# Patient Record
Sex: MALE | Race: BLACK OR AFRICAN AMERICAN | Employment: OTHER | URBAN - METROPOLITAN AREA
[De-identification: names, ages, dates, MRNs, and addresses within clinical notes are randomized per-mention and may not be internally consistent; named-entity substitution may affect disease eponyms.]

---

## 2017-07-13 ENCOUNTER — HOSPITAL ENCOUNTER (OUTPATIENT)
Age: 77
Setting detail: OBSERVATION
Discharge: NURSING, REHAB OR LONG-TERM CARE | End: 2017-07-20
Attending: EMERGENCY MEDICINE | Admitting: INTERNAL MEDICINE
Payer: MEDICARE

## 2017-07-13 DIAGNOSIS — N18.9 CHRONIC RENAL INSUFFICIENCY, UNSPECIFIED STAGE: ICD-10-CM

## 2017-07-13 DIAGNOSIS — D64.9 CHRONIC ANEMIA: ICD-10-CM

## 2017-07-13 DIAGNOSIS — M79.605 BILATERAL LEG PAIN: Primary | ICD-10-CM

## 2017-07-13 DIAGNOSIS — M79.604 BILATERAL LEG PAIN: Primary | ICD-10-CM

## 2017-07-13 PROBLEM — R53.1 WEAKNESS: Status: ACTIVE | Noted: 2017-07-13

## 2017-07-13 PROBLEM — Z87.19 HISTORY OF GI BLEED: Status: ACTIVE | Noted: 2017-07-13

## 2017-07-13 PROBLEM — Z86.718 HISTORY OF DVT (DEEP VEIN THROMBOSIS): Status: ACTIVE | Noted: 2017-07-13

## 2017-07-13 LAB
ALBUMIN SERPL BCP-MCNC: 2.7 G/DL (ref 3.5–5)
ALBUMIN/GLOB SERPL: 0.5 {RATIO} (ref 1.1–2.2)
ALP SERPL-CCNC: 94 U/L (ref 45–117)
ALT SERPL-CCNC: 21 U/L (ref 12–78)
ANION GAP BLD CALC-SCNC: 8 MMOL/L (ref 5–15)
APPEARANCE UR: CLEAR
APTT PPP: 27.8 SEC (ref 22.1–32.5)
AST SERPL W P-5'-P-CCNC: 21 U/L (ref 15–37)
BACTERIA URNS QL MICRO: NEGATIVE /HPF
BASOPHILS # BLD AUTO: 0 K/UL (ref 0–0.1)
BASOPHILS # BLD: 0 % (ref 0–1)
BILIRUB SERPL-MCNC: 0.4 MG/DL (ref 0.2–1)
BILIRUB UR QL: NEGATIVE
BUN SERPL-MCNC: 19 MG/DL (ref 6–20)
BUN/CREAT SERPL: 8 (ref 12–20)
CALCIUM SERPL-MCNC: 8.4 MG/DL (ref 8.5–10.1)
CHLORIDE SERPL-SCNC: 107 MMOL/L (ref 97–108)
CK MB CFR SERPL CALC: 3.7 % (ref 0–2.5)
CK MB SERPL-MCNC: 2.5 NG/ML (ref 5–25)
CK SERPL-CCNC: 68 U/L (ref 39–308)
CO2 SERPL-SCNC: 25 MMOL/L (ref 21–32)
COLOR UR: ABNORMAL
CREAT SERPL-MCNC: 2.3 MG/DL (ref 0.7–1.3)
EOSINOPHIL # BLD: 0.2 K/UL (ref 0–0.4)
EOSINOPHIL NFR BLD: 3 % (ref 0–7)
EPITH CASTS URNS QL MICRO: ABNORMAL /LPF
ERYTHROCYTE [DISTWIDTH] IN BLOOD BY AUTOMATED COUNT: 17.8 % (ref 11.5–14.5)
GLOBULIN SER CALC-MCNC: 5.2 G/DL (ref 2–4)
GLUCOSE SERPL-MCNC: 102 MG/DL (ref 65–100)
GLUCOSE UR STRIP.AUTO-MCNC: NEGATIVE MG/DL
HCT VFR BLD AUTO: 28.1 % (ref 36.6–50.3)
HGB BLD-MCNC: 8.8 G/DL (ref 12.1–17)
HGB UR QL STRIP: ABNORMAL
HYALINE CASTS URNS QL MICRO: ABNORMAL /LPF (ref 0–5)
INR PPP: 1.1 (ref 0.9–1.1)
KETONES UR QL STRIP.AUTO: NEGATIVE MG/DL
LEUKOCYTE ESTERASE UR QL STRIP.AUTO: NEGATIVE
LYMPHOCYTES # BLD AUTO: 32 % (ref 12–49)
LYMPHOCYTES # BLD: 1.6 K/UL (ref 0.8–3.5)
MCH RBC QN AUTO: 24.2 PG (ref 26–34)
MCHC RBC AUTO-ENTMCNC: 31.3 G/DL (ref 30–36.5)
MCV RBC AUTO: 77.2 FL (ref 80–99)
MONOCYTES # BLD: 0.4 K/UL (ref 0–1)
MONOCYTES NFR BLD AUTO: 8 % (ref 5–13)
NEUTS SEG # BLD: 2.8 K/UL (ref 1.8–8)
NEUTS SEG NFR BLD AUTO: 57 % (ref 32–75)
NITRITE UR QL STRIP.AUTO: NEGATIVE
PH UR STRIP: 6 [PH] (ref 5–8)
PLATELET # BLD AUTO: 239 K/UL (ref 150–400)
POTASSIUM SERPL-SCNC: 3.8 MMOL/L (ref 3.5–5.1)
PROT SERPL-MCNC: 7.9 G/DL (ref 6.4–8.2)
PROT UR STRIP-MCNC: 100 MG/DL
PROTHROMBIN TIME: 11.1 SEC (ref 9–11.1)
RBC # BLD AUTO: 3.64 M/UL (ref 4.1–5.7)
RBC #/AREA URNS HPF: ABNORMAL /HPF (ref 0–5)
SODIUM SERPL-SCNC: 140 MMOL/L (ref 136–145)
SP GR UR REFRACTOMETRY: 1.01 (ref 1–1.03)
THERAPEUTIC RANGE,PTTT: NORMAL SECS (ref 58–77)
TROPONIN I SERPL-MCNC: <0.04 NG/ML
UA: UC IF INDICATED,UAUC: ABNORMAL
UROBILINOGEN UR QL STRIP.AUTO: 0.2 EU/DL (ref 0.2–1)
WBC # BLD AUTO: 5 K/UL (ref 4.1–11.1)
WBC URNS QL MICRO: ABNORMAL /HPF (ref 0–4)

## 2017-07-13 PROCEDURE — 74011250637 HC RX REV CODE- 250/637: Performed by: EMERGENCY MEDICINE

## 2017-07-13 PROCEDURE — 74011250636 HC RX REV CODE- 250/636: Performed by: INTERNAL MEDICINE

## 2017-07-13 PROCEDURE — 81001 URINALYSIS AUTO W/SCOPE: CPT | Performed by: EMERGENCY MEDICINE

## 2017-07-13 PROCEDURE — 93005 ELECTROCARDIOGRAM TRACING: CPT

## 2017-07-13 PROCEDURE — 74011250637 HC RX REV CODE- 250/637: Performed by: INTERNAL MEDICINE

## 2017-07-13 PROCEDURE — 85025 COMPLETE CBC W/AUTO DIFF WBC: CPT | Performed by: EMERGENCY MEDICINE

## 2017-07-13 PROCEDURE — 99218 HC RM OBSERVATION: CPT

## 2017-07-13 PROCEDURE — 36415 COLL VENOUS BLD VENIPUNCTURE: CPT | Performed by: EMERGENCY MEDICINE

## 2017-07-13 PROCEDURE — 82550 ASSAY OF CK (CPK): CPT | Performed by: EMERGENCY MEDICINE

## 2017-07-13 PROCEDURE — 85610 PROTHROMBIN TIME: CPT | Performed by: EMERGENCY MEDICINE

## 2017-07-13 PROCEDURE — 85730 THROMBOPLASTIN TIME PARTIAL: CPT | Performed by: EMERGENCY MEDICINE

## 2017-07-13 PROCEDURE — 80053 COMPREHEN METABOLIC PANEL: CPT | Performed by: EMERGENCY MEDICINE

## 2017-07-13 PROCEDURE — 84484 ASSAY OF TROPONIN QUANT: CPT | Performed by: EMERGENCY MEDICINE

## 2017-07-13 PROCEDURE — 99285 EMERGENCY DEPT VISIT HI MDM: CPT

## 2017-07-13 RX ORDER — CARVEDILOL 6.25 MG/1
6.25 TABLET ORAL 2 TIMES DAILY WITH MEALS
Status: ON HOLD | COMMUNITY
End: 2017-07-20

## 2017-07-13 RX ORDER — SODIUM CHLORIDE 0.9 % (FLUSH) 0.9 %
5-10 SYRINGE (ML) INJECTION AS NEEDED
Status: DISCONTINUED | OUTPATIENT
Start: 2017-07-13 | End: 2017-07-21 | Stop reason: HOSPADM

## 2017-07-13 RX ORDER — ACETAMINOPHEN 325 MG/1
650 TABLET ORAL
Status: DISCONTINUED | OUTPATIENT
Start: 2017-07-13 | End: 2017-07-21 | Stop reason: HOSPADM

## 2017-07-13 RX ORDER — AMLODIPINE BESYLATE 5 MG/1
10 TABLET ORAL
Status: COMPLETED | OUTPATIENT
Start: 2017-07-13 | End: 2017-07-13

## 2017-07-13 RX ORDER — INSULIN LISPRO 100 [IU]/ML
INJECTION, SOLUTION INTRAVENOUS; SUBCUTANEOUS
Status: DISCONTINUED | OUTPATIENT
Start: 2017-07-14 | End: 2017-07-21 | Stop reason: HOSPADM

## 2017-07-13 RX ORDER — SODIUM CHLORIDE 9 MG/ML
50 INJECTION, SOLUTION INTRAVENOUS CONTINUOUS
Status: DISCONTINUED | OUTPATIENT
Start: 2017-07-13 | End: 2017-07-19

## 2017-07-13 RX ORDER — ASPIRIN 81 MG/1
81 TABLET ORAL DAILY
COMMUNITY
End: 2018-01-20

## 2017-07-13 RX ORDER — DEXTROSE 50 % IN WATER (D50W) INTRAVENOUS SYRINGE
12.5-25 AS NEEDED
Status: DISCONTINUED | OUTPATIENT
Start: 2017-07-13 | End: 2017-07-21 | Stop reason: HOSPADM

## 2017-07-13 RX ORDER — NALOXONE HYDROCHLORIDE 0.4 MG/ML
0.4 INJECTION, SOLUTION INTRAMUSCULAR; INTRAVENOUS; SUBCUTANEOUS AS NEEDED
Status: DISCONTINUED | OUTPATIENT
Start: 2017-07-13 | End: 2017-07-21 | Stop reason: HOSPADM

## 2017-07-13 RX ORDER — HYDRALAZINE HYDROCHLORIDE 20 MG/ML
20 INJECTION INTRAMUSCULAR; INTRAVENOUS
Status: DISCONTINUED | OUTPATIENT
Start: 2017-07-13 | End: 2017-07-21 | Stop reason: HOSPADM

## 2017-07-13 RX ORDER — AMLODIPINE BESYLATE 5 MG/1
10 TABLET ORAL DAILY
Status: DISCONTINUED | OUTPATIENT
Start: 2017-07-14 | End: 2017-07-21 | Stop reason: HOSPADM

## 2017-07-13 RX ORDER — DIPHENHYDRAMINE HYDROCHLORIDE 50 MG/ML
12.5 INJECTION, SOLUTION INTRAMUSCULAR; INTRAVENOUS
Status: DISCONTINUED | OUTPATIENT
Start: 2017-07-13 | End: 2017-07-21 | Stop reason: HOSPADM

## 2017-07-13 RX ORDER — SODIUM CHLORIDE 0.9 % (FLUSH) 0.9 %
5-10 SYRINGE (ML) INJECTION EVERY 8 HOURS
Status: DISCONTINUED | OUTPATIENT
Start: 2017-07-13 | End: 2017-07-21 | Stop reason: HOSPADM

## 2017-07-13 RX ORDER — HALOPERIDOL 1 MG/1
0.5 TABLET ORAL 3 TIMES DAILY
COMMUNITY
End: 2017-07-20

## 2017-07-13 RX ORDER — GABAPENTIN 300 MG/1
300 CAPSULE ORAL 2 TIMES DAILY
Status: DISCONTINUED | OUTPATIENT
Start: 2017-07-14 | End: 2017-07-14

## 2017-07-13 RX ORDER — ATORVASTATIN CALCIUM 40 MG/1
40 TABLET, FILM COATED ORAL DAILY
COMMUNITY
End: 2018-01-20

## 2017-07-13 RX ORDER — HALOPERIDOL 1 MG/1
0.5 TABLET ORAL 3 TIMES DAILY
Status: DISCONTINUED | OUTPATIENT
Start: 2017-07-13 | End: 2017-07-21 | Stop reason: HOSPADM

## 2017-07-13 RX ORDER — CARVEDILOL 6.25 MG/1
6.25 TABLET ORAL
Status: COMPLETED | OUTPATIENT
Start: 2017-07-13 | End: 2017-07-13

## 2017-07-13 RX ORDER — ATORVASTATIN CALCIUM 20 MG/1
40 TABLET, FILM COATED ORAL DAILY
Status: DISCONTINUED | OUTPATIENT
Start: 2017-07-14 | End: 2017-07-21 | Stop reason: HOSPADM

## 2017-07-13 RX ORDER — HYDROMORPHONE HYDROCHLORIDE 1 MG/ML
0.5 INJECTION, SOLUTION INTRAMUSCULAR; INTRAVENOUS; SUBCUTANEOUS
Status: DISCONTINUED | OUTPATIENT
Start: 2017-07-13 | End: 2017-07-16

## 2017-07-13 RX ORDER — ONDANSETRON 2 MG/ML
4 INJECTION INTRAMUSCULAR; INTRAVENOUS
Status: DISCONTINUED | OUTPATIENT
Start: 2017-07-13 | End: 2017-07-21 | Stop reason: HOSPADM

## 2017-07-13 RX ORDER — CARVEDILOL 6.25 MG/1
6.25 TABLET ORAL 2 TIMES DAILY WITH MEALS
Status: DISCONTINUED | OUTPATIENT
Start: 2017-07-14 | End: 2017-07-16

## 2017-07-13 RX ORDER — MAGNESIUM SULFATE 100 %
4 CRYSTALS MISCELLANEOUS AS NEEDED
Status: DISCONTINUED | OUTPATIENT
Start: 2017-07-13 | End: 2017-07-21 | Stop reason: HOSPADM

## 2017-07-13 RX ORDER — NORTRIPTYLINE HYDROCHLORIDE 25 MG/1
25 CAPSULE ORAL
Status: DISCONTINUED | OUTPATIENT
Start: 2017-07-13 | End: 2017-07-21 | Stop reason: HOSPADM

## 2017-07-13 RX ORDER — HYDROCODONE BITARTRATE AND ACETAMINOPHEN 5; 325 MG/1; MG/1
1 TABLET ORAL
Status: DISCONTINUED | OUTPATIENT
Start: 2017-07-13 | End: 2017-07-21 | Stop reason: HOSPADM

## 2017-07-13 RX ORDER — ASPIRIN 81 MG/1
81 TABLET ORAL DAILY
Status: DISCONTINUED | OUTPATIENT
Start: 2017-07-14 | End: 2017-07-21 | Stop reason: HOSPADM

## 2017-07-13 RX ORDER — DOCUSATE SODIUM 100 MG/1
100 CAPSULE, LIQUID FILLED ORAL 2 TIMES DAILY
Status: DISCONTINUED | OUTPATIENT
Start: 2017-07-14 | End: 2017-07-21 | Stop reason: HOSPADM

## 2017-07-13 RX ORDER — HYDRALAZINE HYDROCHLORIDE 25 MG/1
50 TABLET, FILM COATED ORAL 3 TIMES DAILY
Status: DISCONTINUED | OUTPATIENT
Start: 2017-07-13 | End: 2017-07-14

## 2017-07-13 RX ADMIN — NORTRIPTYLINE HYDROCHLORIDE 25 MG: 25 CAPSULE ORAL at 23:52

## 2017-07-13 RX ADMIN — HALOPERIDOL 0.5 MG: 1 TABLET ORAL at 23:52

## 2017-07-13 RX ADMIN — CARVEDILOL 6.25 MG: 6.25 TABLET, FILM COATED ORAL at 20:50

## 2017-07-13 RX ADMIN — AMLODIPINE BESYLATE 10 MG: 5 TABLET ORAL at 20:50

## 2017-07-13 RX ADMIN — HYDRALAZINE HYDROCHLORIDE 50 MG: 25 TABLET, FILM COATED ORAL at 23:52

## 2017-07-13 RX ADMIN — SODIUM CHLORIDE 50 ML/HR: 900 INJECTION, SOLUTION INTRAVENOUS at 23:52

## 2017-07-13 NOTE — ED NOTES
Pt states he was recently discharged from [facility] and received 5 units of blood. Was on Xarelto. Not currently taking it. States he feels like his blood is low again. Bilateral leg pain.

## 2017-07-13 NOTE — IP AVS SNAPSHOT
[facility] 
 
 
 [address] 
[phone] Patient: Jonathan Chavez MRN: [id number] CVT:0/6/0867 Current Discharge Medication List  
  
START taking these medications Dose & Instructions Dispensing Information Comments Morning Noon Evening Bedtime  
 docusate sodium 100 mg capsule Commonly known as:  Stephanie Lambert Your last dose was: Your next dose is:    
   
   
 Dose:  100 mg Take 1 Cap by mouth two (2) times a day for 90 days. Quantity:  60 Cap Refills:  0  
     
   
   
   
  
 ferrous sulfate 325 mg (65 mg iron) tablet Your last dose was: Your next dose is:    
   
   
 Dose:  325 mg Take 1 Tab by mouth two (2) times daily (with meals). Quantity:  60 Tab Refills:  0  
     
   
   
   
  
 hydrALAZINE 50 mg tablet Commonly known as:  APRESOLINE Your last dose was: Your next dose is:    
   
   
 Dose:  50 mg Take 1 Tab by mouth three (3) times daily. Quantity:  90 Tab Refills:  0  
     
   
   
   
  
 polyethylene glycol 17 gram packet Commonly known as:  Lona Husr Your last dose was: Your next dose is:    
   
   
 Dose:  17 g Take 1 Packet by mouth daily. Quantity:  30 Packet Refills:  0 CONTINUE these medications which have CHANGED Dose & Instructions Dispensing Information Comments Morning Noon Evening Bedtime  
 carvedilol 3.125 mg tablet Commonly known as:  Sadaf Lozano What changed:   
- medication strength 
- how much to take Your last dose was: Your next dose is:    
   
   
 Dose:  3.125 mg Take 1 Tab by mouth two (2) times daily (with meals). Quantity:  60 Tab Refills:  0 CONTINUE these medications which have NOT CHANGED Dose & Instructions Dispensing Information Comments Morning Noon Evening Bedtime  
 amLODIPine 10 mg tablet Commonly known as:  Priscila Swift Your last dose was: Your next dose is:    
   
   
 Dose:  10 mg Take 10 mg by mouth daily. Refills:  0  
     
   
   
   
  
 aspirin delayed-release 81 mg tablet Your last dose was: Your next dose is:    
   
   
 Dose:  81 mg Take 81 mg by mouth daily. Refills:  0  
     
   
   
   
  
 atorvastatin 40 mg tablet Commonly known as:  LIPITOR Your last dose was: Your next dose is:    
   
   
 Dose:  40 mg Take 40 mg by mouth daily. Refills:  0  
     
   
   
   
  
 nortriptyline 25 mg capsule Commonly known as:  PAMELOR Your last dose was: Your next dose is:    
   
   
 Dose:  25 mg Take 25 mg by mouth nightly. Indications: Pain Refills:  0 STOP taking these medications   
 cloNIDine HCl 0.2 mg tablet Commonly known as:  CATAPRES  
   
  
 haloperidol 1 mg tablet Commonly known as:  HALDOL  
   
  
 oxyCODONE-acetaminophen  mg per tablet Commonly known as:  PERCOCET 10 Where to Get Your Medications These medications were sent to [facility], [address] [place] [address] 99 [address] [place] [address], [place] [address] Phone:  [phone]  
  carvedilol 3.125 mg tablet  
 docusate sodium 100 mg capsule  
 ferrous sulfate 325 mg (65 mg iron) tablet  
 hydrALAZINE 50 mg tablet  
 polyethylene glycol 17 gram packet

## 2017-07-13 NOTE — ED NOTES
MD notified of BP elevation. Verbal order for home meds. Pt states he has not taken any of his medications.

## 2017-07-13 NOTE — ED NOTES
Pt has been consistently on the phone since arrival. C/O not eating since last night when he had a bowl of soup. Ordered pt meal tray per MD GARZA.

## 2017-07-13 NOTE — IP AVS SNAPSHOT
[facility] 
 
 
 [address] [facility] [address] [facility] 
[phone] Patient: Alec Cheng MRN: [id number] AZX:[birth year] You are allergic to the following Allergen Reactions Tetracycline Hives Recent Documentation Height Weight BMI Smoking Status 2.032 m 88.9 kg 21.53 kg/m2 Current Every Day Smoker Unresulted Labs Order Current Status SAMPLE TO BLOOD BANK In process Emergency Contacts Name Discharge Info Relation Home Work Mobile Marisabel Willingham  Spouse [3] [phone] About your hospitalization You were admitted on:  July 13, 2017 You last received care in the:  [facility] 5M1 MED SURG 1 You were discharged on:  July 20, 2017 Unit phone number:  [phone] Why you were hospitalized Your primary diagnosis was:  Not on File Your diagnoses also included:  Weakness, History Of Dvt (Deep Vein Thrombosis), History Of Gi Bleed, Peripheral Vascular Disease (Hcc), Hypertension, Neuropathic Pain Of Foot, Ckd (Chronic Kidney Disease), Stage Iii, Dm Type 2 Causing Renal Disease (Hcc), Cad (Coronary Artery Disease) Providers Seen During Your Hospitalizations Provider Role Specialty Primary office phone Daryl Jewell MD Attending Provider Emergency Medicine [phone] Shana Zurita MD Attending Provider Internal Medicine [phone] Monique Courtney MD Attending Provider Internal Medicine [phone] Mary Le MD Attending Provider Internal Medicine [phone] Your Primary Care Physician (PCP) Primary Care Physician Office Phone Office Fax OTHER, PHYS ** None ** ** None ** Follow-up Information Follow up With Details Comments Contact Info  
 your doctor Schedule an appointment as soon as possible for a visit in 1 day [facility] EMERGENCY DEPT  If symptoms worsen [address] [facility] [address] 
[phone] Johny Warner MD   Patient can only remember the practice name and not the physician [place] [address] [address] 
[phone] Current Discharge Medication List  
  
START taking these medications Dose & Instructions Dispensing Information Comments Morning Noon Evening Bedtime  
 docusate sodium 100 mg capsule Commonly known as:  Farshad Parody Your last dose was: Your next dose is:    
   
   
 Dose:  100 mg Take 1 Cap by mouth two (2) times a day for 90 days. Quantity:  60 Cap Refills:  0  
     
   
   
   
  
 ferrous sulfate 325 mg (65 mg iron) tablet Your last dose was: Your next dose is:    
   
   
 Dose:  325 mg Take 1 Tab by mouth two (2) times daily (with meals). Quantity:  60 Tab Refills:  0  
     
   
   
   
  
 hydrALAZINE 50 mg tablet Commonly known as:  APRESOLINE Your last dose was: Your next dose is:    
   
   
 Dose:  50 mg Take 1 Tab by mouth three (3) times daily. Quantity:  90 Tab Refills:  0  
     
   
   
   
  
 polyethylene glycol 17 gram packet Commonly known as:  Kathrin Hodgkin Your last dose was: Your next dose is:    
   
   
 Dose:  17 g Take 1 Packet by mouth daily. Quantity:  30 Packet Refills:  0 CONTINUE these medications which have CHANGED Dose & Instructions Dispensing Information Comments Morning Noon Evening Bedtime  
 carvedilol 3.125 mg tablet Commonly known as:  Denis Morrell What changed:   
- medication strength 
- how much to take Your last dose was: Your next dose is:    
   
   
 Dose:  3.125 mg Take 1 Tab by mouth two (2) times daily (with meals). Quantity:  60 Tab Refills:  0 CONTINUE these medications which have NOT CHANGED Dose & Instructions Dispensing Information Comments  Morning Noon Evening Bedtime  
 amLODIPine 10 mg tablet Commonly known as:  Ariel Rings Your last dose was: Your next dose is:    
   
   
 Dose:  10 mg Take 10 mg by mouth daily. Refills:  0  
     
   
   
   
  
 aspirin delayed-release 81 mg tablet Your last dose was: Your next dose is:    
   
   
 Dose:  81 mg Take 81 mg by mouth daily. Refills:  0  
     
   
   
   
  
 atorvastatin 40 mg tablet Commonly known as:  LIPITOR Your last dose was: Your next dose is:    
   
   
 Dose:  40 mg Take 40 mg by mouth daily. Refills:  0  
     
   
   
   
  
 nortriptyline 25 mg capsule Commonly known as:  PAMELOR Your last dose was: Your next dose is:    
   
   
 Dose:  25 mg Take 25 mg by mouth nightly. Indications: Pain Refills:  0 STOP taking these medications   
 cloNIDine HCl 0.2 mg tablet Commonly known as:  CATAPRES  
   
  
 haloperidol 1 mg tablet Commonly known as:  HALDOL  
   
  
 oxyCODONE-acetaminophen  mg per tablet Commonly known as:  PERCOCET 10 Where to Get Your Medications These medications were sent to [address] [place] [address], [address] [place] [address] 99 66 [place] [address] Phone:  [phone]  
  carvedilol 3.125 mg tablet  
 docusate sodium 100 mg capsule  
 ferrous sulfate 325 mg (65 mg iron) tablet  
 hydrALAZINE 50 mg tablet  
 polyethylene glycol 17 gram packet Discharge Instructions HOSPITALIST DISCHARGE INSTRUCTIONS 
NAME: Emy Clay :  1940 MRN:  [id number] Date/Time:  2017 4:16 PM 
 
ADMIT DATE: 2017 DISCHARGE DATE: 2017 DISCHARGE DIAGNOSIS: 
Weakness Anemia MEDICATIONS: 
 
· It is important that you take the medication exactly as they are prescribed.   
· Keep your medication in the bottles provided by the pharmacist and keep a list of the medication names, dosages, and times to be taken in your wallet. · Do not take other medications without consulting your doctor. What to do at [facility]: 
1. Check blood pressure at home twice a day. Call facility MD for blood pressure greater than 170/90 or lower than 110/55 or if you have dizziness or lightheadedness. 2.  Check blood sugar twice a day. Call facility MD for blood sugar persistently greater than 200 or lower than 80. 
3.  Contact facility MD for heart rate < 50. 
4.  Monitor CBC and BMP weekly with results to facility MD. Recommended diet:  Cardiac Diet and Diabetic Diet Recommended activity: PT/OT Eval and Treat If you experience any of the following symptoms then please call your primary care physician or return to the emergency room if you cannot get hold of your doctor: 
Fever, chills, nausea, vomiting, diarrhea, change in mentation, falling, bleeding, shortness of breath Follow Up: 
Facility MD upon arrival 
 
 
Information obtained by : 
I understand that if any problems occur once I am at home I am to contact my physician. I understand and acknowledge receipt of the instructions indicated above. Physician's or R.N.'s Signature                                                                  Date/Time Patient or Representative Signature                                                          Date/Time Anemia: Care Instructions Your Care Instructions Anemia is a low level of red blood cells, which carry oxygen throughout your body. Many things can cause anemia. Lack of iron is one of the most common causes.  Your body needs iron to make hemoglobin, a substance in red blood cells that carries oxygen from the lungs to your body's cells. Without enough iron, the body produces fewer and smaller red blood cells. As a result, your body's cells do not get enough oxygen, and you feel tired and weak. And you may have trouble concentrating. Bleeding is the most common cause of a lack of iron. You may have heavy menstrual bleeding or bleeding caused by conditions such as ulcers, hemorrhoids, or cancer. Regular use of aspirin or other anti-inflammatory medicines (such as ibuprofen) also can cause bleeding in some people. A lack of iron in your diet also can cause anemia, especially at times when the body needs more iron, such as during pregnancy, infancy, and the teen years. Your doctor may have prescribed iron pills. It may take several months of treatment for your iron levels to return to normal. Your doctor also may suggest that you eat foods that are rich in iron, such as meat and beans. There are many other causes of anemia. It is not always due to a lack of iron. Finding the specific cause of your anemia will help your doctor find the right treatment for you. Follow-up care is a key part of your treatment and safety. Be sure to make and go to all appointments, and call your doctor if you are having problems. It's also a good idea to know your test results and keep a list of the medicines you take. How can you care for yourself at home? · Take your medicines exactly as prescribed. Call your doctor if you think you are having a problem with your medicine. · If your doctor recommends iron pills, take them as directed: ¨ Try to take the pills on an empty stomach about 1 hour before or 2 hours after meals. But you may need to take iron with food to avoid an upset stomach. ¨ Do not take antacids or drink milk or caffeine drinks (such as coffee, tea, or cola) at the same time or within 2 hours of the time that you take your iron. They can make it hard for your body to absorb the iron.  
¨ Vitamin C (from food or supplements) helps your body absorb iron. Try taking iron pills with a glass of orange juice or some other food that is high in vitamin C, such as citrus fruits. ¨ Iron pills may cause stomach problems, such as heartburn, nausea, diarrhea, constipation, and cramps. Be sure to drink plenty of fluids, and include fruits, vegetables, and fiber in your diet each day. Iron pills often make your bowel movements dark or green. ¨ If you forget to take an iron pill, do not take a double dose of iron the next time you take a pill. ¨ Keep iron pills out of the reach of small children. An overdose of iron can be very dangerous. · Follow your doctor's advice about eating iron-rich foods. These include red meat, shellfish, poultry, eggs, beans, raisins, whole-grain bread, and leafy green vegetables. · Steam vegetables to help them keep their iron content. When should you call for help? Call 911 anytime you think you may need emergency care. For example, call if: 
· You have symptoms of a heart attack. These may include: ¨ Chest pain or pressure, or a strange feeling in the chest. 
¨ Sweating. ¨ Shortness of breath. ¨ Nausea or vomiting. ¨ Pain, pressure, or a strange feeling in the back, neck, jaw, or upper belly or in one or both shoulders or arms. ¨ Lightheadedness or sudden weakness. ¨ A fast or irregular heartbeat. After you call 911, the  may tell you to chew 1 adult-strength or 2 to 4 low-dose aspirin. Wait for an ambulance. Do not try to drive yourself. · You passed out (lost consciousness). Call your doctor now or seek immediate medical care if: 
· You have new or increased shortness of breath. · You are dizzy or lightheaded, or you feel like you may faint. · Your fatigue and weakness continue or get worse. · You have any abnormal bleeding, such as: 
¨ Nosebleeds. ¨ Vaginal bleeding that is different (heavier, more frequent, at a different time of the month) than what you are used to.  
¨ Bloody or black stools, or rectal bleeding. ¨ Bloody or pink urine. Watch closely for changes in your health, and be sure to contact your doctor if: 
· You do not get better as expected. Where can you learn more? Go to http://kalpesh-jen.info/. Enter R301 in the search box to learn more about \"Anemia: Care Instructions. \" Current as of: October 13, 2016 Content Version: 11.3 © 3542-3085 [employer]. Care instructions adapted under license by [employer] (which disclaims liability or warranty for this information). If you have questions about a medical condition or this instruction, always ask your healthcare professional. [address] any warranty or liability for your use of this information. Chronic Kidney Disease: Care Instructions Your Care Instructions Chronic kidney disease happens when your kidneys don't work as well as they should. Your kidneys have a few important jobs. They remove waste from your blood. This waste leaves your body in your urine. They also balance your body's fluids and chemicals. When your kidneys don't work well, extra waste and fluid can build up. This can poison the body and sometimes cause death. The most common causes of this disease are diabetes and high blood pressure. In some cases, the disease develops in 2 to 3 months. But it usually develops over many years. If you take medicine and make healthy changes to your lifestyle, you may be able to prevent the disease from getting worse. But if your kidney damage gets worse, you may need dialysis or a kidney transplant. Dialysis uses a machine to filter waste from the blood. A transplant is surgery to give you a healthy kidney from another person. Follow-up care is a key part of your treatment and safety. Be sure to make and go to all appointments, and call your doctor if you are having problems.  It's also a good idea to know your test results and keep a list of the medicines you take. How can you care for yourself at home? Treatments and appointments · Be safe with medicines. Take your medicines exactly as prescribed. Call your doctor if you have any problems with your medicine. You also may take medicine to control your blood pressure or to treat diabetes. Many people who have diabetes take blood pressure medicine. · If you have diabetes, do your best to keep your blood sugar in your target range. You may do this by eating healthy food and exercising. You may also take medicines. · Go to your dialysis appointments if you have this treatment. · Do not take ibuprofen (Advil, Motrin), naproxen (Aleve), or similar medicines, unless your doctor tells you to. These may make the disease worse. · Do not take any vitamins, over-the-counter medicines, or herbal products without talking to your doctor first. 
· Do not smoke or use other tobacco products. Smoking can reduce blood flow to the kidneys. If you need help quitting, talk to your doctor about stop-smoking programs and medicines. These can increase your chances of quitting for good. · Do not drink alcohol or use illegal drugs. · Talk to your doctor about an exercise plan. Exercise helps lower your blood pressure. It also makes you feel better. · If you have an advance directive, let your doctor know. It may include a living will and a durable power of  for health care. If you don't have one, you may want to prepare one. It lets your doctor and loved ones know your health care wishes if you become unable to speak for yourself. Diet · Talk to a registered dietitian. He or she can help you make a meal plan that is right for you. Most people with kidney disease need to limit salt (sodium), fluids, and protein. Some also have to limit potassium and phosphorus. · You may have to give up many foods you like. But try to focus on the fact that this will help you stay healthy for as long as possible.  
· If you have a hard time eating enough, talk to your doctor or dietitian about ways to add calories to your diet. · Your diet may change as your disease changes. See your doctor for regular testing. And work with a dietitian to change your diet as needed. When should you call for help? Call 911 anytime you think you may need emergency care. For example, call if: 
· You passed out (lost consciousness). Call your doctor now or seek immediate medical care if: 
· You have less urine than normal or no urine. · You have trouble urinating or can urinate only very small amounts. · You are confused or have trouble thinking clearly. · You feel weaker or more tired than usual. 
· You are very thirsty, lightheaded, or dizzy. · You have nausea and vomiting. · You have new swelling of your arms or feet, or your swelling is worse. · You have blood in your urine. · You have new or worse trouble breathing. Watch closely for changes in your health, and be sure to contact your doctor if: 
· You have any problems with your medicine or other treatment. Where can you learn more? Go to http://kalpesh-jen.info/. Enter N276 in the search box to learn more about \"Chronic Kidney Disease: Care Instructions. \" Current as of: April 3, 2017 Content Version: 11.3 © 8149-7953 [employer]. Care instructions adapted under license by [employer] (which disclaims liability or warranty for this information). If you have questions about a medical condition or this instruction, always ask your healthcare professional. [place] [address] any warranty or liability for your use of this information. Discharge Orders None [employer] Announcement We are excited to announce that we are making your provider's discharge notes available to you in [employer]. You will see these notes when they are completed and signed by the physician that discharged you from your recent hospital stay.   If you have any questions or concerns about any information you see in Semanticator, please call the Health Information Department where you were seen or reach out to your Primary Care Provider for more information about your plan of care. Introducing [facility]! Florinda Houser introduces Semanticator patient portal. Now you can access parts of your medical record, email your doctor's office, and request medication refills online. 1. In your internet browser, go to [email or web address] 2. Click on the First Time User? Click Here link in the Sign In box. You will see the New Member Sign Up page. 3. Enter your Semanticator Access Code exactly as it appears below. You will not need to use this code after youve completed the sign-up process. If you do not sign up before the expiration date, you must request a new code. · Semanticator Access Code: [email or web address] Expires: 10/11/2017  9:17 PM 
 
4. Enter the last four digits of your Social Security Number (xxxx) and Date of Birth (mm/dd/yyyy) as indicated and click Submit. You will be taken to the next sign-up page. 5. Create a Semanticator ID. This will be your Semanticator login ID and cannot be changed, so think of one that is secure and easy to remember. 6. Create a Semanticator password. You can change your password at any time. 7. Enter your Password Reset Question and Answer. This can be used at a later time if you forget your password. 8. Enter your e-mail address. You will receive e-mail notification when new information is available in [address] 9. Click Sign Up. You can now view and download portions of your medical record. 10. Click the Download Summary menu link to download a portable copy of your medical information. If you have questions, please visit the Frequently Asked Questions section of the Semanticator website. Remember, Semanticator is NOT to be used for urgent needs. For medical emergencies, dial 911. Now available from your iPhone and Android! General Information Please provide this summary of care documentation to your next provider. Patient Signature:  ____________________________________________________________ Date:  ____________________________________________________________  
  
Jayce Shackle Provider Signature:  ____________________________________________________________ Date:  ____________________________________________________________

## 2017-07-14 LAB
ALBUMIN SERPL BCP-MCNC: 2.7 G/DL (ref 3.5–5)
ALBUMIN/GLOB SERPL: 0.5 {RATIO} (ref 1.1–2.2)
ALP SERPL-CCNC: 98 U/L (ref 45–117)
ALT SERPL-CCNC: 23 U/L (ref 12–78)
ANION GAP BLD CALC-SCNC: 10 MMOL/L (ref 5–15)
AST SERPL W P-5'-P-CCNC: 22 U/L (ref 15–37)
ATRIAL RATE: 64 BPM
BILIRUB DIRECT SERPL-MCNC: 0.1 MG/DL (ref 0–0.2)
BILIRUB SERPL-MCNC: 0.4 MG/DL (ref 0.2–1)
BUN SERPL-MCNC: 20 MG/DL (ref 6–20)
BUN/CREAT SERPL: 9 (ref 12–20)
CALCIUM SERPL-MCNC: 8.5 MG/DL (ref 8.5–10.1)
CALCULATED P AXIS, ECG09: 69 DEGREES
CALCULATED R AXIS, ECG10: 16 DEGREES
CALCULATED T AXIS, ECG11: 162 DEGREES
CHLORIDE SERPL-SCNC: 106 MMOL/L (ref 97–108)
CO2 SERPL-SCNC: 24 MMOL/L (ref 21–32)
CREAT SERPL-MCNC: 2.16 MG/DL (ref 0.7–1.3)
DIAGNOSIS, 93000: NORMAL
ERYTHROCYTE [DISTWIDTH] IN BLOOD BY AUTOMATED COUNT: 17.9 % (ref 11.5–14.5)
EST. AVERAGE GLUCOSE BLD GHB EST-MCNC: NORMAL MG/DL
FERRITIN SERPL-MCNC: 21 NG/ML (ref 26–388)
FOLATE SERPL-MCNC: 8.2 NG/ML (ref 5–21)
GLOBULIN SER CALC-MCNC: 5.4 G/DL (ref 2–4)
GLUCOSE BLD STRIP.AUTO-MCNC: 113 MG/DL (ref 65–100)
GLUCOSE BLD STRIP.AUTO-MCNC: 92 MG/DL (ref 65–100)
GLUCOSE BLD STRIP.AUTO-MCNC: 92 MG/DL (ref 65–100)
GLUCOSE BLD STRIP.AUTO-MCNC: 97 MG/DL (ref 65–100)
GLUCOSE SERPL-MCNC: 113 MG/DL (ref 65–100)
HBA1C MFR BLD: 4.6 % (ref 4.2–6.3)
HCT VFR BLD AUTO: 28.9 % (ref 36.6–50.3)
HGB BLD-MCNC: 9.3 G/DL (ref 12.1–17)
IRON SATN MFR SERPL: 10 % (ref 20–50)
IRON SERPL-MCNC: 28 UG/DL (ref 35–150)
MAGNESIUM SERPL-MCNC: 1.8 MG/DL (ref 1.6–2.4)
MCH RBC QN AUTO: 24.3 PG (ref 26–34)
MCHC RBC AUTO-ENTMCNC: 32.2 G/DL (ref 30–36.5)
MCV RBC AUTO: 75.5 FL (ref 80–99)
P-R INTERVAL, ECG05: 236 MS
PHOSPHATE SERPL-MCNC: 3.3 MG/DL (ref 2.6–4.7)
PLATELET # BLD AUTO: 277 K/UL (ref 150–400)
POTASSIUM SERPL-SCNC: 3.8 MMOL/L (ref 3.5–5.1)
PROT SERPL-MCNC: 8.1 G/DL (ref 6.4–8.2)
Q-T INTERVAL, ECG07: 460 MS
QRS DURATION, ECG06: 92 MS
QTC CALCULATION (BEZET), ECG08: 474 MS
RBC # BLD AUTO: 3.83 M/UL (ref 4.1–5.7)
SERVICE CMNT-IMP: ABNORMAL
SERVICE CMNT-IMP: NORMAL
SODIUM SERPL-SCNC: 140 MMOL/L (ref 136–145)
TIBC SERPL-MCNC: 268 UG/DL (ref 250–450)
VENTRICULAR RATE, ECG03: 64 BPM
VIT B12 SERPL-MCNC: 385 PG/ML (ref 211–911)
WBC # BLD AUTO: 5.2 K/UL (ref 4.1–11.1)

## 2017-07-14 PROCEDURE — 82607 VITAMIN B-12: CPT | Performed by: INTERNAL MEDICINE

## 2017-07-14 PROCEDURE — 80076 HEPATIC FUNCTION PANEL: CPT | Performed by: INTERNAL MEDICINE

## 2017-07-14 PROCEDURE — 36415 COLL VENOUS BLD VENIPUNCTURE: CPT | Performed by: INTERNAL MEDICINE

## 2017-07-14 PROCEDURE — 82746 ASSAY OF FOLIC ACID SERUM: CPT | Performed by: INTERNAL MEDICINE

## 2017-07-14 PROCEDURE — 99218 HC RM OBSERVATION: CPT

## 2017-07-14 PROCEDURE — 80048 BASIC METABOLIC PNL TOTAL CA: CPT | Performed by: INTERNAL MEDICINE

## 2017-07-14 PROCEDURE — 82728 ASSAY OF FERRITIN: CPT | Performed by: INTERNAL MEDICINE

## 2017-07-14 PROCEDURE — 74011250637 HC RX REV CODE- 250/637: Performed by: INTERNAL MEDICINE

## 2017-07-14 PROCEDURE — 74011250636 HC RX REV CODE- 250/636: Performed by: INTERNAL MEDICINE

## 2017-07-14 PROCEDURE — 96361 HYDRATE IV INFUSION ADD-ON: CPT

## 2017-07-14 PROCEDURE — 85027 COMPLETE CBC AUTOMATED: CPT | Performed by: INTERNAL MEDICINE

## 2017-07-14 PROCEDURE — 82962 GLUCOSE BLOOD TEST: CPT

## 2017-07-14 PROCEDURE — 83735 ASSAY OF MAGNESIUM: CPT | Performed by: INTERNAL MEDICINE

## 2017-07-14 PROCEDURE — 83036 HEMOGLOBIN GLYCOSYLATED A1C: CPT | Performed by: INTERNAL MEDICINE

## 2017-07-14 PROCEDURE — 84100 ASSAY OF PHOSPHORUS: CPT | Performed by: INTERNAL MEDICINE

## 2017-07-14 PROCEDURE — 83540 ASSAY OF IRON: CPT | Performed by: INTERNAL MEDICINE

## 2017-07-14 PROCEDURE — 96374 THER/PROPH/DIAG INJ IV PUSH: CPT

## 2017-07-14 RX ORDER — CLONIDINE HYDROCHLORIDE 0.1 MG/1
0.2 TABLET ORAL 2 TIMES DAILY
Status: DISCONTINUED | OUTPATIENT
Start: 2017-07-14 | End: 2017-07-16

## 2017-07-14 RX ORDER — OXYCODONE AND ACETAMINOPHEN 10; 325 MG/1; MG/1
1 TABLET ORAL
COMMUNITY
End: 2017-07-20

## 2017-07-14 RX ORDER — CLONIDINE HYDROCHLORIDE 0.2 MG/1
0.2 TABLET ORAL 2 TIMES DAILY
COMMUNITY
End: 2017-07-20

## 2017-07-14 RX ORDER — GLIPIZIDE 5 MG/1
5 TABLET ORAL DAILY
Status: ON HOLD | COMMUNITY
End: 2017-07-14

## 2017-07-14 RX ADMIN — CLONIDINE HYDROCHLORIDE 0.2 MG: 0.1 TABLET ORAL at 17:14

## 2017-07-14 RX ADMIN — HYDRALAZINE HYDROCHLORIDE 50 MG: 25 TABLET, FILM COATED ORAL at 08:38

## 2017-07-14 RX ADMIN — CARVEDILOL 6.25 MG: 6.25 TABLET, FILM COATED ORAL at 17:14

## 2017-07-14 RX ADMIN — Medication 10 ML: at 04:03

## 2017-07-14 RX ADMIN — ATORVASTATIN CALCIUM 40 MG: 20 TABLET, FILM COATED ORAL at 08:38

## 2017-07-14 RX ADMIN — HYDROCODONE BITARTRATE AND ACETAMINOPHEN 1 TABLET: 5; 325 TABLET ORAL at 02:21

## 2017-07-14 RX ADMIN — AMLODIPINE BESYLATE 10 MG: 5 TABLET ORAL at 08:38

## 2017-07-14 RX ADMIN — NORTRIPTYLINE HYDROCHLORIDE 25 MG: 25 CAPSULE ORAL at 21:32

## 2017-07-14 RX ADMIN — HALOPERIDOL 0.5 MG: 1 TABLET ORAL at 08:39

## 2017-07-14 RX ADMIN — HALOPERIDOL 0.5 MG: 1 TABLET ORAL at 17:14

## 2017-07-14 RX ADMIN — CARVEDILOL 6.25 MG: 6.25 TABLET, FILM COATED ORAL at 08:38

## 2017-07-14 RX ADMIN — ASPIRIN 81 MG: 81 TABLET, COATED ORAL at 08:38

## 2017-07-14 RX ADMIN — GABAPENTIN 300 MG: 300 CAPSULE ORAL at 08:39

## 2017-07-14 RX ADMIN — Medication 10 ML: at 00:24

## 2017-07-14 RX ADMIN — HALOPERIDOL 0.5 MG: 1 TABLET ORAL at 21:33

## 2017-07-14 RX ADMIN — HYDROMORPHONE HYDROCHLORIDE 0.5 MG: 1 INJECTION, SOLUTION INTRAMUSCULAR; INTRAVENOUS; SUBCUTANEOUS at 12:52

## 2017-07-14 RX ADMIN — DOCUSATE SODIUM 100 MG: 100 CAPSULE ORAL at 08:38

## 2017-07-14 RX ADMIN — HYDROCODONE BITARTRATE AND ACETAMINOPHEN 1 TABLET: 5; 325 TABLET ORAL at 17:18

## 2017-07-14 NOTE — PROGRESS NOTES
Physical Therapy Note: 
 
Orders acknowledged, chart reviewed, discussed with RN. PT evaluation attempted and deferred. Pt declining participation with PT, closing eyes and appears to fall asleep quickly during conversation. Pt with minimal verbal responses to questions. Requires 3 repetitions regarding willingness to participate before any verbal response offered. Pt eventually states, \"I'm not going to tell you again. I ache all over,\" and declines participation. He immediately closes eyes again, then spontaneously re-opens eyes when room being set-up for PT exit. He agrees to participate at next PT attempt. Current discharge plan is to attempt SNF placement, possibly with skilled rehab if appropriate. Pt currently observation status. Will continue to follow and complete PT evaluation when pt agreeable and appropriate.

## 2017-07-14 NOTE — WOUND CARE
Wound care consult for foot wound. Patient is alert, answering few questions verbally mostly nodding head. Pt does not appear to be in pain or distress. All skin folds and bony prominences assessed. Wound nurses asked if we could turn to assess his backside and he stated \" the day nurse just did that, I have nothing back there. \" Right plantar foot with large resurfaced, calloused area of skin, dry and built up. Left heel dry and intact, with hypopigmented skin from previous wounds. Right lower leg slightly swollen ankle to knee, no redness or warmth noted. Treatment: 
Lotion applied to bilateral legs and feet Podiatry consult Nutrition consult Bariatric bed ordered for length patient is 6'8 
 
Please re consult as needed.  
 
Stephy Hopson RN

## 2017-07-14 NOTE — PROGRESS NOTES
BSI: MED RECONCILIATION Comments/Recommendations:  
 Patient is a poor historian. Unable to report what medications he takes at home. Unable to determine last doses of medication. Reports having a list at home and that his fiance, Ms. Brian Castro, helps him with his medications. Asked if she is a good contact to review his medication list and patient reports she is unable to read or write and would not be able to help. Patient insisted I call his PCP office, Dr. Shila Allison, for his current medications. [facility] med list has been updated to reflect the list faxed from PCP office.  Of note, some of his active medications have not been filled recently: 
o Amlodipine 10 mg, #90 on 2/27/17 
o Nortriptyline 25 mg, #90 on 2/27/17 
o Clonidine 0.2 mg, #180 on 3/27/17 · Per medication list, patient should be taking glipizide 5 mg. However, this medication has not been filled since 2016.  Noted drug interaction between nortriptyline and haloperidol, due to risk of prolonged Qtc interval. Please monitor Qtc interval.  
 
Medications added: · Clonidine 0.2 mg  
· Oxycodone-APAP 
· Glipizide Medications removed: · Gabapentin · Hydralazine · Hydrocodone-APAP Information obtained from: Patient's PCP office (Dr. Shila Allison) Allergies: Tetracycline Prior to Admission Medications:  
 
Prior to Admission Medications Prescriptions Last Dose Informant Patient Reported? Taking? amLODIPine (NORVASC) 10 mg tablet   Yes Yes Sig: Take 10 mg by mouth daily. aspirin delayed-release 81 mg tablet   Yes Yes Sig: Take 81 mg by mouth daily. atorvastatin (LIPITOR) 40 mg tablet   Yes Yes Sig: Take 40 mg by mouth daily. carvedilol (COREG) 6.25 mg tablet   Yes Yes Sig: Take 6.25 mg by mouth two (2) times daily (with meals). cloNIDine HCl (CATAPRES) 0.2 mg tablet   Yes Yes Sig: Take 0.2 mg by mouth two (2) times a day. glipiZIDE (GLUCOTROL) 5 mg tablet   Yes Yes Sig: Take 5 mg by mouth daily. haloperidol (HALDOL) 1 mg tablet   Yes Yes Sig: Take 0.5 mg by mouth three (3) times daily. nortriptyline (PAMELOR) 25 mg capsule   Yes Yes Sig: Take 25 mg by mouth nightly. Indications: Pain  
oxyCODONE-acetaminophen (PERCOCET 10)  mg per tablet   Yes Yes Sig: Take 1 Tab by mouth three (3) times daily as needed. Sue Hannon , PharmD Candidate [id number]

## 2017-07-14 NOTE — DIABETES MGMT
DTC Consult Note POC Glucose last 24hrs:  
 
Lab Results Component Value Date/Time  (H) 07/14/2017 04:42 AM  
  (H) 07/13/2017 06:10 PM  
 GLUCPOC 97 07/14/2017 07:17 AM  
  
 
Recommendations/ Comments: Pt's probably disposition is SNF and LTC. Attempted to see Mr. Sebastian Quinonez; opened eyes when he was addressed. Inappropriate for education. A1C is less than pre-diabetes range. Glucose well within target. If appropriate, please consider changing correction from normal sensitivity to high sensitivity scale. Consult received from Salinas Mendez MD for 
[x]    Assessment of home management 
[]    Meal planning 
[x]    Meter / Monitoring 
[]    New Diagnosis []    Insulin education 
[]    Insulin pump notification 
[x]    Medication recommendations []    Hospital glucose management 
[]    Mary Barrios [x]    Outpatient Education - Information forwarded to [employer] who will follow-up with pt 1 week after discharge Hospital (inpatient) medications: 1. Correction Scale: Lispro (Humalog) Normal Sensitivity scale to cover for glucose > 139 mg/dL before meals and for glucose >199 at bedtime Assessment / Plan:  
 
Chart reviewed and initial evaluation complete on Suman Lacy . Suman Lacy is a 77 yo AA male with a past medical history significant for  DM type 2, per Dr.Khoi CHEN Velasco MD's H&P dated 7/13/2017. Per past medical records home medications are 
-none for DM Lab Results Component Value Date/Time Hemoglobin A1c 4.6 07/14/2017 04:42 AM  
 
Estimated Creatinine Clearance: 37.7 mL/min (based on Cr of 2.16). Thank you. Gianna Peralta, Certified Diabetes Educator [phone]

## 2017-07-14 NOTE — PROGRESS NOTES
Occupational therapy note: 
 
Orders acknowledged, chart reviewed, discussed with RN. OT evaluation attempted and deferred. Pt declining participation, closing eyes and appears to fall asleep quickly during conversation. Pt with minimal verbal responses to questions. Requires 3 repetitions regarding willingness to participate before any verbal response offered. Pt eventually states, \"I'm not going to tell you again. I ache all over,\" and declines participation. He immediately closes eyes again, then spontaneously re-opens eyes when room being set-up for therapist exit. He agrees to participate at OT attempt. Current discharge plan is to attempt SNF placement, possibly with skilled rehab if appropriate. Pt currently observation status. Will continue to follow and complete evaluation when pt agreeable and appropriate. Leesa Dukes, MS OTR/L

## 2017-07-14 NOTE — PROGRESS NOTES
CM Note: 
Met with pt for d/c planning. Pt did not readily answer questions and had to be coaxed to speak. I called his wife/fiancee, Hemant Lieberman. She stated he hasn't walked in several years and is w/c-bound. He had home health in the past but unable to recall the agency name. His emergency contact is his wife, Robina Knight ([phone]). He will need transport at d/c. Robina Knight stated she is unable to care for him at home and wants him to be placed in a snf. She stated she does not read or write and will have her daughter (who drives) help her with choices. Pt has Rx coverage and gets his medications from [facility] on [address]. 
Will continue to follow and f/u on PT/OT evals and recommendations. MIKHAIL Rollins Care Management Interventions PCP Verified by CM: Yes (PCP is Dr. Malik Toussaint.) Transition of Care Consult (CM Consult): Discharge Planning MyChart Signup: No 
Physical Therapy Consult: Yes Occupational Therapy Consult: Yes Speech Therapy Consult: No 
Current Support Network: Own Home (Pt lives in a 1 story house with his wife and adult son. There are 5 entry steps.) Confirm Follow Up Transport: Wheelchair [facility] (Will need transport at discharge.) Plan discussed with Pt/Family/Caregiver: Yes Discharge Location Discharge Placement: Skilled nursing facility

## 2017-07-14 NOTE — PROGRESS NOTES
Nutrition Assessment: 
 
RECOMMENDATIONS/INTERVENTION(S):  
Add Glucerna TID until appetite improves Monitor BG, wt, PO intakes. Monitor POC and abdominal pain. ASSESSMENT:  
7/14: 78 yr old male admitted for general weakness, leg pain, poor living conditions. PMHx: PVD, HTN, CKD3, CAD, DVT, Hep C. Pt poor historian, unable to gather diet history, no family in the room. Pt appears thin. Per chart, over last 2 years pt has lost around 50 lbs. Unsure if wt loss has been gradual or recent. Pt has 2+ pitting lower extremity. LBM 7/14. Pt complaining of abdominal pain. Lunch at bedside, untouched. Pt on carb consistent diet, , 102. A1C 4.6. Labs/meds reviewed. Diet Order: Consistent carb 
% Eaten:  No data found. Pertinent Medications: [x] Reviewed Chemistries: 
Lab Results Component Value Date/Time Sodium 140 07/14/2017 04:42 AM  
 Potassium 3.8 07/14/2017 04:42 AM  
 Chloride 106 07/14/2017 04:42 AM  
 CO2 24 07/14/2017 04:42 AM  
 Anion gap 10 07/14/2017 04:42 AM  
 Glucose 113 07/14/2017 04:42 AM  
 BUN 20 07/14/2017 04:42 AM  
 Creatinine 2.16 07/14/2017 04:42 AM  
 BUN/Creatinine ratio 9 07/14/2017 04:42 AM  
 GFR est AA 36 07/14/2017 04:42 AM  
 GFR est non-AA 30 07/14/2017 04:42 AM  
 Calcium 8.5 07/14/2017 04:42 AM  
 AST (SGOT) 22 07/14/2017 04:42 AM  
 Alk. phosphatase 98 07/14/2017 04:42 AM  
 Protein, total 8.1 07/14/2017 04:42 AM  
 Albumin 2.7 07/14/2017 04:42 AM  
 Globulin 5.4 07/14/2017 04:42 AM  
 A-G Ratio 0.5 07/14/2017 04:42 AM  
 ALT (SGPT) 23 07/14/2017 04:42 AM  
  
Anthropometrics: Height: 6' 8\" (203.2 cm) Weight: 88.9 kg (196 lb) IBW (%IBW): 102.5 kg (226 lb) ( ) UBW (%UBW):   (  %) BMI: Body mass index is 21.53 kg/(m^2). This BMI is indicative of: 
 [x] Underweight for age   [] Normal    [] Overweight    []  Obesity    []  Extreme Obesity (BMI>40) Estimated Nutrition Needs (Based on): 5449 Kcals/day (BMR(1794x1.3) + 250) , 89 g (-107g/day (1.0-1.2g/kg) ActBW)) Protein Carbohydrate: At Least 130 g/day  Fluids: 2600 mL/day Last BM: 7/13   [x]Active     []Hyperactive  []Hypoactive       [] Absent   BS Skin:    [] Intact   [] Incision  [] Breakdown   [] DTI   [] Tears/Excoriation/Abrasion  []Edema [] Other: Wt Readings from Last 30 Encounters:  
07/13/17 88.9 kg (196 lb) 09/04/15 112.2 kg (247 lb 4 oz) 08/28/15 112.2 kg (247 lb 4 oz) 01/23/15 105.2 kg (231 lb 14.8 oz) 01/19/15 106.6 kg (235 lb) 11/07/14 106.1 kg (234 lb) NUTRITION DIAGNOSES:  
Problem:  Inadequate oral food/beverage intake Etiology: related to decreased ability to consume sufficient energy Signs/Symptoms: as evidenced by BMI < 25, age over 72. Poor PO intakes, abd pain NUTRITION INTERVENTIONS: 
Meals/Snacks: General/healthful diet   Supplements: Commercial supplement GOAL:  
Pt will consume > 50% of meals with no abd pain within 3-5 days Cultural, Catholic, or Ethnic Dietary Needs: None LEARNING NEEDS (Diet, Food/Nutrient-Drug Interaction):  
 [x] None Identified 
 [] Identified and Education Provided/Documented 
 [] Identified and Pt declined/was not appropriate [x] Interdisciplinary Care Plan Reviewed/Documented  
 [x] Discharge Needs:   TBD [] No Nutrition Related Discharge Needs NUTRITION RISK:  
Pt Is At Nutrition Risk  [x] No Nutrition Risk Identified  [] PT SEEN FOR:  
 []  MD Consult: []Calorie Count []Diabetic Diet Education []Diet Education []Electrolyte Management 
   [x]General Nutrition Management and Supplements []Management of Tube Feeding []TPN Recommendations []  RN Referral:  []MST score >=2 
   []Enteral/Parenteral Nutrition PTA []Pregnant: Gestational DM or Multigestation  
              [] Pressure Ulcer 
 
[]  Low BMI      []  Length of Stay       [] Dysphagia Diet         [] Ventilator Diane Sharpe RD Pager [phone]

## 2017-07-14 NOTE — PROGRESS NOTES
9127 
Bedside and Verbal shift change report given to MIKHAIL Ashton (oncoming nurse) by Taryn Mora RN (offgoing nurse). Report included the following information SBAR, Kardex, Intake/Output, MAR, Recent Results, Med Rec Status and Cardiac Rhythm 1 AV block. 0000 Patient with 6 home medications. Medications sent to pharmacy via security.

## 2017-07-14 NOTE — ED NOTES
TRANSFER - OUT REPORT: 
 
Verbal report given to Zachery (name) on Kristen Lund  being transferred to [facility]  (unit) for routine progression of care Report consisted of patients Situation, Background, Assessment and  
Recommendations(SBAR). Information from the following report(s) SBAR, MAR and Recent Results was reviewed with the receiving nurse. Lines:  
Peripheral IV 07/13/17 Right; Inner; Upper Arm (Active) Site Assessment Clean, dry, & intact 7/13/2017  6:19 PM  
Phlebitis Assessment 0 7/13/2017  6:19 PM  
Infiltration Assessment 0 7/13/2017  6:19 PM  
Dressing Status Clean, dry, & intact 7/13/2017  6:19 PM  
Dressing Type Tape;Transparent 7/13/2017  6:19 PM  
Hub Color/Line Status Pink;Patent; Flushed 7/13/2017  6:19 PM  
  
 
Opportunity for questions and clarification was provided. Patient transported with: 
 Beacon Reader

## 2017-07-14 NOTE — CONSULTS
Podiatric Surgery Consultation  Assessment/Plan: DM with other skin cx, onychomycosis, callus, class findings  - Pt evaluated and tx.  - Callus x 1 sharply debrided. - Nails x 10 sharply debrided. - RN to moisturize skin. - DM per hospitalist.  - Thank you for this consultation, will f/u prn. Subjective:  Pt seen for at risk foot care and to evaluate for ulceration vs callus formation. Unable to provide much hx or respond to questions but opens eyes when greeted. HPI: Pt admitted to [facility] for weakness, and likely requires placement in a facility. Per records his daughter stated that he was admitted to [facility] several months ago for a severe GI bleed due to Xarelto. During that hospitalization, the patient received an IVC filter and 5 units of blood. His Xarelto was stopped. After discharge, the patient has been living in a house without air conditioning, electricity, or running water. His daughter was concerned tonight because he did not have any food and called EMS. Family is interested in placement. History:  Weakness  Allergies   Allergen Reactions    Tetracycline Hives     Family History   Problem Relation Age of Onset    Hypertension Father       Past Medical History:   Diagnosis Date    Arthritis     CAD (coronary artery disease) 2007    CKD (chronic kidney disease), stage III     DM type 2 causing renal disease (HCC)     Gait abnormality     Heart murmur     Hepatitis C     History of blood transfusion     Hypercholesterolemia     Hypertension     Hypertension 11/7/2014    Neuropathy ([address]     Peripheral vascular disease ([facility] 11/7/2014    A. S/P Right SFA POBA and tibial atherectomy (2/4/13).     PUD (peptic ulcer disease) 2007    Unspecified sleep apnea     never used cpap     Past Surgical History:   Procedure Laterality Date    ABDOMEN SURGERY PROC UNLISTED  2007    has approx 7-8\" midline incision on abdomen--\"had to open him up and clean him out after feeding tube clogged\"    HX GI  2007    feeding tube for approx 2 mo    VASCULAR SURGERY PROCEDURE UNLIST Right 1/22/2015    popliteal-tibial bypass     Social History   Substance Use Topics    Smoking status: Current Every Day Smoker     Packs/day: 0.50    Smokeless tobacco: Not on file    Alcohol use No       History   Alcohol Use No     History   Drug Use No     Comment: >20 years ago      History   Smoking Status    Current Every Day Smoker    Packs/day: 0.50   Smokeless Tobacco    Not on file     Current Facility-Administered Medications   Medication Dose Route Frequency    hydrALAZINE (APRESOLINE) 20 mg/mL injection 20 mg  20 mg IntraVENous Q6H PRN    amLODIPine (NORVASC) tablet 10 mg  10 mg Oral DAILY    aspirin delayed-release tablet 81 mg  81 mg Oral DAILY    atorvastatin (LIPITOR) tablet 40 mg  40 mg Oral DAILY    carvedilol (COREG) tablet 6.25 mg  6.25 mg Oral BID WITH MEALS    gabapentin (NEURONTIN) capsule 300 mg  300 mg Oral BID    haloperidol (HALDOL) tablet 0.5 mg  0.5 mg Oral TID    hydrALAZINE (APRESOLINE) tablet 50 mg  50 mg Oral TID    nortriptyline (PAMELOR) capsule 25 mg  25 mg Oral QHS    0.9% sodium chloride infusion  50 mL/hr IntraVENous CONTINUOUS    sodium chloride (NS) flush 5-10 mL  5-10 mL IntraVENous Q8H    sodium chloride (NS) flush 5-10 mL  5-10 mL IntraVENous PRN    acetaminophen (TYLENOL) tablet 650 mg  650 mg Oral Q4H PRN    HYDROcodone-acetaminophen (NORCO) 5-325 mg per tablet 1 Tab  1 Tab Oral Q4H PRN    HYDROmorphone (PF) (DILAUDID) injection 0.5 mg  0.5 mg IntraVENous Q4H PRN    naloxone (NARCAN) injection 0.4 mg  0.4 mg IntraVENous PRN    diphenhydrAMINE (BENADRYL) injection 12.5 mg  12.5 mg IntraVENous Q4H PRN    ondansetron (ZOFRAN) injection 4 mg  4 mg IntraVENous Q6H PRN    docusate sodium (COLACE) capsule 100 mg  100 mg Oral BID    insulin lispro (HUMALOG) injection   SubCUTAneous AC&HS    glucose chewable tablet 16 g  4 Tab Oral PRN    dextrose (D50W) injection syrg 12.5-25 g  12.5-25 g IntraVENous PRN    glucagon (GLUCAGEN) injection 1 mg  1 mg IntraMUSCular PRN        Objective:  Vitals: Patient Vitals for the past 12 hrs:   BP Temp Pulse Resp SpO2   07/14/17 0742 174/85 98.5 °F (36.9 °C) 66 16 99 %   07/14/17 0401 149/76 98.4 °F (36.9 °C) 66 16 100 %   07/14/17 0001 169/85 - 72 - -       Vascular:  B/L LE  DP 0/4; PT 0/4  capillary fill time brisk, pitting edema is present, skin temperature is cool, varicosities are present. Dermatological:  Nails are thickened, elongated, discolored, painful to palpation, 3 mm thick, with subungual debris. Skin is dry and scaly, exhibits hemosiderin deposition. Skin cracks present at heels b/l. There is no maceration of the interspaces of the feet b/l. Lesions are present consisting of hyperkeratosis at LT heel. Neurological:  DTR are present, protective sensation per 5.07 [place] Samir monofilament is diminished, patient is alert and awake, unable to asses mood. Epicritic sensation is intact. Orthopedic:  B/L LE are symmetric, ROM of ankle, STJ, 1st MTPJ is limited, MMT 5 out of 5 for B/L LE. Constitutional: Pt is a well developed thin elderly AAM.    Lymphatics: negative tenderness to palpation of neck/axillary/inguinal nodes.     Imaging / Cx / Px:  n/a    Labs:  Recent Labs      07/14/17   0442  07/13/17   1811   WBC  5.2   --    CREA  2.16*   --    BUN  20   --    GLU  113*   --    HGB  9.3*   --    HCT  28.9*   --    INR   --   1.1   NA  140   --    K  3.8   --    CL  106   --    CO2  24   --

## 2017-07-14 NOTE — PROGRESS NOTES
[address] 
Primary Nurse Randy Brennan RN and Milagro Grimes RN performed a dual skin assessment on this patient Impairment noted- noted to L heel and scars Rambo score is 18 
 
 
 
2248 TRANSFER - IN REPORT: 
 
Verbal report received from [facility] (name) on Lolis Carbajal  being received from ED (unit) for routine progression of care Report consisted of patients Situation, Background, Assessment and  
Recommendations(SBAR). Information from the following report(s) SBAR, Kardex, ED Summary, Intake/Output, MAR, Recent Results, Med Rec Status and Cardiac Rhythm 1 Av block was reviewed with the receiving nurse. Opportunity for questions and clarification was provided. Assessment completed upon patients arrival to unit and care assumed.

## 2017-07-14 NOTE — PROGRESS NOTES
[facility] [address] 
[address], [place], [address] [facility] 
[phone] Medical Progress Note NAME:         Abdon Ceron :        1940 MRM:        [id number] Date:          2017 Subjective: Patient has been seen and examined as a follow up. Chart, labs, diagnostics reviewed. He is a poor historian and I have discussed with staff for collaborative hx. No new symptoms. Objective: 
 
Vital Signs: 
 
Visit Vitals  /84 (BP 1 Location: Left arm, BP Patient Position: At rest)  Pulse 66  Temp 98.6 °F (37 °C)  Resp 18  Ht 6' 8\" (2.032 m)  Wt 88.9 kg (196 lb)  SpO2 98%  BMI 21.53 kg/m2 No intake or output data in the 24 hours ending 17 1611 Physical Examination: 
 
General:   Weak looking male, not in much acute distress Eyes:   pink conjunctivae, PERRLA with no discharge. ENT:   no ottorrhea or rhinorrhea with moist mucous membranes Neck: no masses, thyroid non-tender and trachea central. 
Pulm:  no accessory muscle use, clear breath sounds without crackles or wheezes Card:  no JVD or murmurs, has regular and normal S1, S2 without thrills, bruits or peripheral edema Abd:  Soft, non-tender, non-distended, normoactive bowel sounds with no palpable organomegaly Musc:  No cyanosis, clubbing, atrophy or deformities. Skin:  No rashes, bruising or ulcers. Neuro: Awake and alert but non verbal. Generally a non focal exam.  
Psych:  Has little insight into illness Current Facility-Administered Medications Medication Dose Route Frequency  cloNIDine HCl (CATAPRES) tablet 0.2 mg  0.2 mg Oral BID  hydrALAZINE (APRESOLINE) 20 mg/mL injection 20 mg  20 mg IntraVENous Q6H PRN  
 amLODIPine (NORVASC) tablet 10 mg  10 mg Oral DAILY  aspirin delayed-release tablet 81 mg  81 mg Oral DAILY  atorvastatin (LIPITOR) tablet 40 mg  40 mg Oral DAILY  carvedilol (COREG) tablet 6.25 mg  6.25 mg Oral BID WITH MEALS  
 haloperidol (HALDOL) tablet 0.5 mg  0.5 mg Oral TID  nortriptyline (PAMELOR) capsule 25 mg  25 mg Oral QHS  
 0.9% sodium chloride infusion  50 mL/hr IntraVENous CONTINUOUS  
 sodium chloride (NS) flush 5-10 mL  5-10 mL IntraVENous Q8H  
 sodium chloride (NS) flush 5-10 mL  5-10 mL IntraVENous PRN  
 acetaminophen (TYLENOL) tablet 650 mg  650 mg Oral Q4H PRN  
 HYDROcodone-acetaminophen (NORCO) 5-325 mg per tablet 1 Tab  1 Tab Oral Q4H PRN  
 HYDROmorphone (PF) (DILAUDID) injection 0.5 mg  0.5 mg IntraVENous Q4H PRN  
 naloxone (NARCAN) injection 0.4 mg  0.4 mg IntraVENous PRN  
 diphenhydrAMINE (BENADRYL) injection 12.5 mg  12.5 mg IntraVENous Q4H PRN  
 ondansetron (ZOFRAN) injection 4 mg  4 mg IntraVENous Q6H PRN  
 docusate sodium (COLACE) capsule 100 mg  100 mg Oral BID  insulin lispro (HUMALOG) injection   SubCUTAneous AC&HS  
 glucose chewable tablet 16 g  4 Tab Oral PRN  
 dextrose (D50W) injection syrg 12.5-25 g  12.5-25 g IntraVENous PRN  
 glucagon (GLUCAGEN) injection 1 mg  1 mg IntraMUSCular PRN Laboratory data and review: 
 
Recent Labs  
   07/14/17 0442 07/13/17 [facility] WBC  5.2  5.0 HGB  9.3*  8.8* HCT  28.9*  28.1*  
PLT  277  239 Recent Labs  
   07/14/17 
 0442  07/13/17 
 1811  07/13/17 [facility] NA  140   --   140  
K  3.8   --   3.8 CL  106   --   107 CO2  24   --   25 GLU  113*   --   102* BUN  20   --   19  
CREA  2.16*   --   2.30* CA  8.5   --   8.4* MG  1.8   --    --   
PHOS  3.3   --    --   
ALB  2.7*   --   2.7* SGOT  22   --   21 ALT  23   --   21 INR   --   1.1   -- No components found for: [place] Assessment and Plan: 
 
  
Generalized weakness/debility: chronic. Declines to have PT, OT. CM working on placement.  
  
Anemia: seems chronic. Hx of recent GI bleed at [facility] s/p 5u RBCs. Hgb stable. Serologies suggest chronic illness.  Monitor Hgb 
  Hx of DVT: s/p IVC filter. Off Xarelto due to GI bleed. Monitor 
  
PVD/CAD: cont low dose ASA, Lipitor  
  
HTN: uncontrolled. Continue coreg, norvasc, hydralazine. Use IV hydralazine prn 
  
DM type 2 w/ renal and vascular complications: K8K 4.6. No need for monitoring.  
  
CKD 3: seems stable. Follow 
  
Neuropathy: decrease neurontin. Cont nortryptyline Total time spent for the patient's care: 25 Minutes Care Plan discussed with: Patient, Care Manager and Nursing Staff Discussed:  Care Plan and D/C Planning Prophylaxis:  SCD's Anticipated Disposition:  SNF/LTC 
        
___________________________________________________ Attending Physician:   Meredith Her MD

## 2017-07-14 NOTE — ED NOTES
One episode of loose stool however patient reports constipation. Patient changed and cleaned, new gown.

## 2017-07-14 NOTE — H&P
Westley Avila [facility] 
566 [facility], [address] [place] [address] 
[phone] Admission History and Physical 
 
 
NAME:  Samia Ellis :   1940 MRN:  [id number] PCP:  Alyssa Mojica MD  
 
Date/Time:  2017 Subjective: CHIEF COMPLAINT: weakness HISTORY OF PRESENT ILLNESS:    
The patient is a 79 yo hx of HTN, DM, PVD, CAD, CKD 3, GI bleed, DVT s/p IVC filter, presented w/ weakness, poor living condition. The patient is a poor historian. His daughter stated that he was admitted to [facility] several months ago for a severe GI bleed due to Xarelto. During that hospitalization, the patient received an IVC filter and 5 units of blood. His Xarelto was stopped. After discharge, the patient has been living in a house without air conditioning, electricity, or running water. His daughter was concerned tonight because he did not have any food and called EMS. Family is interested in placement. Allergies Allergen Reactions  Tetracycline Hives Prior to Admission medications Medication Sig Start Date End Date Taking? Authorizing Provider  
atorvastatin (LIPITOR) 40 mg tablet Take 40 mg by mouth daily. Yes Johny Warner MD  
carvedilol (COREG) 6.25 mg tablet Take 6.25 mg by mouth two (2) times daily (with meals). Yes Johny Warner MD  
haloperidol (HALDOL) 1 mg tablet Take 0.5 mg by mouth three (3) times daily. Yes Johny Warner MD  
aspirin delayed-release 81 mg tablet Take 81 mg by mouth daily. Yes Johny Warner MD  
nortriptyline (PAMELOR) 25 mg capsule Take 25 mg by mouth nightly. Indications: Pain   Yes Historical Provider  
amLODIPine (NORVASC) 10 mg tablet Take 10 mg by mouth daily. Yes Historical Provider HYDROcodone-acetaminophen (NORCO) 5-325 mg per tablet Take 2 Tabs by mouth every four (4) hours as needed for Pain. Max Daily Amount: 12 Tabs.  9/8/15   Renaldo Funk MD  
gabapentin (NEURONTIN) 600 mg tablet Take 600 mg by mouth two (2) times a day. Historical Provider  
hydrALAZINE (APRESOLINE) 50 mg tablet Take 50 mg by mouth daily. Historical Provider Past Medical History:  
Diagnosis Date  Arthritis  CAD (coronary artery disease) 2007  CKD (chronic kidney disease), stage III   
 DM type 2 causing renal disease ([facility] [address]  Gait abnormality  Heart murmur  Hepatitis C   
 History of blood transfusion  Hypercholesterolemia  Hypertension  Hypertension 11/7/2014  Neuropathy ([facility] [address]  Peripheral vascular disease ([facility] [address] 11/7/2014  
 A. S/P Right SFA POBA and tibial atherectomy (2/4/13).  PUD (peptic ulcer disease) 2007  Unspecified sleep apnea   
 never used cpap Past Surgical History:  
Procedure Laterality Date [address] UNLISTED  2007  
 has approx 7-8\" midline incision on abdomen--\"had to open him up and clean him out after feeding tube clogged\"  HX GI  2007  
 feeding tube for approx 2 mo  VASCULAR SURGERY PROCEDURE UNLIST Right 1/22/2015  
 popliteal-tibial bypass Social History Substance Use Topics  Smoking status: Current Every Day Smoker Packs/day: 0.50  Smokeless tobacco: Not on file  Alcohol use No  
  
 
Family History Problem Relation Age of Onset  Hypertension Father Review of Systems: 
(bold if positive, if negative) Gen:  Eyes:  ENT:  CVS:  Pulm:  GI:   
:   
MS:   weakness,Skin:  Psych:  Endo:   
Hem:  Renal:   
Neuro:    
 
  
Objective: VITALS:   
Vital signs reviewed; most recent are: 
 
Visit Vitals  BP (!) 185/92  Pulse 66  Temp 98.2 °F (36.8 °C)  Resp 13  Ht 6' 8\" (2.032 m)  Wt 88.9 kg (196 lb)  SpO2 100%  BMI 21.53 kg/m2 SpO2 Readings from Last 6 Encounters:  
07/13/17 100% 09/08/15 98% 08/28/15 100% 01/26/15 99% 01/19/15 98% No intake or output data in the 24 hours ending 07/13/17 5933 Exam:  
 
Physical Exam: 
 
Gen:  Chronically ill appearing, disheveled, NAD HEENT:  Pink conjunctivae, PERRL, hearing intact to voice, dry mucous membranes Neck:  Supple, without masses, thyroid non-tender Resp:  No accessory muscle use, clear breath sounds without wheezes rales or rhonchi 
Card:  No murmurs, normal S1, S2 without thrills, bruits or peripheral edema Abd:  Soft, non-tender, non-distended, normoactive bowel sounds are present Lymph:  No cervical adenopathy Musc:  No cyanosis or clubbing Skin:  Dry skin Neuro:  Cranial nerves 3-12 are grossly intact, follows commands appropriately Psych:  Alert with poor insight. Oriented to person, place Labs: 
 
Recent Labs  
   07/13/17 [facility] WBC  5.0 HGB  8.8* HCT  28.1*  
PLT  239 Recent Labs  
   07/13/17 [facility] NA  140  
K  3.8 CL  107 CO2  25 GLU  102* BUN  19  
CREA  2.30* CA  8.4* ALB  2.7* TBILI  0.4 SGOT  21 ALT  21 Lab Results Component Value Date/Time Glucose (POC) 115 09/08/2015 04:26 PM  
 Glucose (POC) 172 09/08/2015 12:11 PM  
 
No results for input(s): PH, PCO2, PO2, HCO3, FIO2 in the last 72 hours. Recent Labs  
   07/13/17 
 1811 INR  1.1 Radiology and EKG reviewed:   pending **Old Records reviewed in [facility]** Assessment/Plan: Active Problems: 
 
79 yo hx of HTN, DM, PVD, CAD, CKD 3, GI bleed, DVT s/p IVC filter, presented w/ weakness, poor living condition 1) Weakness/debility: seems chronic. Lives in poor condition. Family is interested in placement. Will consult PT/OT/CM 2) Anemia: seems chronic. Hx of recent GI bleed at [facility] s/p 5u RBCs. Will check iron, ferritin, B12, folate. Monitor Hgb 3) Hx of DVT: s/p IVC filter. Off Xarelto due to GI bleed 4) PVD/CAD: cont low dose ASA, statin 5) HTN: uncontrolled. Likely non-compliance on meds. Will restart coreg, norvasc, hydralazine. Use IV hydralazine prn 
 
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 Not on meds.   Start SSI, consult DM educator 7) CKD 3: seems stable. Will monitor 8) Neuropathy: decrease neurontin. Cont nortryptyline Code: Full Total time spent with patient: 70 Minutes (spent >50% of time on care coordination) Care Plan discussed with: Patient, family, nursing Discussed:  Care Plan Prophylaxis:  SCD's 
 
Probable Disposition:  SNF/LTC 
        
___________________________________________________ Attending Physician: Cody Moon MD

## 2017-07-15 LAB
GLUCOSE BLD STRIP.AUTO-MCNC: 113 MG/DL (ref 65–100)
GLUCOSE BLD STRIP.AUTO-MCNC: 120 MG/DL (ref 65–100)
GLUCOSE BLD STRIP.AUTO-MCNC: 132 MG/DL (ref 65–100)
GLUCOSE BLD STRIP.AUTO-MCNC: 76 MG/DL (ref 65–100)
GLUCOSE BLD STRIP.AUTO-MCNC: 97 MG/DL (ref 65–100)
SERVICE CMNT-IMP: ABNORMAL
SERVICE CMNT-IMP: NORMAL
SERVICE CMNT-IMP: NORMAL

## 2017-07-15 PROCEDURE — 74011250637 HC RX REV CODE- 250/637: Performed by: INTERNAL MEDICINE

## 2017-07-15 PROCEDURE — 99218 HC RM OBSERVATION: CPT

## 2017-07-15 PROCEDURE — 96361 HYDRATE IV INFUSION ADD-ON: CPT

## 2017-07-15 PROCEDURE — 82962 GLUCOSE BLOOD TEST: CPT

## 2017-07-15 RX ADMIN — DOCUSATE SODIUM 100 MG: 100 CAPSULE ORAL at 17:36

## 2017-07-15 RX ADMIN — CLONIDINE HYDROCHLORIDE 0.2 MG: 0.1 TABLET ORAL at 09:40

## 2017-07-15 RX ADMIN — NORTRIPTYLINE HYDROCHLORIDE 25 MG: 25 CAPSULE ORAL at 22:44

## 2017-07-15 RX ADMIN — HALOPERIDOL 0.5 MG: 1 TABLET ORAL at 17:37

## 2017-07-15 RX ADMIN — CARVEDILOL 6.25 MG: 6.25 TABLET, FILM COATED ORAL at 09:40

## 2017-07-15 RX ADMIN — HALOPERIDOL 0.5 MG: 1 TABLET ORAL at 09:40

## 2017-07-15 RX ADMIN — HYDROCODONE BITARTRATE AND ACETAMINOPHEN 1 TABLET: 5; 325 TABLET ORAL at 13:37

## 2017-07-15 RX ADMIN — CARVEDILOL 6.25 MG: 6.25 TABLET, FILM COATED ORAL at 17:36

## 2017-07-15 RX ADMIN — DOCUSATE SODIUM 100 MG: 100 CAPSULE ORAL at 09:40

## 2017-07-15 RX ADMIN — ASPIRIN 81 MG: 81 TABLET, COATED ORAL at 09:40

## 2017-07-15 RX ADMIN — CLONIDINE HYDROCHLORIDE 0.2 MG: 0.1 TABLET ORAL at 17:36

## 2017-07-15 RX ADMIN — AMLODIPINE BESYLATE 10 MG: 5 TABLET ORAL at 09:40

## 2017-07-15 RX ADMIN — Medication 10 ML: at 13:37

## 2017-07-15 RX ADMIN — ATORVASTATIN CALCIUM 40 MG: 20 TABLET, FILM COATED ORAL at 09:40

## 2017-07-15 RX ADMIN — HALOPERIDOL 0.5 MG: 1 TABLET ORAL at 22:44

## 2017-07-15 RX ADMIN — Medication 10 ML: at 22:45

## 2017-07-15 NOTE — PROGRESS NOTES
Occupational Therapy Note: Attempted to see patient. Spoke with nursing, patient is presenting like he did with previous days attempted OT evaluation. He opens eyes to introduction of therapist.  He is reporting abdominal discomfort, his abdomen is distended and tender to touch. Nursing aware. He was educated on the importance of activity and how upright activity improves his abdominal discomfort due to constipation. Patient then closes his eyes and does not respond. When asked follow-up questions the patient states \"i didn't ask you to come in here\". Patient's bed bianca was soiled with urine, offered patient changing of linens and gown while in supine to prevent skin breakdown. He again refuses any activity. Will continue to follow for OT evaluation as patient is agreeable.   
William Atwood, OTR/L

## 2017-07-15 NOTE — ROUTINE PROCESS
Bedside and Verbal shift change report given to Anni Hutton RN (oncoming nurse) by Brian Alfredo RN (offgoing nurse). Report included the following information SBAR, Kardex, ED Summary, Procedure Summary, Intake/Output, MAR, Accordion and Recent Results.

## 2017-07-15 NOTE — PROGRESS NOTES
Westley Avila karissa Streeter [address] 
5[address] [facility], [address] [place] [address] 
[phone] Medical Progress Note NAME:         Andrei Ruffin :        1940 MRM:        [id number] Date:          7/15/2017 Subjective: Patient has been seen and examined as a follow up. Chart, labs, diagnostics reviewed. He is a poor historian and I have discussed with his nurse for collaborative hx. He remains stable with no new issues. Objective: 
 
Vital Signs: 
 
Visit Vitals  /73 (BP 1 Location: Left arm, BP Patient Position: At rest)  Pulse 60  Temp 98.3 °F (36.8 °C)  Resp 18  Ht 6' 8\" (2.032 m)  Wt 88.9 kg (196 lb)  SpO2 100%  BMI 21.53 kg/m2 Intake/Output Summary (Last 24 hours) at 07/15/17 0759 Last data filed at 07/15/17 5704 Gross per 24 hour Intake                0 ml Output              150 ml Net             -150 ml Physical Examination: 
 
General:   Weak looking male, not in much acute distress Eyes:   pink conjunctivae, PERRLA with no discharge. ENT:   no ottorrhea or rhinorrhea with moist mucous membranes Pulm:  clear breath sounds without crackles or wheezes Card:  no JVD or murmurs, has regular and normal S1, S2 without thrills, bruits or peripheral edema Abd:  Soft, non-tender, non-distended, normoactive bowel sounds Musc:  No cyanosis, clubbing, atrophy or deformities. Skin:  No rashes, bruising or ulcers. Neuro: Awake and alert but non verbal. Generally a non focal exam.  
Psych:  Has little insight into illness Current Facility-Administered Medications Medication Dose Route Frequency  cloNIDine HCl (CATAPRES) tablet 0.2 mg  0.2 mg Oral BID  hydrALAZINE (APRESOLINE) 20 mg/mL injection 20 mg  20 mg IntraVENous Q6H PRN  
 amLODIPine (NORVASC) tablet 10 mg  10 mg Oral DAILY  aspirin delayed-release tablet 81 mg  81 mg Oral DAILY  atorvastatin (LIPITOR) tablet 40 mg  40 mg Oral DAILY  carvedilol (COREG) tablet 6.25 mg  6.25 mg Oral BID WITH MEALS  
 haloperidol (HALDOL) tablet 0.5 mg  0.5 mg Oral TID  nortriptyline (PAMELOR) capsule 25 mg  25 mg Oral QHS  
 0.9% sodium chloride infusion  50 mL/hr IntraVENous CONTINUOUS  
 sodium chloride (NS) flush 5-10 mL  5-10 mL IntraVENous Q8H  
 sodium chloride (NS) flush 5-10 mL  5-10 mL IntraVENous PRN  
 acetaminophen (TYLENOL) tablet 650 mg  650 mg Oral Q4H PRN  
 HYDROcodone-acetaminophen (NORCO) 5-325 mg per tablet 1 Tab  1 Tab Oral Q4H PRN  
 HYDROmorphone (PF) (DILAUDID) injection 0.5 mg  0.5 mg IntraVENous Q4H PRN  
 naloxone (NARCAN) injection 0.4 mg  0.4 mg IntraVENous PRN  
 diphenhydrAMINE (BENADRYL) injection 12.5 mg  12.5 mg IntraVENous Q4H PRN  
 ondansetron (ZOFRAN) injection 4 mg  4 mg IntraVENous Q6H PRN  
 docusate sodium (COLACE) capsule 100 mg  100 mg Oral BID  insulin lispro (HUMALOG) injection   SubCUTAneous AC&HS  
 glucose chewable tablet 16 g  4 Tab Oral PRN  
 dextrose (D50W) injection syrg 12.5-25 g  12.5-25 g IntraVENous PRN  
 glucagon (GLUCAGEN) injection 1 mg  1 mg IntraMUSCular PRN Laboratory data and review: 
 
Recent Labs  
   07/14/17 0442 07/13/17 [facility] WBC  5.2  5.0 HGB  9.3*  8.8* HCT  28.9*  28.1*  
PLT  277  239 Recent Labs  
   07/14/17 
 0442  07/13/17 
 1811  07/13/17 [facility] NA  140   --   140  
K  3.8   --   3.8 CL  106   --   107 CO2  24   --   25 GLU  113*   --   102* BUN  20   --   19  
CREA  2.16*   --   2.30* CA  8.5   --   8.4* MG  1.8   --    --   
PHOS  3.3   --    --   
ALB  2.7*   --   2.7* SGOT  22   --   21 ALT  23   --   21 INR   --   1.1   -- No components found for: [place] Assessment and Plan: 
  
Generalized weakness/debility: chronic. Declines to have PT, OT. CM working on placement. Needs LTC 
  
Anemia: seems chronic. Hx of recent GI bleed at [facility] s/p 5u RBCs.  Hgb stable. Serologies suggest chronic illness. No further testing.  
  
Hx of DVT: s/p IVC filter. Off Xarelto due to GI bleed. Monitor 
  
PVD/CAD: cont low dose ASA, Lipitor  
  
HTN: now controlled. Continue coreg, norvasc, hydralazine. Use IV hydralazine prn 
  
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 No need for monitoring.  
  
CKD 3: seems stable. Follow 
  
Neuropathy: decrease neurontin. Cont nortryptyline Total time spent for the patient's care: 25 Minutes Care Plan discussed with: Patient, Care Manager and Nursing Staff Discussed:  Care Plan and D/C Planning Prophylaxis:  SCD's Anticipated Disposition:  SNF/LTC 
        
___________________________________________________ Attending Physician:   Isra Vital MD

## 2017-07-15 NOTE — PROGRESS NOTES
Bedside and Verbal shift change report given to Brion Hashimoto, RN (oncoming nurse) by Teri Severe, RN (offgoing nurse). Report included the following information SBAR, Kardex, Intake/Output, MAR, Accordion and Recent Results

## 2017-07-15 NOTE — PROGRESS NOTES
Physical Therapy Attempted to see patient. Spoke with nursing, patient is presenting like he did with previous days attempt at mobility. He opens eyes to introduction of therapist.  He is reporting abdominal discomfort, his abdomen is distended and tender to touch. Nursing aware. He was educated on the importance of mobility and how upright activity improves his abdominal discomfort due to constipation. Patient then closes his eyes and does not respond. When asked follow-up questions the patient states \"i didn't ask you to come in here\". Patient's bed bianca was soiled with urine, offered patient changing of lines and gown while in supine to prevent skin breakdown. He again refuses mobility. He is currently unrealistic to attempts at mobility. Patient appears to only perform mobility when he is willing to do so.   For assessment of mobility, communications between nursing staff and therapy when patient is willing to participate would be beneficial.   
Ariela Long PT,DPT,NCS

## 2017-07-15 NOTE — ROUTINE PROCESS
Bedside and Verbal shift change report given to [address] [place] [address] (oncoming nurse) by Will RN (offgoing nurse). Report included the following information SBAR, Kardex, Intake/Output and MAR.

## 2017-07-16 LAB
GLUCOSE BLD STRIP.AUTO-MCNC: 100 MG/DL (ref 65–100)
GLUCOSE BLD STRIP.AUTO-MCNC: 113 MG/DL (ref 65–100)
GLUCOSE BLD STRIP.AUTO-MCNC: 120 MG/DL (ref 65–100)
GLUCOSE BLD STRIP.AUTO-MCNC: 185 MG/DL (ref 65–100)
SERVICE CMNT-IMP: ABNORMAL
SERVICE CMNT-IMP: NORMAL

## 2017-07-16 PROCEDURE — G8987 SELF CARE CURRENT STATUS: HCPCS | Performed by: PHYSICAL THERAPIST

## 2017-07-16 PROCEDURE — 97166 OT EVAL MOD COMPLEX 45 MIN: CPT

## 2017-07-16 PROCEDURE — 74011250637 HC RX REV CODE- 250/637: Performed by: INTERNAL MEDICINE

## 2017-07-16 PROCEDURE — 99218 HC RM OBSERVATION: CPT

## 2017-07-16 PROCEDURE — G8988 SELF CARE GOAL STATUS: HCPCS | Performed by: PHYSICAL THERAPIST

## 2017-07-16 PROCEDURE — 74011636637 HC RX REV CODE- 636/637: Performed by: INTERNAL MEDICINE

## 2017-07-16 PROCEDURE — 82962 GLUCOSE BLOOD TEST: CPT

## 2017-07-16 PROCEDURE — 51798 US URINE CAPACITY MEASURE: CPT

## 2017-07-16 PROCEDURE — 97535 SELF CARE MNGMENT TRAINING: CPT

## 2017-07-16 PROCEDURE — 96361 HYDRATE IV INFUSION ADD-ON: CPT

## 2017-07-16 RX ORDER — LANOLIN ALCOHOL/MO/W.PET/CERES
1 CREAM (GRAM) TOPICAL 2 TIMES DAILY WITH MEALS
Status: DISCONTINUED | OUTPATIENT
Start: 2017-07-16 | End: 2017-07-21 | Stop reason: HOSPADM

## 2017-07-16 RX ORDER — CLONIDINE HYDROCHLORIDE 0.1 MG/1
0.1 TABLET ORAL 2 TIMES DAILY
Status: DISCONTINUED | OUTPATIENT
Start: 2017-07-16 | End: 2017-07-19

## 2017-07-16 RX ORDER — POLYETHYLENE GLYCOL 3350 17 G/17G
17 POWDER, FOR SOLUTION ORAL DAILY
Status: DISCONTINUED | OUTPATIENT
Start: 2017-07-17 | End: 2017-07-21 | Stop reason: HOSPADM

## 2017-07-16 RX ORDER — CARVEDILOL 3.12 MG/1
3.12 TABLET ORAL 2 TIMES DAILY WITH MEALS
Status: DISCONTINUED | OUTPATIENT
Start: 2017-07-16 | End: 2017-07-21 | Stop reason: HOSPADM

## 2017-07-16 RX ADMIN — AMLODIPINE BESYLATE 10 MG: 5 TABLET ORAL at 09:00

## 2017-07-16 RX ADMIN — FERROUS SULFATE TAB 325 MG (65 MG ELEMENTAL FE) 325 MG: 325 (65 FE) TAB at 16:34

## 2017-07-16 RX ADMIN — NORTRIPTYLINE HYDROCHLORIDE 25 MG: 25 CAPSULE ORAL at 21:10

## 2017-07-16 RX ADMIN — CLONIDINE HYDROCHLORIDE 0.2 MG: 0.1 TABLET ORAL at 08:58

## 2017-07-16 RX ADMIN — CLONIDINE HYDROCHLORIDE 0.1 MG: 0.1 TABLET ORAL at 17:48

## 2017-07-16 RX ADMIN — Medication 10 ML: at 21:11

## 2017-07-16 RX ADMIN — ASPIRIN 81 MG: 81 TABLET, COATED ORAL at 09:00

## 2017-07-16 RX ADMIN — CARVEDILOL 3.12 MG: 3.12 TABLET, FILM COATED ORAL at 16:34

## 2017-07-16 RX ADMIN — HALOPERIDOL 0.5 MG: 1 TABLET ORAL at 09:00

## 2017-07-16 RX ADMIN — CARVEDILOL 6.25 MG: 6.25 TABLET, FILM COATED ORAL at 09:00

## 2017-07-16 RX ADMIN — DOCUSATE SODIUM 100 MG: 100 CAPSULE ORAL at 17:48

## 2017-07-16 RX ADMIN — HALOPERIDOL 0.5 MG: 1 TABLET ORAL at 16:34

## 2017-07-16 RX ADMIN — ATORVASTATIN CALCIUM 40 MG: 20 TABLET, FILM COATED ORAL at 08:58

## 2017-07-16 RX ADMIN — Medication 10 ML: at 05:16

## 2017-07-16 RX ADMIN — HALOPERIDOL 0.5 MG: 1 TABLET ORAL at 21:10

## 2017-07-16 RX ADMIN — DOCUSATE SODIUM 100 MG: 100 CAPSULE ORAL at 09:00

## 2017-07-16 NOTE — PROGRESS NOTES
PHYSICAL THERAPY: 1530 Chart reviewed, RN notified. Patient received in bed side chair following assist to chair from OT and lift team. Patient was initially agreeable to PT, when PT left to retrieve extra assist and returned, pt declined to participate. PT asked patient repeatedly if he wished to participate and sit<>stand or take a few steps with RW and extra assist. He continued to decline. Patient then stopped responding to PT's questions altogether and refused to acknowledge or make eye contact with PT. PT has not completed IE that was ordered on 07/13/2017. Will attempt one more time and if patient declines participation, PT should complete orders and notify MD of non-compliance.   
 
Thank you for the referral,  
 
Jose Marquez, PT, DPT

## 2017-07-16 NOTE — PROGRESS NOTES
Occupational Therapy notation: Chart reviewed, nursing consulted. Patient continues to decline participation with OT. Recommend one more attempt before completing orders. Will continue to follow.

## 2017-07-16 NOTE — PROGRESS NOTES
Problem: Self Care Deficits Care Plan (Adult) Goal: *Acute Goals and Plan of Care (Insert Text) Occupational Therapy Goals Initiated 7/16/2017 1. Patient will perform lower body dressing at supervision/set-up within 7 days. 2. Patient will perform toilet transfers at supervision/set-up using Walkers, Type: Rolling Walker within 7 days. 3. Patient will perform all aspects of toileting at supervision/set-up within 7 days. 4. Patient will tolerate greater than 5 minutes of standing without a rest break at supervision/set-up using Walkers, Type: Rolling Walker during ADLs within 7 days. OCCUPATIONAL THERAPY EVALUATION Patient: Jann Jacob (43 y.o. male) Date: 7/16/2017 Primary Diagnosis: Weakness Precautions:   Fall ASSESSMENT : 
Based on the objective data described below, the patient presents with admission for weakness, general deconditioning and episodes of bowel and bladder incontinence. Patient has been agitated and declining any advances from therapy, today was amenable to work with therapy to get OOB. Patient follows commands, demonstrates BUE ROM and strength WDL. Patient is cautious and wary of all requests asking why he needs to do something, calms down with explanation and additional time. Patient completes supine to sit EOB with min assist. Patient able to tolerate sitting EOB unsupported through MMT for UE and completion of ADL assessment. Patient requires mod assist x 2 for sit to stand and to advance 6 steps to sit in hip chair in place to accommodate 6'8\" stature. Patient states being independent PTA and working as a contract . Patient would benefit from additional rehab before safe discharge home. Patient will benefit from skilled intervention to address the above impairments. Patients rehabilitation potential is considered to be Fair Factors which may influence rehabilitation potential include:  
[ ]             None noted [ ]             Mental ability/status [ ]             Medical condition [ ]             Home/family situation and support systems [ ]             Safety awareness [ ]             Pain tolerance/management [ ]             Other: PLAN : 
Recommendations and Planned Interventions: 
[x ]               Self Care Training                  [x ]        Therapeutic Activities [x ]               Functional Mobility Training    [ ]        Cognitive Retraining [x ]               Therapeutic Exercises           [x ]        Endurance Activities [ x]               Balance Training                   [ ]        Neuromuscular Re-Education [ ]               Visual/Perceptual Training     [x ]   Home Safety Training [x ]               Patient Education                 [x ]        Family Training/Education [ ]               Other (comment): Frequency/Duration: Patient will be followed by occupational therapy 3 times a week to address goals. Discharge Recommendations: Rehab Further Equipment Recommendations for Discharge: TBD at rehab SUBJECTIVE:  
Patient stated You aren't going to put cuffs on me are you? (During MMT).  
   
OBJECTIVE DATA SUMMARY:  
HISTORY:  
Past Medical History:  
Diagnosis Date  Arthritis  CAD (coronary artery disease) 2007  CKD (chronic kidney disease), stage III    
 DM type 2 causing renal disease ([address]  Gait abnormality  Heart murmur  Hepatitis C    
 History of blood transfusion  Hypercholesterolemia  Hypertension  Hypertension 11/7/2014  Neuropathy [facility]  Peripheral vascular disease ([facility] 11/7/2014  
  A. S/P Right SFA POBA and tibial atherectomy (2/4/13).  PUD (peptic ulcer disease) 2007  Unspecified sleep apnea    
  never used cpap Past Surgical History:  
Procedure Laterality Date [address] UNLISTED   2007  
  has approx 7-8\" midline incision on abdomen--\"had to open him up and clean him out after feeding tube clogged\"  HX GI   2007  
  feeding tube for approx 2 mo  VASCULAR SURGERY PROCEDURE UNLIST Right 1/22/2015  
  popliteal-tibial bypass Prior Level of Function/Home Situation: States I with ADLS, doubtfu Expanded or extensive additional review of patient history:  
  
Home Situation Home Environment: Private residence # Steps to Enter: 5 Rails to Enter: No 
One/Two Story Residence: One story Living Alone: No 
Support Systems: Family member(s) Patient Expects to be Discharged to[de-identified] Private residence Current DME Used/Available at Home: Walker, rolling Tub or Shower Type: Shower [X]  Right hand dominant             [ ]  Left hand dominant EXAMINATION OF PERFORMANCE DEFICITS: 
Cognitive/Behavioral Status: 
Neurologic State: Alert Orientation Level: Oriented to person Cognition: Follows commands Perception: Appears intact Perseveration: No perseveration noted Safety/Judgement: Decreased awareness of need for assistance;Decreased insight into deficits Skin: appears intact UE Edema: none noted UE Hearing: Auditory Auditory Impairment: None Vision/Perceptual:   
    
    
    
  
Diplopia: No   
Acuity: Within Defined Limits;Able to read clock/calendar on wall without difficulty Range of Motion: 
  
AROM: Generally decreased, functional (BUE) Strength: 
  
Strength: Generally decreased, functional (BUE) Coordination: 
Coordination: Generally decreased, functional (BUE) Fine Motor Skills-Upper: Left Intact; Right Intact Gross Motor Skills-Upper: Left Intact; Right Intact Tone & Sensation: 
  
Tone: Normal (BUE) Sensation: Intact (BUE) Balance: 
Sitting: Intact Standing: Impaired;Pull to stand; With support Standing - Static: Constant support; Fair 
Standing - Dynamic : Fair Functional Mobility and Transfers for ADLs: 
Bed Mobility: 
Rolling: Minimum assistance;Assist x1;Additional time Supine to Sit: Minimum assistance;Assist x1;Additional time Scooting: Supervision Transfers: 
Sit to Stand: Moderate assistance;Assist x2 Stand to Sit: Moderate assistance;Assist x2 Bed to Chair: Moderate assistance;Assist x2 Toilet Transfer :  (NT, mod assist x 2 for transfer to chair) ADL Assessment: 
Feeding: Setup Oral Facial Hygiene/Grooming: Setup Bathing: Maximum assistance (for LE) Upper Body Dressing: Minimum assistance;Assist x1 Lower Body Dressing: Total assistance Toileting: Total assistance (incontinent of bowel and bladder) ADL Intervention and task modifications: 
  Patient instructed and indicated understanding the benefits of maintaining activity tolerance, functional mobility, and independence with self care tasks during acute stay to ensure safe return home and to baseline. Encouraged patient to increase frequency and duration OOB, be out of bed for all meals, perform daily ADLs (as approved by RN/MD regarding bathing etc), and performing functional mobility to/from bathroom with assistance. Cognitive Retraining Safety/Judgement: Decreased awareness of need for assistance;Decreased insight into deficits Functional Measure: 
Barthel Index: 
   
Bathin Bladder: 0 Bowels: 0 Groomin Dressin Feeding: 10 Mobility: 5 Stairs: 0 Toilet Use: 0 Transfer (Bed to Chair and Back): 5 Total: 30 Barthel and G-code impairment scale: 
Percentage of impairment CH 
0% CI 
1-19% CJ 
20-39% CK 
40-59% CL 
60-79% CM 
80-99% CN 
100% Barthel Score 0-100 100 99-80 79-60 59-40 20-39 1-19 
  0 Barthel Score 0-20 20 17-19 13-16 9-12 5-8 1-4 0 The Barthel ADL Index: Guidelines 1. The index should be used as a record of what a patient does, not as a record of what a patient could do.  
2. The main aim is to establish degree of independence from any help, physical or verbal, however minor and for whatever reason. 3. The need for supervision renders the patient not independent. 4. A patient's performance should be established using the best available evidence. Asking the patient, friends/relatives and nurses are the usual sources, but direct observation and common sense are also important. However direct testing is not needed. 5. Usually the patient's performance over the preceding 24-48 hours is important, but occasionally longer periods will be relevant. 6. Middle categories imply that the patient supplies over 50 per cent of the effort. 7. Use of aids to be independent is allowed. Bunny Sweet., Barthel, D.W. (6087). Functional evaluation: the Barthel Index. [facility] (14)2. DAREK Burks, Supriya Miller., Jackson Henning., Pamela, [address] (1999). Measuring the change indisability after inpatient rehabilitation; comparison of the responsiveness of the Barthel Index and Functional [place] Measure. Journal of Neurology, Neurosurgery, and Psychiatry, 66(4), 577-496. BALBIR Cutler.MARY, RYAN Wilkerson, & Rosario Ladd MPavanA. (2004.) Assessment of post-stroke quality of life in cost-effectiveness studies: The usefulness of the Barthel Index and the EuroQoL-5D. [facility], 45, 739-28 G codes: In compliance with CMSs Claims Based Outcome Reporting, the following G-code set was chosen for this patient based on their primary functional limitation being treated: The outcome measure chosen to determine the severity of the functional limitation was the Barthel with a score of 30/100 which was correlated with the impairment scale. · Other PT/OT Primary Functional Limitations: 
             - CURRENT STATUS:    CL - 60%-79% impaired, limited or restricted  - GOAL STATUS:           CK - 40%-59% impaired, limited or restricted              - D/C STATUS:                       ---------------To be determined---------------  
 Occupational Therapy Evaluation Charge Determination History Examination Decision-Making MEDIUM Complexity : Expanded review of history including physical, cognitive and psychosocial  history  MEDIUM Complexity : 3-5 performance deficits relating to physical, cognitive , or psychosocial skils that result in activity limitations and / or participation restrictions MEDIUM Complexity : Patient may present with comorbidities that affect occupational performnce. Miniml to moderate modification of tasks or assistance (eg, physical or verbal ) with assesment(s) is necessary to enable patient to complete evaluation Based on the above components, the patient evaluation is determined to be of the following complexity level: MEDIUM Pain: 
Pain Scale 1: Numeric (0 - 10) Pain Intensity 1: 0 Activity Tolerance:  
Patient completes sit to stand and transfer to chair Please refer to the flowsheet for vital signs taken during this treatment. After treatment:  
[X] Patient left in no apparent distress sitting up in chair [ ] Patient left in no apparent distress in bed [X] Call bell left within reach 
[X] Nursing notified [ ] Caregiver present [X] Chair alarm activated COMMUNICATION/EDUCATION:  
The patients plan of care was discussed with: Physical Therapist. 
[X] Home safety education was provided and the patient/caregiver indicated understanding. [X] Patient/family have participated as able in goal setting and plan of care. [X] Patient/family agree to work toward stated goals and plan of care. [ ] Patient understands intent and goals of therapy, but is neutral about his/her participation. [ ] Patient is unable to participate in goal setting and plan of care. This patients plan of care is appropriate for delegation to [facility]. Thank you for this referral. 
Angelo Kwong OT Time Calculation: 30 mins  Addended by DASIA Gunderson/L, CBARVIN on 7/19/2017 to update POC

## 2017-07-16 NOTE — PROGRESS NOTES
Medical Progress Note NAME: Dinah Valadez :  1940 MRM:  [id number] Date/Time: 2017  12:05 PM 
 
  
Assessment / Plan:  
 
Generalized weakness/debility:  Continues to decline to work with PT/OT. -- encourage OOB and PT/OT as able 
   
Iron deficiency Anemia:  HGB stable this admission. Hx of recent GI bleed while on Xarelto at [facility] s/p 5u RBCs. Serologies suggest iron deficiency anemia. -- add iron supplementation -- repeat labs in AM 
 -- records from [facility] 
  
PVD / CAD:  Denies chest pain or sob. Has hx of vascular surgery on right. -- continue ASA, Lipitor -- check MARY 
   
HTN:  Controlled. However, noted to have bradycardia at times. -- Continue amlodipine 
 -- decrease coreg and clonidine -- check overnight ox Abdominal pain:  Patient points to suprapubic region. Abdomen nontender and nondistended. -- check PVR   
 
Hx of DVT:  Xarelto stopped due to GI bleed. S/P IVC filter. 
   
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 -- SSI alone 
   
CKD 3:  Stable from . 
 -- monitor 
   
Neuropathy:  
 -- Cont nortryptyline  
  
 
 
  
Subjective: Chief Complaint:  abd pain. Points to suprapubic region in regards to pain. No n/v. No chest pain or sob. ROS: 
(bold if positive, if negative) Abd Pain Objective:  
 
 
Vitals:  
 
 
  
Last 24hrs VS reviewed since prior progress note. Most recent are: 
 
Visit Vitals  /74 (BP 1 Location: Left arm, BP Patient Position: At rest)  Pulse (!) 56  Temp 98.2 °F (36.8 °C)  Resp 16  
 Ht 6' 8\" (2.032 m)  Wt 88.9 kg (196 lb)  SpO2 100%  BMI 21.53 kg/m2 SpO2 Readings from Last 6 Encounters:  
17 100% 09/08/15 98% 08/28/15 100% 01/26/15 99% 01/19/15 98% Intake/Output Summary (Last 24 hours) at 17 1205 Last data filed at 17 2660 Gross per 24 hour Intake                0 ml Output              300 ml Net             -300 ml Exam:  
 
Physical Exam: 
 
Gen:  Well-developed, well-nourished, in no acute distress HEENT:  Pink conjunctivae, hearing intact to voice, moist mucous membranes Resp:  No accessory muscle use, clear breath sounds without wheezes rales or rhonchi 
Card:  No murmurs, normal S1, S2 without thrills or peripheral edema Abd:  Soft, non-tender, non-distended, normoactive bowel sounds are present Musc:  No cyanosis Skin:  Callus of left heel Neuro: Face symmetric, tongue midline, speech fluent,  strength is 4/5 bilaterally and dorsi / plantarflexion is 4/5 bilaterally, follows commands appropriately Psych:  alert Medications Reviewed: (see below) Lab Data Reviewed: (see below) 
 
______________________________________________________________________ Medications:  
 
Current Facility-Administered Medications Medication Dose Route Frequency  cloNIDine HCl (CATAPRES) tablet 0.2 mg  0.2 mg Oral BID  hydrALAZINE (APRESOLINE) 20 mg/mL injection 20 mg  20 mg IntraVENous Q6H PRN  
 amLODIPine (NORVASC) tablet 10 mg  10 mg Oral DAILY  aspirin delayed-release tablet 81 mg  81 mg Oral DAILY  atorvastatin (LIPITOR) tablet 40 mg  40 mg Oral DAILY  carvedilol (COREG) tablet 6.25 mg  6.25 mg Oral BID WITH MEALS  
 haloperidol (HALDOL) tablet 0.5 mg  0.5 mg Oral TID  nortriptyline (PAMELOR) capsule 25 mg  25 mg Oral QHS  
 0.9% sodium chloride infusion  50 mL/hr IntraVENous CONTINUOUS  
 sodium chloride (NS) flush 5-10 mL  5-10 mL IntraVENous Q8H  
 sodium chloride (NS) flush 5-10 mL  5-10 mL IntraVENous PRN  
 acetaminophen (TYLENOL) tablet 650 mg  650 mg Oral Q4H PRN  
 HYDROcodone-acetaminophen (NORCO) 5-325 mg per tablet 1 Tab  1 Tab Oral Q4H PRN  
 HYDROmorphone (PF) (DILAUDID) injection 0.5 mg  0.5 mg IntraVENous Q4H PRN  
 naloxone (NARCAN) injection 0.4 mg  0.4 mg IntraVENous PRN  
 diphenhydrAMINE (BENADRYL) injection 12.5 mg  12.5 mg IntraVENous Q4H PRN  
 ondansetron (ZOFRAN) injection 4 mg  4 mg IntraVENous Q6H PRN  
 docusate sodium (COLACE) capsule 100 mg  100 mg Oral BID  insulin lispro (HUMALOG) injection   SubCUTAneous AC&HS  
 glucose chewable tablet 16 g  4 Tab Oral PRN  
 dextrose (D50W) injection syrg 12.5-25 g  12.5-25 g IntraVENous PRN  
 glucagon (GLUCAGEN) injection 1 mg  1 mg IntraMUSCular PRN Lab Review:  
 
Recent Labs  
   07/14/17 0442 07/13/17 [facility] WBC  5.2  5.0 HGB  9.3*  8.8* HCT  28.9*  28.1*  
PLT  277  239 Recent Labs  
   07/14/17 0442 07/13/17 
 1811  07/13/17 [facility] NA  140   --   140  
K  3.8   --   3.8 CL  106   --   107 CO2  24   --   25 GLU  113*   --   102* BUN  20   --   19  
CREA  2.16*   --   2.30* CA  8.5   --   8.4* MG  1.8   --    --   
PHOS  3.3   --    --   
ALB  2.7*   --   2.7* TBILI  0.4   --   0.4 SGOT  22   --   21 ALT  23   --   21 INR   --   1.1   --   
 
Lab Results Component Value Date/Time Glucose (POC) 120 07/16/2017 10:44 AM  
 Glucose (POC) 100 07/16/2017 07:09 AM  
 Glucose (POC) 132 07/15/2017 09:14 PM  
 Glucose (POC) 97 07/15/2017 04:40 PM  
 Glucose (POC) 120 07/15/2017 11:34 AM  
 
 
 
Total time spent with patient: 30 Minutes Care Plan discussed with: Patient and Nursing Staff Discussed:  Care Plan Prophylaxis:  SCD's Disposition:  SNF/LTC 
        
___________________________________________________ Attending Physician: Rancho Valle MD

## 2017-07-16 NOTE — ROUTINE PROCESS
Bedside and Verbal shift change report given to Shannon Leal RN (oncoming nurse) by Randall Silva RN (offgoing nurse). Report included the following information SBAR, Kardex, ED Summary, Intake/Output, MAR and Recent Results.

## 2017-07-17 LAB
GLUCOSE BLD STRIP.AUTO-MCNC: 140 MG/DL (ref 65–100)
GLUCOSE BLD STRIP.AUTO-MCNC: 141 MG/DL (ref 65–100)
GLUCOSE BLD STRIP.AUTO-MCNC: 91 MG/DL (ref 65–100)
SERVICE CMNT-IMP: ABNORMAL
SERVICE CMNT-IMP: ABNORMAL
SERVICE CMNT-IMP: NORMAL

## 2017-07-17 PROCEDURE — 99218 HC RM OBSERVATION: CPT

## 2017-07-17 PROCEDURE — 74011250637 HC RX REV CODE- 250/637: Performed by: INTERNAL MEDICINE

## 2017-07-17 PROCEDURE — 96374 THER/PROPH/DIAG INJ IV PUSH: CPT

## 2017-07-17 PROCEDURE — 82962 GLUCOSE BLOOD TEST: CPT

## 2017-07-17 RX ADMIN — ASPIRIN 81 MG: 81 TABLET, COATED ORAL at 09:20

## 2017-07-17 RX ADMIN — CLONIDINE HYDROCHLORIDE 0.1 MG: 0.1 TABLET ORAL at 17:32

## 2017-07-17 RX ADMIN — CARVEDILOL 3.12 MG: 3.12 TABLET, FILM COATED ORAL at 16:11

## 2017-07-17 RX ADMIN — CARVEDILOL 3.12 MG: 3.12 TABLET, FILM COATED ORAL at 10:58

## 2017-07-17 RX ADMIN — FERROUS SULFATE TAB 325 MG (65 MG ELEMENTAL FE) 325 MG: 325 (65 FE) TAB at 09:20

## 2017-07-17 RX ADMIN — NORTRIPTYLINE HYDROCHLORIDE 25 MG: 25 CAPSULE ORAL at 23:52

## 2017-07-17 RX ADMIN — POLYETHYLENE GLYCOL 3350 17 G: 17 POWDER, FOR SOLUTION ORAL at 09:19

## 2017-07-17 RX ADMIN — FERROUS SULFATE TAB 325 MG (65 MG ELEMENTAL FE) 325 MG: 325 (65 FE) TAB at 16:12

## 2017-07-17 RX ADMIN — AMLODIPINE BESYLATE 10 MG: 5 TABLET ORAL at 09:20

## 2017-07-17 RX ADMIN — DOCUSATE SODIUM 100 MG: 100 CAPSULE ORAL at 09:20

## 2017-07-17 RX ADMIN — ATORVASTATIN CALCIUM 40 MG: 20 TABLET, FILM COATED ORAL at 09:20

## 2017-07-17 RX ADMIN — HALOPERIDOL 0.5 MG: 1 TABLET ORAL at 09:19

## 2017-07-17 RX ADMIN — HALOPERIDOL 0.5 MG: 1 TABLET ORAL at 23:51

## 2017-07-17 RX ADMIN — CLONIDINE HYDROCHLORIDE 0.1 MG: 0.1 TABLET ORAL at 09:20

## 2017-07-17 RX ADMIN — DOCUSATE SODIUM 100 MG: 100 CAPSULE ORAL at 17:32

## 2017-07-17 RX ADMIN — HALOPERIDOL 0.5 MG: 1 TABLET ORAL at 16:11

## 2017-07-17 NOTE — PROGRESS NOTES
Patient told MD he would agree to IV placement and lab draws, but when this RN entered the room the patient stated: \"I'm not letting you put anything in my arm. I have bad veins. 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 \"  Will make another attempt after the patient rests a while.

## 2017-07-17 NOTE — ROUTINE PROCESS
Bedside and Verbal shift change report given to Josiah Weathers RN (oncoming nurse) by America King RN (offgoing nurse). Report included the following information SBAR, Kardex, ED Summary, Intake/Output, MAR and Recent Results.

## 2017-07-17 NOTE — PROGRESS NOTES
1636: 
[facility] willing to accept pt and will start Quynh Daherman. MIKHAIL Rollins 
 
CM Note: 
Pt's fiancee, Melvern Lombard was called ([phone]) and asked when pt was at [facility]. She wasn't sure if it was April or June. She called back and stated he was at [facility] in June but didn't have any dates. I called her back and left a message that I needed the exact dates he was there. She chose the following snf's: [facility], [facility] and [facility]. Referrals sent.   MIKHAIL Rollins

## 2017-07-17 NOTE — INTERDISCIPLINARY ROUNDS
Interdisciplinary team rounds were held 7/17/2017 with the following team members:Care Management, Nursing and Physical Therapy Plan of care discussed. See clinical pathway and/or care plan for interventions and desired outcomes. Discharge TBD

## 2017-07-17 NOTE — PROGRESS NOTES
Attempted to work with the patient for Physical Therapy, chart reviewed and discussed with nurse patient still not cooperating and refuse to participate with therapy. We will continue to follow up with the patient for therapy. Thank you.

## 2017-07-17 NOTE — PROGRESS NOTES
Medical Progress Note NAME: Bhumi Elam :  1940 MRM:  [id number] Date/Time: 2017  10:13 AM 
 
  
Assessment / Plan:  
 
Generalized weakness/debility:  Continues to decline to work with PT/OT. -- encourage OOB and PT/OT as able 
   
Iron deficiency Anemia:  HGB stable this admission. Hx of recent GI bleed while on Xarelto at [facility] s/p 5u RBCs. Serologies suggest iron deficiency anemia. Patient refused to have labs done. -- continue iron supplementation -- repeat labs when willing 
  
PVD / CAD:  Denies chest pain or sob. Has hx of vascular surgery on right. -- continue ASA, Lipitor -- check MARY 
   
HTN:  Controlled. However, noted to have bradycardia at times. Refused overnight oximetry. -- Continue amlodipine 
 -- continue lower dose of coreg and clonidine Abdominal pain:  Denies abd pain. No sig post void residual noted by bladder scans. -- continue bowel regimen Hx of DVT:  Xarelto stopped due to GI bleed. S/P IVC filter. 
   
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 -- SSI alone 
   
CKD 3:  Stable from . -- repeat labs when patient willing 
   
Neuropathy:  
 -- Cont nortryptyline  
  
 
 
  
Subjective: Chief Complaint:  \"fine\" Denies abd pain. Denies cp or sob. ROS: 
(bold if positive, if negative) Objective:  
 
 
Vitals:  
 
 
  
Last 24hrs VS reviewed since prior progress note. Most recent are: 
 
Visit Vitals  /73  Pulse (!) 56  Temp 97.8 °F (36.6 °C)  Resp 16  
 Ht 6' 8\" (2.032 m)  Wt 88.9 kg (196 lb)  SpO2 100%  BMI 21.53 kg/m2 SpO2 Readings from Last 6 Encounters:  
17 100% 09/08/15 98% 08/28/15 100% 01/26/15 99% 01/19/15 98% Intake/Output Summary (Last 24 hours) at 17 1013 Last data filed at 17 1001 Gross per 24 hour Intake                0 ml Output              775 ml Net -775 ml Exam:  
 
Physical Exam: 
 
Gen:  Well-developed, well-nourished, in no acute distress HEENT:  Pink conjunctivae, hearing intact to voice, moist mucous membranes Resp:  No accessory muscle use, clear breath sounds without wheezes rales or rhonchi 
Card:  No murmurs, normal S1, S2 without thrills or peripheral edema Abd:  Soft, non-tender, non-distended, normoactive bowel sounds are present Skin:  Callus of left heel Neuro: Face symmetric, tongue midline, speech fluent,  strength is 4/5 bilaterally and dorsi / plantarflexion is 4/5 bilaterally, follows commands appropriately Psych:  alert Medications Reviewed: (see below) Lab Data Reviewed: (see below) 
 
______________________________________________________________________ Medications:  
 
Current Facility-Administered Medications Medication Dose Route Frequency  polyethylene glycol (MIRALAX) packet 17 g  17 g Oral DAILY  cloNIDine HCl (CATAPRES) tablet 0.1 mg  0.1 mg Oral BID  carvedilol (COREG) tablet 3.125 mg  3.125 mg Oral BID WITH MEALS  ferrous sulfate tablet 325 mg  1 Tab Oral BID WITH MEALS  
 hydrALAZINE (APRESOLINE) 20 mg/mL injection 20 mg  20 mg IntraVENous Q6H PRN  
 amLODIPine (NORVASC) tablet 10 mg  10 mg Oral DAILY  aspirin delayed-release tablet 81 mg  81 mg Oral DAILY  atorvastatin (LIPITOR) tablet 40 mg  40 mg Oral DAILY  haloperidol (HALDOL) tablet 0.5 mg  0.5 mg Oral TID  nortriptyline (PAMELOR) capsule 25 mg  25 mg Oral QHS  
 0.9% sodium chloride infusion  50 mL/hr IntraVENous CONTINUOUS  
 sodium chloride (NS) flush 5-10 mL  5-10 mL IntraVENous Q8H  
 sodium chloride (NS) flush 5-10 mL  5-10 mL IntraVENous PRN  
 acetaminophen (TYLENOL) tablet 650 mg  650 mg Oral Q4H PRN  
 HYDROcodone-acetaminophen (NORCO) 5-325 mg per tablet 1 Tab  1 Tab Oral Q4H PRN  
 naloxone (NARCAN) injection 0.4 mg  0.4 mg IntraVENous PRN  
 diphenhydrAMINE (BENADRYL) injection 12.5 mg  12.5 mg IntraVENous Q4H PRN  
 ondansetron (ZOFRAN) injection 4 mg  4 mg IntraVENous Q6H PRN  
 docusate sodium (COLACE) capsule 100 mg  100 mg Oral BID  insulin lispro (HUMALOG) injection   SubCUTAneous AC&HS  
 glucose chewable tablet 16 g  4 Tab Oral PRN  
 dextrose (D50W) injection syrg 12.5-25 g  12.5-25 g IntraVENous PRN  
 glucagon (GLUCAGEN) injection 1 mg  1 mg IntraMUSCular PRN Lab Review: No results for input(s): WBC, HGB, HCT, PLT, HGBEXT, HCTEXT, PLTEXT, HGBEXT, HCTEXT, PLTEXT in the last 72 hours. No results for input(s): NA, K, CL, CO2, GLU, BUN, CREA, CA, MG, PHOS, ALB, TBIL, TBILI, SGOT, ALT, INR in the last 72 hours. No lab exists for component: INREXT, INREXT Lab Results Component Value Date/Time Glucose (POC) 113 07/16/2017 09:42 PM  
 Glucose (POC) 185 07/16/2017 05:27 PM  
 Glucose (POC) 120 07/16/2017 10:44 AM  
 Glucose (POC) 100 07/16/2017 07:09 AM  
 Glucose (POC) 132 07/15/2017 09:14 PM  
 
 
 
Total time spent with patient: 20 Minutes Care Plan discussed with: Patient and Nursing Staff Discussed:  Care Plan Prophylaxis:  SCD's Disposition:  SNF/LTC 
        
___________________________________________________ Attending Physician: Rodrigo Frederick MD

## 2017-07-17 NOTE — PROGRESS NOTES
0130: Patient's IV infiltrated; removed IV and stopped fluids (NS @ 50 ml/hr). Patient refuses new IV. He also refused overnight oximetry and AM lab draw. Informed Dr. Alejandro Ellis; she stated to apply warm compresses to the IV site and hospitalist can assess R arm in the morning to determine if there is a need for a doppler.

## 2017-07-18 LAB
GLUCOSE BLD STRIP.AUTO-MCNC: 103 MG/DL (ref 65–100)
GLUCOSE BLD STRIP.AUTO-MCNC: 119 MG/DL (ref 65–100)
GLUCOSE BLD STRIP.AUTO-MCNC: 83 MG/DL (ref 65–100)
GLUCOSE BLD STRIP.AUTO-MCNC: 97 MG/DL (ref 65–100)
SERVICE CMNT-IMP: ABNORMAL
SERVICE CMNT-IMP: ABNORMAL
SERVICE CMNT-IMP: NORMAL
SERVICE CMNT-IMP: NORMAL

## 2017-07-18 PROCEDURE — G8978 MOBILITY CURRENT STATUS: HCPCS | Performed by: PHYSICAL THERAPIST

## 2017-07-18 PROCEDURE — G8979 MOBILITY GOAL STATUS: HCPCS | Performed by: PHYSICAL THERAPIST

## 2017-07-18 PROCEDURE — 74011250637 HC RX REV CODE- 250/637: Performed by: INTERNAL MEDICINE

## 2017-07-18 PROCEDURE — 93922 UPR/L XTREMITY ART 2 LEVELS: CPT

## 2017-07-18 PROCEDURE — 99218 HC RM OBSERVATION: CPT

## 2017-07-18 PROCEDURE — 97530 THERAPEUTIC ACTIVITIES: CPT

## 2017-07-18 PROCEDURE — 97161 PT EVAL LOW COMPLEX 20 MIN: CPT

## 2017-07-18 PROCEDURE — 82962 GLUCOSE BLOOD TEST: CPT

## 2017-07-18 RX ORDER — HYDRALAZINE HYDROCHLORIDE 25 MG/1
25 TABLET, FILM COATED ORAL 3 TIMES DAILY
Status: DISCONTINUED | OUTPATIENT
Start: 2017-07-18 | End: 2017-07-19

## 2017-07-18 RX ADMIN — NORTRIPTYLINE HYDROCHLORIDE 25 MG: 25 CAPSULE ORAL at 22:46

## 2017-07-18 RX ADMIN — CLONIDINE HYDROCHLORIDE 0.1 MG: 0.1 TABLET ORAL at 17:49

## 2017-07-18 RX ADMIN — ATORVASTATIN CALCIUM 40 MG: 20 TABLET, FILM COATED ORAL at 09:49

## 2017-07-18 RX ADMIN — DOCUSATE SODIUM 100 MG: 100 CAPSULE ORAL at 17:49

## 2017-07-18 RX ADMIN — HYDRALAZINE HYDROCHLORIDE 25 MG: 25 TABLET, FILM COATED ORAL at 17:49

## 2017-07-18 RX ADMIN — DOCUSATE SODIUM 100 MG: 100 CAPSULE ORAL at 09:49

## 2017-07-18 RX ADMIN — POLYETHYLENE GLYCOL 3350 17 G: 17 POWDER, FOR SOLUTION ORAL at 09:55

## 2017-07-18 RX ADMIN — HALOPERIDOL 0.5 MG: 1 TABLET ORAL at 17:49

## 2017-07-18 RX ADMIN — CLONIDINE HYDROCHLORIDE 0.1 MG: 0.1 TABLET ORAL at 09:49

## 2017-07-18 RX ADMIN — FERROUS SULFATE TAB 325 MG (65 MG ELEMENTAL FE) 325 MG: 325 (65 FE) TAB at 09:49

## 2017-07-18 RX ADMIN — CARVEDILOL 3.12 MG: 3.12 TABLET, FILM COATED ORAL at 09:49

## 2017-07-18 RX ADMIN — ASPIRIN 81 MG: 81 TABLET, COATED ORAL at 09:49

## 2017-07-18 RX ADMIN — CARVEDILOL 3.12 MG: 3.12 TABLET, FILM COATED ORAL at 17:49

## 2017-07-18 RX ADMIN — HYDRALAZINE HYDROCHLORIDE 25 MG: 25 TABLET, FILM COATED ORAL at 12:00

## 2017-07-18 RX ADMIN — FERROUS SULFATE TAB 325 MG (65 MG ELEMENTAL FE) 325 MG: 325 (65 FE) TAB at 17:49

## 2017-07-18 RX ADMIN — HALOPERIDOL 0.5 MG: 1 TABLET ORAL at 22:46

## 2017-07-18 RX ADMIN — AMLODIPINE BESYLATE 10 MG: 5 TABLET ORAL at 09:49

## 2017-07-18 RX ADMIN — HALOPERIDOL 0.5 MG: 1 TABLET ORAL at 09:51

## 2017-07-18 RX ADMIN — HYDRALAZINE HYDROCHLORIDE 25 MG: 25 TABLET, FILM COATED ORAL at 22:46

## 2017-07-18 NOTE — PROGRESS NOTES
Attempted to get IV access was unable to, Daily Gómez tried patient stated it was hurting. Radiology called to get access, patient refused. MD informed.

## 2017-07-18 NOTE — PROCEDURES
[facility]  *** FINAL REPORT ***    Name: Sumit Huff  MRN: [id number]    Inpatient  : 09 Sep 1940  HIS Order #: [id number]  [address] [facility] Visit #: [id number]  Date: 2017    TYPE OF TEST: Peripheral Arterial Testing    REASON FOR TEST  PVD with rest pain    Right Leg  Segmentals: Abnormal                     mmHg  Brachial         170  High thigh  Low thigh  Calf  Posterior tibial  89  Dorsalis pedis    90  Peroneal  Metatarsal  Toe pressure      66  Doppler:    Abnormal  PVR:  Ankle/Brachial: 0.51    Left Leg  Segmentals: Abnormal                     mmHg  Brachial         175  High thigh  Low thigh  Calf  Posterior tibial  92  Dorsalis pedis    64  Peroneal  Metatarsal  Toe pressure      60  Doppler:    Abnormal  PVR:  Ankle/Brachial: 0.53    INTERPRETATION/FINDINGS  PROCEDURE:  Evaluation of lower extremity arteries with systolic blood   pressure measurement at the ankle and brachial level and calculation  of the ankle/brachial pressure index (MARY). The exam may also include   pulse volume recording (PVR) plethysmography at the ankle level. IMPRESSION:  1. Moderate peripheral arterial disease indicated at rest in the right   leg. 2. Moderate peripheral arterial disease indicated at rest in the left  leg. 3. The right ankle/brachial index is 0.51 and the left ankle/brachial  index is 0.53.  4. The right toe/brachial index is 0.38 and the left toe/brachial  index is 0.34. ADDITIONAL COMMENTS    NOTE: A full PVR is not obtained due to a hx bypass in both legs. I have personally reviewed the data relevant to the interpretation of  this  study.     TECHNOLOGIST: Corin Barksdale RDCS  Signed: 2017 10:56 AM    PHYSICIAN: Smita Galvan MD  Signed: 2017 05:54 AM

## 2017-07-18 NOTE — PROGRESS NOTES
Bedside and Verbal shift change report given to Yvonne Peralta (oncoming nurse) by Kathy Walker RN (offgoing nurse). Report included the following information SBAR, Kardex, Intake/Output, MAR, Accordion and Recent Results.

## 2017-07-18 NOTE — PROGRESS NOTES
1553:  [facility] reported pt is full Medicaid. MIKHAIL Rollins    4573:  Didier Leija called from [facility]. She stated pt needs PT participation notes to get auth for snf. So far he has refused. MIKHAIL Rollins    7695:  Pt's daughter, Carrol Hall called ([phone]). She stated she has POA. She has tried for several months to get him into a nursing home but he refuses to go. He would rather be in his home without power. [facility] was asked to check if he does have Medicaid. MIKHAIL Rollins     Note:  Zoya Phelan declined pt as he still shows as obs and does not have Medicaid. I called his fiancee, Isai Collins. She stated there were no d/c papers from [facility]. She can't remember what unit he was on there. She now says that he was at [facility] for 3-4 weeks and was there in early May. Marisabeldiane Hardy stated pt has 12 children and none of them will help. She is going to call his daughter Hal Angelo and have me explain the situation to her. Jessica Paz consulted.   MIKHAIL Rollins

## 2017-07-18 NOTE — PROGRESS NOTES
Patient is a [employer] Medicare client and Medicaid client. I have verified his Medicaid on the Medicaid web portal. Since he is [facility] in order to go to SNF he will need an authorization for the initial stay. [facility] has accepted the patient for SNF care if Amina Tran can be obtained from [facility] They have requested updated therapy notes on the patient in order to accomplish this which updated progress notes were sent to them for today for today including therapy notes. Patient will need to have UAI completed for possible LTC due to patient possibility of needing LTC also.  I will also have the  look into additional facility partners for this partners that may accept this patient with transition to LTC . / Ethan Sandoval of Case Management

## 2017-07-18 NOTE — PROGRESS NOTES
Problem: Mobility Impaired (Adult and Pediatric)  Goal: *Acute Goals and Plan of Care (Insert Text)  Physical Therapy Goals  Initiated 7/18/2017  1. Patient will move from supine to sit and sit to supine , scoot up and down and roll side to side in bed with independence within 7 day(s). 2. Patient will transfer from bed to chair and chair to bed with minimal assistance/contact guard assist using the least restrictive device within 7 day(s). 3. Patient will perform sit to stand with minimal assistance/contact guard assist within 7 day(s). 4. Patient will ambulate with moderate assistance for 50 feet with the least restrictive device within 7 day(s). PHYSICAL THERAPY EVALUATION  Patient: Jj Bennett (30 y.o. male)  Date: 7/18/2017  Primary Diagnosis: Weakness        Precautions:   Fall      ASSESSMENT :  Based on the objective data described below, the patient presents with generalized weakness and debility. Sit on the edge of bed with min assist, dangled on the edge of bed high guard steady sitting without support, sit to stand 4 times max assist. Patient unable to stand up straight. After the 4th attempt patient sated no more and just want to go back to bed. Assisted back to bed supine. educate and  Instructed patient to continue active range of motion exercise on both legs while up in bed. Notified nurse who agreed to monitor patient. Patient will benefit from skilled intervention to address the above impairments.   Patients rehabilitation potential is considered to be Excellent  Factors which may influence rehabilitation potential include:   [ ]         None noted  [X]         Mental ability/status  [X]         Medical condition  [ ]         Home/family situation and support systems  [X]         Safety awareness  [ ]         Pain tolerance/management  [ ]         Other:        PLAN :  Recommendations and Planned Interventions:  [X]           Bed Mobility Training             [ ] Neuromuscular Re-Education  [X]           Transfer Training                   [ ]    Orthotic/Prosthetic Training  [X]           Gait Training                         [ ]    Modalities  [X]           Therapeutic Exercises           [ ]    Edema Management/Control  [X]           Therapeutic Activities            [ ]    Patient and Family Training/Education  [ ]           Other (comment):     Frequency/Duration: Patient will be followed by physical therapy  5 times a week to address goals. Discharge Recommendations: Diallo Young  Further Equipment Recommendations for Discharge: TBD       SUBJECTIVE:   Patient stated Ok we can try.       OBJECTIVE DATA SUMMARY:   HISTORY:    Past Medical History:   Diagnosis Date    Arthritis      CAD (coronary artery disease) 2007    CKD (chronic kidney disease), stage III      DM type 2 causing renal disease (HCC)      Gait abnormality      Heart murmur      Hepatitis C      History of blood transfusion      Hypercholesterolemia      Hypertension      Hypertension 11/7/2014    [facility] [address]      Peripheral vascular disease ([facility] [address] 11/7/2014     A. S/P Right SFA POBA and tibial atherectomy (2/4/13).  PUD (peptic ulcer disease) 2007    Unspecified sleep apnea       never used cpap     Past Surgical History:   Procedure Laterality Date    ABDOMEN SURGERY PROC UNLISTED   2007     has approx 7-8\" midline incision on abdomen--\"had to open him up and clean him out after feeding tube clogged\"    HX GI   2007     feeding tube for approx 2 mo    VASCULAR SURGERY PROCEDURE UNLIST Right 1/22/2015     popliteal-tibial bypass     Prior Level of Function/Home Situation: Independent community ambulator without assistive device.   Personal factors and/or comorbidities impacting plan of care:      Home Situation  Home Environment: Private residence  # Steps to Enter: 5  Rails to Enter: No  One/Two Story Residence: One story  Living Alone: No  Support Systems: Family member(s)  Patient Expects to be Discharged to[de-identified] Private residence  Current DME Used/Available at Home: Koleen Karlene, rolling  Tub or Shower Type: Shower     EXAMINATION/PRESENTATION/DECISION MAKING:   Critical Behavior:  Neurologic State: Alert  Orientation Level: Oriented to person, Oriented to place  Cognition: Follows commands  Safety/Judgement: Decreased awareness of need for assistance, Decreased insight into deficits  Hearing: Auditory  Auditory Impairment: None     Range Of Motion:  AROM: Within functional limits           PROM: Within functional limits           Strength:    Strength: Generally decreased, functional                    Tone & Sensation:                                  Coordination:  Coordination: Within functional limits  Vision:      Functional Mobility:  Bed Mobility:  Rolling: Minimum assistance  Supine to Sit: Minimum assistance  Sit to Supine: Minimum assistance  Scooting: Minimum assistance  Transfers:  Sit to Stand: Maximum assistance  Stand to Sit: Maximum assistance                       Balance:   Sitting: Intact; High guard  Standing: Impaired;Pull to stand; With support  Standing - Static: Poor  Standing - Dynamic : Poor  Ambulation/Gait Training:  Distance (ft): 0 Feet (ft)  Assistive Device: Walker, rolling;Gait belt  Ambulation - Level of Assistance: Maximum assistance     Gait Description (WDL): Exceptions to WDL  Gait Abnormalities: Path deviations        Base of Support: Widened     Speed/[place]: Fluctuations                             Therapeutic Exercises:    Instructed patient to continue active range of motion exercise on both legs while up in bed.          Functional Measure:  Barthel Index:      Bathin  Bladder: 0  Bowels: 0  Groomin  Dressin  Feeding: 10  Mobility: 0  Stairs: 0  Toilet Use: 5  Transfer (Bed to Chair and Back): 5  Total: 30         Barthel and G-code impairment scale:  Percentage of impairment CH  0% CI  1-19% CJ  20-39% CK  40-59% CL  60-79% CM  80-99% CN  100%   Barthel Score 0-100 100 99-80 79-60 59-40 20-39 1-19    0   Barthel Score 0-20 20 17-19 13-16 9-12 5-8 1-4 0      The Barthel ADL Index: Guidelines  1. The index should be used as a record of what a patient does, not as a record of what a patient could do. 2. The main aim is to establish degree of independence from any help, physical or verbal, however minor and for whatever reason. 3. The need for supervision renders the patient not independent. 4. A patient's performance should be established using the best available evidence. Asking the patient, friends/relatives and nurses are the usual sources, but direct observation and common sense are also important. However direct testing is not needed. 5. Usually the patient's performance over the preceding 24-48 hours is important, but occasionally longer periods will be relevant. 6. Middle categories imply that the patient supplies over 50 per cent of the effort. 7. Use of aids to be independent is allowed. Safia Samuels., Barthel, D.W. (1091). Functional evaluation: the Barthel Index. [address] [facility] (14)2. Rik Bryant avril DAREK Webster, Jeet Mcelroy., Valerie Woods, Ronal Hutchins, [facility] (1999). Measuring the change indisability after inpatient rehabilitation; comparison of the responsiveness of the Barthel Index and Functional [place] Measure. Journal of Neurology, Neurosurgery, and Psychiatry, 66(4), 260-029. Jacqueline Elizabeth, N.J.A, Jett Taylor  W.J.ANGELA, & Aj Gallardo MPavanA. (2004.) Assessment of post-stroke quality of life in cost-effectiveness studies: The usefulness of the Barthel Index and the EuroQoL-5D. Quality of Life Research, 13, 373-61         G codes: In compliance with CMSs Claims Based Outcome Reporting, the following G-code set was chosen for this patient based on their primary functional limitation being treated:      The outcome measure chosen to determine the severity of the functional limitation was the Barthel index with a score of 30/100 which was correlated with the impairment scale. · Mobility - Walking and Moving Around:               - CURRENT STATUS:    CL - 60%-79% impaired, limited or restricted               - GOAL STATUS:           CK - 40%-59% impaired, limited or restricted               - D/C STATUS:                       ---------------To be determined---------------      Physical Therapy Evaluation Charge Determination   History Examination Presentation Decision-Making   LOW Complexity : Zero comorbidities / personal factors that will impact the outcome / POC LOW Complexity : 1-2 Standardized tests and measures addressing body structure, function, activity limitation and / or participation in recreation  MEDIUM Complexity : Evolving with changing characteristics  Other outcome measures barthel index  HIGH       Based on the above components, the patient evaluation is determined to be of the following complexity level: MEDIUM     Pain:  Pain Scale 1: Numeric (0 - 10)  Pain Intensity 1: 0              Activity Tolerance:   Good. Please refer to the flowsheet for vital signs taken during this treatment. After treatment:   [ ]         Patient left in no apparent distress sitting up in chair  [X]         Patient left in no apparent distress in bed  [X]         Call bell left within reach  [X]         Nursing notified  [ ]         Caregiver present  [X]         Bed alarm activated      COMMUNICATION/EDUCATION:   The patients plan of care was discussed with: Registered Nurse and patient. [X]         Fall prevention education was provided and the patient/caregiver indicated understanding. [X]         Patient/family have participated as able in goal setting and plan of care. [X]         Patient/family agree to work toward stated goals and plan of care. [ ]         Patient understands intent and goals of therapy, but is neutral about his/her participation.   [ ]         Patient is unable to participate in goal setting and plan of care. Thank you for this referral.  Samuel Aguilar, PT,WCC.    Time Calculation: 25 mins

## 2017-07-18 NOTE — PROGRESS NOTES
Medical Progress Note NAME: Elver Valente :  1940 MRM:  [id number] Date/Time: 2017  10:13 AM 
 
  
Assessment / Plan:  
 
Generalized weakness/debility:  Continues to decline to work with PT/OT. -- encourage OOB and PT/OT as able 
   
Iron deficiency Anemia:  HGB stable this admission. Hx of recent GI bleed while on Xarelto at [facility] s/p 5u RBCs. Serologies suggest iron deficiency anemia. -- continue iron supplementation 
 -- discussed importance of checking labs again this AM and patient seems willing 
  
PVD / CAD:  Denies chest pain or sob. Has hx of vascular surgery on right. -- continue ASA, Lipitor -- check MARY 
   
HTN:  Trending upwards. However, noted to have bradycardia at times. Refused overnight oximetry. -- Continue amlodipine, coreg, and clonidine 
 -- add hydralazine po Abdominal pain:  Resolved. No sig post void residual noted by bladder scans. -- continue bowel regimen Hx of DVT:  Xarelto stopped due to GI bleed. S/P IVC filter. 
   
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 Glucose well controlled. -- SSI alone 
   
CKD 3:  Stable from 2015. -- repeat labs today 
   
Neuropathy:  
 -- Cont nortryptyline  
  
 
 
  
Subjective: Chief Complaint:  I want to get better. Denies abd pain, n/v.  No cp or sob. ROS: 
(bold if positive, if negative) Objective:  
 
 
Vitals:  
 
 
  
Last 24hrs VS reviewed since prior progress note. Most recent are: 
 
Visit Vitals  /77 (BP 1 Location: Left arm, BP Patient Position: Supine)  Pulse (!) 57  Temp 98.1 °F (36.7 °C)  Resp 18  Ht 6' 8\" (2.032 m)  Wt 88.9 kg (196 lb)  SpO2 100%  BMI 21.53 kg/m2 SpO2 Readings from Last 6 Encounters:  
17 100% 09/08/15 98% 08/28/15 100% 01/26/15 99% 01/19/15 98% Intake/Output Summary (Last 24 hours) at 17 8154 Last data filed at 17 0747 
 Gross per 24 hour Intake                0 ml Output             1865 ml Net            -1865 ml Exam:  
 
Physical Exam: 
 
Gen:  Well-developed, well-nourished, in no acute distress HEENT:  Pink conjunctivae, hearing intact to voice, moist mucous membranes Resp:  No accessory muscle use, clear breath sounds without wheezes rales or rhonchi 
Card:  No murmurs, normal S1, S2 without thrills or peripheral edema Abd:  Soft, non-tender, non-distended, normoactive bowel sounds are present Skin:  Callus of left heel Neuro: Face symmetric, tongue midline, speech fluent,  strength is 4/5 bilaterally and dorsi / plantarflexion is 4/5 bilaterally, follows commands appropriately Psych:  alert Medications Reviewed: (see below) Lab Data Reviewed: (see below) 
 
______________________________________________________________________ Medications:  
 
Current Facility-Administered Medications Medication Dose Route Frequency  polyethylene glycol (MIRALAX) packet 17 g  17 g Oral DAILY  cloNIDine HCl (CATAPRES) tablet 0.1 mg  0.1 mg Oral BID  carvedilol (COREG) tablet 3.125 mg  3.125 mg Oral BID WITH MEALS  ferrous sulfate tablet 325 mg  1 Tab Oral BID WITH MEALS  
 hydrALAZINE (APRESOLINE) 20 mg/mL injection 20 mg  20 mg IntraVENous Q6H PRN  
 amLODIPine (NORVASC) tablet 10 mg  10 mg Oral DAILY  aspirin delayed-release tablet 81 mg  81 mg Oral DAILY  atorvastatin (LIPITOR) tablet 40 mg  40 mg Oral DAILY  haloperidol (HALDOL) tablet 0.5 mg  0.5 mg Oral TID  nortriptyline (PAMELOR) capsule 25 mg  25 mg Oral QHS  
 0.9% sodium chloride infusion  50 mL/hr IntraVENous CONTINUOUS  
 sodium chloride (NS) flush 5-10 mL  5-10 mL IntraVENous Q8H  
 sodium chloride (NS) flush 5-10 mL  5-10 mL IntraVENous PRN  
 acetaminophen (TYLENOL) tablet 650 mg  650 mg Oral Q4H PRN  
 HYDROcodone-acetaminophen (NORCO) 5-325 mg per tablet 1 Tab  1 Tab Oral Q4H PRN  
 naloxone (NARCAN) injection 0.4 mg  0.4 mg IntraVENous PRN  
 diphenhydrAMINE (BENADRYL) injection 12.5 mg  12.5 mg IntraVENous Q4H PRN  
 ondansetron (ZOFRAN) injection 4 mg  4 mg IntraVENous Q6H PRN  
 docusate sodium (COLACE) capsule 100 mg  100 mg Oral BID  insulin lispro (HUMALOG) injection   SubCUTAneous AC&HS  
 glucose chewable tablet 16 g  4 Tab Oral PRN  
 dextrose (D50W) injection syrg 12.5-25 g  12.5-25 g IntraVENous PRN  
 glucagon (GLUCAGEN) injection 1 mg  1 mg IntraMUSCular PRN Lab Review: No results for input(s): WBC, HGB, HCT, PLT, HGBEXT, HCTEXT, PLTEXT, HGBEXT, HCTEXT, PLTEXT in the last 72 hours. No results for input(s): NA, K, CL, CO2, GLU, BUN, CREA, CA, MG, PHOS, ALB, TBIL, TBILI, SGOT, ALT, INR in the last 72 hours. No lab exists for component: INREXT, INREXT Lab Results Component Value Date/Time Glucose (POC) 83 07/18/2017 06:40 AM  
 Glucose (POC) 91 07/17/2017 09:03 PM  
 Glucose (POC) 140 07/17/2017 03:59 PM  
 Glucose (POC) 141 07/17/2017 10:50 AM  
 Glucose (POC) 113 07/16/2017 09:42 PM  
 
 
 
Total time spent with patient: 20 Minutes Care Plan discussed with: Patient and Nursing Staff Discussed:  Care Plan Prophylaxis:  SCD's Disposition:  SNF/LTC 
        
___________________________________________________ Attending Physician: Blanco King MD

## 2017-07-18 NOTE — PROGRESS NOTES
Nutrition Assessment:    RECOMMENDATIONS/INTERVENTION(S):   Add Glucerna TID until appetite improves  Monitor BG, wt, PO intakes. Monitor POC and abdominal pain. ASSESSMENT:   7/18: Pt continues to refuse most medical care. Pt out of room. Tray already removed from room, unable to tell how much pt ate. Noted 2 Glucerna on bedside table. No documentation in chart regarding intakes. , 140, 91mg/dL. A1C inaccurate due to blood transfusion. LBM 7/15. Noted Colace and miralax ordered but pt may be refusing these as well. 7/14: 78 yr old male admitted for general weakness, leg pain, poor living conditions. PMHx: PVD, HTN, CKD3, CAD, DVT, Hep C. Pt poor historian, unable to gather diet history, no family in the room. Pt appears thin. Per chart, over last 2 years pt has lost around 50 lbs. Unsure if wt loss has been gradual or recent. Pt has 2+ pitting lower extremity. LBM 7/14. Pt complaining of abdominal pain. Lunch at bedside, untouched. Pt on carb consistent diet, , 102. A1C 4.6. Labs/meds reviewed. Diet Order: Consistent carb  % Eaten:  No data found. Pertinent Medications: [x] Reviewed    Chemistries:  Lab Results   Component Value Date/Time    Sodium 140 07/14/2017 04:42 AM    Potassium 3.8 07/14/2017 04:42 AM    Chloride 106 07/14/2017 04:42 AM    CO2 24 07/14/2017 04:42 AM    Anion gap 10 07/14/2017 04:42 AM    Glucose 113 07/14/2017 04:42 AM    BUN 20 07/14/2017 04:42 AM    Creatinine 2.16 07/14/2017 04:42 AM    BUN/Creatinine ratio 9 07/14/2017 04:42 AM    GFR est AA 36 07/14/2017 04:42 AM    GFR est non-AA 30 07/14/2017 04:42 AM    Calcium 8.5 07/14/2017 04:42 AM    AST (SGOT) 22 07/14/2017 04:42 AM    Alk.  phosphatase 98 07/14/2017 04:42 AM    Protein, total 8.1 07/14/2017 04:42 AM    Albumin 2.7 07/14/2017 04:42 AM    Globulin 5.4 07/14/2017 04:42 AM    A-G Ratio 0.5 07/14/2017 04:42 AM    ALT (SGPT) 23 07/14/2017 04:42 AM      Anthropometrics: Height: 6' 8\" (203.2 cm) Weight: 88.9 kg (196 lb)    IBW (%IBW): 102.5 kg (226 lb) ( ) UBW (%UBW):   (  %)    BMI: Body mass index is 21.53 kg/(m^2). This BMI is indicative of:   [x] Underweight for age   [] Normal    [] Overweight    []  Obesity    []  Extreme Obesity (BMI>40)  Estimated Nutrition Needs (Based on): 2319 Kcals/day (BMR(1794x1.3) + 250) , 89 g (-107g/day (1.0-1.2g/kg) ActBW)) Protein  Carbohydrate: At Least 130 g/day  Fluids: 2600 mL/day    Last BM: 7/13   [x]Active     []Hyperactive  []Hypoactive       [] Absent   BS  Skin:    [x] Intact   [] Incision  [] Breakdown   [] DTI   [] Tears/Excoriation/Abrasion  []Edema  1+ BLE [] Other: Wt Readings from Last 30 Encounters:   07/13/17 88.9 kg (196 lb)   09/04/15 112.2 kg (247 lb 4 oz)   08/28/15 112.2 kg (247 lb 4 oz)   01/23/15 105.2 kg (231 lb 14.8 oz)   01/19/15 106.6 kg (235 lb)   11/07/14 106.1 kg (234 lb)      NUTRITION DIAGNOSES:   Problem:  Inadequate oral food/beverage intake     Etiology: related to decreased ability to consume sufficient energy      Signs/Symptoms: as evidenced by BMI < 22, age over 72.  Poor PO intakes, abd pain      NUTRITION INTERVENTIONS:  Meals/Snacks: General/healthful diet   Supplements: Commercial supplement              GOAL:   Pt will consume > 50% of meals with no abd pain within 3-5 days    Cultural, Rastafarian, or Ethnic Dietary Needs: None     LEARNING NEEDS (Diet, Food/Nutrient-Drug Interaction):    [x] None Identified   [] Identified and Education Provided/Documented   [] Identified and Pt declined/was not appropriate      [x] Interdisciplinary Care Plan Reviewed/Documented    [x] Discharge Needs:   TBD   [] No Nutrition Related Discharge Needs    NUTRITION RISK:   Pt Is At Nutrition Risk  [x]     No Nutrition Risk Identified  []       PT SEEN FOR:    []  MD Consult: []Calorie Count      []Diabetic Diet Education        []Diet Education     []Electrolyte Management     [x]General Nutrition Management and Supplements     []Management of Tube Feeding     []TPN Recommendations    []  RN Referral:  []MST score >=2     []Enteral/Parenteral Nutrition PTA     []Pregnant: Gestational DM or Multigestation                 [] Pressure Ulcer        [x]  Low BMI      []  Length of Stay       [] Dysphagia Diet         [] Ventilator      [x]  Follow-Up      Previous Recommendations:   [x] Implemented          [] Not Implemented          [] Not Applicable    Previous Goal:   [] Met              [] Progressing Towards Goal              [x] Not Progressing Towards Goal   [] Not Applicable            Apoorva Odom, [address] [place] [address]  Pager [phone]

## 2017-07-19 LAB
ALBUMIN SERPL BCP-MCNC: 2.2 G/DL (ref 3.5–5)
ALBUMIN/GLOB SERPL: 0.5 {RATIO} (ref 1.1–2.2)
ALP SERPL-CCNC: 87 U/L (ref 45–117)
ALT SERPL-CCNC: 55 U/L (ref 12–78)
ANION GAP BLD CALC-SCNC: 6 MMOL/L (ref 5–15)
AST SERPL W P-5'-P-CCNC: 50 U/L (ref 15–37)
BILIRUB SERPL-MCNC: 0.3 MG/DL (ref 0.2–1)
BUN SERPL-MCNC: 23 MG/DL (ref 6–20)
BUN/CREAT SERPL: 11 (ref 12–20)
CALCIUM SERPL-MCNC: 7.8 MG/DL (ref 8.5–10.1)
CHLORIDE SERPL-SCNC: 109 MMOL/L (ref 97–108)
CO2 SERPL-SCNC: 25 MMOL/L (ref 21–32)
CREAT SERPL-MCNC: 2.09 MG/DL (ref 0.7–1.3)
ERYTHROCYTE [DISTWIDTH] IN BLOOD BY AUTOMATED COUNT: 19.1 % (ref 11.5–14.5)
GLOBULIN SER CALC-MCNC: 4.1 G/DL (ref 2–4)
GLUCOSE BLD STRIP.AUTO-MCNC: 108 MG/DL (ref 65–100)
GLUCOSE BLD STRIP.AUTO-MCNC: 129 MG/DL (ref 65–100)
GLUCOSE BLD STRIP.AUTO-MCNC: 87 MG/DL (ref 65–100)
GLUCOSE SERPL-MCNC: 81 MG/DL (ref 65–100)
HCT VFR BLD AUTO: 22.3 % (ref 36.6–50.3)
HGB BLD-MCNC: 7.1 G/DL (ref 12.1–17)
MAGNESIUM SERPL-MCNC: 1.9 MG/DL (ref 1.6–2.4)
MCH RBC QN AUTO: 24.7 PG (ref 26–34)
MCHC RBC AUTO-ENTMCNC: 31.8 G/DL (ref 30–36.5)
MCV RBC AUTO: 77.4 FL (ref 80–99)
PLATELET # BLD AUTO: 309 K/UL (ref 150–400)
POTASSIUM SERPL-SCNC: 4 MMOL/L (ref 3.5–5.1)
PROT SERPL-MCNC: 6.3 G/DL (ref 6.4–8.2)
RBC # BLD AUTO: 2.88 M/UL (ref 4.1–5.7)
SERVICE CMNT-IMP: ABNORMAL
SERVICE CMNT-IMP: ABNORMAL
SERVICE CMNT-IMP: NORMAL
SODIUM SERPL-SCNC: 140 MMOL/L (ref 136–145)
WBC # BLD AUTO: 4 K/UL (ref 4.1–11.1)

## 2017-07-19 PROCEDURE — 80053 COMPREHEN METABOLIC PANEL: CPT | Performed by: INTERNAL MEDICINE

## 2017-07-19 PROCEDURE — 74011250637 HC RX REV CODE- 250/637: Performed by: INTERNAL MEDICINE

## 2017-07-19 PROCEDURE — 82962 GLUCOSE BLOOD TEST: CPT

## 2017-07-19 PROCEDURE — 83735 ASSAY OF MAGNESIUM: CPT | Performed by: INTERNAL MEDICINE

## 2017-07-19 PROCEDURE — 85027 COMPLETE CBC AUTOMATED: CPT | Performed by: INTERNAL MEDICINE

## 2017-07-19 PROCEDURE — 97530 THERAPEUTIC ACTIVITIES: CPT

## 2017-07-19 PROCEDURE — 97530 THERAPEUTIC ACTIVITIES: CPT | Performed by: OCCUPATIONAL THERAPIST

## 2017-07-19 PROCEDURE — 36415 COLL VENOUS BLD VENIPUNCTURE: CPT | Performed by: INTERNAL MEDICINE

## 2017-07-19 PROCEDURE — 99218 HC RM OBSERVATION: CPT

## 2017-07-19 RX ORDER — HYDRALAZINE HYDROCHLORIDE 25 MG/1
50 TABLET, FILM COATED ORAL 3 TIMES DAILY
Status: DISCONTINUED | OUTPATIENT
Start: 2017-07-19 | End: 2017-07-21 | Stop reason: HOSPADM

## 2017-07-19 RX ADMIN — ASPIRIN 81 MG: 81 TABLET, COATED ORAL at 08:18

## 2017-07-19 RX ADMIN — Medication 10 ML: at 21:37

## 2017-07-19 RX ADMIN — CARVEDILOL 3.12 MG: 3.12 TABLET, FILM COATED ORAL at 16:19

## 2017-07-19 RX ADMIN — HYDRALAZINE HYDROCHLORIDE 25 MG: 25 TABLET, FILM COATED ORAL at 08:18

## 2017-07-19 RX ADMIN — HALOPERIDOL 0.5 MG: 1 TABLET ORAL at 21:37

## 2017-07-19 RX ADMIN — CARVEDILOL 3.12 MG: 3.12 TABLET, FILM COATED ORAL at 08:18

## 2017-07-19 RX ADMIN — HYDRALAZINE HYDROCHLORIDE 50 MG: 25 TABLET, FILM COATED ORAL at 21:36

## 2017-07-19 RX ADMIN — DOCUSATE SODIUM 100 MG: 100 CAPSULE ORAL at 08:18

## 2017-07-19 RX ADMIN — CLONIDINE HYDROCHLORIDE 0.1 MG: 0.1 TABLET ORAL at 08:18

## 2017-07-19 RX ADMIN — CLONIDINE HYDROCHLORIDE 0.1 MG: 0.1 TABLET ORAL at 17:31

## 2017-07-19 RX ADMIN — ATORVASTATIN CALCIUM 40 MG: 20 TABLET, FILM COATED ORAL at 08:18

## 2017-07-19 RX ADMIN — FERROUS SULFATE TAB 325 MG (65 MG ELEMENTAL FE) 325 MG: 325 (65 FE) TAB at 16:18

## 2017-07-19 RX ADMIN — HALOPERIDOL 0.5 MG: 1 TABLET ORAL at 08:18

## 2017-07-19 RX ADMIN — POLYETHYLENE GLYCOL 3350 17 G: 17 POWDER, FOR SOLUTION ORAL at 08:18

## 2017-07-19 RX ADMIN — DOCUSATE SODIUM 100 MG: 100 CAPSULE ORAL at 17:31

## 2017-07-19 RX ADMIN — Medication 10 ML: at 14:00

## 2017-07-19 RX ADMIN — AMLODIPINE BESYLATE 10 MG: 5 TABLET ORAL at 08:18

## 2017-07-19 RX ADMIN — NORTRIPTYLINE HYDROCHLORIDE 25 MG: 25 CAPSULE ORAL at 21:36

## 2017-07-19 RX ADMIN — HALOPERIDOL 0.5 MG: 1 TABLET ORAL at 16:20

## 2017-07-19 RX ADMIN — FERROUS SULFATE TAB 325 MG (65 MG ELEMENTAL FE) 325 MG: 325 (65 FE) TAB at 08:18

## 2017-07-19 RX ADMIN — HYDRALAZINE HYDROCHLORIDE 25 MG: 25 TABLET, FILM COATED ORAL at 16:18

## 2017-07-19 NOTE — PROGRESS NOTES
Bedside and Verbal shift change report given to Onel Calderon RN (oncoming nurse) by Autumn Guzman RN (offgoing nurse). Report included the following information SBAR, Kardex, Intake/Output, MAR, Accordion and Recent Results.

## 2017-07-19 NOTE — PROGRESS NOTES
Problem: Self Care Deficits Care Plan (Adult)  Goal: *Acute Goals and Plan of Care (Insert Text)  Occupational Therapy Goals  Initiated 7/16/2017    1. Patient will perform lower body dressing at supervision/set-up within 7 days. 2. Patient will perform toilet transfers at supervision/set-up using Walkers, Type: Rolling Walker within 7 days. 3. Patient will perform all aspects of toileting at supervision/set-up within 7 days. 4. Patient will tolerate greater than 5 minutes of standing without a rest break at supervision/set-up using Walkers, Type: Rolling Walker during ADLs within 7 days. OCCUPATIONAL THERAPY TREATMENT  Patient: Karoline Elliott (63 y.o. male)  Date: 7/19/2017  Diagnosis: Weakness <principal problem not specified>       Precautions: Fall      ASSESSMENT:  Pt seated on EOB upon arrival attempting to use restroom. Assisted pt with urinal seated EOB with min A and offered to assist pt to bathroom. Pt declined stating he was too afraid of falling. Once done toileting encouraged pt to participate in ADLs and functional mobility and he declined, just stating \"no\" and returned to supine and then closed eyes. Recommend 24/7 assist for safety at discharge and rehab. Progression toward goals:  [ ]       Improving appropriately and progressing toward goals  [X]       Improving slowly and progressing toward goals  [ ]       Not making progress toward goals and plan of care will be adjusted       PLAN:  Patient continues to benefit from skilled intervention to address the above impairments. Continue treatment per established plan of care. Discharge Recommendations:  Rehab  Further Equipment Recommendations for Discharge:  TBD       SUBJECTIVE:   Patient stated I need to pee.       OBJECTIVE DATA SUMMARY:   Cognitive/Behavioral Status:  Neurologic State: Alert;Confused  Orientation Level: Oriented to person;Disoriented to place; Disoriented to situation;Disoriented to time  Cognition: Decreased attention/concentration;Decreased command following;Memory loss; Impulsive;Poor safety awareness  Perception: Appears intact  Perseveration: No perseveration noted  Safety/Judgement: Decreased awareness of environment;Decreased awareness of need for assistance;Decreased awareness of need for safety;Decreased insight into deficits; Fall prevention     Functional Mobility and Transfers for ADLs:  Bed Mobility:  Scooting: Stand-by asssistance (seated scooting up EOB to [facility])     Balance:  Sitting: Intact     ADL Intervention:  Toileting  Bladder Hygiene: Minimum assistance (A to effectively use urinal seated EOB)  Cues: Tactile cues provided;Verbal cues provided;Visual cues provided     Cognitive Retraining  Safety/Judgement: Decreased awareness of environment;Decreased awareness of need for assistance;Decreased awareness of need for safety;Decreased insight into deficits; Fall prevention     Pain:  Pt with no c/o pain     Activity Tolerance:   Fair-poor  Please refer to the flowsheet for vital signs taken during this treatment.   After treatment:   [ ] Patient left in no apparent distress sitting up in chair  [X] Patient left in no apparent distress in bed  [X] Call bell left within reach  [X] Nursing notified - Devi  [ ] Caregiver present  [ ] Bed alarm activated      COMMUNICATION/COLLABORATION:   The patients plan of care was discussed with: Physical Therapy Assistant and Registered Nurse     Berenice Zurita OT  Time Calculation: 11 mins

## 2017-07-19 NOTE — PROGRESS NOTES
Bedside and Verbal shift change report given to Breann Ramos (oncoming nurse) by Raeann Hart (offgoing nurse). Report included the following information SBAR, Kardex, Intake/Output, MAR and Recent Results.

## 2017-07-19 NOTE — PROGRESS NOTES
1709:  [facility] called back. Bret Ordonez stated they were unable to find auth for pt and it is still pending. They are hopeful they will get auth tomorrow. MIKHAIL Rollins    2870:  Spoke with pt about the need for rehab and that I had spoken with his avni and his daughter. I informed him that he was accepted at 2 snf's. He preferred [facility]. Will keep him updated. MIKHAIL Rollins    8871: [facility] auth for [facility]. I spoke with pt's daughter, Brady Klein, who stated he would not want to go that far. [facility] is checking on auth. MIKHAIL Rollins    0764:  [facility] called and stated they are ready to offer a place to pt but no demographics showed up. I scanned and sent a face sheet to her. They do have a long term bed available. MIKHAIL Rollins      CM Note:  Referrals sent in [address] to [address].   MIKHAIL Rollins

## 2017-07-19 NOTE — PROGRESS NOTES
Medical Progress Note NAME: Erasmo Armenta :  1940 MRM:  [id number] Date/Time: 2017  9:18 AM 
 
  
Assessment / Plan:  
 
Generalized weakness/debility: 
 -- encourage OOB and PT/OT as able 
   
Iron deficiency Anemia:  HGB stable this admission. Hx of recent GI bleed while on Xarelto at [facility] s/p 5u RBCs. Serologies suggest iron deficiency anemia. -- continue iron supplementation 
 -- again to try CBC 
**3:56 PM 
HGB 7.1. However, patient denies sob, dizziness, lightheadedness. Not tachycardic or hypotensive. Will monitor HGB and transfuse for symptoms or hgb < 7.1.  PVD / CAD:  Denies chest pain or sob. Has hx of vascular surgery on right. Moderate PVD by MARY. -- continue ASA, Lipitor  
   
HTN:  Trending upwards. However, noted to have bradycardia at times. Refused overnight oximetry. -- Continue amlodipine, coreg, and clonidine 
 -- titrate hydralazine po if needed Abdominal pain:  Resolved. No sig post void residual noted by bladder scans. -- continue bowel regimen Hx of DVT:  Xarelto stopped due to GI bleed. S/P IVC filter. 
   
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 Glucose well controlled. -- SSI alone 
   
CKD 3:  Stable from . -- try labs again today 
   
Neuropathy:  
 -- Cont nortryptyline  
  
 
 
  
Subjective: Chief Complaint:  \"my old lady kicked me out. \" Feels well otherwise. Again says he is willing to have IV and labs done. No cp or sob. No n/v or abd pain. ROS: 
(bold if positive, if negative) Objective:  
 
 
Vitals:  
 
 
  
Last 24hrs VS reviewed since prior progress note. Most recent are: 
 
Visit Vitals  /74 (BP 1 Location: Left arm, BP Patient Position: At rest)  Pulse (!) 50  Temp 98 °F (36.7 °C)  Resp 16  
 Ht 6' 8\" (2.032 m)  Wt 88.9 kg (196 lb)  SpO2 99%  BMI 21.53 kg/m2 SpO2 Readings from Last 6 Encounters: 07/19/17 99% 09/08/15 98% 08/28/15 100% 01/26/15 99% 01/19/15 98% Intake/Output Summary (Last 24 hours) at 07/19/17 6962 Last data filed at 07/19/17 9673 Gross per 24 hour Intake                0 ml Output              750 ml Net             -750 ml Exam:  
 
Physical Exam: 
 
Gen:  Well-developed, well-nourished, in no acute distress HEENT:  Pink conjunctivae, hearing intact to voice, moist mucous membranes Resp:  No accessory muscle use, clear breath sounds without wheezes rales or rhonchi 
Card:  No murmurs, normal S1, S2 without thrills, trace LLE edema Abd:  Soft, non-tender, non-distended, normoactive bowel sounds are present Skin:  Callus of left heel Neuro: Face symmetric, tongue midline, speech fluent,  strength is 4/5 bilaterally and dorsi / plantarflexion is 4/5 bilaterally, follows commands appropriately Psych:  alert Medications Reviewed: (see below) Lab Data Reviewed: (see below) 
 
______________________________________________________________________ Medications:  
 
Current Facility-Administered Medications Medication Dose Route Frequency  hydrALAZINE (APRESOLINE) tablet 25 mg  25 mg Oral TID  polyethylene glycol (MIRALAX) packet 17 g  17 g Oral DAILY  cloNIDine HCl (CATAPRES) tablet 0.1 mg  0.1 mg Oral BID  carvedilol (COREG) tablet 3.125 mg  3.125 mg Oral BID WITH MEALS  ferrous sulfate tablet 325 mg  1 Tab Oral BID WITH MEALS  
 hydrALAZINE (APRESOLINE) 20 mg/mL injection 20 mg  20 mg IntraVENous Q6H PRN  
 amLODIPine (NORVASC) tablet 10 mg  10 mg Oral DAILY  aspirin delayed-release tablet 81 mg  81 mg Oral DAILY  atorvastatin (LIPITOR) tablet 40 mg  40 mg Oral DAILY  haloperidol (HALDOL) tablet 0.5 mg  0.5 mg Oral TID  nortriptyline (PAMELOR) capsule 25 mg  25 mg Oral QHS  
 0.9% sodium chloride infusion  50 mL/hr IntraVENous CONTINUOUS  
 sodium chloride (NS) flush 5-10 mL  5-10 mL IntraVENous Q8H  
 sodium chloride (NS) flush 5-10 mL  5-10 mL IntraVENous PRN  
 acetaminophen (TYLENOL) tablet 650 mg  650 mg Oral Q4H PRN  
 HYDROcodone-acetaminophen (NORCO) 5-325 mg per tablet 1 Tab  1 Tab Oral Q4H PRN  
 naloxone (NARCAN) injection 0.4 mg  0.4 mg IntraVENous PRN  
 diphenhydrAMINE (BENADRYL) injection 12.5 mg  12.5 mg IntraVENous Q4H PRN  
 ondansetron (ZOFRAN) injection 4 mg  4 mg IntraVENous Q6H PRN  
 docusate sodium (COLACE) capsule 100 mg  100 mg Oral BID  insulin lispro (HUMALOG) injection   SubCUTAneous AC&HS  
 glucose chewable tablet 16 g  4 Tab Oral PRN  
 dextrose (D50W) injection syrg 12.5-25 g  12.5-25 g IntraVENous PRN  
 glucagon (GLUCAGEN) injection 1 mg  1 mg IntraMUSCular PRN Lab Review: No results for input(s): WBC, HGB, HCT, PLT, HGBEXT, HCTEXT, PLTEXT, HGBEXT, HCTEXT, PLTEXT in the last 72 hours. No results for input(s): NA, K, CL, CO2, GLU, BUN, CREA, CA, MG, PHOS, ALB, TBIL, TBILI, SGOT, ALT, INR in the last 72 hours. No lab exists for component: INREXT, INREXT Lab Results Component Value Date/Time Glucose (POC) 87 07/19/2017 07:21 AM  
 Glucose (POC) 97 07/18/2017 10:03 PM  
 Glucose (POC) 103 07/18/2017 04:14 PM  
 Glucose (POC) 119 07/18/2017 11:16 AM  
 Glucose (POC) 83 07/18/2017 06:40 AM  
 
 
 
Total time spent with patient: 20 Minutes Care Plan discussed with: Patient and Nursing Staff Discussed:  Care Plan Prophylaxis:  SCD's Disposition:  SNF/LTC 
        
___________________________________________________ Attending Physician: Suellen Warner MD

## 2017-07-19 NOTE — PROGRESS NOTES
Problem: Mobility Impaired (Adult and Pediatric)  Goal: *Acute Goals and Plan of Care (Insert Text)  Physical Therapy Goals  Initiated 7/18/2017  1. Patient will move from supine to sit and sit to supine , scoot up and down and roll side to side in bed with independence within 7 day(s). 2. Patient will transfer from bed to chair and chair to bed with minimal assistance/contact guard assist using the least restrictive device within 7 day(s). 3. Patient will perform sit to stand with minimal assistance/contact guard assist within 7 day(s). 4. Patient will ambulate with moderate assistance for 50 feet with the least restrictive device within 7 day(s). PHYSICAL THERAPY TREATMENT  Patient: Fritz Blake (22 y.o. male)  Date: 7/19/2017  Diagnosis: Weakness <principal problem not specified>       Precautions: Fall  Chart, physical therapy assessment, plan of care and goals were reviewed. ASSESSMENT:  Pt sitting on EOB on arrival of PT. Pt requesting to urinate. Pt was given a urinal.Pt able to urinate after several minutes. Pt refused to work on standing after much encouragement. Pt is fearful of falling. Pt was able to scoot to head of bed x 3 with CGA. Pt sit to supine with CGA to min assist.Note pt not safe to for tranfers at this time. PT will follow. Progression toward goals:  [ ]      Improving appropriately and progressing toward goals  [ ]      Improving slowly and progressing toward goals  [ ]      Not making progress toward goals and plan of care will be adjusted       PLAN:  Patient continues to benefit from skilled intervention to address the above impairments. Continue treatment per established plan of care.   Discharge Recommendations:  [facility]  Further Equipment Recommendations for Discharge:         SUBJECTIVE:          OBJECTIVE DATA SUMMARY:   Critical Behavior:  Neurologic State: Alert, Confused  Orientation Level: Oriented to person, Disoriented to place, Disoriented to situation, Disoriented to time  Cognition: Decreased attention/concentration, Decreased command following, Memory loss, Impulsive, Poor safety awareness  Safety/Judgement: Decreased awareness of environment, Decreased awareness of need for assistance, Decreased awareness of need for safety, Decreased insight into deficits, Fall prevention  Functional Mobility Training:  Bed Mobility:        Sit to Supine: Minimum assistance  Scooting: Stand-by asssistance (seated scooting up EOB to [facility])                       Balance:  Sitting: Intact              Activity Tolerance:   Pt tolerated treatment well. Please refer to the flowsheet for vital signs taken during this treatment.   After treatment:   [ ] Patient left in no apparent distress sitting up in chair  [X] Patient left in no apparent distress in bed  [ ] Call bell left within reach  [ ] Nursing notified  [ ] Caregiver present  [ ] Bed alarm activated      COMMUNICATION/COLLABORATION:   The patients plan of care was discussed with: Physical Therapist     Michaela Moreno PTA   Time Calculation: 23 mins

## 2017-07-20 VITALS
WEIGHT: 196 LBS | HEART RATE: 52 BPM | DIASTOLIC BLOOD PRESSURE: 76 MMHG | TEMPERATURE: 97.9 F | BODY MASS INDEX: 22.68 KG/M2 | RESPIRATION RATE: 16 BRPM | SYSTOLIC BLOOD PRESSURE: 175 MMHG | HEIGHT: 78 IN | OXYGEN SATURATION: 100 %

## 2017-07-20 LAB
ERYTHROCYTE [DISTWIDTH] IN BLOOD BY AUTOMATED COUNT: 18.8 % (ref 11.5–14.5)
GLUCOSE BLD STRIP.AUTO-MCNC: 112 MG/DL (ref 65–100)
GLUCOSE BLD STRIP.AUTO-MCNC: 132 MG/DL (ref 65–100)
GLUCOSE BLD STRIP.AUTO-MCNC: 89 MG/DL (ref 65–100)
GLUCOSE BLD STRIP.AUTO-MCNC: 97 MG/DL (ref 65–100)
HCT VFR BLD AUTO: 26.7 % (ref 36.6–50.3)
HGB BLD-MCNC: 8.3 G/DL (ref 12.1–17)
MCH RBC QN AUTO: 24.3 PG (ref 26–34)
MCHC RBC AUTO-ENTMCNC: 31.1 G/DL (ref 30–36.5)
MCV RBC AUTO: 78.1 FL (ref 80–99)
PLATELET # BLD AUTO: 262 K/UL (ref 150–400)
RBC # BLD AUTO: 3.42 M/UL (ref 4.1–5.7)
SERVICE CMNT-IMP: ABNORMAL
SERVICE CMNT-IMP: ABNORMAL
SERVICE CMNT-IMP: NORMAL
SERVICE CMNT-IMP: NORMAL
WBC # BLD AUTO: 4.6 K/UL (ref 4.1–11.1)

## 2017-07-20 PROCEDURE — 99218 HC RM OBSERVATION: CPT

## 2017-07-20 PROCEDURE — 74011250637 HC RX REV CODE- 250/637: Performed by: INTERNAL MEDICINE

## 2017-07-20 PROCEDURE — 85027 COMPLETE CBC AUTOMATED: CPT | Performed by: INTERNAL MEDICINE

## 2017-07-20 PROCEDURE — 97535 SELF CARE MNGMENT TRAINING: CPT | Performed by: OCCUPATIONAL THERAPIST

## 2017-07-20 PROCEDURE — 97530 THERAPEUTIC ACTIVITIES: CPT

## 2017-07-20 PROCEDURE — 82962 GLUCOSE BLOOD TEST: CPT

## 2017-07-20 PROCEDURE — 97112 NEUROMUSCULAR REEDUCATION: CPT | Performed by: OCCUPATIONAL THERAPIST

## 2017-07-20 PROCEDURE — 36415 COLL VENOUS BLD VENIPUNCTURE: CPT | Performed by: INTERNAL MEDICINE

## 2017-07-20 RX ORDER — POLYETHYLENE GLYCOL 3350 17 G/17G
17 POWDER, FOR SOLUTION ORAL DAILY
Qty: 30 PACKET | Refills: 0 | Status: SHIPPED | OUTPATIENT
Start: 2017-07-20 | End: 2018-01-20

## 2017-07-20 RX ORDER — CARVEDILOL 3.12 MG/1
3.12 TABLET ORAL 2 TIMES DAILY WITH MEALS
Qty: 60 TAB | Refills: 0 | Status: SHIPPED | OUTPATIENT
Start: 2017-07-20 | End: 2018-01-20

## 2017-07-20 RX ORDER — LANOLIN ALCOHOL/MO/W.PET/CERES
325 CREAM (GRAM) TOPICAL 2 TIMES DAILY WITH MEALS
Qty: 60 TAB | Refills: 0 | Status: SHIPPED | OUTPATIENT
Start: 2017-07-20 | End: 2018-01-20

## 2017-07-20 RX ORDER — DOCUSATE SODIUM 100 MG/1
100 CAPSULE, LIQUID FILLED ORAL 2 TIMES DAILY
Qty: 60 CAP | Refills: 0 | Status: SHIPPED | OUTPATIENT
Start: 2017-07-20 | End: 2017-10-18

## 2017-07-20 RX ORDER — HYDRALAZINE HYDROCHLORIDE 50 MG/1
50 TABLET, FILM COATED ORAL 3 TIMES DAILY
Qty: 90 TAB | Refills: 0 | Status: SHIPPED | OUTPATIENT
Start: 2017-07-20 | End: 2018-01-20

## 2017-07-20 RX ADMIN — Medication 10 ML: at 06:14

## 2017-07-20 RX ADMIN — FERROUS SULFATE TAB 325 MG (65 MG ELEMENTAL FE) 325 MG: 325 (65 FE) TAB at 09:07

## 2017-07-20 RX ADMIN — CARVEDILOL 3.12 MG: 3.12 TABLET, FILM COATED ORAL at 09:07

## 2017-07-20 RX ADMIN — FERROUS SULFATE TAB 325 MG (65 MG ELEMENTAL FE) 325 MG: 325 (65 FE) TAB at 18:31

## 2017-07-20 RX ADMIN — HYDRALAZINE HYDROCHLORIDE 50 MG: 25 TABLET, FILM COATED ORAL at 09:07

## 2017-07-20 RX ADMIN — AMLODIPINE BESYLATE 10 MG: 5 TABLET ORAL at 09:07

## 2017-07-20 RX ADMIN — HALOPERIDOL 0.5 MG: 1 TABLET ORAL at 09:07

## 2017-07-20 RX ADMIN — DOCUSATE SODIUM 100 MG: 100 CAPSULE ORAL at 18:31

## 2017-07-20 RX ADMIN — HYDRALAZINE HYDROCHLORIDE 50 MG: 25 TABLET, FILM COATED ORAL at 18:32

## 2017-07-20 RX ADMIN — ATORVASTATIN CALCIUM 40 MG: 20 TABLET, FILM COATED ORAL at 09:07

## 2017-07-20 RX ADMIN — HALOPERIDOL 0.5 MG: 1 TABLET ORAL at 18:32

## 2017-07-20 RX ADMIN — ASPIRIN 81 MG: 81 TABLET, COATED ORAL at 09:07

## 2017-07-20 RX ADMIN — DOCUSATE SODIUM 100 MG: 100 CAPSULE ORAL at 09:07

## 2017-07-20 NOTE — PROGRESS NOTES
1453:  A referral was sent in [facility] to [facility] requesting transport to [facility]. Nursing to call report to [facility] at [phone].  is at 6:30. SUMAYA Schmidt RN    CM Note:  Aaron Holt called from [facility] and stated that they now have [place]. Dr. Kassy Green was called and a message was left for him.   Ayo RN

## 2017-07-20 NOTE — PROGRESS NOTES
Patient discharged per MD order. Report given to Nurse Deb Castillo at [facility]. Opportunity to ask questions provided. No questions at this time. IV removed with tip intact. Patient transported via stretcher by [facility].

## 2017-07-20 NOTE — PROGRESS NOTES
Problem: Self Care Deficits Care Plan (Adult)  Goal: *Acute Goals and Plan of Care (Insert Text)  Occupational Therapy Goals  Initiated 7/16/2017    1. Patient will perform lower body dressing at supervision/set-up within 7 days. 2. Patient will perform toilet transfers at supervision/set-up using Walkers, Type: Rolling Walker within 7 days. 3. Patient will perform all aspects of toileting at supervision/set-up within 7 days. 4. Patient will tolerate greater than 5 minutes of standing without a rest break at supervision/set-up using Walkers, Type: Rolling Walker during ADLs within 7 days. OCCUPATIONAL THERAPY TREATMENT  Patient: Chevy Fernández (28 y.o. male)  Date: 7/20/2017  Diagnosis: Weakness <principal problem not specified>       Precautions: Fall      ASSESSMENT:  Pt alert and pleasant this session. He was agreeable to therapy and able to participate in bed mobility, functional mobility to chair at bedside using RW and grooming tasks seated in chair. He required mod A x 2 to come to stand with noted LE weakness and B hip and knee flexion requiring mod manual cues for upright posture for WBing. He required min A for grooming tasks. Recommend SNF at discharge. Progression toward goals:  [ ]       Improving appropriately and progressing toward goals  [X]       Improving slowly and progressing toward goals  [ ]       Not making progress toward goals and plan of care will be adjusted       PLAN:  Patient continues to benefit from skilled intervention to address the above impairments. Continue treatment per established plan of care. Discharge Recommendations:  Skilled Nursing Facility  Further Equipment Recommendations for Discharge:  TBD       SUBJECTIVE:   Patient stated Do you know where I can get my hair dyed around here?       OBJECTIVE DATA SUMMARY:   Cognitive/Behavioral Status:  Neurologic State: Alert;Confused  Orientation Level: Oriented to person;Oriented to place;Oriented to situation;Disoriented to time  Cognition: Decreased attention/concentration; Follows commands;Poor safety awareness  Perception: Appears intact  Perseveration: No perseveration noted  Safety/Judgement: Awareness of environment;Decreased awareness of need for safety;Decreased insight into deficits; Fall prevention     Neuro Re-Education and Functional Mobility and Transfers for ADLs:  Bed Mobility:  Supine to Sit: Minimum assistance; Additional time;Assist x1 (A for upper body)  Sit to Supine: Minimum assistance; Additional time;Assist x1 (A for LEs)  Scooting: Stand-by asssistance; Additional time;Assist x1     Transfers:  Sit to Stand: Moderate assistance; Additional time;Assist x2 (flexed posture requiring mod manual cues for hip and knee ex)        Balance:  Sitting: Intact  Standing: Impaired; With support;Pull to stand (RW)  Standing - Static: Fair;Constant support  Standing - Dynamic : Poor     ADL Intervention:  Grooming  Grooming Assistance: Minimum assistance (seated in chair)  Washing Face: Supervision/set-up  Washing Hands: Supervision/set-up  Brushing Teeth: Supervision/set-up  Shaving: Minimum assistance (to shave all hair due to growth) - pt required 25 minutes to perform  Cues: Physical assistance;Verbal cues provided;Visual cues provided  Adaptive Equipment: Standard razor     Lower Body Dressing Assistance  Socks: Total assistance (dependent)  Cues: Don;Physical assistance; Tactile cues provided;Verbal cues provided;Visual cues provided     Cognitive Retraining  Orientation Retraining: Reorienting  Safety/Judgement: Awareness of environment;Decreased awareness of need for safety;Decreased insight into deficits; Fall prevention     Pain:  Pain Scale 1: Numeric (0 - 10)  Pain Intensity 1: 0              Activity Tolerance:   Fair  Please refer to the flowsheet for vital signs taken during this treatment.   After treatment:   [X] Patient left in no apparent distress sitting up in chair  [ ] Patient left in no apparent distress in bed  [X] Call bell left within reach  [X] Nursing notified  [ ] Caregiver present  [X] Bed alarm activated      COMMUNICATION/COLLABORATION:   The patients plan of care was discussed with: Physical Therapy Assistant and Registered Nurse     Kate Krishnamurthy, OT  Time Calculation: 40 mins

## 2017-07-20 NOTE — DISCHARGE INSTRUCTIONS
HOSPITALIST DISCHARGE INSTRUCTIONS  NAME: Marivel Wade   :  1940   MRN:  [id number]     Date/Time:  2017 4:16 PM    ADMIT DATE: 2017     DISCHARGE DATE: 2017     DISCHARGE DIAGNOSIS:  Weakness  Anemia    MEDICATIONS:    · It is important that you take the medication exactly as they are prescribed. · Keep your medication in the bottles provided by the pharmacist and keep a list of the medication names, dosages, and times to be taken in your wallet. · Do not take other medications without consulting your doctor. What to do at [facility]:  1. Check blood pressure at home twice a day. Call facility MD for blood pressure greater than 170/90 or lower than 110/55 or if you have dizziness or lightheadedness. 2.  Check blood sugar twice a day. Call facility MD for blood sugar persistently greater than 200 or lower than 80.  3.  Contact facility MD for heart rate < 50.  4.  Monitor CBC and BMP weekly with results to facility MD.    Recommended diet:  Cardiac Diet and Diabetic Diet    Recommended activity: PT/OT Eval and Treat    If you experience any of the following symptoms then please call your primary care physician or return to the emergency room if you cannot get hold of your doctor:  Fever, chills, nausea, vomiting, diarrhea, change in mentation, falling, bleeding, shortness of breath    Follow Up:  Facility MD upon arrival      Information obtained by :  I understand that if any problems occur once I am at home I am to contact my physician. I understand and acknowledge receipt of the instructions indicated above.                                                                                                                                            Physician's or R.N.'s Signature                                                                  Date/Time Patient or Representative Signature                                                          Date/Time         Anemia: Care Instructions  Your Care Instructions    Anemia is a low level of red blood cells, which carry oxygen throughout your body. Many things can cause anemia. Lack of iron is one of the most common causes. Your body needs iron to make hemoglobin, a substance in red blood cells that carries oxygen from the lungs to your body's cells. Without enough iron, the body produces fewer and smaller red blood cells. As a result, your body's cells do not get enough oxygen, and you feel tired and weak. And you may have trouble concentrating. Bleeding is the most common cause of a lack of iron. You may have heavy menstrual bleeding or bleeding caused by conditions such as ulcers, hemorrhoids, or cancer. Regular use of aspirin or other anti-inflammatory medicines (such as ibuprofen) also can cause bleeding in some people. A lack of iron in your diet also can cause anemia, especially at times when the body needs more iron, such as during pregnancy, infancy, and the teen years. Your doctor may have prescribed iron pills. It may take several months of treatment for your iron levels to return to normal. Your doctor also may suggest that you eat foods that are rich in iron, such as meat and beans. There are many other causes of anemia. It is not always due to a lack of iron. Finding the specific cause of your anemia will help your doctor find the right treatment for you. Follow-up care is a key part of your treatment and safety. Be sure to make and go to all appointments, and call your doctor if you are having problems. It's also a good idea to know your test results and keep a list of the medicines you take. How can you care for yourself at home? · Take your medicines exactly as prescribed. Call your doctor if you think you are having a problem with your medicine.   · If your doctor recommends iron pills, take them as directed:  ¨ Try to take the pills on an empty stomach about 1 hour before or 2 hours after meals. But you may need to take iron with food to avoid an upset stomach. ¨ Do not take antacids or drink milk or caffeine drinks (such as coffee, tea, or cola) at the same time or within 2 hours of the time that you take your iron. They can make it hard for your body to absorb the iron. ¨ Vitamin C (from food or supplements) helps your body absorb iron. Try taking iron pills with a glass of orange juice or some other food that is high in vitamin C, such as citrus fruits. ¨ Iron pills may cause stomach problems, such as heartburn, nausea, diarrhea, constipation, and cramps. Be sure to drink plenty of fluids, and include fruits, vegetables, and fiber in your diet each day. Iron pills often make your bowel movements dark or green. ¨ If you forget to take an iron pill, do not take a double dose of iron the next time you take a pill. ¨ Keep iron pills out of the reach of small children. An overdose of iron can be very dangerous. · Follow your doctor's advice about eating iron-rich foods. These include red meat, shellfish, poultry, eggs, beans, raisins, whole-grain bread, and leafy green vegetables. · Steam vegetables to help them keep their iron content. When should you call for help? Call 911 anytime you think you may need emergency care. For example, call if:  · You have symptoms of a heart attack. These may include:  ¨ Chest pain or pressure, or a strange feeling in the chest.  ¨ Sweating. ¨ Shortness of breath. ¨ Nausea or vomiting. ¨ Pain, pressure, or a strange feeling in the back, neck, jaw, or upper belly or in one or both shoulders or arms. ¨ Lightheadedness or sudden weakness. ¨ A fast or irregular heartbeat. After you call 911, the  may tell you to chew 1 adult-strength or 2 to 4 low-dose aspirin. Wait for an ambulance. Do not try to drive yourself.   · You passed out (lost consciousness). Call your doctor now or seek immediate medical care if:  · You have new or increased shortness of breath. · You are dizzy or lightheaded, or you feel like you may faint. · Your fatigue and weakness continue or get worse. · You have any abnormal bleeding, such as:  ¨ Nosebleeds. ¨ Vaginal bleeding that is different (heavier, more frequent, at a different time of the month) than what you are used to. ¨ Bloody or black stools, or rectal bleeding. ¨ Bloody or pink urine. Watch closely for changes in your health, and be sure to contact your doctor if:  · You do not get better as expected. Where can you learn more? Go to http://kalpesh-jen.info/. Enter R301 in the search box to learn more about \"Anemia: Care Instructions. \"  Current as of: October 13, 2016  Content Version: 11.3  © 4988-1670 [employer]. Care instructions adapted under license by [employer] (which disclaims liability or warranty for this information). If you have questions about a medical condition or this instruction, always ask your healthcare professional. [place] [address] any warranty or liability for your use of this information. Chronic Kidney Disease: Care Instructions  Your Care Instructions  Chronic kidney disease happens when your kidneys don't work as well as they should. Your kidneys have a few important jobs. They remove waste from your blood. This waste leaves your body in your urine. They also balance your body's fluids and chemicals. When your kidneys don't work well, extra waste and fluid can build up. This can poison the body and sometimes cause death. The most common causes of this disease are diabetes and high blood pressure. In some cases, the disease develops in 2 to 3 months. But it usually develops over many years.   If you take medicine and make healthy changes to your lifestyle, you may be able to prevent the disease from getting worse. But if your kidney damage gets worse, you may need dialysis or a kidney transplant. Dialysis uses a machine to filter waste from the blood. A transplant is surgery to give you a healthy kidney from another person. Follow-up care is a key part of your treatment and safety. Be sure to make and go to all appointments, and call your doctor if you are having problems. It's also a good idea to know your test results and keep a list of the medicines you take. How can you care for yourself at home? Treatments and appointments  · Be safe with medicines. Take your medicines exactly as prescribed. Call your doctor if you have any problems with your medicine. You also may take medicine to control your blood pressure or to treat diabetes. Many people who have diabetes take blood pressure medicine. · If you have diabetes, do your best to keep your blood sugar in your target range. You may do this by eating healthy food and exercising. You may also take medicines. · Go to your dialysis appointments if you have this treatment. · Do not take ibuprofen (Advil, Motrin), naproxen (Aleve), or similar medicines, unless your doctor tells you to. These may make the disease worse. · Do not take any vitamins, over-the-counter medicines, or herbal products without talking to your doctor first.  · Do not smoke or use other tobacco products. Smoking can reduce blood flow to the kidneys. If you need help quitting, talk to your doctor about stop-smoking programs and medicines. These can increase your chances of quitting for good. · Do not drink alcohol or use illegal drugs. · Talk to your doctor about an exercise plan. Exercise helps lower your blood pressure. It also makes you feel better. · If you have an advance directive, let your doctor know. It may include a living will and a durable power of  for health care. If you don't have one, you may want to prepare one.  It lets your doctor and loved ones know your health care wishes if you become unable to speak for yourself. Diet  · Talk to a registered dietitian. He or she can help you make a meal plan that is right for you. Most people with kidney disease need to limit salt (sodium), fluids, and protein. Some also have to limit potassium and phosphorus. · You may have to give up many foods you like. But try to focus on the fact that this will help you stay healthy for as long as possible. · If you have a hard time eating enough, talk to your doctor or dietitian about ways to add calories to your diet. · Your diet may change as your disease changes. See your doctor for regular testing. And work with a dietitian to change your diet as needed. When should you call for help? Call 911 anytime you think you may need emergency care. For example, call if:  · You passed out (lost consciousness). Call your doctor now or seek immediate medical care if:  · You have less urine than normal or no urine. · You have trouble urinating or can urinate only very small amounts. · You are confused or have trouble thinking clearly. · You feel weaker or more tired than usual.  · You are very thirsty, lightheaded, or dizzy. · You have nausea and vomiting. · You have new swelling of your arms or feet, or your swelling is worse. · You have blood in your urine. · You have new or worse trouble breathing. Watch closely for changes in your health, and be sure to contact your doctor if:  · You have any problems with your medicine or other treatment. Where can you learn more? Go to http://kalpesh-jen.info/. Enter N276 in the search box to learn more about \"Chronic Kidney Disease: Care Instructions. \"  Current as of: April 3, 2017  Content Version: 11.3  © 8680-6570 [employer]. Care instructions adapted under license by [employer] (which disclaims liability or warranty for this information).  If you have questions about a medical condition or this instruction, always ask your healthcare professional. [place] [address] any warranty or liability for your use of this information.

## 2017-07-20 NOTE — DISCHARGE SUMMARY
Physician Discharge Summary     Patient ID:  Flaco Valdivia  [id number]  35 y.o.  1940    Admit date: 7/13/2017    Discharge date and time: 7/20/2017    Admission Diagnoses: Weakness    Discharge Diagnoses: Active Problems:    Peripheral vascular disease ([facility] (11/7/2014)      Overview: A.  S/P Right SFA POBA and tibial atherectomy (2/4/13). Hypertension (11/7/2014)      Neuropathic pain of foot (9/4/2015)      CKD (chronic kidney disease), stage III (9/5/2015)      DM type 2 causing renal disease ([facility] (9/5/2015)      CAD (coronary artery disease) ()      Weakness (7/13/2017)      History of DVT (deep vein thrombosis) (7/13/2017)      History of GI bleed (7/13/2017)           Hospital Course:   Iron deficiency Anemia:  Hx of recent GI bleed while on Xarelto at [facility] s/p 5u RBCs. Serologies suggest iron deficiency anemia. No visible blood loss noted this admission. HGB stable this admission. Iron supplements continued. Recommended weekly labs at SNF.     Generalized weakness/debility:  Patient followed by PT/OT and recommended SNF level rehab.      PVD / CAD:  Denies chest pain or sob. Has hx of vascular surgery on right. Moderate PVD by [facility]. Patient was continued on ASA and Lipitor.      HTN:  BP overall controlled. Has had mild bradycardia but asymptomatic. Checked myself x 60 seconds with HR 55 and regular. Coreg dose decreased to 3.125 mg and clonidine stopped. Hydralazine titrated for improved BP control.      Hx of DVT:  Xarelto stopped due to GI bleed. S/P IVC filter.      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 Glucose well controlled with diet alone.      CKD 3:  Stable from 2015.      PCP: Johny Warner, MD     Consults: None    Condition of patient at discharge: stable    Discharge Exam:  Please refer to progress note from day of discharge.     Disposition: SNF    Discharge Medications:   Current Discharge Medication List START taking these medications    Details   ferrous sulfate 325 mg (65 mg iron) tablet Take 1 Tab by mouth two (2) times daily (with meals). Qty: 60 Tab, Refills: 0      docusate sodium (COLACE) 100 mg capsule Take 1 Cap by mouth two (2) times a day for 90 days. Qty: 60 Cap, Refills: 0      hydrALAZINE (APRESOLINE) 50 mg tablet Take 1 Tab by mouth three (3) times daily. Qty: 90 Tab, Refills: 0      polyethylene glycol (MIRALAX) 17 gram packet Take 1 Packet by mouth daily. Qty: 30 Packet, Refills: 0         CONTINUE these medications which have CHANGED    Details   carvedilol (COREG) 3.125 mg tablet Take 1 Tab by mouth two (2) times daily (with meals). Qty: 60 Tab, Refills: 0         CONTINUE these medications which have NOT CHANGED    Details   atorvastatin (LIPITOR) 40 mg tablet Take 40 mg by mouth daily. aspirin delayed-release 81 mg tablet Take 81 mg by mouth daily. nortriptyline (PAMELOR) 25 mg capsule Take 25 mg by mouth nightly. Indications: Pain      amLODIPine (NORVASC) 10 mg tablet Take 10 mg by mouth daily. STOP taking these medications       oxyCODONE-acetaminophen (PERCOCET 10)  mg per tablet Comments:   Reason for Stopping:         cloNIDine HCl (CATAPRES) 0.2 mg tablet Comments:   Reason for Stopping:         haloperidol (HALDOL) 1 mg tablet Comments:   Reason for Stopping:               Activity: PT/OT Eval and Treat  Diet: Cardiac Diet and Diabetic Diet    Follow-up with facility MD upon arrival.  Follow-up tests/labs:  Weekly cbc and BMP. Discussed with CM and daughter Shannan Albarran by phone. Approximate time spent in patient care on day of discharge: 40 min.     Signed:  Franklin Bradshaw MD  7/20/2017  4:18 PM

## 2017-07-20 NOTE — PROGRESS NOTES
Bedside and Verbal shift change report given to Isamar Tolbert RN (oncoming nurse) by Antelmo Lowery RN (offgoing nurse). Report included the following information SBAR, Kardex, Intake/Output, MAR, Accordion and Recent Results.

## 2017-07-20 NOTE — ROUTINE PROCESS
Bedside and Verbal shift change report given to Zachary Cesar RN (oncoming nurse) by Carlos Das RN (offgoing nurse). Report included the following information SBAR, Kardex, Intake/Output, MAR and Recent Results.

## 2017-07-20 NOTE — PROGRESS NOTES
Problem: Mobility Impaired (Adult and Pediatric)  Goal: *Acute Goals and Plan of Care (Insert Text)  Physical Therapy Goals  Initiated 7/18/2017  1. Patient will move from supine to sit and sit to supine , scoot up and down and roll side to side in bed with independence within 7 day(s). 2. Patient will transfer from bed to chair and chair to bed with minimal assistance/contact guard assist using the least restrictive device within 7 day(s). 3. Patient will perform sit to stand with minimal assistance/contact guard assist within 7 day(s). 4. Patient will ambulate with moderate assistance for 50 feet with the least restrictive device within 7 day(s). PHYSICAL THERAPY TREATMENT  Patient: Dinah Valadez (89 y.o. male)  Date: 7/20/2017  Diagnosis: Weakness <principal problem not specified>       Precautions: Fall  Chart, physical therapy assessment, plan of care and goals were reviewed. ASSESSMENT:  Pt comes to sit with min assist..Pt to stand mod assist of 2 with bed elevated. Pt was able to take 4 steps to hip chair with RW mod to max assist of 2. Pt is unable to fully extend knees. Pt left sitting with chair alarm in place. Progression toward goals:  -      Improving appropriately and progressing toward goals  -      Improving slowly and progressing toward goals  -      Not making progress toward goals and plan of care will be adjusted       PLAN:  Patient continues to benefit from skilled intervention to address the above impairments. Continue treatment per established plan of care.   Discharge Recommendations:  [facility] for rehab  Further Equipment Recommendations for Discharge:  rolling walker       SUBJECTIVE:          OBJECTIVE DATA SUMMARY:   Critical Behavior:  Neurologic State: Alert, Confused  Orientation Level: Oriented to person, Oriented to place, Oriented to situation, Disoriented to time  Cognition: Decreased attention/concentration, Follows commands, Poor safety awareness  Safety/Judgement: Awareness of environment, Decreased awareness of need for safety, Decreased insight into deficits, Fall prevention  Functional Mobility Training:  Bed Mobility:     Supine to Sit: Minimum assistance; Additional time;Assist x1 (A for upper body)  Scooting: Stand-by asssistance; Additional time;Assist x1                    Transfers:  Sit to Stand: Moderate assistance; Additional time;Assist x2 (flexed posture requiring mod manual cues for hip and knee ex)           Bed to Chair: Moderate assist to max assist of 2 using RW                    Balance:  Sitting: Intact  Standing: Impaired  Standing - Static: Fair;Constant support  Standing - Dynamic : Poor                 Pain:  Pain Scale 1: Numeric (0 - 10)  Pain Intensity 1: 0              Activity Tolerance:   Pt tolerated treatment fairly well. Please refer to the flowsheet for vital signs taken during this treatment.   After treatment:   [X] Patient left in no apparent distress sitting up in chair  [ ] Patient left in no apparent distress in bed  [ ] Call bell left within reach  [ ] Nursing notified  [ ] Caregiver present  [ ] Bed alarm activated      COMMUNICATION/COLLABORATION:   The patients plan of care was discussed with: Physical Therapist     Chaka Ascencio PTA   Time Calculation: 23 mins

## 2017-07-24 NOTE — PROGRESS NOTES
[facility] screening submitted to [facility] for approval for SNF level of care/ Aubree Ear of Case Management

## 2018-01-20 ENCOUNTER — APPOINTMENT (OUTPATIENT)
Dept: GENERAL RADIOLOGY | Age: 78
DRG: 292 | End: 2018-01-20
Attending: PHYSICIAN ASSISTANT
Payer: MEDICARE

## 2018-01-20 ENCOUNTER — HOSPITAL ENCOUNTER (INPATIENT)
Age: 78
LOS: 4 days | Discharge: PSYCHIATRIC OR SUBSTANCE USE FACILITY | DRG: 292 | End: 2018-01-24
Attending: EMERGENCY MEDICINE | Admitting: INTERNAL MEDICINE
Payer: MEDICARE

## 2018-01-20 DIAGNOSIS — Z71.89 GOALS OF CARE, COUNSELING/DISCUSSION: ICD-10-CM

## 2018-01-20 DIAGNOSIS — R53.81 DEBILITY: ICD-10-CM

## 2018-01-20 DIAGNOSIS — I16.0 HYPERTENSIVE URGENCY, MALIGNANT: Primary | ICD-10-CM

## 2018-01-20 DIAGNOSIS — I50.31 ACUTE DIASTOLIC CHF (CONGESTIVE HEART FAILURE) (HCC): ICD-10-CM

## 2018-01-20 DIAGNOSIS — Z71.89 DNR (DO NOT RESUSCITATE) DISCUSSION: ICD-10-CM

## 2018-01-20 DIAGNOSIS — R07.9 CHEST PAIN AT REST: ICD-10-CM

## 2018-01-20 DIAGNOSIS — R77.8 ELEVATED TROPONIN I LEVEL: ICD-10-CM

## 2018-01-20 DIAGNOSIS — F17.200 NICOTINE DEPENDENCE, UNCOMPLICATED, UNSPECIFIED NICOTINE PRODUCT TYPE: ICD-10-CM

## 2018-01-20 DIAGNOSIS — Z91.14 NON COMPLIANCE W MEDICATION REGIMEN: ICD-10-CM

## 2018-01-20 PROBLEM — R00.1 BRADYCARDIA: Status: ACTIVE | Noted: 2018-01-20

## 2018-01-20 LAB
ALBUMIN SERPL-MCNC: 2.4 G/DL (ref 3.5–5)
ALBUMIN/GLOB SERPL: 0.5 {RATIO} (ref 1.1–2.2)
ALP SERPL-CCNC: 84 U/L (ref 45–117)
ALT SERPL-CCNC: 9 U/L (ref 12–78)
AMPHET UR QL SCN: NEGATIVE
ANION GAP SERPL CALC-SCNC: 8 MMOL/L (ref 5–15)
APPEARANCE UR: CLEAR
AST SERPL-CCNC: 19 U/L (ref 15–37)
BACTERIA URNS QL MICRO: NEGATIVE /HPF
BARBITURATES UR QL SCN: NEGATIVE
BASOPHILS # BLD: 0 K/UL (ref 0–0.1)
BASOPHILS NFR BLD: 0 % (ref 0–1)
BENZODIAZ UR QL: NEGATIVE
BILIRUB SERPL-MCNC: 0.4 MG/DL (ref 0.2–1)
BILIRUB UR QL: NEGATIVE
BNP SERPL-MCNC: ABNORMAL PG/ML (ref 0–450)
BUN SERPL-MCNC: 24 MG/DL (ref 6–20)
BUN/CREAT SERPL: 10 (ref 12–20)
CALCIUM SERPL-MCNC: 8.4 MG/DL (ref 8.5–10.1)
CANNABINOIDS UR QL SCN: POSITIVE
CHLORIDE SERPL-SCNC: 108 MMOL/L (ref 97–108)
CO2 SERPL-SCNC: 28 MMOL/L (ref 21–32)
COCAINE UR QL SCN: NEGATIVE
COLOR UR: ABNORMAL
CREAT SERPL-MCNC: 2.48 MG/DL (ref 0.7–1.3)
DRUG SCRN COMMENT,DRGCM: ABNORMAL
EOSINOPHIL # BLD: 0.1 K/UL (ref 0–0.4)
EOSINOPHIL NFR BLD: 3 % (ref 0–7)
EPITH CASTS URNS QL MICRO: ABNORMAL /LPF
ERYTHROCYTE [DISTWIDTH] IN BLOOD BY AUTOMATED COUNT: 16 % (ref 11.5–14.5)
GLOBULIN SER CALC-MCNC: 5.1 G/DL (ref 2–4)
GLUCOSE BLD STRIP.AUTO-MCNC: 88 MG/DL (ref 65–100)
GLUCOSE SERPL-MCNC: 82 MG/DL (ref 65–100)
GLUCOSE UR STRIP.AUTO-MCNC: NEGATIVE MG/DL
HCT VFR BLD AUTO: 30.7 % (ref 36.6–50.3)
HGB BLD-MCNC: 10.4 G/DL (ref 12.1–17)
HGB UR QL STRIP: ABNORMAL
HYALINE CASTS URNS QL MICRO: ABNORMAL /LPF (ref 0–5)
KETONES UR QL STRIP.AUTO: NEGATIVE MG/DL
LEUKOCYTE ESTERASE UR QL STRIP.AUTO: NEGATIVE
LYMPHOCYTES # BLD: 1.5 K/UL (ref 0.8–3.5)
LYMPHOCYTES NFR BLD: 41 % (ref 12–49)
MAGNESIUM SERPL-MCNC: 1.7 MG/DL (ref 1.6–2.4)
MCH RBC QN AUTO: 27.7 PG (ref 26–34)
MCHC RBC AUTO-ENTMCNC: 33.9 G/DL (ref 30–36.5)
MCV RBC AUTO: 81.9 FL (ref 80–99)
METHADONE UR QL: NEGATIVE
MONOCYTES # BLD: 0.2 K/UL (ref 0–1)
MONOCYTES NFR BLD: 6 % (ref 5–13)
NEUTS SEG # BLD: 1.9 K/UL (ref 1.8–8)
NEUTS SEG NFR BLD: 50 % (ref 32–75)
NITRITE UR QL STRIP.AUTO: NEGATIVE
OPIATES UR QL: NEGATIVE
PCP UR QL: NEGATIVE
PH UR STRIP: 6 [PH] (ref 5–8)
PLATELET # BLD AUTO: 237 K/UL (ref 150–400)
POTASSIUM SERPL-SCNC: 3.5 MMOL/L (ref 3.5–5.1)
PROT SERPL-MCNC: 7.5 G/DL (ref 6.4–8.2)
PROT UR STRIP-MCNC: 300 MG/DL
RBC # BLD AUTO: 3.75 M/UL (ref 4.1–5.7)
RBC #/AREA URNS HPF: ABNORMAL /HPF (ref 0–5)
SERVICE CMNT-IMP: NORMAL
SODIUM SERPL-SCNC: 144 MMOL/L (ref 136–145)
SP GR UR REFRACTOMETRY: 1.02 (ref 1–1.03)
TROPONIN I SERPL-MCNC: 0.09 NG/ML
UROBILINOGEN UR QL STRIP.AUTO: 1 EU/DL (ref 0.2–1)
WBC # BLD AUTO: 3.8 K/UL (ref 4.1–11.1)
WBC URNS QL MICRO: ABNORMAL /HPF (ref 0–4)

## 2018-01-20 PROCEDURE — 74011250636 HC RX REV CODE- 250/636: Performed by: INTERNAL MEDICINE

## 2018-01-20 PROCEDURE — 85025 COMPLETE CBC W/AUTO DIFF WBC: CPT | Performed by: PHYSICIAN ASSISTANT

## 2018-01-20 PROCEDURE — 83880 ASSAY OF NATRIURETIC PEPTIDE: CPT | Performed by: INTERNAL MEDICINE

## 2018-01-20 PROCEDURE — 81001 URINALYSIS AUTO W/SCOPE: CPT | Performed by: PHYSICIAN ASSISTANT

## 2018-01-20 PROCEDURE — 84484 ASSAY OF TROPONIN QUANT: CPT | Performed by: PHYSICIAN ASSISTANT

## 2018-01-20 PROCEDURE — 74011250637 HC RX REV CODE- 250/637: Performed by: INTERNAL MEDICINE

## 2018-01-20 PROCEDURE — 71045 X-RAY EXAM CHEST 1 VIEW: CPT

## 2018-01-20 PROCEDURE — 83735 ASSAY OF MAGNESIUM: CPT | Performed by: PHYSICIAN ASSISTANT

## 2018-01-20 PROCEDURE — 80307 DRUG TEST PRSMV CHEM ANLYZR: CPT | Performed by: INTERNAL MEDICINE

## 2018-01-20 PROCEDURE — 36415 COLL VENOUS BLD VENIPUNCTURE: CPT | Performed by: PHYSICIAN ASSISTANT

## 2018-01-20 PROCEDURE — 65660000000 HC RM CCU STEPDOWN

## 2018-01-20 PROCEDURE — 93005 ELECTROCARDIOGRAM TRACING: CPT

## 2018-01-20 PROCEDURE — 80053 COMPREHEN METABOLIC PANEL: CPT | Performed by: PHYSICIAN ASSISTANT

## 2018-01-20 PROCEDURE — 99285 EMERGENCY DEPT VISIT HI MDM: CPT

## 2018-01-20 PROCEDURE — 82962 GLUCOSE BLOOD TEST: CPT

## 2018-01-20 RX ORDER — LABETALOL HYDROCHLORIDE 5 MG/ML
20 INJECTION, SOLUTION INTRAVENOUS
Status: DISCONTINUED | OUTPATIENT
Start: 2018-01-20 | End: 2018-01-20

## 2018-01-20 RX ORDER — HYDRALAZINE HYDROCHLORIDE 25 MG/1
50 TABLET, FILM COATED ORAL 3 TIMES DAILY
Status: DISCONTINUED | OUTPATIENT
Start: 2018-01-20 | End: 2018-01-23

## 2018-01-20 RX ORDER — AMLODIPINE BESYLATE 5 MG/1
10 TABLET ORAL DAILY
Status: DISCONTINUED | OUTPATIENT
Start: 2018-01-20 | End: 2018-01-24 | Stop reason: HOSPADM

## 2018-01-20 RX ORDER — HYDRALAZINE HYDROCHLORIDE 20 MG/ML
20 INJECTION INTRAMUSCULAR; INTRAVENOUS
Status: DISCONTINUED | OUTPATIENT
Start: 2018-01-20 | End: 2018-01-24 | Stop reason: HOSPADM

## 2018-01-20 RX ORDER — ATORVASTATIN CALCIUM 20 MG/1
40 TABLET, FILM COATED ORAL DAILY
Status: DISCONTINUED | OUTPATIENT
Start: 2018-01-20 | End: 2018-01-24 | Stop reason: HOSPADM

## 2018-01-20 RX ORDER — HEPARIN SODIUM 5000 [USP'U]/ML
5000 INJECTION, SOLUTION INTRAVENOUS; SUBCUTANEOUS EVERY 8 HOURS
Status: DISCONTINUED | OUTPATIENT
Start: 2018-01-20 | End: 2018-01-24 | Stop reason: HOSPADM

## 2018-01-20 RX ORDER — IBUPROFEN 200 MG
1 TABLET ORAL EVERY 24 HOURS
Status: DISCONTINUED | OUTPATIENT
Start: 2018-01-20 | End: 2018-01-24 | Stop reason: HOSPADM

## 2018-01-20 RX ORDER — INSULIN LISPRO 100 [IU]/ML
INJECTION, SOLUTION INTRAVENOUS; SUBCUTANEOUS
Status: DISCONTINUED | OUTPATIENT
Start: 2018-01-20 | End: 2018-01-24 | Stop reason: HOSPADM

## 2018-01-20 RX ORDER — SODIUM CHLORIDE 0.9 % (FLUSH) 0.9 %
5-10 SYRINGE (ML) INJECTION AS NEEDED
Status: DISCONTINUED | OUTPATIENT
Start: 2018-01-20 | End: 2018-01-24 | Stop reason: HOSPADM

## 2018-01-20 RX ORDER — LANOLIN ALCOHOL/MO/W.PET/CERES
325 CREAM (GRAM) TOPICAL 2 TIMES DAILY WITH MEALS
Status: DISCONTINUED | OUTPATIENT
Start: 2018-01-21 | End: 2018-01-24 | Stop reason: HOSPADM

## 2018-01-20 RX ORDER — HYDROMORPHONE HYDROCHLORIDE 2 MG/ML
0.5 INJECTION, SOLUTION INTRAMUSCULAR; INTRAVENOUS; SUBCUTANEOUS
Status: DISCONTINUED | OUTPATIENT
Start: 2018-01-20 | End: 2018-01-24 | Stop reason: HOSPADM

## 2018-01-20 RX ORDER — DEXTROSE 50 % IN WATER (D50W) INTRAVENOUS SYRINGE
12.5-25 AS NEEDED
Status: DISCONTINUED | OUTPATIENT
Start: 2018-01-20 | End: 2018-01-24 | Stop reason: HOSPADM

## 2018-01-20 RX ORDER — DOCUSATE SODIUM 100 MG/1
100 CAPSULE, LIQUID FILLED ORAL 2 TIMES DAILY
Status: DISCONTINUED | OUTPATIENT
Start: 2018-01-20 | End: 2018-01-24 | Stop reason: HOSPADM

## 2018-01-20 RX ORDER — ASPIRIN 81 MG/1
81 TABLET ORAL DAILY
Status: DISCONTINUED | OUTPATIENT
Start: 2018-01-20 | End: 2018-01-24 | Stop reason: HOSPADM

## 2018-01-20 RX ORDER — ACETAMINOPHEN 325 MG/1
650 TABLET ORAL
Status: DISCONTINUED | OUTPATIENT
Start: 2018-01-20 | End: 2018-01-24 | Stop reason: HOSPADM

## 2018-01-20 RX ORDER — CARVEDILOL 3.12 MG/1
3.12 TABLET ORAL 2 TIMES DAILY WITH MEALS
Status: DISCONTINUED | OUTPATIENT
Start: 2018-01-20 | End: 2018-01-24 | Stop reason: HOSPADM

## 2018-01-20 RX ORDER — ONDANSETRON 2 MG/ML
4 INJECTION INTRAMUSCULAR; INTRAVENOUS
Status: DISCONTINUED | OUTPATIENT
Start: 2018-01-20 | End: 2018-01-24 | Stop reason: HOSPADM

## 2018-01-20 RX ORDER — HYDROCODONE BITARTRATE AND ACETAMINOPHEN 5; 325 MG/1; MG/1
1 TABLET ORAL
Status: DISCONTINUED | OUTPATIENT
Start: 2018-01-20 | End: 2018-01-24 | Stop reason: HOSPADM

## 2018-01-20 RX ORDER — NALOXONE HYDROCHLORIDE 0.4 MG/ML
0.4 INJECTION, SOLUTION INTRAMUSCULAR; INTRAVENOUS; SUBCUTANEOUS AS NEEDED
Status: DISCONTINUED | OUTPATIENT
Start: 2018-01-20 | End: 2018-01-24 | Stop reason: HOSPADM

## 2018-01-20 RX ORDER — MAGNESIUM SULFATE 100 %
4 CRYSTALS MISCELLANEOUS AS NEEDED
Status: DISCONTINUED | OUTPATIENT
Start: 2018-01-20 | End: 2018-01-24 | Stop reason: HOSPADM

## 2018-01-20 RX ORDER — DIPHENHYDRAMINE HYDROCHLORIDE 50 MG/ML
12.5 INJECTION, SOLUTION INTRAMUSCULAR; INTRAVENOUS
Status: DISCONTINUED | OUTPATIENT
Start: 2018-01-20 | End: 2018-01-24 | Stop reason: HOSPADM

## 2018-01-20 RX ORDER — SODIUM CHLORIDE 0.9 % (FLUSH) 0.9 %
5-10 SYRINGE (ML) INJECTION EVERY 8 HOURS
Status: DISCONTINUED | OUTPATIENT
Start: 2018-01-20 | End: 2018-01-24 | Stop reason: HOSPADM

## 2018-01-20 RX ADMIN — Medication 10 ML: at 23:14

## 2018-01-20 RX ADMIN — HEPARIN SODIUM 5000 UNITS: 5000 INJECTION, SOLUTION INTRAVENOUS; SUBCUTANEOUS at 23:15

## 2018-01-20 RX ADMIN — NITROGLYCERIN 1 INCH: 20 OINTMENT TOPICAL at 20:36

## 2018-01-20 RX ADMIN — ATORVASTATIN CALCIUM 40 MG: 20 TABLET, FILM COATED ORAL at 22:12

## 2018-01-20 RX ADMIN — HYDRALAZINE HYDROCHLORIDE 20 MG: 20 INJECTION INTRAMUSCULAR; INTRAVENOUS at 20:33

## 2018-01-20 RX ADMIN — ASPIRIN 81 MG: 81 TABLET, COATED ORAL at 22:11

## 2018-01-20 RX ADMIN — CARVEDILOL 3.12 MG: 3.12 TABLET, FILM COATED ORAL at 22:13

## 2018-01-20 RX ADMIN — AMLODIPINE BESYLATE 10 MG: 5 TABLET ORAL at 22:10

## 2018-01-20 RX ADMIN — HYDRALAZINE HYDROCHLORIDE 50 MG: 25 TABLET, FILM COATED ORAL at 22:14

## 2018-01-20 RX ADMIN — DOCUSATE SODIUM 100 MG: 100 CAPSULE, LIQUID FILLED ORAL at 22:14

## 2018-01-20 RX ADMIN — HYDROCODONE BITARTRATE AND ACETAMINOPHEN 1 TABLET: 5; 325 TABLET ORAL at 22:08

## 2018-01-20 NOTE — ED TRIAGE NOTES
Complains of bilateral leg pain since October, complains of chest pain times one week: per pt he has been unable to afford his medications and has not seen a physician or taken medications since October

## 2018-01-20 NOTE — ED PROVIDER NOTES
HPI Comments: 78 y.o. male with past medical history significant for HTN, high cholesterol, neuropathy, PVD, PUD , hepatitis C,  DM, CKD, CAD, BKA, and AKA who presents from home via EMS with chief complaint of CP. The pt c/o CP and B/L leg pain at the sites of his amputations. The pt is a poor historian. The pt was admitted for his amputation in 10/2017 and reports that he was not sent home with medications. The pt reports that he has not taken his medications since his discharge. The pt claims he called his PCP office and they \"would not give me more medications. \" The pt has not made a F/U appointment and did not take pain medications PTA. The pt reports that he is having difficulty caring for himself and that his fiance helps. The pt also reports mild SOB. The pt denies fever, chills, N/V/D, and abdominal pain. There are no other acute medical concerns at this time. Social hx: current smoker, no EtOH use PCP: Johny Warner MD 
 
Note written by Roosevelt Ndiaye, as dictated by NILTON Adorno  2:35 PM 
 
 
The history is provided by the patient and the EMS personnel. No  was used. Past Medical History:  
Diagnosis Date  Arthritis  CAD (coronary artery disease) 2007  CKD (chronic kidney disease), stage III   
 DM type 2 causing renal disease ([facility]  Gait abnormality  Heart murmur  Hepatitis C   
 History of blood transfusion  Hypercholesterolemia  Hypertension  Hypertension 11/7/2014  Neuropathy  [facility] ([facility] 11/7/2014  
 A. S/P Right SFA POBA and tibial atherectomy (2/4/13).  PUD (peptic ulcer disease) 2007  Unspecified sleep apnea   
 never used cpap Past Surgical History:  
Procedure Laterality Date [address] UNLISTED  2007  
 has approx 7-8\" midline incision on abdomen--\"had to open him up and clean him out after feeding tube clogged\"  HX GI  2007  
 feeding tube for approx 2 mo  VASCULAR SURGERY PROCEDURE UNLIST Right 1/22/2015  
 popliteal-tibial bypass Family History:  
Problem Relation Age of Onset  Hypertension Father Social History Social History  Marital status:  Spouse name: N/A  
 Number of children: N/A  
 Years of education: N/A Occupational History  Not on file. Social History Main Topics  Smoking status: Current Every Day Smoker Packs/day: 0.50  Smokeless tobacco: Not on file  Alcohol use No  
 Drug use: No  
   Comment: >20 years ago  Sexual activity: Not on file Other Topics Concern  Not on file Social History Narrative ALLERGIES: Tetracycline Review of Systems Constitutional: Negative for chills and fever. HENT: Negative for congestion, rhinorrhea, sneezing and sore throat. Eyes: Negative for redness and visual disturbance. Respiratory: Positive for shortness of breath. Cardiovascular: Positive for chest pain. Negative for leg swelling. Gastrointestinal: Negative for abdominal pain, diarrhea, nausea and vomiting. Genitourinary: Negative for difficulty urinating and frequency. Musculoskeletal: Negative for back pain, myalgias and neck stiffness. B/L leg pain Skin: Negative for rash. Neurological: Negative for dizziness, syncope, weakness and headaches. Hematological: Negative for adenopathy. Patient Vitals for the past 12 hrs: 
 Temp Pulse Resp BP SpO2  
01/21/18 0100 - 80 16 166/85 -  
01/21/18 0000 - 78 15 160/90 -  
01/20/18 2300 - 77 17 (!) 198/97 -  
01/20/18 2230 - 81 18 (!) 197/100 -  
01/20/18 2200 - 87 18 (!) 194/97 -  
01/20/18 2153 - 81 19 (!) 172/117 -  
01/20/18 2145 - - - - 97 % 01/20/18 2045 98.2 °F (36.8 °C) 79 20 (!) 203/88 97 % 01/20/18 2033 - 73 - (!) 200/97 -  
01/20/18 2030 - 70 19 (!) 200/97 97 % 01/20/18 2015 - 65 16 (!) 206/116 95 % 01/20/18 2000 - 73 14 (!) 221/114 98 % 01/20/18 1945 - 72 17 (!) 197/138 99 % 01/20/18 1930 - 77 17 (!) 227/147 -  
01/20/18 1915 - 83 13 (!) 211/111 -  
01/20/18 1845 - 81 19 198/90 -  
01/20/18 1830 - 70 13 (!) 219/100 100 % 01/20/18 1815 - 69 13 (!) 210/102 -  
01/20/18 1800 - 74 17 (!) 199/94 97 % 01/20/18 1745 - 67 14 (!) 228/105 95 % 01/20/18 1736 98.9 °F (37.2 °C) 70 18 (!) 206/102 95 % Physical Exam  
Constitutional: He is oriented to person, place, and time. He appears well-developed and well-nourished. No distress. Unkempt male HENT:  
Head: Normocephalic and atraumatic. Right Ear: External ear normal.  
Left Ear: External ear normal.  
Mouth/Throat: Oropharynx is clear and moist.  
Eyes: EOM are normal. Pupils are equal, round, and reactive to light. Neck: Neck supple. Cardiovascular: Normal rate, regular rhythm, normal heart sounds and intact distal pulses. Exam reveals no gallop and no friction rub. No murmur heard. Pulmonary/Chest: No stridor. No respiratory distress. He has no wheezes. He has no rales. He exhibits no tenderness. Diminished breath sounds. Poor respiratory effort Abdominal: Soft. Bowel sounds are normal. He exhibits no distension and no mass. There is no tenderness. There is no rebound and no guarding. Musculoskeletal: Normal range of motion. He exhibits no edema, tenderness or deformity. R BKA, L AKA well healed with out erythema or lesions to stumps. Neurological: He is alert and oriented to person, place, and time. No cranial nerve deficit. Coordination normal.  
Skin: No rash noted. No erythema. No pallor. Psychiatric:  
Pt encouraged to be cooperative with exam  
Nursing note and vitals reviewed. MDM Number of Diagnoses or Management Options Acute diastolic CHF (congestive heart failure) [facility]):  
Chest pain at rest:  
Elevated troponin I level: Hypertensive urgency, malignant:  
Nicotine dependence, uncomplicated, unspecified nicotine product type:  
Non compliance w medication regimen:  
  
Amount and/or Complexity of Data Reviewed Clinical lab tests: ordered and reviewed Tests in the radiology section of CPT®: ordered and reviewed Tests in the medicine section of CPT®: reviewed and ordered Obtain history from someone other than the patient: yes (ems) Review and summarize past medical records: yes Independent visualization of images, tracings, or specimens: yes Patient Progress Patient progress: stable ED Course Procedures ED EKG interpretation: 5:34 PM 
Rhythm: normal sinus rhythm,  With occasional PVC's and PAC's; and irregular. Rate (approx.): 74; Axis: normal; P wave: normal; QRS interval: normal ; ST/T wave: non-specific changes; Other findings: left atrial enlargement, LVH with repolarization abnormality, abnormal EKG, c/w EKG from 7?13/17 no acute STEMI. This EKG was interpreted by Aries Ohara MD,ED Provider. 5:49 PM 
Discussed pt, sx, hx and current findings with Dr Carolina Link. He is in agreement with plan and will see pt. Will get cardiac lags, ekg , xray. Check blood glucose Hazel Quiñones PA-C 
 
7:50 PM 
Xray shows chf, bp not controlled. Trop elevated. Pt non compliant dm, htn, will admit Hazel Rainey. SAVANNAH Quiñones 
 
1:59 AM 
Dr Carolina Link spoke with Dr. Smitha Mena, Consult for Hospitalist. Discussed available diagnostic tests and clinical findings. He is in agreement with care plans as outlined. He will admit NILTON Jamil 
 
LABS COMPLETED AND REVIEWED: 
Recent Results (from the past 12 hour(s)) GLUCOSE, POC Collection Time: 01/20/18  5:59 PM  
Result Value Ref Range Glucose (POC) 88 65 - 100 mg/dL Performed by Ashok Christopher CBC WITH AUTOMATED DIFF Collection Time: 01/20/18  6:45 PM  
Result Value Ref Range WBC 3.8 (L) 4.1 - 11.1 K/uL  
 RBC 3.75 (L) 4.10 - 5.70 M/uL  
 HGB 10.4 (L) 12.1 - 17.0 g/dL HCT 30.7 (L) 36.6 - 50.3 % MCV 81.9 80.0 - 99.0 FL  
 MCH 27.7 26.0 - 34.0 PG  
 MCHC 33.9 30.0 - 36.5 g/dL  
 RDW 16.0 (H) 11.5 - 14.5 %  PLATELET 294 150 - 400 K/uL NEUTROPHILS 50 32 - 75 % LYMPHOCYTES 41 12 - 49 % MONOCYTES 6 5 - 13 % EOSINOPHILS 3 0 - 7 % BASOPHILS 0 0 - 1 %  
 ABS. NEUTROPHILS 1.9 1.8 - 8.0 K/UL  
 ABS. LYMPHOCYTES 1.5 0.8 - 3.5 K/UL  
 ABS. MONOCYTES 0.2 0.0 - 1.0 K/UL  
 ABS. EOSINOPHILS 0.1 0.0 - 0.4 K/UL  
 ABS. BASOPHILS 0.0 0.0 - 0.1 K/UL METABOLIC PANEL, COMPREHENSIVE Collection Time: 01/20/18  6:45 PM  
Result Value Ref Range Sodium 144 136 - 145 mmol/L Potassium 3.5 3.5 - 5.1 mmol/L Chloride 108 97 - 108 mmol/L  
 CO2 28 21 - 32 mmol/L Anion gap 8 5 - 15 mmol/L Glucose 82 65 - 100 mg/dL BUN 24 (H) 6 - 20 MG/DL Creatinine 2.48 (H) 0.70 - 1.30 MG/DL  
 BUN/Creatinine ratio 10 (L) 12 - 20 GFR est AA 31 (L) >60 ml/min/1.73m2 GFR est non-AA 26 (L) >60 ml/min/1.73m2 Calcium 8.4 (L) 8.5 - 10.1 MG/DL Bilirubin, total 0.4 0.2 - 1.0 MG/DL  
 ALT (SGPT) 9 (L) 12 - 78 U/L  
 AST (SGOT) 19 15 - 37 U/L Alk. phosphatase 84 45 - 117 U/L Protein, total 7.5 6.4 - 8.2 g/dL Albumin 2.4 (L) 3.5 - 5.0 g/dL Globulin 5.1 (H) 2.0 - 4.0 g/dL A-G Ratio 0.5 (L) 1.1 - 2.2 MAGNESIUM Collection Time: 01/20/18  6:45 PM  
Result Value Ref Range Magnesium 1.7 1.6 - 2.4 mg/dL TROPONIN I Collection Time: 01/20/18  6:45 PM  
Result Value Ref Range Troponin-I, Qt. 0.09 (H) <0.05 ng/mL NT-PRO BNP Collection Time: 01/20/18  6:45 PM  
Result Value Ref Range NT pro-BNP >08638 (H) 0 - 450 PG/ML  
URINALYSIS W/ RFLX MICROSCOPIC Collection Time: 01/20/18  9:12 PM  
Result Value Ref Range Color YELLOW/STRAW Appearance CLEAR CLEAR Specific gravity 1.020 1.003 - 1.030    
 pH (UA) 6.0 5.0 - 8.0 Protein 300 (A) NEG mg/dL Glucose NEGATIVE  NEG mg/dL Ketone NEGATIVE  NEG mg/dL Bilirubin NEGATIVE  NEG Blood TRACE (A) NEG Urobilinogen 1.0 0.2 - 1.0 EU/dL Nitrites NEGATIVE  NEG  Leukocyte Esterase NEGATIVE  NEG    
 WBC 0-4 0 - 4 /hpf  
 RBC 5-10 0 - 5 /hpf Epithelial cells FEW FEW /lpf Bacteria NEGATIVE  NEG /hpf Hyaline cast 0-2 0 - 5 /lpf DRUG SCREEN, URINE Collection Time: 01/20/18  9:12 PM  
Result Value Ref Range AMPHETAMINES NEGATIVE  NEG    
 BARBITURATES NEGATIVE  NEG BENZODIAZEPINES NEGATIVE  NEG    
 COCAINE NEGATIVE  NEG METHADONE NEGATIVE  NEG    
 OPIATES NEGATIVE  NEG    
 PCP(PHENCYCLIDINE) NEGATIVE  NEG    
 THC (TH-CANNABINOL) POSITIVE (A) NEG Drug screen comment (NOTE) IMAGING COMPLETED AND REVIEWED: 
The following have been ordered and reviewed: 
 
Xr Chest [facility] Result Date: 1/20/2018 EXAM: Portable CXR.  1800 hours. COMPARISON: 1/19/2015. INDICATION: cp, fatigue There is new interstitial pulmonary edema, cardiomegaly and small left pleural effusion. There is no focal infiltrate, pneumothorax or midline shift. IMPRESSION: CHF pattern. MEDICATIONS GIVEN: 
Medications  
hydrALAZINE (APRESOLINE) 20 mg/mL injection 20 mg (20 mg IntraVENous Given 1/20/18 2033)  
nitroglycerin (NITROBID) 2 % ointment 1 Inch (1 Inch Topical Given 1/20/18 2036) amLODIPine (NORVASC) tablet 10 mg (10 mg Oral Given 1/20/18 2210) aspirin delayed-release tablet 81 mg (81 mg Oral Given 1/20/18 2211)  
atorvastatin (LIPITOR) tablet 40 mg (40 mg Oral Given 1/20/18 2212) carvedilol (COREG) tablet 3.125 mg (3.125 mg Oral Given 1/20/18 2213) ferrous sulfate tablet 325 mg (not administered)  
hydrALAZINE (APRESOLINE) tablet 50 mg (50 mg Oral Given 1/20/18 2214)  
sodium chloride (NS) flush 5-10 mL (10 mL IntraVENous Given 1/20/18 2314)  
sodium chloride (NS) flush 5-10 mL (not administered)  
acetaminophen (TYLENOL) tablet 650 mg (not administered) HYDROcodone-acetaminophen (NORCO) 5-325 mg per tablet 1 Tab (1 Tab Oral Given 1/21/18 0143) HYDROmorphone (DILAUDID) injection 0.5 mg (not administered)  
naloxone (NARCAN) injection 0.4 mg (not administered) diphenhydrAMINE (BENADRYL) injection 12.5 mg (12.5 mg IntraVENous Given 1/21/18 0142) ondansetron (ZOFRAN) injection 4 mg (4 mg IntraVENous Given 1/21/18 0144) docusate sodium (COLACE) capsule 100 mg (100 mg Oral Given 1/20/18 2214)  
nicotine (NICODERM CQ) 21 mg/24 hr patch 1 Patch (1 Patch TransDERmal Apply Patch 1/20/18 2314)  
heparin (porcine) injection 5,000 Units (5,000 Units SubCUTAneous Given 1/20/18 2315)  
insulin lispro (HUMALOG) injection (0 Units SubCUTAneous Held 1/20/18 2200) glucose chewable tablet 16 g (not administered) dextrose (D50W) injection syrg 12.5-25 g (not administered) glucagon (GLUCAGEN) injection 1 mg (not administered) CLINICAL IMPRESSION: 
1. Hypertensive urgency, malignant 2. Chest pain at rest   
3. Elevated troponin I level 4. Acute diastolic CHF (congestive heart failure) ([address] 5. Non compliance w medication regimen 6. Nicotine dependence, uncomplicated, unspecified nicotine product type Plan 1. Admission per Hospitalist 
 
 
2:00 AM 
The patient is being admitted to the hospital.  The results of their tests and reasons for their admission have been discussed with them and/or available family. The patient/family has conveyed agreement and understanding for the need to be admitted and for their admission diagnosis. Consultation has been made with the inpatient physician specialist for hospitalizatio

## 2018-01-20 NOTE — ED NOTES
Two attempts for IV/blood draw unsuccessful. [facility] ED tech to attempt. Scar tissue noted to arms, patient reports he used to use IV heroin

## 2018-01-20 NOTE — IP AVS SNAPSHOT
[address] [id number] Patient: Quiana Carrasco MRN: [id number] NHS:[birth year] A check hesham indicates which time of day the medication should be taken. My Medications START taking these medications Instructions Each Dose to Equal  
 Morning Noon Evening Bedtime  
 mirtazapine 7.5 mg tablet Commonly known as:  Bro[place]ap Your last dose was: Your next dose is: Take 1 Tab by mouth nightly. 7.5 mg  
    
   
   
   
  
 nicotine 21 mg/24 hr  
Commonly known as:  Nicole Ruff Your last dose was: Your next dose is:    
   
   
 1 Patch by TransDERmal route every twenty-four (24) hours for 30 days. 1 Patch CONTINUE taking these medications Instructions Each Dose to Equal  
 Morning Noon Evening Bedtime  
 amLODIPine 10 mg tablet Commonly known as:  Ray Schmidt Your last dose was: Your next dose is: Take 1 Tab by mouth daily. 10 mg  
    
   
   
   
  
 aspirin delayed-release 81 mg tablet Start taking on:  1/23/2018 Your last dose was: Your next dose is: Take 1 Tab by mouth daily. 81 mg  
    
   
   
   
  
 atorvastatin 40 mg tablet Commonly known as:  LIPITOR Your last dose was: Your next dose is: Take 1 Tab by mouth daily. 40 mg  
    
   
   
   
  
 carvedilol 3.125 mg tablet Commonly known as:  Michelle Sham Your last dose was: Your next dose is: Take 1 Tab by mouth two (2) times daily (with meals). 3.125 mg  
    
   
   
   
  
 ferrous sulfate 325 mg (65 mg iron) tablet Your last dose was: Your next dose is: Take 1 Tab by mouth two (2) times daily (with meals). 325 mg  
    
   
   
   
  
 hydrALAZINE 50 mg tablet Commonly known as:  APRESOLINE Your last dose was: Your next dose is: Take 1 Tab by mouth three (3) times daily. 50 mg Where to Get Your Medications Information on where to get these meds will be given to you by the nurse or doctor. !  Ask your nurse or doctor about these medications  
  amLODIPine 10 mg tablet  
 atorvastatin 40 mg tablet  
 carvedilol 3.125 mg tablet  
 hydrALAZINE 50 mg tablet  
 mirtazapine 7.5 mg tablet  
 nicotine 21 mg/24 hr

## 2018-01-20 NOTE — IP AVS SNAPSHOT
Dmitry Khanh 
 
 
 [address] [facility] [address] 
[phone] Patient: Brianna Gruber MRN: [id number] About your hospitalization You were admitted on:  January 20, 2018 You last received care in the:  [facility] [address] You were discharged on:  January 22, 2018 Why you were hospitalized Your primary diagnosis was:  Hypertensive Urgency, Malignant Your diagnoses also included:  Chest Pain At Rest, Acute Diastolic Chf (Congestive Heart Failure) (Hcc), Ckd (Chronic Kidney Disease), Stage Iii, Dm Type 2 Causing Renal Disease (Hcc), Cad (Coronary Artery Disease), Suicidal Ideation, Anemia Follow-up Information Follow up With Details Comments Contact Info Jeanine Caballero MD In 1 week if you do not have a PCP Reza Loja [address] 
[phone] Kassandra Carpenter MD   [address] [facility] [address] [address] [phone] Discharge Orders None A check hesham indicates which time of day the medication should be taken. My Medications START taking these medications Instructions Each Dose to Equal  
 Morning Noon Evening Bedtime  
 mirtazapine 7.5 mg tablet Commonly known as:  Orville Ansari Your last dose was: Your next dose is: Take 1 Tab by mouth nightly. 7.5 mg  
    
   
   
   
  
 nicotine 21 mg/24 hr  
Commonly known as:  Nohemy Hugo Your last dose was: Your next dose is:    
   
   
 1 Patch by TransDERmal route every twenty-four (24) hours for 30 days. 1 Patch CONTINUE taking these medications Instructions Each Dose to Equal  
 Morning Noon Evening Bedtime  
 amLODIPine 10 mg tablet Commonly known as:  Adriana Doherty Your last dose was: Your next dose is: Take 1 Tab by mouth daily. 10 mg  
    
   
   
   
  
 aspirin delayed-release 81 mg tablet Start taking on:  2018 Your last dose was: Your next dose is: Take 1 Tab by mouth daily. 81 mg  
    
   
   
   
  
 atorvastatin 40 mg tablet Commonly known as:  LIPITOR Your last dose was: Your next dose is: Take 1 Tab by mouth daily. 40 mg  
    
   
   
   
  
 carvedilol 3.125 mg tablet Commonly known as:  Clerance Barley Your last dose was: Your next dose is: Take 1 Tab by mouth two (2) times daily (with meals). 3.125 mg  
    
   
   
   
  
 ferrous sulfate 325 mg (65 mg iron) tablet Your last dose was: Your next dose is: Take 1 Tab by mouth two (2) times daily (with meals). 325 mg  
    
   
   
   
  
 hydrALAZINE 50 mg tablet Commonly known as:  APRESOLINE Your last dose was: Your next dose is: Take 1 Tab by mouth three (3) times daily. 50 mg Where to Get Your Medications Information on where to get these meds will be given to you by the nurse or doctor. ! Ask your nurse or doctor about these medications  
  amLODIPine 10 mg tablet  
 atorvastatin 40 mg tablet  
 carvedilol 3.125 mg tablet  
 hydrALAZINE 50 mg tablet  
 mirtazapine 7.5 mg tablet  
 nicotine 21 mg/24 hr  
  
  
  
  
Discharge Instructions HOSPITALIST DISCHARGE INSTRUCTIONS 
NAME: Hallie Torres :  1940 MRN:  [id number] Date/Time:  2018 10:09 AM 
 
ADMIT DATE: 2018 DISCHARGE DATE: 2018 DISCHARGE DIAGNOSIS: 
Uncontrolled hypertension from not taking your meds MEDICATIONS: 
· It is important that you take the medication exactly as they are prescribed. · Keep your medication in the bottles provided by the pharmacist and keep a list of the medication names, dosages, and times to be taken in your wallet. · Do not take other medications without consulting your doctor.   
 
Pain Management: per above medications What to do at [facility] Recommended diet:  Cardiac Diet Recommended activity: Activity as tolerated If you experience any of the following symptoms then please call your primary care physician or return to the emergency room if you cannot get hold of your doctor: 
Fever, chills, nausea, vomiting, diarrhea, change in mentation, falling, bleeding, shortness of breath Follow Up: Follow-up Information Follow up With Details Comments Contact Info Tierney Rudolph MD In 1 week if you do not have a PCP Reza Loja 906 [address] [facility] 
[phone] Information obtained by : 
I understand that if any problems occur once I am at home I am to contact my physician. I understand and acknowledge receipt of the instructions indicated above. Physician's or R.N.'s Signature                                                                  Date/Time Patient or Representative Signature                                                          Date/Time Mirtazapine (By mouth) Mirtazapine ([id number]) Treats depression. Brand Name(s): Remeron, Remeron Soltab There may be other brand names for this medicine. When This Medicine Should Not Be Used: This medicine is not right for everyone. Do not use it if you had an allergic reaction to mirtazapine. How to Use This Medicine:  
Tablet, Dissolving Tablet · Take your medicine as directed. Your dose may need to be changed several times to find what works best for you. Your doctor may tell you to take this medicine at bedtime, because it can make you sleepy. · You may need to take this medicine for several weeks before you begin to feel better.  
· Make sure your hands are dry before you handle the disintegrating tablet. Peel back the foil from the blister pack, then remove the tablet. Do not push the tablet through the foil. Place the tablet in your mouth. After it has melted, swallow or take a drink of water. Do not crush, split, or break the tablet. · This medicine should come with a Medication Guide. Ask your pharmacist for a copy if you do not have one. · Missed dose: Take a dose as soon as you remember. If it is almost time for your next dose, wait until then and take a regular dose. Do not take extra medicine to make up for a missed dose. · Store the medicine in a closed container at room temperature, away from heat, moisture, and direct light. Keep the orally disintegrating tablet in the original package until you are ready to take it. Drugs and Foods to Avoid: Ask your doctor or pharmacist before using any other medicine, including over-the-counter medicines, vitamins, and herbal products. · Do not use this medicine and an MAO inhibitor within 14 days of each other. · Some medicines can affect how mirtazapine works. Tell your doctor if you are using any of the following: 
¨ Buspirone, carbamazepine, cimetidine, diazepam, fentanyl, lithium, nefazodone, phenytoin, rifampicin, Gian's wort, tramadol, or tryptophan ¨ Other medicine to treat depression, a triptan medicine to treat migraine headaches, medicine to treat an infection, medicine to treat HIV, or a blood thinner (such as warfarin) · Do not drink alcohol while you are using this medicine. Warnings While Using This Medicine: · Tell your doctor if you are pregnant or breastfeeding, or if you have kidney disease, liver disease, glaucoma, high cholesterol, heart or blood vessel disease, or a history of seizures, heart attack, or stroke. Tell your doctor if you have phenylketonuria.  
· For some children, teenagers, and young adults, this medicine may increase mental or emotional problems. This may lead to thoughts of suicide and violence. Talk with your doctor right away if you have any thoughts or behavior changes that concern you. Tell your doctor if you or anyone in your family has a history of bipolar disorder or suicide attempts. · This medicine may cause the following problems: 
¨ Serotonin syndrome (may be life-threatening) ¨ Decreased white blood cells, which can affect your body's ability to fight an infection ¨ Low sodium levels in the blood · Do not stop using this medicine suddenly. Your doctor will need to slowly decrease your dose before you stop it completely. · This medicine may make you dizzy or drowsy. Do not drive or do anything else that could be dangerous until you know how this medicine affects you. Stand or sit up slowly if you feel lightheaded or dizzy. · Your doctor will do lab tests at regular visits to check on the effects of this medicine. Keep all appointments. · Keep all medicine out of the reach of children. Never share your medicine with anyone. Possible Side Effects While Using This Medicine:  
Call your doctor right away if you notice any of these side effects: · Allergic reaction: Itching or hives, swelling in your face or hands, swelling or tingling in your mouth or throat, chest tightness, trouble breathing · Anxiety, restlessness, fast heartbeat, fever, sweating, muscle spasms, nausea, vomiting, diarrhea, seeing or hearing things that are not there · Blistering, peeling, red skin rash · Eye pain, vision changes, seeing halos around lights · Confusion, weakness, muscle twitching · Feeling more excited or energetic than usual 
· Fever, chills, cough, sore throat, body aches · Thoughts of hurting yourself or others, worsening depression, unusual behaviors If you notice these less serious side effects, talk with your doctor: · Dry mouth, constipation · Increased appetite or weight gain · Sleepiness, tiredness If you notice other side effects that you think are caused by this medicine, tell your doctor. Call your doctor for medical advice about side effects. You may report side effects to FDA at 8-836-HIW-5061 © 2017 [address] Information is for End User's use only and may not be sold, redistributed or otherwise used for commercial purposes. The above information is an  only. It is not intended as medical advice for individual conditions or treatments. Talk to your doctor, nurse or pharmacist before following any medical regimen to see if it is safe and effective for you. Nicotine (Nicoderm CQ, Nicoderm CQ Clear, Leader Nicotine Transdermal System, Nicotrol) - (Absorbed through the skin) Why this medicine is used:  
Helps you quit smoking. Contact a nurse or doctor right away if you have: 
· Fast, slow, pounding, or uneven heartbeat Common side effects: · Vivid dreams, trouble sleeping · Mild skin redness, itching, burning, tingling where you wear the patch 
· Headache © 2017 [address] Information is for End User's use only and may not be sold, redistributed or otherwise used for commercial purposes. [employer] Announcement We are excited to announce that we are making your provider's discharge notes available to you in [employer]. You will see these notes when they are completed and signed by the physician that discharged you from your recent hospital stay. If you have any questions or concerns about any information you see in [employer], please call the Health Information Department where you were seen or reach out to your Primary Care Provider for more information about your plan of care. Introducing [facility] [place] [facility]! Tammi Morton introduces [employer] patient portal. Now you can access parts of your medical record, email your doctor's office, and request medication refills online. 1. In your internet browser, go to [email or web address] 2. Click on the First Time User? Click Here link in the Sign In box. You will see the New Member Sign Up page. 3. Enter your [employer] Access Code exactly as it appears below. You will not need to use this code after youve completed the sign-up process. If you do not sign up before the expiration date, you must request a new code. · [employer] Access Code: [email or web address] Expires: 4/22/2018 10:09 AM 
 
4. Enter the last four digits of your Social Security Number (xxxx) and Date of Birth (mm/dd/yyyy) as indicated and click Submit. You will be taken to the next sign-up page. 5. Create a [employer] ID. This will be your [employer] login ID and cannot be changed, so think of one that is secure and easy to remember. 6. Create a [employer] password. You can change your password at any time. 7. Enter your Password Reset Question and Answer. This can be used at a later time if you forget your password. 8. Enter your e-mail address. You will receive e-mail notification when new information is available in [address] 9. Click Sign Up. You can now view and download portions of your medical record. 10. Click the Download Summary menu link to download a portable copy of your medical information. If you have questions, please visit the Frequently Asked Questions section of the [employer] website. Remember, [employer] is NOT to be used for urgent needs. For medical emergencies, dial 911. Now available from your iPhone and Android! Providers Seen During Your Hospitalization Provider Specialty Primary office phone Dawson Tamez DO Emergency Medicine [phone] Mercedes Araujo MD Internal Medicine [phone] Your Primary Care Physician (PCP) Primary Care Physician Office Phone Office Fax OTHER, PHYS ** None ** ** None ** You are allergic to the following Allergen Reactions Tetracycline Hives Recent Documentation Height Weight BMI Smoking Status  1.803 m 81.6 kg 25.1 kg/m2 Current Every Day Smoker Emergency Contacts Name Discharge Info Relation Home Work Mobile Marisabel Willingham DISCHARGE CAREGIVER [3] Spouse [3] [phone] Patient Belongings The following personal items are in your possession at time of discharge: 
  Dental Appliances: None         Home Medications: None      Clothing: At bedside Please provide this summary of care documentation to your next provider. Signatures-by signing, you are acknowledging that this After Visit Summary has been reviewed with you and you have received a copy. Patient Signature:  ____________________________________________________________ Date:  ____________________________________________________________  
  
[facility] Sleeper Provider Signature:  ____________________________________________________________ Date:  ____________________________________________________________

## 2018-01-21 PROBLEM — Z86.718 HISTORY OF DVT (DEEP VEIN THROMBOSIS): Chronic | Status: ACTIVE | Noted: 2017-07-13

## 2018-01-21 PROBLEM — R45.851 SUICIDAL IDEATION: Status: ACTIVE | Noted: 2018-01-21

## 2018-01-21 LAB
ALBUMIN SERPL-MCNC: 2.2 G/DL (ref 3.5–5)
ALBUMIN/GLOB SERPL: 0.5 {RATIO} (ref 1.1–2.2)
ALP SERPL-CCNC: 78 U/L (ref 45–117)
ALT SERPL-CCNC: 9 U/L (ref 12–78)
ANION GAP SERPL CALC-SCNC: 6 MMOL/L (ref 5–15)
AST SERPL-CCNC: 19 U/L (ref 15–37)
BILIRUB DIRECT SERPL-MCNC: 0.1 MG/DL (ref 0–0.2)
BILIRUB SERPL-MCNC: 0.3 MG/DL (ref 0.2–1)
BUN SERPL-MCNC: 26 MG/DL (ref 6–20)
BUN/CREAT SERPL: 10 (ref 12–20)
CALCIUM SERPL-MCNC: 8.1 MG/DL (ref 8.5–10.1)
CHLORIDE SERPL-SCNC: 108 MMOL/L (ref 97–108)
CHOLEST SERPL-MCNC: 197 MG/DL
CK MB CFR SERPL CALC: 4.3 % (ref 0–2.5)
CK MB SERPL-MCNC: 2.6 NG/ML (ref 5–25)
CK SERPL-CCNC: 61 U/L (ref 39–308)
CO2 SERPL-SCNC: 27 MMOL/L (ref 21–32)
CREAT SERPL-MCNC: 2.49 MG/DL (ref 0.7–1.3)
ERYTHROCYTE [DISTWIDTH] IN BLOOD BY AUTOMATED COUNT: 16 % (ref 11.5–14.5)
EST. AVERAGE GLUCOSE BLD GHB EST-MCNC: NORMAL MG/DL
GLOBULIN SER CALC-MCNC: 4.1 G/DL (ref 2–4)
GLUCOSE BLD STRIP.AUTO-MCNC: 107 MG/DL (ref 65–100)
GLUCOSE SERPL-MCNC: 100 MG/DL (ref 65–100)
HBA1C MFR BLD: 4.5 % (ref 4.2–6.3)
HCT VFR BLD AUTO: 27.8 % (ref 36.6–50.3)
HDLC SERPL-MCNC: 49 MG/DL
HDLC SERPL: 4 {RATIO} (ref 0–5)
HGB BLD-MCNC: 9.4 G/DL (ref 12.1–17)
LDLC SERPL CALC-MCNC: 127.2 MG/DL (ref 0–100)
LIPID PROFILE,FLP: ABNORMAL
MAGNESIUM SERPL-MCNC: 1.6 MG/DL (ref 1.6–2.4)
MCH RBC QN AUTO: 27.4 PG (ref 26–34)
MCHC RBC AUTO-ENTMCNC: 33.8 G/DL (ref 30–36.5)
MCV RBC AUTO: 81 FL (ref 80–99)
PHOSPHATE SERPL-MCNC: 2.9 MG/DL (ref 2.6–4.7)
PLATELET # BLD AUTO: 210 K/UL (ref 150–400)
POTASSIUM SERPL-SCNC: 3.8 MMOL/L (ref 3.5–5.1)
PROT SERPL-MCNC: 6.3 G/DL (ref 6.4–8.2)
RBC # BLD AUTO: 3.43 M/UL (ref 4.1–5.7)
SERVICE CMNT-IMP: ABNORMAL
SODIUM SERPL-SCNC: 141 MMOL/L (ref 136–145)
TRIGL SERPL-MCNC: 104 MG/DL (ref ?–150)
TROPONIN I SERPL-MCNC: 0.1 NG/ML
VLDLC SERPL CALC-MCNC: 20.8 MG/DL
WBC # BLD AUTO: 3.9 K/UL (ref 4.1–11.1)

## 2018-01-21 PROCEDURE — 82553 CREATINE MB FRACTION: CPT | Performed by: INTERNAL MEDICINE

## 2018-01-21 PROCEDURE — 80048 BASIC METABOLIC PNL TOTAL CA: CPT | Performed by: INTERNAL MEDICINE

## 2018-01-21 PROCEDURE — 80061 LIPID PANEL: CPT | Performed by: INTERNAL MEDICINE

## 2018-01-21 PROCEDURE — 84100 ASSAY OF PHOSPHORUS: CPT | Performed by: INTERNAL MEDICINE

## 2018-01-21 PROCEDURE — 82962 GLUCOSE BLOOD TEST: CPT

## 2018-01-21 PROCEDURE — 80076 HEPATIC FUNCTION PANEL: CPT | Performed by: INTERNAL MEDICINE

## 2018-01-21 PROCEDURE — 83735 ASSAY OF MAGNESIUM: CPT | Performed by: INTERNAL MEDICINE

## 2018-01-21 PROCEDURE — 74011250636 HC RX REV CODE- 250/636

## 2018-01-21 PROCEDURE — G8979 MOBILITY GOAL STATUS: HCPCS | Performed by: PHYSICAL THERAPIST

## 2018-01-21 PROCEDURE — 74011250636 HC RX REV CODE- 250/636: Performed by: INTERNAL MEDICINE

## 2018-01-21 PROCEDURE — 83036 HEMOGLOBIN GLYCOSYLATED A1C: CPT | Performed by: INTERNAL MEDICINE

## 2018-01-21 PROCEDURE — 97161 PT EVAL LOW COMPLEX 20 MIN: CPT

## 2018-01-21 PROCEDURE — 96374 THER/PROPH/DIAG INJ IV PUSH: CPT

## 2018-01-21 PROCEDURE — 36415 COLL VENOUS BLD VENIPUNCTURE: CPT | Performed by: INTERNAL MEDICINE

## 2018-01-21 PROCEDURE — 85027 COMPLETE CBC AUTOMATED: CPT | Performed by: INTERNAL MEDICINE

## 2018-01-21 PROCEDURE — 84484 ASSAY OF TROPONIN QUANT: CPT | Performed by: INTERNAL MEDICINE

## 2018-01-21 PROCEDURE — 74011250637 HC RX REV CODE- 250/637: Performed by: PSYCHIATRY & NEUROLOGY

## 2018-01-21 PROCEDURE — G8978 MOBILITY CURRENT STATUS: HCPCS | Performed by: PHYSICAL THERAPIST

## 2018-01-21 PROCEDURE — 74011250637 HC RX REV CODE- 250/637: Performed by: INTERNAL MEDICINE

## 2018-01-21 PROCEDURE — 65660000000 HC RM CCU STEPDOWN

## 2018-01-21 PROCEDURE — 97530 THERAPEUTIC ACTIVITIES: CPT

## 2018-01-21 RX ORDER — HALOPERIDOL 5 MG/ML
INJECTION INTRAMUSCULAR
Status: COMPLETED
Start: 2018-01-21 | End: 2018-01-21

## 2018-01-21 RX ORDER — MIRTAZAPINE 15 MG/1
7.5 TABLET, FILM COATED ORAL
Status: DISCONTINUED | OUTPATIENT
Start: 2018-01-21 | End: 2018-01-24 | Stop reason: HOSPADM

## 2018-01-21 RX ORDER — HALOPERIDOL 5 MG/ML
5 INJECTION INTRAMUSCULAR
Status: ACTIVE | OUTPATIENT
Start: 2018-01-21 | End: 2018-01-22

## 2018-01-21 RX ADMIN — Medication 10 ML: at 22:30

## 2018-01-21 RX ADMIN — DIPHENHYDRAMINE HYDROCHLORIDE 12.5 MG: 50 INJECTION, SOLUTION INTRAMUSCULAR; INTRAVENOUS at 01:42

## 2018-01-21 RX ADMIN — HYDROCODONE BITARTRATE AND ACETAMINOPHEN 1 TABLET: 5; 325 TABLET ORAL at 19:33

## 2018-01-21 RX ADMIN — DOCUSATE SODIUM 100 MG: 100 CAPSULE, LIQUID FILLED ORAL at 09:16

## 2018-01-21 RX ADMIN — MIRTAZAPINE 7.5 MG: 15 TABLET, FILM COATED ORAL at 19:30

## 2018-01-21 RX ADMIN — NITROGLYCERIN 1 INCH: 20 OINTMENT TOPICAL at 22:22

## 2018-01-21 RX ADMIN — HYDRALAZINE HYDROCHLORIDE 20 MG: 20 INJECTION INTRAMUSCULAR; INTRAVENOUS at 11:13

## 2018-01-21 RX ADMIN — HEPARIN SODIUM 5000 UNITS: 5000 INJECTION, SOLUTION INTRAVENOUS; SUBCUTANEOUS at 22:29

## 2018-01-21 RX ADMIN — ONDANSETRON 4 MG: 2 INJECTION INTRAMUSCULAR; INTRAVENOUS at 01:44

## 2018-01-21 RX ADMIN — HYDROCODONE BITARTRATE AND ACETAMINOPHEN 1 TABLET: 5; 325 TABLET ORAL at 01:43

## 2018-01-21 RX ADMIN — HYDRALAZINE HYDROCHLORIDE 20 MG: 20 INJECTION INTRAMUSCULAR; INTRAVENOUS at 22:29

## 2018-01-21 RX ADMIN — HYDROCODONE BITARTRATE AND ACETAMINOPHEN 1 TABLET: 5; 325 TABLET ORAL at 06:31

## 2018-01-21 RX ADMIN — NITROGLYCERIN 1 INCH: 20 OINTMENT TOPICAL at 09:16

## 2018-01-21 RX ADMIN — HALOPERIDOL LACTATE 5 MG: 5 INJECTION, SOLUTION INTRAMUSCULAR at 16:59

## 2018-01-21 RX ADMIN — CARVEDILOL 3.12 MG: 3.12 TABLET, FILM COATED ORAL at 09:16

## 2018-01-21 RX ADMIN — AMLODIPINE BESYLATE 10 MG: 5 TABLET ORAL at 19:30

## 2018-01-21 RX ADMIN — ATORVASTATIN CALCIUM 40 MG: 20 TABLET, FILM COATED ORAL at 19:32

## 2018-01-21 RX ADMIN — HEPARIN SODIUM 5000 UNITS: 5000 INJECTION, SOLUTION INTRAVENOUS; SUBCUTANEOUS at 06:32

## 2018-01-21 RX ADMIN — CARVEDILOL 3.12 MG: 3.12 TABLET, FILM COATED ORAL at 19:33

## 2018-01-21 RX ADMIN — HYDRALAZINE HYDROCHLORIDE 50 MG: 25 TABLET, FILM COATED ORAL at 19:32

## 2018-01-21 RX ADMIN — FERROUS SULFATE TAB 325 MG (65 MG ELEMENTAL FE) 325 MG: 325 (65 FE) TAB at 09:16

## 2018-01-21 RX ADMIN — HYDRALAZINE HYDROCHLORIDE 50 MG: 25 TABLET, FILM COATED ORAL at 09:16

## 2018-01-21 RX ADMIN — HYDROCODONE BITARTRATE AND ACETAMINOPHEN 1 TABLET: 5; 325 TABLET ORAL at 09:27

## 2018-01-21 NOTE — H&P
Westley Avila [facility] [place] [address] 
[address] [facility], [place], [address] [place] [address] 
[phone] Admission History and Physical 
 
 
NAME:  Pérez Ward :   1940 MRN:  [id number] PCP:  Sharla Bernabe MD  
 
Date/Time:  2018 Subjective: CHIEF COMPLAINT: chest pain HISTORY OF PRESENT ILLNESS:    
The patient is a 77 yo hx of HTN, DM, CAD, PVD s/p amputation, DVT s/p IVC, CKD 3, presented w/ chest pain, HTN urgency, CHF. The patient is a poor historian. He ran of mediations for several weeks and began to have L sided chest pain last week. The pain is sharp, associated with dyspnea, but non-radiating. No cough, fevers, chills, nausea, vomiting, diaphoresis. In the ED, Trop was 0.09. CXR with bilateral edema with CM. SBP ~220. Allergies Allergen Reactions  Tetracycline Hives Prior to Admission medications Medication Sig Start Date End Date Taking? Authorizing Provider  
carvedilol (COREG) 3.125 mg tablet Take 1 Tab by mouth two (2) times daily (with meals). 17   Soha García MD  
ferrous sulfate 325 mg (65 mg iron) tablet Take 1 Tab by mouth two (2) times daily (with meals). 17   Soha García MD  
hydrALAZINE (APRESOLINE) 50 mg tablet Take 1 Tab by mouth three (3) times daily. 17   Soha García MD  
polyethylene glycol [facility]) 17 gram packet Take 1 Packet by mouth daily. 17   Soha García MD  
atorvastatin (LIPITOR) 40 mg tablet Take 40 mg by mouth daily. Phys MD Timmy  
aspirin delayed-release 81 mg tablet Take 81 mg by mouth daily. Phys MD Timmy  
nortriptyline (PAMELOR) 25 mg capsule Take 25 mg by mouth nightly. Indications: Pain    Historical Provider  
amLODIPine (NORVASC) 10 mg tablet Take 10 mg by mouth daily. Historical Provider Past Medical History:  
Diagnosis Date  Arthritis  CAD (coronary artery disease)   CKD (chronic kidney disease), stage III   
 DM type 2 causing renal disease ([facility] [address]  Gait abnormality  Heart murmur  Hepatitis C   
 History of blood transfusion  Hypercholesterolemia  Hypertension  Hypertension 11/7/2014  Neuropathy  Peripheral vascular disease ([facility] [address] 11/7/2014  
 A. S/P Right SFA POBA and tibial atherectomy (2/4/13).  PUD (peptic ulcer disease) 2007  Unspecified sleep apnea   
 never used cpap Past Surgical History:  
Procedure Laterality Date [address] UNLISTED  2007  
 has approx 7-8\" midline incision on abdomen--\"had to open him up and clean him out after feeding tube clogged\"  HX GI  2007  
 feeding tube for approx 2 mo  VASCULAR SURGERY PROCEDURE UNLIST Right 1/22/2015  
 popliteal-tibial bypass Social History Substance Use Topics  Smoking status: Current Every Day Smoker Packs/day: 0.50  Smokeless tobacco: Not on file  Alcohol use No  
  
 
Family History Problem Relation Age of Onset  Hypertension Father Review of Systems: 
(bold if positive, if negative) Gen:  Eyes:  ENT:  CVS:  chest painPulm:  h, dyspnea,GI:   
:   
MS:  Skin:  Psych:  Endo:   
Hem:  Renal:   
Neuro:    
 
  
Objective: VITALS:   
Vital signs reviewed; most recent are: 
 
Visit Vitals  BP (!) 221/114  Pulse 73  Temp 98.9 °F (37.2 °C)  Resp 14  
 Ht 5' 11\" (1.803 m)  Wt 95.3 kg (210 lb)  SpO2 98%  BMI 29.29 kg/m2 SpO2 Readings from Last 6 Encounters:  
01/20/18 98% 07/20/17 100% 09/08/15 98% 08/28/15 100% 01/26/15 99% 01/19/15 98% No intake or output data in the 24 hours ending 01/20/18 2031 Exam:  
 
Physical Exam: 
 
Gen:  Disheveled, elderly, frail, NAD HEENT:  Pink conjunctivae, PERRL, hearing intact to voice, moist mucous membranes Neck:  Supple, without masses, thyroid non-tender Resp:  No accessory muscle use, scattered crackles Card:  Tiago Kohler, no murmurs, normal S1, S2 without thrills, bruits or peripheral edema Abd:  Soft, non-tender, non-distended, normoactive bowel sounds are present Lymph:  No cervical adenopathy Musc:  No cyanosis or clubbing, R BKA, L AKA Skin:  No rashes Neuro:  Cranial nerves 3-12 are grossly intact, follows commands appropriately Psych:  Alert with poor insight. Oriented to person, place, and time Labs: 
 
Recent Labs  
   01/20/18 
 1845 WBC  3.8* HGB  10.4* HCT  30.7* PLT  237 Recent Labs  
   01/20/18 
 1845 NA  144  
K  3.5 CL  108 CO2  28  
GLU  82 BUN  24* CREA  2.48* CA  8.4* MG  1.7 ALB  2.4* TBILI  0.4 SGOT  19 ALT  9* Lab Results Component Value Date/Time Glucose (POC) 88 01/20/2018 05:59 PM  
 Glucose (POC) 97 07/20/2017 09:23 PM  
 
No results for input(s): PH, PCO2, PO2, HCO3, FIO2 in the last 72 hours. No results for input(s): INR in the last 72 hours. No lab exists for component: INREXT Radiology and EKG reviewed:   CXR with IE 
 
**Old Records reviewed in [facility]** Assessment/Plan:   
  
Principal Problem: 
 
77 yo hx of HTN, DM, CAD, PVD s/p amputation, DVT s/p IVC, CKD 3, presented w/ chest pain, HTN urgency, CHF 1) Hypertensive urgency, malignant: due to medical non-compliance. Pharmacy to discuss medications. Will monitor on Tele. Start IV hydralazine prn, nitro paste. Resume home norvasc, coreg, oral hydralazine. Educated patient on non-compliance 2) Chest pain/elevated Trop: mild elevation, likely due to strain rather than true ACS. Will monitor on tele. Check serial enzymes. Cont ASA, nitro, O2, BB, statin. Consult Cards to eval 
 
3) Acute diastolic CHF: likely due to HTN. Will check echo. Control blood pressure. Hold diuretics 4) Bradycardia: seems chronic. Will monitor 5) CAD/PVD: s/p R BKA, L AKA. Cont ASA, statin 6) DM type 2 w/ renal complications: not on meds. Check A1C, start SSI 7) Bilateral LE pain/neuropathic pain: chronic, start opiates prn. Might benefit from neurontin 8) CKD 3: seems at baseline, will monitor Code: Full Risk of deterioration: high Total time spent with patient: 70 Minutes Care Plan discussed with: Patient, nursing Discussed:  Care Plan Prophylaxis:  Hep SQ Probable Disposition:  Home w/Family 
        
___________________________________________________ Attending Physician: Delonte Ravi MD

## 2018-01-21 NOTE — PROGRESS NOTES
Westley Avila karissa [place] 81 
[address] [facility], [address] [place] [address] 
[phone] Medical Progress Note NAME: Cele Hendricks :  1940 MRM:  [id number] Date/Time: 2018  10:08 AM 
 
 
  
Subjective: Chief Complaint:  Chest pain: constant, mild to moderate, sore, left sided ROS: 
(bold if positive, if negative) Tolerating PT  Tolerating Diet Objective:  
 
 
Vitals:  
 
 
  
Last 24hrs VS reviewed since prior progress note. Most recent are: 
 
Visit Vitals  /90  Pulse 69  Temp 98.2 °F (36.8 °C)  Resp 17  Ht 5' 11\" (1.803 m)  Wt 82.6 kg (182 lb)  SpO2 95%  BMI 25.38 kg/m2 SpO2 Readings from Last 6 Encounters:  
18 95% 17 100% 09/08/15 98% 08/28/15 100% 01/26/15 99% 01/19/15 98% Intake/Output Summary (Last 24 hours) at 18 1008 Last data filed at 18 8325 Gross per 24 hour Intake              250 ml Output              200 ml Net               50 ml Exam:  
 
Physical Exam: 
 
Gen:  Well-developed, well-nourished, elderly, in no acute distress HEENT:  Pink conjunctivae, PERRL, hearing intact to voice, moist mucous membranes Neck:  Supple, without masses, thyroid non-tender Resp:  No accessory muscle use, clear breath sounds without wheezes rales or rhonchi 
Card:  No murmurs, normal S1, S2 without thrills, bruits or peripheral edema Abd:  Soft, non-tender, non-distended, normoactive bowel sounds are present, no palpable organomegaly and no detectable hernias Lymph:  No cervical or inguinal adenopathy Musc:  No cyanosis or clubbing Skin:  No rashes or ulcers, skin turgor is good Neuro:  Cranial nerves are grossly intact, no focal motor weakness, follows commands appropriately Psych:  Fair insight, oriented to person, place and time, alert Telemetry reviewed:   normal sinus rhythm Medications Reviewed: (see below) Lab Data Reviewed: (see below) 
 
______________________________________________________________________ Medications:  
 
Current Facility-Administered Medications Medication Dose Route Frequency  hydrALAZINE (APRESOLINE) 20 mg/mL injection 20 mg  20 mg IntraVENous Q6H PRN  
 nitroglycerin (NITROBID) 2 % ointment 1 Inch  1 Inch Topical BID  amLODIPine (NORVASC) tablet 10 mg  10 mg Oral DAILY  aspirin delayed-release tablet 81 mg  81 mg Oral DAILY  atorvastatin (LIPITOR) tablet 40 mg  40 mg Oral DAILY  carvedilol (COREG) tablet 3.125 mg  3.125 mg Oral BID WITH MEALS  ferrous sulfate tablet 325 mg  325 mg Oral BID WITH MEALS  
 hydrALAZINE (APRESOLINE) tablet 50 mg  50 mg Oral TID  sodium chloride (NS) flush 5-10 mL  5-10 mL IntraVENous Q8H  
 sodium chloride (NS) flush 5-10 mL  5-10 mL IntraVENous PRN  
 acetaminophen (TYLENOL) tablet 650 mg  650 mg Oral Q4H PRN  
 HYDROcodone-acetaminophen (NORCO) 5-325 mg per tablet 1 Tab  1 Tab Oral Q4H PRN  
 HYDROmorphone (DILAUDID) injection 0.5 mg  0.5 mg IntraVENous Q4H PRN  
 naloxone (NARCAN) injection 0.4 mg  0.4 mg IntraVENous PRN  
 diphenhydrAMINE (BENADRYL) injection 12.5 mg  12.5 mg IntraVENous Q4H PRN  
 ondansetron (ZOFRAN) injection 4 mg  4 mg IntraVENous Q6H PRN  
 docusate sodium (COLACE) capsule 100 mg  100 mg Oral BID  nicotine (NICODERM CQ) 21 mg/24 hr patch 1 Patch  1 Patch TransDERmal Q24H  
 heparin (porcine) injection 5,000 Units  5,000 Units SubCUTAneous Q8H  
 insulin lispro (HUMALOG) injection   SubCUTAneous QID WITH MEALS  glucose chewable tablet 16 g  4 Tab Oral PRN  
 dextrose (D50W) injection syrg 12.5-25 g  12.5-25 g IntraVENous PRN  
 glucagon (GLUCAGEN) injection 1 mg  1 mg IntraMUSCular PRN Lab Review:  
 
Recent Labs  
   01/21/18 0221 01/20/18 
 1845 WBC  3.9*  3.8* HGB  9.4*  10.4* HCT  27.8*  30.7* PLT  210  237 Recent Labs  
   01/21/18 0221 01/20/18 
 1845 NA 141  144  
K  3.8  3.5 CL  108  108 CO2  27  28 GLU  100  82 BUN  26*  24* CREA  2.49*  2.48* CA  8.1*  8.4* MG  1.6  1.7 PHOS  2.9   --   
ALB  2.2*  2.4* TBILI  0.3  0.4 SGOT  19  19 ALT  9*  9* Lab Results Component Value Date/Time Glucose (POC) 107 01/21/2018 07:07 AM  
 Glucose (POC) 88 01/20/2018 05:59 PM  
 Glucose (POC) 97 07/20/2017 09:23 PM  
 Glucose (POC) 112 07/20/2017 04:40 PM  
 Glucose (POC) 132 07/20/2017 11:33 AM  
 
No results for input(s): PH, PCO2, PO2, HCO3, FIO2 in the last 72 hours. No results for input(s): INR in the last 72 hours. No lab exists for component: INREXT No results found for: SDES Lab Results Component Value Date/Time Culture result: MRSA NOT PRESENT 01/19/2015 10:44 AM  
 Culture result:  01/19/2015 10:44 AM  
      Screening of patient nares for MRSA is for surveillance purposes and, if positive, to facilitate isolation considerations in high risk settings. It is not intended for automatic decolonization interventions per se as regimens are not sufficiently effective to warrant routine use. Assessment:  
 
Principal Problem: Hypertensive urgency, malignant (1/20/2018) Active Problems: 
  CKD (chronic kidney disease), stage III (9/5/2015) DM type 2 causing renal disease ([facility] [address] (9/5/2015) Anemia (9/5/2015) CAD (coronary artery disease) () Chest pain at rest (1/20/2018) Acute diastolic CHF (congestive heart failure) ([facility] [address] (1/20/2018) Suicidal ideation (1/21/2018) Plan:  
 
Principal Problem: Hypertensive urgency, malignant (1/20/2018) - due to medical non-compliance - BP okay on current meds as above Active Problems: 
  CKD (chronic kidney disease), stage III (9/5/2015) - creatinine slightly above baseline, mild DANY POA likely due to HTN urgency DM type 2 causing renal disease ([facility] [address] (9/5/2015) - with renal disease, DM 2 causing CKD 3 
 - BS okay on meds as above Anemia (9/5/2015) 
 - follow Hgb 
 
  CAD (coronary artery disease) ()/Chest pain at rest (1/20/2018) - chest pain with elevated troponin, likely due to demand ischemia related to HTN 
 - troponin flat, NOT c/w AMI 
 - Cardiology to see 
 - control BP Acute diastolic CHF (congestive heart failure) ([facility] [address] (1/20/2018) - due to HTN  
 - improved with BP control 
 - echo ordered Suicidal ideation (1/21/2018) - denies currently, reported by Nursing earlier 
 - consult Psychiatry, on suicide precautions now Total time spent with patient: 35 minutes Care Plan discussed with: Patient and Nursing Staff Discussed:  Code Status, Care Plan and D/C Planning Prophylaxis:  Hep SQ Disposition:  TBD 
        
___________________________________________________ Attending Physician: Esther Sevilla MD

## 2018-01-21 NOTE — PROGRESS NOTES
Problem: Mobility Impaired (Adult and Pediatric) Goal: *Acute Goals and Plan of Care (Insert Text) Physical Therapy Goals Initiated 1/21/2018 1. Patient will move from supine to sit and sit to supine , scoot up and down and roll side to side in bed with independence within 7 day(s). 2.  Patient will transfer from bed to chair and chair to bed with minimal assistance/contact guard assist using the sliding board or least restrictive device within 7 day(s). physical Therapy EVALUATION Patient: Harjit Weiner (89 y.o. male) Date: 1/21/2018 Primary Diagnosis: Hypertensive urgency, malignant Precautions: Right BKA.; Left AKA 1017 ASSESSMENT : 
Based on the objective data described below, the patient presents with difficulty with transfer and functional mobility. Patient was last discharge from this hospital July 2017 and was walking short distance with rolling walker. Per chart reviewed patient had amputation 10/17 on both LE patient was living at home and fiances assisting. Patient poor historian and was hostile earlier today  Trying to hit staff and refusing all meds. Patient more cooperative this afternoon and transferred to a recliner with max assist x 2. Activated chair alarm nurse assisting with transfer. Patient will benefits with sliding board transfer to and from the chair and recommend  Nurse to call lift team for assistance with transferring to and from the recliner with side rail removed to slide in and out easily nurse agreed. Patient will benefit from skilled intervention to address the above impairments. Patients rehabilitation potential is considered to be Excellent Factors which may influence rehabilitation potential include:  
[]         None noted 
[x]         Mental ability/status [x]         Medical condition 
[x]         Home/family situation and support systems 
[x]         Safety awareness 
[]         Pain tolerance/management 
[]         Other: PLAN : Recommendations and Planned Interventions: 
[x]           Bed Mobility Training             []    Neuromuscular Re-Education 
[x]           Transfer Training                   []    Orthotic/Prosthetic Training 
[]           Gait Training                         []    Modalities [x]           Therapeutic Exercises           []    Edema Management/Control 
[x]           Therapeutic Activities            []    Patient and Family Training/Education 
[]           Other (comment): Frequency/Duration: Patient will be followed by physical therapy  3 times a week to address goals. Discharge Recommendations: Diallo Young Further Equipment Recommendations for Discharge: TBD SUBJECTIVE:  
Patient stated ok.  OBJECTIVE DATA SUMMARY:  
HISTORY:   
Past Medical History:  
Diagnosis Date  Arthritis  CAD (coronary artery disease) 2007  CKD (chronic kidney disease), stage III   
 DM type 2 causing renal disease ([facility] [address]  DVT (deep venous thrombosis) ([facility] [address] Hx of DVT:  Xarelto stopped due to GI bleed. S/P IVC filter.  Gait abnormality  GI bleed  Heart murmur  Hepatitis C   
 History of blood transfusion  Hypercholesterolemia  Hypertension 11/7/2014  Neuropathy  Non compliance w medication regimen  Peripheral vascular disease ([facility] [address] 11/7/2014  
 A. S/P Right SFA POBA and tibial atherectomy (2/4/13).  PUD (peptic ulcer disease) 2007  Unspecified sleep apnea   
 never used cpap Past Surgical History:  
Procedure Laterality Date [address] [place] [address] UNLISTED  2007  
 has approx 7-8\" midline incision on abdomen--\"had to open him up and clean him out after feeding tube clogged\"  HX GI  2007  
 feeding tube for approx 2 mo  VASCULAR SURGERY PROCEDURE UNLIST Right 1/22/2015  
 popliteal-tibial bypass Prior Level of Function/Home Situation: patient poor historian per chart review patient lives with fiance Personal factors and/or comorbidities impacting plan of care:  
 
Home Situation Living Alone: No 
Support Systems: Spouse/Significant Other/Partner Patient Expects to be Discharged to[de-identified] Private residence Current DME Used/Available at Home: Wheelchair EXAMINATION/PRESENTATION/DECISION MAKING:  
Critical Behavior: 
  
  
  
  
Hearing: Auditory Auditory Impairment: None Range Of Motion: 
AROM: Generally decreased, functional 
  
  
  
PROM: Generally decreased, functional 
  
  
  
Strength:   
Strength: Generally decreased, functional 
  
  
  
  
  
  
Tone & Sensation:  
  
  
  
  
  
  
  
  
  
   
Coordination: 
Coordination: Generally decreased, functional 
Vision:  
  
Functional Mobility: 
Bed Mobility: 
Rolling: Minimum assistance Supine to Sit: Minimum assistance Sit to Supine: Minimum assistance Scooting: Minimum assistance Transfers: 
  
  
     
  
     
Sliding Board : Assist x2 Balance:  
Sitting: Intact; High guard Ambulation/Gait Training: 
  
  
  
  
Gait Description (WDL):  (non ambualtory right BKA, left AKA) Functional Measure: 
Barthel Index: 
 
Bathin Bladder: 10 Bowels: 10 
Groomin Dressin Feeding: 10 Mobility: 0 Stairs: 0 Toilet Use: 0 Transfer (Bed to Chair and Back): 0 Total: 35 Barthel and G-code impairment scale: 
Percentage of impairment CH 
0% CI 
1-19% CJ 
20-39% CK 
40-59% CL 
60-79% CM 
80-99% CN 
100% Barthel Score 0-100 100 99-80 79-60 59-40 20-39 1-19 
 0 Barthel Score 0-20 20 17-19 13-16 9-12 5-8 1-4 0 The Barthel ADL Index: Guidelines 1. The index should be used as a record of what a patient does, not as a record of what a patient could do. 2. The main aim is to establish degree of independence from any help, physical or verbal, however minor and for whatever reason. 3. The need for supervision renders the patient not independent. 4. A patient's performance should be established using the best available evidence.  Asking the patient, friends/relatives and nurses are the usual sources, but direct observation and common sense are also important. However direct testing is not needed. 5. Usually the patient's performance over the preceding 24-48 hours is important, but occasionally longer periods will be relevant. 6. Middle categories imply that the patient supplies over 50 per cent of the effort. 7. Use of aids to be independent is allowed. Meredith Santillan., Barthel, DJOSELINE (2386). Functional evaluation: the Barthel Index. [address] [facility] (14)2. Norval Low avril [place], J.J.M.F, Maira Aguero., Tammy Benjamin., [place], [address] (1999). Measuring the change indisability after inpatient rehabilitation; comparison of the responsiveness of the Barthel Index and Functional [place] Measure. Journal of Neurology, Neurosurgery, and Psychiatry, 66(4), 763-453. Vanessa Naidu, NPavanJ.MARY, RYAN Wilkerson, & Emmanuel Lorenzo MFARZAD. (2004.) Assessment of post-stroke quality of life in cost-effectiveness studies: The usefulness of the Barthel Index and the EuroQoL-5D. [facility], 61, 263-57 G codes: In compliance with CMSs Claims Based Outcome Reporting, the following G-code set was chosen for this patient based on their primary functional limitation being treated: The outcome measure chosen to determine the severity of the functional limitation was the barthel with a score of 35/100 which was correlated with the impairment scale. ? Mobility - Walking and Moving Around:  
  - CURRENT STATUS: CL - 60%-79% impaired, limited or restricted  - GOAL STATUS: CK - 40%-59% impaired, limited or restricted  - D/C STATUS:  ---------------To be determined--------------- Physical Therapy Evaluation Charge Determination History Examination Presentation Decision-Making HIGH Complexity :3+ comorbidities / personal factors will impact the outcome/ POC  HIGH Complexity : 4+ Standardized tests and measures addressing body structure, function, activity limitation and / or participation in recreation  HIGH Complexity : Unstable and unpredictable characteristics  Other outcome measures barthel  HIGH Based on the above components, the patient evaluation is determined to be of the following complexity level: HIGH Pain: 
Pain Scale 1: Numeric (0 - 10) Pain Intensity 1: 3 Pain Location 1: Chest 
Pain Orientation 1: Right Pain Description 1: Patrecia Bangor Pain Intervention(s) 1: Medication (see MAR) Activity Tolerance:  
Good. Please refer to the flowsheet for vital signs taken during this treatment. After treatment:  
[x]         Patient left in no apparent distress sitting up in chair 
[]         Patient left in no apparent distress in bed 
[x]         Call bell left within reach [x]         Nursing notified 
[]         Caregiver present [x]         Chair alarm activated COMMUNICATION/EDUCATION:  
The patients plan of care was discussed with: Registered Nurse and patient'. [x]         Fall prevention education was provided and the patient/caregiver indicated understanding. [x]         Patient/family have participated as able in goal setting and plan of care. [x]         Patient/family agree to work toward stated goals and plan of care. []         Patient understands intent and goals of therapy, but is neutral about his/her participation. []         Patient is unable to participate in goal setting and plan of care. Thank you for this referral. 
[facility]. Time Calculation: 25 mins

## 2018-01-21 NOTE — ROUTINE PROCESS
Primary Nurse Angelina Ward RN and Julianna Todd., RN performed a dual skin assessment on this patient No impairment noted Rambo score is 19

## 2018-01-21 NOTE — PROGRESS NOTES
[facility]: MED RECONCILIATION Information obtained from: Patient Comment: Talked to the patient. He states he only goes to [facility]. [place]. He has not filled anything since June 2017. There were lots of prescription on hold at the pharmacy (means he has not picked up). When asked patient about the reason for not taking meds, he did not reply anything. Allergies: Tetracycline [address] [place], [place]   Contact: [phone]

## 2018-01-21 NOTE — PROGRESS NOTES
Samson Saunders MD. [facility] [place] Patient: Josue Vidal : 1940 Today's Date: 2018 HISTORY OF PRESENT ILLNESS:  
 
History of Present Illness: 
Reason for consult: chest pain, elevated troponin Mr. Jerald Zamorano would not speak to me at all and history obtained from the chart. He has been hostile to nurses and refusing medications. He would just nod his head yes or no to some questions. History obtained from the chart. \"Mr. Jerald Zamorano is a 78 y.o. male with past medical history significant for HTN, high cholesterol, neuropathy, PVD, PUD , hepatitis C,  DM, CKD, CAD, BKA, and AKA who presents from home via EMS with chief complaint of CP. The pt c/o CP and B/L leg pain at the sites of his amputations. The pt is a poor historian. The pt was admitted for his amputation in 10/2017 and reports that he was not sent home with medications. The pt reports that he has not taken his medications since his discharge. The pt claims he called his PCP office and they \"would not give me more medications. \" The pt has not made a F/U appointment and did not take pain medications PTA. \" \"He ran of mediations for several weeks and began to have L sided chest pain last week. The pain is sharp, associated with dyspnea, but non-radiating. No cough, fevers, chills, nausea, vomiting, diaphoresis. In the ED, Trop was 0.09. CXR with bilateral edema with CM. SBP ~220. \" PAST MEDICAL HISTORY:  
 
Past Medical History:  
Diagnosis Date  Arthritis  CAD (coronary artery disease)   CKD (chronic kidney disease), stage III   
 DM type 2 causing renal disease ([facility]  DVT (deep venous thrombosis) ([facility] Hx of DVT:  Xarelto stopped due to GI bleed. S/P IVC filter.  Gait abnormality  GI bleed  Heart murmur  Hepatitis C   
 History of blood transfusion  Hypercholesterolemia  Hypertension 2014  Neuropathy  Non compliance w medication regimen  Peripheral vascular disease ([facility] 11/7/2014  
 A. S/P Right SFA POBA and tibial atherectomy (2/4/13).  PUD (peptic ulcer disease) 2007  Unspecified sleep apnea   
 never used cpap Past Surgical History:  
Procedure Laterality Date [address] UNLISTED  2007  
 has approx 7-8\" midline incision on abdomen--\"had to open him up and clean him out after feeding tube clogged\"  HX GI  2007  
 feeding tube for approx 2 mo  VASCULAR SURGERY PROCEDURE UNLIST Right 1/22/2015  
 popliteal-tibial bypass MEDICATIONS:  
 
 
BSI: MED RECONCILIATION by Pharmacy Information obtained from: Patient Comment: Talked to the patient. He states he only goes to [facility] [address] [facility]. He has not filled anything since June 2017. There were lots of prescription on hold at the pharmacy (means he has not picked up). When asked patient about the reason for not taking meds, he did not reply anything. Current Facility-Administered Medications Medication Dose Route Frequency Provider Last Rate Last Dose  hydrALAZINE (APRESOLINE) 20 mg/mL injection 20 mg  20 mg IntraVENous Q6H PRN Ty SIDDIQUI Do, MD   20 mg at 01/21/18 1113  
 nitroglycerin (NITROBID) 2 % ointment 1 Inch  1 Inch Topical BID Ty SIDDIQUI Do, MD   1 Inch at 01/21/18 0916  
 amLODIPine (NORVASC) tablet 10 mg  10 mg Oral DAILY Ty SIDDIQUI Do, MD   10 mg at 01/20/18 2210  aspirin delayed-release tablet 81 mg  81 mg Oral DAILY Ty SIDDIQUI Do, MD   81 mg at 01/20/18 2211  
 atorvastatin (LIPITOR) tablet 40 mg  40 mg Oral DAILY Ty SIDDIQUI Do, MD   40 mg at 01/20/18 2212  carvedilol (COREG) tablet 3.125 mg  3.125 mg Oral BID WITH MEALS Ty SIDDIQUI Do, MD   3.125 mg at 01/21/18 5290  ferrous sulfate tablet 325 mg  325 mg Oral BID WITH MEALS Ty SIDDIQUI Do, MD   325 mg at 01/21/18 7663  hydrALAZINE (APRESOLINE) tablet 50 mg  50 mg Oral TID Bridgette Mckinley MD   50 mg at 01/21/18 6949  sodium chloride (NS) flush 5-10 mL  5-10 mL IntraVENous Q8H Ty SIDDIQUI Do, MD   10 mL at 01/20/18 2314  sodium chloride (NS) flush 5-10 mL  5-10 mL IntraVENous PRN Ty SIDDIQUI Do, MD      
 acetaminophen (TYLENOL) tablet 650 mg  650 mg Oral Q4H PRN Ty SIDDIQUI Do, MD      
 HYDROcodone-acetaminophen (NORCO) 5-325 mg per tablet 1 Tab  1 Tab Oral Q4H LUIS ANGEL SIDDIQUI Do, MD   1 Tab at 01/21/18 0927  
 HYDROmorphone (DILAUDID) injection 0.5 mg  0.5 mg IntraVENous Q4H PRMICHAEL SIDDIQUI Do, MD      
 naloxone (NARCAN) injection 0.4 mg  0.4 mg IntraVENous PRN Ty SIDDIQUI Do, MD      
 diphenhydrAMINE (BENADRYL) injection 12.5 mg  12.5 mg IntraVENous Q4H PRN Ty SIDDIQUI Do, MD   12.5 mg at 01/21/18 0142  ondansetron (ZOFRAN) injection 4 mg  4 mg IntraVENous Q6H PRN Ty SIDDIQUI Do, MD   4 mg at 01/21/18 0144  docusate sodium (COLACE) capsule 100 mg  100 mg Oral BID Ty SIDDIQUI Do, MD   100 mg at 01/21/18 0916  
 nicotine (NICODERM CQ) 21 mg/24 hr patch 1 Patch  1 Patch TransDERmal Q24H Ekta Medina MD   1 Patch at 01/20/18 2314  heparin (porcine) injection 5,000 Units  5,000 Units SubCUTAneous Q8H Ty SIDDIQUI Do, MD   5,000 Units at 01/21/18 2189  
 insulin lispro (HUMALOG) injection   SubCUTAneous QID WITH MEALS Ty SIDDIQUI Do, MD   Stopped at 01/20/18 2200  
 glucose chewable tablet 16 g  4 Tab Oral LUIS ANGEL SIDDIQUI Do, MD      
 dextrose (D50W) injection syrg 12.5-25 g  12.5-25 g IntraVENous PRMICHAEL SIDDIQUI Do, MD      
 glucagon (GLUCAGEN) injection 1 mg  1 mg IntraMUSCular LUIS ANGEL Medina MD      
 
 
 
Allergies Allergen Reactions  Tetracycline Hives SOCIAL HISTORY:  
 
Social History Substance Use Topics  Smoking status: Current Every Day Smoker Packs/day: 0.50  Smokeless tobacco: Not on file  Alcohol use No  
 
 
 
 
FAMILY HISTORY:  
 
Family History Problem Relation Age of Onset  Hypertension Father REVIEW OF SYMPTOMS:  
 
Review of Symptoms: 
Unable to obtain from patient since he does not respond to my questions PHYSICAL EXAM:  
 
Physical Exam: 
Visit Vitals  /90  Pulse 69  Temp 98.2 °F (36.8 °C)  Resp 17  Ht 5' 11\" (1.803 m)  Wt 182 lb (82.6 kg)  SpO2 96%  BMI 25.38 kg/m2 Patient resting comfortably in NAD HEENT:  Hearing intact, non-icteric, normocephalic, atraumatic. Neck Exam: Supple Lung Exam: Clear to auscultation, even breath sounds. Cardiac Exam: Regular rate and rhythm with no murmur Abdomen: Soft, non-tender Extremities: Moves all ext well. No lower extremity edema. + Right BKA, left AKA Psych: calm Neuro - Grossly intact LABS / OTHER STUDIES:  
 
 
Recent Results (from the past 24 hour(s)) GLUCOSE, POC Collection Time: 01/20/18  5:59 PM  
Result Value Ref Range Glucose (POC) 88 65 - 100 mg/dL Performed by Page Ibarra CBC WITH AUTOMATED DIFF Collection Time: 01/20/18  6:45 PM  
Result Value Ref Range WBC 3.8 (L) 4.1 - 11.1 K/uL  
 RBC 3.75 (L) 4.10 - 5.70 M/uL  
 HGB 10.4 (L) 12.1 - 17.0 g/dL HCT 30.7 (L) 36.6 - 50.3 % MCV 81.9 80.0 - 99.0 FL  
 MCH 27.7 26.0 - 34.0 PG  
 MCHC 33.9 30.0 - 36.5 g/dL  
 RDW 16.0 (H) 11.5 - 14.5 % PLATELET 372 037 - 471 K/uL NEUTROPHILS 50 32 - 75 % LYMPHOCYTES 41 12 - 49 % MONOCYTES 6 5 - 13 % EOSINOPHILS 3 0 - 7 % BASOPHILS 0 0 - 1 %  
 ABS. NEUTROPHILS 1.9 1.8 - 8.0 K/UL  
 ABS. LYMPHOCYTES 1.5 0.8 - 3.5 K/UL  
 ABS. MONOCYTES 0.2 0.0 - 1.0 K/UL  
 ABS. EOSINOPHILS 0.1 0.0 - 0.4 K/UL  
 ABS. BASOPHILS 0.0 0.0 - 0.1 K/UL METABOLIC PANEL, COMPREHENSIVE Collection Time: 01/20/18  6:45 PM  
Result Value Ref Range Sodium 144 136 - 145 mmol/L Potassium 3.5 3.5 - 5.1 mmol/L Chloride 108 97 - 108 mmol/L  
 CO2 28 21 - 32 mmol/L Anion gap 8 5 - 15 mmol/L Glucose 82 65 - 100 mg/dL BUN 24 (H) 6 - 20 MG/DL Creatinine 2.48 (H) 0.70 - 1.30 MG/DL  
 BUN/Creatinine ratio 10 (L) 12 - 20 GFR est AA 31 (L) >60 ml/min/1.73m2 GFR est non-AA 26 (L) >60 ml/min/1.73m2 Calcium 8.4 (L) 8.5 - 10.1 MG/DL Bilirubin, total 0.4 0.2 - 1.0 MG/DL  
 ALT (SGPT) 9 (L) 12 - 78 U/L  
 AST (SGOT) 19 15 - 37 U/L Alk. phosphatase 84 45 - 117 U/L Protein, total 7.5 6.4 - 8.2 g/dL Albumin 2.4 (L) 3.5 - 5.0 g/dL Globulin 5.1 (H) 2.0 - 4.0 g/dL A-G Ratio 0.5 (L) 1.1 - 2.2 MAGNESIUM Collection Time: 01/20/18  6:45 PM  
Result Value Ref Range Magnesium 1.7 1.6 - 2.4 mg/dL TROPONIN I Collection Time: 01/20/18  6:45 PM  
Result Value Ref Range Troponin-I, Qt. 0.09 (H) <0.05 ng/mL NT-PRO BNP Collection Time: 01/20/18  6:45 PM  
Result Value Ref Range NT pro-BNP >10481 (H) 0 - 450 PG/ML  
URINALYSIS W/ RFLX MICROSCOPIC Collection Time: 01/20/18  9:12 PM  
Result Value Ref Range Color YELLOW/STRAW Appearance CLEAR CLEAR Specific gravity 1.020 1.003 - 1.030    
 pH (UA) 6.0 5.0 - 8.0 Protein 300 (A) NEG mg/dL Glucose NEGATIVE  NEG mg/dL Ketone NEGATIVE  NEG mg/dL Bilirubin NEGATIVE  NEG Blood TRACE (A) NEG Urobilinogen 1.0 0.2 - 1.0 EU/dL Nitrites NEGATIVE  NEG Leukocyte Esterase NEGATIVE  NEG    
 WBC 0-4 0 - 4 /hpf  
 RBC 5-10 0 - 5 /hpf Epithelial cells FEW FEW /lpf Bacteria NEGATIVE  NEG /hpf Hyaline cast 0-2 0 - 5 /lpf DRUG SCREEN, URINE Collection Time: 01/20/18  9:12 PM  
Result Value Ref Range AMPHETAMINES NEGATIVE  NEG    
 BARBITURATES NEGATIVE  NEG BENZODIAZEPINES NEGATIVE  NEG    
 COCAINE NEGATIVE  NEG METHADONE NEGATIVE  NEG    
 OPIATES NEGATIVE  NEG    
 PCP(PHENCYCLIDINE) NEGATIVE  NEG    
 THC (TH-CANNABINOL) POSITIVE (A) NEG Drug screen comment (NOTE) METABOLIC PANEL, BASIC Collection Time: 01/21/18  2:21 AM  
Result Value Ref Range Sodium 141 136 - 145 mmol/L Potassium 3.8 3.5 - 5.1 mmol/L Chloride 108 97 - 108 mmol/L  
 CO2 27 21 - 32 mmol/L Anion gap 6 5 - 15 mmol/L Glucose 100 65 - 100 mg/dL BUN 26 (H) 6 - 20 MG/DL  Creatinine 2.49 (H) 0.70 - 1.30 MG/DL  
 BUN/Creatinine ratio 10 (L) 12 - 20 GFR est AA 31 (L) >60 ml/min/1.73m2 GFR est non-AA 25 (L) >60 ml/min/1.73m2 Calcium 8.1 (L) 8.5 - 10.1 MG/DL  
LIPID PANEL Collection Time: 01/21/18  2:21 AM  
Result Value Ref Range LIPID PROFILE Cholesterol, total 197 <200 MG/DL Triglyceride 104 <150 MG/DL  
 HDL Cholesterol 49 MG/DL  
 LDL, calculated 127.2 (H) 0 - 100 MG/DL VLDL, calculated 20.8 MG/DL  
 CHOL/HDL Ratio 4.0 0 - 5.0    
CBC W/O DIFF Collection Time: 01/21/18  2:21 AM  
Result Value Ref Range WBC 3.9 (L) 4.1 - 11.1 K/uL  
 RBC 3.43 (L) 4.10 - 5.70 M/uL HGB 9.4 (L) 12.1 - 17.0 g/dL HCT 27.8 (L) 36.6 - 50.3 % MCV 81.0 80.0 - 99.0 FL  
 MCH 27.4 26.0 - 34.0 PG  
 MCHC 33.8 30.0 - 36.5 g/dL  
 RDW 16.0 (H) 11.5 - 14.5 % PLATELET 403 444 - 575 K/uL HEMOGLOBIN A1C WITH EAG Collection Time: 01/21/18  2:21 AM  
Result Value Ref Range Hemoglobin A1c 4.5 4.2 - 6.3 % Est. average glucose Cannot be calculated mg/dL MAGNESIUM Collection Time: 01/21/18  2:21 AM  
Result Value Ref Range Magnesium 1.6 1.6 - 2.4 mg/dL PHOSPHORUS Collection Time: 01/21/18  2:21 AM  
Result Value Ref Range Phosphorus 2.9 2.6 - 4.7 MG/DL  
HEPATIC FUNCTION PANEL Collection Time: 01/21/18  2:21 AM  
Result Value Ref Range Protein, total 6.3 (L) 6.4 - 8.2 g/dL Albumin 2.2 (L) 3.5 - 5.0 g/dL Globulin 4.1 (H) 2.0 - 4.0 g/dL A-G Ratio 0.5 (L) 1.1 - 2.2 Bilirubin, total 0.3 0.2 - 1.0 MG/DL Bilirubin, direct 0.1 0.0 - 0.2 MG/DL Alk. phosphatase 78 45 - 117 U/L  
 AST (SGOT) 19 15 - 37 U/L  
 ALT (SGPT) 9 (L) 12 - 78 U/L  
CK W/ CKMB & INDEX Collection Time: 01/21/18  2:21 AM  
Result Value Ref Range CK 61 39 - 308 U/L  
 CK - MB 2.6 <3.6 NG/ML  
 CK-MB Index 4.3 (H) 0 - 2.5    
TROPONIN I Collection Time: 01/21/18  2:21 AM  
Result Value Ref Range Troponin-I, Qt. 0.10 (H) <0.05 ng/mL GLUCOSE, POC  Collection Time: 01/21/18  7:07 AM  
Result Value Ref Range Glucose (POC) 107 (H) 65 - 100 mg/dL Performed by Evelyn Arias (PCT) CARDIAC DIAGNOSTICS:  
 
Cardiac Evaluation Includes: 
 
Lexiscan Cardiolite 11/14 - Normal MPI, LVEF 53% EKG 1/20/18 - NSR, PVC's, LVH with repolarization changes. - similar to EKG 7/13/17 CXR 1/20/18 - There is new interstitial pulmonary edema, cardiomegaly and small left pleural effusion. I viewed CXR and EKG's myself. ASSESSMENT AND PLAN:  
 
Assessment and Plan: 
 
77 yo hx of HTN, DM, CAD, PVD s/p amputation, DVT s/p IVC, CKD 3, GI bleed, presented w/ chest pain, HTN urgency, CHF.   
1) Hypertensive urgency, malignant: 
- due to medical non-compliance. - BP is better today. Would resume an oral regimen as has been ordered (BB, norvasc, hydralazine) - however he has been refusing meds   
2) CAD, Chest pain and mildly elevated troponin  
- mild trop elevation and chest pain likely due to strain rather than true ACS. - Need to get him back on his BP meds --- along with a statin and ASA 
- If symptoms persist on medications, then can consider a lexiscan cardiolite for further risk stratification   
3) Acute pulm edema / CHF 
- echo results are pending - Problems due to non-compliance (has taken now meds past several months) leading to uncontrolled HTN and pulm edema - Despite edema on CXR and elevated proBNP, he looks comfortable lying down and O2 sats 97% on RA.   
- Clinically the volume status looks OK - The main goal here will be controlling BP. If he notes SOB or is hypoxic, then would add a loop diuretic to regimen, following renal function closely 5) PAD 
- s/p R BKA, L AKA. Cont ASA, statin 
  
6) CKD 3 
- seems at baseline 7) Depression - Psychiatry to see 8) I will sign off if echo shows stable findings. Mr. Calderon Martinez follows at the [facility] and should follow-up there. Please call me if any further questions or problems.   
Addendum:  Echo 1/21/18 - LVEF 50-55%. RV normal.  Mild LAE. Aorta at sinus 4.1 cm. Echo shows stable findings. Would first treat medically and get him back on the regimen as above before considering further testing. I will sign off, but please call with any questions. Maria Fernanda Andrews MD, [facility] - [address], [address]  [place] [address] [address] [facility] [id number] [place] [address] Ph: [phone]   Ph [phone]

## 2018-01-21 NOTE — PROGRESS NOTES
ADMISSION REPORT: 
2145- Admission report given to Tylor Gibbons RN [facility] [address] [facility] [place] nurse) by Angie Jaeger (ER nurse). Report included the following information SBAR, Kardex, Procedure Summary, Intake/Output, Recent Results and Cardiac Rhythm. SHIFT SUMMARY: 
2145-tranferred to 333; Norco given for c/o pain\"vague\" pain; more interested in womething to eat;accepting few med 2245-accepted meds and wants something to eat, meatloaf given 0145-I feel terrible; said \"yes\" when asked if he was in pain; Norco, Benadryl and Zofran given. 0230-blood drawn for AM labs. END OF SHIFT REPORT: 
0700-Bedside shift change report given to Jose Rodriguez RN (oncoming nurse) by Tylor Gibbons RN (offgoing nurse). Report included the following information SBAR, Kardex, Procedure Summary, Intake/Output, Recent Results and Cardiac Rhythm .

## 2018-01-21 NOTE — CONSULTS
[address] [facility] Mery CHAVEZ MD  [phone]                         IDENTIFICATION:    Patient Name  Germán White   Date of Birth 1940   [facility] [id number]   Medical Record Number  [id number]      Age  78 y.o. PCP Johny Warner MD   Admit date:  1/20/2018    Room Number  333/01  @ [address] [facility]   Date of Service  1/21/2018            HISTORY         REASON FOR CONSULTATION:  \"suiciedal ideation\". HISTORY OF PRESENT ILLNESS:    The patient Germán White is a 78 y.o.  male with a past psychiatric history of depression and substance abuse , who presented for the principle diagnosis of Hypertensive urgency, malignant. Patient reports/evidences the following emotional symptoms: depressed mood, decreased sleep, decreased energy,  and irritability. Apparently he told nursing staff he did not want to live any more. Denies such thoughts to me. Denies any plan of hurting himself. Is somewhat irritable during interview. Only answers some questions. Also spoke with his fiance Ms. Darshana Braxton ph [phone] who notes that pt has had some depressed mood and insomnia but no suicidal ideation. 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 He admitted to using I/Heroin in the ER in the past. UDS was + for MJ. Gets irritable when asked about substance use. Per Ms. Lennox Salon he just sits all day. Needs help with transfer into wheelchair but does not move around in wheelchair himself. Can feed himself. There is no  history of suicide attempts. no history of psychosis. no history of manic symptoms. no previous psych hospitalizations. ALLERGIES:  Allergies   Allergen Reactions    Tetracycline Hives      MEDICATIONS PRIOR TO ADMISSION:  No prescriptions prior to admission.       PAST MEDICAL HISTORY:  Active Ambulatory Problems     Diagnosis Date Noted    Sleep apnea 08/04/2011    Peripheral vascular disease ([facility] 11/07/2014  Hypertension 11/07/2014    Dyslipidemia 11/07/2014    ASO (arteriosclerosis obliterans) 01/22/2015    Ischemic pain of foot 09/04/2015    Neuropathic pain of foot 09/04/2015    Delirium 09/05/2015    CKD (chronic kidney disease), stage III 09/05/2015    DM type 2 [facility] 09/05/2015    Anemia 09/05/2015    Hepatitis C     Neuropathy     Arthritis     PUD (peptic ulcer disease)     CAD (coronary artery disease)     Weakness 07/13/2017    History of DVT (deep vein thrombosis) 07/13/2017    History of GI bleed 07/13/2017     Resolved Ambulatory Problems     Diagnosis Date Noted    Insomnia 08/04/2011    Poor sleep hygiene 08/04/2011    Fever 09/05/2015    Sinus tachycardia 09/05/2015    SIRS (systemic inflammatory response syndrome) ([facility] 09/05/2015     Past Medical History:   Diagnosis Date    Arthritis     CAD (coronary artery disease) 2007    CKD (chronic kidney disease), stage III     DM type 2 causing renal disease ([facility] [address]     DVT (deep venous thrombosis) (HCC)     Gait abnormality     GI bleed     Heart murmur     Hepatitis C     History of blood transfusion     Hypercholesterolemia     Hypertension 11/7/2014    Neuropathy     Non compliance w medication regimen     Peripheral vascular disease [facility] 11/7/2014    PUD (peptic ulcer disease) 2007    Unspecified sleep apnea       Past Medical History:   Diagnosis Date    Arthritis     CAD (coronary artery disease) 2007    CKD (chronic kidney disease), stage III     DM type 2 causing renal disease (HCC)     DVT (deep venous thrombosis) (HCC)     Hx of DVT:  Xarelto stopped due to GI bleed. S/P IVC filter.  Gait abnormality     GI bleed     Heart murmur     Hepatitis C     History of blood transfusion     Hypercholesterolemia     Hypertension 11/7/2014    Neuropathy     Non compliance w medication regimen     Peripheral vascular disease ([facility] 11/7/2014    A.   S/P Right SFA POBA and tibial atherectomy (2/4/13).  PUD (peptic ulcer disease) 2007    Unspecified sleep apnea     never used cpap     Past Surgical History:   Procedure Laterality Date    ABDOMEN SURGERY PROC UNLISTED  2007    has approx 7-8\" midline incision on abdomen--\"had to open him up and clean him out after feeding tube clogged\"    HX GI  2007    feeding tube for approx 2 mo    VASCULAR SURGERY PROCEDURE UNLIST Right 1/22/2015    popliteal-tibial bypass      SOCIAL HISTORY:  Lives with yuliya whom he has known for last 29 years. Social History     Social History Narrative     Social History   Substance Use Topics    Smoking status: Current Every Day Smoker     Packs/day: 0.50    Smokeless tobacco: Not on file    Alcohol use No      FAMILY HISTORY:  History reviewed. Family History   Problem Relation Age of Onset    Hypertension Father        REVIEW of SYSTEMS:   As noted in history of present illness           MENTAL STATUS EXAM & VITALS     Oriented X3. Memory: WNL. Mood: depressed. Affect: Constricted, irritable. Normal speech. Denies SI/HI. no delusions. no hallucinations. Thought process logical and goal directed. Concentration limited. Insight/judgement Fair.              VITALS:     Patient Vitals for the past 24 hrs:   Temp Pulse Resp BP SpO2   01/21/18 0705 - - - - 96 %   01/21/18 0650 - 69 - - -   01/21/18 0600 - 68 17 170/90 -   01/21/18 0532 - 71 16 161/80 -   01/21/18 0530 - 69 15 161/80 -   01/21/18 0400 - 73 16 145/64 -   01/21/18 0300 - 71 15 170/71 -   01/21/18 0200 - 74 16 169/85 95 %   01/21/18 0100 - 80 16 166/85 97 %   01/21/18 0000 - 78 15 160/90 97 %   01/20/18 2307 - 70 - - -   01/20/18 2300 - 77 17 (!) 198/97 -   01/20/18 2230 - 81 18 (!) 197/100 -   01/20/18 2200 - 87 18 (!) 194/97 96 %   01/20/18 2153 - 81 19 (!) 172/117 97 %   01/20/18 2145 - - 20 - 97 %   01/20/18 2045 98.2 °F (36.8 °C) 79 20 (!) 203/88 97 %   01/20/18 2033 - 73 - (!) 200/97 -   01/20/18 2030 - 70 19 (!) 200/97 97 %   01/20/18 2015 - 65 16 (!) 206/116 95 %   01/20/18 2000 - 73 14 (!) 221/114 98 %   01/20/18 1945 - 72 17 (!) 197/138 99 %   01/20/18 1930 - 77 17 (!) 227/147 -   01/20/18 1915 - 83 13 (!) 211/111 -   01/20/18 1845 - 81 19 198/90 -   01/20/18 1830 - 70 13 (!) 219/100 100 %   01/20/18 1815 - 69 13 (!) 210/102 -   01/20/18 1800 - 74 17 (!) 199/94 97 %   01/20/18 1745 - 67 14 (!) 228/105 95 %   01/20/18 1736 98.9 °F (37.2 °C) 70 18 (!) 206/102 95 %              DATA     Labs reviewed    MEDICATIONS       ALL MEDICATIONS  Current Facility-Administered Medications   Medication Dose Route Frequency    mirtazapine (REMERON) tablet 7.5 mg  7.5 mg Oral QHS    hydrALAZINE (APRESOLINE) 20 mg/mL injection 20 mg  20 mg IntraVENous Q6H PRN    nitroglycerin (NITROBID) 2 % ointment 1 Inch  1 Inch Topical BID    amLODIPine (NORVASC) tablet 10 mg  10 mg Oral DAILY    aspirin delayed-release tablet 81 mg  81 mg Oral DAILY    atorvastatin (LIPITOR) tablet 40 mg  40 mg Oral DAILY    carvedilol (COREG) tablet 3.125 mg  3.125 mg Oral BID WITH MEALS    ferrous sulfate tablet 325 mg  325 mg Oral BID WITH MEALS    hydrALAZINE (APRESOLINE) tablet 50 mg  50 mg Oral TID    sodium chloride (NS) flush 5-10 mL  5-10 mL IntraVENous Q8H    sodium chloride (NS) flush 5-10 mL  5-10 mL IntraVENous PRN    acetaminophen (TYLENOL) tablet 650 mg  650 mg Oral Q4H PRN    HYDROcodone-acetaminophen (NORCO) 5-325 mg per tablet 1 Tab  1 Tab Oral Q4H PRN    HYDROmorphone (DILAUDID) injection 0.5 mg  0.5 mg IntraVENous Q4H PRN    naloxone (NARCAN) injection 0.4 mg  0.4 mg IntraVENous PRN    diphenhydrAMINE (BENADRYL) injection 12.5 mg  12.5 mg IntraVENous Q4H PRN    ondansetron (ZOFRAN) injection 4 mg  4 mg IntraVENous Q6H PRN    docusate sodium (COLACE) capsule 100 mg  100 mg Oral BID    nicotine (NICODERM CQ) 21 mg/24 hr patch 1 Patch  1 Patch TransDERmal Q24H    heparin (porcine) injection 5,000 Units  5,000 Units SubCUTAneous Q8H    insulin lispro (HUMALOG) injection   SubCUTAneous QID WITH MEALS    glucose chewable tablet 16 g  4 Tab Oral PRN    dextrose (D50W) injection syrg 12.5-25 g  12.5-25 g IntraVENous PRN    glucagon (GLUCAGEN) injection 1 mg  1 mg IntraMUSCular PRN      SCHEDULED MEDICATIONS  Current Facility-Administered Medications   Medication Dose Route Frequency    mirtazapine (REMERON) tablet 7.5 mg  7.5 mg Oral QHS    nitroglycerin (NITROBID) 2 % ointment 1 Inch  1 Inch Topical BID    amLODIPine (NORVASC) tablet 10 mg  10 mg Oral DAILY    aspirin delayed-release tablet 81 mg  81 mg Oral DAILY    atorvastatin (LIPITOR) tablet 40 mg  40 mg Oral DAILY    carvedilol (COREG) tablet 3.125 mg  3.125 mg Oral BID WITH MEALS    ferrous sulfate tablet 325 mg  325 mg Oral BID WITH MEALS    hydrALAZINE (APRESOLINE) tablet 50 mg  50 mg Oral TID    sodium chloride (NS) flush 5-10 mL  5-10 mL IntraVENous Q8H    docusate sodium (COLACE) capsule 100 mg  100 mg Oral BID    nicotine (NICODERM CQ) 21 mg/24 hr patch 1 Patch  1 Patch TransDERmal Q24H    heparin (porcine) injection 5,000 Units  5,000 Units SubCUTAneous Q8H    insulin lispro (HUMALOG) injection   SubCUTAneous QID WITH MEALS                ASSESSMENT & PLAN          Case d/w nursing staff and hx obtained/plan reviewed with pt who gave verbal informed consent for treatment with medications as noted below. The patient Zane Severe is a 78 y.o.  male who presents at this time for the following psychiatric diagnoses:    MDD recurrent moderate  Cannabis abuse    Plan:  1. Add Remeron 7.5 mg qhs    May d/c sitter unless needed for medical reasons. Pt is a fall risk. Can be discharged home from psych stand point and can follow up as outpatient with a psychiatrist.    Will follow patient's course along with you as necessary. Thank you for the opportunity to participate in the care of your patient.                         SIGNED:    Ayan Ramirez MD  1/21/2018

## 2018-01-21 NOTE — PROGRESS NOTES
Per Dr. Ricarda Nguyen  suicidal precaution is being lift off now. Probably fall risk precaution still needed.

## 2018-01-21 NOTE — PROGRESS NOTES
SHIFT REPORT: 
1900-Bedside shift change report given to Rozina Jenkins RN (oncoming nurse) by Michele Fan RN (offgoing nurse). Report included the following information SBAR, Kardex, Procedure Summary, Intake/Output, Recent Results and Cardiac Rhythm. SHIFT SUMMARY: 
1915-pt accepted several med including Norco and B/P med, refuse others; Sitter/tech at bedside 2229-Apresoline IV for sharona B/P-permited by pt 
9223-YAKELIN assisted (2 assist) up to Bedside recliner; Posey seat alarm on; refuses anything to drink or to answer questions except \"NO\". 2345-wants to get back to bed, lifted w 2 assist back to bed,  
0115-pt actually sleeping 
0145-incont urine, linen changed, pt pulled telemetry leads off and refuses to have pads reapplied, monitor tech notified 
0300-assisted w 2 people to recliner; tele unit back on w pt permission;  
0620-B/P elevated, Apresoline given IV w pt permission; pt remains up in recliner. Refusing Heparin SQ 
END OF SHIFT REPORT: 
0700-Bedside shift change report given to Jenni Ngo RN (oncoming nurse) by  Rozina Jenkins RN (offgoing nurse). Report included the following information SBAR, Kardex, Procedure Summary, Intake/Output, Recent Results and Cardiac Rhythm .

## 2018-01-21 NOTE — PROGRESS NOTES
Problem: Falls - Risk of 
Goal: *Absence of Falls Document Pablo Shutter Fall Risk and appropriate interventions in the flowsheet. Outcome: Progressing Towards Goal 
Fall Risk Interventions: 
Mobility Interventions: Patient to call before getting OOB Mentation Interventions: Reorient patient, Evaluate medications/consider consulting pharmacy Elimination Interventions: Urinal in reach History of Falls Interventions: Evaluate medications/consider consulting pharmacy, Investigate reason for fall

## 2018-01-22 LAB
ATRIAL RATE: 74 BPM
CALCULATED P AXIS, ECG09: 72 DEGREES
CALCULATED R AXIS, ECG10: 35 DEGREES
CALCULATED T AXIS, ECG11: -112 DEGREES
DIAGNOSIS, 93000: NORMAL
GLUCOSE BLD STRIP.AUTO-MCNC: 127 MG/DL (ref 65–100)
P-R INTERVAL, ECG05: 202 MS
Q-T INTERVAL, ECG07: 422 MS
QRS DURATION, ECG06: 84 MS
QTC CALCULATION (BEZET), ECG08: 468 MS
SERVICE CMNT-IMP: ABNORMAL
VENTRICULAR RATE, ECG03: 74 BPM

## 2018-01-22 PROCEDURE — 97165 OT EVAL LOW COMPLEX 30 MIN: CPT | Performed by: OCCUPATIONAL THERAPIST

## 2018-01-22 PROCEDURE — G8987 SELF CARE CURRENT STATUS: HCPCS | Performed by: PHYSICAL THERAPIST

## 2018-01-22 PROCEDURE — 65660000000 HC RM CCU STEPDOWN

## 2018-01-22 PROCEDURE — 74011250636 HC RX REV CODE- 250/636: Performed by: INTERNAL MEDICINE

## 2018-01-22 PROCEDURE — 76450000000

## 2018-01-22 PROCEDURE — 82962 GLUCOSE BLOOD TEST: CPT

## 2018-01-22 PROCEDURE — 74011250637 HC RX REV CODE- 250/637: Performed by: PSYCHIATRY & NEUROLOGY

## 2018-01-22 PROCEDURE — G8988 SELF CARE GOAL STATUS: HCPCS | Performed by: PHYSICAL THERAPIST

## 2018-01-22 PROCEDURE — 97535 SELF CARE MNGMENT TRAINING: CPT | Performed by: OCCUPATIONAL THERAPIST

## 2018-01-22 PROCEDURE — 74011250637 HC RX REV CODE- 250/637: Performed by: INTERNAL MEDICINE

## 2018-01-22 RX ORDER — ATORVASTATIN CALCIUM 40 MG/1
40 TABLET, FILM COATED ORAL DAILY
Qty: 30 TAB | Refills: 0 | Status: SHIPPED | OUTPATIENT
Start: 2018-01-22 | End: 2018-03-02

## 2018-01-22 RX ORDER — CARVEDILOL 3.12 MG/1
3.12 TABLET ORAL 2 TIMES DAILY WITH MEALS
Qty: 60 TAB | Refills: 0 | Status: SHIPPED | OUTPATIENT
Start: 2018-01-22 | End: 2018-03-02

## 2018-01-22 RX ORDER — IBUPROFEN 200 MG
1 TABLET ORAL EVERY 24 HOURS
Qty: 30 PATCH | Refills: 0 | Status: SHIPPED | OUTPATIENT
Start: 2018-01-22 | End: 2018-03-02

## 2018-01-22 RX ORDER — ASPIRIN 81 MG/1
81 TABLET ORAL DAILY
Status: SHIPPED | COMMUNITY
Start: 2018-01-23

## 2018-01-22 RX ORDER — LANOLIN ALCOHOL/MO/W.PET/CERES
325 CREAM (GRAM) TOPICAL 2 TIMES DAILY WITH MEALS
Qty: 60 TAB | Refills: 0 | Status: SHIPPED | COMMUNITY
Start: 2018-01-22 | End: 2018-03-02

## 2018-01-22 RX ORDER — MIRTAZAPINE 7.5 MG/1
7.5 TABLET, FILM COATED ORAL
Qty: 30 TAB | Refills: 0 | Status: SHIPPED | OUTPATIENT
Start: 2018-01-22 | End: 2018-03-02

## 2018-01-22 RX ORDER — AMLODIPINE BESYLATE 10 MG/1
10 TABLET ORAL DAILY
Qty: 30 TAB | Refills: 0 | Status: SHIPPED | OUTPATIENT
Start: 2018-01-22 | End: 2018-03-02

## 2018-01-22 RX ORDER — HYDRALAZINE HYDROCHLORIDE 50 MG/1
50 TABLET, FILM COATED ORAL 3 TIMES DAILY
Qty: 90 TAB | Refills: 0 | Status: SHIPPED | OUTPATIENT
Start: 2018-01-22 | End: 2018-01-23

## 2018-01-22 RX ADMIN — DIPHENHYDRAMINE HYDROCHLORIDE 12.5 MG: 50 INJECTION, SOLUTION INTRAMUSCULAR; INTRAVENOUS at 23:00

## 2018-01-22 RX ADMIN — HYDRALAZINE HYDROCHLORIDE 50 MG: 25 TABLET, FILM COATED ORAL at 20:57

## 2018-01-22 RX ADMIN — Medication 10 ML: at 14:00

## 2018-01-22 RX ADMIN — HYDRALAZINE HYDROCHLORIDE 20 MG: 20 INJECTION INTRAMUSCULAR; INTRAVENOUS at 06:20

## 2018-01-22 RX ADMIN — HYDROMORPHONE HYDROCHLORIDE 0.5 MG: 2 INJECTION INTRAMUSCULAR; INTRAVENOUS; SUBCUTANEOUS at 21:43

## 2018-01-22 RX ADMIN — CARVEDILOL 3.12 MG: 3.12 TABLET, FILM COATED ORAL at 18:47

## 2018-01-22 RX ADMIN — ASPIRIN 81 MG: 81 TABLET, COATED ORAL at 08:44

## 2018-01-22 RX ADMIN — HYDRALAZINE HYDROCHLORIDE 50 MG: 25 TABLET, FILM COATED ORAL at 18:47

## 2018-01-22 RX ADMIN — ONDANSETRON 4 MG: 2 INJECTION INTRAMUSCULAR; INTRAVENOUS at 21:43

## 2018-01-22 RX ADMIN — MIRTAZAPINE 7.5 MG: 15 TABLET, FILM COATED ORAL at 20:58

## 2018-01-22 RX ADMIN — HYDRALAZINE HYDROCHLORIDE 20 MG: 20 INJECTION INTRAMUSCULAR; INTRAVENOUS at 21:43

## 2018-01-22 RX ADMIN — HYDROCODONE BITARTRATE AND ACETAMINOPHEN 1 TABLET: 5; 325 TABLET ORAL at 22:31

## 2018-01-22 RX ADMIN — ATORVASTATIN CALCIUM 40 MG: 20 TABLET, FILM COATED ORAL at 20:58

## 2018-01-22 RX ADMIN — FERROUS SULFATE TAB 325 MG (65 MG ELEMENTAL FE) 325 MG: 325 (65 FE) TAB at 18:49

## 2018-01-22 RX ADMIN — AMLODIPINE BESYLATE 10 MG: 5 TABLET ORAL at 20:58

## 2018-01-22 RX ADMIN — ACETAMINOPHEN 650 MG: 325 TABLET ORAL at 08:39

## 2018-01-22 RX ADMIN — CARVEDILOL 3.12 MG: 3.12 TABLET, FILM COATED ORAL at 08:41

## 2018-01-22 RX ADMIN — DOCUSATE SODIUM 100 MG: 100 CAPSULE, LIQUID FILLED ORAL at 18:49

## 2018-01-22 NOTE — PROGRESS NOTES
I contacted pt.'s fiancee @ [phone] to inform her that pt is being discharged home today . Sonali Lemus is @ a different hospital with her daughter who is 6 months pregnant. Once her daughter is discharged,pt will notify me with the time she will be @ home today so she can receive her . Sonali Lemus wants home health for skilled nursing,PT ,OT and home health aide. Sonali Lemus prefers [facility] is possible. Referral sent through [employer] to [facility]. Waiting on confirmation from [address] [place] [address] Darrius Dobbins and waiting on fiancee to inform me what time to set up [facility] transport home. Thank you. Laura Lozada Team B Dr Martines Mems [phone] PCP is Dr Edward Beasley. Laura Lozada

## 2018-01-22 NOTE — PROGRESS NOTES
I am working with Ronny Oropeza nurse as pt is threatening to kill himself again. He is making overt suicidal statements that he is going to kill himself. Pt is also threatening to leave AMA. We contacted Dr Phuong Medel who reinstated the 1:1 sitter for suicide precautions. He also wrote an order for pt to be assessed by [address] [place] [address] for a TDO. Nurse contacted [address] [place] [address]. Chart faxed to [address] [place] [address] @ [phone]. Crisis number is [id number]. [facility] states that if pt threatens or attempts to leave AMA,nurse is to call supervisor to have [facility] PD stay with pt if necessary untoil crisis evaluates pt. I informed crisis worker that pt is medically stable for discharge to SNF so is technically medically stable to go to inpatient psych. Dr Jeremy Qiu has been reconsulted per pt.'s nurse. I discussed pt with pt.'s nurse in detail. Anais Pinto Team B with  Dr Phuong Medel [phone]

## 2018-01-22 NOTE — CONSULTS
Palliative Medicine Consult    Patient Name: Gunner Stewart  YOB: 1940    Date of Initial Consult: 1/22/18  Reason for Consult: Care decisions  Requesting Provider: Dr. Dirk Figueroa   Primary Care Physician: Johny Warner MD      SUMMARY:   Gunner Stewart is a 78 y.o. with a past history of HTN, CAD, PAD, noncompliance, substance abuse, diastolic CHF, DVT, B/L amputation, depression, who was admitted on 1/20/2018 from home with a diagnosis of HTN urgency Current medical issues leading to Palliative Medicine involvement include: Care decisions    Chart reviewed/HPI-patient admitted to hospital with HTN urgency. Admits to not being compliant with medications. He has stabilized with medications restarted. In addition, he admits to have SI and depressed. He has been seen by Psychiatry and medications have been added    SH-lives with his girlfriend of 30+ years(Marisabel Canales). Unfortunately, it appears there house now with heat.electricity and not able to return to that living arrangement. He has 13 children and states none are willing to accept him to their home. Now awaiting placement of NH where he does not want to live       PALLIATIVE DIAGNOSES:   1. GOC discussion  2. DNR discussion  3. Debility  4. PAD  5. Depression  6. CAD       PLAN:   1. Met with patient and reviewed his current medical issues. He is able to tell me he was admitted secondary to his high blood pressure. He is tearful when discussing not being able to return home  2. GOC-continue full restorative measures. He could not specifically state why he had stopped his medications. Encouraged to continue his medications. He does become tearful when learning discharge has been stopped and not sure he wants to go on if he can not go home. 3. AMD-none completed but he does want Laury Hugo to be his MPOA.  Discussed completing an AMD because if not done, we would need to talk with his children about medical decision making if he is incapacitated. We will complete an AMD tomorrow so can assign [place]  4. Code status-addressed resuscitation and what this would entail. He is clear on remaining full code status  5. Symptom management-no acute symptoms for us to manage at this time  6. Psychosocial-patient with 13 children but apparently not very involved in his life. [place] seems to be his main support. No spiritual connection  7. Initial consult note routed to primary continuity provider  8. Communicated plan of care with: Palliative IDT       GOALS OF CARE / TREATMENT PREFERENCES:   [====Goals of Care====]  GOALS OF CARE:  Patient/Health Care Proxy Stated Goals: Cure      TREATMENT PREFERENCES:   Code Status: Full Code    Advance Care Planning:  Advance Care Planning 1/22/2018   Patient's Healthcare Decision Maker is: Verbal statement (Legal Next of Kin remains as decision maker)   Primary Decision Maker Name Rosa Wright   Primary Decision Maker Phone Number [phone]   Primary Decision Maker Relationship to Patient Boyfriend/girlfriend   Confirm Advance Directive None   Patient Would Like to Complete Advance Directive Yes       Medical Interventions: Full interventions  Other Instructions: The palliative care team has discussed with patient / health care proxy about goals of care / treatment preferences for patient.  [====Goals of Care====]         HISTORY:     History obtained from: chart, patient    CHIEF COMPLAINT: elevated BP    HPI/SUBJECTIVE:    The patient is:   [x] Verbal and participatory  [] Non-participatory due to:   Patient is soft spoken.  Frustrated about not being able to go home and tearful when discussing    Clinical Pain Assessment (nonverbal scale for severity on nonverbal patients):   Clinical Pain Assessment  Severity: 0            FUNCTIONAL ASSESSMENT:     Palliative Performance Scale (PPS):  PPS: 50       PSYCHOSOCIAL/SPIRITUAL SCREENING:     Advance Care Planning:  Advance Care Planning 1/22/2018   Patient's Healthcare Decision Maker is: Verbal statement (Legal Next of Kin remains as decision maker)   Primary Decision Maker Name Alfredo White   Primary Decision Maker Phone Number [phone]   Primary Decision Maker Relationship to Patient Boyfriend/girlfriend   Confirm Advance Directive None   Patient Would Like to Complete Advance Directive Yes        Any spiritual / Temple concerns:  [] Yes /  [x] No    Caregiver Burnout:  [] Yes /  [] No /  [x] No Caregiver Present      Anticipatory grief assessment:   [x] Normal  / [] Maladaptive       ESAS Anxiety: Anxiety: 1    ESAS Depression: Depression: 4        REVIEW OF SYSTEMS:     Positive and pertinent negative findings in ROS are noted above in HPI. The following systems were [x] reviewed / [] unable to be reviewed as noted in HPI  Other findings are noted below. Systems: constitutional, ears/nose/mouth/throat, respiratory, gastrointestinal, genitourinary, musculoskeletal, integumentary, neurologic, psychiatric, endocrine. Positive findings noted below. Modified ESAS Completed by: provider   Fatigue: 2 Drowsiness: 0   Depression: 4 Pain: 0   Anxiety: 1 Nausea: 0   Anorexia: 0 Dyspnea: 0     Constipation: No              PHYSICAL EXAM:     From RN flowsheet:  Wt Readings from Last 3 Encounters:   01/22/18 180 lb (81.6 kg)   07/13/17 196 lb (88.9 kg)   09/04/15 247 lb 4 oz (112.2 kg)     Blood pressure 158/79, pulse 76, temperature 98.7 °F (37.1 °C), resp. rate 18, height 5' 11\" (1.803 m), weight 180 lb (81.6 kg), SpO2 97 %.     Pain Scale 1: Visual  Pain Intensity 1: 0  Pain Onset 1:   Pain Location 1: Abdomen, Leg  Pain Orientation 1: Right, Left  Pain Description 1: Aching  Pain Intervention(s) 1: Medication (see MAR)  Last bowel movement, if known:     Constitutional: alert and oriented, NAD  Eyes: pupils equal, anicteric  ENMT: no nasal discharge, moist mucous membranes  Cardiovascular: regular rhythm, distal pulses intact  Respiratory: breathing not labored, symmetric  Gastrointestinal: soft non-tender, +bowel sounds  Musculoskeletal: b/l amputation  Skin: warm, dry  Neurologic: following commands, moving all extremities  Psychiatric: flat affect, no hallucinations  Other:       HISTORY:     Principal Problem:    Hypertensive urgency, malignant (1/20/2018)    Active Problems:    CKD (chronic kidney disease), stage III (9/5/2015)      DM type 2 causing renal disease ([facility] (9/5/2015)      Anemia (9/5/2015)      CAD (coronary artery disease) ()      Chest pain at rest (1/20/2018)      Acute diastolic CHF (congestive heart failure) ([facility] (1/20/2018)      Suicidal ideation (1/21/2018)      Past Medical History:   Diagnosis Date    Arthritis     CAD (coronary artery disease) 2007    CKD (chronic kidney disease), stage III     DM type 2 causing renal disease ([facility]     DVT (deep venous thrombosis) ([facility])     Hx of DVT:  Xarelto stopped due to GI bleed. S/P IVC filter.  Gait abnormality     GI bleed     Heart murmur     Hepatitis C     History of blood transfusion     Hypercholesterolemia     Hypertension 11/7/2014    Neuropathy     Non compliance w medication regimen     Peripheral vascular disease ([facility] 11/7/2014    A. S/P Right SFA POBA and tibial atherectomy (2/4/13).  PUD (peptic ulcer disease) 2007    Unspecified sleep apnea     never used cpap      Past Surgical History:   Procedure Laterality Date    ABDOMEN SURGERY PROC UNLISTED  2007    has approx 7-8\" midline incision on abdomen--\"had to open him up and clean him out after feeding tube clogged\"    HX GI  2007    feeding tube for approx 2 mo    VASCULAR SURGERY PROCEDURE UNLIST Right 1/22/2015    popliteal-tibial bypass      Family History   Problem Relation Age of Onset    Hypertension Father       History reviewed, no pertinent family history.   Social History   Substance Use Topics    Smoking status: Current Every Day Smoker     Packs/day: 0.50  Smokeless tobacco: Not on file    Alcohol use No     Allergies   Allergen Reactions    Tetracycline Hives      Current Facility-Administered Medications   Medication Dose Route Frequency    mirtazapine (REMERON) tablet 7.5 mg  7.5 mg Oral QHS    hydrALAZINE (APRESOLINE) 20 mg/mL injection 20 mg  20 mg IntraVENous Q6H PRN    nitroglycerin (NITROBID) 2 % ointment 1 Inch  1 Inch Topical BID    amLODIPine (NORVASC) tablet 10 mg  10 mg Oral DAILY    aspirin delayed-release tablet 81 mg  81 mg Oral DAILY    atorvastatin (LIPITOR) tablet 40 mg  40 mg Oral DAILY    carvedilol (COREG) tablet 3.125 mg  3.125 mg Oral BID WITH MEALS    ferrous sulfate tablet 325 mg  325 mg Oral BID WITH MEALS    hydrALAZINE (APRESOLINE) tablet 50 mg  50 mg Oral TID    sodium chloride (NS) flush 5-10 mL  5-10 mL IntraVENous Q8H    sodium chloride (NS) flush 5-10 mL  5-10 mL IntraVENous PRN    acetaminophen (TYLENOL) tablet 650 mg  650 mg Oral Q4H PRN    HYDROcodone-acetaminophen (NORCO) 5-325 mg per tablet 1 Tab  1 Tab Oral Q4H PRN    HYDROmorphone (DILAUDID) injection 0.5 mg  0.5 mg IntraVENous Q4H PRN    naloxone (NARCAN) injection 0.4 mg  0.4 mg IntraVENous PRN    diphenhydrAMINE (BENADRYL) injection 12.5 mg  12.5 mg IntraVENous Q4H PRN    ondansetron (ZOFRAN) injection 4 mg  4 mg IntraVENous Q6H PRN    docusate sodium (COLACE) capsule 100 mg  100 mg Oral BID    nicotine (NICODERM CQ) 21 mg/24 hr patch 1 Patch  1 Patch TransDERmal Q24H    heparin (porcine) injection 5,000 Units  5,000 Units SubCUTAneous Q8H    insulin lispro (HUMALOG) injection   SubCUTAneous QID WITH MEALS    glucose chewable tablet 16 g  4 Tab Oral PRN    dextrose (D50W) injection syrg 12.5-25 g  12.5-25 g IntraVENous PRN    glucagon (GLUCAGEN) injection 1 mg  1 mg IntraMUSCular PRN          LAB AND IMAGING FINDINGS:     Lab Results   Component Value Date/Time    WBC 3.9 01/21/2018 02:21 AM    HGB 9.4 01/21/2018 02:21 AM    PLATELET 296 01/21/2018 02:21 AM     Lab Results   Component Value Date/Time    Sodium 141 01/21/2018 02:21 AM    Potassium 3.8 01/21/2018 02:21 AM    Chloride 108 01/21/2018 02:21 AM    CO2 27 01/21/2018 02:21 AM    BUN 26 01/21/2018 02:21 AM    Creatinine 2.49 01/21/2018 02:21 AM    Calcium 8.1 01/21/2018 02:21 AM    Magnesium 1.6 01/21/2018 02:21 AM    Phosphorus 2.9 01/21/2018 02:21 AM      Lab Results   Component Value Date/Time    AST (SGOT) 19 01/21/2018 02:21 AM    Alk. phosphatase 78 01/21/2018 02:21 AM    Protein, total 6.3 01/21/2018 02:21 AM    Albumin 2.2 01/21/2018 02:21 AM    Globulin 4.1 01/21/2018 02:21 AM     Lab Results   Component Value Date/Time    INR 1.1 07/13/2017 06:11 PM    Prothrombin time 11.1 07/13/2017 06:11 PM    aPTT 27.8 07/13/2017 06:11 PM      Lab Results   Component Value Date/Time    Iron 28 07/14/2017 04:41 AM    TIBC 268 07/14/2017 04:41 AM    Iron % saturation 10 07/14/2017 04:41 AM    Ferritin 21 07/14/2017 04:41 AM      No results found for: PH, PCO2, PO2  No components found for: [place]   Lab Results   Component Value Date/Time    CK 61 01/21/2018 02:21 AM    CK - MB 2.6 01/21/2018 02:21 AM                Total time: 50  Counseling / coordination time, spent as noted above:45  > 50% counseling / coordination?: yes    Prolonged service was provided for  []30 min   []75 min in face to face time in the presence of the patient, spent as noted above. Time Start:   Time End:   Note: this can only be billed with 67589 (initial) or 04997 (follow up). If multiple start / stop times, list each separately.

## 2018-01-22 NOTE — ROUTINE PROCESS
1/22/18   10:43AM 
Called Mr. Nandini Jones in his room to ask about his PCP. Unable to engage with pt over the phone. EBEN Pitts unable to collect information either. Left  for pt's fiance inquiring about pt's PCP. Awaiting return call.  Melissa Bell CM Specialist

## 2018-01-22 NOTE — PROGRESS NOTES
Bedside and Verbal shift change report given to [facility] (oncoming nurse) by Pru (offgoing nurse). Report included the following information SBAR, Kardex, ED Summary, Procedure Summary, Intake/Output, MAR, Recent Results and Cardiac Rhythm nsr. Dr Francisco Hunter notified that pt refusing BG check. Per MD, no need to call when patient refuses meds or treatments, MD will monitor this note for updates. 8156 pt refusing nitrobid paste \"don't put that on me\", refusing iron and colace. Pt refusing to wear pulse ox. Importance of medications and compliance explained to patient. Pt just shook his head. 0900 pt incont of urine, bathed & returned to bed.  
 
0932 orders for am labs 1011 order noted for dc 
 
1045 left voicemail for Shawna Tacoma [phone] left another voicemail with pt avni iDxon re: dc instructions 1315 spoke to Shawna Tacoma who informed me that she does not think she will be able to care for pt. She states that his home has no running water or electricity. \"I'm 90 lbs and cannot lift him\" \"There is no food in the house\" \"I have no money for his prescriptions, we will have to wait until the first of the month to get his money\" Gisselle from [facility] notified. [address] from [facility] rounded on pt (card placed in chart), states that pt home is being foreclosed on in 1 week & that home has no wheelchair access. Gisselle from [facility] notified. Cm is attempting SNF placement for pt.  
 
1520 pt cannot go to SNF until without sitter for 24 hours. Per Dr Francisco Hunter, cancel dc 
 
4110 pt told tech that he is thinking about killing himself. Pt became tearful and asked to call avni. Via phone avni told pt that she can no longer care for him. Pt became tearful and told avni \"I won't go to a nursing home. I'll just kill myself. \" Dr Francisco Hunter notified. Suicide precautions reinitiated. Telephone order for psych reconsult.   
 
[phone] pt threatening to leave AMA, pt has no transport, aware that he has no home to go to or medications. \"I don't care if I die\"  Meka Jean-Baptiste reached via phone, reassured patient that they would be homeless in a week, [place] and he can't go. She assured patient that she can no longer care for him. Dr Ce Vargas notified, order to have [facility] evaluate patient. Stephy Lozada from [address] [facility] notified, faxed over pt info [phone], if pt tries to leave before eval, call [facility] police to sit with patient. Suyapa with [facility] Crisis concerned that pt is medically unstable for [facility], advised that MD deemed pt medically stable for CT today. Dr Ce Vargas notified, will call Stephy Lozada for clarification. [place]ringlan [address] Per Dr Ce Vargas, [facility] Supervisor taking over case, will eval pt.  
 
1815 Per juan antonio with [facility] he will not be coming for eval because patient is amenable to going to SNF.  
 
1838 Dr Venita Pimentel at bedside, given pt update.

## 2018-01-22 NOTE — PROGRESS NOTES
Talked with pt.'s janice and informed her that pt.'s nurse needs to talk with her . Janice will call her. Ayaan Raphael is asking for a 4 pm discharge home by ambulance . Julián Jimenez

## 2018-01-22 NOTE — PROGRESS NOTES
Henrry informed nurse that pt has no running water and no electricity,etc etc.Henrry states she is working with DSS on these issues. Ida Arroyo would like for pt to go to [facility] or [facility]. Referrals for both sent through cc link. Ana ePñaloza Team B 
[phone]

## 2018-01-22 NOTE — PROGRESS NOTES
Westley Avila karissa [address] 
[facility], [address] [place] [address] 
[phone] Medical Progress Note NAME: Wendy Aponte :  1940 MRM:  [id number] Date/Time: 2018  10:10 AM  
 
 
  
Subjective: Chief Complaint:  Chest pain: was constant, mild to moderate, sore, left sided, denies this AM 
 
ROS: 
(bold if positive, if negative) Tolerating PT  Tolerating Diet Objective:  
 
 
Vitals:  
 
 
  
Last 24hrs VS reviewed since prior progress note. Most recent are: 
 
Visit Vitals  /61 (BP 1 Location: Left arm, BP Patient Position: Sitting)  Pulse 76  Temp 98.1 °F (36.7 °C)  Resp 18  Ht 5' 11\" (1.803 m)  Wt 81.6 kg (180 lb)  SpO2 97%  BMI 25.1 kg/m2 SpO2 Readings from Last 6 Encounters:  
18 97%  
17 100% 09/08/15 98% 08/28/15 100% 01/26/15 99% 01/19/15 98% Intake/Output Summary (Last 24 hours) at 18 1010 Last data filed at 18 0995 Gross per 24 hour Intake                0 ml Output              295 ml Net             -295 ml Exam:  
 
Physical Exam: 
 
Gen:  Well-developed, well-nourished, elderly, in no acute distress HEENT:  Pink conjunctivae, PERRL, hearing intact to voice, moist mucous membranes Neck:  Supple, without masses, thyroid non-tender Resp:  No accessory muscle use, clear breath sounds without wheezes rales or rhonchi 
Card:  No murmurs, normal S1, S2 without thrills, bruits or peripheral edema Abd:  Soft, non-tender, non-distended, normoactive bowel sounds are present, no palpable organomegaly and no detectable hernias Lymph:  No cervical or inguinal adenopathy Musc:  No cyanosis or clubbing Skin:  No rashes or ulcers, skin turgor is good Neuro:  Cranial nerves are grossly intact, no focal motor weakness, follows commands appropriately Psych:  Fair insight, oriented to person, place and time, alert Telemetry reviewed:   normal sinus rhythm Medications Reviewed: (see below) Lab Data Reviewed: (see below) 
 
______________________________________________________________________ Medications:  
 
Current Facility-Administered Medications Medication Dose Route Frequency  mirtazapine (REMERON) tablet 7.5 mg  7.5 mg Oral QHS  hydrALAZINE (APRESOLINE) 20 mg/mL injection 20 mg  20 mg IntraVENous Q6H PRN  
 nitroglycerin (NITROBID) 2 % ointment 1 Inch  1 Inch Topical BID  amLODIPine (NORVASC) tablet 10 mg  10 mg Oral DAILY  aspirin delayed-release tablet 81 mg  81 mg Oral DAILY  atorvastatin (LIPITOR) tablet 40 mg  40 mg Oral DAILY  carvedilol (COREG) tablet 3.125 mg  3.125 mg Oral BID WITH MEALS  ferrous sulfate tablet 325 mg  325 mg Oral BID WITH MEALS  
 hydrALAZINE (APRESOLINE) tablet 50 mg  50 mg Oral TID  sodium chloride (NS) flush 5-10 mL  5-10 mL IntraVENous Q8H  
 sodium chloride (NS) flush 5-10 mL  5-10 mL IntraVENous PRN  
 acetaminophen (TYLENOL) tablet 650 mg  650 mg Oral Q4H PRN  
 HYDROcodone-acetaminophen (NORCO) 5-325 mg per tablet 1 Tab  1 Tab Oral Q4H PRN  
 HYDROmorphone (DILAUDID) injection 0.5 mg  0.5 mg IntraVENous Q4H PRN  
 naloxone (NARCAN) injection 0.4 mg  0.4 mg IntraVENous PRN  
 diphenhydrAMINE (BENADRYL) injection 12.5 mg  12.5 mg IntraVENous Q4H PRN  
 ondansetron (ZOFRAN) injection 4 mg  4 mg IntraVENous Q6H PRN  
 docusate sodium (COLACE) capsule 100 mg  100 mg Oral BID  nicotine (NICODERM CQ) 21 mg/24 hr patch 1 Patch  1 Patch TransDERmal Q24H  
 heparin (porcine) injection 5,000 Units  5,000 Units SubCUTAneous Q8H  
 insulin lispro (HUMALOG) injection   SubCUTAneous QID WITH MEALS  glucose chewable tablet 16 g  4 Tab Oral PRN  
 dextrose (D50W) injection syrg 12.5-25 g  12.5-25 g IntraVENous PRN  
 glucagon (GLUCAGEN) injection 1 mg  1 mg IntraMUSCular PRN Lab Review:  
 
Recent Labs  
   01/21/18 
 0221  01/20/18 [phone] WBC  3.9*  3.8* HGB  9.4*  10.4* HCT  27.8*  30.7* PLT  210  237 Recent Labs  
   01/21/18 
 0221  01/20/18 
 1845 NA  141  144  
K  3.8  3.5 CL  108  108 CO2  27  28 GLU  100  82 BUN  26*  24* CREA  2.49*  2.48* CA  8.1*  8.4* MG  1.6  1.7 PHOS  2.9   --   
ALB  2.2*  2.4* TBILI  0.3  0.4 SGOT  19  19 ALT  9*  9* Lab Results Component Value Date/Time Glucose (POC) 107 01/21/2018 07:07 AM  
 Glucose (POC) 88 01/20/2018 05:59 PM  
 Glucose (POC) 97 07/20/2017 09:23 PM  
 Glucose (POC) 112 07/20/2017 04:40 PM  
 Glucose (POC) 132 07/20/2017 11:33 AM  
 
No results for input(s): PH, PCO2, PO2, HCO3, FIO2 in the last 72 hours. No results for input(s): INR in the last 72 hours. No lab exists for component: INREXT, INREXT No results found for: SDES Lab Results Component Value Date/Time Culture result: MRSA NOT PRESENT 01/19/2015 10:44 AM  
 Culture result:  01/19/2015 10:44 AM  
      Screening of patient nares for MRSA is for surveillance purposes and, if positive, to facilitate isolation considerations in high risk settings. It is not intended for automatic decolonization interventions per se as regimens are not sufficiently effective to warrant routine use. Assessment:  
 
Principal Problem: Hypertensive urgency, malignant (1/20/2018) Active Problems: 
  CKD (chronic kidney disease), stage III (9/5/2015) DM type 2 causing renal disease ([facility] (9/5/2015) Anemia (9/5/2015) CAD (coronary artery disease) () Chest pain at rest (1/20/2018) Acute diastolic CHF (congestive heart failure) ([facility] (1/20/2018) Suicidal ideation (1/21/2018) Plan:  
 
Principal Problem: Hypertensive urgency, malignant (1/20/2018) - due to medical non-compliance - BP okay on current meds as above Active Problems: 
  CKD (chronic kidney disease), stage III (9/5/2015)  - thought to be due to DM but may be more from HTN 
 - creatinine slightly above baseline, mild DANY POA likely due to HTN urgency DM type 2 causing renal disease ([facility] (9/5/2015) - A1c 4.5 without meds at home, may not really have DM, needs outpatient follow up to determine 
 - if he truly doesn't have DM, his CKD is likely due to chronic HTN rather than DM Anemia (9/5/2015) 
 - follow Hgb 
 
  CAD (coronary artery disease) ()/Chest pain at rest (1/20/2018) - no inpatient work up per Cardiology Acute diastolic CHF (congestive heart failure) ([facility] (1/20/2018) - due to HTN  
 - improved with BP control 
 - echo with preserved EF and grade 1 diastolic dysfunction Suicidal ideation (1/21/2018) - Psychiatry signed off and started Remeron, home on Remeron Total time spent with patient: 35 minutes Care Plan discussed with: Patient, Care Manager, Nursing Staff and Maurice Spicer NP Discussed:  Code Status, Care Plan and D/C Planning Prophylaxis:  Hep SQ Disposition:  TBD 
        
___________________________________________________ Attending Physician: Arvind Ferrari MD

## 2018-01-22 NOTE — PROGRESS NOTES
Physical Therapy: Attempted to see patient, patient Coni Kolb with Palliative MD.  We will continue to follow and re-attempt later as able. Thank you Trang Singh PT,DPT,NCS

## 2018-01-22 NOTE — PROGRESS NOTES
Ronine Thornton co-worker with case management will be working with pt on discharge disposition. Please page Ernst Gamino @ [phone] for discharge needs. Sasha Varela B

## 2018-01-22 NOTE — PROGRESS NOTES
Problem: Falls - Risk of 
Goal: *Absence of Falls Document Darling Castañeda Fall Risk and appropriate interventions in the flowsheet. Outcome: Progressing Towards Goal 
Fall Risk Interventions: 
Mobility Interventions: PT Consult for mobility concerns Mentation Interventions: Door open when patient unattended Medication Interventions: Evaluate medications/consider consulting pharmacy Elimination Interventions: Bed/chair exit alarm History of Falls Interventions: Evaluate medications/consider consulting pharmacy

## 2018-01-22 NOTE — ROUTINE PROCESS
1/22/18   3:05PM 
[facility], Dr. Faina Ortiz office, does not typically schedule BERTRAND appts prior to pt being discharged from SNF due to uncertain d/c date. BERTRAND appt will need to be scheduled when pt prepares to leave SNF.  Brandy Marcelo CM Specialist

## 2018-01-22 NOTE — PROGRESS NOTES
I notified [facility] that home health has been cancelled:that pt will be going to SNF . I contacted both [facility] @ [phone] to see if pt is accepted at their facility. I am waiting to hear back from Leonid with [facility]. I am also corresponding with Slime with [facility] to see if they can accept pt today to SNF. Once I know about acceptance to SNF,I will set up transport. I will communicate my findings with pt.'s nurse today before I leave. Daniel Arce Team B 
[phone]

## 2018-01-22 NOTE — PROGRESS NOTES
Bedside and Verbal shift change report given to [facility] (oncoming nurse) by Pru (offgoing nurse). Report included the following information SBAR, Kardex, ED Summary, Procedure Summary, Intake/Output, MAR, Recent Results and Cardiac Rhythm nsr. Dr Qi Benitez notified that pt refusing BG check. Per MD, no need to call when patient refuses meds or treatments, MD will monitor this note for updates. 9435 pt refusing nitrobid paste \"don't put that on me\", refusing iron and colace. Pt refusing to wear pulse ox. Importance of medications and compliance explained to patient. Pt just shook his head. 0900 pt incont of urine, bathed & returned to bed.  
 
0932 orders for am labs 1011 order noted for dc 
 
1045 left voicemail for Kell Julissa [phone] left another voicemail with pt yuliyatarik Morales Risen re: dc instructions 1316 spoke to Alana, expressed concern that she can't take care of pt. \"I'm 90 lbs, I can't lift him. \" \"There is no running water or power at his house\" \"we are getting evicted next month\" \"I don't have the money for his prescriptions, I'll have to wait until the first of the month to get his money\" Gisselle from [employer] notified, will call Ms Maribel Deng. [address] from [facility] rounded on pt (card placed in chart), states that pt home is being foreclosed on in 1 week & that home has no wheelchair access. Gisselle from [employer] notified.  is attempting SNF placement for pt.  
 
1520 pt cannot go to SNF until without sitter for 24 hours. Per Dr Qi Benitez, cancel dc 
 
1550 pt stated to tech that he wants to kill himself. Became tearful and asked to call avni. He again told his fiancee that he was going to kill himself. Dr Maria Isabel Franks notified, telephone order for [facility] to eval patient. Psych reconsulted. Sicide precautions reinstated. \"I won't go to a nursing home. I'll just kill myself. \" Dr Qi Benitez notified.  Suicide precautions reinitiated. Telephone order for psych reconsult. [phone] pt stating that he wants to leave AMA. Pt states that he does not care if he dies. Pt called avni who confirmed that she can no longer care for pt. Deysi Mina states that when the house is foreclosed on that that she is going to live in the woods and that he cannot come with her. Pt has no transport to get home and avni cannot pick him up. Dr Christopher Navarro notified, telephone order for [facility] to eval patient. Lizzeth Kim from [place] [id number] notified, records faxed over [phone]. If patient tries to leave, [facility] police to be called to sit with patient. Suyapa with [facility] Crisis concerned that pt is medically unstable for psych hospital, advised that MD deemed pt medically stable for dc today. Dr Christopher Navarro notified, will call Lizzeth Kim for clarification. Kringlan [address] Per Dr Christopher Navarro, [facility] Supervisor taking over case, will eval pt.  
 
1815 Per juan antonio with [facility] he will not be coming for eval because patient is amenable to going to SNF.  
 
1838 Dr Shay Lea at bedside, given pt update, informed of code atlas yesterday. 1855 per MD pt meets inpatient psych criteria, will go voluntarily. Pt refused nitrobid. Bedside and Verbal shift change report given to Alison (oncoming nurse) by Michael Rosas (offgoing nurse). Report included the following information SBAR, Kardex, Procedure Summary, Intake/Output, MAR, Recent Results and Cardiac Rhythm nsr.   
 
Night RN aware of suicide precautions,

## 2018-01-22 NOTE — PROGRESS NOTES
Problem: Falls - Risk of 
Goal: *Absence of Falls Document Tiago Jack Fall Risk and appropriate interventions in the flowsheet. Outcome: Progressing Towards Goal 
Fall Risk Interventions: 
Mobility Interventions: Bed/chair exit alarm, Communicate number of staff needed for ambulation/transfer, OT consult for ADLs, Patient to call before getting OOB, PT Consult for mobility concerns Mentation Interventions: Bed/chair exit alarm, Door open when patient unattended, Family/sitter at bedside, More frequent rounding, Room close to nurse's station, Toileting rounds, Update white board Medication Interventions: Bed/chair exit alarm, Patient to call before getting OOB Elimination Interventions: Bed/chair exit alarm, Call light in reach, Patient to call for help with toileting needs, Toilet paper/wipes in reach, Toileting schedule/hourly rounds, Urinal in reach History of Falls Interventions: Bed/chair exit alarm, Door open when patient unattended, Room close to nurse's station Problem: Patient Education: Go to Patient Education Activity Goal: Patient/Family Education Outcome: Not Progressing Towards Goal 
Patient attempting to exit bed

## 2018-01-22 NOTE — PROGRESS NOTES
Problem: Self Care Deficits Care Plan (Adult) Goal: *Acute Goals and Plan of Care (Insert Text) Occupational Therapy Goals Initiated 1/22/2018 1. Patient will perform grooming and upper body dressing seated EOB with supervision/set-up within 7 day(s). 2.  Patient will perform lower body dressing bed level rolling side to side with supervision/set-up within 7 day(s). 3.  Patient will perform toilet transfers to drop arm BSC with minimal assistance/contact guard assist within 7 day(s). 4.  Patient will perform all aspects of toileting with minimal assistance/contact guard assist within 7 day(s). 5.  Patient will participate in upper extremity therapeutic exercise/activities with supervision/set-up for 10 minutes within 7 day(s). 6.  Patient will utilize energy conservation techniques during functional activities with verbal and visual cues within 7 day(s). Occupational Therapy EVALUATION Patient: Josue Vidal (14 y.o. male) Date: 1/22/2018 Primary Diagnosis: Hypertensive urgency, malignant Precautions:  Fall ASSESSMENT : 
Based on the objective data described below, the patient presents with impaired cognition and decreased attention to task, decreased strength, endurance, mobility, balance with LOB seated unsupported EOB and poor safety awareness. Pt has h/o a B BKAs and is w/c dependant for mobility. He currently requires up to mod A for LE ADLs bed level, max A for toileting and mod A for lateral transfers to chair with removable arm and drop arm BSC. Pt resides with his fiance who he says assists him with everything. Based on above recommend [facility] therapy vs SNF depending on available assistance at home. Patient will benefit from skilled intervention to address the above impairments. Patients rehabilitation potential is considered to be Guarded Factors which may influence rehabilitation potential include:  
[]             None noted [x]             Mental ability/status []             Medical condition [x]             Home/family situation and support systems [x]             Safety awareness []             Pain tolerance/management 
[]             Other: PLAN : 
Recommendations and Planned Interventions: 
[x]               Self Care Training                  [x]        Therapeutic Activities [x]               Functional Mobility Training    [x]        Cognitive Retraining 
[x]               Therapeutic Exercises           [x]        Endurance Activities [x]               Balance Training                   []        Neuromuscular Re-Education []               Visual/Perceptual Training     [x]   Home Safety Training 
[x]               Patient Education                 [x]        Family Training/Education []               Other (comment): Frequency/Duration: Patient will be followed by occupational therapy 3 times a week to address goals. Discharge Recommendations: [facility] Further Equipment Recommendations for Discharge: TBD SUBJECTIVE:  
Patient stated I am ready to leave.  OBJECTIVE DATA SUMMARY:  
HISTORY:  
Past Medical History:  
Diagnosis Date  Arthritis  CAD (coronary artery disease) 2007  CKD (chronic kidney disease), stage III   
 DM type 2 causing renal disease ([facility]  DVT (deep venous thrombosis) ([facility] Hx of DVT:  Xarelto stopped due to GI bleed. S/P IVC filter.  Gait abnormality  GI bleed  Heart murmur  Hepatitis C   
 History of blood transfusion  Hypercholesterolemia  Hypertension 11/7/2014  Neuropathy  Non compliance w medication regimen  Peripheral vascular disease ([facility] 11/7/2014  
 A. S/P Right SFA POBA and tibial atherectomy (2/4/13).  PUD (peptic ulcer disease) 2007  Unspecified sleep apnea   
 never used cpap Past Surgical History:  
Procedure Laterality Date  ABDOMEN SURGERY PROC UNLISTED  2007  
 has approx 7-8\" midline incision on abdomen--\"had to open him up and clean him out after feeding tube clogged\"  HX GI  2007  
 feeding tube for approx 2 mo  VASCULAR SURGERY PROCEDURE UNLIST Right 1/22/2015  
 popliteal-tibial bypass Prior Level of Function/Environment/Context: Pt resides with his fiance who he says assists him with everything. Expanded or extensive additional review of patient history: obtained from chart and pt Home Situation Home Environment: Private residence # Steps to Enter: 5 (pt bumps up steps in w/c with assistance) One/Two Story Residence: One story Living Alone: No 
Support Systems: Spouse/Significant Other/Partner Patient Expects to be Discharged to[de-identified] Private residence Current DME Used/Available at Home: Wheelchair Tub or Shower Type:  (pt only sponge bathes) [x]  Right hand dominant   []  Left hand dominant EXAMINATION OF PERFORMANCE DEFICITS: 
Cognitive/Behavioral Status: 
Neurologic State: Alert Orientation Level: Oriented X4 Cognition: Appropriate for age attention/concentration; Follows commands Perception: Appears intact Perseveration: No perseveration noted Safety/Judgement: Awareness of environment;Decreased awareness of need for safety; Fall prevention Hearing: Auditory Auditory Impairment: None Vision/Perceptual:   
Acuity: Able to read clock/calendar on wall without difficulty Range of Motion: 
AROM: Generally decreased, functional 
PROM: Generally decreased, functional 
  
  
  
  
  
  
 
Strength: 
Strength: Generally decreased, functional 
  
  
  
  
 
Coordination: 
Coordination: Generally decreased, functional 
Fine Motor Skills-Upper: Left Impaired;Right Impaired Gross Motor Skills-Upper: Left Intact; Right Intact Balance: 
Sitting: Impaired Sitting - Static: Good (unsupported) Sitting - Dynamic: Fair (occasional) Standing:  (pt does not stand) Functional Mobility and Transfers for ADLs: 
Bed Mobility: 
Rolling: Minimum assistance Supine to Sit: Moderate assistance; Additional time;Assist x1 (LOB posteriorly) Sit to Supine: Minimum assistance; Additional time;Assist x1 Scooting: Minimum assistance; Additional time;Assist x1 (using bed rails) Transfers: 
Sit to Stand:  (pt with B BKAs and does not stand or walk) Toilet Transfer : Moderate assistance; Additional time;Assist x1 (to drop arm BSC) ADL Assessment and Intervention: 
Feeding: Modified independent Oral Facial Hygiene/Grooming: Setup; Additional time (seated in bed) Bathing: Minimum assistance; Additional time;Assist x1 (bed level- A for cleanliness and attention to task) Upper Body Dressing: Minimum assistance; Additional time;Assist x1 (A to maintain unsupported balance) Lower Body Dressing: Moderate assistance; Additional time;Assist x1 (rolling side to side in bed- A to pull pants over hips) Toileting: Maximum assistance; Additional time;Assist x1 (A for clothing and hygiene) Cognitive Retraining Safety/Judgement: Awareness of environment;Decreased awareness of need for safety; Fall prevention Functional Measure: 
Barthel Index: 
 
Bathin Bladder: 10 Bowels: 10 
Groomin Dressin Feeding: 10 Mobility: 0 Stairs: 0 Toilet Use: 0 Transfer (Bed to Chair and Back): 5 Total: 40 Barthel and G-code impairment scale: 
Percentage of impairment CH 
0% CI 
1-19% CJ 
20-39% CK 
40-59% CL 
60-79% CM 
80-99% CN 
100% Barthel Score 0-100 100 99-80 79-60 59-40 20-39 1-19 
 0 Barthel Score 0-20 20 17-19 13-16 9-12 5-8 1-4 0 The Barthel ADL Index: Guidelines 1. The index should be used as a record of what a patient does, not as a record of what a patient could do. 2. The main aim is to establish degree of independence from any help, physical or verbal, however minor and for whatever reason. 3. The need for supervision renders the patient not independent. 4. A patient's performance should be established using the best available evidence. Asking the patient, friends/relatives and nurses are the usual sources, but direct observation and common sense are also important. However direct testing is not needed. 5. Usually the patient's performance over the preceding 24-48 hours is important, but occasionally longer periods will be relevant. 6. Middle categories imply that the patient supplies over 50 per cent of the effort. 7. Use of aids to be independent is allowed. Mi Cruz., Barthel, D.W. ([id number]). Functional evaluation: the Barthel Index. [address] [facility] (14)2. Gila Alcocer avril DAREK Webster, Kashif Maria., Ollie Hernandez., [place], [address] (1999). Measuring the change indisability after inpatient rehabilitation; comparison of the responsiveness of the Barthel Index and Functional [place] Measure. Journal of Neurology, Neurosurgery, and Psychiatry, 66(4), 868-300. LYNN Le, RYAN Wilkerson, & Dylon Bryant M.A. (2004.) Assessment of post-stroke quality of life in cost-effectiveness studies: The usefulness of the Barthel Index and the EuroQoL-5D. [facility], 13, 246-13 G codes: In compliance with CMSs Claims Based Outcome Reporting, the following G-code set was chosen for this patient based on their primary functional limitation being treated: The outcome measure chosen to determine the severity of the functional limitation was the Barthel Index with a score of 40/100 which was correlated with the impairment scale. ? Self Care:  
  - CURRENT STATUS: CL - 60%-79% impaired, limited or restricted  - GOAL STATUS: CK - 40%-59% impaired, limited or restricted  - D/C STATUS:  ---------------To be determined--------------- Occupational Therapy Evaluation Charge Determination History Examination Decision-Making LOW Complexity : Brief history review  MEDIUM Complexity : 3-5 performance deficits relating to physical, cognitive , or psychosocial skils that result in activity limitations and / or participation restrictions MEDIUM Complexity : Patient may present with comorbidities that affect occupational performnce. Miniml to moderate modification of tasks or assistance (eg, physical or verbal ) with assesment(s) is necessary to enable patient to complete evaluation Based on the above components, the patient evaluation is determined to be of the following complexity level: LOW Pain: 
Pain Scale 1: Visual 
Pain Intensity 1: 0 Activity Tolerance:  
Poor Please refer to the flowsheet for vital signs taken during this treatment. After treatment:  
[] Patient left in no apparent distress sitting up in chair 
[x] Patient left in no apparent distress in bed 
[x] Call bell left within reach [x] Nursing notified 
[x] Caregiver present 
[] Bed alarm activated COMMUNICATION/EDUCATION:  
The patients plan of care was discussed with: Registered Nurse. [x] Home safety education was provided and the patient/caregiver indicated understanding. [x] Patient/family have participated as able in goal setting and plan of care. [x] Patient/family agree to work toward stated goals and plan of care. [] Patient understands intent and goals of therapy, but is neutral about his/her participation. [] Patient is unable to participate in goal setting and plan of care. This patients plan of care is appropriate for delegation to [facility]. Thank you for this referral. 
Ese Eisenberg OT Time Calculation: 25 mins

## 2018-01-22 NOTE — DISCHARGE SUMMARY
Physician Discharge Summary     Patient ID:  Germán White  [id number]  78 y.o.  1940    Admit date: 1/20/2018    Discharge date: 1/22/2018    Admission Diagnoses: Hypertensive urgency, malignant    Discharge Diagnoses:  Principal Diagnosis Hypertensive urgency, malignant                                            Principal Problem:    Hypertensive urgency, malignant (1/20/2018)    Active Problems:    CKD (chronic kidney disease), stage III (9/5/2015)      DM type 2 causing renal disease ([facility] (9/5/2015)      Anemia (9/5/2015)      CAD (coronary artery disease) ()      Chest pain at rest (1/20/2018)      Acute diastolic CHF (congestive heart failure) ([facility] (1/20/2018)      Suicidal ideation (1/21/2018)         Resolved Problems:  Problem List as of 1/22/2018  Date Reviewed: 1/20/2018          Codes Class Noted - Resolved    Suicidal ideation ICD-10-CM: R45.851  ICD-9-CM: V62.84  1/21/2018 - Present        * (Principal)Hypertensive urgency, malignant ICD-10-CM: I16.0  ICD-9-CM: 401.0  1/20/2018 - Present        Chest pain at rest ICD-10-CM: R07.9  ICD-9-CM: 786.50  1/20/2018 - Present        Acute diastolic CHF (congestive heart failure) ([facility] ICD-10-CM: I50.31  ICD-9-CM: 428.31, 428.0  1/20/2018 - Present        Weakness ICD-10-CM: R53.1  ICD-9-CM: 780.79  7/13/2017 - Present        History of DVT (deep vein thrombosis) (Chronic) ICD-10-CM: Z86.718  ICD-9-CM: V12.51  7/13/2017 - Present        History of GI bleed ICD-10-CM: Z87.19  ICD-9-CM: V12.79  7/13/2017 - Present        Delirium ICD-10-CM: R41.0  ICD-9-CM: 780.09  9/5/2015 - Present        CKD (chronic kidney disease), stage III (Chronic) ICD-10-CM: N18.3  ICD-9-CM: 585.3  9/5/2015 - Present        DM type 2 causing renal disease ([facility]) (Chronic) ICD-10-CM: E11.29  ICD-9-CM: 250.40  9/5/2015 - Present        Anemia (Chronic) ICD-10-CM: D64.9  ICD-9-CM: 285.9  9/5/2015 - Present        Hepatitis C (Chronic) ICD-10-CM: B19.20  ICD-9-CM: 070.70 Unknown - Present        Neuropathy (Chronic) ICD-10-CM: G62.9  ICD-9-CM: 355.9  Unknown - Present        Arthritis (Chronic) ICD-10-CM: M19.90  ICD-9-CM: 716.90  Unknown - Present        PUD (peptic ulcer disease) (Chronic) ICD-10-CM: K27.9  ICD-9-CM: 533.90  Unknown - Present        CAD (coronary artery disease) (Chronic) ICD-10-CM: I25.10  ICD-9-CM: 414.00  Unknown - Present        Ischemic pain of foot ICD-10-CM: M79.673, I99.8  ICD-9-CM: 729.5, 459.9  9/4/2015 - Present        Neuropathic pain of foot (Chronic) ICD-10-CM: G57.90  ICD-9-CM: 355.8  9/4/2015 - Present        ASO (arteriosclerosis obliterans) (Chronic) ICD-10-CM: I70.90  ICD-9-CM: 440.9  1/22/2015 - Present        Peripheral vascular disease ([facility] (Chronic) ICD-10-CM: I73.9  ICD-9-CM: 443.9  11/7/2014 - Present    Overview Signed 11/7/2014  3:25 PM by Radha Davis MD     A. S/P Right SFA POBA and tibial atherectomy (2/4/13). Hypertension (Chronic) ICD-10-CM: I10  ICD-9-CM: 401.9  11/7/2014 - Present        Dyslipidemia (Chronic) ICD-10-CM: E78.5  ICD-9-CM: 272.4  11/7/2014 - Present        Sleep apnea (Chronic) ICD-10-CM: G47.30  ICD-9-CM: 780.57  8/4/2011 - Present    Overview Signed 8/4/2011  3:02 PM by Rolo Resendiz     unspecified             RESOLVED: Fever ICD-10-CM: R50.9  ICD-9-CM: 780.60  9/5/2015 - 9/7/2015        RESOLVED: Sinus tachycardia ICD-10-CM: R00.0  ICD-9-CM: 427.89  9/5/2015 - 9/7/2015        RESOLVED: SIRS (systemic inflammatory response syndrome) ([facility] ICD-10-CM: R65.10  ICD-9-CM: 995.90  9/5/2015 - 9/7/2015        RESOLVED: Insomnia ICD-10-CM: G47.00  ICD-9-CM: 780.52  8/4/2011 - 9/5/2015    Overview Signed 8/4/2011  3:02 PM by Rolo Resendiz     unsepcified               RESOLVED: Poor sleep hygiene ICD-10-CM: L19.946  ICD-9-CM: 307.49  8/4/2011 - 11/7/2014                Hospital Course:   Mr. Farzaneh Howard was admitted to the Hospitalist Service on the 3rd floor for treatment of hypertensive emergentcy.   BP normalized with resuming his previous medication regimen, which he had stopped taking for several months. Troponin was mildly elevated due to strain and Cardiology was consulted but did not feel inpatient work up was needed. Psychiatry was consulted because he expressed some suicidal ideation. Psychiatry started Remeron but did not feel inpatient treatment was needed. He was discharged home on 1/22/2018 in improved condition. However, I have serious concerns about his compliance. These were discussed with the patient but he appeared uninterested in listening. PCP: Johny Warner MD    Consults: Cardiology and Psychiatry    Discharge Exam:  See my Progress Note from today. Disposition: home    Patient Instructions:   Current Discharge Medication List      START taking these medications    Details   mirtazapine (REMERON) 7.5 mg tablet Take 1 Tab by mouth nightly. Qty: 30 Tab, Refills: 0      nicotine (NICODERM CQ) 21 mg/24 hr 1 Patch by TransDERmal route every twenty-four (24) hours for 30 days. Qty: 30 Patch, Refills: 0         CONTINUE these medications which have CHANGED    Details   amLODIPine (NORVASC) 10 mg tablet Take 1 Tab by mouth daily. Qty: 30 Tab, Refills: 0      aspirin delayed-release 81 mg tablet Take 1 Tab by mouth daily. atorvastatin (LIPITOR) 40 mg tablet Take 1 Tab by mouth daily. Qty: 30 Tab, Refills: 0      carvedilol (COREG) 3.125 mg tablet Take 1 Tab by mouth two (2) times daily (with meals). Qty: 60 Tab, Refills: 0      ferrous sulfate 325 mg (65 mg iron) tablet Take 1 Tab by mouth two (2) times daily (with meals). Qty: 60 Tab, Refills: 0      hydrALAZINE (APRESOLINE) 50 mg tablet Take 1 Tab by mouth three (3) times daily.   Qty: 90 Tab, Refills: 0            Activity: Activity as tolerated  Diet: Cardiac Diet  Wound Care: None needed    Follow-up Information     Follow up With Details Comments [address] MD In 1 week if you do not have a PCP [phone] [address] [place] [address]  [phone]            35 minutes were spent on this discharge.     Signed:  Koki Martinez MD  1/22/2018  12:46 PM

## 2018-01-22 NOTE — NURSE NAVIGATOR
Chart reviewed by Heart Failure Nurse Navigator. Heart Failure database completed. Current Echo with EF 50-55% wall thickness moderately increased, grade 1 diastolic dysfunction. ACEi/ARB: not indicated. BB: carvedilol 3.125 mg BID. CRT not indicated. NYHA Functional Class not indicated. Heart Failure Teach Back in Patient Education. Heart Failure Avoiding Triggers on Discharge Instructions.

## 2018-01-22 NOTE — PROGRESS NOTES
Pharmacist Discharge Medication Reconciliation Discharge Provider:  Dr. Christie May Discharge Medications: My Medications TAKE these medications as instructed Instructions Each Dose to Equal   Morning Noon Evening Bedtime  
 
 amLODIPine 10 mg tablet Commonly known as:  Km Reynolds Your last dose was: Your next dose is: Take 1 Tab by mouth daily. 10 mg  
    
   
   
   
  
 aspirin delayed-release 81 mg tablet Start taking on:  1/23/2018 Your last dose was: Your next dose is: Take 1 Tab by mouth daily. 81 mg  
    
   
   
   
  
 atorvastatin 40 mg tablet Commonly known as:  LIPITOR Your last dose was: Your next dose is: Take 1 Tab by mouth daily. 40 mg  
    
   
   
   
  
 carvedilol 3.125 mg tablet Commonly known as:  Silvia Carter Your last dose was: Your next dose is: Take 1 Tab by mouth two (2) times daily (with meals). 3.125 mg  
    
   
   
   
  
 ferrous sulfate 325 mg (65 mg iron) tablet Your last dose was: Your next dose is: Take 1 Tab by mouth two (2) times daily (with meals). 325 mg  
    
   
   
   
  
 hydrALAZINE 50 mg tablet Commonly known as:  APRESOLINE Your last dose was: Your next dose is: Take 1 Tab by mouth three (3) times daily. 50 mg  
    
   
   
   
  
 mirtazapine 7.5 mg tablet Commonly known as:  Mariela Contes Your last dose was: Your next dose is: Take 1 Tab by mouth nightly. 7.5 mg  
    
   
   
   
  
 nicotine 21 mg/24 hr  
Commonly known as:  [place]ne [place] Your last dose was: Your next dose is:    
   
   
 1 Patch by TransDERmal route every twenty-four (24) hours for 30 days. 1 Patch Where to Get Your Medications Information on where to get these meds will be given to you by the nurse or doctor. ! Ask your nurse or doctor about these medications  amLODIPine 10 mg tablet  atorvastatin 40 mg tablet  carvedilol 3.125 mg tablet  hydrALAZINE 50 mg tablet  mirtazapine 7.5 mg tablet  nicotine 21 mg/24 hr The patient's chart, MAR, and AVS were reviewed by Diane Greenfield, PHARMD, Contact: [phone]

## 2018-01-22 NOTE — PROGRESS NOTES
I talked with [facility] who before accepting pt will need the 1:1 sitter discontinued for 24 hours before accepting pt. Per the psychiatrist note,1:1 sitter is not needed for suicide precautions. I notified attending and will discuss with pt.'s nurse. SNF will consider pt for acceptance once pt has been sitter -free for 24 hours. Pt has [address] so SNF also will need to obtain preauthorization for placement. I also notified nurse that pt.'s discharge today has been cancelled. I have not heard back from Fede @ this time. Laura Lozada Team B 
[phone]

## 2018-01-22 NOTE — PROGRESS NOTES
I discussed pt with Trinh Dai with parademic /EMS division who is following pt as he was referred to pt last Thursday from [address] number is [phone] and cell is [phone]. He met with pt and offered to help pt with resources after rehab. Ramandeep People Team B

## 2018-01-22 NOTE — DISCHARGE INSTRUCTIONS
HOSPITALIST DISCHARGE INSTRUCTIONS  NAME: Evelyn Charles   :  1940   MRN:  [id number]     Date/Time:  2018 10:09 AM    ADMIT DATE: 2018     DISCHARGE DATE: 2018     DISCHARGE DIAGNOSIS:  Uncontrolled high blood pressure from not taking your meds    MEDICATIONS:  · It is important that you take the medication exactly as they are prescribed. · Keep your medication in the bottles provided by the pharmacist and keep a list of the medication names, dosages, and times to be taken in your wallet. · Do not take other medications without consulting your doctor. Pain Management: per above medications    What to do at Home    Recommended diet:  Cardiac Diet    Recommended activity: Activity as tolerated    If you experience any of the following symptoms then please call your primary care physician or return to the emergency room if you cannot get hold of your doctor:  Chest pain, shortness of breath, dizziness, weakness, numbness, falling, confusion    Follow Up: Follow-up Information     Follow up With Details Comments [address] [facility] [place] ANDERSON MD In 1 week if you do not have a  [facility]      Blaire Mejia MD   [address] [facility]  [phone]      Anmol Alonso MD In 1 week  [address]  [address] [address] [phone]      Danielle Morrow MD In 1 week  [facility] [address] [facility] [address]  [address] [facility]  [phone]              Information obtained by :  I understand that if any problems occur once I am at home I am to contact my physician. I understand and acknowledge receipt of the instructions indicated above.                                                                                                                                            Physician's or R.N.'s Signature                                                                  Date/Time Patient or Representative Signature                                                          Date/Time    Mirtazapine (By mouth)   Mirtazapine ([id number])  Treats depression. Brand Name(s): Remeron, Remeron Soltab   There may be other brand names for this medicine. When This Medicine Should Not Be Used: This medicine is not right for everyone. Do not use it if you had an allergic reaction to mirtazapine. How to Use This Medicine:   Tablet, Dissolving Tablet  · Take your medicine as directed. Your dose may need to be changed several times to find what works best for you. Your doctor may tell you to take this medicine at bedtime, because it can make you sleepy. · You may need to take this medicine for several weeks before you begin to feel better. · Make sure your hands are dry before you handle the disintegrating tablet. Peel back the foil from the blister pack, then remove the tablet. Do not push the tablet through the foil. Place the tablet in your mouth. After it has melted, swallow or take a drink of water. Do not crush, split, or break the tablet. · This medicine should come with a Medication Guide. Ask your pharmacist for a copy if you do not have one. · Missed dose: Take a dose as soon as you remember. If it is almost time for your next dose, wait until then and take a regular dose. Do not take extra medicine to make up for a missed dose. · Store the medicine in a closed container at room temperature, away from heat, moisture, and direct light. Keep the orally disintegrating tablet in the original package until you are ready to take it. Drugs and Foods to Avoid:   Ask your doctor or pharmacist before using any other medicine, including over-the-counter medicines, vitamins, and herbal products.   · Do not use this medicine and an MAO inhibitor within 14 days of each other.  · Some medicines can affect how mirtazapine works. Tell your doctor if you are using any of the following:  ¨ Buspirone, carbamazepine, cimetidine, diazepam, fentanyl, lithium, nefazodone, phenytoin, rifampicin, Gian's wort, tramadol, or tryptophan  ¨ Other medicine to treat depression, a triptan medicine to treat migraine headaches, medicine to treat an infection, medicine to treat HIV, or a blood thinner (such as warfarin)  · Do not drink alcohol while you are using this medicine. Warnings While Using This Medicine:   · Tell your doctor if you are pregnant or breastfeeding, or if you have kidney disease, liver disease, glaucoma, high cholesterol, heart or blood vessel disease, or a history of seizures, heart attack, or stroke. Tell your doctor if you have phenylketonuria. · For some children, teenagers, and young adults, this medicine may increase mental or emotional problems. This may lead to thoughts of suicide and violence. Talk with your doctor right away if you have any thoughts or behavior changes that concern you. Tell your doctor if you or anyone in your family has a history of bipolar disorder or suicide attempts. · This medicine may cause the following problems:  ¨ Serotonin syndrome (may be life-threatening)  ¨ Decreased white blood cells, which can affect your body's ability to fight an infection  ¨ Low sodium levels in the blood  · Do not stop using this medicine suddenly. Your doctor will need to slowly decrease your dose before you stop it completely. · This medicine may make you dizzy or drowsy. Do not drive or do anything else that could be dangerous until you know how this medicine affects you. Stand or sit up slowly if you feel lightheaded or dizzy. · Your doctor will do lab tests at regular visits to check on the effects of this medicine. Keep all appointments. · Keep all medicine out of the reach of children. Never share your medicine with anyone.   Possible Side Effects While Using This Medicine:   Call your doctor right away if you notice any of these side effects:  · Allergic reaction: Itching or hives, swelling in your face or hands, swelling or tingling in your mouth or throat, chest tightness, trouble breathing  · Anxiety, restlessness, fast heartbeat, fever, sweating, muscle spasms, nausea, vomiting, diarrhea, seeing or hearing things that are not there  · Blistering, peeling, red skin rash  · Eye pain, vision changes, seeing halos around lights  · Confusion, weakness, muscle twitching  · Feeling more excited or energetic than usual  · Fever, chills, cough, sore throat, body aches  · Thoughts of hurting yourself or others, worsening depression, unusual behaviors  If you notice these less serious side effects, talk with your doctor:   · Dry mouth, constipation  · Increased appetite or weight gain  · Sleepiness, tiredness  If you notice other side effects that you think are caused by this medicine, tell your doctor. Call your doctor for medical advice about side effects. You may report side effects to FDA at 5-144-FDA-7099  © 2017 [facility] Information is for End User's use only and may not be sold, redistributed or otherwise used for commercial purposes. The above information is an  only. It is not intended as medical advice for individual conditions or treatments. Talk to your doctor, nurse or pharmacist before following any medical regimen to see if it is safe and effective for you. Nicotine (Nicoderm CQ, Nicoderm CQ Clear, Leader Nicotine Transdermal System, Nicotrol) - (Absorbed through the skin)   Why this medicine is used:   Helps you quit smoking.   Contact a nurse or doctor right away if you have:  · Fast, slow, pounding, or uneven heartbeat     Common side effects:  · Vivid dreams, trouble sleeping  · Mild skin redness, itching, burning, tingling where you wear the patch  · Headache  © 2017 [address] is for End User's use only and may not be sold, redistributed or otherwise used for commercial purposes.

## 2018-01-23 LAB
ALBUMIN SERPL-MCNC: 2.4 G/DL (ref 3.5–5)
ALBUMIN/GLOB SERPL: 0.5 {RATIO} (ref 1.1–2.2)
ALP SERPL-CCNC: 70 U/L (ref 45–117)
ALT SERPL-CCNC: 9 U/L (ref 12–78)
ANION GAP SERPL CALC-SCNC: 9 MMOL/L (ref 5–15)
AST SERPL-CCNC: 21 U/L (ref 15–37)
BASOPHILS # BLD: 0 K/UL (ref 0–0.1)
BASOPHILS NFR BLD: 0 % (ref 0–1)
BILIRUB SERPL-MCNC: 0.3 MG/DL (ref 0.2–1)
BUN SERPL-MCNC: 34 MG/DL (ref 6–20)
BUN/CREAT SERPL: 13 (ref 12–20)
CALCIUM SERPL-MCNC: 8.5 MG/DL (ref 8.5–10.1)
CHLORIDE SERPL-SCNC: 107 MMOL/L (ref 97–108)
CO2 SERPL-SCNC: 23 MMOL/L (ref 21–32)
CREAT SERPL-MCNC: 2.61 MG/DL (ref 0.7–1.3)
EOSINOPHIL # BLD: 0.1 K/UL (ref 0–0.4)
EOSINOPHIL NFR BLD: 2 % (ref 0–7)
ERYTHROCYTE [DISTWIDTH] IN BLOOD BY AUTOMATED COUNT: 16.4 % (ref 11.5–14.5)
GLOBULIN SER CALC-MCNC: 4.9 G/DL (ref 2–4)
GLUCOSE BLD STRIP.AUTO-MCNC: 77 MG/DL (ref 65–100)
GLUCOSE BLD STRIP.AUTO-MCNC: 83 MG/DL (ref 65–100)
GLUCOSE BLD STRIP.AUTO-MCNC: 85 MG/DL (ref 65–100)
GLUCOSE SERPL-MCNC: 120 MG/DL (ref 65–100)
HCT VFR BLD AUTO: 29.6 % (ref 36.6–50.3)
HGB BLD-MCNC: 9.8 G/DL (ref 12.1–17)
LYMPHOCYTES # BLD: 2.2 K/UL (ref 0.8–3.5)
LYMPHOCYTES NFR BLD: 52 % (ref 12–49)
MAGNESIUM SERPL-MCNC: 1.6 MG/DL (ref 1.6–2.4)
MCH RBC QN AUTO: 27.2 PG (ref 26–34)
MCHC RBC AUTO-ENTMCNC: 33.1 G/DL (ref 30–36.5)
MCV RBC AUTO: 82.2 FL (ref 80–99)
MONOCYTES # BLD: 0.3 K/UL (ref 0–1)
MONOCYTES NFR BLD: 6 % (ref 5–13)
NEUTS SEG # BLD: 1.7 K/UL (ref 1.8–8)
NEUTS SEG NFR BLD: 40 % (ref 32–75)
PHOSPHATE SERPL-MCNC: 3.7 MG/DL (ref 2.6–4.7)
PLATELET # BLD AUTO: 252 K/UL (ref 150–400)
POTASSIUM SERPL-SCNC: 4.4 MMOL/L (ref 3.5–5.1)
PROT SERPL-MCNC: 7.3 G/DL (ref 6.4–8.2)
RBC # BLD AUTO: 3.6 M/UL (ref 4.1–5.7)
SERVICE CMNT-IMP: NORMAL
SODIUM SERPL-SCNC: 139 MMOL/L (ref 136–145)
WBC # BLD AUTO: 4.3 K/UL (ref 4.1–11.1)

## 2018-01-23 PROCEDURE — 85025 COMPLETE CBC W/AUTO DIFF WBC: CPT | Performed by: INTERNAL MEDICINE

## 2018-01-23 PROCEDURE — 65660000000 HC RM CCU STEPDOWN

## 2018-01-23 PROCEDURE — 51798 US URINE CAPACITY MEASURE: CPT

## 2018-01-23 PROCEDURE — 74011250637 HC RX REV CODE- 250/637: Performed by: INTERNAL MEDICINE

## 2018-01-23 PROCEDURE — 74011250637 HC RX REV CODE- 250/637: Performed by: PSYCHIATRY & NEUROLOGY

## 2018-01-23 PROCEDURE — 74011250636 HC RX REV CODE- 250/636: Performed by: INTERNAL MEDICINE

## 2018-01-23 PROCEDURE — 84100 ASSAY OF PHOSPHORUS: CPT | Performed by: INTERNAL MEDICINE

## 2018-01-23 PROCEDURE — 82962 GLUCOSE BLOOD TEST: CPT

## 2018-01-23 PROCEDURE — 36415 COLL VENOUS BLD VENIPUNCTURE: CPT | Performed by: INTERNAL MEDICINE

## 2018-01-23 PROCEDURE — 80053 COMPREHEN METABOLIC PANEL: CPT | Performed by: INTERNAL MEDICINE

## 2018-01-23 PROCEDURE — 83735 ASSAY OF MAGNESIUM: CPT | Performed by: INTERNAL MEDICINE

## 2018-01-23 PROCEDURE — 74011000250 HC RX REV CODE- 250: Performed by: INTERNAL MEDICINE

## 2018-01-23 RX ORDER — HYDRALAZINE HYDROCHLORIDE 25 MG/1
100 TABLET, FILM COATED ORAL 3 TIMES DAILY
Status: DISCONTINUED | OUTPATIENT
Start: 2018-01-23 | End: 2018-01-24 | Stop reason: HOSPADM

## 2018-01-23 RX ORDER — HYDRALAZINE HYDROCHLORIDE 100 MG/1
100 TABLET, FILM COATED ORAL 3 TIMES DAILY
Qty: 90 TAB | Refills: 0 | Status: SHIPPED | OUTPATIENT
Start: 2018-01-23 | End: 2018-03-02

## 2018-01-23 RX ADMIN — HYDRALAZINE HYDROCHLORIDE 100 MG: 25 TABLET, FILM COATED ORAL at 21:39

## 2018-01-23 RX ADMIN — HYDRALAZINE HYDROCHLORIDE 100 MG: 25 TABLET, FILM COATED ORAL at 09:08

## 2018-01-23 RX ADMIN — ATORVASTATIN CALCIUM 40 MG: 20 TABLET, FILM COATED ORAL at 21:38

## 2018-01-23 RX ADMIN — CARVEDILOL 3.12 MG: 3.12 TABLET, FILM COATED ORAL at 09:08

## 2018-01-23 RX ADMIN — DEXTROSE MONOHYDRATE 12.5 G: 25 INJECTION, SOLUTION INTRAVENOUS at 11:23

## 2018-01-23 RX ADMIN — Medication 10 ML: at 14:00

## 2018-01-23 RX ADMIN — Medication 10 ML: at 06:00

## 2018-01-23 RX ADMIN — MIRTAZAPINE 7.5 MG: 15 TABLET, FILM COATED ORAL at 21:38

## 2018-01-23 RX ADMIN — HEPARIN SODIUM 5000 UNITS: 5000 INJECTION, SOLUTION INTRAVENOUS; SUBCUTANEOUS at 21:41

## 2018-01-23 RX ADMIN — AMLODIPINE BESYLATE 10 MG: 5 TABLET ORAL at 21:37

## 2018-01-23 RX ADMIN — Medication 10 ML: at 21:42

## 2018-01-23 NOTE — CONSULTS
Psychiatric Follow Up Note  Ashutohs Bowden MD  [phone]    Date: 1/22/2018  Account Number:  [de-identified]  Name: Mesfin Vazquez PROGRESS NOTE:  To include the following:   Coordinated treatment team rounds conducted with patient. Discussions held with nursing staff. Chart reviewed in full including consultant notes, ancillary staff notes, vitals and labs in [facility] EMR reviewed in full. SUBJECTIVE:     Please see full consult note dated yesterday. I was asked to see the pt again today as he has been making suicidal statements again. Told nurse and tech that he is having thoughts of harming himself and does not want to live any more. Is now upset that he cannot go home based on fiancees report that there is no heat or electricity in the house and that she is being evicted for non payment of mortgage payment. Pt states he is upset since \"we have lived in that house for 20 years\". Piero Wang is apparently physically not able to take care of himself. When asked if he has thoughts of suicide he tells me \"I answered that for you yesterday\". Then becomes quiet and starts looking other way. After I repeat the question he nods as if saying yes. Does not answer when asked if he has a plan. I suggested inpatient psych and he states he will be willing to go \"I need help\". Also states \"I don't want to go a nursing home\"    Per staff he has been irritable and threatening to leave at times. Side Effects:  None reported or admitted to. OBJECTIVE:                   Mental Status exam:   Oriented X4. Mood: depressed. Affect: Constricted. Normal speech. Denies HI. Has some SI but does not say if he has a plan. Does not contract for safety. No delusions. No hallucinations. Thought process logical and goal directed. Concentration limited.  Insight/judgement Fair    Pertinent data:  Patient Vitals for the past 8 hrs:   BP Temp Pulse Resp SpO2   01/22/18 2000 (!) 194/94 98.2 °F (36.8 °C) 69 15 95 % 01/22/18 1537 178/89 98.5 °F (36.9 °C) 72 13 97 %   01/22/18 1500 - - 71 - -     Recent Results (from the past 24 hour(s))   GLUCOSE, POC    Collection Time: 01/22/18  9:24 PM   Result Value Ref Range    Glucose (POC) 127 (H) 65 - 100 mg/dL    Performed by Mica Chapman (PCT)        Medications:  Current Facility-Administered Medications   Medication Dose Route Frequency    mirtazapine (REMERON) tablet 7.5 mg  7.5 mg Oral QHS    hydrALAZINE (APRESOLINE) 20 mg/mL injection 20 mg  20 mg IntraVENous Q6H PRN    nitroglycerin (NITROBID) 2 % ointment 1 Inch  1 Inch Topical BID    amLODIPine (NORVASC) tablet 10 mg  10 mg Oral DAILY    aspirin delayed-release tablet 81 mg  81 mg Oral DAILY    atorvastatin (LIPITOR) tablet 40 mg  40 mg Oral DAILY    carvedilol (COREG) tablet 3.125 mg  3.125 mg Oral BID WITH MEALS    ferrous sulfate tablet 325 mg  325 mg Oral BID WITH MEALS    hydrALAZINE (APRESOLINE) tablet 50 mg  50 mg Oral TID    sodium chloride (NS) flush 5-10 mL  5-10 mL IntraVENous Q8H    sodium chloride (NS) flush 5-10 mL  5-10 mL IntraVENous PRN    acetaminophen (TYLENOL) tablet 650 mg  650 mg Oral Q4H PRN    HYDROcodone-acetaminophen (NORCO) 5-325 mg per tablet 1 Tab  1 Tab Oral Q4H PRN    HYDROmorphone (DILAUDID) injection 0.5 mg  0.5 mg IntraVENous Q4H PRN    naloxone (NARCAN) injection 0.4 mg  0.4 mg IntraVENous PRN    diphenhydrAMINE (BENADRYL) injection 12.5 mg  12.5 mg IntraVENous Q4H PRN    ondansetron (ZOFRAN) injection 4 mg  4 mg IntraVENous Q6H PRN    docusate sodium (COLACE) capsule 100 mg  100 mg Oral BID    nicotine (NICODERM CQ) 21 mg/24 hr patch 1 Patch  1 Patch TransDERmal Q24H    heparin (porcine) injection 5,000 Units  5,000 Units SubCUTAneous Q8H    insulin lispro (HUMALOG) injection   SubCUTAneous QID WITH MEALS    glucose chewable tablet 16 g  4 Tab Oral PRN    dextrose (D50W) injection syrg 12.5-25 g  12.5-25 g IntraVENous PRN    glucagon (GLUCAGEN) injection 1 mg  1 mg IntraMUSCular PRN       Scheduled Medications:  Current Facility-Administered Medications   Medication Dose Route Frequency    mirtazapine (REMERON) tablet 7.5 mg  7.5 mg Oral QHS    nitroglycerin (NITROBID) 2 % ointment 1 Inch  1 Inch Topical BID    amLODIPine (NORVASC) tablet 10 mg  10 mg Oral DAILY    aspirin delayed-release tablet 81 mg  81 mg Oral DAILY    atorvastatin (LIPITOR) tablet 40 mg  40 mg Oral DAILY    carvedilol (COREG) tablet 3.125 mg  3.125 mg Oral BID WITH MEALS    ferrous sulfate tablet 325 mg  325 mg Oral BID WITH MEALS    hydrALAZINE (APRESOLINE) tablet 50 mg  50 mg Oral TID    sodium chloride (NS) flush 5-10 mL  5-10 mL IntraVENous Q8H    docusate sodium (COLACE) capsule 100 mg  100 mg Oral BID    nicotine (NICODERM CQ) 21 mg/24 hr patch 1 Patch  1 Patch TransDERmal Q24H    heparin (porcine) injection 5,000 Units  5,000 Units SubCUTAneous Q8H    insulin lispro (HUMALOG) injection   SubCUTAneous QID WITH MEALS         ASSESSMENT/PLAN:       Diagnoses:  MDD recurrent moderate  Cannabis abuse         Plan:  Continue Remeron 7.5 mg qhs for now. Risk/benefits/alternatives of meds discussed with patient who provided informed verbal consent. Continue 1:1 sitter    Needs inpatient psych admission. Willing to go at this time. If threatens to leave please call [facility] again at [phone] or [phone] for possible TDO. They apparently were contacted earlier and they refused to come and see pt. In my opinion pt is a danger to self and needs inpt psych. Will follow patient's course along with you as necessary. Thank you for the opportunity to participate in the care of your patient.     Signed By: Xenia Frazier MD

## 2018-01-23 NOTE — PROGRESS NOTES
Problem: Falls - Risk of 
Goal: *Absence of Falls Document Kikatarik Mamta Fall Risk and appropriate interventions in the flowsheet. Outcome: Progressing Towards Goal 
Fall Risk Interventions: 
Mobility Interventions: Bed/chair exit alarm Mentation Interventions: Bed/chair exit alarm Medication Interventions: Bed/chair exit alarm Elimination Interventions: Call light in reach History of Falls Interventions: Bed/chair exit alarm

## 2018-01-23 NOTE — PROGRESS NOTES
I am in the process of setting up BLS transport for pt to [facility] inpatient psychiatry. Transport informed pt is on suicide precautions. Nursing the number to call report is [phone]. S ambulance has been confirmed for a 5 pm transport. Thank you. Paula Lamas Team B with Dr Kristin Dolan [phone]

## 2018-01-23 NOTE — PROGRESS NOTES
Bedside and Verbal shift change report given to [facility] (oncoming nurse) by Bunny Ribeiro (offgoing nurse). Report included the following information SBAR, Kardex, Intake/Output, MAR, Recent Results and Cardiac Rhythm nsr ST dep. Per report pt had to be straight cathed overnight for retention. At time of report, pt attempting to exit bed. Overnight pt threatened to throw self out window & throw tele box at tech. Patient refused am labs. Sitter at bedside 2323 new order to increase hydralazine to 100 mg.  
 
0906 Dr Juanjo Smith notified of pt threat of harm and self harm. Per MD do not call for every refusal, md will check this note. 2972 patient refusing all meds at this time. Importance of meds and compliance explained. Risks explained. Dr Juanjo Smith notified. 1032 Dr Juanjo Smith at bedside, pt has agreed to take po meds 1045 pt took hydralazine & coreg, refused all else. MD notified. 1118 pt BG 77, refusing juice. 1136 pt given 12.5 of D50, explained purpose, informed pt that we would need to recheck BG in 15 minutes. Flat affect, pt did not answer. Dr Juanjo Smith notified. 1200 order for consult to nephrology 1220 Per Gisselle in CM pt has a bed avail at [facility] if pt can be cleared by nephrology. [facility] will need to be notified by 8 pm. Dr Glendy Meyers notified, he will call Dr Akanksha Rousseau.  
 
[phone] report called to Roseline Juarez at [facility]. [phone], pt to be transported via ambulance at 1700. Will call prior to leaving to inform RN that pt is still coming voluntarily. IV to be left in place. 1630 pt refusing vital signs, MD notified. Will fill out EMTALA with 1500 vitals. 1750 pt refusing all medications Dedra Mendieta notified of patient transfer and room number. Transport has arrived for patient. 1825 patient refusing to leave with transport. Dr Juanjo Smith notified. Pt explained that he is a danger to himself and needs treatment, and it will be easier if he goes voluntarily. Pt refusing.   
 
Emma 32 Verenice Marking with [address] [phone] notified of refusal, need for TDO, awaiting return call. 102 [facility] police put on notice, will have  call police when TDO obtained Rosyyosef Frye stated that pt POA is Valentino Dolphin (sister) [phone] Jeana Nathanffer from [address] [place], [place] [address]Th  stated that she will be out to evaluate patient. 6914 Dr Richelle Calvin note faxed to Jeana Chapman at [address] [place], [place] [address]Th St [phone] (fax). Jaime Obrien from [facility] given update, she will call [facility] and inquire about holding bed. 1909 per Raleigh Beck 1640 ([phone]) once TDO obtained, call [facility] Will-Call [phone] and let them know that pt is ready. Please call [facility] and let them know time of arrival.  
 
Rosa M Stroud from [address] [place] [address] at bedside Bedside and Verbal shift change report given to Dionne (oncoming nurse) by Quinn Dubon (offgoing nurse). Report included the following information SBAR, Kardex, Procedure Summary, Intake/Output, MAR, Recent Results and Cardiac Rhythm nsr st dep 1ndeg. Night RN aware: 
 
1) once TDO obtained & served to patient by police 2) alert [employer] Jacklyn tenorio Laishamasonwili) 3) Call [facility] [phone] to let them know that patient is ready to be transported 4) Call [facility] [phone] and let them know ETA 5) Call avni Sparksor [phone] and let her know that patient is transferred

## 2018-01-23 NOTE — PROGRESS NOTES
Problem: Falls - Risk of 
Goal: *Absence of Falls Document Marquez Oleary Fall Risk and appropriate interventions in the flowsheet. Outcome: Progressing Towards Goal 
Fall Risk Interventions: 
Mobility Interventions: Communicate number of staff needed for ambulation/transfer, OT consult for ADLs, Patient to call before getting OOB, PT Consult for mobility concerns Mentation Interventions: Family/sitter at bedside, Door open when patient unattended, More frequent rounding, Reorient patient, Room close to nurse's station Medication Interventions: Bed/chair exit alarm Elimination Interventions: Urinal in reach, Bed/chair exit alarm History of Falls Interventions: Bed/chair exit alarm, Room close to nurse's station Problem: Hypertension Goal: *Blood pressure within specified parameters Outcome: Not Progressing Towards Goal 
Patient still hypertensive, often refusing meds

## 2018-01-23 NOTE — PROGRESS NOTES
I received notification from [facility] inpatient psychiatry that Dr Cy Jean-Baptiste has accepted pt to her services. Pt will be going to bed assignment 725 A. I notified attending regarding pt.'s increase in his BUN/creatinine and am waiting to hear back from Dr Maura Martin to let me know if pt is medically stable to transfer to geriatric psychiatry today. [facility] cannot [place] until another pt has discharged. Seema Roberts with bed board @ [facility] will inform me when pt has discharged so I can set up ambulance transport for pt. During transport,transport must be informed that pt is on suicide precautions. Ana Peñaloza Team B with Dr Maura Martin [phone] Nursing -Very important-If pt is cleared by nephrology Today,if it is after I leave ,pt.'s nurse either on day shift or evening will need to call [phone] to inform them that pt will be coming this evening. It is crucial that nursing call the [phone] number before 8 pm to inform them that pt is coming . Pt will need to go by ambulance on suicide precautions. [facility] can accept pt.'s 24/7 but they need to know pt is coming before 8 pm or they will not hold the kristel-psych bed and it is extremely difficult to secure a kristel-psych bed. Thank you. Ana Peñaloza Team B with Dr Maura Martin

## 2018-01-23 NOTE — PROGRESS NOTES
I discussed pt with pt.'s nurse. [facility] did not come in to evaluate pt yesterday. Instead,crisis did a phone conversation with pt. Dr Jeremy Qui reevaluated pt and is recommending inpatient psychiatric placement for continued suicidal ideations/statements. I met with pt this morning to see if pt s willing to come to inpatient psychiatric unit. Pt told \"yes,I am but I don't want to talk further. \" I contacted Rudy Weeks with the bedboard @ [facility] @ [phone]. Currently,the geriatric unit @ [facility] is full and beds ae tight @ [employer] but she will notify me when there is a kristel-psychiatric bed available. Anais Pinto Team B with Dr Mayo Meredith [phone] Nursing,if pt threatens to leave or states he is not willing to go to inpatient psychiatry,please contact me immediately @ [phone] and per Dr Stanley Rodriges order,I will recall [facility] to have pt evaluated for a TDO. Thank you, Anais Pinto

## 2018-01-23 NOTE — PROGRESS NOTES
01/23/18 0158 Unmeasurable Output Urine Occurrence(s) 2 Straight Cath Straight Cath Nurse performed cath Number of Attempts 1 Catheter Size 16 FR Time Catheter Inserted 0150 Time Catheter Removed 0153 Urine 300 mL After urinating pt still felt like he needed to urinate. Bladder scanned with total greater than 410. Called doctor Tayler that ordered one time order to straight cath. Pt struggled to tolerate but 300 was removed.

## 2018-01-23 NOTE — PROGRESS NOTES
Problem: Falls - Risk of 
Goal: *Absence of Falls Document Cynthia Asunciontabatha Fall Risk and appropriate interventions in the flowsheet. Outcome: Progressing Towards Goal 
Fall Risk Interventions: 
Mobility Interventions: Communicate number of staff needed for ambulation/transfer, OT consult for ADLs, Patient to call before getting OOB, PT Consult for mobility concerns Mentation Interventions: Family/sitter at bedside, Door open when patient unattended, More frequent rounding, Reorient patient, Room close to nurse's station Medication Interventions: Bed/chair exit alarm Elimination Interventions: Urinal in reach, Bed/chair exit alarm History of Falls Interventions: Bed/chair exit alarm, Room close to nurse's station Problem: Patient Education: Go to Patient Education Activity Goal: Patient/Family Education Outcome: Not Progressing Towards Goal 
Patient attempts bed exits despite safety warnings Problem: Suicide/Homicide (Adult/Pediatric) Goal: *STG:  Verbalizes alternative ways of dealing with maladaptive feelings/behaviors Outcome: Not Progressing Towards Goal 
Patient withdrawn

## 2018-01-23 NOTE — PROGRESS NOTES
Spoke with patient's nurse, [facility],  when noted that patient had not been picked up for his 5pm transport. When the ambulance arrived to  the patient who had stated he would be admitted to inpatient psych voluntarily, patient refused to be transferred. At this time [facility] has been called to come and assess the patient for a TDO. Called [facility]'s inpatient psych unit and made Lola Martins aware of the occurrence in the patient's care affecting the transfer. Made her aware that we were awaiting [facility] to assist in getting a TDO on the patient so he could still transfer this evening to [facility]. She expressed understanding and made her aware that we would be calling her back with an update as soon as [facility] evaluated. Also sent message through [employer] for [facility] to put on Will Call for after TDO evaluation done by [facility]. Called the floor and gave the information to [facility], the patient's nurse that I had called [facility] and spoke with Lola Martins and that there was not any expression of not being able to take the patient once the TDO was in place. Also gave her the number to call [facility] to make them aware of the readiness of the patient to transfer. Made her aware that they would need to call [facility] back when patient was ready for transfer so they could estimate the time of arrival. / Julissa Bee of Case Management [facility]: [phone]

## 2018-01-23 NOTE — PROGRESS NOTES
I greatly appreciate Dr Gonzalez Forward prompt evaluation and clearance of pt so he can go to [facility] inpatient psychiatry once the bed @ [facility] is ready for pt to come. For discharge: 1. Pt will be transported by [facility] services . Suicide precautions will be maintained throughout transport. 2.Dr Jared Baptiste is the acceprting psychiatrist.. 3. Pt will be admitted to room [address] informs me of the requested time for transport,then I will find out the number to call report. 5.PCS prepared for future transport to [facility] and   placed in pt.'s bedside chart. 6.Once pt is ready for transfer,I will inform pt and will call his fiancee. 7.When pt is discharged from [facility] if possible contact Trang Epstein with [facility] @ [phone]. I placed this number on pt.'s AVS, Thank you. Lizzette Lamas Team B with Dr Luis Fernando Martin [phone]

## 2018-01-23 NOTE — DISCHARGE SUMMARY
Physician Discharge Summary     Patient ID:  Itzel Bowen  [id number]  78 y.o.  1940    Admit date: 1/20/2018    Discharge date: 1/23/2018    Admission Diagnoses: Hypertensive urgency, malignant    Principal Discharge Diagnoses:    Hypertensive urgency    OTHER PROBLEMS ADDRESSEDS  Principal Diagnosis Hypertensive urgency, malignant                                            Principal Problem:    Hypertensive urgency, malignant (1/20/2018)    Active Problems:    CKD (chronic kidney disease), stage III (9/5/2015)      DM type 2 causing renal disease ([facility] (9/5/2015)      Anemia (9/5/2015)      CAD (coronary artery disease) ()      Chest pain at rest (1/20/2018)      Acute diastolic CHF (congestive heart failure) ([facility] (1/20/2018)      Suicidal ideation (1/21/2018)       Patient Active Problem List   Diagnosis Code    Sleep apnea G47.30    Peripheral vascular disease ([facility] I73.9    Hypertension I10    Dyslipidemia E78.5    ASO (arteriosclerosis obliterans) I70.90    Ischemic pain of foot M79.673, I99.8    Neuropathic pain of foot G57.90    Delirium R41.0    CKD (chronic kidney disease), stage III N18.3    DM type 2 causing renal disease (HCC) E11.29    Anemia D64.9    Hepatitis C B19.20    Neuropathy G62.9    Arthritis M19.90    PUD (peptic ulcer disease) K27.9    CAD (coronary artery disease) I25.10    Weakness R53.1    History of DVT (deep vein thrombosis) Z86.718    History of GI bleed Z87.19    Hypertensive urgency, malignant I16.0    Chest pain at rest R07.9    Acute diastolic CHF (congestive heart failure) (HCC) I50.31    Suicidal ideation R45.851         Hospital Course:   Mr. Maikel Harry remained stable overnight. He remains inconsistently compliant with medications and intervention. Psych evaluated him and thought patient required admission to inpatient psych due to suicidal ideations. I agree that he would benefit from inpatient psychiatry intervention.  Unfortunately, unless Mr. Penelope Urrutia becomes more compliant with medical therapy, his overall outlook and prognosis is poor. I did increase his hydralazine dose again today. To be discharged on meds as below. He will need to follow up with nephrology re: renal insufficiency. Discussed with Dr. Irene Conde. See Dr. Belem Canales notes from yesterday for details    Pt discharged in improved and stable condition. Procedures performed: none    Imaging studies:   CXR - CHF pattern    TTE - Left ventricle: Systolic function was at the lower limits of normal.  Ejection fraction was estimated in the range of 50 % to 55 %. There were  no regional wall motion abnormalities. Wall thickness was moderately  increased. Doppler parameters were consistent with abnormal left  ventricular relaxation (grade 1 diastolic dysfunction). Right ventricle: The size was normal. Systolic function was normal.    Left atrium: The atrium was mildly dilated. Aorta, systemic arteries: There was mild dilatation of the aortic arch. The maximal diameter across the sinuses of Valsalva diameter at  end-diastole was 41 mm. PCP: Johny Warner MD    Consults: Cardiology, Nephrology, Psychiatry and Palliative Care    Discharge Exam:  Patient Vitals for the past 12 hrs:   Temp Pulse Resp BP SpO2   01/23/18 1502 98.1 °F (36.7 °C) 61 16 167/71 100 %   01/23/18 1500 - 65 - - -   01/23/18 1254 98.5 °F (36.9 °C) 73 18 154/68 100 %   01/23/18 1032 - - - 189/82 -   01/23/18 0756 98.7 °F (37.1 °C) 77 18 175/84 99 %     GEN: NAD  CV: RRR  RESP: CTAB      Disposition: inpatient psych    Patient Instructions:   Current Discharge Medication List      START taking these medications    Details   mirtazapine (REMERON) 7.5 mg tablet Take 1 Tab by mouth nightly. Qty: 30 Tab, Refills: 0      nicotine (NICODERM CQ) 21 mg/24 hr 1 Patch by TransDERmal route every twenty-four (24) hours for 30 days.   Qty: 30 Patch, Refills: 0         CONTINUE these medications which have CHANGED Details   hydrALAZINE (APRESOLINE) 100 mg tablet Take 1 Tab by mouth three (3) times daily. Qty: 90 Tab, Refills: 0      amLODIPine (NORVASC) 10 mg tablet Take 1 Tab by mouth daily. Qty: 30 Tab, Refills: 0      aspirin delayed-release 81 mg tablet Take 1 Tab by mouth daily. atorvastatin (LIPITOR) 40 mg tablet Take 1 Tab by mouth daily. Qty: 30 Tab, Refills: 0      carvedilol (COREG) 3.125 mg tablet Take 1 Tab by mouth two (2) times daily (with meals). Qty: 60 Tab, Refills: 0      ferrous sulfate 325 mg (65 mg iron) tablet Take 1 Tab by mouth two (2) times daily (with meals). Qty: 60 Tab, Refills: 0             Activity: See discharge instructions  Diet: See discharge instructions  Wound Care: See discharge instructions    Follow-up Information     Follow up With Details Comments Contact Info    Bibiana Khan MD In 1 week if you do not have a  [facility]      Geovanny Granados MD   [address] [facility]  [phone]      Jeanie Quevedo MD In 1 week  [address] [facility] [address] [id number]  [phone]      Cornell Badillo MD In 1 week  [address] [place]  [address] [place] [address] [phone]      Johny Warner MD   Patient can only remember the practice name and not the physician        Kevin Clay with [facility] -[phone]           I spent 35 minutes on this discharge.     Signed:  Helena Coughlin MD  1/23/2018  6:52 PM

## 2018-01-23 NOTE — PROGRESS NOTES
HYPOGLYCEMIC EPISODE DOCUMENTATION Patient with hypoglycemic episode(s) at 1114(time) on 1/23(date). BG value(s) pre-treatment 77 Was patient symptomatic? [] yes, [x] no Patient was treated with the following rescue medications/treatments: [x] D50 [] Glucose tablets 
              [] Glucagon 
              [] 4oz juice 
              [] 6oz reg soda 
              [] 8oz low fat milk BG value post-treatment: 85 Once BG treated and value greater than 80mg/dl, pt was provided with the following: 
[] snack [x] meal 
Name of MD notified:Tuan The following orders were received: None NOTE: THIS PATIENT HAS REFUSED ALMOST ALL BLOOD GLUCOSE CHECKS. HE OFTEN REFUSES MEALS. PATIENT REFUSED TO DRINK JUICE TO TREAT HYPOGLYCEMIA.

## 2018-01-23 NOTE — PROGRESS NOTES
I attempted to call pt.'s avni @ [phone] to inform her regarding pt.'s discharge plans. Noone answered so I left her my office phone number.  
Mark Garza

## 2018-01-23 NOTE — PROGRESS NOTES
In the beginning of my shift, pt expressed need to want to throw himself out of the window. He asked to be moved from the bed to chair and back again. He stated his legs and abd hurt. He became irate when he was told he needed to find a place to stay for a period longer than 5 minutes. He threatened to throw his tele box at the PCT and out the window. He had moment of cooperation and other times of combativeness.

## 2018-01-23 NOTE — CONSULTS
Consult rec. Chart rev. Pt is stable FROM Nephrology point of view to get admitted to In Pt Psych at [facility]. Pt has mild DANY on CKD . CKD: DM,HTN  DANY:? HTN ATN vs other  UA: Protein : most likely DM,HTN, due to some RBCs: serologies. Pt needs to be f up by Nephrologist at [facility]  We are available at [facility]. If pt stays at [facility] ,we will follow him here. Thank you for allowing us to participate in this patient's care.

## 2018-01-24 ENCOUNTER — HOSPITAL ENCOUNTER (INPATIENT)
Age: 78
LOS: 37 days | Discharge: HOME | DRG: 881 | End: 2018-03-02
Attending: PSYCHIATRY & NEUROLOGY | Admitting: PSYCHIATRY & NEUROLOGY
Payer: MEDICARE

## 2018-01-24 VITALS
SYSTOLIC BLOOD PRESSURE: 172 MMHG | TEMPERATURE: 97.2 F | RESPIRATION RATE: 15 BRPM | WEIGHT: 184.3 LBS | HEART RATE: 72 BPM | HEIGHT: 71 IN | OXYGEN SATURATION: 100 % | DIASTOLIC BLOOD PRESSURE: 68 MMHG | BODY MASS INDEX: 25.8 KG/M2

## 2018-01-24 DIAGNOSIS — M79.673 ISCHEMIC PAIN OF FOOT, UNSPECIFIED LATERALITY: Primary | ICD-10-CM

## 2018-01-24 DIAGNOSIS — I99.8 ISCHEMIC PAIN OF FOOT, UNSPECIFIED LATERALITY: Primary | ICD-10-CM

## 2018-01-24 PROBLEM — F32.A DEPRESSION: Status: ACTIVE | Noted: 2018-01-24

## 2018-01-24 LAB
ANION GAP SERPL CALC-SCNC: 7 MMOL/L (ref 5–15)
BASOPHILS # BLD: 0 K/UL (ref 0–0.1)
BASOPHILS NFR BLD: 0 % (ref 0–1)
BUN SERPL-MCNC: 30 MG/DL (ref 6–20)
BUN/CREAT SERPL: 11 (ref 12–20)
CALCIUM SERPL-MCNC: 8.7 MG/DL (ref 8.5–10.1)
CHLORIDE SERPL-SCNC: 111 MMOL/L (ref 97–108)
CO2 SERPL-SCNC: 24 MMOL/L (ref 21–32)
CREAT SERPL-MCNC: 2.73 MG/DL (ref 0.7–1.3)
EOSINOPHIL # BLD: 0.1 K/UL (ref 0–0.4)
EOSINOPHIL NFR BLD: 4 % (ref 0–7)
ERYTHROCYTE [DISTWIDTH] IN BLOOD BY AUTOMATED COUNT: 17 % (ref 11.5–14.5)
GLUCOSE BLD STRIP.AUTO-MCNC: 133 MG/DL (ref 65–100)
GLUCOSE BLD STRIP.AUTO-MCNC: 142 MG/DL (ref 65–100)
GLUCOSE BLD STRIP.AUTO-MCNC: 93 MG/DL (ref 65–100)
GLUCOSE SERPL-MCNC: 147 MG/DL (ref 65–100)
HCT VFR BLD AUTO: 32.7 % (ref 36.6–50.3)
HGB BLD-MCNC: 10.7 G/DL (ref 12.1–17)
LYMPHOCYTES # BLD: 1.3 K/UL (ref 0.8–3.5)
LYMPHOCYTES NFR BLD: 40 % (ref 12–49)
MCH RBC QN AUTO: 27.3 PG (ref 26–34)
MCHC RBC AUTO-ENTMCNC: 32.7 G/DL (ref 30–36.5)
MCV RBC AUTO: 83.4 FL (ref 80–99)
MONOCYTES # BLD: 0.2 K/UL (ref 0–1)
MONOCYTES NFR BLD: 7 % (ref 5–13)
NEUTS SEG # BLD: 1.6 K/UL (ref 1.8–8)
NEUTS SEG NFR BLD: 49 % (ref 32–75)
PLATELET # BLD AUTO: 255 K/UL (ref 150–400)
POTASSIUM SERPL-SCNC: 4.4 MMOL/L (ref 3.5–5.1)
RBC # BLD AUTO: 3.92 M/UL (ref 4.1–5.7)
SERVICE CMNT-IMP: ABNORMAL
SERVICE CMNT-IMP: ABNORMAL
SERVICE CMNT-IMP: NORMAL
SODIUM SERPL-SCNC: 142 MMOL/L (ref 136–145)
WBC # BLD AUTO: 3.2 K/UL (ref 4.1–11.1)

## 2018-01-24 PROCEDURE — 80048 BASIC METABOLIC PNL TOTAL CA: CPT | Performed by: FAMILY MEDICINE

## 2018-01-24 PROCEDURE — 74011250637 HC RX REV CODE- 250/637: Performed by: HOSPITALIST

## 2018-01-24 PROCEDURE — 85025 COMPLETE CBC W/AUTO DIFF WBC: CPT | Performed by: FAMILY MEDICINE

## 2018-01-24 PROCEDURE — 74011250637 HC RX REV CODE- 250/637: Performed by: FAMILY MEDICINE

## 2018-01-24 PROCEDURE — 65220000003 HC RM SEMIPRIVATE PSYCH

## 2018-01-24 PROCEDURE — 82962 GLUCOSE BLOOD TEST: CPT

## 2018-01-24 PROCEDURE — 74011250637 HC RX REV CODE- 250/637: Performed by: PSYCHIATRY & NEUROLOGY

## 2018-01-24 PROCEDURE — 36415 COLL VENOUS BLD VENIPUNCTURE: CPT | Performed by: FAMILY MEDICINE

## 2018-01-24 RX ORDER — INSULIN LISPRO 100 [IU]/ML
INJECTION, SOLUTION INTRAVENOUS; SUBCUTANEOUS
Status: DISCONTINUED | OUTPATIENT
Start: 2018-01-24 | End: 2018-01-24

## 2018-01-24 RX ORDER — MIRTAZAPINE 15 MG/1
7.5 TABLET, FILM COATED ORAL
Status: DISCONTINUED | OUTPATIENT
Start: 2018-01-24 | End: 2018-02-01

## 2018-01-24 RX ORDER — ASPIRIN 81 MG/1
81 TABLET ORAL DAILY
Status: DISCONTINUED | OUTPATIENT
Start: 2018-01-24 | End: 2018-03-03 | Stop reason: HOSPADM

## 2018-01-24 RX ORDER — BENZTROPINE MESYLATE 1 MG/ML
1 INJECTION INTRAMUSCULAR; INTRAVENOUS
Status: DISCONTINUED | OUTPATIENT
Start: 2018-01-24 | End: 2018-03-03 | Stop reason: HOSPADM

## 2018-01-24 RX ORDER — DOXAZOSIN 2 MG/1
2 TABLET ORAL
Status: DISCONTINUED | OUTPATIENT
Start: 2018-01-24 | End: 2018-01-25

## 2018-01-24 RX ORDER — CARVEDILOL 3.12 MG/1
3.12 TABLET ORAL 2 TIMES DAILY WITH MEALS
Status: DISCONTINUED | OUTPATIENT
Start: 2018-01-24 | End: 2018-03-03 | Stop reason: HOSPADM

## 2018-01-24 RX ORDER — MAGNESIUM SULFATE 100 %
4 CRYSTALS MISCELLANEOUS AS NEEDED
Status: DISCONTINUED | OUTPATIENT
Start: 2018-01-24 | End: 2018-03-03 | Stop reason: HOSPADM

## 2018-01-24 RX ORDER — HYDRALAZINE HYDROCHLORIDE 50 MG/1
100 TABLET, FILM COATED ORAL 3 TIMES DAILY
Status: DISCONTINUED | OUTPATIENT
Start: 2018-01-24 | End: 2018-03-03 | Stop reason: HOSPADM

## 2018-01-24 RX ORDER — INSULIN LISPRO 100 [IU]/ML
INJECTION, SOLUTION INTRAVENOUS; SUBCUTANEOUS
Status: DISCONTINUED | OUTPATIENT
Start: 2018-01-24 | End: 2018-03-03 | Stop reason: HOSPADM

## 2018-01-24 RX ORDER — ADHESIVE BANDAGE
30 BANDAGE TOPICAL DAILY PRN
Status: DISCONTINUED | OUTPATIENT
Start: 2018-01-24 | End: 2018-03-03 | Stop reason: HOSPADM

## 2018-01-24 RX ORDER — ACETAMINOPHEN 325 MG/1
650 TABLET ORAL
Status: DISCONTINUED | OUTPATIENT
Start: 2018-01-24 | End: 2018-03-03 | Stop reason: HOSPADM

## 2018-01-24 RX ORDER — LANOLIN ALCOHOL/MO/W.PET/CERES
325 CREAM (GRAM) TOPICAL 2 TIMES DAILY WITH MEALS
Status: DISCONTINUED | OUTPATIENT
Start: 2018-01-24 | End: 2018-03-03 | Stop reason: HOSPADM

## 2018-01-24 RX ORDER — AMLODIPINE BESYLATE 5 MG/1
10 TABLET ORAL DAILY
Status: DISCONTINUED | OUTPATIENT
Start: 2018-01-24 | End: 2018-03-03 | Stop reason: HOSPADM

## 2018-01-24 RX ORDER — ATORVASTATIN CALCIUM 40 MG/1
40 TABLET, FILM COATED ORAL DAILY
Status: DISCONTINUED | OUTPATIENT
Start: 2018-01-24 | End: 2018-03-03 | Stop reason: HOSPADM

## 2018-01-24 RX ORDER — BENZTROPINE MESYLATE 1 MG/1
1 TABLET ORAL
Status: DISCONTINUED | OUTPATIENT
Start: 2018-01-24 | End: 2018-03-03 | Stop reason: HOSPADM

## 2018-01-24 RX ORDER — IBUPROFEN 200 MG
1 TABLET ORAL
Status: DISCONTINUED | OUTPATIENT
Start: 2018-01-24 | End: 2018-03-03 | Stop reason: HOSPADM

## 2018-01-24 RX ORDER — IBUPROFEN 200 MG
1 TABLET ORAL EVERY 24 HOURS
Status: DISCONTINUED | OUTPATIENT
Start: 2018-01-24 | End: 2018-03-03 | Stop reason: HOSPADM

## 2018-01-24 RX ORDER — ZOLPIDEM TARTRATE 5 MG/1
5 TABLET ORAL
Status: DISCONTINUED | OUTPATIENT
Start: 2018-01-24 | End: 2018-03-03 | Stop reason: HOSPADM

## 2018-01-24 RX ORDER — OLANZAPINE 2.5 MG/1
2.5 TABLET ORAL
Status: DISCONTINUED | OUTPATIENT
Start: 2018-01-24 | End: 2018-03-03 | Stop reason: HOSPADM

## 2018-01-24 RX ORDER — IBUPROFEN 400 MG/1
400 TABLET ORAL
Status: DISCONTINUED | OUTPATIENT
Start: 2018-01-24 | End: 2018-01-24

## 2018-01-24 RX ORDER — DEXTROSE 50 % IN WATER (D50W) INTRAVENOUS SYRINGE
12.5-25 AS NEEDED
Status: DISCONTINUED | OUTPATIENT
Start: 2018-01-24 | End: 2018-03-03 | Stop reason: HOSPADM

## 2018-01-24 RX ADMIN — HYDRALAZINE HYDROCHLORIDE 100 MG: 50 TABLET, FILM COATED ORAL at 18:59

## 2018-01-24 RX ADMIN — MIRTAZAPINE 7.5 MG: 15 TABLET, FILM COATED ORAL at 21:28

## 2018-01-24 RX ADMIN — ASPIRIN 81 MG: 81 TABLET, COATED ORAL at 14:05

## 2018-01-24 RX ADMIN — CARVEDILOL 3.12 MG: 3.12 TABLET, FILM COATED ORAL at 18:59

## 2018-01-24 RX ADMIN — AMLODIPINE BESYLATE 10 MG: 5 TABLET ORAL at 14:05

## 2018-01-24 RX ADMIN — FERROUS SULFATE TAB 325 MG (65 MG ELEMENTAL FE) 325 MG: 325 (65 FE) TAB at 18:59

## 2018-01-24 RX ADMIN — HYDRALAZINE HYDROCHLORIDE 100 MG: 50 TABLET, FILM COATED ORAL at 21:27

## 2018-01-24 RX ADMIN — DOXAZOSIN 2 MG: 2 TABLET ORAL at 22:51

## 2018-01-24 RX ADMIN — ATORVASTATIN CALCIUM 40 MG: 40 TABLET, FILM COATED ORAL at 14:06

## 2018-01-24 NOTE — IP AVS SNAPSHOT
2700 [facility] [address] [place] [address] 
[phone] Patient: Dallin Hernandez MRN: [id number] YUO:[birth year] About your hospitalization You were admitted on:  January 24, 2018 You last received care in the:  [address] [place] [address] You were discharged on:  March 2, 2018 Why you were hospitalized Your primary diagnosis was:  Depression Follow-up Information Follow up With Details Comments Contact Info 305 [facility] on 2/23/2018 Walk-in Monday between 8:00am and 2:00pm, Tuesday through Thursday between 8:00am and 4:00pm, and Friday between 8:00am and 2:00pm to enroll in mental health treatment, housing, and case management services. [place] [address] [address] 
[phone] Phys Timmy, MD   Patient can only remember the practice name and not the physician Discharge Orders None A check hesham indicates which time of day the medication should be taken. My Medications START taking these medications Instructions Each Dose to Equal  
 Morning Noon Evening Bedtime buPROPion  mg SR tablet Commonly known as:  Dara Chopra Your next dose is:  Tomorrow at 9am  
   
 Take 1 Tab by mouth daily. Indications: ANXIETY WITH DEPRESSION  
 150 mg  
    
  
   
   
   
  
 doxazosin 4 mg tablet Commonly known as:  CARDURA Your next dose is: Tonight at bedtime Take 1 Tab by mouth nightly. Indications: hypertension 4 mg LORazepam 0.5 mg tablet Commonly known as:  ATIVAN Take 1 Tab by mouth nightly as needed for Anxiety for up to 5 days. Max Daily Amount: 0.5 mg. Indications: anxiety 0.5 mg  
    
   
   
   
  
  
CHANGE how you take these medications Instructions Each Dose to Equal  
 Morning Noon Evening Bedtime  
 mirtazapine 30 mg tablet Commonly known as:  Carla Shea What changed:   
- medication strength - how much to take Your next dose is: Tonight at bedtime Take 1 Tab by mouth nightly. Indications: major depressive disorder 30 mg CONTINUE taking these medications Instructions Each Dose to Equal  
 Morning Noon Evening Bedtime  
 amLODIPine 10 mg tablet Commonly known as:  Fanta Polanco Your next dose is:  Tomorrow at 9am  
   
 Take 1 Tab by mouth daily. Indications: hypertension 10 mg  
    
  
   
   
   
  
 aspirin delayed-release 81 mg tablet Your next dose is:  Tomorrow at 9am  
   
 Take 1 Tab by mouth daily. 81 mg  
    
  
   
   
   
  
 atorvastatin 40 mg tablet Commonly known as:  LIPITOR Your next dose is:  Tomorrow at 9am  
   
 Take 1 Tab by mouth daily. Indications: hyperlipidemia 40 mg  
    
  
   
   
   
  
 carvedilol 3.125 mg tablet Commonly known as:  Roselinda Burkitt Your next dose is: Today at 9pm  
   
 Take 1 Tab by mouth two (2) times daily (with meals). Indications: chronic heart failure 3.125 mg  
    
  
   
   
  
   
  
 ferrous sulfate 325 mg (65 mg iron) tablet Your next dose is: Today at dinner Take 1 Tab by mouth two (2) times daily (with meals). Indications: Iron Deficiency Anemia 325 mg  
    
  
   
   
  
   
  
 hydrALAZINE 100 mg tablet Commonly known as:  APRESOLINE Your next dose is: Today at 4pm and bedtime Take 1 Tab by mouth three (3) times daily. Indications: chronic heart failure  
 100 mg  
    
  
   
   
  
   
  
  
  
STOP taking these medications   
 nicotine 21 mg/24 hr  
Commonly known as:  Magda Navarro Where to Get Your Medications Information on where to get these meds will be given to you by the nurse or doctor. ! Ask your nurse or doctor about these medications  
  amLODIPine 10 mg tablet  
 atorvastatin 40 mg tablet  buPROPion  mg SR tablet  
 carvedilol 3.125 mg tablet  
 doxazosin 4 mg tablet  
 ferrous sulfate 325 mg (65 mg iron) tablet  
 hydrALAZINE 100 mg tablet LORazepam 0.5 mg tablet  
 mirtazapine 30 mg tablet Discharge Instructions DISCHARGE SUMMARY 
 
NAME:Hernan Tang : 1940 MRN: [id number] The patient Zenia Mccoy exhibits the ability to control behavior in a less restrictive environment. Patient's level of functioning is improving. No assaultive/destructive behavior has been observed for the past 24 hours. No suicidal/homicidal threat or behavior has been observed for the past 24 hours. There is no evidence of serious medication side effects. Patient has not been in physical or protective restraints for at least the past 24 hours. If weapons involved, how are they secured? No weapons involved. Is patient aware of and in agreement with discharge plan? Yes Arrangements for medication:  Prescriptions filled for patient. Referral for substance abuse treatment? Patient is not using substances/Not applicable. Referral for smoking cessation needed? Yes, refused. Copy of discharge instructions to provider?:  [address] Arrangements for transportation home:  Medicaid taxi Keep all follow up appointments as scheduled, continue to take prescribed medications per physician instructions. Mental health crisis number:  [phone] or your local mental health crisis line number at [phone]. DISCHARGE SUMMARY from Nurse PATIENT INSTRUCTIONS: 
What to do at Home: 
Recommended activity: Activity as tolerated, If you experience any of the following symptoms thoughts of harming self, feeling overwhelmed with hopelessness and lack of control over your life, please follow up with your local crisis number and assigned staff. *  Please give a list of your current medications to your Primary Care Provider.  
 
*  Please update this list whenever your medications are discontinued, doses are 
    changed, or new medications (including over-the-counter products) are added. *  Please carry medication information at all times in case of emergency situations. These are general instructions for a healthy lifestyle: No smoking/ No tobacco products/ Avoid exposure to second hand smoke Surgeon General's Warning:  Quitting smoking now greatly reduces serious risk to your health. Obesity, smoking, and sedentary lifestyle greatly increases your risk for illness A healthy diet, regular physical exercise & weight monitoring are important for maintaining a healthy lifestyle You may be retaining fluid if you have a history of heart failure or if you experience any of the following symptoms:  Weight gain of 3 pounds or more overnight or 5 pounds in a week, increased swelling in our hands or feet or shortness of breath while lying flat in bed. Please call your doctor as soon as you notice any of these symptoms; do not wait until your next office visit. Recognize signs and symptoms of STROKE: 
 
F-face looks uneven A-arms unable to move or move unevenly S-speech slurred or non-existent T-time-call 911 as soon as signs and symptoms begin-DO NOT go Back to bed or wait to see if you get better-TIME IS BRAIN. Warning Signs of HEART ATTACK Call 911 if you have these symptoms:  Chest discomfort. Most heart attacks involve discomfort in the center of the chest that lasts more than a few minutes, or that goes away and comes back. It can feel like uncomfortable pressure, squeezing, fullness, or pain.  Discomfort in other areas of the upper body. Symptoms can include pain or discomfort in one or both arms, the back, neck, jaw, or stomach.  Shortness of breath with or without chest discomfort.  Other signs may include breaking out in a cold sweat, nausea, or lightheadedness. Don't wait more than five minutes to call [address]! Fast action can save your life.  Calling 911 is almost always the fastest way to get lifesaving treatment. Emergency Medical Services staff can begin treatment when they arrive  up to an hour sooner than if someone gets to the hospital by car. The discharge information has been reviewed with the patient. The patient verbalized understanding. Discharge medications reviewed with the patient and appropriate educational materials and side effects teaching were provided. ___________________________________________________________________________________________________________________________________ [facility] Announcement We are excited to announce that we are making your provider's discharge notes available to you in [facility]. You will see these notes when they are completed and signed by the physician that discharged you from your recent hospital stay. If you have any questions or concerns about any information you see in [facility], please call the Health Information Department where you were seen or reach out to your Primary Care Provider for more information about your plan of care. Introducing [facility]! Elke [employer] introduces [facility] patient portal. Now you can access parts of your medical record, email your doctor's office, and request medication refills online. 1. In your internet browser, go to [email or web address] 2. Click on the First Time User? Click Here link in the Sign In box. You will see the New Member Sign Up page. 3. Enter your [facility] Access Code exactly as it appears below. You will not need to use this code after youve completed the sign-up process. If you do not sign up before the expiration date, you must request a new code. · [facility] Access Code: [email or web address] Expires: 4/22/2018 10:09 AM 
 
4. Enter the last four digits of your Social Security Number (xxxx) and Date of Birth (mm/dd/yyyy) as indicated and click Submit. You will be taken to the next sign-up page. 5. Create a MyChart ID.  This will be your "[employer]"t login ID and cannot be changed, so think of one that is secure and easy to remember. 6. Create a [employer] password. You can change your password at any time. 7. Enter your Password Reset Question and Answer. This can be used at a later time if you forget your password. 8. Enter your e-mail address. You will receive e-mail notification when new information is available in [address] 9. Click Sign Up. You can now view and download portions of your medical record. 10. Click the Download Summary menu link to download a portable copy of your medical information. If you have questions, please visit the Frequently Asked Questions section of the [employer] website. Remember, [employer] is NOT to be used for urgent needs. For medical emergencies, dial 911. Now available from your iPhone and Android! Providers Seen During Your Hospitalization Provider Specialty Primary office phone Josue Link MD Psychiatry [phone] Leda Shepard MD Psychiatry [phone] Immunizations Administered for This Admission Name Date Influenza Vaccine (Quad) PF  Deferred (),  Deferred () Your Primary Care Physician (PCP) Primary Care Physician Office Phone Office Fax OTHER, PHYS ** None ** ** None ** You are allergic to the following Allergen Reactions Tetracycline Hives Recent Documentation Height Weight BMI Smoking Status 1.803 m 93.7 kg 28.81 kg/m2 Current Every Day Smoker Emergency Contacts Name Discharge Info Relation Home Work Mobile Marisabel Willingham DISCHARGE CAREGIVER [3] Spouse [3] [phone] Patient Belongings The following personal items are in your possession at time of discharge: 
  Dental Appliances: None  Visual Aid: Glasses      Home Medications: None   Jewelry: None  Clothing: Jacket/Coat, Pants, Shirt, Undergarments    Other Valuables: None  Please provide this summary of care documentation to your next provider. Signatures-by signing, you are acknowledging that this After Visit Summary has been reviewed with you and you have received a copy. Patient Signature:  ____________________________________________________________ Date:  ____________________________________________________________  
  
Elsy Trenton Provider Signature:  ____________________________________________________________ Date:  ____________________________________________________________

## 2018-01-24 NOTE — PROGRESS NOTES
BP elevated - add doxazosin and monitor  DM2 - blood glucose normal, continue to monitor and given SSI as needed  CKD3 - renal function continues to worsen, will monitor  Anemia - stable, continue iron  CHF, chronic diastolic - continue BP and rate control, diuresis as needed    Milagros Brooks MD

## 2018-01-24 NOTE — IP AVS SNAPSHOT
[address] 
[phone] Patient: Pérez Ward MRN: [id number] RSI:9/5/8540 A check hesham indicates which time of day the medication should be taken. My Medications START taking these medications Instructions Each Dose to Equal  
 Morning Noon Evening Bedtime buPROPion  mg SR tablet Commonly known as:  Tejas Justin Your next dose is:  Tomorrow at 9am  
   
 Take 1 Tab by mouth daily. Indications: ANXIETY WITH DEPRESSION  
 150 mg  
    
  
   
   
   
  
 doxazosin 4 mg tablet Commonly known as:  CARDURA Your next dose is: Tonight at bedtime Take 1 Tab by mouth nightly. Indications: hypertension 4 mg LORazepam 0.5 mg tablet Commonly known as:  ATIVAN Take 1 Tab by mouth nightly as needed for Anxiety for up to 5 days. Max Daily Amount: 0.5 mg. Indications: anxiety 0.5 mg  
    
   
   
   
  
  
CHANGE how you take these medications Instructions Each Dose to Equal  
 Morning Noon Evening Bedtime  
 mirtazapine 30 mg tablet Commonly known as:  Nichole Petersen What changed:   
- medication strength 
- how much to take Your next dose is: Tonight at bedtime Take 1 Tab by mouth nightly. Indications: major depressive disorder 30 mg CONTINUE taking these medications Instructions Each Dose to Equal  
 Morning Noon Evening Bedtime  
 amLODIPine 10 mg tablet Commonly known as:  Nneka Perry Your next dose is:  Tomorrow at 9am  
   
 Take 1 Tab by mouth daily. Indications: hypertension 10 mg  
    
  
   
   
   
  
 aspirin delayed-release 81 mg tablet Your next dose is:  Tomorrow at 9am  
   
 Take 1 Tab by mouth daily. 81 mg  
    
  
   
   
   
  
 atorvastatin 40 mg tablet Commonly known as:  LIPITOR Your next dose is:  Tomorrow at 9am  
   
 Take 1 Tab by mouth daily.  Indications: hyperlipidemia 40 mg  
    
  
   
   
   
  
 carvedilol 3.125 mg tablet Commonly known as:  Marilou Jones Your next dose is: Today at 9pm  
   
 Take 1 Tab by mouth two (2) times daily (with meals). Indications: chronic heart failure 3.125 mg  
    
  
   
   
  
   
  
 ferrous sulfate 325 mg (65 mg iron) tablet Your next dose is: Today at dinner Take 1 Tab by mouth two (2) times daily (with meals). Indications: Iron Deficiency Anemia 325 mg  
    
  
   
   
  
   
  
 hydrALAZINE 100 mg tablet Commonly known as:  APRESOLINE Your next dose is: Today at 4pm and bedtime Take 1 Tab by mouth three (3) times daily. Indications: chronic heart failure  
 100 mg  
    
  
   
   
  
   
  
  
  
STOP taking these medications   
 nicotine 21 mg/24 hr  
Commonly known as:  Oz Trujillo Where to Get Your Medications Information on where to get these meds will be given to you by the nurse or doctor. ! Ask your nurse or doctor about these medications  
  amLODIPine 10 mg tablet  
 atorvastatin 40 mg tablet buPROPion  mg SR tablet  
 carvedilol 3.125 mg tablet  
 doxazosin 4 mg tablet  
 ferrous sulfate 325 mg (65 mg iron) tablet  
 hydrALAZINE 100 mg tablet LORazepam 0.5 mg tablet  
 mirtazapine 30 mg tablet

## 2018-01-24 NOTE — PROGRESS NOTES
TRANSFER - IN REPORT:    Verbal report received from [facility] BETI HUANG RN at [address]  being received from [facility] for routine progression of care      Report consisted of patients Situation, Background, Assessment and   Recommendations(SBAR). Information from the following report(s) SBAR, Kardex, ED Summary and Recent Results was reviewed with the receiving nurse. Opportunity for questions and clarification was provided. Assessment completed upon patients arrival to unit and care assumed at [facility] 4037.

## 2018-01-24 NOTE — H&P
History & Physical    Date of admission: 1/24/2018    Patient name: Wendy Aponte  MRN: [id number]  YOB: 1940  Age: 78 y.o. Primary care provider:  Johny Warner MD     Source of Information:  medical records                            Chief complaint:  Patient does not provide    History of present illness  Wendy Aponte is a 78 y.o.  male with past medical history of arthritis, CAD, anemia, hypertension, hyperlipidemia, CKD, type 2 diabetes mellitus, diastolic CHF, sleep apnea, peripheral vascular disease, s/p bilateral BKA, peripheral neuropathy, PUD, prior DVT and hepatitis C presented to the [facility]) from [facility]) with reported depression and suicidal ideation. Patient had been hospitalized at [facility] with diagnoses of hypertensive urgency, stage 3 CKD, type 2 DM, anemia, CAD, chest pain at rest, acute diastolic CHF, and suicidal ideation. Request was for patient transfer to [facility] for inpatient progression of care. Patient was cleared for medical discharged. Patient was transferred to [facility] behavioral health unit. On current bedside evaluation, patient is refusing to talk or answer questions. There were no reports of new onset syncope, loss of consciousness, headache, neck pain, visual disturbance, numbness, paresthesias, focal weakness, shortness of breath, chest pain, palpitations, abdominal pain, nausea, vomiting, diarrhea, calf pain, increased leg swelling/ edema, rash, fever, chills. Past Medical History:   Diagnosis Date    Arthritis     CAD (coronary artery disease) 2007    CKD (chronic kidney disease), stage III     DM type 2 causing renal disease (HCC)     DVT (deep venous thrombosis) (HCC)     Hx of DVT:  Xarelto stopped due to GI bleed. S/P IVC filter.     Gait abnormality     GI bleed     Heart murmur     Hepatitis C     History of blood transfusion     Hypercholesterolemia     Hypertension 11/7/2014    Neuropathy     Non compliance w medication regimen     Peripheral vascular disease ([facility] [address] 11/7/2014    A. S/P Right SFA POBA and tibial atherectomy (2/4/13).  PUD (peptic ulcer disease) 2007    Unspecified sleep apnea     never used cpap      Past Surgical History:   Procedure Laterality Date    ABDOMEN SURGERY PROC UNLISTED  2007    has approx 7-8\" midline incision on abdomen--\"had to open him up and clean him out after feeding tube clogged\"    HX GI  2007    feeding tube for approx 2 mo    VASCULAR SURGERY PROCEDURE UNLIST Right 1/22/2015    popliteal-tibial bypass     Prior to Admission medications    Medication Sig Start Date End Date Taking? Authorizing Provider   hydrALAZINE (APRESOLINE) 100 mg tablet Take 1 Tab by mouth three (3) times daily. 1/23/18   Joleen Sheehan MD   amLODIPine (NORVASC) 10 mg tablet Take 1 Tab by mouth daily. 1/22/18   Fidel Aase, MD   aspirin delayed-release 81 mg tablet Take 1 Tab by mouth daily. 1/23/18   Fidel Aase, MD   atorvastatin (LIPITOR) 40 mg tablet Take 1 Tab by mouth daily. 1/22/18   Fidel Aase, MD   carvedilol (COREG) 3.125 mg tablet Take 1 Tab by mouth two (2) times daily (with meals). 1/22/18   Fidel Aase, MD   ferrous sulfate 325 mg (65 mg iron) tablet Take 1 Tab by mouth two (2) times daily (with meals). 1/22/18   Fidel Aase, MD   mirtazapine (REMERON) 7.5 mg tablet Take 1 Tab by mouth nightly. 1/22/18   Fidel Aase, MD   nicotine (NICODERM CQ) 21 mg/24 hr 1 Patch by TransDERmal route every twenty-four (24) hours for 30 days.  1/22/18 2/21/18  Fidel Aase, MD     Allergies   Allergen Reactions    Tetracycline Hives      Family History   Problem Relation Age of Onset    Hypertension Father            Social history    Alcohol history   x  unknown           Smoking history          x  Current smoker     History   Smoking Status    Current Every Day Smoker    Packs/day: 0.50   Smokeless Tobacco    Not on file     Illegal drugs:  unknown    Code status  x  Full code     Review of systems  Patient does not provide and refuses to answer questions. Physical Examination   Visit Vitals    /79 (BP 1 Location: Right arm, BP Patient Position: Supine)    Pulse 80    Temp 98.1 °F (36.7 °C)    Resp 18    Wt 82.7 kg (182 lb 5.1 oz)    SpO2 98%    BMI 25.43 kg/m2               General:  Patient in no acute respiratory distress   Head:  Normocephalic, without obvious abnormality, atraumatic   Eyes:  Conjunctivae/corneas clear. Pupils 2 mm reactive bilateral   E/N/M/T: Nares normal. Septum midline. No nasal drainage or sinus tenderness  Tongue midline/ non-edematous  Clear oropharynx   Neck: Normal appearance and movements, symmetrical, trachea midline  No palpable adenopathy  No thyroid enlargement, tenderness or nodules  No carotid bruit   No JVD   Lungs:   Symmetrical chest expansion and respiratory effort  Clear to auscultation bilaterally   Chest wall:  No tenderness or deformity   Heart:  Regular rate and rhythm   Sounds normal; no murmur, click, rub or gallop   Abdomen:   Soft, no tenderness  No rebound, guarding, or rigidity  Non-distended  Bowel sounds normal  No masses or hepatosplenomegaly  No hernias present   Back: No CVA tenderness   Extremities: Bilateral BKA  No cyanosis or edema     Pulses 2+ radial/ 1+ DP bilateral pulses   Skin: Warm/ dry   Musculo-      skeletal: Gait not tested  No calf tenderness   Neuro: GCS 10 (E4 V1 M5). Moves BUE keeping BUE fixed in flexion position. Currently aphasic. Not talking. No facial droop. Sensation grossly intact.      Psych: Initially asleep but on arousal; patient not talking  Very uncooperative       Data Review      24 Hour Results:  Recent Results (from the past 24 hour(s))   GLUCOSE, POC    Collection Time: 01/23/18 11:14 AM   Result Value Ref Range    Glucose (POC) 77 65 - 100 mg/dL    Performed by Richa Sotomayor    GLUCOSE, POC    Collection Time: 01/23/18 11:47 AM   Result Value Ref Range    Glucose (POC) 85 65 - 100 mg/dL    Performed by  Melissa Mequon, POC    Collection Time: 01/23/18  9:39 PM   Result Value Ref Range    Glucose (POC) 83 65 - 100 mg/dL    Performed by Arielle Zhong (PCT)      Recent Labs      01/23/18   0346   WBC  4.3   HGB  9.8*   HCT  29.6*   PLT  252     Recent Labs      01/23/18   0346   NA  139   K  4.4   CL  107   CO2  23   GLU  120*   BUN  34*   CREA  2.61*   CA  8.5   MG  1.6   PHOS  3.7   ALB  2.4*   TBILI  0.3   SGOT  21   ALT  9*         Assessment and Plan   1. Depression. Admitted to psychiatry service for progression of care, continued evaluation and treatments. 2.  Hypertension. Monitor BP. Resume back on home BP medications. 3.  Hyperlipidemia. Order Crestor. 4.  Acute on chronic kidney disease - stage 3. Due for follow up with nephrologist as outpatient. Will order repeat renal panel today to re-evaluate. Encourage oral fluid intake. May consider for nephrologist consultation. 5.  Type 2 diabetes mellitus. Order Humalog insulin correctional coverage, scheduled blood glucose checks and check hemoglobin A1c level. 6.  Diastolic CHF. Order daily weights. 7.  Suicidal ideation. Recommend suicide precautions. 8.  Aphasia. Patient refused to talk during examination. Continue plan of care and noted. 9.  Anemia. Repeat H/H.    10.  Urinary incontinence. Order skin care precautions/ checks. 11.  Tobacco abuse. Encourage smoking cessation. Nicotine patch ordered.           Signed by: Cathi Brewer MD    January 24, 2018 at 6:53 AM

## 2018-01-24 NOTE — BH NOTES
PSYCHIATRIC PROGRESS NOTE         Patient Name  Stewart Weber   Date of Birth 1940   [facility] [id number]   Medical Record Number  [id number]      Age  78 y.o. PCP Johny Warner, MD   Admit date:  1/24/2018    Room Number  748/01  @ [facility]   Date of Service  1/24/2018          PSYCHOTHERAPY SESSION NOTE:  Length of psychotherapy session: 45 minutes    Main condition/diagnosis/issues treated during session today, 1/24/2018 : self care, anger management, coping skills     I employed Cognitive Behavioral therapy techniques, Reality-Oriented psychotherapy, as well as supportive psychotherapy in regards to various ongoing psychosocial stressors, including the following: pre-admission and current problems; housing issues; occupational issues; medical issues; and stress of hospitalization. Interpersonal relationship issues and psychodynamic conflicts explored. Attempts made to alleviate maladaptive patterns. We, also, worked on issues of denial & effects of substance dependency/use     Overall, patient is not progressing    Treatment Plan Update (reviewed an updated 1/24/2018) : I will modify psychotherapy tx plan by implementing more stress management strategies, building upon cognitive behavioral techniques, increasing coping skills, as well as shoring up psychological defenses). An extended energy and skill set was needed to engage pt in psychotherapy due to some of the following: resistiveness, complexity, negativity, confrontational nature, hostile behaviors, and/or severe abnormalities in thought processes/psychosis resulting in the loss of expressive/receptive language communication skills. E & M PROGRESS NOTE:         HISTORY       CC:  \"depression \"  HISTORY OF PRESENT ILLNESS/INTERVAL HISTORY:  (reviewed/updated 1/24/2018).   per initial evaluation:     Stewart Weber presents/reports/evidences the following emotional symptoms today, 1/24/2018:depression. The above symptoms have been present for 2 days . These symptoms are of acute severity. The symptoms are intermittent/ fleeting in nature. Additional symptomatology and features include anger outbursts. SIDE EFFECTS: (reviewed/updated 1/24/2018)  None reported or admitted to. No noted toxicity with use of Depakote/Tegretol/lithium/Clozaril/TCAs   ALLERGIES:(reviewed/updated 1/24/2018)  Allergies   Allergen Reactions    Tetracycline Hives      MEDICATIONS PRIOR TO ADMISSION:(reviewed/updated 1/24/2018)  Prescriptions Prior to Admission   Medication Sig    hydrALAZINE (APRESOLINE) 100 mg tablet Take 1 Tab by mouth three (3) times daily.  amLODIPine (NORVASC) 10 mg tablet Take 1 Tab by mouth daily.  aspirin delayed-release 81 mg tablet Take 1 Tab by mouth daily.  atorvastatin (LIPITOR) 40 mg tablet Take 1 Tab by mouth daily.  carvedilol (COREG) 3.125 mg tablet Take 1 Tab by mouth two (2) times daily (with meals).  ferrous sulfate 325 mg (65 mg iron) tablet Take 1 Tab by mouth two (2) times daily (with meals).  mirtazapine (REMERON) 7.5 mg tablet Take 1 Tab by mouth nightly.  nicotine (NICODERM CQ) 21 mg/24 hr 1 Patch by TransDERmal route every twenty-four (24) hours for 30 days. PAST MEDICAL HISTORY: Past medical history from the initial psychiatric evaluation has been reviewed (reviewed/updated 1/24/2018) with no additional updates (I asked patient and no additional past medical history provided). Past Medical History:   Diagnosis Date    Arthritis     CAD (coronary artery disease) 2007    CKD (chronic kidney disease), stage III     DM type 2 causing renal disease (HCC)     DVT (deep venous thrombosis) ([facility])     Hx of DVT:  Xarelto stopped due to GI bleed. S/P IVC filter.     Gait abnormality     GI bleed     Heart murmur     Hepatitis C     History of blood transfusion     Hypercholesterolemia     Hypertension 11/7/2014    Neuropathy     Non compliance w medication regimen     Peripheral vascular disease ([facility] [address] 11/7/2014    A. S/P Right SFA POBA and tibial atherectomy (2/4/13).  PUD (peptic ulcer disease) 2007    Unspecified sleep apnea     never used cpap     Past Surgical History:   Procedure Laterality Date    ABDOMEN SURGERY PROC UNLISTED  2007    has approx 7-8\" midline incision on abdomen--\"had to open him up and clean him out after feeding tube clogged\"    HX GI  2007    feeding tube for approx 2 mo    VASCULAR SURGERY PROCEDURE UNLIST Right 1/22/2015    popliteal-tibial bypass      SOCIAL HISTORY: Social history from the initial psychiatric evaluation has been reviewed (reviewed/updated 1/24/2018) with no additional updates (I asked patient and no additional social history provided). Social History     Social History    Marital status:      Spouse name: N/A    Number of children: N/A    Years of education: N/A     Occupational History    Not on file. Social History Main Topics    Smoking status: Current Every Day Smoker     Packs/day: 0.50    Smokeless tobacco: Not on file    Alcohol use No    Drug use: No      Comment: >20 years ago    Sexual activity: Not on file     Other Topics Concern    Not on file     Social History Narrative    78 year ols male admitted for depression and homelessness. Pt will be evicated from apartment due to non payment of bills. Girlfriend of 30 years left him to [facility] live in the [place] with others. \" Pt is a brittle diabetic, with treatment non compliance. He has bilarela lower extremity amputations. Patient has a long hx of substance abuse. FAMILY HISTORY: Family history from the initial psychiatric evaluation has been reviewed (reviewed/updated 1/24/2018) with no additional updates (I asked patient and no additional family history provided).  Family History   Problem Relation Age of Onset    Hypertension Father        REVIEW OF SYSTEMS: (reviewed/updated 1/24/2018)  Appetite:no change from normal   Sleep: does not feel rested   All other Review of Systems: Psychological ROS: positive for - behavioral disorder  Respiratory ROS: no cough, shortness of breath, or wheezing  Cardiovascular ROS: no chest pain or dyspnea on exertion         [facility] ([facility]):    MSE FINDINGS ARE WITHIN NORMAL LIMITS (WNL) UNLESS OTHERWISE STATED BELOW. ( ALL OF THE BELOW CATEGORIES OF THE MSE HAVE BEEN REVIEWED (reviewed 1/24/2018) AND UPDATED AS DEEMED APPROPRIATE )  General Presentation older than stated age, evasive and guarded   Orientation oriented to time, place and person   Vital Signs  See below (reviewed 1/24/2018); Vital Signs (BP, Pulse, & Temp) are within normal limits if not listed below.    Gait and Station Stable/steady, no ataxia   Musculoskeletal System No extrapyramidal symptoms (EPS); no abnormal muscular movements or Tardive Dyskinesia (TD); muscle strength and tone are within normal limits   Language No aphasia or dysarthria   Speech:  monotone   Thought Processes concrete; fast rate of thoughts; fair abstract reasoning/computation   Thought Associations circumstantial   Thought Content free of delusions and free of hallucinations   Suicidal Ideations none   Homicidal Ideations none   Mood:  irritable   Affect:  mood-congruent   Memory recent  fair   Memory remote:  fair   Concentration/Attention:  distractable   Fund of Knowledge below average   Insight:  poor   Reliability poor   Judgment:  poor          VITALS:     Patient Vitals for the past 24 hrs:   Temp Pulse Resp BP SpO2   01/24/18 1030 98 °F (36.7 °C) 69 16 188/89 100 %   01/24/18 0131 98.1 °F (36.7 °C) 80 18 183/79 98 %     Wt Readings from Last 3 Encounters:   01/24/18 82.7 kg (182 lb 5.1 oz)   01/23/18 83.6 kg (184 lb 4.9 oz)   07/13/17 88.9 kg (196 lb)     Temp Readings from Last 3 Encounters:   01/24/18 98 °F (36.7 °C)   01/24/18 97.2 °F (36.2 °C)   07/20/17 97.9 °F (36.6 °C)     BP Readings from Last 3 Encounters:   01/24/18 188/89   01/24/18 172/68   07/20/17 175/76     Pulse Readings from Last 3 Encounters:   01/24/18 69   01/24/18 72   07/20/17 (!) 52            DATA     LABORATORY DATA:(reviewed/updated 1/24/2018)  Recent Results (from the past 24 hour(s))   GLUCOSE, POC    Collection Time: 01/23/18  9:39 PM   Result Value Ref Range    Glucose (POC) 83 65 - 100 mg/dL    Performed by Jean Chung (PCT)    GLUCOSE, POC    Collection Time: 01/24/18 10:19 AM   Result Value Ref Range    Glucose (POC) 142 (H) 65 - 100 mg/dL    Performed by Concetta Nazario      No results found for: VALF2, VALAC, VALP, VALPR, DS6, CRBAM, CRBAMP, CARB2, XCRBAM  No results found for: LITHM   RADIOLOGY REPORTS:(reviewed/updated 1/24/2018)  Xr Chest Port    Result Date: 1/20/2018  EXAM: Portable CXR.  1800 hours. COMPARISON: 1/19/2015. INDICATION: cp, fatigue There is new interstitial pulmonary edema, cardiomegaly and small left pleural effusion. There is no focal infiltrate, pneumothorax or midline shift. IMPRESSION: CHF pattern.          MEDICATIONS     ALL MEDICATIONS:   Current Facility-Administered Medications   Medication Dose Route Frequency    OLANZapine (ZyPREXA) tablet 2.5 mg  2.5 mg Oral Q6H PRN    ziprasidone (GEODON) 10 mg in sterile water (preservative free) 0.5 mL injection  10 mg IntraMUSCular BID PRN    benztropine (COGENTIN) tablet 1 mg  1 mg Oral BID PRN    benztropine (COGENTIN) injection 1 mg  1 mg IntraMUSCular BID PRN    zolpidem (AMBIEN) tablet 5 mg  5 mg Oral QHS PRN    acetaminophen (TYLENOL) tablet 650 mg  650 mg Oral Q4H PRN    ibuprofen (MOTRIN) tablet 400 mg  400 mg Oral Q8H PRN    magnesium hydroxide (MILK OF MAGNESIA) 400 mg/5 mL oral suspension 30 mL  30 mL Oral DAILY PRN    nicotine (NICODERM CQ) 21 mg/24 hr patch 1 Patch  1 Patch TransDERmal DAILY PRN    influenza vaccine 2017-18 (3 yrs+)(PF) (FLUZONE QUAD/FLUARIX QUAD) injection 0.5 mL  0.5 mL IntraMUSCular PRIOR TO DISCHARGE  amLODIPine (NORVASC) tablet 10 mg  10 mg Oral DAILY    aspirin delayed-release tablet 81 mg  81 mg Oral DAILY    atorvastatin (LIPITOR) tablet 40 mg  40 mg Oral DAILY    carvedilol (COREG) tablet 3.125 mg  3.125 mg Oral BID WITH MEALS    ferrous sulfate tablet 325 mg  325 mg Oral BID WITH MEALS    hydrALAZINE (APRESOLINE) tablet 100 mg  100 mg Oral TID    glucose chewable tablet 16 g  4 Tab Oral PRN    dextrose (D50W) injection syrg 12.5-25 g  12.5-25 g IntraVENous PRN    glucagon (GLUCAGEN) injection 1 mg  1 mg IntraMUSCular PRN    insulin lispro (HUMALOG) injection   SubCUTAneous AC&HS    mirtazapine (REMERON) tablet 7.5 mg  7.5 mg Oral QHS    nicotine (NICODERM CQ) 21 mg/24 hr patch 1 Patch  1 Patch TransDERmal Q24H      SCHEDULED MEDICATIONS:   Current Facility-Administered Medications   Medication Dose Route Frequency    influenza vaccine 2017-18 (3 yrs+)(PF) (FLUZONE QUAD/FLUARIX QUAD) injection 0.5 mL  0.5 mL IntraMUSCular PRIOR TO DISCHARGE    amLODIPine (NORVASC) tablet 10 mg  10 mg Oral DAILY    aspirin delayed-release tablet 81 mg  81 mg Oral DAILY    atorvastatin (LIPITOR) tablet 40 mg  40 mg Oral DAILY    carvedilol (COREG) tablet 3.125 mg  3.125 mg Oral BID WITH MEALS    ferrous sulfate tablet 325 mg  325 mg Oral BID WITH MEALS    hydrALAZINE (APRESOLINE) tablet 100 mg  100 mg Oral TID    insulin lispro (HUMALOG) injection   SubCUTAneous AC&HS    mirtazapine (REMERON) tablet 7.5 mg  7.5 mg Oral QHS    nicotine (NICODERM CQ) 21 mg/24 hr patch 1 Patch  1 Patch TransDERmal Q24H          ASSESSMENT & PLAN     DIAGNOSES REQUIRING ACTIVE TREATMENT AND MONITORING: (reviewed/updated 1/24/2018)  Patient Active Hospital Problem List:   Depression (1/24/2018)    Assessment: sadness, hopelessness, helplessness,poor energy and insomnia     Plan: antidepressant            I will continue to monitor blood levels (Depakote, Tegretol, lithium, clozapine---a drug with a narrow therapeutic index= NTI) and associated labs for drug therapy implemented that require intense monitoring for toxicity as deemed appropriate based on current medication side effects and pharmacodynamically determined drug 1/2 lives. In summary, Gilberto Vazquez, is a 78 y.o.  male who presents with a severe exacerbation of the principal diagnosis of Depression  Patient's condition is worsening/not improving/not stable . Patient requires continued inpatient hospitalization for further stabilization, safety monitoring and medication management. I will continue to coordinate the provision of individual, milieu, occupational, group, and substance abuse therapies to address target symptoms/diagnoses as deemed appropriate for the individual patient. A coordinated, multidisplinary treatment team round was conducted with the patient (this team consists of the nurse, psychiatric unit pharmcist,  and writer). Complete current electronic health record for patient has been reviewed today including consultant notes, ancillary staff notes, nurses and psychiatric tech notes. Suicide risk assessment completed and patient deemed to be of low risk for suicide at this time. The following regarding medications was addressed during rounds with patient:   the risks and benefits of the proposed medication. The patient was given the opportunity to ask questions. Informed consent given to the use of the above medications. Will continue to adjust psychiatric and non-psychiatric medications (see above \"medication\" section and orders section for details) as deemed appropriate & based upon diagnoses and response to treatment. I will continue to order blood tests/labs and diagnostic tests as deemed appropriate and review results as they become available (see orders for details and above listed lab/test results).     I will order psychiatric records from previous [facility] hospitals to further elucidate the nature of patient's psychopathology and review once available. I will gather additional collateral information from friends, family and o/p treatment team to further elucidate the nature of patient's psychopathology and baselline level of psychiatric functioning. I certify that this patient's inpatient psychiatric hospital services furnished since the previous certification were, and continue to be, required for treatment that could reasonably be expected to improve the patient's condition, or for diagnostic study, and that the patient continues to need, on a daily basis, active treatment furnished directly by or requiring the supervision of inpatient psychiatric facility personnel. In addition, the hospital records show that services furnished were intensive treatment services, admission or related services, or equivalent services.     EXPECTED DISCHARGE DATE/DAY: TBD     DISPOSITION: Home       Signed By:   Randy Ashford MD  1/24/2018

## 2018-01-24 NOTE — PROGRESS NOTES
19:30 Received report from [facility]. 19:50 Gaylia Cabot from [facility] interviewing pt. 
19:59 V/S taken pt. Refused to have his BP and temp taken. Pt. Requesting to go outside to smoke. Assessment done as documented. 58:41 Systolic BP was in 084'U, due med given. Pt. Was very calm and cooperative, taken all his pills without signs of resistance. Assisted by sitter to sit on recliner chair. Blood sugar 83. 
22:13 Spoken to Gaylia Cabot re: update of pt's. Transfer and TDO, she talked to [address] [place] [address], pt. Room # 725, and to call receiving nurse for update. TDO form is being faxed as we speak. 09:06 [employer] was in to check on the status of pt's transfer. Updated. 22:20 Spoken to Jian Moses RN then advised me to call claribel ELIZONDO Eric after 11 pm to update her. 40:98  will get TDO paper from [facility]. ETA will be 15-20 minutes. 23:28 [facility] called. 00:15 EMTALA signed by Dr. Sudeep Powers, Report given to ambulance staff, pt. Records given to them. 00:32 Pt. Discharge.

## 2018-01-24 NOTE — PROGRESS NOTES
Problem: Depressed Mood (Adult/Pediatric)  Goal: *STG: Participates in treatment plan  Variance: Patient slowly responding  Pt is guarded, minimal input and conversation. Refused AM care but eventually allowed staff to do perineal care and vitals.    Goal: *STG: Attends activities and groups  Outcome: Not Progressing Towards Goal  Pt did not attend groups

## 2018-01-24 NOTE — BH NOTES
Behavioral Health Interdisciplinary Rounds     Patient Name: Jeet Rodriguez  Age: 78 y.o. Room/Bed:  725/  Primary Diagnosis: <principal problem not specified>   Admission Status: TDO by Clair Alexander     Readmission within 30 days: no  Power of  in place:   Patient requires a blocked bed: no          Reason for blocked bed: na    VTE Prophylaxis:   Flu vaccine given : no --pt says he does want to get it  Mobility needs/Fall risk: yes--bilat BKA    Nutritional Plan:   Consults: Needs PT, Diabetic Treatment team, Hospitalist         Labs/Testing due today?: no    Sleep hours:  3.5+      Participation in Care/Groups:  No--new admit  Medication Compliant?:   PRNS (last 24 hours):     Restraints (last 24 hours):  no  Substance Abuse:  no  CIWA (range last 24 hours): na COWS (range last 24 hours): na  Alcohol screening (AUDIT) completed -  AUDIT Score: 0  If applicable, date SBIRT discussed in treatment team AND documented: na  Tobacco - patient is a smoker: yes   Date tobacco education completed by RN: 1 1/2 PPD since 14 y/o. Given NO SMOKING sheet during admission  24 hour chart check complete: yes     Patient goal(s) for today:   Treatment team focus/goals: psychosocial  Progress note: Patient transferred from [facility]. Pt refusing to speak with nursing or SW. LOS:  0  Expected LOS: TBD    Financial concerns/prescription coverage: [place] Medicare; Medicaid  Date of last family contact: None     Family requesting physician contact today: No  Discharge plan: Placement  Guns in the home: no        Outpatient provider(s): To be linked    Participating treatment team members:  DARRICK Fink; Dr. Lilliana Hamilton MD; David Telles RN; Monty Robison, DongD

## 2018-01-24 NOTE — BH NOTES
PSYCHOSOCIAL ASSESSMENT  :Patient identifying info: Gabbi Harris is a 78 y.o., male admitted 1/24/2018  1:22 AM     Presenting problem and precipitating factors: Pt was transferred to [facility] [place] [facility] under TDO after being treated at [address] [place] [address] for bilateral leg pain and chest pain. While hospitalized at [address] [place] [address], Pt made several suicidal statements. Pt's girlfriend of 30 years stated she could no longer care for Pt and was leaving him [place] go [place] the woods. Pt's home is being foreclosed on in 1 week. Pt states he has no reason for living. Pt had both feet amputated in 10/2017 and has refused medical follow-up or medications since that surgery. [facility] was called on multiple occasions to assess Pt who was threatening to leave AMA. Mental status assessment: Uncooperative; refusing to speak; combative    Current psychiatric providers and contact info: To be linked    Previous psychiatric services/providers and response to treatment: None    Family history of mental illness: Unknown    Substance abuse history:  Heroin, ETOH (previous); UDS positive for THC  Social History   Substance Use Topics    Smoking status: Current Every Day Smoker     Packs/day: 0.50    Smokeless tobacco: Not on file    Alcohol use No       Family constellation: None    Is significant other involved? No (Girlfriend broke up with him while on medical unit 1/22/18)      Describe support system: None    Describe living arrangements and home environment: Pt's house is being foreclosed on due to nonpayment of bills;  Pt is currently homeless  Health issues:   Hospital Problems  Date Reviewed: 1/20/2018          Codes Class Noted POA    * (Principal)Depression ICD-10-CM: F32.9  ICD-9-CM: 037  1/24/2018 Unknown              Trauma history: Bilateral foot amputation (10/2017); girlfriend of 30 years just left him to \"go live in the woods\" after being evicted from their home    Legal issues: Recently evicted from home    History of  service: No    Financial status: SSI    Roman Catholic/cultural factors: Mandaeism    Education/work history: Unknown    Have you been licensed as a robert care professional (current or ): No  Leisure and recreation preferences: None  Describe coping skills: Megan3 Judy Carey Border  2018

## 2018-01-24 NOTE — PROGRESS NOTES
NUTRITION COMPLETE ASSESSMENT    RECOMMENDATIONS:   1. Assist and encourage PO intake with meals   - If less than 50% meal consumed should be consuming supplement  2. Downgrade diet texture to puree if needed d/t dentition  3. Weekly weights - contact lift team for walt lift if needed  4. Daily MVI if not consuming Ensure shakes     Interventions/Plan:   Food/Nutrient Delivery:   (mech soft) Commercial supplement (Ensure BID, magic cup) Feeding assistance at meals        Assessment:   Reason for Assessment: [x]BPA/MST Referral     Diet: regular  Supplements: none  Nutritionally Significant Medications: [x] Reviewed & Includes: coreg, iron, SSI, remeron; PRN: mom    Subjective:Pt just barely nodding head to answers when visited today. Objective:  Pt admitted for depression from [facility] with TDO. PMHx: DM (A1c WNL this month),  CAD, CKD3, HTN, hypercholesterolemia, hep C, bilateral BKA. Of note: hx of feeding tube in 2007 for about 2 months noted but not further hx available. Spoke with PCT and RN. Pt did fair at lunch but did not like options, soup ordered as alternative. Attempted to get food preferences from pt but barely even nodding answers to questions. For now will add Ensure Enlive BID (700kcal, 40g protein) and Magic Cup (290kcal, g protein). Diet adjusted with pt edentulous. Notable wt loss of 7% x 4 months noted. Wt Readings:   01/24/18 82.7 kg (182 lb 5.1 oz)   01/23/18 83.6 kg (184 lb 4.9 oz)   07/13/17 88.9 kg (196 lb)   09/04/15 112.2 kg (247 lb 4 oz)     Will continue to follow for PO intake, supplement acceptance and wt trends. Hope that with Remeron appetite will improve. Please obtain weekly weights with walt lift.   Estimated Nutrition Needs:   Kcals/day: 1476 Kcals/day (1476-1640kcal)  Protein: 80 g (66-83g (0.801g/kg))  Fluid: 1640 ml  Based On: Kcal/kg - specify (Comment) (18-20kcal/kg)   Weight Used: Actual wt (82.7kg)    Pt expected to meet estimated nutrient needs:  [] Yes     []  No [x] Unable to predict at this time  Nutrition Diagnosis:   1. Inadequate oral intake related to depression as evidenced by fair intake this admit; pt hx    Goals:     Consumption of at least 50% meals and 1 supplement/day in 5-7 days     Monitoring & Evaluation:    - Liquid meal replacement, Total energy intake   - Weight/weight change    Previous Nutrition Goals Met:   N/A  Previous Recommendations:    N/A    Education & Discharge Needs:   [x] None Identified   [] Identified and addressed    [] Participated in care plan, discharge planning, and/or interdisciplinary rounds        Cultural, Worship and ethnic food preferences identified: None  Skin Integrity: [x]Intact  []Other  Edema: []None []Other: 1+ prior to tx  Last BM: 1/19  Food Allergies: [x]None []Other  Diet Restrictions: Cultural/Restorationism Preference(s): None     Anthropometrics:    Weight Loss Metrics 1/24/2018 1/23/2018 7/13/2017 9/4/2015 8/28/2015 1/23/2015 1/22/2015   Today's Wt 182 lb 5.1 oz 184 lb 4.9 oz 196 lb 247 lb 4 oz 247 lb 4 oz 231 lb 14.8 oz -   BMI 25.43 kg/m2 25.71 kg/m2 21.53 kg/m2 27.16 kg/m2 27.16 kg/m2 - 25.48 kg/m2      Weight Source: Bed  Height: 5' 11\" (180.3 cm),    Body mass index is 25.43 kg/(m^2).   IBW : 68.5 kg (151 lb), % IBW (Calculated): 120.74 %  Usual Body Weight: 88.9 kg (196 lb),      Labs:    Lab Results   Component Value Date/Time    Sodium 139 01/23/2018 03:46 AM    Potassium 4.4 01/23/2018 03:46 AM    Chloride 107 01/23/2018 03:46 AM    CO2 23 01/23/2018 03:46 AM    Glucose 120 01/23/2018 03:46 AM    BUN 34 01/23/2018 03:46 AM    Creatinine 2.61 01/23/2018 03:46 AM    Calcium 8.5 01/23/2018 03:46 AM    Magnesium 1.6 01/23/2018 03:46 AM    Phosphorus 3.7 01/23/2018 03:46 AM    Albumin 2.4 01/23/2018 03:46 AM     Lab Results   Component Value Date/Time    Hemoglobin A1c 4.5 01/21/2018 02:21 AM     Vipin Siu RD

## 2018-01-24 NOTE — BH NOTES
GROUP THERAPY PROGRESS NOTE    Gilberto Vazquez did not participate in the Geriatric Process Group with music. He also did not attend the Process Group on the General Unit with a focus identifying feelings, planning for the day, and learning more about DBT concepts of \"Interpersonal Effectiveness and using the [address] as a long-term personal treatment plan.

## 2018-01-24 NOTE — IP AVS SNAPSHOT
Summary of Care Report The Summary of Care report has been created to help improve care coordination. Users with access to [id number] or [address] (Web-based application) may access additional patient information including the Discharge Summary. If you are not currently a [address] user and need more information, please call the number listed below in the [place] [address] section and ask to be connected with Medical Records. Facility Information Name Address Phone [address] [facility] [address] [phone] Patient Information Patient Name Sex  Mi Mondragon ([id number]) Male 1940 Discharge Information Admitting Provider Service Area Unit Nicolas Nugent MD / [address] [place] [address]/[facility] / [phone] Discharge Provider Discharge Date/Time Discharge Disposition Destination (none) 3/2/2018 Afternoon (Pending) AHR (none) Patient Language Language ENGLISH [13] Hospital Problems as of 3/2/2018  Reviewed: 2018  8:27 PM by Pedro Leal MD  
  
  
  
 Class Noted - Resolved Last Modified POA Active Problems * (Principal)Depression  2018 - Present 2018 by Teddy Ricardo MD Unknown Entered by Nicolas Nugent MD  
  
Non-Hospital Problems as of 3/2/2018  Reviewed: 2018  8:27 PM by Pedro Leal MD  
  
  
  
 Class Noted - Resolved Last Modified Active Problems Sleep apnea (Chronic)  2011 - Present 2015 by Naty Newby MD  
  Entered by Zahra Corona Overview Signed 2011  3:02 PM by Zahra Corona  
   unspecified Peripheral vascular disease ([facility] [address] (Chronic)  2014 - Present 2018 by Naty Newby MD  
  Entered by Dawson Ellis MD  
  Overview Signed 2014  3:25 PM by Dawson Ellis MD  
   A.   S/P Right SFA POBA and tibial atherectomy (2/4/13).  
  
  
  Hypertension (Chronic)  11/7/2014 - Present 7/13/2017 by Tootie Burris MD  
  Entered by Alexx Grier MD  
  Dyslipidemia (Chronic)  11/7/2014 - Present 9/7/2015 by Allen Abraham MD  
  Entered by Alexx Grier MD  
  ASO (arteriosclerosis obliterans) (Chronic)  1/22/2015 - Present 9/7/2015 by Allen Abraham MD  
  Entered by Cathie Chris MD  
  Ischemic pain of foot  9/4/2015 - Present 9/5/2015 by Napoleon Fatima MD  
  Entered by Bobbi Graham MD  
  Neuropathic pain of foot (Chronic)  9/4/2015 - Present 1/21/2018 by Allen Abraham MD  
  Entered by Bobbi Graham MD  
  Delirium  9/5/2015 - Present 9/5/2015 by Napoleon Fatima MD  
  Entered by Napoleon Fatima MD  
  CKD (chronic kidney disease), stage III (Chronic)  9/5/2015 - Present 1/20/2018 by Tootie Burris MD  
  Entered by Napoleon Fatima MD  
  DM type 2 causing renal disease ([facility] [address] (Chronic)  9/5/2015 - Present 1/20/2018 by Tootie Burris MD  
  Entered by Napoleon Fatima MD  
  Anemia (Chronic)  9/5/2015 - Present 1/21/2018 by Aleln Abraham MD  
  Entered by Napoleon Fatima MD  
  Hepatitis C (Chronic)  Unknown - Present 9/7/2015 by Allen Abraham MD  
  Entered by Napoleon Fatima MD  
  Neuropathy (Chronic)  Unknown - Present 9/7/2015 by Allen Abraham MD  
  Entered by Napoleon Fatima MD  
  Arthritis (Chronic)  Unknown - Present 9/7/2015 by Allen Abraham MD  
  Entered by Napoleon Fatima MD  
  PUD (peptic ulcer disease) (Chronic)  Unknown - Present 9/7/2015 by Allen Abraham MD  
  Entered by Napoleon Fatima MD  
  CAD (coronary artery disease) (Chronic)  Unknown - Present 1/20/2018 by Tootie Burris MD  
  Entered by Napoleon Fatima MD  
  Weakness  7/13/2017 - Present 7/13/2017 by Tootie Burris MD  
  Entered by Tootie Burris MD  
  History of DVT (deep vein thrombosis) (Chronic)  7/13/2017 - Present 1/21/2018 by Allen Abraham MD  
  Entered by Tootie Burris MD  
  History of GI bleed  7/13/2017 - Present 7/13/2017 by Preeti Harman MD  
  Entered by Preeti Harman MD  
  Hypertensive urgency, malignant  1/20/2018 - Present 1/20/2018 by Preeti Harman MD  
  Entered by Preeti Harman MD  
  Chest pain at rest  1/20/2018 - Present 1/20/2018 by Preeti Harman MD  
  Entered by Preeti Harman MD  
  Acute diastolic CHF (congestive heart failure) ([facility] [address]  1/20/2018 - Present 1/20/2018 by Preeti Harman MD  
  Entered by Preeti Harman MD  
  Suicidal ideation  1/21/2018 - Present 1/21/2018 by Phyllis Costello MD  
  Entered by Phyllis Costello MD  
  
You are allergic to the following Allergen Reactions Tetracycline Hives Current Discharge Medication List  
  
START taking these medications Dose & Instructions Dispensing Information Comments buPROPion  mg SR tablet Commonly known as:  WELLBUTRIN SR  
 Dose:  150 mg Take 1 Tab by mouth daily. Indications: ANXIETY WITH DEPRESSION Quantity:  30 Tab Refills:  0  
   
 doxazosin 4 mg tablet Commonly known as:  CARDURA Dose:  4 mg Take 1 Tab by mouth nightly. Indications: hypertension Quantity:  30 Tab Refills:  0 LORazepam 0.5 mg tablet Commonly known as:  ATIVAN Dose:  0.5 mg Take 1 Tab by mouth nightly as needed for Anxiety for up to 5 days. Max Daily Amount: 0.5 mg. Indications: anxiety Quantity:  5 Tab Refills:  0 CONTINUE these medications which have CHANGED Dose & Instructions Dispensing Information Comments  
 mirtazapine 30 mg tablet Commonly known as:  Eddie Chopra What changed:   
- medication strength 
- how much to take Dose:  30 mg Take 1 Tab by mouth nightly. Indications: major depressive disorder Quantity:  30 Tab Refills:  0 CONTINUE these medications which have NOT CHANGED Dose & Instructions Dispensing Information Comments  
 amLODIPine 10 mg tablet Commonly known as:  Rashawn Shield Dose:  10 mg Take 1 Tab by mouth daily. Indications: hypertension  Quantity:  30 Tab Refills:  0  
   
 aspirin delayed-release 81 mg tablet Dose:  81 mg Take 1 Tab by mouth daily. Refills:  0  
   
 atorvastatin 40 mg tablet Commonly known as:  LIPITOR Dose:  40 mg Take 1 Tab by mouth daily. Indications: hyperlipidemia Quantity:  30 Tab Refills:  0  
   
 carvedilol 3.125 mg tablet Commonly known as:  Lorel Docker Dose:  3.125 mg Take 1 Tab by mouth two (2) times daily (with meals). Indications: chronic heart failure Quantity:  60 Tab Refills:  0  
   
 ferrous sulfate 325 mg (65 mg iron) tablet Dose:  325 mg Take 1 Tab by mouth two (2) times daily (with meals). Indications: Iron Deficiency Anemia Quantity:  60 Tab Refills:  0  
   
 hydrALAZINE 100 mg tablet Commonly known as:  APRESOLINE Dose:  100 mg Take 1 Tab by mouth three (3) times daily. Indications: chronic heart failure Quantity:  90 Tab Refills:  0 STOP taking these medications Comments  
 nicotine 21 mg/24 hr  
Commonly known as:  Jones Sánchez Current Immunizations Name Date Influenza Vaccine (Quad) PF  Deferred (),  Deferred () Pneumococcal Polysaccharide (PPSV-23) 2015 Follow-up Information Follow up With Details Comments Contact Info [facility] on 2018 Walk-in Monday between 8:00am and 2:00pm, Tuesday through Thursday between 8:00am and 4:00pm, and Friday between 8:00am and 2:00pm to enroll in mental health treatment, housing, and case management services. [address] [place],[address] [address] [phone] Johny Warner MD   Patient can only remember the practice name and not the physician Discharge Instructions DISCHARGE SUMMARY 
 
NAME:Hernan Polanco : 1940 MRN: [id number] The patient Josselin Royal exhibits the ability to control behavior in a less restrictive environment. Patient's level of functioning is improving.   No assaultive/destructive behavior has been observed for the past 24 hours. No suicidal/homicidal threat or behavior has been observed for the past 24 hours. There is no evidence of serious medication side effects. Patient has not been in physical or protective restraints for at least the past 24 hours. If weapons involved, how are they secured? No weapons involved. Is patient aware of and in agreement with discharge plan? Yes Arrangements for medication:  Prescriptions filled for patient. Referral for substance abuse treatment? Patient is not using substances/Not applicable. Referral for smoking cessation needed? Yes, refused. Copy of discharge instructions to provider?:  [address] Arrangements for transportation home:  Medicaid taxi Keep all follow up appointments as scheduled, continue to take prescribed medications per physician instructions. Mental health crisis number:  [phone] or your local mental health crisis line number at [phone]. DISCHARGE SUMMARY from Nurse PATIENT INSTRUCTIONS: 
What to do at Home: 
Recommended activity: Activity as tolerated, If you experience any of the following symptoms thoughts of harming self, feeling overwhelmed with hopelessness and lack of control over your life, please follow up with your local crisis number and assigned staff. *  Please give a list of your current medications to your Primary Care Provider. *  Please update this list whenever your medications are discontinued, doses are 
    changed, or new medications (including over-the-counter products) are added. *  Please carry medication information at all times in case of emergency situations. These are general instructions for a healthy lifestyle: No smoking/ No tobacco products/ Avoid exposure to second hand smoke Surgeon General's Warning:  Quitting smoking now greatly reduces serious risk to your health.  
 
Obesity, smoking, and sedentary lifestyle greatly increases your risk for illness A healthy diet, regular physical exercise & weight monitoring are important for maintaining a healthy lifestyle You may be retaining fluid if you have a history of heart failure or if you experience any of the following symptoms:  Weight gain of 3 pounds or more overnight or 5 pounds in a week, increased swelling in our hands or feet or shortness of breath while lying flat in bed. Please call your doctor as soon as you notice any of these symptoms; do not wait until your next office visit. Recognize signs and symptoms of STROKE: 
 
F-face looks uneven A-arms unable to move or move unevenly S-speech slurred or non-existent T-time-call 911 as soon as signs and symptoms begin-DO NOT go Back to bed or wait to see if you get better-TIME IS BRAIN. Warning Signs of HEART ATTACK Call 911 if you have these symptoms:  Chest discomfort. Most heart attacks involve discomfort in the center of the chest that lasts more than a few minutes, or that goes away and comes back. It can feel like uncomfortable pressure, squeezing, fullness, or pain.  Discomfort in other areas of the upper body. Symptoms can include pain or discomfort in one or both arms, the back, neck, jaw, or stomach.  Shortness of breath with or without chest discomfort.  Other signs may include breaking out in a cold sweat, nausea, or lightheadedness. Don't wait more than five minutes to call [address]! Fast action can save your life. Calling 911 is almost always the fastest way to get lifesaving treatment. Emergency Medical Services staff can begin treatment when they arrive  up to an hour sooner than if someone gets to the hospital by car. The discharge information has been reviewed with the patient. The patient verbalized understanding.  
Discharge medications reviewed with the patient and appropriate educational materials and side effects teaching were provided. ___________________________________________________________________________________________________________________________________ Chart Review Routing History No Routing History on File

## 2018-01-24 NOTE — H&P
INITIAL PSYCHIATRIC EVALUATION            IDENTIFICATION:    Patient Name  Zenia Mccoy   Date of Birth 1940   [facility] [id number]   Medical Record Number  [id number]      Age  78 y.o. PCP Johny Warner, MD   Admit date:  1/24/2018    Room Number  748/01  @ [facility]   Date of Service  1/24/2018            HISTORY         REASON FOR HOSPITALIZATION:  CC: \"homelessness and poor self care \". Pt admitted under a temporary [facility] order (TDO) or  Poor self care and homelessness and jg proving to be an imminent danger to self and others and an inability to care for self. HISTORY OF PRESENT ILLNESS:    The patient, Zenia Mccoy, is a 78 y.o. BLACK OR  male with a past psychiatric history significant for diabetes, who presents at this time with complaints of (and/or evidence of) the following emotional symptoms: depression. Additional symptomatology include agitation and anger outbursts. The above symptoms have been present for 2 days . These symptoms are of acute severity. These symptoms are intermittent/ fleeting in nature. The patient's condition has been precipitated by homelessness and psychosocial stressors (poor compliance with treatment ). Patient's condition made worse by treatment noncompliance. UDS: negative; BAL=0. ALLERGIES:   Allergies   Allergen Reactions    Tetracycline Hives      MEDICATIONS PRIOR TO ADMISSION:   Prescriptions Prior to Admission   Medication Sig    hydrALAZINE (APRESOLINE) 100 mg tablet Take 1 Tab by mouth three (3) times daily.  amLODIPine (NORVASC) 10 mg tablet Take 1 Tab by mouth daily.  aspirin delayed-release 81 mg tablet Take 1 Tab by mouth daily.  atorvastatin (LIPITOR) 40 mg tablet Take 1 Tab by mouth daily.  carvedilol (COREG) 3.125 mg tablet Take 1 Tab by mouth two (2) times daily (with meals).  ferrous sulfate 325 mg (65 mg iron) tablet Take 1 Tab by mouth two (2) times daily (with meals).     mirtazapine (REMERON) 7.5 mg tablet Take 1 Tab by mouth nightly.  nicotine (NICODERM CQ) 21 mg/24 hr 1 Patch by TransDERmal route every twenty-four (24) hours for 30 days. PAST MEDICAL HISTORY:   Past Medical History:   Diagnosis Date    Arthritis     CAD (coronary artery disease) 2007    CKD (chronic kidney disease), stage III     DM type 2 causing renal disease (HCC)     DVT (deep venous thrombosis) (HCC)     Hx of DVT:  Xarelto stopped due to GI bleed. S/P IVC filter.  Gait abnormality     GI bleed     Heart murmur     Hepatitis C     History of blood transfusion     Hypercholesterolemia     Hypertension 11/7/2014    Neuropathy     Non compliance w medication regimen     Peripheral vascular disease ([facility] [address] 11/7/2014    A. S/P Right SFA POBA and tibial atherectomy (2/4/13).  PUD (peptic ulcer disease) 2007    Unspecified sleep apnea     never used cpap     Past Surgical History:   Procedure Laterality Date    ABDOMEN SURGERY PROC UNLISTED  2007    has approx 7-8\" midline incision on abdomen--\"had to open him up and clean him out after feeding tube clogged\"    HX GI  2007    feeding tube for approx 2 mo    VASCULAR SURGERY PROCEDURE UNLIST Right 1/22/2015    popliteal-tibial bypass      SOCIAL HISTORY:    Social History     Social History    Marital status:      Spouse name: N/A    Number of children: N/A    Years of education: N/A     Occupational History    Not on file. Social History Main Topics    Smoking status: Current Every Day Smoker     Packs/day: 0.50    Smokeless tobacco: Not on file    Alcohol use No    Drug use: No      Comment: >20 years ago    Sexual activity: Not on file     Other Topics Concern    Not on file     Social History Narrative    78 year ols male admitted for depression and homelessness. Pt will be evicated from apartment due to non payment of bills. [facility] of 30 years [facility] [place] [facility]. \" Pt is a brittle diabetic, with treatment non compliance. He has bilarela lower extremity amputations. Patient has a long hx of substance abuse. FAMILY HISTORY:    Family History   Problem Relation Age of Onset    Hypertension Father        REVIEW OF SYSTEMS:   Psychological ROS: positive for - behavioral disorder  Respiratory ROS: no cough, shortness of breath, or wheezing  Cardiovascular ROS: no chest pain or dyspnea on exertion  Pertinent items are noted in the History of Present Illness. All other Systems reviewed and are considered negative. MENTAL STATUS EXAM & VITALS     MENTAL STATUS EXAM (MSE):    MSE FINDINGS ARE WITHIN NORMAL LIMITS (WNL) UNLESS OTHERWISE STATED BELOW. ( ALL OF THE BELOW CATEGORIES OF THE MSE HAVE BEEN REVIEWED (reviewed 1/24/2018) AND UPDATED AS DEEMED APPROPRIATE )  General Presentation older than stated age, evasive and guarded   Orientation oriented to time, place and person   Vital Signs  See below (reviewed 1/24/2018); Vital Signs (BP, Pulse, & Temp) are within normal limits if not listed below.    Gait and Station Stable/steady, no ataxia   Musculoskeletal System No extrapyramidal symptoms (EPS); no abnormal muscular movements or Tardive Dyskinesia (TD); muscle strength and tone are within normal limits   Language No aphasia or dysarthria   Speech:  monotone   Thought Processes concrete; normal rate of thoughts; poor abstract reasoning/computation   Thought Associations circumstantial   Thought Content free of delusions and free of hallucinations   Suicidal Ideations none   Homicidal Ideations none   Mood:  irritable   Affect:  mood-congruent   Memory recent  fair   Memory remote:  fair   Concentration/Attention:  distractable   Fund of Knowledge below average   Insight:  poor   Reliability poor   Judgment:  poor          VITALS:     Patient Vitals for the past 24 hrs:   Temp Pulse Resp BP SpO2   01/24/18 1030 98 °F (36.7 °C) 69 16 188/89 100 %   01/24/18 0131 98.1 °F (36.7 °C) 80 18 183/79 98 %     Wt Readings from Last 3 Encounters:   01/24/18 82.7 kg (182 lb 5.1 oz)   01/23/18 83.6 kg (184 lb 4.9 oz)   07/13/17 88.9 kg (196 lb)     Temp Readings from Last 3 Encounters:   01/24/18 98 °F (36.7 °C)   01/24/18 97.2 °F (36.2 °C)   07/20/17 97.9 °F (36.6 °C)     BP Readings from Last 3 Encounters:   01/24/18 188/89   01/24/18 172/68   07/20/17 175/76     Pulse Readings from Last 3 Encounters:   01/24/18 69   01/24/18 72   07/20/17 (!) 52            DATA     LABORATORY DATA:  Labs Reviewed   GLUCOSE, POC - Abnormal; Notable for the following:        Result Value    Glucose (POC) 142 (*)     All other components within normal limits   CULTURE, MRSA   METABOLIC PANEL, BASIC   CBC WITH AUTOMATED DIFF     Admission on 01/24/2018   Component Date Value Ref Range Status    Glucose (POC) 01/24/2018 142* 65 - 100 mg/dL Final    Performed by 01/24/2018 Zeus Valdes   Final   Admission on 01/20/2018, Discharged on 01/24/2018   Component Date Value Ref Range Status    WBC 01/20/2018 3.8* 4.1 - 11.1 K/uL Final    RBC 01/20/2018 3.75* 4.10 - 5.70 M/uL Final    HGB 01/20/2018 10.4* 12.1 - 17.0 g/dL Final    HCT 01/20/2018 30.7* 36.6 - 50.3 % Final    MCV 01/20/2018 81.9  80.0 - 99.0 FL Final    MCH 01/20/2018 27.7  26.0 - 34.0 PG Final    MCHC 01/20/2018 33.9  30.0 - 36.5 g/dL Final    RDW 01/20/2018 16.0* 11.5 - 14.5 % Final    PLATELET 95/59/4560 651  150 - 400 K/uL Final    NEUTROPHILS 01/20/2018 50  32 - 75 % Final    LYMPHOCYTES 01/20/2018 41  12 - 49 % Final    MONOCYTES 01/20/2018 6  5 - 13 % Final    EOSINOPHILS 01/20/2018 3  0 - 7 % Final    BASOPHILS 01/20/2018 0  0 - 1 % Final    ABS. NEUTROPHILS 01/20/2018 1.9  1.8 - 8.0 K/UL Final    ABS. LYMPHOCYTES 01/20/2018 1.5  0.8 - 3.5 K/UL Final    ABS. MONOCYTES 01/20/2018 0.2  0.0 - 1.0 K/UL Final    ABS. EOSINOPHILS 01/20/2018 0.1  0.0 - 0.4 K/UL Final    ABS.  BASOPHILS 01/20/2018 0.0  0.0 - 0.1 K/UL Final    Sodium 01/20/2018 144 136 - 145 mmol/L Final    Potassium 01/20/2018 3.5  3.5 - 5.1 mmol/L Final    Chloride 01/20/2018 108  97 - 108 mmol/L Final    CO2 01/20/2018 28  21 - 32 mmol/L Final    Anion gap 01/20/2018 8  5 - 15 mmol/L Final    Glucose 01/20/2018 82  65 - 100 mg/dL Final    BUN 01/20/2018 24* 6 - 20 MG/DL Final    Creatinine 01/20/2018 2.48* 0.70 - 1.30 MG/DL Final    BUN/Creatinine ratio 01/20/2018 10* 12 - 20   Final    GFR est AA 01/20/2018 31* >60 ml/min/1.73m2 Final    GFR est non-AA 01/20/2018 26* >60 ml/min/1.73m2 Final    Calcium 01/20/2018 8.4* 8.5 - 10.1 MG/DL Final    Bilirubin, total 01/20/2018 0.4  0.2 - 1.0 MG/DL Final    ALT (SGPT) 01/20/2018 9* 12 - 78 U/L Final    AST (SGOT) 01/20/2018 19  15 - 37 U/L Final    Alk.  phosphatase 01/20/2018 84  45 - 117 U/L Final    Protein, total 01/20/2018 7.5  6.4 - 8.2 g/dL Final    Albumin 01/20/2018 2.4* 3.5 - 5.0 g/dL Final    Globulin 01/20/2018 5.1* 2.0 - 4.0 g/dL Final    A-G Ratio 01/20/2018 0.5* 1.1 - 2.2   Final    Color 01/20/2018 YELLOW/STRAW    Final    Appearance 01/20/2018 CLEAR  CLEAR   Final    Specific gravity 01/20/2018 1.020  1.003 - 1.030   Final    pH (UA) 01/20/2018 6.0  5.0 - 8.0   Final    Protein 01/20/2018 300* NEG mg/dL Final    Glucose 01/20/2018 NEGATIVE   NEG mg/dL Final    Ketone 01/20/2018 NEGATIVE   NEG mg/dL Final    Bilirubin 01/20/2018 NEGATIVE   NEG   Final    Blood 01/20/2018 TRACE* NEG   Final    Urobilinogen 01/20/2018 1.0  0.2 - 1.0 EU/dL Final    Nitrites 01/20/2018 NEGATIVE   NEG   Final    Leukocyte Esterase 01/20/2018 NEGATIVE   NEG   Final    WBC 01/20/2018 0-4  0 - 4 /hpf Final    RBC 01/20/2018 5-10  0 - 5 /hpf Final    Epithelial cells 01/20/2018 FEW  FEW /lpf Final    Bacteria 01/20/2018 NEGATIVE   NEG /hpf Final    Hyaline cast 01/20/2018 0-2  0 - 5 /lpf Final    Ventricular Rate 01/20/2018 74  BPM Final    Atrial Rate 01/20/2018 74  BPM Final    P-R Interval 01/20/2018 202  ms Final  QRS Duration 01/20/2018 84  ms Final    Q-T Interval 01/20/2018 422  ms Final    QTC Calculation (Bezet) 01/20/2018 468  ms Final    Calculated P Axis 01/20/2018 72  degrees Final    Calculated R Axis 01/20/2018 35  degrees Final    Calculated T Axis 01/20/2018 -112  degrees Final    Diagnosis 01/20/2018    Final                    Value:Sinus rhythm with occasional premature ventricular complexes and premature   atrial complexes  Possible Left atrial enlargement  Left ventricular hypertrophy with repolarization abnormality  Abnormal ECG  When compared with ECG of 13-JUL-2017 17:12,  premature ventricular complexes are now present  premature atrial complexes are now present  NC interval has decreased  T wave inversion more evident in Inferior leads  Confirmed by Mando Morales MD., Patti ([id number]) on 1/22/2018 8:11:50 AM      Magnesium 01/20/2018 1.7  1.6 - 2.4 mg/dL Final    Troponin-I, Qt. 01/20/2018 0.09* <0.05 ng/mL Final    Glucose (POC) 01/20/2018 88  65 - 100 mg/dL Final    Performed by 01/20/2018 Silvia Albarran   Final    NT pro-BNP 01/20/2018 >82873* 0 - 450 PG/ML Final    AMPHETAMINES 01/20/2018 NEGATIVE   NEG   Final    BARBITURATES 01/20/2018 NEGATIVE   NEG   Final    BENZODIAZEPINES 01/20/2018 NEGATIVE   NEG   Final    COCAINE 01/20/2018 NEGATIVE   NEG   Final    METHADONE 01/20/2018 NEGATIVE   NEG   Final    OPIATES 01/20/2018 NEGATIVE   NEG   Final    PCP(PHENCYCLIDINE) 01/20/2018 NEGATIVE   NEG   Final    THC (TH-CANNABINOL) 01/20/2018 POSITIVE* NEG   Final    Drug screen comment 01/20/2018 (NOTE)   Final    Sodium 01/21/2018 141  136 - 145 mmol/L Final    Potassium 01/21/2018 3.8  3.5 - 5.1 mmol/L Final    Chloride 01/21/2018 108  97 - 108 mmol/L Final    CO2 01/21/2018 27  21 - 32 mmol/L Final    Anion gap 01/21/2018 6  5 - 15 mmol/L Final    Glucose 01/21/2018 100  65 - 100 mg/dL Final    BUN 01/21/2018 26* 6 - 20 MG/DL Final    Creatinine 01/21/2018 2.49* 0.70 - 1.30 MG/DL Final    BUN/Creatinine ratio 01/21/2018 10* 12 - 20   Final    GFR est AA 01/21/2018 31* >60 ml/min/1.73m2 Final    GFR est non-AA 01/21/2018 25* >60 ml/min/1.73m2 Final    Calcium 01/21/2018 8.1* 8.5 - 10.1 MG/DL Final    LIPID PROFILE 01/21/2018        Final    Cholesterol, total 01/21/2018 197  <200 MG/DL Final    Triglyceride 01/21/2018 104  <150 MG/DL Final    HDL Cholesterol 01/21/2018 49  MG/DL Final    LDL, calculated 01/21/2018 127.2* 0 - 100 MG/DL Final    VLDL, calculated 01/21/2018 20.8  MG/DL Final    CHOL/HDL Ratio 01/21/2018 4.0  0 - 5.0   Final    WBC 01/21/2018 3.9* 4.1 - 11.1 K/uL Final    RBC 01/21/2018 3.43* 4.10 - 5.70 M/uL Final    HGB 01/21/2018 9.4* 12.1 - 17.0 g/dL Final    HCT 01/21/2018 27.8* 36.6 - 50.3 % Final    MCV 01/21/2018 81.0  80.0 - 99.0 FL Final    MCH 01/21/2018 27.4  26.0 - 34.0 PG Final    MCHC 01/21/2018 33.8  30.0 - 36.5 g/dL Final    RDW 01/21/2018 16.0* 11.5 - 14.5 % Final    PLATELET 41/13/9752 623  150 - 400 K/uL Final    Hemoglobin A1c 01/21/2018 4.5  4.2 - 6.3 % Final    Est. average glucose 01/21/2018 Cannot be calculated  mg/dL Final    Magnesium 01/21/2018 1.6  1.6 - 2.4 mg/dL Final    Phosphorus 01/21/2018 2.9  2.6 - 4.7 MG/DL Final    Protein, total 01/21/2018 6.3* 6.4 - 8.2 g/dL Final    Albumin 01/21/2018 2.2* 3.5 - 5.0 g/dL Final    Globulin 01/21/2018 4.1* 2.0 - 4.0 g/dL Final    A-G Ratio 01/21/2018 0.5* 1.1 - 2.2   Final    Bilirubin, total 01/21/2018 0.3  0.2 - 1.0 MG/DL Final    Bilirubin, direct 01/21/2018 0.1  0.0 - 0.2 MG/DL Final    Alk.  phosphatase 01/21/2018 78  45 - 117 U/L Final    AST (SGOT) 01/21/2018 19  15 - 37 U/L Final    ALT (SGPT) 01/21/2018 9* 12 - 78 U/L Final    CK 01/21/2018 61  39 - 308 U/L Final    CK - MB 01/21/2018 2.6  <3.6 NG/ML Final    CK-MB Index 01/21/2018 4.3* 0 - 2.5   Final    Troponin-I, Qt. 01/21/2018 0.10* <0.05 ng/mL Final    Glucose (POC) 01/21/2018 107* 65 - 100 mg/dL Final    Performed by 01/21/2018 Tacho Tillman (PCT)   Final    Glucose (POC) 01/22/2018 127* 65 - 100 mg/dL Final    Performed by 01/22/2018 Martha Donovan (PCT)   Final    WBC 01/23/2018 4.3  4.1 - 11.1 K/uL Final    RBC 01/23/2018 3.60* 4.10 - 5.70 M/uL Final    HGB 01/23/2018 9.8* 12.1 - 17.0 g/dL Final    HCT 01/23/2018 29.6* 36.6 - 50.3 % Final    MCV 01/23/2018 82.2  80.0 - 99.0 FL Final    MCH 01/23/2018 27.2  26.0 - 34.0 PG Final    MCHC 01/23/2018 33.1  30.0 - 36.5 g/dL Final    RDW 01/23/2018 16.4* 11.5 - 14.5 % Final    PLATELET 97/60/7644 776  150 - 400 K/uL Final    NEUTROPHILS 01/23/2018 40  32 - 75 % Final    LYMPHOCYTES 01/23/2018 52* 12 - 49 % Final    MONOCYTES 01/23/2018 6  5 - 13 % Final    EOSINOPHILS 01/23/2018 2  0 - 7 % Final    BASOPHILS 01/23/2018 0  0 - 1 % Final    ABS. NEUTROPHILS 01/23/2018 1.7* 1.8 - 8.0 K/UL Final    ABS. LYMPHOCYTES 01/23/2018 2.2  0.8 - 3.5 K/UL Final    ABS. MONOCYTES 01/23/2018 0.3  0.0 - 1.0 K/UL Final    ABS. EOSINOPHILS 01/23/2018 0.1  0.0 - 0.4 K/UL Final    ABS. BASOPHILS 01/23/2018 0.0  0.0 - 0.1 K/UL Final    Sodium 01/23/2018 139  136 - 145 mmol/L Final    Potassium 01/23/2018 4.4  3.5 - 5.1 mmol/L Final    Chloride 01/23/2018 107  97 - 108 mmol/L Final    CO2 01/23/2018 23  21 - 32 mmol/L Final    Anion gap 01/23/2018 9  5 - 15 mmol/L Final    Glucose 01/23/2018 120* 65 - 100 mg/dL Final    BUN 01/23/2018 34* 6 - 20 MG/DL Final    Creatinine 01/23/2018 2.61* 0.70 - 1.30 MG/DL Final    BUN/Creatinine ratio 01/23/2018 13  12 - 20   Final    GFR est AA 01/23/2018 29* >60 ml/min/1.73m2 Final    GFR est non-AA 01/23/2018 24* >60 ml/min/1.73m2 Final    Calcium 01/23/2018 8.5  8.5 - 10.1 MG/DL Final    Bilirubin, total 01/23/2018 0.3  0.2 - 1.0 MG/DL Final    ALT (SGPT) 01/23/2018 9* 12 - 78 U/L Final    AST (SGOT) 01/23/2018 21  15 - 37 U/L Final    Alk.  phosphatase 01/23/2018 70  45 - 117 U/L Final    Protein, total 01/23/2018 7.3 6.4 - 8.2 g/dL Final    Albumin 01/23/2018 2.4* 3.5 - 5.0 g/dL Final    Globulin 01/23/2018 4.9* 2.0 - 4.0 g/dL Final    A-G Ratio 01/23/2018 0.5* 1.1 - 2.2   Final    Magnesium 01/23/2018 1.6  1.6 - 2.4 mg/dL Final    Phosphorus 01/23/2018 3.7  2.6 - 4.7 MG/DL Final    Glucose (POC) 01/23/2018 77  65 - 100 mg/dL Final    Performed by 01/23/2018 Gabby Right   Final    Glucose (POC) 01/23/2018 85  65 - 100 mg/dL Final    Performed by 01/23/2018 Evelia Montemayor   Final    Glucose (POC) 01/23/2018 83  65 - 100 mg/dL Final    Performed by 01/23/2018 Tanya Owusu (PCT)   Final        RADIOLOGY REPORTS:    Results from Hospital Encounter encounter on 01/20/18   XR CHEST PORT   Narrative EXAM: Portable CXR.  1800 hours. COMPARISON: 1/19/2015. INDICATION: cp, fatigue    There is new interstitial pulmonary edema, cardiomegaly and small left pleural  effusion. There is no focal infiltrate, pneumothorax or midline shift. Impression IMPRESSION: CHF pattern. Xr Chest Port    Result Date: 1/20/2018  EXAM: Portable CXR.  1800 hours. COMPARISON: 1/19/2015. INDICATION: cp, fatigue There is new interstitial pulmonary edema, cardiomegaly and small left pleural effusion. There is no focal infiltrate, pneumothorax or midline shift. IMPRESSION: CHF pattern.              MEDICATIONS       ALL MEDICATIONS  Current Facility-Administered Medications   Medication Dose Route Frequency    OLANZapine (ZyPREXA) tablet 2.5 mg  2.5 mg Oral Q6H PRN    ziprasidone (GEODON) 10 mg in sterile water (preservative free) 0.5 mL injection  10 mg IntraMUSCular BID PRN    benztropine (COGENTIN) tablet 1 mg  1 mg Oral BID PRN    benztropine (COGENTIN) injection 1 mg  1 mg IntraMUSCular BID PRN    zolpidem (AMBIEN) tablet 5 mg  5 mg Oral QHS PRN    acetaminophen (TYLENOL) tablet 650 mg  650 mg Oral Q4H PRN    ibuprofen (MOTRIN) tablet 400 mg  400 mg Oral Q8H PRN    magnesium hydroxide (MILK OF MAGNESIA) 400 mg/5 mL oral suspension 30 mL  30 mL Oral DAILY PRN    nicotine (NICODERM CQ) 21 mg/24 hr patch 1 Patch  1 Patch TransDERmal DAILY PRN    influenza vaccine 2017-18 (3 yrs+)(PF) (FLUZONE QUAD/FLUARIX QUAD) injection 0.5 mL  0.5 mL IntraMUSCular PRIOR TO DISCHARGE    amLODIPine (NORVASC) tablet 10 mg  10 mg Oral DAILY    aspirin delayed-release tablet 81 mg  81 mg Oral DAILY    atorvastatin (LIPITOR) tablet 40 mg  40 mg Oral DAILY    carvedilol (COREG) tablet 3.125 mg  3.125 mg Oral BID WITH MEALS    ferrous sulfate tablet 325 mg  325 mg Oral BID WITH MEALS    hydrALAZINE (APRESOLINE) tablet 100 mg  100 mg Oral TID    glucose chewable tablet 16 g  4 Tab Oral PRN    dextrose (D50W) injection syrg 12.5-25 g  12.5-25 g IntraVENous PRN    glucagon (GLUCAGEN) injection 1 mg  1 mg IntraMUSCular PRN    insulin lispro (HUMALOG) injection   SubCUTAneous AC&HS      SCHEDULED MEDICATIONS  Current Facility-Administered Medications   Medication Dose Route Frequency    influenza vaccine 2017-18 (3 yrs+)(PF) (FLUZONE QUAD/FLUARIX QUAD) injection 0.5 mL  0.5 mL IntraMUSCular PRIOR TO DISCHARGE    amLODIPine (NORVASC) tablet 10 mg  10 mg Oral DAILY    aspirin delayed-release tablet 81 mg  81 mg Oral DAILY    atorvastatin (LIPITOR) tablet 40 mg  40 mg Oral DAILY    carvedilol (COREG) tablet 3.125 mg  3.125 mg Oral BID WITH MEALS    ferrous sulfate tablet 325 mg  325 mg Oral BID WITH MEALS    hydrALAZINE (APRESOLINE) tablet 100 mg  100 mg Oral TID    insulin lispro (HUMALOG) injection   SubCUTAneous AC&HS                ASSESSMENT & PLAN        The patient, Itzel Bowen, is a 78 y.o.  male who presents at this time for treatment of the following diagnoses:  Patient Active Hospital Problem List:   Depression (1/24/2018)    Assessment: sadness, hopelessness, helplessness, insomnia and anhedonia    Plan: antidepressant, CBT           I will continue to monitor blood levels (Depakote, Tegretol, lithium, clozapine---a drug with a narrow therapeutic index= NTI) and associated labs for drug therapy implemented that require intense monitoring for toxicity as deemed appropriate based on current medication side effects and pharmacodynamically determined drug 1/2 lives. A coordinated, multidisplinary treatment team (includes the nurse, unit pharmcist,  and writer) round was conducted for this initial evaluation with the patient present. The following regarding medications was addressed during rounds with patient:prn ambien   the risks and benefits of the proposed medication. The patient was given the opportunity to ask questions. Informed consent given to the use of the above medications. I will continue to adjust psychiatric and non-psychiatric medications (see above \"medication\" section and orders section for details) as deemed appropriate & based upon diagnoses and response to treatment. I have reviewed admission (and previous/old) labs and medical tests in the EHR and or transferring hospital documents. I will continue to order blood tests/labs and diagnostic tests as deemed appropriate and review results as they become available (see orders for details). I have reviewed old psychiatric and medical records available in the EHR. I Will order additional psychiatric records from other institutions to further elucidate the nature of patient's psychopathology and review once available. I will gather additional collateral information from friends, family and o/p treatment team to further elucidate the nature of patient's psychopathology and baselline level of psychiatric functioning.       ESTIMATED LENGTH OF STAY:    5-10 days        STRENGTHS:  Exercising self-direction/Resourceful, Awareness of Substance abuse issues and knows how to navigate healthcare system                                        SIGNED:    Wade Leahy MD  1/24/2018

## 2018-01-24 NOTE — BH NOTES
Primary Nurse Cheryl Simental RN and MIKHAIL Choi performed a dual skin assessment on this patient No impairment noted. Rambo score is 14    Pt is a bilateral BKA (healed). Pt has 2 tattoos--1 on chest & 1 on R arm--both are healed. Pt has healed scars on legs, chest, lower abd, & left thigh. Pt says he does not have any appliances/prosthetics for his legs, so he is mobile via his w/c (not with him). Monitoring continues.

## 2018-01-25 LAB
BACTERIA SPEC CULT: NORMAL
BACTERIA SPEC CULT: NORMAL
GLUCOSE BLD STRIP.AUTO-MCNC: 114 MG/DL (ref 65–100)
GLUCOSE BLD STRIP.AUTO-MCNC: 143 MG/DL (ref 65–100)
GLUCOSE BLD STRIP.AUTO-MCNC: 155 MG/DL (ref 65–100)
GLUCOSE BLD STRIP.AUTO-MCNC: 77 MG/DL (ref 65–100)
GLUCOSE BLD STRIP.AUTO-MCNC: 83 MG/DL (ref 65–100)
SERVICE CMNT-IMP: ABNORMAL
SERVICE CMNT-IMP: NORMAL

## 2018-01-25 PROCEDURE — 65220000003 HC RM SEMIPRIVATE PSYCH

## 2018-01-25 PROCEDURE — 74011250637 HC RX REV CODE- 250/637: Performed by: PSYCHIATRY & NEUROLOGY

## 2018-01-25 PROCEDURE — 82962 GLUCOSE BLOOD TEST: CPT

## 2018-01-25 PROCEDURE — 74011250637 HC RX REV CODE- 250/637: Performed by: HOSPITALIST

## 2018-01-25 PROCEDURE — 74011250637 HC RX REV CODE- 250/637: Performed by: FAMILY MEDICINE

## 2018-01-25 RX ORDER — DOXAZOSIN 2 MG/1
4 TABLET ORAL
Status: DISCONTINUED | OUTPATIENT
Start: 2018-01-25 | End: 2018-03-03 | Stop reason: HOSPADM

## 2018-01-25 RX ORDER — POLYETHYLENE GLYCOL 3350 17 G/17G
17 POWDER, FOR SOLUTION ORAL DAILY
Status: DISCONTINUED | OUTPATIENT
Start: 2018-01-26 | End: 2018-03-03 | Stop reason: HOSPADM

## 2018-01-25 RX ADMIN — HYDRALAZINE HYDROCHLORIDE 100 MG: 50 TABLET, FILM COATED ORAL at 16:47

## 2018-01-25 RX ADMIN — DOXAZOSIN 4 MG: 2 TABLET ORAL at 21:37

## 2018-01-25 RX ADMIN — HYDRALAZINE HYDROCHLORIDE 100 MG: 50 TABLET, FILM COATED ORAL at 21:37

## 2018-01-25 RX ADMIN — CARVEDILOL 3.12 MG: 3.12 TABLET, FILM COATED ORAL at 17:38

## 2018-01-25 RX ADMIN — FERROUS SULFATE TAB 325 MG (65 MG ELEMENTAL FE) 325 MG: 325 (65 FE) TAB at 17:37

## 2018-01-25 RX ADMIN — MIRTAZAPINE 7.5 MG: 15 TABLET, FILM COATED ORAL at 21:38

## 2018-01-25 NOTE — PROGRESS NOTES
Patient wants to be put into a reclining chair, called lift team and asked for walt lift. Scottie advised his supervisor is on way up.

## 2018-01-25 NOTE — BH NOTES
2010:  Pt is alert, strong equal bilateral  UE  and pull, strong equal bilateral LE push and pull. Pt declines to sit self up, turn self, or assist staff to clean and turn him.

## 2018-01-25 NOTE — PROGRESS NOTES
Problem: Falls - Risk of  Goal: *Absence of Falls  Document Bello Fall Risk and appropriate interventions in the flowsheet. Outcome: Progressing Towards Goal  Fall Risk Interventions:  Mobility Interventions: Bed/chair exit alarm, Communicate number of staff needed for ambulation/transfer, Patient to call before getting OOB, PT Consult for mobility concerns, PT Consult for assist device competence    Mentation Interventions: Adequate sleep, hydration, pain control, Bed/chair exit alarm, Door open when patient unattended, Evaluate medications/consider consulting pharmacy, Reorient patient, Room close to nurse's station, Increase mobility    Medication Interventions: Bed/chair exit alarm, Evaluate medications/consider consulting pharmacy, Patient to call before getting OOB    Elimination Interventions: Bed/chair exit alarm, Call light in reach, Patient to call for help with toileting needs, Urinal in reach    History of Falls Interventions: Bed/chair exit alarm, Door open when patient unattended, Evaluate medications/consider consulting pharmacy, Room close to nurse's station    2530: up out of bed with assistance into chair. Requested lift team for transfer back into bed    Problem: Depressed Mood (Adult/Pediatric)  Goal: *STG: Participates in treatment plan  Outcome: Progressing Towards Goal  Review meds, isolative to room and self, irritable, guarded and defensive. Voices not liking the accommodations, describes feeling rushed. Denies SI, states he doesn't recall saying that and never would hurt self. Frustration over being admitted.  Staff offer support and reassurance

## 2018-01-25 NOTE — BH NOTES
Behavioral Health Interdisciplinary Rounds     Patient Name: Gunner Stewart  Age: 78 y.o. Room/Bed:  740/02  Primary Diagnosis: Depression   Admission Status: TDO by [facility] at 0730 today     Readmission within 30 days: no  Power of  in place: Eliecer Romano (daughter); awaiting paperwork  Patient requires a blocked bed: yes          Reason for blocked bed: awaiting MRSA results    VTE Prophylaxis: Yes--ASA  Flu vaccine given : no --refused  Mobility needs/Fall risk: yes--bilat BKA    Nutritional Plan: needs cons with Dietary  Consults: needs PT, Diabetic Tx team, Nephrology consults         Labs/Testing due today?: yes    Sleep hours:  5.25+      Participation in Care/Groups:  no  Medication Compliant?: Yes  PRNS (last 24 hours): None    Restraints (last 24 hours):  no  Substance Abuse:  no  CIWA (range last 24 hours): na COWS (range last 24 hours): na  Alcohol screening (AUDIT) completed -  AUDIT Score: 0  If applicable, date SBIRT discussed in treatment team AND documented: na  Tobacco - patient is a smoker: yes   Date tobacco education completed by RN: 1.5 ppd since 15 yrs old. 1/24/18 given Stop Smoking info  24 hour chart check complete: yes     Patient goal(s) for today:   Treatment team focus/goals: Call daughter/POA  Progress note: Patient selectively engaging with staff. LOS:  1  Expected LOS: TBD    Financial concerns/prescription coverage:  [place] Medicare; Medicaid  Date of last family contact: 1/25 SW spoke to fiance and daughter/POA      Family requesting physician contact today:  no  Discharge plan: placement  Guns in the home: no        Outpatient provider(s): to be linked    Participating treatment team members:  DARRICK Valiente; Dr. Missael Mcgill MD; Robina Montanez RN; Ken Drake, PharmD

## 2018-01-25 NOTE — PROGRESS NOTES
MUSIC THERAPY GROUP PROGRESS NOTE        1/25/2018    The patient Evelyn cisneros 78 y.o. male participated in Music Therapy group on [address] [address] unit. Group time: 40 minutes    Personal goal for participation: Participate in 2/3 of the group time and have at least one meaningful interaction during group. Goal orientation: Social    Group therapy participation: Active    Therapeutic interventions: Sing along, Katharina analysis    Observation of participation: The patient actively participated in [address] [place] [address] and achieved personal goal for participation.     JOSÉ MIGUEL Muniz (Music Therapist-Board Certified)  Spiritual Care Department  Referral-based service

## 2018-01-25 NOTE — PROGRESS NOTES
Problem: Falls - Risk of  Goal: *Absence of Falls  Document Bello Fall Risk and appropriate interventions in the flowsheet. Outcome: Progressing Towards Goal  Fall Risk Interventions:  Side rails x 3, 1 assist, education provided. Mobility Interventions: Bed/chair exit alarm, Communicate number of staff needed for ambulation/transfer, PT Consult for mobility concerns    Mentation Interventions: Adequate sleep, hydration, pain control, Bed/chair exit alarm, Door open when patient unattended, Reorient patient, Room close to nurse's station    Medication Interventions: Bed/chair exit alarm    Elimination Interventions: Bed/chair exit alarm, Call light in reach, Toileting schedule/hourly rounds, Urinal in reach    History of Falls Interventions: Bed/chair exit alarm, Room close to nurse's station        Problem: Depressed Mood (Adult/Pediatric)  Goal: *STG: Participates in treatment plan  Outcome: Progressing Towards Goal  Flat, guarded. Selectively cooperative, declines vitals, turning, and incontinence care. Poor memory, poverty of thought. Pt moved by staff to be close to nurse's station, pt states \"I've been waiting, when will I be moved? \" hours afterward. Pt's fiancee would like to be present for TDO hearing tomorrow. Accepts medications.   Denies SI.

## 2018-01-25 NOTE — BH NOTES
GROUP THERAPY PROGRESS NOTE    Dallin Hernandez is not participating in Leisure-Creative Group.      Group time: 15 minutes    Personal goal for participation: none    Goal orientation: relaxation    Group therapy participation: none    Therapeutic interventions reviewed and discussed:     Impression of participation: did not attend
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
## 2018-01-25 NOTE — BH NOTES
1545:  Patient initially received as he is sitting in the Day Room. He greets oncoming staff cordially and then requests assistance with toileting. He is assisted by staff and then cleaned up with assistance from the Lift Team with the [employer]. He has now returned to the [address] [place] [address] where he is sitting quietly with peers. Will maintain q 15 minute safety checks. 1815:  Patient requesting double portions for dinner this evening. Will continue to monitor - may need to modify his diet. 2045:  Patient requires transfer assistance with the Lift Team and the [employer]. When asked if he is ready for bed at this time so that staff can call for the Lift Team, [facility] Will continue to monitor.

## 2018-01-26 PROCEDURE — 65220000003 HC RM SEMIPRIVATE PSYCH

## 2018-01-26 NOTE — BH NOTES
3986: Patient is non-compliant with VS, POC accucheck, medications, eating breakfast.  Patient is refusing, swinging at staff if they try to put a blood pressure cuff on patient, otherwise ignores staff and refuses to talk. Will notify physician and continue to monitor patient.

## 2018-01-26 NOTE — PROGRESS NOTES
Problem: Depressed Mood (Adult/Pediatric)  Goal: *STG: Attends activities and groups  Outcome: Progressing Towards Goal  Patient has been visible on the unit for dinner with his peers and watching TV afterward.

## 2018-01-26 NOTE — BH NOTES
GROUP THERAPY PROGRESS NOTE    Rob Meeks participated in a morning Process Group on the Geriatric Unit, with a focus identifying feelings, planning for the day, and singing. Group time: 45 minutes. Personal goal for participation: To increase the capacity to shift ones mood, prepare for the day, and share in group singing. Goal orientation: The patient will be able to prepare for the day through group singing. Group therapy participation: When prompted, this patient partially participated in the group. Therapeutic interventions reviewed and discussed: The group members were introduce themselves by first names and participate in group singing as a way to increase their oxygen and blood flow and begin their day on a positive note. They were also asked to join in singing several songs. Impression of participation: The patient said he didn't want to get up and yell \"because that would only lead people to want to keep me here longer. \" He indicated that he was woken up and went to his TDO hearing this morning, \"and I'm still here. \" He did not join in the singing today but was looking closely at several pages of paper on his lap. After taking some additional medicine, I closed his eyes and his head fell to his chest. He looked like he slept through the last half of the group session today. He expressed no SI/HI and displayed no overt psychosis, that were displayed in this group. His affect was angry and anxious. His mood irritated, frustrated, and tired. He appears to be in denial of the problems that brought him into the hospital. He was pleasant and restraining himself in the first half of the group.

## 2018-01-26 NOTE — BH NOTES
PSYCHIATRIC PROGRESS NOTE         Patient Name  Shaheed Lieberman   Date of Birth 1940   [facility] [id number]   Medical Record Number  [id number]      Age  78 y.o. PCP Johny Warner, MD   Admit date:  1/24/2018    Room Number  [phone]  @ [facility]   Date of Service  1/26/2018          PSYCHOTHERAPY SESSION NOTE:  Length of psychotherapy session: 15 minutes    Main condition/diagnosis/issues treated during session today, 1/26/2018 : med , meal and group non compliance    I employed Cognitive Behavioral therapy techniques, Reality-Oriented psychotherapy, as well as supportive psychotherapy in regards to various ongoing psychosocial stressors, including the following: pre-admission and current problems; housing issues; occupational issues; medical issues; and stress of hospitalization. Interpersonal relationship issues and psychodynamic conflicts explored. Attempts made to alleviate maladaptive patterns. We, also, worked on issues of denial & effects of substance dependency/use     Overall, patient is not progressing    Treatment Plan Update (reviewed an updated 1/26/2018) : I will modify psychotherapy tx plan by implementing more stress management strategies, building upon cognitive behavioral techniques, increasing coping skills, as well as shoring up psychological defenses). An extended energy and skill set was needed to engage pt in psychotherapy due to some of the following: resistiveness, complexity, negativity, confrontational nature, hostile behaviors, and/or severe abnormalities in thought processes/psychosis resulting in the loss of expressive/receptive language communication skills. E & M PROGRESS NOTE:         HISTORY       CC:  \"depression and non compliance with treatment \"  HISTORY OF PRESENT ILLNESS/INTERVAL HISTORY:  (reviewed/updated 1/26/2018).   per initial evaluation:     Shaheed Lieberman presents/reports/evidences the following emotional symptoms today, 1/26/2018:depression. The above symptoms have been present for 1 years. These symptoms are of severe severity. The symptoms are constant  in nature. Additional symptomatology and features include anxiety. SIDE EFFECTS: (reviewed/updated 1/26/2018)  None reported or admitted to. No noted toxicity with use of Depakote/Tegretol/lithium/Clozaril/TCAs   ALLERGIES:(reviewed/updated 1/26/2018)  Allergies   Allergen Reactions    Tetracycline Hives      MEDICATIONS PRIOR TO ADMISSION:(reviewed/updated 1/26/2018)  Prescriptions Prior to Admission   Medication Sig    hydrALAZINE (APRESOLINE) 100 mg tablet Take 1 Tab by mouth three (3) times daily.  amLODIPine (NORVASC) 10 mg tablet Take 1 Tab by mouth daily.  aspirin delayed-release 81 mg tablet Take 1 Tab by mouth daily.  atorvastatin (LIPITOR) 40 mg tablet Take 1 Tab by mouth daily.  carvedilol (COREG) 3.125 mg tablet Take 1 Tab by mouth two (2) times daily (with meals).  ferrous sulfate 325 mg (65 mg iron) tablet Take 1 Tab by mouth two (2) times daily (with meals).  mirtazapine (REMERON) 7.5 mg tablet Take 1 Tab by mouth nightly.  nicotine (NICODERM CQ) 21 mg/24 hr 1 Patch by TransDERmal route every twenty-four (24) hours for 30 days. PAST MEDICAL HISTORY: Past medical history from the initial psychiatric evaluation has been reviewed (reviewed/updated 1/26/2018) with no additional updates (I asked patient and no additional past medical history provided). Past Medical History:   Diagnosis Date    Arthritis     CAD (coronary artery disease) 2007    CKD (chronic kidney disease), stage III     DM type 2 causing renal disease (HCC)     DVT (deep venous thrombosis) (HCC)     Hx of DVT:  Xarelto stopped due to GI bleed. S/P IVC filter.     Gait abnormality     GI bleed     Heart murmur     Hepatitis C     History of blood transfusion     Hypercholesterolemia     Hypertension 11/7/2014    Neuropathy     Non compliance w medication regimen     Peripheral vascular disease ([facility] [address] 11/7/2014    A. S/P Right SFA POBA and tibial atherectomy (2/4/13).  PUD (peptic ulcer disease) 2007    Unspecified sleep apnea     never used cpap     Past Surgical History:   Procedure Laterality Date    ABDOMEN SURGERY PROC UNLISTED  2007    has approx 7-8\" midline incision on abdomen--\"had to open him up and clean him out after feeding tube clogged\"    HX GI  2007    feeding tube for approx 2 mo    VASCULAR SURGERY PROCEDURE UNLIST Right 1/22/2015    popliteal-tibial bypass      SOCIAL HISTORY: Social history from the initial psychiatric evaluation has been reviewed (reviewed/updated 1/26/2018) with no additional updates (I asked patient and no additional social history provided). Social History     Social History    Marital status:      Spouse name: N/A    Number of children: N/A    Years of education: N/A     Occupational History    Not on file. Social History Main Topics    Smoking status: Current Every Day Smoker     Packs/day: 0.50    Smokeless tobacco: Not on file    Alcohol use No    Drug use: No      Comment: >20 years ago    Sexual activity: Not on file     Other Topics Concern    Not on file     Social History Narrative    78 year ols male admitted for depression and homelessness. Pt will be evicated from apartment due to non payment of bills. Girlfriend of 30 years left him to [facility] live in the [place] with others. \" Pt is a brittle diabetic, with treatment non compliance. He has bilarela lower extremity amputations. Patient has a long hx of substance abuse. FAMILY HISTORY: Family history from the initial psychiatric evaluation has been reviewed (reviewed/updated 1/26/2018) with no additional updates (I asked patient and no additional family history provided).  Family History   Problem Relation Age of Onset    Hypertension Father        REVIEW OF SYSTEMS: (reviewed/updated 1/26/2018)  Appetite:decreased   Sleep: fitful   All other Review of Systems: Psychological ROS: positive for - behavioral disorder  Respiratory ROS: no cough, shortness of breath, or wheezing  Cardiovascular ROS: no chest pain or dyspnea on exertion         [address] ([facility]):    MSE FINDINGS ARE WITHIN NORMAL LIMITS (WNL) UNLESS OTHERWISE STATED BELOW. ( ALL OF THE BELOW CATEGORIES OF THE MSE HAVE BEEN REVIEWED (reviewed 1/26/2018) AND UPDATED AS DEEMED APPROPRIATE )  General Presentation age appropriate, evasive and guarded   Orientation oriented to time, place and person   Vital Signs  See below (reviewed 1/26/2018); Vital Signs (BP, Pulse, & Temp) are within normal limits if not listed below.    Gait and Station Stable/steady, no ataxia   Musculoskeletal System No extrapyramidal symptoms (EPS); no abnormal muscular movements or Tardive Dyskinesia (TD); muscle strength and tone are within normal limits   Language No aphasia or dysarthria   Speech:  hypoverbal   Thought Processes concrete; normal rate of thoughts; poor abstract reasoning/computation   Thought Associations goal directed   Thought Content free of delusions and free of hallucinations   Suicidal Ideations none   Homicidal Ideations none   Mood:  irritable   Affect:  mood-congruent   Memory recent  fair   Memory remote:  fair   Concentration/Attention:  distractable   Fund of Knowledge significantly below average   Insight:  poor   Reliability poor   Judgment:  poor          VITALS:     Patient Vitals for the past 24 hrs:   Temp Pulse Resp BP SpO2   01/25/18 2007 97.8 °F (36.6 °C) 74 20 156/79 95 %   01/25/18 1738 - 68 - 160/73 -   01/25/18 1640 - 64 - - -   01/25/18 1533 97.5 °F (36.4 °C) 63 16 (!) 187/95 98 %     Wt Readings from Last 3 Encounters:   01/24/18 82.7 kg (182 lb 5.1 oz)   01/23/18 83.6 kg (184 lb 4.9 oz)   07/13/17 88.9 kg (196 lb)     Temp Readings from Last 3 Encounters:   01/24/18 97.2 °F (36.2 °C)   07/20/17 97.9 °F (36.6 °C) 09/08/15 98.7 °F (37.1 °C)     BP Readings from Last 3 Encounters:   01/25/18 156/79   01/24/18 172/68   07/20/17 175/76     Pulse Readings from Last 3 Encounters:   01/25/18 74   01/24/18 72   07/20/17 (!) 52            DATA     LABORATORY DATA:(reviewed/updated 1/26/2018)  Recent Results (from the past 24 hour(s))   GLUCOSE, POC    Collection Time: 01/25/18  4:27 PM   Result Value Ref Range    Glucose (POC) 114 (H) 65 - 100 mg/dL    Performed by [address] [place] [address], POC    Collection Time: 01/25/18  8:43 PM   Result Value Ref Range    Glucose (POC) 155 (H) 65 - 100 mg/dL    Performed by Mike Geiger      No results found for: VALF2, VALAC, VALP, VALPR, DS6, CRBAM, CRBAMP, CARB2, XCRBAM  No results found for: LITHM   RADIOLOGY REPORTS:(reviewed/updated 1/26/2018)  Xr Chest Port    Result Date: 1/20/2018  EXAM: Portable CXR.  1800 hours. COMPARISON: 1/19/2015. INDICATION: cp, fatigue There is new interstitial pulmonary edema, cardiomegaly and small left pleural effusion. There is no focal infiltrate, pneumothorax or midline shift. IMPRESSION: CHF pattern.          MEDICATIONS     ALL MEDICATIONS:   Current Facility-Administered Medications   Medication Dose Route Frequency    doxazosin (CARDURA) tablet 4 mg  4 mg Oral QHS    polyethylene glycol (MIRALAX) packet 17 g  17 g Oral DAILY    OLANZapine (ZyPREXA) tablet 2.5 mg  2.5 mg Oral Q6H PRN    ziprasidone (GEODON) 10 mg in sterile water (preservative free) 0.5 mL injection  10 mg IntraMUSCular BID PRN    benztropine (COGENTIN) tablet 1 mg  1 mg Oral BID PRN    benztropine (COGENTIN) injection 1 mg  1 mg IntraMUSCular BID PRN    zolpidem (AMBIEN) tablet 5 mg  5 mg Oral QHS PRN    acetaminophen (TYLENOL) tablet 650 mg  650 mg Oral Q4H PRN    magnesium hydroxide (MILK OF MAGNESIA) 400 mg/5 mL oral suspension 30 mL  30 mL Oral DAILY PRN    nicotine (NICODERM CQ) 21 mg/24 hr patch 1 Patch  1 Patch TransDERmal DAILY PRN    influenza vaccine 2017-18 (3 yrs+)(PF) (FLUZONE QUAD/FLUARIX QUAD) injection 0.5 mL  0.5 mL IntraMUSCular PRIOR TO DISCHARGE    amLODIPine (NORVASC) tablet 10 mg  10 mg Oral DAILY    aspirin delayed-release tablet 81 mg  81 mg Oral DAILY    atorvastatin (LIPITOR) tablet 40 mg  40 mg Oral DAILY    carvedilol (COREG) tablet 3.125 mg  3.125 mg Oral BID WITH MEALS    ferrous sulfate tablet 325 mg  325 mg Oral BID WITH MEALS    hydrALAZINE (APRESOLINE) tablet 100 mg  100 mg Oral TID    glucose chewable tablet 16 g  4 Tab Oral PRN    dextrose (D50W) injection syrg 12.5-25 g  12.5-25 g IntraVENous PRN    glucagon (GLUCAGEN) injection 1 mg  1 mg IntraMUSCular PRN    insulin lispro (HUMALOG) injection   SubCUTAneous AC&HS    mirtazapine (REMERON) tablet 7.5 mg  7.5 mg Oral QHS    nicotine (NICODERM CQ) 21 mg/24 hr patch 1 Patch  1 Patch TransDERmal Q24H      SCHEDULED MEDICATIONS:   Current Facility-Administered Medications   Medication Dose Route Frequency    doxazosin (CARDURA) tablet 4 mg  4 mg Oral QHS    polyethylene glycol (MIRALAX) packet 17 g  17 g Oral DAILY    influenza vaccine 2017-18 (3 yrs+)(PF) (FLUZONE QUAD/FLUARIX QUAD) injection 0.5 mL  0.5 mL IntraMUSCular PRIOR TO DISCHARGE    amLODIPine (NORVASC) tablet 10 mg  10 mg Oral DAILY    aspirin delayed-release tablet 81 mg  81 mg Oral DAILY    atorvastatin (LIPITOR) tablet 40 mg  40 mg Oral DAILY    carvedilol (COREG) tablet 3.125 mg  3.125 mg Oral BID WITH MEALS    ferrous sulfate tablet 325 mg  325 mg Oral BID WITH MEALS    hydrALAZINE (APRESOLINE) tablet 100 mg  100 mg Oral TID    insulin lispro (HUMALOG) injection   SubCUTAneous AC&HS    mirtazapine (REMERON) tablet 7.5 mg  7.5 mg Oral QHS    nicotine (NICODERM CQ) 21 mg/24 hr patch 1 Patch  1 Patch TransDERmal Q24H          ASSESSMENT & PLAN     DIAGNOSES REQUIRING ACTIVE TREATMENT AND MONITORING: (reviewed/updated 1/26/2018)  Patient Active Hospital Problem List:   Depression (1/24/2018)    Assessment: sadness, helplessness, hopelessness, in reaction to recent bilateral lower leg amputationa and inability to pat water bill at home. Plan: consider forced medication/treatment, address social issues, daughter may be obtaining guardianship              In summary, Josue Vidal, is a 78 y.o.  male who presents with a severe exacerbation of the principal diagnosis of Depression  Patient's condition is worsening/not improving/not stable Patient requires continued inpatient hospitalization for further stabilization, safety monitoring and medication management. I will continue to coordinate the provision of individual, milieu, occupational, group, and substance abuse therapies to address target symptoms/diagnoses as deemed appropriate for the individual patient. A coordinated, multidisplinary treatment team round was conducted with the patient (this team consists of the nurse, psychiatric unit pharmcist,  and writer). Complete current electronic health record for patient has been reviewed today including consultant notes, ancillary staff notes, nurses and psychiatric tech notes. Suicide risk assessment completed and patient deemed to be of low risk for suicide at this time. The following regarding medications was addressed during rounds with patient:   the risks and benefits of the proposed medication. The patient was given the opportunity to ask questions. Informed consent given to the use of the above medications. Will continue to adjust psychiatric and non-psychiatric medications (see above \"medication\" section and orders section for details) as deemed appropriate & based upon diagnoses and response to treatment. I will continue to order blood tests/labs and diagnostic tests as deemed appropriate and review results as they become available (see orders for details and above listed lab/test results).     I will order psychiatric records from previous [facility] hospitals to further elucidate the nature of patient's psychopathology and review once available. I will gather additional collateral information from friends, family and o/p treatment team to further elucidate the nature of patient's psychopathology and baselline level of psychiatric functioning. I certify that this patient's inpatient psychiatric hospital services furnished since the previous certification were, and continue to be, required for treatment that could reasonably be expected to improve the patient's condition, or for diagnostic study, and that the patient continues to need, on a daily basis, active treatment furnished directly by or requiring the supervision of inpatient psychiatric facility personnel. In addition, the hospital records show that services furnished were intensive treatment services, admission or related services, or equivalent services.     EXPECTED DISCHARGE DATE/DAY: TBD     DISPOSITION: Home       Signed By:   Saeed Cadet MD  1/26/2018

## 2018-01-26 NOTE — BH NOTES
Behavioral Health Interdisciplinary Rounds     Patient Name: Jorge Pastor  Age: 78 y.o. Room/Bed:  740/02  Primary Diagnosis: Depression   Admission Status: Involuntary Commitment     Readmission within 30 days: no  Power of  in place: yes--Radha Chan (daughter); awaiting paperwork  Patient requires a blocked bed: yes          Reason for blocked bed: awaiting MRSA results    VTE Prophylaxis: Yes--ASA  Flu vaccine given : no --refused  Mobility needs/Fall risk: yes--bilat BKA   Nutritional Plan: needs cons ordered for Dietary  Consults: needs PT, Diabetic tx team, Nephrology consults ordered         Labs/Testing due today?: yes--pt refused when we went to get labs this morning    Sleep hours:  5.25+      Participation in Care/Groups:  yes  Medication Compliant?: Selective  PRNS (last 24 hours): None    Restraints (last 24 hours):  no  Substance Abuse:  no  CIWA (range last 24 hours): na COWS (range last 24 hours): na  Alcohol screening (AUDIT) completed -  AUDIT Score: 0  If applicable, date SBIRT discussed in treatment team AND documented: na  Tobacco - patient is a smoker: yes   Date tobacco education completed by RN: 1.5 ppd since 15 yrs old. 1/24/18 given Stop Smoking info. 24 hour chart check complete: yes     Patient goal(s) for today: Cooperate with treatment team  Treatment team focus/goals: Attempt to engage Pt in treatment  Progress note: Patient continuing to refuse to speak or respond with staff. Stayed in bed all day. Refusing meds and prompting. Consider [address]. Pt involuntarily committed. Family meeting scheduled with daughter for Tuesday, 1/30/18. LOS:  2  Expected LOS: TBD    Financial concerns/prescription coverage:  VA Medicare; Medicaid  Date of last family contact: 1/25 SW spoke to fiancee & daughter/POA      Family requesting physician contact today:  no  Discharge plan: SNF placement?   Guns in the home: no        Outpatient provider(s): to be linked    Participating treatment team members:  DARRICK Mccormick; Dr. Qi Stroud MD; Ventura Duran, RN; Buster Kaminski, DongD

## 2018-01-26 NOTE — PROGRESS NOTES
Problem: Depressed Mood (Adult/Pediatric)  Goals will be met by 02/02/18  Goal: *STG: Verbalizes anger, guilt, and other feelings in a constructive manor  Variance: Patient Uncooperative  Comments: Patient is uncooperative, refusing to talk to staff, not verbalizing anger. Patient is non-compliant with VS, POC accuchecks, meals, refusing all. Problem: Falls - Risk of  Goal: *Absence of Falls  Document Bello Fall Risk and appropriate interventions in the flowsheet. Outcome: Progressing Towards Goal  Fall Risk Interventions:  Patient is absent of falls. Mobility Interventions: Bed/chair exit alarm  Mentation Interventions: Bed/chair exit alarm  Medication Interventions: Bed/chair exit alarm, Evaluate medications/consider consulting pharmacy  Elimination Interventions: Bed/chair exit alarm, Call light in reach  History of Falls Interventions: Bed/chair exit alarm, Door open when patient unattended      Problem: Depressed Mood (Adult/Pediatric)  Goal: *STG: Attends activities and groups  Outcome: Not Progressing Towards Goal  Variance: Patient Uncooperative  Comments: Patient is not attending activities and groups.       [facility] 60  Master Treatment Plan for Hal Severe    Date Treatment Plan Initiated: 01/26/18    Treatment Plan Modalities:  Type of Modality Amount  (x minutes) Frequency (x/week) Duration (x days) Name of Responsible Staff   [address]  meetings to encourage peer interactions 15 7 1 MARTIN Ascencio   Group psychotherapy to assist in building coping skills and internal controls 60 7 1 Jose Arreguin   Therapeutic activity groups to build coping skills 60 7 1 Jose Arreguin   Psychoeducation in group setting to address:   Medication education   15 7 1 MIKHAIL Miles   Coping skills         Relaxation techniques         Symptom management         Discharge planning         Spirituality    60 2 [address]   60 1 1 Volunteer   Recovery/AA/NA   60 3 1 Volunteer Physician medication management   15 7 1 Dr. Chaya Pacheco

## 2018-01-26 NOTE — BH NOTES
GROUP THERAPY PROGRESS NOTE    Cele Hendricks is participating in [employer].      Group time: 15 minutes    Personal goal for participation: decrease anxiety    Goal orientation: relaxation    Group therapy participation: active    Therapeutic interventions reviewed and discussed:     Impression of participation:fair

## 2018-01-26 NOTE — BH NOTES
1530:  Patient received as he is resting in bed. He does not respond to greetings from oncoming staff and does not acknowledge the transfer of another patient into his room. He remains still in his bed with his eyes closed. Will maintain q 15 minute safety checks. 1630:  Dr. Errol Veras here to see patient. He opened his eyes briefly, but did not respond to any of the doctor's questions. The doctor listened to his respirations and advised patient to drink water R/T hydration and got no response from the patient. Will continue to monitor. [phone]:  Patient refused staff efforts to turn and reposition him stating \"What are you doing - leave me alone\". When told that we are trying to prevent him from developing sores, patient angriy states \"I don't got no sores\". 1845:  Patient refused staff efforts to get his vital signs and blood sugar stating \"Go away and leave me alone\". 2000:  Patient continues to refuse to be repositioned. He has again refused to have his vital signs taken and to have his blood sugar checked at this time. Will continue to monitor. 2200:  Patient continues to refuse all care efforts - he refuses to be turned/repositioned or cleaned up at this time.

## 2018-01-27 LAB
GLUCOSE BLD STRIP.AUTO-MCNC: 102 MG/DL (ref 65–100)
GLUCOSE BLD STRIP.AUTO-MCNC: 115 MG/DL (ref 65–100)
GLUCOSE BLD STRIP.AUTO-MCNC: 64 MG/DL (ref 65–100)
GLUCOSE BLD STRIP.AUTO-MCNC: 68 MG/DL (ref 65–100)
SERVICE CMNT-IMP: ABNORMAL
SERVICE CMNT-IMP: NORMAL

## 2018-01-27 PROCEDURE — 74011250637 HC RX REV CODE- 250/637: Performed by: HOSPITALIST

## 2018-01-27 PROCEDURE — 65220000003 HC RM SEMIPRIVATE PSYCH

## 2018-01-27 PROCEDURE — 74011250637 HC RX REV CODE- 250/637: Performed by: FAMILY MEDICINE

## 2018-01-27 PROCEDURE — 82962 GLUCOSE BLOOD TEST: CPT

## 2018-01-27 PROCEDURE — 74011250637 HC RX REV CODE- 250/637: Performed by: PSYCHIATRY & NEUROLOGY

## 2018-01-27 RX ADMIN — CARVEDILOL 3.12 MG: 3.12 TABLET, FILM COATED ORAL at 17:52

## 2018-01-27 RX ADMIN — MIRTAZAPINE 7.5 MG: 15 TABLET, FILM COATED ORAL at 20:34

## 2018-01-27 RX ADMIN — FERROUS SULFATE TAB 325 MG (65 MG ELEMENTAL FE) 325 MG: 325 (65 FE) TAB at 17:53

## 2018-01-27 RX ADMIN — DOXAZOSIN 4 MG: 2 TABLET ORAL at 20:34

## 2018-01-27 RX ADMIN — HYDRALAZINE HYDROCHLORIDE 100 MG: 50 TABLET, FILM COATED ORAL at 17:52

## 2018-01-27 RX ADMIN — HYDRALAZINE HYDROCHLORIDE 100 MG: 50 TABLET, FILM COATED ORAL at 20:34

## 2018-01-27 NOTE — PROGRESS NOTES
HYPOGLYCEMIC EPISODE DOCUMENTATION    Patient with hypoglycemic episode(s) at 1631(time) on 1/27/18(date). BG value(s) pre-treatment 59    Was patient symptomatic? [] yes, [x] no  Patient was treated with the following rescue medications/treatments: [] D50                [] Glucose tablets                [] Glucagon                [x] 4oz juice                [] 6oz reg soda                [] 8oz low fat milk  BG value post-treatment:  68    1648 HYPOGLYCEMIC EPISODE DOCUMENTATION:  Recheck  Patient with hypoglycemic episode(s) at 1648(time) on 1/27/18(date). BG value(s) pre-treatment 80    Was patient symptomatic? [] yes, [x] no  Patient was treated with the following rescue medications/treatments: [] D50                [] Glucose tablets                [] Glucagon                [x] 4oz juice                [] 6oz reg soda                [] 8oz low fat milk  BG value post-treatment: 102  Once BG treated and value greater than 80mg/dl, pt was provided with the following:  [] snack  [x] meal  Name of MD notified:Team 7 paged [phone]    2000:  Hospitalist paged again. Notified of episode, results, pt asymptomatic.     The following orders were received: None

## 2018-01-27 NOTE — INTERDISCIPLINARY ROUNDS
Behavioral Health Interdisciplinary Rounds     Patient Name: Alida Cloud  Age: 78 y.o. Room/Bed:  740/02  Primary Diagnosis: Depression   Admission Status: Involuntary Commitment     Readmission within 30 days: no  Power of  in place:yes--Radha Vargas (daughter); awaiting paperwork   Patient requires a blocked bed: no          Reason for blocked bed:     VTE Prophylaxis: Yes ASA  Flu vaccine given : no refused  Mobility needs/Fall risk: yes B/L BKA   Nutritional Plan: needs cons ordered for Dietary  Consults:  PT, Diabetic tx team, Nephrology consults ordered         Labs/Testing due today?: yes refused 1/27/18 labs work    Sleep hours: 7.45       Participation in Care/Groups:  no  Medication Compliant?: Refusing all meds  PRNS (last 24 hours): None    Restraints (last 24 hours):  no  Substance Abuse:  no  CIWA (range last 24 hours):  COWS (range last 24 hours):   Alcohol screening (AUDIT) completed -  AUDIT Score: 0  If applicable, date SBIRT discussed in treatment team AND documented:   Tobacco - patient is a smoker: yes   Date tobacco education completed by RN:  1.5 ppd since 15 yrs old. 1/24/18 given Stop Smoking info. 24 hour chart check complete: yes     Patient goal(s) for today:   Treatment team focus/goals:   Progress note     LOS:  3  Expected LOS:     Financial concerns/prescription coverage:    Date of last family contact:      Family requesting physician contact today:    Discharge plan:   Guns in the home:         Outpatient provider(s):     Participating treatment team members:  Alida Cloud, * (assigned SW),

## 2018-01-27 NOTE — PROGRESS NOTES
Problem: Falls - Risk of  Goal: *Absence of Falls  Document Bello Fall Risk and appropriate interventions in the flowsheet. Outcome: Progressing Towards Goal  Fall Risk Interventions:  Mobility Interventions: Bed/chair exit alarm, Communicate number of staff needed for ambulation/transfer    Mentation Interventions: Bed/chair exit alarm, Evaluate medications/consider consulting pharmacy    Medication Interventions: Bed/chair exit alarm, Evaluate medications/consider consulting pharmacy    Elimination Interventions: Bed/chair exit alarm, Patient to call for help with toileting needs    History of Falls Interventions: Bed/chair exit alarm, Evaluate medications/consider consulting pharmacy        Problem: Depressed Mood (Adult/Pediatric)  Goal: *STG: Participates in treatment plan  Outcome: Not Progressing Towards Goal  Patient refuses all care efforts, meals, medications, vital signs, Accuchecks and attempts to turn/reposition/change his brief.

## 2018-01-27 NOTE — BH NOTES
PSYCHIATRIC PROGRESS NOTE         Patient Name  Dallin Hernandez   Date of Birth 1940   [facility] [id number]   Medical Record Number  [id number]      Age  78 y.o. PCP Johny Warner, MD   Admit date:  1/24/2018    Room Number  [phone] [id number]  @ [facility]   Date of Service  1/27/2018          PSYCHOTHERAPY SESSION NOTE:  Length of psychotherapy session: 15 minutes    Main condition/diagnosis/issues treated during session today, 1/27/2018 : med , meal and group non compliance    I employed Cognitive Behavioral therapy techniques, Reality-Oriented psychotherapy, as well as supportive psychotherapy in regards to various ongoing psychosocial stressors, including the following: pre-admission and current problems; housing issues; occupational issues; medical issues; and stress of hospitalization. Interpersonal relationship issues and psychodynamic conflicts explored. Attempts made to alleviate maladaptive patterns. We, also, worked on issues of denial & effects of substance dependency/use     Overall, patient is not progressing    Treatment Plan Update (reviewed an updated 1/27/2018) : I will modify psychotherapy tx plan by implementing more stress management strategies, building upon cognitive behavioral techniques, increasing coping skills, as well as shoring up psychological defenses). An extended energy and skill set was needed to engage pt in psychotherapy due to some of the following: resistiveness, complexity, negativity, confrontational nature, hostile behaviors, and/or severe abnormalities in thought processes/psychosis resulting in the loss of expressive/receptive language communication skills. E & M PROGRESS NOTE:         HISTORY       CC:  \"depression and non compliance with treatment \"  HISTORY OF PRESENT ILLNESS/INTERVAL HISTORY:  (reviewed/updated 1/27/2018).   per initial evaluation:     Dallin Hernandez presents/reports/evidences the following emotional symptoms today, 1/27/2018:depression. The above symptoms have been present for 1 years. These symptoms are of severe severity. The symptoms are constant  in nature. Additional symptomatology and features include anxiety. 1/27/18 he is not responding to questions and not engaging and not showing engagement and refuse medications and treatment        SIDE EFFECTS: (reviewed/updated 1/27/2018)  None reported or admitted to. No noted toxicity with use of Depakote/Tegretol/lithium/Clozaril/TCAs   ALLERGIES:(reviewed/updated 1/27/2018)  Allergies   Allergen Reactions    Tetracycline Hives      MEDICATIONS PRIOR TO ADMISSION:(reviewed/updated 1/27/2018)  Prescriptions Prior to Admission   Medication Sig    hydrALAZINE (APRESOLINE) 100 mg tablet Take 1 Tab by mouth three (3) times daily.  amLODIPine (NORVASC) 10 mg tablet Take 1 Tab by mouth daily.  aspirin delayed-release 81 mg tablet Take 1 Tab by mouth daily.  atorvastatin (LIPITOR) 40 mg tablet Take 1 Tab by mouth daily.  carvedilol (COREG) 3.125 mg tablet Take 1 Tab by mouth two (2) times daily (with meals).  ferrous sulfate 325 mg (65 mg iron) tablet Take 1 Tab by mouth two (2) times daily (with meals).  mirtazapine (REMERON) 7.5 mg tablet Take 1 Tab by mouth nightly.  nicotine (NICODERM CQ) 21 mg/24 hr 1 Patch by TransDERmal route every twenty-four (24) hours for 30 days. PAST MEDICAL HISTORY: Past medical history from the initial psychiatric evaluation has been reviewed (reviewed/updated 1/27/2018) with no additional updates (I asked patient and no additional past medical history provided). Past Medical History:   Diagnosis Date    Arthritis     CAD (coronary artery disease) 2007    CKD (chronic kidney disease), stage III     DM type 2 causing renal disease (HCC)     DVT (deep venous thrombosis) (HCC)     Hx of DVT:  Xarelto stopped due to GI bleed. S/P IVC filter.     Gait abnormality     GI bleed     Heart murmur     Hepatitis C  History of blood transfusion     Hypercholesterolemia     Hypertension 11/7/2014    Neuropathy     Non compliance w medication regimen     Peripheral vascular disease ([facility] [address] 11/7/2014    A. S/P Right SFA POBA and tibial atherectomy (2/4/13).  PUD (peptic ulcer disease) 2007    Unspecified sleep apnea     never used cpap     Past Surgical History:   Procedure Laterality Date    ABDOMEN SURGERY PROC UNLISTED  2007    has approx 7-8\" midline incision on abdomen--\"had to open him up and clean him out after feeding tube clogged\"    HX GI  2007    feeding tube for approx 2 mo    VASCULAR SURGERY PROCEDURE UNLIST Right 1/22/2015    popliteal-tibial bypass      SOCIAL HISTORY: Social history from the initial psychiatric evaluation has been reviewed (reviewed/updated 1/27/2018) with no additional updates (I asked patient and no additional social history provided). Social History     Social History    Marital status:      Spouse name: N/A    Number of children: N/A    Years of education: N/A     Occupational History    Not on file. Social History Main Topics    Smoking status: Current Every Day Smoker     Packs/day: 0.50    Smokeless tobacco: Not on file    Alcohol use No    Drug use: No      Comment: >20 years ago    Sexual activity: Not on file     Other Topics Concern    Not on file     Social History Narrative    78 year ols male admitted for depression and homelessness. Pt will be evicated from apartment due to non payment of bills. [facility] of 30 years [facility] [place]. \" Pt is a brittle diabetic, with treatment non compliance. He has bilarela lower extremity amputations. Patient has a long hx of substance abuse. FAMILY HISTORY: Family history from the initial psychiatric evaluation has been reviewed (reviewed/updated 1/27/2018) with no additional updates (I asked patient and no additional family history provided).    Family History   Problem Relation Age of Onset    Hypertension Father        REVIEW OF SYSTEMS: (reviewed/updated 1/27/2018)  Appetite:decreased   Sleep: fitful   All other Review of Systems: Psychological ROS: positive for - behavioral disorder  Respiratory ROS: no cough, shortness of breath, or wheezing  Cardiovascular ROS: no chest pain or dyspnea on exertion         [facility] (MSE):    MSE FINDINGS ARE WITHIN NORMAL LIMITS (WNL) UNLESS OTHERWISE STATED BELOW. ( ALL OF THE BELOW CATEGORIES OF THE MSE HAVE BEEN REVIEWED (reviewed 1/27/2018) AND UPDATED AS DEEMED APPROPRIATE )  General Presentation age appropriate, evasive and guarded   Orientation oriented to time, place and person   Vital Signs  See below (reviewed 1/27/2018); Vital Signs (BP, Pulse, & Temp) are within normal limits if not listed below. Gait and Station Stable/steady, no ataxia   Musculoskeletal System No extrapyramidal symptoms (EPS); no abnormal muscular movements or Tardive Dyskinesia (TD); muscle strength and tone are within normal limits   Language No aphasia or dysarthria   Speech:  hypoverbal   Thought Processes concrete; normal rate of thoughts; poor abstract reasoning/computation   Thought Associations goal directed   Thought Content free of delusions and free of hallucinations   Suicidal Ideations none   Homicidal Ideations none   Mood:  irritable   Affect:  mood-congruent   Memory recent  fair   Memory remote:  fair   Concentration/Attention:  distractable   Fund of Knowledge significantly below average   Insight:  poor   Reliability poor   Judgment:  poor          VITALS:     No data found.     Wt Readings from Last 3 Encounters:   01/24/18 82.7 kg (182 lb 5.1 oz)   01/23/18 83.6 kg (184 lb 4.9 oz)   07/13/17 88.9 kg (196 lb)     Temp Readings from Last 3 Encounters:   01/24/18 97.2 °F (36.2 °C)   07/20/17 97.9 °F (36.6 °C)   09/08/15 98.7 °F (37.1 °C)     BP Readings from Last 3 Encounters:   01/25/18 156/79 01/24/18 172/68   07/20/17 175/76     Pulse Readings from Last 3 Encounters:   01/25/18 74   01/24/18 72   07/20/17 (!) 52            DATA     LABORATORY DATA:(reviewed/updated 1/27/2018)  No results found for this or any previous visit (from the past 24 hour(s)). No results found for: VALF2, VALAC, VALP, VALPR, DS6, CRBAM, CRBAMP, CARB2, XCRBAM  No results found for: LITHM   RADIOLOGY REPORTS:(reviewed/updated 1/27/2018)  Xr Chest Port    Result Date: 1/20/2018  EXAM: Portable CXR.  1800 hours. COMPARISON: 1/19/2015. INDICATION: cp, fatigue There is new interstitial pulmonary edema, cardiomegaly and small left pleural effusion. There is no focal infiltrate, pneumothorax or midline shift. IMPRESSION: CHF pattern.          MEDICATIONS     ALL MEDICATIONS:   Current Facility-Administered Medications   Medication Dose Route Frequency    doxazosin (CARDURA) tablet 4 mg  4 mg Oral QHS    polyethylene glycol (MIRALAX) packet 17 g  17 g Oral DAILY    OLANZapine (ZyPREXA) tablet 2.5 mg  2.5 mg Oral Q6H PRN    ziprasidone (GEODON) 10 mg in sterile water (preservative free) 0.5 mL injection  10 mg IntraMUSCular BID PRN    benztropine (COGENTIN) tablet 1 mg  1 mg Oral BID PRN    benztropine (COGENTIN) injection 1 mg  1 mg IntraMUSCular BID PRN    zolpidem (AMBIEN) tablet 5 mg  5 mg Oral QHS PRN    acetaminophen (TYLENOL) tablet 650 mg  650 mg Oral Q4H PRN    magnesium hydroxide (MILK OF MAGNESIA) 400 mg/5 mL oral suspension 30 mL  30 mL Oral DAILY PRN    nicotine (NICODERM CQ) 21 mg/24 hr patch 1 Patch  1 Patch TransDERmal DAILY PRN    influenza vaccine 2017-18 (3 yrs+)(PF) (FLUZONE QUAD/FLUARIX QUAD) injection 0.5 mL  0.5 mL IntraMUSCular PRIOR TO DISCHARGE    amLODIPine (NORVASC) tablet 10 mg  10 mg Oral DAILY    aspirin delayed-release tablet 81 mg  81 mg Oral DAILY    atorvastatin (LIPITOR) tablet 40 mg  40 mg Oral DAILY    carvedilol (COREG) tablet 3.125 mg  3.125 mg Oral BID WITH MEALS    ferrous sulfate tablet 325 mg  325 mg Oral BID WITH MEALS    hydrALAZINE (APRESOLINE) tablet 100 mg  100 mg Oral TID    glucose chewable tablet 16 g  4 Tab Oral PRN    dextrose (D50W) injection syrg 12.5-25 g  12.5-25 g IntraVENous PRN    glucagon (GLUCAGEN) injection 1 mg  1 mg IntraMUSCular PRN    insulin lispro (HUMALOG) injection   SubCUTAneous AC&HS    mirtazapine (REMERON) tablet 7.5 mg  7.5 mg Oral QHS    nicotine (NICODERM CQ) 21 mg/24 hr patch 1 Patch  1 Patch TransDERmal Q24H      SCHEDULED MEDICATIONS:   Current Facility-Administered Medications   Medication Dose Route Frequency    doxazosin (CARDURA) tablet 4 mg  4 mg Oral QHS    polyethylene glycol (MIRALAX) packet 17 g  17 g Oral DAILY    influenza vaccine 2017-18 (3 yrs+)(PF) (FLUZONE QUAD/FLUARIX QUAD) injection 0.5 mL  0.5 mL IntraMUSCular PRIOR TO DISCHARGE    amLODIPine (NORVASC) tablet 10 mg  10 mg Oral DAILY    aspirin delayed-release tablet 81 mg  81 mg Oral DAILY    atorvastatin (LIPITOR) tablet 40 mg  40 mg Oral DAILY    carvedilol (COREG) tablet 3.125 mg  3.125 mg Oral BID WITH MEALS    ferrous sulfate tablet 325 mg  325 mg Oral BID WITH MEALS    hydrALAZINE (APRESOLINE) tablet 100 mg  100 mg Oral TID    insulin lispro (HUMALOG) injection   SubCUTAneous AC&HS    mirtazapine (REMERON) tablet 7.5 mg  7.5 mg Oral QHS    nicotine (NICODERM CQ) 21 mg/24 hr patch 1 Patch  1 Patch TransDERmal Q24H          ASSESSMENT & PLAN     DIAGNOSES REQUIRING ACTIVE TREATMENT AND MONITORING: (reviewed/updated 1/27/2018)  Patient Active Hospital Problem List:   Depression (1/24/2018)    Assessment: sadness, helplessness, hopelessness, in reaction to recent bilateral lower leg amputationa and inability to pat water bill at home.     Plan: consider forced medication/treatment, address social issues, daughter may be obtaining guardianship  1/27/18 need order to treat              In summary, Gabbi Harris, is a 78 y.o.  male who presents with a severe exacerbation of the principal diagnosis of Depression  Patient's condition is worsening/not improving/not stable Patient requires continued inpatient hospitalization for further stabilization, safety monitoring and medication management. I will continue to coordinate the provision of individual, milieu, occupational, group, and substance abuse therapies to address target symptoms/diagnoses as deemed appropriate for the individual patient. A coordinated, multidisplinary treatment team round was conducted with the patient (this team consists of the nurse, psychiatric unit pharmcist,  and writer). Complete current electronic health record for patient has been reviewed today including consultant notes, ancillary staff notes, nurses and psychiatric tech notes. Suicide risk assessment completed and patient deemed to be of low risk for suicide at this time. The following regarding medications was addressed during rounds with patient:   the risks and benefits of the proposed medication. The patient was given the opportunity to ask questions. Informed consent given to the use of the above medications. Will continue to adjust psychiatric and non-psychiatric medications (see above \"medication\" section and orders section for details) as deemed appropriate & based upon diagnoses and response to treatment. I will continue to order blood tests/labs and diagnostic tests as deemed appropriate and review results as they become available (see orders for details and above listed lab/test results). I will order psychiatric records from previous [facility] hospitals to further elucidate the nature of patient's psychopathology and review once available. I will gather additional collateral information from friends, family and o/p treatment team to further elucidate the nature of patient's psychopathology and baselline level of psychiatric functioning.          I certify that this patient's inpatient psychiatric hospital services furnished since the previous certification were, and continue to be, required for treatment that could reasonably be expected to improve the patient's condition, or for diagnostic study, and that the patient continues to need, on a daily basis, active treatment furnished directly by or requiring the supervision of inpatient psychiatric facility personnel. In addition, the hospital records show that services furnished were intensive treatment services, admission or related services, or equivalent services.     EXPECTED DISCHARGE DATE/DAY: TBD     DISPOSITION: Home       Signed By:   Brian Kaminski MD  1/27/2018

## 2018-01-27 NOTE — PROGRESS NOTES
Trent Figueroa MD   Phone/text: [phone] (7am to 7pm)  After 7pm please call  for hospitalist on call    Hospitalist Progress Note     1/26/2018   PCP:  Dr. Clara Singh MD  Reason for consultation:     Admission Summary:   Adal Virk is a 78 y.o.  male with past medical history of arthritis, CAD, anemia, hypertension, hyperlipidemia, CKD, type 2 diabetes mellitus, diastolic CHF, sleep apnea, peripheral vascular disease, s/p bilateral BKA, peripheral neuropathy, PUD, prior DVT and hepatitis C presented to the [facility]) from [facility]) with reported depression and suicidal ideation. Reason For Visit:  CHF    Assessment/Plan   CHF, chronic diastolic - compensated as he is not taking much PO  - Continue BP and rate control, diuresis as needed    Hypertension (chronic) - patient not taking BP meds so BP is elevated  - Continue amlodipine, carvedilol, hydralazine and newly added doxazosin    DM2 - blood glucose normal, not eating much  - Continue to monitor and given SSI as needed    CKD3 - renal function continues to worsen and now patient refusing labs  - Monitor as able    Anemia - stable, continue iron     Subjective   Mr. Ryder Watson is awake and opens his eyes to look at me but will not provide any history. Reviewed interval history  Physical examination     Visit Vitals    /79 (BP 1 Location: Left arm, BP Patient Position: At rest)    Pulse 74    Temp 97.8 °F (36.6 °C)    Resp 20    Ht 5' 11\" (1.803 m)    Wt 82.7 kg (182 lb 5.1 oz)    SpO2 95%    BMI 25.43 kg/m2       No data recorded.     No intake or output data in the 24 hours ending 01/26/18 2028    Gen - NAD, not interactive  HEENT - MM dry  Neck - supple, full ROM  CV - RRR, no MRG  Resp - lungs CTA b/l, no wheezing, normal WOB  GI - abdomen S, NT, ND, +BS   - no CVA tenderness, bladder non-palpable in lower abdomen  MSK - Bilateral BKA, no edema  Neuro - Awake but not interacting with me, moving all 4 extremities    Data Review       Telemetry    ECG    Xray    CT scan    MRI    Echocardiogram    Ultrasound              I have reviewed the flow sheet and recent notes  New labs and data personally reviewed.     Recent Labs      01/24/18   1420   WBC  3.2*   HGB  10.7*   HCT  32.7*   PLT  255     Recent Labs      01/24/18   1420   NA  142   K  4.4   CL  111*   CO2  24   GLU  147*   BUN  30*   CREA  2.73*   CA  8.7       Medications reviewed  Current Facility-Administered Medications   Medication Dose Route Frequency    doxazosin (CARDURA) tablet 4 mg  4 mg Oral QHS    polyethylene glycol (MIRALAX) packet 17 g  17 g Oral DAILY    OLANZapine (ZyPREXA) tablet 2.5 mg  2.5 mg Oral Q6H PRN    ziprasidone (GEODON) 10 mg in sterile water (preservative free) 0.5 mL injection  10 mg IntraMUSCular BID PRN    benztropine (COGENTIN) tablet 1 mg  1 mg Oral BID PRN    benztropine (COGENTIN) injection 1 mg  1 mg IntraMUSCular BID PRN    zolpidem (AMBIEN) tablet 5 mg  5 mg Oral QHS PRN    acetaminophen (TYLENOL) tablet 650 mg  650 mg Oral Q4H PRN    magnesium hydroxide (MILK OF MAGNESIA) 400 mg/5 mL oral suspension 30 mL  30 mL Oral DAILY PRN    nicotine (NICODERM CQ) 21 mg/24 hr patch 1 Patch  1 Patch TransDERmal DAILY PRN    influenza vaccine 2017-18 (3 yrs+)(PF) (FLUZONE QUAD/FLUARIX QUAD) injection 0.5 mL  0.5 mL IntraMUSCular PRIOR TO DISCHARGE    amLODIPine (NORVASC) tablet 10 mg  10 mg Oral DAILY    aspirin delayed-release tablet 81 mg  81 mg Oral DAILY    atorvastatin (LIPITOR) tablet 40 mg  40 mg Oral DAILY    carvedilol (COREG) tablet 3.125 mg  3.125 mg Oral BID WITH MEALS    ferrous sulfate tablet 325 mg  325 mg Oral BID WITH MEALS    hydrALAZINE (APRESOLINE) tablet 100 mg  100 mg Oral TID    glucose chewable tablet 16 g  4 Tab Oral PRN    dextrose (D50W) injection syrg 12.5-25 g  12.5-25 g IntraVENous PRN    glucagon (GLUCAGEN) injection 1 mg  1 mg IntraMUSCular PRN    insulin lispro (HUMALOG) injection   SubCUTAneous AC&HS    mirtazapine (REMERON) tablet 7.5 mg  7.5 mg Oral QHS    nicotine (NICODERM CQ) 21 mg/24 hr patch 1 Patch  1 Patch TransDERmal Q24H         Esmer Winslow MD  Internal Medicine  1/26/2018

## 2018-01-27 NOTE — BH NOTES
Refused am lab work. Educated on importance of having labs done. Still refused, will continue to monitor.

## 2018-01-27 NOTE — PROGRESS NOTES
Problem: Depressed Mood (Adult/Pediatric)  Goal: *STG: Participates in treatment plan  Outcome: Not Progressing Towards Goal  Patient is angry and irritable   Refused VS, POC, breakfast and morning medications   He verbalizes \"Leave me alone\" or just wont answer and ignore staff. Compliant with turn team turning him this morning.     Goal: *STG: Complies with medication therapy  Outcome: Not Progressing Towards Goal  Non-compliant

## 2018-01-27 NOTE — PROGRESS NOTES
Yane Sparks MD   Phone/text: [phone] (7am to 7pm)  After 7pm please call  for hospitalist on call    Hospitalist Progress Note     1/25/2018   PCP:  Dr. Cecilia Wheeler MD  Reason for consultation:     Admission Summary:   Shaheed Lieberman is a 78 y.o.  male with past medical history of arthritis, CAD, anemia, hypertension, hyperlipidemia, CKD, type 2 diabetes mellitus, diastolic CHF, sleep apnea, peripheral vascular disease, s/p bilateral BKA, peripheral neuropathy, PUD, prior DVT and hepatitis C presented to the [facility]) from [facility]) with reported depression and suicidal ideation. Reason For Visit:  CHF    Assessment/Plan   CHF, chronic diastolic - compensated  - Continue BP and rate control, diuresis as needed    Hypertension (chronic) - BP elevated  - Continue amlodipine, carvedilol, hydralazine. Add doxazosin    DM2 - blood glucose good, poor intake  - Continue to monitor and given SSI as needed    CKD3 - renal function continues to worsen and patient refused labs this morning  - Monitor as able    Anemia - stable, continue iron     Subjective   Mr. Ana Black does not want to be in the hospital. He denies chest pain, headache, SOB. Reviewed interval history  Physical examination     Visit Vitals    /79 (BP 1 Location: Left arm, BP Patient Position: At rest)    Pulse 74    Temp 97.8 °F (36.6 °C)    Resp 20    Ht 5' 11\" (1.803 m)    Wt 82.7 kg (182 lb 5.1 oz)    SpO2 95%    BMI 25.43 kg/m2       No data recorded.     No intake or output data in the 24 hours ending 01/26/18 2033    Gen - NAD  HEENT - MMM  Neck - supple, full ROM  CV - RRR, no MRG  Resp - lungs CTA b/l, no wheezing, normal WOB  GI - abdomen S, NT, ND, +BS   - no CVA tenderness, bladder non-palpable in lower abdomen  MSK - Bilateral BKA, no edema  Neuro - Alert and oriented, no focal deficits    Data Review       Telemetry    ECG Xray    CT scan    MRI    Echocardiogram    Ultrasound              I have reviewed the flow sheet and recent notes  New labs and data personally reviewed.     Recent Labs      01/24/18   1420   WBC  3.2*   HGB  10.7*   HCT  32.7*   PLT  255     Recent Labs      01/24/18   1420   NA  142   K  4.4   CL  111*   CO2  24   GLU  147*   BUN  30*   CREA  2.73*   CA  8.7       Medications reviewed  Current Facility-Administered Medications   Medication Dose Route Frequency    doxazosin (CARDURA) tablet 4 mg  4 mg Oral QHS    polyethylene glycol (MIRALAX) packet 17 g  17 g Oral DAILY    OLANZapine (ZyPREXA) tablet 2.5 mg  2.5 mg Oral Q6H PRN    ziprasidone (GEODON) 10 mg in sterile water (preservative free) 0.5 mL injection  10 mg IntraMUSCular BID PRN    benztropine (COGENTIN) tablet 1 mg  1 mg Oral BID PRN    benztropine (COGENTIN) injection 1 mg  1 mg IntraMUSCular BID PRN    zolpidem (AMBIEN) tablet 5 mg  5 mg Oral QHS PRN    acetaminophen (TYLENOL) tablet 650 mg  650 mg Oral Q4H PRN    magnesium hydroxide (MILK OF MAGNESIA) 400 mg/5 mL oral suspension 30 mL  30 mL Oral DAILY PRN    nicotine (NICODERM CQ) 21 mg/24 hr patch 1 Patch  1 Patch TransDERmal DAILY PRN    influenza vaccine 2017-18 (3 yrs+)(PF) (FLUZONE QUAD/FLUARIX QUAD) injection 0.5 mL  0.5 mL IntraMUSCular PRIOR TO DISCHARGE    amLODIPine (NORVASC) tablet 10 mg  10 mg Oral DAILY    aspirin delayed-release tablet 81 mg  81 mg Oral DAILY    atorvastatin (LIPITOR) tablet 40 mg  40 mg Oral DAILY    carvedilol (COREG) tablet 3.125 mg  3.125 mg Oral BID WITH MEALS    ferrous sulfate tablet 325 mg  325 mg Oral BID WITH MEALS    hydrALAZINE (APRESOLINE) tablet 100 mg  100 mg Oral TID    glucose chewable tablet 16 g  4 Tab Oral PRN    dextrose (D50W) injection syrg 12.5-25 g  12.5-25 g IntraVENous PRN    glucagon (GLUCAGEN) injection 1 mg  1 mg IntraMUSCular PRN    insulin lispro (HUMALOG) injection   SubCUTAneous AC&HS    mirtazapine (REMERON) tablet 7.5 mg  7.5 mg Oral QHS    nicotine (NICODERM CQ) 21 mg/24 hr patch 1 Patch  1 Patch TransDERmal Q24H         Kaila Ozuna MD  Internal Medicine  1/25/2018

## 2018-01-27 NOTE — PROGRESS NOTES
Problem: Falls - Risk of  Goal: *Absence of Falls  Document Bello Fall Risk and appropriate interventions in the flowsheet. Outcome: Progressing Towards Goal  Fall Risk Interventions:  Mobility Interventions: Bed/chair exit alarm, Communicate number of staff needed for ambulation/transfer    Mentation Interventions: Bed/chair exit alarm, Evaluate medications/consider consulting pharmacy    Medication Interventions: Bed/chair exit alarm, Evaluate medications/consider consulting pharmacy    Elimination Interventions: Bed/chair exit alarm, Patient to call for help with toileting needs    History of Falls Interventions: Bed/chair exit alarm, Evaluate medications/consider consulting pharmacy    Resting in bed with eyes closed. Refusing RN to touch him or clean him. Unable to assess his skin currently. Pt educated on they importance of not sitting in his urine and about skin breakdown. He still refusing all care. Will try later. Safety measures in place, will continue to monitor. 0000-pt allowed nurse to assess his skin and change him.    0600-diaper changed, skin care given, skin intact.

## 2018-01-28 LAB
ANION GAP SERPL CALC-SCNC: 7 MMOL/L (ref 5–15)
BUN SERPL-MCNC: 29 MG/DL (ref 6–20)
BUN/CREAT SERPL: 12 (ref 12–20)
CALCIUM SERPL-MCNC: 8.3 MG/DL (ref 8.5–10.1)
CHLORIDE SERPL-SCNC: 107 MMOL/L (ref 97–108)
CO2 SERPL-SCNC: 25 MMOL/L (ref 21–32)
CREAT SERPL-MCNC: 2.49 MG/DL (ref 0.7–1.3)
ERYTHROCYTE [DISTWIDTH] IN BLOOD BY AUTOMATED COUNT: 16.7 % (ref 11.5–14.5)
GLUCOSE BLD STRIP.AUTO-MCNC: 106 MG/DL (ref 65–100)
GLUCOSE BLD STRIP.AUTO-MCNC: 109 MG/DL (ref 65–100)
GLUCOSE BLD STRIP.AUTO-MCNC: 131 MG/DL (ref 65–100)
GLUCOSE BLD STRIP.AUTO-MCNC: 167 MG/DL (ref 65–100)
GLUCOSE BLD STRIP.AUTO-MCNC: 73 MG/DL (ref 65–100)
GLUCOSE BLD STRIP.AUTO-MCNC: 75 MG/DL (ref 65–100)
GLUCOSE SERPL-MCNC: 139 MG/DL (ref 65–100)
HCT VFR BLD AUTO: 32.9 % (ref 36.6–50.3)
HGB BLD-MCNC: 10.7 G/DL (ref 12.1–17)
MCH RBC QN AUTO: 28.3 PG (ref 26–34)
MCHC RBC AUTO-ENTMCNC: 32.5 G/DL (ref 30–36.5)
MCV RBC AUTO: 87 FL (ref 80–99)
NRBC # BLD: 0 K/UL (ref 0–0.01)
NRBC BLD-RTO: 0 PER 100 WBC
PLATELET # BLD AUTO: 249 K/UL (ref 150–400)
PMV BLD AUTO: 10.2 FL (ref 8.9–12.9)
POTASSIUM SERPL-SCNC: 4.3 MMOL/L (ref 3.5–5.1)
RBC # BLD AUTO: 3.78 M/UL (ref 4.1–5.7)
SERVICE CMNT-IMP: ABNORMAL
SERVICE CMNT-IMP: NORMAL
SERVICE CMNT-IMP: NORMAL
SODIUM SERPL-SCNC: 139 MMOL/L (ref 136–145)
TSH SERPL DL<=0.05 MIU/L-ACNC: 3.25 UIU/ML (ref 0.36–3.74)
WBC # BLD AUTO: 3.5 K/UL (ref 4.1–11.1)

## 2018-01-28 PROCEDURE — 80048 BASIC METABOLIC PNL TOTAL CA: CPT | Performed by: HOSPITALIST

## 2018-01-28 PROCEDURE — 74011250637 HC RX REV CODE- 250/637: Performed by: FAMILY MEDICINE

## 2018-01-28 PROCEDURE — 65220000003 HC RM SEMIPRIVATE PSYCH

## 2018-01-28 PROCEDURE — 74011250637 HC RX REV CODE- 250/637: Performed by: HOSPITALIST

## 2018-01-28 PROCEDURE — 85027 COMPLETE CBC AUTOMATED: CPT | Performed by: HOSPITALIST

## 2018-01-28 PROCEDURE — 36415 COLL VENOUS BLD VENIPUNCTURE: CPT | Performed by: HOSPITALIST

## 2018-01-28 PROCEDURE — 82962 GLUCOSE BLOOD TEST: CPT

## 2018-01-28 PROCEDURE — 74011250637 HC RX REV CODE- 250/637: Performed by: PSYCHIATRY & NEUROLOGY

## 2018-01-28 PROCEDURE — 84443 ASSAY THYROID STIM HORMONE: CPT | Performed by: PSYCHIATRY & NEUROLOGY

## 2018-01-28 RX ORDER — MAG HYDROX/ALUMINUM HYD/SIMETH 200-200-20
30 SUSPENSION, ORAL (FINAL DOSE FORM) ORAL
Status: DISCONTINUED | OUTPATIENT
Start: 2018-01-28 | End: 2018-03-03 | Stop reason: HOSPADM

## 2018-01-28 RX ADMIN — DOXAZOSIN 4 MG: 2 TABLET ORAL at 21:30

## 2018-01-28 RX ADMIN — MIRTAZAPINE 7.5 MG: 15 TABLET, FILM COATED ORAL at 21:31

## 2018-01-28 RX ADMIN — POLYETHYLENE GLYCOL (3350) 17 G: 17 POWDER, FOR SOLUTION ORAL at 10:13

## 2018-01-28 RX ADMIN — FERROUS SULFATE TAB 325 MG (65 MG ELEMENTAL FE) 325 MG: 325 (65 FE) TAB at 18:51

## 2018-01-28 RX ADMIN — FERROUS SULFATE TAB 325 MG (65 MG ELEMENTAL FE) 325 MG: 325 (65 FE) TAB at 10:12

## 2018-01-28 RX ADMIN — HYDRALAZINE HYDROCHLORIDE 100 MG: 50 TABLET, FILM COATED ORAL at 10:12

## 2018-01-28 RX ADMIN — HYDRALAZINE HYDROCHLORIDE 100 MG: 50 TABLET, FILM COATED ORAL at 18:51

## 2018-01-28 RX ADMIN — CARVEDILOL 3.12 MG: 3.12 TABLET, FILM COATED ORAL at 10:12

## 2018-01-28 RX ADMIN — ATORVASTATIN CALCIUM 40 MG: 40 TABLET, FILM COATED ORAL at 10:12

## 2018-01-28 RX ADMIN — HYDRALAZINE HYDROCHLORIDE 100 MG: 50 TABLET, FILM COATED ORAL at 21:30

## 2018-01-28 RX ADMIN — CARVEDILOL 3.12 MG: 3.12 TABLET, FILM COATED ORAL at 18:51

## 2018-01-28 RX ADMIN — AMLODIPINE BESYLATE 10 MG: 5 TABLET ORAL at 10:12

## 2018-01-28 RX ADMIN — ASPIRIN 81 MG: 81 TABLET, COATED ORAL at 09:00

## 2018-01-28 NOTE — PROGRESS NOTES
Problem: Falls - Risk of  Goal: *Absence of Falls  Document Bello Fall Risk and appropriate interventions in the flowsheet. Outcome: Progressing Towards Goal  Fall Risk Interventions:  Mobility Interventions: Bed/chair exit alarm, Communicate number of staff needed for ambulation/transfer, Mechanical lift, PT Consult for mobility concerns    Mentation Interventions: Adequate sleep, hydration, pain control, Bed/chair exit alarm, Door open when patient unattended, Reorient patient, Room close to nurse's station, Toileting rounds    Medication Interventions: Bed/chair exit alarm, Patient to call before getting OOB    Elimination Interventions: Bed/chair exit alarm, Call light in reach, Patient to call for help with toileting needs, Toilet paper/wipes in reach, Toileting schedule/hourly rounds, Urinal in reach    History of Falls Interventions: Bed/chair exit alarm, Room close to nurse's station    Resting in bed with eyes closed, no complaints, no distress noted. Skin care and diaper change completed. Pt following commands and appropriate. Skin remains intact, education completed. Safety measures in pace, will continue to monitor.

## 2018-01-28 NOTE — PROGRESS NOTES
Problem: Depressed Mood (Adult/Pediatric)  Goal: *STG: Participates in treatment plan  Outcome: Progressing Towards Goal  Patient refused all care this morning   However an hour later he has been cooperative with VS, POC testing, meds and meal  Sat on the side of the bed and ate his meals   Verbalized he wanted to go home and who did he need to talk to about that   Informed patient when the doctor makes his rounds he can talk to the MD.       Goal: *STG: Complies with medication therapy  Outcome: Progressing Towards Goal  Med/Meal compliant.

## 2018-01-28 NOTE — BH NOTES
PSYCHIATRIC PROGRESS NOTE         Patient Name  Judy Quesada   Date of Birth 1940   [facility] [id number]   Medical Record Number  [id number]      Age  78 y.o. PCP Johny Warner, MD   Admit date:  1/24/2018    Room Number  [phone]  @ [facility]   Date of Service  1/28/2018          PSYCHOTHERAPY SESSION NOTE:  Length of psychotherapy session: 15 minutes    Main condition/diagnosis/issues treated during session today, 1/28/2018 : med , meal and group non compliance    I employed Cognitive Behavioral therapy techniques, Reality-Oriented psychotherapy, as well as supportive psychotherapy in regards to various ongoing psychosocial stressors, including the following: pre-admission and current problems; housing issues; occupational issues; medical issues; and stress of hospitalization. Interpersonal relationship issues and psychodynamic conflicts explored. Attempts made to alleviate maladaptive patterns. We, also, worked on issues of denial & effects of substance dependency/use     Overall, patient is not progressing    Treatment Plan Update (reviewed an updated 1/28/2018) : I will modify psychotherapy tx plan by implementing more stress management strategies, building upon cognitive behavioral techniques, increasing coping skills, as well as shoring up psychological defenses). An extended energy and skill set was needed to engage pt in psychotherapy due to some of the following: resistiveness, complexity, negativity, confrontational nature, hostile behaviors, and/or severe abnormalities in thought processes/psychosis resulting in the loss of expressive/receptive language communication skills. E & M PROGRESS NOTE:         HISTORY       CC:  \"depression and non compliance with treatment \"  HISTORY OF PRESENT ILLNESS/INTERVAL HISTORY:  (reviewed/updated 1/28/2018).   per initial evaluation:     Judy Quesada presents/reports/evidences the following emotional symptoms today, 1/28/2018:depression. The above symptoms have been present for 1 years. These symptoms are of severe severity. The symptoms are constant  in nature. Additional symptomatology and features include anxiety. 1/27/18 he is not responding to questions and not engaging and not showing engagement and refuse medications and treatment   1/28/18 he is doing better and he engaged and not feeling sad or depressed and he eats better        SIDE EFFECTS: (reviewed/updated 1/28/2018)  None reported or admitted to. No noted toxicity with use of Depakote/Tegretol/lithium/Clozaril/TCAs   ALLERGIES:(reviewed/updated 1/28/2018)  Allergies   Allergen Reactions    Tetracycline Hives      MEDICATIONS PRIOR TO ADMISSION:(reviewed/updated 1/28/2018)  Prescriptions Prior to Admission   Medication Sig    hydrALAZINE (APRESOLINE) 100 mg tablet Take 1 Tab by mouth three (3) times daily.  amLODIPine (NORVASC) 10 mg tablet Take 1 Tab by mouth daily.  aspirin delayed-release 81 mg tablet Take 1 Tab by mouth daily.  atorvastatin (LIPITOR) 40 mg tablet Take 1 Tab by mouth daily.  carvedilol (COREG) 3.125 mg tablet Take 1 Tab by mouth two (2) times daily (with meals).  ferrous sulfate 325 mg (65 mg iron) tablet Take 1 Tab by mouth two (2) times daily (with meals).  mirtazapine (REMERON) 7.5 mg tablet Take 1 Tab by mouth nightly.  nicotine (NICODERM CQ) 21 mg/24 hr 1 Patch by TransDERmal route every twenty-four (24) hours for 30 days. PAST MEDICAL HISTORY: Past medical history from the initial psychiatric evaluation has been reviewed (reviewed/updated 1/28/2018) with no additional updates (I asked patient and no additional past medical history provided).    Past Medical History:   Diagnosis Date    Arthritis     CAD (coronary artery disease) 2007    CKD (chronic kidney disease), stage III     DM type 2 causing renal disease (HCC)     DVT (deep venous thrombosis) ([facility])     Hx of DVT:  Xarelto stopped due to GI bleed. S/P IVC filter.  Gait abnormality     GI bleed     Heart murmur     Hepatitis C     History of blood transfusion     Hypercholesterolemia     Hypertension 11/7/2014    Neuropathy     Non compliance w medication regimen     Peripheral vascular disease ([facility] [address] 11/7/2014    A. S/P Right SFA POBA and tibial atherectomy (2/4/13).  PUD (peptic ulcer disease) 2007    Unspecified sleep apnea     never used cpap     Past Surgical History:   Procedure Laterality Date    ABDOMEN SURGERY PROC UNLISTED  2007    has approx 7-8\" midline incision on abdomen--\"had to open him up and clean him out after feeding tube clogged\"    HX GI  2007    feeding tube for approx 2 mo    VASCULAR SURGERY PROCEDURE UNLIST Right 1/22/2015    popliteal-tibial bypass      SOCIAL HISTORY: Social history from the initial psychiatric evaluation has been reviewed (reviewed/updated 1/28/2018) with no additional updates (I asked patient and no additional social history provided). Social History     Social History    Marital status:      Spouse name: N/A    Number of children: N/A    Years of education: N/A     Occupational History    Not on file. Social History Main Topics    Smoking status: Current Every Day Smoker     Packs/day: 0.50    Smokeless tobacco: Not on file    Alcohol use No    Drug use: No      Comment: >20 years ago    Sexual activity: Not on file     Other Topics Concern    Not on file     Social History Narrative    78 year ols male admitted for depression and homelessness. Pt will be evicated from apartment due to non payment of bills. [facility] of 30 [facility] [place] [facility] [place]. \" Pt is a brittle diabetic, with treatment non compliance. He has bilarela lower extremity amputations. Patient has a long hx of substance abuse.       FAMILY HISTORY: Family history from the initial psychiatric evaluation has been reviewed (reviewed/updated 1/28/2018) with no additional updates (I asked patient and no additional family history provided). Family History   Problem Relation Age of Onset    Hypertension Father        REVIEW OF SYSTEMS: (reviewed/updated 1/28/2018)  Appetite:decreased   Sleep: fitful   All other Review of Systems: Psychological ROS: positive for - behavioral disorder  Respiratory ROS: no cough, shortness of breath, or wheezing  Cardiovascular ROS: no chest pain or dyspnea on exertion         [facility] [place] [place]    MSE FINDINGS ARE WITHIN NORMAL LIMITS (WNL) UNLESS OTHERWISE STATED BELOW. ( ALL OF THE BELOW CATEGORIES OF THE MSE HAVE BEEN REVIEWED (reviewed 1/28/2018) AND UPDATED AS DEEMED APPROPRIATE )  General Presentation age appropriate, evasive and guarded   Orientation oriented to time, place and person   Vital Signs  See below (reviewed 1/28/2018); Vital Signs (BP, Pulse, & Temp) are within normal limits if not listed below.    Gait and Station Stable/steady, no ataxia   Musculoskeletal System No extrapyramidal symptoms (EPS); no abnormal muscular movements or Tardive Dyskinesia (TD); muscle strength and tone are within normal limits   Language No aphasia or dysarthria   Speech:  hypoverbal   Thought Processes concrete; normal rate of thoughts; poor abstract reasoning/computation   Thought Associations goal directed   Thought Content free of delusions and free of hallucinations   Suicidal Ideations none   Homicidal Ideations none   Mood:  irritable   Affect:  mood-congruent   Memory recent  fair   Memory remote:  fair   Concentration/Attention:  distractable   Fund of Knowledge significantly below average   Insight:  poor   Reliability poor   Judgment:  poor          VITALS:     Patient Vitals for the past 24 hrs:   Temp Pulse Resp BP SpO2   01/28/18 1202 98 °F (36.7 °C) 72 18 158/62 99 %   01/28/18 1012 - - - (!) 193/92 -   01/28/18 1011 97.7 °F (36.5 °C) 74 18 (!) 193/92 100 %   01/27/18 1916 98 °F (36.7 °C) 66 16 187/85 98 %   01/27/18 1629 98 °F (36.7 °C) 72 16 (!) 174/93 100 %     Wt Readings from Last 3 Encounters:   01/24/18 82.7 kg (182 lb 5.1 oz)   01/23/18 83.6 kg (184 lb 4.9 oz)   07/13/17 88.9 kg (196 lb)     Temp Readings from Last 3 Encounters:   01/28/18 98 °F (36.7 °C)   01/24/18 97.2 °F (36.2 °C)   07/20/17 97.9 °F (36.6 °C)     BP Readings from Last 3 Encounters:   01/28/18 158/62   01/24/18 172/68   07/20/17 175/76     Pulse Readings from Last 3 Encounters:   01/28/18 72   01/24/18 72   07/20/17 (!) 52            DATA     LABORATORY DATA:(reviewed/updated 1/28/2018)  Recent Results (from the past 24 hour(s))   GLUCOSE, POC    Collection Time: 01/27/18  4:31 PM   Result Value Ref Range    Glucose (POC) 64 (L) 65 - 100 mg/dL    Performed by [employer] [id number], POC    Collection Time: 01/27/18  4:47 PM   Result Value Ref Range    Glucose (POC) 68 65 - 100 mg/dL    Performed by for; to (do) [id number], POC    Collection Time: 01/27/18  5:03 PM   Result Value Ref Range    Glucose (POC) 102 (H) 65 - 100 mg/dL    Performed by Rafiq Balderas    GLUCOSE, POC    Collection Time: 01/27/18  8:06 PM   Result Value Ref Range    Glucose (POC) 115 (H) 65 - 100 mg/dL    Performed by Thais Banuelos    GLUCOSE, POC    Collection Time: 01/28/18  8:16 AM   Result Value Ref Range    Glucose (POC) 75 65 - 100 mg/dL    Performed by May Fernández    GLUCOSE, POC    Collection Time: 01/28/18  8:33 AM   Result Value Ref Range    Glucose (POC) 73 65 - 100 mg/dL    Performed by May Fernández    GLUCOSE, POC    Collection Time: 01/28/18  8:55 AM   Result Value Ref Range    Glucose (POC) 109 (H) 65 - 100 mg/dL    Performed by May Fernández    GLUCOSE, POC    Collection Time: 01/28/18 11:52 AM   Result Value Ref Range    Glucose (POC) 167 (H) 65 - 100 mg/dL    Performed by May Fernández    GLUCOSE, POC    Collection Time: 01/28/18  4:19 PM   Result Value Ref Range    Glucose (POC) 106 (H) 65 - 100 mg/dL    Performed by [employer]      No results found for: VALF2, VALAC, VALP, VALPR, DS6, CRBAM, CRBAMP, CARB2, XCRBAM  No results found for: LITHM   RADIOLOGY REPORTS:(reviewed/updated 1/28/2018)  Xr Chest Port    Result Date: 1/20/2018  EXAM: Portable CXR.  1800 hours. COMPARISON: 1/19/2015. INDICATION: cp, fatigue There is new interstitial pulmonary edema, cardiomegaly and small left pleural effusion. There is no focal infiltrate, pneumothorax or midline shift. IMPRESSION: CHF pattern.          MEDICATIONS     ALL MEDICATIONS:   Current Facility-Administered Medications   Medication Dose Route Frequency    alum-mag hydroxide-simeth (MYLANTA) oral suspension 30 mL  30 mL Oral Q4H PRN    doxazosin (CARDURA) tablet 4 mg  4 mg Oral QHS    polyethylene glycol (MIRALAX) packet 17 g  17 g Oral DAILY    OLANZapine (ZyPREXA) tablet 2.5 mg  2.5 mg Oral Q6H PRN    ziprasidone (GEODON) 10 mg in sterile water (preservative free) 0.5 mL injection  10 mg IntraMUSCular BID PRN    benztropine (COGENTIN) tablet 1 mg  1 mg Oral BID PRN    benztropine (COGENTIN) injection 1 mg  1 mg IntraMUSCular BID PRN    zolpidem (AMBIEN) tablet 5 mg  5 mg Oral QHS PRN    acetaminophen (TYLENOL) tablet 650 mg  650 mg Oral Q4H PRN    magnesium hydroxide (MILK OF MAGNESIA) 400 mg/5 mL oral suspension 30 mL  30 mL Oral DAILY PRN    nicotine (NICODERM CQ) 21 mg/24 hr patch 1 Patch  1 Patch TransDERmal DAILY PRN    influenza vaccine 2017-18 (3 yrs+)(PF) (FLUZONE QUAD/FLUARIX QUAD) injection 0.5 mL  0.5 mL IntraMUSCular PRIOR TO DISCHARGE    amLODIPine (NORVASC) tablet 10 mg  10 mg Oral DAILY    aspirin delayed-release tablet 81 mg  81 mg Oral DAILY    atorvastatin (LIPITOR) tablet 40 mg  40 mg Oral DAILY    carvedilol (COREG) tablet 3.125 mg  3.125 mg Oral BID WITH MEALS    ferrous sulfate tablet 325 mg  325 mg Oral BID WITH MEALS    hydrALAZINE (APRESOLINE) tablet 100 mg  100 mg Oral TID    glucose chewable tablet 16 g  4 Tab Oral PRN    dextrose (D50W) injection syrg 12.5-25 g  12.5-25 g IntraVENous PRN    glucagon (GLUCAGEN) injection 1 mg  1 mg IntraMUSCular PRN    insulin lispro (HUMALOG) injection   SubCUTAneous AC&HS    mirtazapine (REMERON) tablet 7.5 mg  7.5 mg Oral QHS    nicotine (NICODERM CQ) 21 mg/24 hr patch 1 Patch  1 Patch TransDERmal Q24H      SCHEDULED MEDICATIONS:   Current Facility-Administered Medications   Medication Dose Route Frequency    doxazosin (CARDURA) tablet 4 mg  4 mg Oral QHS    polyethylene glycol (MIRALAX) packet 17 g  17 g Oral DAILY    influenza vaccine 2017-18 (3 yrs+)(PF) (FLUZONE QUAD/FLUARIX QUAD) injection 0.5 mL  0.5 mL IntraMUSCular PRIOR TO DISCHARGE    amLODIPine (NORVASC) tablet 10 mg  10 mg Oral DAILY    aspirin delayed-release tablet 81 mg  81 mg Oral DAILY    atorvastatin (LIPITOR) tablet 40 mg  40 mg Oral DAILY    carvedilol (COREG) tablet 3.125 mg  3.125 mg Oral BID WITH MEALS    ferrous sulfate tablet 325 mg  325 mg Oral BID WITH MEALS    hydrALAZINE (APRESOLINE) tablet 100 mg  100 mg Oral TID    insulin lispro (HUMALOG) injection   SubCUTAneous AC&HS    mirtazapine (REMERON) tablet 7.5 mg  7.5 mg Oral QHS    nicotine (NICODERM CQ) 21 mg/24 hr patch 1 Patch  1 Patch TransDERmal Q24H          ASSESSMENT & PLAN     DIAGNOSES REQUIRING ACTIVE TREATMENT AND MONITORING: (reviewed/updated 1/28/2018)  Patient Active Hospital Problem List:   Depression (1/24/2018)    Assessment: sadness, helplessness, hopelessness, in reaction to recent bilateral lower leg amputationa and inability to pat water bill at home.     Plan: consider forced medication/treatment, address social issues, daughter may be obtaining guardianship  1/27/18 need order to treat    1/28/18 continue his medications              In summary, Jada Wan, is a 78 y.o.  male who presents with a severe exacerbation of the principal diagnosis of Depression  Patient's condition is worsening/not improving/not stable Patient requires continued inpatient hospitalization for further stabilization, safety monitoring and medication management. I will continue to coordinate the provision of individual, milieu, occupational, group, and substance abuse therapies to address target symptoms/diagnoses as deemed appropriate for the individual patient. A coordinated, multidisplinary treatment team round was conducted with the patient (this team consists of the nurse, psychiatric unit pharmcist,  and writer). Complete current electronic health record for patient has been reviewed today including consultant notes, ancillary staff notes, nurses and psychiatric tech notes. Suicide risk assessment completed and patient deemed to be of low risk for suicide at this time. The following regarding medications was addressed during rounds with patient:   the risks and benefits of the proposed medication. The patient was given the opportunity to ask questions. Informed consent given to the use of the above medications. Will continue to adjust psychiatric and non-psychiatric medications (see above \"medication\" section and orders section for details) as deemed appropriate & based upon diagnoses and response to treatment. I will continue to order blood tests/labs and diagnostic tests as deemed appropriate and review results as they become available (see orders for details and above listed lab/test results). I will order psychiatric records from previous [facility] hospitals to further elucidate the nature of patient's psychopathology and review once available. I will gather additional collateral information from friends, family and o/p treatment team to further elucidate the nature of patient's psychopathology and baselline level of psychiatric functioning.          I certify that this patient's inpatient psychiatric hospital services furnished since the previous certification were, and continue to be, required for treatment that could reasonably be expected to improve the patient's condition, or for diagnostic study, and that the patient continues to need, on a daily basis, active treatment furnished directly by or requiring the supervision of inpatient psychiatric facility personnel. In addition, the hospital records show that services furnished were intensive treatment services, admission or related services, or equivalent services.     EXPECTED DISCHARGE DATE/DAY: TBD     DISPOSITION: Home       Signed By:   Tawana Santacruz MD  1/28/2018

## 2018-01-28 NOTE — PROGRESS NOTES
Problem: Falls - Risk of  Goal: *Absence of Falls  Document Bello Fall Risk and appropriate interventions in the flowsheet. Outcome: Progressing Towards Goal  Fall Risk Interventions:  Mobility Interventions: Bed/chair exit alarm, Communicate number of staff needed for ambulation/transfer, Mechanical lift, PT Consult for mobility concerns    Mentation Interventions: Adequate sleep, hydration, pain control, Bed/chair exit alarm, Door open when patient unattended, Reorient patient, Room close to nurse's station, Toileting rounds    Medication Interventions: Bed/chair exit alarm, Patient to call before getting OOB    Elimination Interventions: Bed/chair exit alarm, Call light in reach, Patient to call for help with toileting needs, Toilet paper/wipes in reach, Toileting schedule/hourly rounds, Urinal in reach    History of Falls Interventions: Bed/chair exit alarm, Room close to nurse's station        Problem: Depressed Mood (Adult/Pediatric)  Goal: *STG: Participates in treatment plan  Outcome: Progressing Towards Goal  Labile, irritable. Cooperation is intermittent. Refuses food, vitals, toileting, and turns when irritated. Declines to leave bed and calls staff continually. Pt is able to feed and turn self. Staff goal to encourage acceptance of care and interventions and meet emotional needs. Q 15 min checks.   Goal: *STG: Verbalizes anger, guilt, and other feelings in a constructive manor  Outcome: Not Progressing Towards Goal  Irritable, demanding  Goal: *STG: Attends activities and groups  Outcome: Not Progressing Towards Goal  Withdrawn

## 2018-01-28 NOTE — INTERDISCIPLINARY ROUNDS
Behavioral Health Interdisciplinary Rounds     Patient Name: Pérez Ward  Age: 78 y.o. Room/Bed:  740/02  Primary Diagnosis: Depression   Admission Status: Involuntary Commitment     Readmission within 30 days: no  Power of  in place: yes--Radha Urrutia (daughter); awaiting paperwork  Patient requires a blocked bed: no          Reason for blocked bed:     VTE Prophylaxis: Yes ASA  Flu vaccine given : no refused  Mobility needs/Fall risk: yes B/L BKA   Nutritional Plan: needs cons ordered for Dietary  Consults: PT, Diabetic tx team, Nephrology consults ordered         Labs/Testing due today?: refusing    Sleep hours: 6.45       Participation in Care/Groups:  no  Medication Compliant?: Selective  PRNS (last 24 hours): None    Restraints (last 24 hours):  no  Substance Abuse:  no  CIWA (range last 24 hours):  COWS (range last 24 hours):   Alcohol screening (AUDIT) completed -  AUDIT Score: 0  If applicable, date SBIRT discussed in treatment team AND documented:   Tobacco - patient is a smoker: yes   Date tobacco education completed by RN: 1.5 ppd since 15 yrs old. 1/24/18 given Stop Smoking info. 24 hour chart check complete: yes     Patient goal(s) for today:   Treatment team focus/goals:   Progress note     LOS:  4  Expected LOS:     Financial concerns/prescription coverage:    Date of last family contact:      Family requesting physician contact today:    Discharge plan:   Guns in the home:       Outpatient provider(s):     Participating treatment team members:  Pérez Ward, * (assigned SW),

## 2018-01-28 NOTE — BH NOTES
1550: Hospitalist with pt.    1930:  2 attempts by RN to draw labs. 2015:  Labs drawn by Deonna Harry RN.

## 2018-01-28 NOTE — PROGRESS NOTES
Ivette Villanueva MD   Phone/text: [phone] (7am to 7pm)  After 7pm please call  for hospitalist on call    Hospitalist Progress Note     2018   PCP:  Dr. Jung Claire MD  Reason for consultation:     Admission Summary:   Sophy King is a 78 y.o.  male with past medical history of arthritis, CAD, anemia, hypertension, hyperlipidemia, CKD, type 2 diabetes mellitus, diastolic CHF, sleep apnea, peripheral vascular disease, s/p bilateral BKA, peripheral neuropathy, PUD, prior DVT and hepatitis C presented to the [facility]) from [facility]) with reported depression and suicidal ideation. Reason For Visit:  CHF    Assessment/Plan   CHF, chronic diastolic - compensated  - Continue BP and rate control, diuresis as needed    Hypertension (chronic) - now taking bp meds and pressure is better  - Continue amlodipine, carvedilol, hydralazine and newly added doxazosin    DM2 - blood glucose normal, not eating much  - Continue to monitor and given SSI as needed    CKD3 - renal function continues to worsen and now patient refusing labs  - Monitor as able    Anemia - stable, continue iron     Subjective   Mr. Yaz Cowart is much more cooperative. He has been taking his meds. He denies SOB, chest pain.     Reviewed interval history  Physical examination     Visit Vitals    /62 (BP Patient Position: At rest)    Pulse 72    Temp 98 °F (36.7 °C)    Resp 18    Ht 5' 11\" (1.803 m)    Wt 82.7 kg (182 lb 5.1 oz)    SpO2 99%    BMI 25.43 kg/m2       Temp (24hrs), Av.9 °F (36.6 °C), Min:97.7 °F (36.5 °C), Max:98 °F (36.7 °C)    No intake or output data in the 24 hours ending 18 1641    Gen - NAD  HEENT - MM dry  Neck - supple, full ROM  CV - RRR, no MRG  Resp - lungs CTA b/l, no wheezing, normal WOB  GI - abdomen S, NT, ND, +BS   - no CVA tenderness, bladder non-palpable in lower abdomen  MSK - Bilateral BKA, no edema  Neuro - Awake and interactive, moving all 4 extremities    Data Review       Telemetry    ECG    Xray    CT scan    MRI    Echocardiogram    Ultrasound              I have reviewed the flow sheet and recent notes  New labs and data personally reviewed. No results for input(s): WBC, HGB, HCT, PLT, HGBEXT, HCTEXT, PLTEXT, HGBEXT, HCTEXT, PLTEXT in the last 72 hours. No results for input(s): NA, K, CL, CO2, GLU, BUN, CREA, CA, MG, PHOS, ALB, TBIL, TBILI, SGOT, ALT, INR in the last 72 hours.     No lab exists for component: INREXT, INREXT    Medications reviewed  Current Facility-Administered Medications   Medication Dose Route Frequency    alum-mag hydroxide-simeth (MYLANTA) oral suspension 30 mL  30 mL Oral Q4H PRN    doxazosin (CARDURA) tablet 4 mg  4 mg Oral QHS    polyethylene glycol (MIRALAX) packet 17 g  17 g Oral DAILY    OLANZapine (ZyPREXA) tablet 2.5 mg  2.5 mg Oral Q6H PRN    ziprasidone (GEODON) 10 mg in sterile water (preservative free) 0.5 mL injection  10 mg IntraMUSCular BID PRN    benztropine (COGENTIN) tablet 1 mg  1 mg Oral BID PRN    benztropine (COGENTIN) injection 1 mg  1 mg IntraMUSCular BID PRN    zolpidem (AMBIEN) tablet 5 mg  5 mg Oral QHS PRN    acetaminophen (TYLENOL) tablet 650 mg  650 mg Oral Q4H PRN    magnesium hydroxide (MILK OF MAGNESIA) 400 mg/5 mL oral suspension 30 mL  30 mL Oral DAILY PRN    nicotine (NICODERM CQ) 21 mg/24 hr patch 1 Patch  1 Patch TransDERmal DAILY PRN    influenza vaccine 2017-18 (3 yrs+)(PF) (FLUZONE QUAD/FLUARIX QUAD) injection 0.5 mL  0.5 mL IntraMUSCular PRIOR TO DISCHARGE    amLODIPine (NORVASC) tablet 10 mg  10 mg Oral DAILY    aspirin delayed-release tablet 81 mg  81 mg Oral DAILY    atorvastatin (LIPITOR) tablet 40 mg  40 mg Oral DAILY    carvedilol (COREG) tablet 3.125 mg  3.125 mg Oral BID WITH MEALS    ferrous sulfate tablet 325 mg  325 mg Oral BID WITH MEALS    hydrALAZINE (APRESOLINE) tablet 100 mg  100 mg Oral TID    glucose chewable tablet 16 g  4 Tab Oral PRN    dextrose (D50W) injection syrg 12.5-25 g  12.5-25 g IntraVENous PRN    glucagon (GLUCAGEN) injection 1 mg  1 mg IntraMUSCular PRN    insulin lispro (HUMALOG) injection   SubCUTAneous AC&HS    mirtazapine (REMERON) tablet 7.5 mg  7.5 mg Oral QHS    nicotine (NICODERM CQ) 21 mg/24 hr patch 1 Patch  1 Patch TransDERmal Q24H         Giovanna Cuadra MD  Internal Medicine  1/28/2018

## 2018-01-28 NOTE — BH NOTES
HYPOGLYCEMIC EPISODE DOCUMENTATION    Patient with hypoglycemic episode(s) at 0816(time) on 1/28/18(date). BG value(s) pre-treatment 68    Was patient symptomatic? [] yes, [x] no  Patient was treated with the following rescue medications/treatments: [] D50                [] Glucose tablets                [] Glucagon                [x] 4oz juice                [] 6oz reg soda                [] 8oz low fat milk    0833- 73 after 4 oz juice. Another 4 ounce juice given.     0855- BG value post-treatment: 109   Once BG treated and value greater than 80mg/dl, pt was provided with the following:  [x] snack  [] meal  Name of MD notified:will discuss in treatment   The following orders were received: n/a    1149-Dr Sergey Bee is aware of pt BS

## 2018-01-29 PROCEDURE — 65220000003 HC RM SEMIPRIVATE PSYCH

## 2018-01-29 NOTE — BH NOTES
GROUP THERAPY PROGRESS NOTE Zane Severe participated in a morning Process Group on the Geriatric Unit, with a focus identifying feelings, planning for the day, and singing. Group time: 45 minutes. Personal goal for participation: To increase the capacity to shift ones mood, prepare for the day, and share in group singing. Goal orientation: The patient will be able to prepare for the day through group singing. Group therapy participation: When prompted, this patient partially participated in the group. Therapeutic interventions reviewed and discussed: The group members were introduce themselves by first names and participate in group singing as a way to increase their oxygen and blood flow and begin their day on a positive note. They were also asked to join in singing several songs. Impression of participation: The patient joined the group about FPC through and joined in the singing, while standing rather than sitting. He was fully alert and generally oriented. He expressed no SI/HI and displayed no overt psychosis. His affect was mildly euphoric and his mood matched his affect. He said he had been complimented on the general unit for actively participating in groups.  He did appear to be in a good mood and may be back to baseline, but this will need to be verified by his treatment team.

## 2018-01-29 NOTE — BH NOTES
1525:  Patient received as he is being assessed by Wound Care with staff assistance. He is cooperative with the assessment voicing no complaints at this time. Will maintain q 15 minute safety checks. 1600:  Patient refused to allow staff to get his vital signs at this time. Will continue to monitor. 1715:  Patient sitting on the side of his bed to eat his dinner and use the urinal.  While making rounds, patient asks writer to do him a favor and go downstairs to [employer] to get him some pants. Reorientation to [facility] provided and fact that writer is a nurse on shift until 2300. Brief offered - patient refused. Will continue to monitor. 2115:  Patient sitting on the edge of his bed urinating on the bed and floor. He is argumentative, posturing and resistant with staff attempting to clean him up. A male BHT from the Acute unit assisted staff with encouraging patient to cooperate with changing his brief and bed linens. Patient is sullen and angry but somewhat cooperative. Will continue to monitor.

## 2018-01-29 NOTE — BH NOTES
PSYCHIATRIC PROGRESS NOTE         Patient Name  Jeet Rodriguez   Date of Birth 1940   [facility] [id number]   Medical Record Number  [id number]      Age  78 y.o. PCP Johny Warner, MD   Admit date:  1/24/2018    Room Number  [id number]  @ [facility]   Date of Service  1/29/2018          PSYCHOTHERAPY SESSION NOTE:  Length of psychotherapy session: 15 minutes    Main condition/diagnosis/issues treated during session today, 1/29/2018 : med , meal and group non compliance    I employed Cognitive Behavioral therapy techniques, Reality-Oriented psychotherapy, as well as supportive psychotherapy in regards to various ongoing psychosocial stressors, including the following: pre-admission and current problems; housing issues; occupational issues; medical issues; and stress of hospitalization. Interpersonal relationship issues and psychodynamic conflicts explored. Attempts made to alleviate maladaptive patterns. We, also, worked on issues of denial & effects of substance dependency/use     Overall, patient is not progressing    Treatment Plan Update (reviewed an updated 1/29/2018) : I will modify psychotherapy tx plan by implementing more stress management strategies, building upon cognitive behavioral techniques, increasing coping skills, as well as shoring up psychological defenses). An extended energy and skill set was needed to engage pt in psychotherapy due to some of the following: resistiveness, complexity, negativity, confrontational nature, hostile behaviors, and/or severe abnormalities in thought processes/psychosis resulting in the loss of expressive/receptive language communication skills. E & M PROGRESS NOTE:         HISTORY       CC:  \"depression and non compliance with treatment \"  HISTORY OF PRESENT ILLNESS/INTERVAL HISTORY:  (reviewed/updated 1/29/2018).   per initial evaluation:     Jeet Rodriguez presents/reports/evidences the following emotional symptoms today, 1/29/2018:depression. The above symptoms have been present for 1 years. These symptoms are of severe severity. The symptoms are constant  in nature. Additional symptomatology and features include anxiety. 1/27/18 he is not responding to questions and not engaging and not showing engagement and refuse medications and treatment   1/28/18 he is doing better and he engaged and not feeling sad or depressed and he eats better    1/29/18= Patient is deliberately refusing vital signs, labs and medications. He likes finger foods and eats those. Will meet with daughter on Tuesday to discuss possible guardianship. Will seek forced meds. SIDE EFFECTS: (reviewed/updated 1/29/2018)  None reported or admitted to. No noted toxicity with use of Depakote/Tegretol/lithium/Clozaril/TCAs   ALLERGIES:(reviewed/updated 1/29/2018)  Allergies   Allergen Reactions    Tetracycline Hives      MEDICATIONS PRIOR TO ADMISSION:(reviewed/updated 1/29/2018)  Prescriptions Prior to Admission   Medication Sig    hydrALAZINE (APRESOLINE) 100 mg tablet Take 1 Tab by mouth three (3) times daily.  amLODIPine (NORVASC) 10 mg tablet Take 1 Tab by mouth daily.  aspirin delayed-release 81 mg tablet Take 1 Tab by mouth daily.  atorvastatin (LIPITOR) 40 mg tablet Take 1 Tab by mouth daily.  carvedilol (COREG) 3.125 mg tablet Take 1 Tab by mouth two (2) times daily (with meals).  ferrous sulfate 325 mg (65 mg iron) tablet Take 1 Tab by mouth two (2) times daily (with meals).  mirtazapine (REMERON) 7.5 mg tablet Take 1 Tab by mouth nightly.  nicotine (NICODERM CQ) 21 mg/24 hr 1 Patch by TransDERmal route every twenty-four (24) hours for 30 days. PAST MEDICAL HISTORY: Past medical history from the initial psychiatric evaluation has been reviewed (reviewed/updated 1/29/2018) with no additional updates (I asked patient and no additional past medical history provided).    Past Medical History:   Diagnosis Date    Arthritis  CAD (coronary artery disease) 2007    CKD (chronic kidney disease), stage III     DM type 2 causing renal disease (HCC)     DVT (deep venous thrombosis) (HCC)     Hx of DVT:  Xarelto stopped due to GI bleed. S/P IVC filter.  Gait abnormality     GI bleed     Heart murmur     Hepatitis C     History of blood transfusion     Hypercholesterolemia     Hypertension 11/7/2014    Neuropathy     Non compliance w medication regimen     Peripheral vascular disease ([facility] [address] 11/7/2014    A. S/P Right SFA POBA and tibial atherectomy (2/4/13).  PUD (peptic ulcer disease) 2007    Unspecified sleep apnea     never used cpap     Past Surgical History:   Procedure Laterality Date    ABDOMEN SURGERY PROC UNLISTED  2007    has approx 7-8\" midline incision on abdomen--\"had to open him up and clean him out after feeding tube clogged\"    HX GI  2007    feeding tube for approx 2 mo    VASCULAR SURGERY PROCEDURE UNLIST Right 1/22/2015    popliteal-tibial bypass      SOCIAL HISTORY: Social history from the initial psychiatric evaluation has been reviewed (reviewed/updated 1/29/2018) with no additional updates (I asked patient and no additional social history provided). Social History     Social History    Marital status:      Spouse name: N/A    Number of children: N/A    Years of education: N/A     Occupational History    Not on file. Social History Main Topics    Smoking status: Current Every Day Smoker     Packs/day: 0.50    Smokeless tobacco: Not on file    Alcohol use No    Drug use: No      Comment: >20 years ago    Sexual activity: Not on file     Other Topics Concern    Not on file     Social History Narrative    78 year ols male admitted for depression and homelessness. Pt will be evicated from apartment due to non payment of bills. Girlfriend of 30 years left him to [facility] live in the woods with others. \" Pt is a brittle diabetic, with treatment non compliance. He has bilarela lower extremity amputations. Patient has a long hx of substance abuse. FAMILY HISTORY: Family history from the initial psychiatric evaluation has been reviewed (reviewed/updated 1/29/2018) with no additional updates (I asked patient and no additional family history provided). Family History   Problem Relation Age of Onset    Hypertension Father        REVIEW OF SYSTEMS: (reviewed/updated 1/29/2018)  Appetite:decreased   Sleep: fitful   All other Review of Systems: Psychological ROS: positive for - behavioral disorder  Respiratory ROS: no cough, shortness of breath, or wheezing  Cardiovascular ROS: no chest pain or dyspnea on exertion         [facility] (MSE):    MSE FINDINGS ARE WITHIN NORMAL LIMITS (WNL) UNLESS OTHERWISE STATED BELOW. ( ALL OF THE BELOW CATEGORIES OF THE MSE HAVE BEEN REVIEWED (reviewed 1/29/2018) AND UPDATED AS DEEMED APPROPRIATE )  General Presentation age appropriate, evasive and guarded   Orientation oriented to time, place and person   Vital Signs  See below (reviewed 1/29/2018); Vital Signs (BP, Pulse, & Temp) are within normal limits if not listed below.    Gait and Station Stable/steady, no ataxia   Musculoskeletal System No extrapyramidal symptoms (EPS); no abnormal muscular movements or Tardive Dyskinesia (TD); muscle strength and tone are within normal limits   Language No aphasia or dysarthria   Speech:  hypoverbal   Thought Processes concrete; normal rate of thoughts; poor abstract reasoning/computation   Thought Associations goal directed   Thought Content free of delusions and free of hallucinations   Suicidal Ideations none   Homicidal Ideations none   Mood:  irritable   Affect:  mood-congruent   Memory recent  fair   Memory remote:  fair   Concentration/Attention:  distractable   Fund of Knowledge significantly below average   Insight:  poor   Reliability poor   Judgment:  poor          VITALS:     Patient Vitals for the past 24 hrs:   Temp Pulse Resp BP SpO2   01/29/18 0737 97.3 °F (36.3 °C) 69 16 (!) 146/96 99 %   01/28/18 1911 97.2 °F (36.2 °C) 63 18 144/70 99 %   01/28/18 1705 - 64 18 151/72 99 %     Wt Readings from Last 3 Encounters:   01/24/18 82.7 kg (182 lb 5.1 oz)   01/23/18 83.6 kg (184 lb 4.9 oz)   07/13/17 88.9 kg (196 lb)     Temp Readings from Last 3 Encounters:   01/29/18 97.3 °F (36.3 °C)   01/24/18 97.2 °F (36.2 °C)   07/20/17 97.9 °F (36.6 °C)     BP Readings from Last 3 Encounters:   01/29/18 (!) 146/96   01/24/18 172/68   07/20/17 175/76     Pulse Readings from Last 3 Encounters:   01/29/18 69   01/24/18 72   07/20/17 (!) 52            DATA     LABORATORY DATA:(reviewed/updated 1/29/2018)  Recent Results (from the past 24 hour(s))   GLUCOSE, POC    Collection Time: 01/28/18  4:19 PM   Result Value Ref Range    Glucose (POC) 106 (H) 65 - 100 mg/dL    Performed by Lily Lovelace    CBC W/O DIFF    Collection Time: 01/28/18  8:14 PM   Result Value Ref Range    WBC 3.5 (L) 4.1 - 11.1 K/uL    RBC 3.78 (L) 4.10 - 5.70 M/uL    HGB 10.7 (L) 12.1 - 17.0 g/dL    HCT 32.9 (L) 36.6 - 50.3 %    MCV 87.0 80.0 - 99.0 FL    MCH 28.3 26.0 - 34.0 PG    MCHC 32.5 30.0 - 36.5 g/dL    RDW 16.7 (H) 11.5 - 14.5 %    PLATELET 019 507 - 054 K/uL    MPV 10.2 8.9 - 12.9 FL    NRBC 0.0 0  WBC    ABSOLUTE NRBC 0.00 0.00 - 5.36 K/uL   METABOLIC PANEL, BASIC    Collection Time: 01/28/18  8:14 PM   Result Value Ref Range    Sodium 139 136 - 145 mmol/L    Potassium 4.3 3.5 - 5.1 mmol/L    Chloride 107 97 - 108 mmol/L    CO2 25 21 - 32 mmol/L    Anion gap 7 5 - 15 mmol/L    Glucose 139 (H) 65 - 100 mg/dL    BUN 29 (H) 6 - 20 MG/DL    Creatinine 2.49 (H) 0.70 - 1.30 MG/DL    BUN/Creatinine ratio 12 12 - 20      GFR est AA 31 (L) >60 ml/min/1.73m2    GFR est non-AA 25 (L) >60 ml/min/1.73m2    Calcium 8.3 (L) 8.5 - 10.1 MG/DL   TSH 3RD GENERATION    Collection Time: 01/28/18  8:14 PM   Result Value Ref Range    TSH 3.25 0.36 - 3.74 uIU/mL   GLUCOSE, POC Collection Time: 01/28/18  8:18 PM   Result Value Ref Range    Glucose (POC) 131 (H) 65 - 100 mg/dL    Performed by [facility]      No results found for: VALF2, VALAC, VALP, VALPR, DS6, CRBAM, CRBAMP, CARB2, XCRBAM  No results found for: LITHM   RADIOLOGY REPORTS:(reviewed/updated 1/29/2018)  Xr Chest Port    Result Date: 1/20/2018  EXAM: Portable CXR.  1800 hours. COMPARISON: 1/19/2015. INDICATION: cp, fatigue There is new interstitial pulmonary edema, cardiomegaly and small left pleural effusion. There is no focal infiltrate, pneumothorax or midline shift. IMPRESSION: CHF pattern.          MEDICATIONS     ALL MEDICATIONS:   Current Facility-Administered Medications   Medication Dose Route Frequency    alum-mag hydroxide-simeth (MYLANTA) oral suspension 30 mL  30 mL Oral Q4H PRN    doxazosin (CARDURA) tablet 4 mg  4 mg Oral QHS    polyethylene glycol (MIRALAX) packet 17 g  17 g Oral DAILY    OLANZapine (ZyPREXA) tablet 2.5 mg  2.5 mg Oral Q6H PRN    ziprasidone (GEODON) 10 mg in sterile water (preservative free) 0.5 mL injection  10 mg IntraMUSCular BID PRN    benztropine (COGENTIN) tablet 1 mg  1 mg Oral BID PRN    benztropine (COGENTIN) injection 1 mg  1 mg IntraMUSCular BID PRN    zolpidem (AMBIEN) tablet 5 mg  5 mg Oral QHS PRN    acetaminophen (TYLENOL) tablet 650 mg  650 mg Oral Q4H PRN    magnesium hydroxide (MILK OF MAGNESIA) 400 mg/5 mL oral suspension 30 mL  30 mL Oral DAILY PRN    nicotine (NICODERM CQ) 21 mg/24 hr patch 1 Patch  1 Patch TransDERmal DAILY PRN    influenza vaccine 2017-18 (3 yrs+)(PF) (FLUZONE QUAD/FLUARIX QUAD) injection 0.5 mL  0.5 mL IntraMUSCular PRIOR TO DISCHARGE    amLODIPine (NORVASC) tablet 10 mg  10 mg Oral DAILY    aspirin delayed-release tablet 81 mg  81 mg Oral DAILY    atorvastatin (LIPITOR) tablet 40 mg  40 mg Oral DAILY    carvedilol (COREG) tablet 3.125 mg  3.125 mg Oral BID WITH MEALS    ferrous sulfate tablet 325 mg  325 mg Oral BID WITH MEALS  hydrALAZINE (APRESOLINE) tablet 100 mg  100 mg Oral TID    glucose chewable tablet 16 g  4 Tab Oral PRN    dextrose (D50W) injection syrg 12.5-25 g  12.5-25 g IntraVENous PRN    glucagon (GLUCAGEN) injection 1 mg  1 mg IntraMUSCular PRN    insulin lispro (HUMALOG) injection   SubCUTAneous AC&HS    mirtazapine (REMERON) tablet 7.5 mg  7.5 mg Oral QHS    nicotine (NICODERM CQ) 21 mg/24 hr patch 1 Patch  1 Patch TransDERmal Q24H      SCHEDULED MEDICATIONS:   Current Facility-Administered Medications   Medication Dose Route Frequency    doxazosin (CARDURA) tablet 4 mg  4 mg Oral QHS    polyethylene glycol (MIRALAX) packet 17 g  17 g Oral DAILY    influenza vaccine 2017-18 (3 yrs+)(PF) (FLUZONE QUAD/FLUARIX QUAD) injection 0.5 mL  0.5 mL IntraMUSCular PRIOR TO DISCHARGE    amLODIPine (NORVASC) tablet 10 mg  10 mg Oral DAILY    aspirin delayed-release tablet 81 mg  81 mg Oral DAILY    atorvastatin (LIPITOR) tablet 40 mg  40 mg Oral DAILY    carvedilol (COREG) tablet 3.125 mg  3.125 mg Oral BID WITH MEALS    ferrous sulfate tablet 325 mg  325 mg Oral BID WITH MEALS    hydrALAZINE (APRESOLINE) tablet 100 mg  100 mg Oral TID    insulin lispro (HUMALOG) injection   SubCUTAneous AC&HS    mirtazapine (REMERON) tablet 7.5 mg  7.5 mg Oral QHS    nicotine (NICODERM CQ) 21 mg/24 hr patch 1 Patch  1 Patch TransDERmal Q24H          ASSESSMENT & PLAN     DIAGNOSES REQUIRING ACTIVE TREATMENT AND MONITORING: (reviewed/updated 1/29/2018)  Patient Active Hospital Problem List:   Depression (1/24/2018)    Assessment: sadness, helplessness, hopelessness, in reaction to recent bilateral lower leg amputationa and inability to pat water bill at home.     Plan: consider forced medication/treatment, address social issues, daughter may be obtaining guardianship  1/27/18 need order to treat    1/28/18 continue his medications              In summary, Kev Jenkins, is a 78 y.o.  male who presents with a severe exacerbation of the principal diagnosis of Depression  Patient's condition is worsening/not improving/not stable Patient requires continued inpatient hospitalization for further stabilization, safety monitoring and medication management. I will continue to coordinate the provision of individual, milieu, occupational, group, and substance abuse therapies to address target symptoms/diagnoses as deemed appropriate for the individual patient. A coordinated, multidisplinary treatment team round was conducted with the patient (this team consists of the nurse, psychiatric unit pharmcist,  and writer). Complete current electronic health record for patient has been reviewed today including consultant notes, ancillary staff notes, nurses and psychiatric tech notes. Suicide risk assessment completed and patient deemed to be of low risk for suicide at this time. The following regarding medications was addressed during rounds with patient:   the risks and benefits of the proposed medication. The patient was given the opportunity to ask questions. Informed consent given to the use of the above medications. Will continue to adjust psychiatric and non-psychiatric medications (see above \"medication\" section and orders section for details) as deemed appropriate & based upon diagnoses and response to treatment. I will continue to order blood tests/labs and diagnostic tests as deemed appropriate and review results as they become available (see orders for details and above listed lab/test results). I will order psychiatric records from previous [facility] hospitals to further elucidate the nature of patient's psychopathology and review once available. I will gather additional collateral information from friends, family and o/p treatment team to further elucidate the nature of patient's psychopathology and baselline level of psychiatric functioning.          I certify that this patient's inpatient psychiatric hospital services furnished since the previous certification were, and continue to be, required for treatment that could reasonably be expected to improve the patient's condition, or for diagnostic study, and that the patient continues to need, on a daily basis, active treatment furnished directly by or requiring the supervision of inpatient psychiatric facility personnel. In addition, the hospital records show that services furnished were intensive treatment services, admission or related services, or equivalent services.     EXPECTED DISCHARGE DATE/DAY: TBD     DISPOSITION: Home       Signed By:   Alyssa Wilkes MD  1/29/2018

## 2018-01-29 NOTE — PROGRESS NOTES
Problem: Falls - Risk of  Goal: *Absence of Falls  Document Bello Fall Risk and appropriate interventions in the flowsheet.    Outcome: Progressing Towards Goal  Fall Risk Interventions:  Mobility Interventions: Bed/chair exit alarm, Communicate number of staff needed for ambulation/transfer, Patient to call before getting OOB, Utilize walker, cane, or other assitive device    Mentation Interventions: Adequate sleep, hydration, pain control, Bed/chair exit alarm, Door open when patient unattended, More frequent rounding, Reorient patient, Room close to nurse's station, Toileting rounds    Medication Interventions: Bed/chair exit alarm, Evaluate medications/consider consulting pharmacy, Patient to call before getting OOB    Elimination Interventions: Bed/chair exit alarm, Patient to call for help with toileting needs    History of Falls Interventions: Bed/chair exit alarm, Consult care management for discharge planning, Door open when patient unattended, Evaluate medications/consider consulting pharmacy, Room close to nurse's station

## 2018-01-29 NOTE — BH NOTES
GROUP THERAPY PROGRESS NOTE    Argenis Garcia did not participate in a morning Process Group on the Geriatric Unit with a focus on shifting ones feelings to a positive note and preparing for the day through group singing.

## 2018-01-29 NOTE — PROGRESS NOTES
Mr. Alanna Arce is looking better, taking meds and BP is under control. Continue to encourage fluids. I will sign off now. Please call with any questions.     Marcus Khalil MD

## 2018-01-29 NOTE — PROGRESS NOTES
Problem: Depressed Mood (Adult/Pediatric)  Goal: *STG: Participates in treatment plan  Outcome: Not Progressing Towards Goal  Received this morning resting in bed. Has been alert,but confused to time place and situation. Poor thought process. Is defensive with staff. Irritable during  incontinence care,not taking part in his own care or needs. Can be demanding and refusing to follow directions during am cares. Appeared to become more subdued when left alone. Has declined all blood sugars and medications so far today. Remained sitting up in the side of the bed.

## 2018-01-29 NOTE — WOUND CARE
WOCN Note:     New consult placed by RN for buttocks. Chart shows:  Admitted for depression; history of sleep apnea, PVD, bilateral BKA's, DM, Hepatitis C    Assessment:   Patient is sleepy and does not talk to us very much. RNNoah in room to assist in assessment. He is in bed and already in right side-lying position. Patient wearing briefs for incontinence. Patient reports no pain. Sacral skin intact and without erythema. 1. Left central buttock with minute scratch = 0.1 x 0.3 x 0.1 cm  No exudate. Periwound  intact & without erythema. Aloe [place] in use. Skin Care & Pressure Relief Recommendations:  Manage incontinence / promote continence; Aloe Vesta to buttocks and sacrum daily and as needed with incontinence care    Discussed above plan with Noah ELIZONDO.     Transition of Care: Plan to follow weekly and as needed while admitted to hospital.    MAKI McdonaldN, RN, [facility]  Certified Wound, Ostomy, Continence Nurse  office [phone]  pager [phone] or call  to page

## 2018-01-29 NOTE — BH NOTES
GROUP THERAPY PROGRESS NOTE    Pérezluis fernando Ward did not participate in an Afternoon Activity Group on the Geriatric Unit, with a focus on group singing and mood lifting.

## 2018-01-29 NOTE — DIABETES MGMT
DTC Progress Note    Recommendations/ Comments: Chart reviewed d/t hypoglycemic event. Pt has not required any correctional insulin. No documentation on po intake. Current hospital DM medication: Correctional Lispro-high sensitivity     Chart reviewed on Harjit Weiner. Patient is a 78 y.o. male with known Type 2 Diabetes on diet at home. A1c:   Lab Results   Component Value Date/Time    Hemoglobin A1c 4.5 01/21/2018 02:21 AM    Hemoglobin A1c 4.6 07/14/2017 04:42 AM       Recent Glucose Results:   Lab Results   Component Value Date/Time     (H) 01/28/2018 08:14 PM    GLUCPOC 131 (H) 01/28/2018 08:18 PM    GLUCPOC 106 (H) 01/28/2018 04:19 PM        Lab Results   Component Value Date/Time    Creatinine 2.49 01/28/2018 08:14 PM     Estimated Creatinine Clearance: 27.3 mL/min (based on Cr of 2.49). Active Orders   Diet    DIET DYSPHAGIA MECH ALTERED (NDD2) 2 GM NA (House Low NA)        PO intake: No data found. Will continue to follow as needed. Thank you  Ebony Sanderson RD, CDE

## 2018-01-29 NOTE — PROGRESS NOTES
Problem: Falls - Risk of  Goal: *Absence of Falls  Document Bello Fall Risk and appropriate interventions in the flowsheet. Outcome: Progressing Towards Goal  Fall Risk Interventions:  Mobility Interventions: Bed/chair exit alarm, Communicate number of staff needed for ambulation/transfer, Mechanical lift, PT Consult for mobility concerns    Mentation Interventions: Adequate sleep, hydration, pain control, Bed/chair exit alarm, Door open when patient unattended, Reorient patient, Room close to nurse's station    Medication Interventions: Bed/chair exit alarm, Patient to call before getting OOB    Elimination Interventions: Bed/chair exit alarm, Patient to call for help with toileting needs, Toileting schedule/hourly rounds, Urinal in reach    History of Falls Interventions: Bed/chair exit alarm, Room close to nurse's station        Problem: Depressed Mood (Adult/Pediatric)  Goal: *STG: Participates in treatment plan  Outcome: Progressing Towards Goal  Cooperative, defensive, irritable. Demanding with telephone. Seems disinterested. Pt participates in his care this shift - accepts medications and vitals, turns self and assists staff to toilet him. Pt in recliner this shift and ate 100% of dinner and 2 snacks in the dining room. Staff goal to set limits, recognize accomplishments, and encourage participation. Q 15 min checks. Goal: *STG: Verbalizes anger, guilt, and other feelings in a constructive manor  Outcome: Not Progressing Towards Goal  Pt is demanding and defensive.

## 2018-01-29 NOTE — PROGRESS NOTES
Problem: Diabetes Self-Management  Goal: *Monitoring blood glucose, interpreting and using results  Identify recommended blood glucose targets  and personal targets. Outcome: Not Progressing Towards Goal  Has not been compliant with POC or any medications this morning.

## 2018-01-29 NOTE — BH NOTES
Behavioral Health Interdisciplinary Rounds     Patient Name: Hallie Torres  Age: 78 y.o. Room/Bed:  740/02  Primary Diagnosis: Depression   Admission Status: Involuntary Commitment     Readmission within 30 days: no  Power of  in place: yes--Radha Max (daughter); awaiting paperwork  Patient requires a blocked bed: no          Reason for blocked bed: na    VTE Prophylaxis: Yes--ASA  Flu vaccine given : no --refused  Mobility needs/Fall risk: yes--dc BKA    Nutritional Plan:   Consults: PT,Diabetic Tx team, Nephrology following         Labs/Testing due today?: no    Sleep hours:  6.25+      Participation in Care/Groups:  no  Medication Compliant?: Selective  PRNS (last 24 hours): None    Restraints (last 24 hours):  no  Substance Abuse:  no  CIWA (range last 24 hours): na COWS (range last 24 hours): na  Alcohol screening (AUDIT) completed -  AUDIT Score: 0  If applicable, date SBIRT discussed in treatment team AND documented: na  Tobacco - patient is a smoker: yes   Date tobacco education completed by RN: 1/24/18  24 hour chart check complete: yes     Patient goal(s) for today:   Treatment team focus/goals: Family meeting Tuesday   Progress note     LOS:  5  Expected LOS: TBD    Financial concerns/prescription coverage:  VA Medicare; Medicaid  Date of last family contact:       Family requesting physician contact today:    Discharge plan: probably SNF placement  Guns in the home: no        Outpatient provider(s): To be linked    Participating treatment team members:  DARRICK Matute; Dr. Jorge King MD; Monica Verdin RN; Pam Fenton, DongD

## 2018-01-30 LAB
GLUCOSE BLD STRIP.AUTO-MCNC: 103 MG/DL (ref 65–100)
GLUCOSE BLD STRIP.AUTO-MCNC: 158 MG/DL (ref 65–100)
GLUCOSE BLD STRIP.AUTO-MCNC: 193 MG/DL (ref 65–100)
SERVICE CMNT-IMP: ABNORMAL

## 2018-01-30 PROCEDURE — 74011250637 HC RX REV CODE- 250/637: Performed by: FAMILY MEDICINE

## 2018-01-30 PROCEDURE — 82962 GLUCOSE BLOOD TEST: CPT

## 2018-01-30 PROCEDURE — 74011250637 HC RX REV CODE- 250/637: Performed by: HOSPITALIST

## 2018-01-30 PROCEDURE — 65220000003 HC RM SEMIPRIVATE PSYCH

## 2018-01-30 PROCEDURE — 74011250637 HC RX REV CODE- 250/637: Performed by: PSYCHIATRY & NEUROLOGY

## 2018-01-30 RX ORDER — ONDANSETRON 4 MG/1
8 TABLET, ORALLY DISINTEGRATING ORAL ONCE
Status: COMPLETED | OUTPATIENT
Start: 2018-01-30 | End: 2018-01-30

## 2018-01-30 RX ADMIN — MIRTAZAPINE 7.5 MG: 15 TABLET, FILM COATED ORAL at 21:04

## 2018-01-30 RX ADMIN — FERROUS SULFATE TAB 325 MG (65 MG ELEMENTAL FE) 325 MG: 325 (65 FE) TAB at 17:06

## 2018-01-30 RX ADMIN — ATORVASTATIN CALCIUM 40 MG: 40 TABLET, FILM COATED ORAL at 10:08

## 2018-01-30 RX ADMIN — FERROUS SULFATE TAB 325 MG (65 MG ELEMENTAL FE) 325 MG: 325 (65 FE) TAB at 10:08

## 2018-01-30 RX ADMIN — CARVEDILOL 3.12 MG: 3.12 TABLET, FILM COATED ORAL at 17:06

## 2018-01-30 RX ADMIN — AMLODIPINE BESYLATE 10 MG: 5 TABLET ORAL at 10:08

## 2018-01-30 RX ADMIN — HYDRALAZINE HYDROCHLORIDE 100 MG: 50 TABLET, FILM COATED ORAL at 22:03

## 2018-01-30 RX ADMIN — ASPIRIN 81 MG: 81 TABLET, COATED ORAL at 10:08

## 2018-01-30 RX ADMIN — HYDRALAZINE HYDROCHLORIDE 100 MG: 50 TABLET, FILM COATED ORAL at 10:08

## 2018-01-30 RX ADMIN — DOXAZOSIN 4 MG: 2 TABLET ORAL at 21:03

## 2018-01-30 RX ADMIN — ONDANSETRON 8 MG: 4 TABLET, ORALLY DISINTEGRATING ORAL at 14:15

## 2018-01-30 RX ADMIN — HYDRALAZINE HYDROCHLORIDE 100 MG: 50 TABLET, FILM COATED ORAL at 18:06

## 2018-01-30 RX ADMIN — CARVEDILOL 3.12 MG: 3.12 TABLET, FILM COATED ORAL at 10:08

## 2018-01-30 NOTE — PROGRESS NOTES
Problem: Falls - Risk of  Goal: *Absence of Falls  Document Bello Fall Risk and appropriate interventions in the flowsheet. Outcome: Progressing Towards Goal  Fall Risk Interventions:  Mobility Interventions: Bed/chair exit alarm, Communicate number of staff needed for ambulation/transfer, Mechanical lift, Patient to call before getting OOB, PT Consult for mobility concerns, PT Consult for assist device competence    Mentation Interventions: Adequate sleep, hydration, pain control, Bed/chair exit alarm, Door open when patient unattended, Evaluate medications/consider consulting pharmacy, Room close to nurse's station    Medication Interventions: Bed/chair exit alarm, Evaluate medications/consider consulting pharmacy, Patient to call before getting OOB    Elimination Interventions: Bed/chair exit alarm, Call light in reach, Urinal in reach, Patient to call for help with toileting needs    History of Falls Interventions: Bed/chair exit alarm, Door open when patient unattended, Evaluate medications/consider consulting pharmacy, Room close to nurse's station        Problem: Depressed Mood (Adult/Pediatric)  Goal: *STG: Participates in treatment plan  Outcome: Progressing Towards Goal  Patient received awake in bed. Alert and appeared agitated with staff. Refused morning VS, BS, morning care and medications. Flat affect. Readdress patient with morning care, remained guarded, assisted with morning care and agreed to come out to the dining area. Attended music group. 1030 Patient is still guarded. flat affect. Is compliant to VS and medication. Will allow BS prior to lunch. In dining area with staff and other patients. Goal: *STG: Complies with medication therapy  Outcome: Not Progressing Towards Goal  Patient refused morning VS, BS and medications. Patient ate breakfast.    1030 Patient compliant with medications.

## 2018-01-30 NOTE — BH NOTES
PSYCHIATRIC PROGRESS NOTE         Patient Name  Kev Jenkins   Date of Birth 1940   [facility] [id number]   Medical Record Number  [id number]      Age  78 y.o. PCP Johny Warner, MD   Admit date:  1/24/2018    Room Number  [phone] [id number]  @ [facility]   Date of Service  1/30/2018          PSYCHOTHERAPY SESSION NOTE:  Length of psychotherapy session: 15 minutes    Main condition/diagnosis/issues treated during session today, 1/30/2018 : med , meal and group non compliance    I employed Cognitive Behavioral therapy techniques, Reality-Oriented psychotherapy, as well as supportive psychotherapy in regards to various ongoing psychosocial stressors, including the following: pre-admission and current problems; housing issues; occupational issues; medical issues; and stress of hospitalization. Interpersonal relationship issues and psychodynamic conflicts explored. Attempts made to alleviate maladaptive patterns. We, also, worked on issues of denial & effects of substance dependency/use     Overall, patient is not progressing    Treatment Plan Update (reviewed an updated 1/30/2018) : I will modify psychotherapy tx plan by implementing more stress management strategies, building upon cognitive behavioral techniques, increasing coping skills, as well as shoring up psychological defenses). An extended energy and skill set was needed to engage pt in psychotherapy due to some of the following: resistiveness, complexity, negativity, confrontational nature, hostile behaviors, and/or severe abnormalities in thought processes/psychosis resulting in the loss of expressive/receptive language communication skills. E & M PROGRESS NOTE:         HISTORY       CC:  \"depression and non compliance with treatment \"  HISTORY OF PRESENT ILLNESS/INTERVAL HISTORY:  (reviewed/updated 1/30/2018).   per initial evaluation:     Kev Jenkins presents/reports/evidences the following emotional symptoms today, 1/30/2018:depression. The above symptoms have been present for 1 years. These symptoms are of severe severity. The symptoms are constant  in nature. Additional symptomatology and features include anxiety. 1/27/18 he is not responding to questions and not engaging and not showing engagement and refuse medications and treatment   1/28/18 he is doing better and he engaged and not feeling sad or depressed and he eats better    1/29/18= Patient is deliberately refusing vital signs, labs and medications. He likes finger foods and eats those. Will meet with daughter on Tuesday to discuss possible guardianship. Will seek forced meds. 1/30/18- Pt. Agreed to comply with meds after extensive family meeting. Planning for SNF admission and possible transfer to home afterwards [address]. SIDE EFFECTS: (reviewed/updated 1/30/2018)  None reported or admitted to. No noted toxicity with use of Depakote/Tegretol/lithium/Clozaril/TCAs   ALLERGIES:(reviewed/updated 1/30/2018)  Allergies   Allergen Reactions    Tetracycline Hives      MEDICATIONS PRIOR TO ADMISSION:(reviewed/updated 1/30/2018)  Prescriptions Prior to Admission   Medication Sig    hydrALAZINE (APRESOLINE) 100 mg tablet Take 1 Tab by mouth three (3) times daily.  amLODIPine (NORVASC) 10 mg tablet Take 1 Tab by mouth daily.  aspirin delayed-release 81 mg tablet Take 1 Tab by mouth daily.  atorvastatin (LIPITOR) 40 mg tablet Take 1 Tab by mouth daily.  carvedilol (COREG) 3.125 mg tablet Take 1 Tab by mouth two (2) times daily (with meals).  ferrous sulfate 325 mg (65 mg iron) tablet Take 1 Tab by mouth two (2) times daily (with meals).  mirtazapine (REMERON) 7.5 mg tablet Take 1 Tab by mouth nightly.  nicotine (NICODERM CQ) 21 mg/24 hr 1 Patch by TransDERmal route every twenty-four (24) hours for 30 days.       PAST MEDICAL HISTORY: Past medical history from the initial psychiatric evaluation has been reviewed (reviewed/updated 1/30/2018) with no additional updates (I asked patient and no additional past medical history provided). Past Medical History:   Diagnosis Date    Arthritis     CAD (coronary artery disease) 2007    CKD (chronic kidney disease), stage III     DM type 2 causing renal disease (HCC)     DVT (deep venous thrombosis) (HCC)     Hx of DVT:  Xarelto stopped due to GI bleed. S/P IVC filter.  Gait abnormality     GI bleed     Heart murmur     Hepatitis C     History of blood transfusion     Hypercholesterolemia     Hypertension 11/7/2014    Neuropathy     Non compliance w medication regimen     Peripheral vascular disease ([facility] [address] 11/7/2014    A. S/P Right SFA POBA and tibial atherectomy (2/4/13).  PUD (peptic ulcer disease) 2007    Unspecified sleep apnea     never used cpap     Past Surgical History:   Procedure Laterality Date    ABDOMEN SURGERY PROC UNLISTED  2007    has approx 7-8\" midline incision on abdomen--\"had to open him up and clean him out after feeding tube clogged\"    HX GI  2007    feeding tube for approx 2 mo    VASCULAR SURGERY PROCEDURE UNLIST Right 1/22/2015    popliteal-tibial bypass      SOCIAL HISTORY: Social history from the initial psychiatric evaluation has been reviewed (reviewed/updated 1/30/2018) with no additional updates (I asked patient and no additional social history provided). Social History     Social History    Marital status:      Spouse name: N/A    Number of children: N/A    Years of education: N/A     Occupational History    Not on file. Social History Main Topics    Smoking status: Current Every Day Smoker     Packs/day: 0.50    Smokeless tobacco: Not on file    Alcohol use No    Drug use: No      Comment: >20 years ago    Sexual activity: Not on file     Other Topics Concern    Not on file     Social History Narrative    78 year ols male admitted for depression and homelessness.  Pt will be evicated from apartment due to non payment of bills. [facility] of 30 [facility] [place] [facility]. \" Pt is a brittle diabetic, with treatment non compliance. He has bilarela lower extremity amputations. Patient has a long hx of substance abuse. FAMILY HISTORY: Family history from the initial psychiatric evaluation has been reviewed (reviewed/updated 1/30/2018) with no additional updates (I asked patient and no additional family history provided). Family History   Problem Relation Age of Onset    Hypertension Father        REVIEW OF SYSTEMS: (reviewed/updated 1/30/2018)  Appetite:decreased   Sleep: fitful   All other Review of Systems: Psychological ROS: positive for - behavioral disorder  Respiratory ROS: no cough, shortness of breath, or wheezing  Cardiovascular ROS: no chest pain or dyspnea on exertion         [facility] ([facility]    [facility] FINDINGS ARE WITHIN NORMAL LIMITS (WNL) UNLESS OTHERWISE STATED BELOW. ( ALL OF THE BELOW CATEGORIES OF THE [facility] HAVE BEEN REVIEWED (reviewed 1/30/2018) AND UPDATED AS DEEMED APPROPRIATE )  General Presentation age appropriate, evasive and guarded   Orientation oriented to time, place and person   Vital Signs  See below (reviewed 1/30/2018); Vital Signs (BP, Pulse, & Temp) are within normal limits if not listed below.    Gait and Station Stable/steady, no ataxia   Musculoskeletal System No extrapyramidal symptoms (EPS); no abnormal muscular movements or Tardive Dyskinesia (TD); muscle strength and tone are within normal limits   Language No aphasia or dysarthria   Speech:  hypoverbal   Thought Processes concrete; normal rate of thoughts; poor abstract reasoning/computation   Thought Associations goal directed   Thought Content free of delusions and free of hallucinations   Suicidal Ideations none   Homicidal Ideations none   Mood:  irritable   Affect:  mood-congruent   Memory recent  fair   Memory remote:  fair Concentration/Attention:  distractable   Fund of Knowledge significantly below average   Insight:  poor   Reliability poor   Judgment:  poor          VITALS:     Patient Vitals for the past 24 hrs:   Temp Pulse Resp BP SpO2   01/30/18 1006 98 °F (36.7 °C) 67 18 170/85 100 %     Wt Readings from Last 3 Encounters:   01/24/18 82.7 kg (182 lb 5.1 oz)   01/23/18 83.6 kg (184 lb 4.9 oz)   07/13/17 88.9 kg (196 lb)     Temp Readings from Last 3 Encounters:   01/30/18 98 °F (36.7 °C)   01/24/18 97.2 °F (36.2 °C)   07/20/17 97.9 °F (36.6 °C)     BP Readings from Last 3 Encounters:   01/30/18 170/85   01/24/18 172/68   07/20/17 175/76     Pulse Readings from Last 3 Encounters:   01/30/18 67   01/24/18 72   07/20/17 (!) 52            DATA     LABORATORY DATA:(reviewed/updated 1/30/2018)  Recent Results (from the past 24 hour(s))   GLUCOSE, POC    Collection Time: 01/30/18 11:47 AM   Result Value Ref Range    Glucose (POC) 193 (H) 65 - 100 mg/dL    Performed by Carmen Mcneil      No results found for: VALF2, VALAC, VALP, VALPR, DS6, CRBAM, CRBAMP, CARB2, XCRBAM  No results found for: LITHM   RADIOLOGY REPORTS:(reviewed/updated 1/30/2018)  Xr Chest Port    Result Date: 1/20/2018  EXAM: Portable CXR.  1800 hours. COMPARISON: 1/19/2015. INDICATION: cp, fatigue There is new interstitial pulmonary edema, cardiomegaly and small left pleural effusion. There is no focal infiltrate, pneumothorax or midline shift. IMPRESSION: CHF pattern.          MEDICATIONS     ALL MEDICATIONS:   Current Facility-Administered Medications   Medication Dose Route Frequency    alum-mag hydroxide-simeth (MYLANTA) oral suspension 30 mL  30 mL Oral Q4H PRN    doxazosin (CARDURA) tablet 4 mg  4 mg Oral QHS    polyethylene glycol (MIRALAX) packet 17 g  17 g Oral DAILY    OLANZapine (ZyPREXA) tablet 2.5 mg  2.5 mg Oral Q6H PRN    ziprasidone (GEODON) 10 mg in sterile water (preservative free) 0.5 mL injection  10 mg IntraMUSCular BID PRN    benztropine (COGENTIN) tablet 1 mg  1 mg Oral BID PRN    benztropine (COGENTIN) injection 1 mg  1 mg IntraMUSCular BID PRN    zolpidem (AMBIEN) tablet 5 mg  5 mg Oral QHS PRN    acetaminophen (TYLENOL) tablet 650 mg  650 mg Oral Q4H PRN    magnesium hydroxide (MILK OF MAGNESIA) 400 mg/5 mL oral suspension 30 mL  30 mL Oral DAILY PRN    nicotine (NICODERM CQ) 21 mg/24 hr patch 1 Patch  1 Patch TransDERmal DAILY PRN    influenza vaccine 2017-18 (3 yrs+)(PF) (FLUZONE QUAD/FLUARIX QUAD) injection 0.5 mL  0.5 mL IntraMUSCular PRIOR TO DISCHARGE    amLODIPine (NORVASC) tablet 10 mg  10 mg Oral DAILY    aspirin delayed-release tablet 81 mg  81 mg Oral DAILY    atorvastatin (LIPITOR) tablet 40 mg  40 mg Oral DAILY    carvedilol (COREG) tablet 3.125 mg  3.125 mg Oral BID WITH MEALS    ferrous sulfate tablet 325 mg  325 mg Oral BID WITH MEALS    hydrALAZINE (APRESOLINE) tablet 100 mg  100 mg Oral TID    glucose chewable tablet 16 g  4 Tab Oral PRN    dextrose (D50W) injection syrg 12.5-25 g  12.5-25 g IntraVENous PRN    glucagon (GLUCAGEN) injection 1 mg  1 mg IntraMUSCular PRN    insulin lispro (HUMALOG) injection   SubCUTAneous AC&HS    mirtazapine (REMERON) tablet 7.5 mg  7.5 mg Oral QHS    nicotine (NICODERM CQ) 21 mg/24 hr patch 1 Patch  1 Patch TransDERmal Q24H      SCHEDULED MEDICATIONS:   Current Facility-Administered Medications   Medication Dose Route Frequency    doxazosin (CARDURA) tablet 4 mg  4 mg Oral QHS    polyethylene glycol (MIRALAX) packet 17 g  17 g Oral DAILY    influenza vaccine 2017-18 (3 yrs+)(PF) (FLUZONE QUAD/FLUARIX QUAD) injection 0.5 mL  0.5 mL IntraMUSCular PRIOR TO DISCHARGE    amLODIPine (NORVASC) tablet 10 mg  10 mg Oral DAILY    aspirin delayed-release tablet 81 mg  81 mg Oral DAILY    atorvastatin (LIPITOR) tablet 40 mg  40 mg Oral DAILY    carvedilol (COREG) tablet 3.125 mg  3.125 mg Oral BID WITH MEALS    ferrous sulfate tablet 325 mg  325 mg Oral BID WITH MEALS  hydrALAZINE (APRESOLINE) tablet 100 mg  100 mg Oral TID    insulin lispro (HUMALOG) injection   SubCUTAneous AC&HS    mirtazapine (REMERON) tablet 7.5 mg  7.5 mg Oral QHS    nicotine (NICODERM CQ) 21 mg/24 hr patch 1 Patch  1 Patch TransDERmal Q24H          ASSESSMENT & PLAN     DIAGNOSES REQUIRING ACTIVE TREATMENT AND MONITORING: (reviewed/updated 1/30/2018)  Patient Active Hospital Problem List:   Depression (1/24/2018)    Assessment: sadness, helplessness, hopelessness, in reaction to recent bilateral lower leg amputationa and inability to pat water bill at home. Plan: consider forced medication/treatment, address social issues, daughter may be obtaining guardianship  1/27/18 need order to treat    1/28/18 continue his medications              In summary, Jorge Pastor, is a 78 y.o.  male who presents with a severe exacerbation of the principal diagnosis of Depression  Patient's condition is worsening/not improving/not stable Patient requires continued inpatient hospitalization for further stabilization, safety monitoring and medication management. I will continue to coordinate the provision of individual, milieu, occupational, group, and substance abuse therapies to address target symptoms/diagnoses as deemed appropriate for the individual patient. A coordinated, multidisplinary treatment team round was conducted with the patient (this team consists of the nurse, psychiatric unit pharmcist,  and writer). Complete current electronic health record for patient has been reviewed today including consultant notes, ancillary staff notes, nurses and psychiatric tech notes. Suicide risk assessment completed and patient deemed to be of low risk for suicide at this time. The following regarding medications was addressed during rounds with patient:   the risks and benefits of the proposed medication. The patient was given the opportunity to ask questions.  Informed consent given to the use of the above medications. Will continue to adjust psychiatric and non-psychiatric medications (see above \"medication\" section and orders section for details) as deemed appropriate & based upon diagnoses and response to treatment. I will continue to order blood tests/labs and diagnostic tests as deemed appropriate and review results as they become available (see orders for details and above listed lab/test results). I will order psychiatric records from previous [facility] hospitals to further elucidate the nature of patient's psychopathology and review once available. I will gather additional collateral information from friends, family and o/p treatment team to further elucidate the nature of patient's psychopathology and baselline level of psychiatric functioning. I certify that this patient's inpatient psychiatric hospital services furnished since the previous certification were, and continue to be, required for treatment that could reasonably be expected to improve the patient's condition, or for diagnostic study, and that the patient continues to need, on a daily basis, active treatment furnished directly by or requiring the supervision of inpatient psychiatric facility personnel. In addition, the hospital records show that services furnished were intensive treatment services, admission or related services, or equivalent services.     EXPECTED DISCHARGE DATE/DAY: TBD     DISPOSITION: Home       Signed By:   Domo Haines MD  1/30/2018

## 2018-01-30 NOTE — BH NOTES
GROUP THERAPY PROGRESS NOTE    Pérez Ward participated in a morning Process Group on the Geriatric Unit, with a focus identifying feelings, planning for the day, and singing. Group time: 50 minutes. Personal goal for participation: To increase the capacity to shift ones mood, prepare for the day, and share in group singing. Goal orientation: The patient will be able to prepare for the day through group singing. Group therapy participation: When prompted, this patient participated in the group. Therapeutic interventions reviewed and discussed: The group members were introduce themselves by first names and participate in group singing as a way to increase their oxygen and blood flow and begin their day on a positive note. They were also asked to join in singing several songs. Impression of participation: The patient introduced himself and said \"I'm hanging in there. \" He passively listened to the music and did not join in. He shared with nursing that he was [place]. \" The patient expressed no current SI/HI and displayed no overt psychosis. His affect was depressed and his mood reflected his affect.

## 2018-01-30 NOTE — BH NOTES
Behavioral Health Interdisciplinary Rounds     Patient Name: Wendy Aponte  Age: 78 y.o. Room/Bed:  740/02  Primary Diagnosis: Depression   Admission Status: Involuntary Commitment     Readmission within 30 days: no  Power of  in place: yes--Radha Martinez (daughter); awaiting paperwork  Patient requires a blocked bed: no          Reason for blocked bed: na    VTE Prophylaxis: Yes--ASA  Flu vaccine given : no--refused   Mobility needs/Fall risk: yes--bilateral BKA    Nutritional Plan:   Consults: PT/OT, Diabetic Tx team, Hospitalist, & Wound Care following. Nephrology & Palliative Care doctor saw (will return if needed) (pt wants to remain a FULL Code)        Labs/Testing due today?: no    Sleep hours:  5.5+       Participation in Care/Groups:  no  Medication Compliant?: Refusing Psychiatric Medications and Refusing Medical Medications  PRNS (last 24 hours): None    Restraints (last 24 hours):  no  Substance Abuse:  no  CIWA (range last 24 hours): na COWS (range last 24 hours): na  Alcohol screening (AUDIT) completed -  AUDIT Score: 0  If applicable, date SBIRT discussed in treatment team AND documented: na  Tobacco - patient is a smoker: yes   Date tobacco education completed by RN: 1/24/18  24 hour chart check complete: yes     Patient goal(s) for today: Comply with treatment goals  Treatment team focus/goals: Family meeting  Progress note: Participated in family meeting with daughter/POA, girlfriend, and doctor; Pt agrees to comply with treatment and d/c to SNF     LOS:  6  Expected LOS: TBD    Financial concerns/prescription coverage:  VA Medicare; Medicaid  Date of last family contact: 1/30 Laura met with family      Family requesting physician contact today:    Discharge plan: probably SNF placement  Guns in the home: no        Outpatient provider(s): To be linked    Participating treatment team members:  DARRICK Cheatham; Dr. Lacy Jones MD; [facility] Melodie Devi; Bryce James, PharmD

## 2018-01-30 NOTE — BH NOTES
1520:  Patient received as he appears to be dozing comfortably in bed. He does not respond to greetings from oncoming staff and appears to be resting comfortably at this time. There are no signs/symptoms of pain or distress. Will maintain q 15 minute safety checks. 1630:  Patient refuses to have his vitals taken and refuses his scheduled medication at this time. When attempts made to check patient's blood sugar, patient ignored staff - refusing to comply or to respond verbally. 1700:  Patient allowed staff to get his vital signs at this time. Patient allowed Accucheck at this time, and patient did take scheduled medications after much encouragement. Will continue to monitor. 2030:  Patient sitting up on the side of the bed states that he wants to get up. Lift team paged for the [employer] for the transfer. Patient now calls out to staff stating that he wants to get in bed - he is sitting on the side of the bed. Upon further clarification about getting up, patient states \"Why would I want to get up at this time of the night\". Nadia Lift cancelled. 2115; With some encouragement, patient allowed staff to get bedtime vital signs and Accucheck and to clean him up. Patient incontinent with large bowel movement requiring linen change. Patient minimally cooperative, but he did take his scheduled bedtime medications. Will continue to monitor.

## 2018-01-30 NOTE — BH NOTES
Patient refused all of scheduled medication. Patient refused all blood sugar checks and vital signs. Will monitor.

## 2018-01-30 NOTE — PROGRESS NOTES
Physical Therapy Screening:  Services are not indicated at this time. [facility] based on results obtained during the nursing admission assessment. The patients chart was reviewed and the patient is not appropriate for a skilled therapy evaluation at this time. Please consult physical therapy if any therapy needs arise. Thank you.     Gricel Sanchez, PT

## 2018-01-30 NOTE — PROGRESS NOTES
Problem: Depressed Mood (Adult/Pediatric)  Goal: *STG: Participates in treatment plan  Outcome: Progressing Towards Goal  Patient did allow staff to take his vital signs and get his blood sugar. He was also compliant with his scheduled medications at dinner. Goal: *STG: Attends activities and groups  Outcome: Not Progressing Towards Goal  Patient has remained isolative to his room throughout this shift. Goal: *STG: Complies with medication therapy  Outcome: Progressing Towards Goal  Patient did take his scheduled medications at dinnertime this evening.

## 2018-01-30 NOTE — PROGRESS NOTES
Problem: Depressed Mood (Adult/Pediatric)  Goal: *STG: Participates in treatment plan  Outcome: Not Progressing Towards Goal  Patient has refused his vital signs and to have his blood sugar checked. Goal: *STG: Attends activities and groups  Outcome:  Not Progressing Towards Goal  Patient has remained isolative to his room throughout this shift. Goal: *STG: Complies with medication therapy  Outcome: Not Progressing Towards Goal  Patient has refused all of his scheduled medications.

## 2018-01-31 LAB
GLUCOSE BLD STRIP.AUTO-MCNC: 182 MG/DL (ref 65–100)
GLUCOSE BLD STRIP.AUTO-MCNC: 90 MG/DL (ref 65–100)
SERVICE CMNT-IMP: ABNORMAL
SERVICE CMNT-IMP: NORMAL

## 2018-01-31 PROCEDURE — 65220000003 HC RM SEMIPRIVATE PSYCH

## 2018-01-31 PROCEDURE — 82962 GLUCOSE BLOOD TEST: CPT

## 2018-01-31 PROCEDURE — 74011250637 HC RX REV CODE- 250/637

## 2018-01-31 PROCEDURE — 74011250637 HC RX REV CODE- 250/637: Performed by: HOSPITALIST

## 2018-01-31 RX ORDER — BUPROPION HYDROCHLORIDE 100 MG/1
100 TABLET, EXTENDED RELEASE ORAL DAILY
Status: DISCONTINUED | OUTPATIENT
Start: 2018-02-01 | End: 2018-02-01

## 2018-01-31 RX ADMIN — ZOLPIDEM TARTRATE 5 MG: 5 TABLET ORAL at 00:38

## 2018-01-31 RX ADMIN — POLYETHYLENE GLYCOL (3350) 17 G: 17 POWDER, FOR SOLUTION ORAL at 09:53

## 2018-01-31 NOTE — BH NOTES
PSYCHIATRIC PROGRESS NOTE         Patient Name  Harjit Weiner   Date of Birth 1940   [facility] [id number]   Medical Record Number  [id number]      Age  78 y.o. PCP Johny Warner, MD   Admit date:  1/24/2018    Room Number  ([phone] [id number]  @ [facility]   Date of Service  1/31/2018          PSYCHOTHERAPY SESSION NOTE:  Length of psychotherapy session: 15 minutes    Main condition/diagnosis/issues treated during session today, 1/31/2018 : med , meal and group non compliance    I employed Cognitive Behavioral therapy techniques, Reality-Oriented psychotherapy, as well as supportive psychotherapy in regards to various ongoing psychosocial stressors, including the following: pre-admission and current problems; housing issues; occupational issues; medical issues; and stress of hospitalization. Interpersonal relationship issues and psychodynamic conflicts explored. Attempts made to alleviate maladaptive patterns. We, also, worked on issues of denial & effects of substance dependency/use     Overall, patient is not progressing    Treatment Plan Update (reviewed an updated 1/31/2018) : I will modify psychotherapy tx plan by implementing more stress management strategies, building upon cognitive behavioral techniques, increasing coping skills, as well as shoring up psychological defenses). An extended energy and skill set was needed to engage pt in psychotherapy due to some of the following: resistiveness, complexity, negativity, confrontational nature, hostile behaviors, and/or severe abnormalities in thought processes/psychosis resulting in the loss of expressive/receptive language communication skills. E & M PROGRESS NOTE:         HISTORY       CC:  \"depression and non compliance with treatment \"  HISTORY OF PRESENT ILLNESS/INTERVAL HISTORY:  (reviewed/updated 1/31/2018).   per initial evaluation:     Harjit Weiner presents/reports/evidences the following emotional symptoms today, 1/31/2018:depression. The above symptoms have been present for 1 years. These symptoms are of severe severity. The symptoms are constant  in nature. Additional symptomatology and features include anxiety. 1/27/18 he is not responding to questions and not engaging and not showing engagement and refuse medications and treatment   1/28/18 he is doing better and he engaged and not feeling sad or depressed and he eats better    1/29/18= Patient is deliberately refusing vital signs, labs and medications. He likes finger foods and eats those. Will meet with daughter on Tuesday to discuss possible guardianship. Will seek forced meds. 1/30/18- Pt. Agreed to comply with meds after extensive family meeting. Planning for SNF admission and possible transfer to home afterwards [address].  1/31/18- Patient is intermittently cooperative with somatic meds. We discussed the risks to his health this can cause. I advised Wellbutrin for depression. This may help motivate treatment compliance. SIDE EFFECTS: (reviewed/updated 1/31/2018)  None reported or admitted to. No noted toxicity with use of Depakote/Tegretol/lithium/Clozaril/TCAs   ALLERGIES:(reviewed/updated 1/31/2018)  Allergies   Allergen Reactions    Tetracycline Hives      MEDICATIONS PRIOR TO ADMISSION:(reviewed/updated 1/31/2018)  Prescriptions Prior to Admission   Medication Sig    hydrALAZINE (APRESOLINE) 100 mg tablet Take 1 Tab by mouth three (3) times daily.  amLODIPine (NORVASC) 10 mg tablet Take 1 Tab by mouth daily.  aspirin delayed-release 81 mg tablet Take 1 Tab by mouth daily.  atorvastatin (LIPITOR) 40 mg tablet Take 1 Tab by mouth daily.  carvedilol (COREG) 3.125 mg tablet Take 1 Tab by mouth two (2) times daily (with meals).  ferrous sulfate 325 mg (65 mg iron) tablet Take 1 Tab by mouth two (2) times daily (with meals).  mirtazapine (REMERON) 7.5 mg tablet Take 1 Tab by mouth nightly.     nicotine (NICODERM CQ) 21 mg/24 hr 1 Patch by TransDERmal route every twenty-four (24) hours for 30 days. PAST MEDICAL HISTORY: Past medical history from the initial psychiatric evaluation has been reviewed (reviewed/updated 1/31/2018) with no additional updates (I asked patient and no additional past medical history provided). Past Medical History:   Diagnosis Date    Arthritis     CAD (coronary artery disease) 2007    CKD (chronic kidney disease), stage III     DM type 2 causing renal disease (HCC)     DVT (deep venous thrombosis) (HCC)     Hx of DVT:  Xarelto stopped due to GI bleed. S/P IVC filter.  Gait abnormality     GI bleed     Heart murmur     Hepatitis C     History of blood transfusion     Hypercholesterolemia     Hypertension 11/7/2014    Neuropathy     Non compliance w medication regimen     Peripheral vascular disease ([facility] [address] 11/7/2014    A. S/P Right SFA POBA and tibial atherectomy (2/4/13).  PUD (peptic ulcer disease) 2007    Unspecified sleep apnea     never used cpap     Past Surgical History:   Procedure Laterality Date    ABDOMEN SURGERY PROC UNLISTED  2007    has approx 7-8\" midline incision on abdomen--\"had to open him up and clean him out after feeding tube clogged\"    HX GI  2007    feeding tube for approx 2 mo    VASCULAR SURGERY PROCEDURE UNLIST Right 1/22/2015    popliteal-tibial bypass      SOCIAL HISTORY: Social history from the initial psychiatric evaluation has been reviewed (reviewed/updated 1/31/2018) with no additional updates (I asked patient and no additional social history provided). Social History     Social History    Marital status:      Spouse name: N/A    Number of children: N/A    Years of education: N/A     Occupational History    Not on file.      Social History Main Topics    Smoking status: Current Every Day Smoker     Packs/day: 0.50    Smokeless tobacco: Not on file    Alcohol use No    Drug use: No      Comment: >20 years ago    Sexual activity: Not on file     Other Topics Concern    Not on file     Social History Narrative    78 year ols male admitted for depression and homelessness. Pt will be evicated from apartment due to non payment of bills. Girlfriend of 30 years left him [place]. \" Pt is a brittle diabetic, with treatment non compliance. He has bilarela lower extremity amputations. Patient has a long hx of substance abuse. FAMILY HISTORY: Family history from the initial psychiatric evaluation has been reviewed (reviewed/updated 1/31/2018) with no additional updates (I asked patient and no additional family history provided). Family History   Problem Relation Age of Onset    Hypertension Father        REVIEW OF SYSTEMS: (reviewed/updated 1/31/2018)  Appetite:decreased   Sleep: fitful   All other Review of Systems: Psychological ROS: positive for - behavioral disorder  Respiratory ROS: no cough, shortness of breath, or wheezing  Cardiovascular ROS: no chest pain or dyspnea on exertion         [facility] (MSE):    MSE FINDINGS ARE WITHIN NORMAL LIMITS (WNL) UNLESS OTHERWISE STATED BELOW. ( ALL OF THE BELOW CATEGORIES OF THE MSE HAVE BEEN REVIEWED (reviewed 1/31/2018) AND UPDATED AS DEEMED APPROPRIATE )  General Presentation age appropriate, evasive and guarded   Orientation oriented to time, place and person   Vital Signs  See below (reviewed 1/31/2018); Vital Signs (BP, Pulse, & Temp) are within normal limits if not listed below.    Gait and Station Stable/steady, no ataxia   Musculoskeletal System No extrapyramidal symptoms (EPS); no abnormal muscular movements or Tardive Dyskinesia (TD); muscle strength and tone are within normal limits   Language No aphasia or dysarthria   Speech:  hypoverbal   Thought Processes concrete; normal rate of thoughts; poor abstract reasoning/computation   Thought Associations goal directed   Thought Content free of delusions and free of hallucinations   Suicidal Ideations none   Homicidal Ideations none   Mood:  irritable   Affect:  mood-congruent   Memory recent  fair   Memory remote:  fair   Concentration/Attention:  distractable   Fund of Knowledge significantly below average   Insight:  poor   Reliability poor   Judgment:  poor          VITALS:     Patient Vitals for the past 24 hrs:   Temp Pulse Resp BP SpO2   01/31/18 0805 97.8 °F (36.6 °C) 71 16 154/71 100 %   01/30/18 2102 98.8 °F (37.1 °C) 64 20 149/76 100 %   01/30/18 1700 98.5 °F (36.9 °C) 80 16 148/67 98 %     Wt Readings from Last 3 Encounters:   01/24/18 82.7 kg (182 lb 5.1 oz)   01/23/18 83.6 kg (184 lb 4.9 oz)   07/13/17 88.9 kg (196 lb)     Temp Readings from Last 3 Encounters:   01/31/18 97.8 °F (36.6 °C)   01/24/18 97.2 °F (36.2 °C)   07/20/17 97.9 °F (36.6 °C)     BP Readings from Last 3 Encounters:   01/31/18 154/71   01/24/18 172/68   07/20/17 175/76     Pulse Readings from Last 3 Encounters:   01/31/18 71   01/24/18 72   07/20/17 (!) 52            DATA     LABORATORY DATA:(reviewed/updated 1/31/2018)  Recent Results (from the past 24 hour(s))   GLUCOSE, POC    Collection Time: 01/30/18  5:04 PM   Result Value Ref Range    Glucose (POC) 158 (H) 65 - 100 mg/dL    Performed by [facility]ERN[facility] [address] [place] [address], POC    Collection Time: 01/30/18  8:58 PM   Result Value Ref Range    Glucose (POC) 103 (H) 65 - 100 mg/dL    Performed by Dinora Waggoner    GLUCOSE, POC    Collection Time: 01/31/18  8:02 AM   Result Value Ref Range    Glucose (POC) 90 65 - 100 mg/dL    Performed by [address], POC    Collection Time: 01/31/18 11:36 AM   Result Value Ref Range    Glucose (POC) 182 (H) 65 - 100 mg/dL    Performed by Stacia Malagon      No results found for: VALF2, VALAC, VALP, VALPR, DS6, CRBAM, CRBAMP, CARB2, XCRBAM  No results found for: LITHM   RADIOLOGY REPORTS:(reviewed/updated 1/31/2018)  Xr Chest Port    Result Date: 1/20/2018  EXAM: Portable CXR.  1800 hours. COMPARISON: 1/19/2015. INDICATION: cp, fatigue There is new interstitial pulmonary edema, cardiomegaly and small left pleural effusion. There is no focal infiltrate, pneumothorax or midline shift. IMPRESSION: CHF pattern.          MEDICATIONS     ALL MEDICATIONS:   Current Facility-Administered Medications   Medication Dose Route Frequency    [START ON 2/1/2018] buPROPion SR (WELLBUTRIN SR) tablet 100 mg  100 mg Oral DAILY    alum-mag hydroxide-simeth (MYLANTA) oral suspension 30 mL  30 mL Oral Q4H PRN    doxazosin (CARDURA) tablet 4 mg  4 mg Oral QHS    polyethylene glycol (MIRALAX) packet 17 g  17 g Oral DAILY    OLANZapine (ZyPREXA) tablet 2.5 mg  2.5 mg Oral Q6H PRN    ziprasidone (GEODON) 10 mg in sterile water (preservative free) 0.5 mL injection  10 mg IntraMUSCular BID PRN    benztropine (COGENTIN) tablet 1 mg  1 mg Oral BID PRN    benztropine (COGENTIN) injection 1 mg  1 mg IntraMUSCular BID PRN    zolpidem (AMBIEN) tablet 5 mg  5 mg Oral QHS PRN    acetaminophen (TYLENOL) tablet 650 mg  650 mg Oral Q4H PRN    magnesium hydroxide (MILK OF MAGNESIA) 400 mg/5 mL oral suspension 30 mL  30 mL Oral DAILY PRN    nicotine (NICODERM CQ) 21 mg/24 hr patch 1 Patch  1 Patch TransDERmal DAILY PRN    influenza vaccine 2017-18 (3 yrs+)(PF) (FLUZONE QUAD/FLUARIX QUAD) injection 0.5 mL  0.5 mL IntraMUSCular PRIOR TO DISCHARGE    amLODIPine (NORVASC) tablet 10 mg  10 mg Oral DAILY    aspirin delayed-release tablet 81 mg  81 mg Oral DAILY    atorvastatin (LIPITOR) tablet 40 mg  40 mg Oral DAILY    carvedilol (COREG) tablet 3.125 mg  3.125 mg Oral BID WITH MEALS    ferrous sulfate tablet 325 mg  325 mg Oral BID WITH MEALS    hydrALAZINE (APRESOLINE) tablet 100 mg  100 mg Oral TID    glucose chewable tablet 16 g  4 Tab Oral PRN    dextrose (D50W) injection syrg 12.5-25 g  12.5-25 g IntraVENous PRN    glucagon (GLUCAGEN) injection 1 mg  1 mg IntraMUSCular PRN    insulin lispro (HUMALOG) injection   SubCUTAneous AC&HS    mirtazapine (REMERON) tablet 7.5 mg  7.5 mg Oral QHS    nicotine (NICODERM CQ) 21 mg/24 hr patch 1 Patch  1 Patch TransDERmal Q24H      SCHEDULED MEDICATIONS:   Current Facility-Administered Medications   Medication Dose Route Frequency    [START ON 2/1/2018] buPROPion SR (WELLBUTRIN SR) tablet 100 mg  100 mg Oral DAILY    doxazosin (CARDURA) tablet 4 mg  4 mg Oral QHS    polyethylene glycol (MIRALAX) packet 17 g  17 g Oral DAILY    influenza vaccine 2017-18 (3 yrs+)(PF) (FLUZONE QUAD/FLUARIX QUAD) injection 0.5 mL  0.5 mL IntraMUSCular PRIOR TO DISCHARGE    amLODIPine (NORVASC) tablet 10 mg  10 mg Oral DAILY    aspirin delayed-release tablet 81 mg  81 mg Oral DAILY    atorvastatin (LIPITOR) tablet 40 mg  40 mg Oral DAILY    carvedilol (COREG) tablet 3.125 mg  3.125 mg Oral BID WITH MEALS    ferrous sulfate tablet 325 mg  325 mg Oral BID WITH MEALS    hydrALAZINE (APRESOLINE) tablet 100 mg  100 mg Oral TID    insulin lispro (HUMALOG) injection   SubCUTAneous AC&HS    mirtazapine (REMERON) tablet 7.5 mg  7.5 mg Oral QHS    nicotine (NICODERM CQ) 21 mg/24 hr patch 1 Patch  1 Patch TransDERmal Q24H          ASSESSMENT & PLAN     DIAGNOSES REQUIRING ACTIVE TREATMENT AND MONITORING: (reviewed/updated 1/31/2018)  Patient Active Hospital Problem List:   Depression (1/24/2018)    Assessment: sadness, helplessness, hopelessness, in reaction to recent bilateral lower leg amputationa and inability to pat water bill at home.     Plan: consider forced medication/treatment, address social issues, daughter may be obtaining guardianship  1/27/18 need order to treat    1/28/18 continue his medications              In summary, Germán White, is a 78 y.o.  male who presents with a severe exacerbation of the principal diagnosis of Depression  Patient's condition is worsening/not improving/not stable Patient requires continued inpatient hospitalization for further stabilization, safety monitoring and medication management. I will continue to coordinate the provision of individual, milieu, occupational, group, and substance abuse therapies to address target symptoms/diagnoses as deemed appropriate for the individual patient. A coordinated, multidisplinary treatment team round was conducted with the patient (this team consists of the nurse, psychiatric unit pharmcist,  and writer). Complete current electronic health record for patient has been reviewed today including consultant notes, ancillary staff notes, nurses and psychiatric tech notes. Suicide risk assessment completed and patient deemed to be of low risk for suicide at this time. The following regarding medications was addressed during rounds with patient:   the risks and benefits of the proposed medication. The patient was given the opportunity to ask questions. Informed consent given to the use of the above medications. Will continue to adjust psychiatric and non-psychiatric medications (see above \"medication\" section and orders section for details) as deemed appropriate & based upon diagnoses and response to treatment. I will continue to order blood tests/labs and diagnostic tests as deemed appropriate and review results as they become available (see orders for details and above listed lab/test results). I will order psychiatric records from previous [facility] hospitals to further elucidate the nature of patient's psychopathology and review once available. I will gather additional collateral information from friends, family and o/p treatment team to further elucidate the nature of patient's psychopathology and baselline level of psychiatric functioning.          I certify that this patient's inpatient psychiatric hospital services furnished since the previous certification were, and continue to be, required for treatment that could reasonably be expected to improve the patient's condition, or for diagnostic study, and that the patient continues to need, on a daily basis, active treatment furnished directly by or requiring the supervision of inpatient psychiatric facility personnel. In addition, the hospital records show that services furnished were intensive treatment services, admission or related services, or equivalent services.     EXPECTED DISCHARGE DATE/DAY: TBD     DISPOSITION: Home       Signed By:   Elenita Hamilton MD  1/31/2018

## 2018-01-31 NOTE — BH NOTES
GROUP THERAPY PROGRESS NOTE    Hallie Torres is not participating in Leisure-[employer]ive Group.      Group time: 15 minutes    Personal goal for participation: decrease anxiety    Goal orientation: relaxation    Group therapy participation: none    Therapeutic interventions reviewed and discussed:     Impression of participation: did not attend

## 2018-01-31 NOTE — BH NOTES
PRN Medication Documentation    Specific patient behavior that led to need for PRN medication: Sleeplessness  Staff interventions attempted prior to PRN being given: relaxation  PRN medication given: ambien  Patient response/effectiveness of PRN medication: will continue to monitor      Skin care given, skim remains intact.

## 2018-01-31 NOTE — PROGRESS NOTES
Problem: Falls - Risk of  Goal: *Absence of Falls  Document Bello Fall Risk and appropriate interventions in the flowsheet. Outcome: Progressing Towards Goal  Fall Risk Interventions:  Mobility Interventions: Bed/chair exit alarm, Mechanical lift    Mentation Interventions: Adequate sleep, hydration, pain control, Bed/chair exit alarm, Evaluate medications/consider consulting pharmacy    Medication Interventions: Bed/chair exit alarm, Evaluate medications/consider consulting pharmacy    Elimination Interventions: Bed/chair exit alarm, Call light in reach, Patient to call for help with toileting needs, Toilet paper/wipes in reach    History of Falls Interventions: Bed/chair exit alarm, Evaluate medications/consider consulting pharmacy, Room close to nurse's station    Resting in bed with eyes closed, no complaints, no distress noted. T+P Q2H, skin care given, skin remains intact. Safety measures on place, will continue to monitor.

## 2018-01-31 NOTE — PROGRESS NOTES
Problem: Depressed Mood (Adult/Pediatric)  Goal: *STG: Attends activities and groups  Outcome: Not Progressing Towards Goal  Patient has remained isolative to his room throughout this shift. He has refused his vital signs, Accuchecks and all of his medications thus far.

## 2018-01-31 NOTE — PROGRESS NOTES
Problem: Depressed Mood (Adult/Pediatric)  Goal: *STG: Participates in treatment plan  Variance: Patient slowly responding  Comments: Uncooperative. Refused meds this am    Demanding. Yells at staff. Refused morning meds. Denies SI. Staff goal: to encourage coping skills   Goal: Interventions  Outcome: Progressing Towards Goal  Medication management. Q 15 min safety rounds. Group therapy. On left buttocks noted two skin tears that could possibly be stage 1 pressure sores. Patient refuses care at times. Turn Q 2 hr schedule. Educated patient. Patient is incontinent at times. Will ask MD for order for wound care consult.

## 2018-01-31 NOTE — BH NOTES
Wound care nurse Jose Mora was informed of the 2 skin tears to left buttock.  RN said she has seen this and it appeared to be friction areas and to use alovesta cream.

## 2018-01-31 NOTE — INTERDISCIPLINARY ROUNDS
Behavioral Health Interdisciplinary Rounds     Patient Name: Venita George  Age: 78 y.o. Room/Bed:  740/02  Primary Diagnosis: Depression   Admission Status: Involuntary Commitment     Readmission within 30 days: no  Power of  in place: yes-Sherita Waynetta Dandy (daughter); awaiting paperwork  Patient requires a blocked bed: no          Reason for blocked bed:     VTE Prophylaxis: Yes ASA  Flu vaccine given : no refused  Mobility needs/Fall risk: yes-B/L BKA   Nutritional Plan:   Consults: PT/OT, Diabetic Tx team, Hospitalist, & Wound Care following. Nephrology & Palliative Care doctor saw (will return if needed) (pt wants to remain a FULL Code)        Labs/Testing due today?: no    Sleep hours:  4.45+      Participation in Care/Groups:  no  Medication Compliant?: Yes  PRNS (last 24 hours): None    Restraints (last 24 hours):  no  Substance Abuse:  no  CIWA (range last 24 hours):  COWS (range last 24 hours):   Alcohol screening (AUDIT) completed -  AUDIT Score: 0  If applicable, date SBIRT discussed in treatment team AND documented:   Tobacco - patient is a smoker: yes   Date tobacco education completed by RN: 1/24/18  24 hour chart check complete: yes     Patient goal(s) for today:   Treatment team focus/goals:   Progress note     LOS:  7  Expected LOS:     Financial concerns/prescription coverage:    Date of last family contact:      Family requesting physician contact today:    Discharge plan:   Guns in the home:         Outpatient provider(s):     Participating treatment team members:  Venita Ariel, * (assigned SW),

## 2018-01-31 NOTE — BH NOTES
GROUP THERAPY PROGRESS NOTE    Jorge Pastor did not participate in a morning Process Group on the Geriatric Unit with a focus on shifting ones feelings to a positive note and preparing for the day through group singing.

## 2018-01-31 NOTE — BH NOTES
1520:  Patient received as he appears to be resting quietly in bed. He does not greet oncoming staff, and there are no signs/symptoms of pain or distress at this time. Will maintain q 15 minute safety checks. 1600:  Patient refused to have his vital signs taken at this time. 1628:  Patient refused to have his blood sugar checked at this time. 1940:  Patient refuses to have his vital signs taken and to have his blood sugar checked at this time. He also refuses to allow staff to put his side bedrail up for safety. Will continue to monitor.

## 2018-01-31 NOTE — BH NOTES
1145: Pt told staff that he needed to have a bowel movement. Staff called lift team in preparation to put pt in bed and on bedpan. When lift team arrived w/ walt lift, pt stated that he no longer needed to go to the bathroom. Staff asked pt if he had already gone to the bathroom, and pt denied already going to the bathroom.

## 2018-02-01 LAB
GLUCOSE BLD STRIP.AUTO-MCNC: 119 MG/DL (ref 65–100)
GLUCOSE BLD STRIP.AUTO-MCNC: 79 MG/DL (ref 65–100)
GLUCOSE BLD STRIP.AUTO-MCNC: 86 MG/DL (ref 65–100)
SERVICE CMNT-IMP: ABNORMAL
SERVICE CMNT-IMP: NORMAL
SERVICE CMNT-IMP: NORMAL

## 2018-02-01 PROCEDURE — 82962 GLUCOSE BLOOD TEST: CPT

## 2018-02-01 PROCEDURE — 65220000003 HC RM SEMIPRIVATE PSYCH

## 2018-02-01 PROCEDURE — 74011250637 HC RX REV CODE- 250/637: Performed by: FAMILY MEDICINE

## 2018-02-01 PROCEDURE — 74011250637 HC RX REV CODE- 250/637: Performed by: HOSPITALIST

## 2018-02-01 PROCEDURE — 74011250637 HC RX REV CODE- 250/637: Performed by: PSYCHIATRY & NEUROLOGY

## 2018-02-01 RX ORDER — BUPROPION HYDROCHLORIDE 150 MG/1
150 TABLET, EXTENDED RELEASE ORAL DAILY
Status: DISCONTINUED | OUTPATIENT
Start: 2018-02-02 | End: 2018-03-03 | Stop reason: HOSPADM

## 2018-02-01 RX ORDER — MIRTAZAPINE 15 MG/1
15 TABLET, FILM COATED ORAL
Status: DISCONTINUED | OUTPATIENT
Start: 2018-02-01 | End: 2018-02-02

## 2018-02-01 RX ORDER — LORAZEPAM 1 MG/1
1 TABLET ORAL 2 TIMES DAILY
Status: DISCONTINUED | OUTPATIENT
Start: 2018-02-01 | End: 2018-02-02

## 2018-02-01 RX ADMIN — LORAZEPAM 1 MG: 1 TABLET ORAL at 22:10

## 2018-02-01 RX ADMIN — ATORVASTATIN CALCIUM 40 MG: 40 TABLET, FILM COATED ORAL at 09:37

## 2018-02-01 RX ADMIN — AMLODIPINE BESYLATE 10 MG: 5 TABLET ORAL at 09:35

## 2018-02-01 RX ADMIN — FERROUS SULFATE TAB 325 MG (65 MG ELEMENTAL FE) 325 MG: 325 (65 FE) TAB at 09:38

## 2018-02-01 RX ADMIN — CARVEDILOL 3.12 MG: 3.12 TABLET, FILM COATED ORAL at 09:38

## 2018-02-01 RX ADMIN — HYDRALAZINE HYDROCHLORIDE 100 MG: 50 TABLET, FILM COATED ORAL at 22:09

## 2018-02-01 RX ADMIN — MIRTAZAPINE 15 MG: 15 TABLET, FILM COATED ORAL at 22:09

## 2018-02-01 RX ADMIN — DOXAZOSIN 4 MG: 2 TABLET ORAL at 22:09

## 2018-02-01 RX ADMIN — HYDRALAZINE HYDROCHLORIDE 100 MG: 50 TABLET, FILM COATED ORAL at 09:32

## 2018-02-01 RX ADMIN — ASPIRIN 81 MG: 81 TABLET, COATED ORAL at 09:36

## 2018-02-01 RX ADMIN — BUPROPION HYDROCHLORIDE 100 MG: 100 TABLET, FILM COATED, EXTENDED RELEASE ORAL at 09:37

## 2018-02-01 NOTE — BH NOTES
PSYCHIATRIC PROGRESS NOTE         Patient Name  Alida Cloud   Date of Birth 1940   [facility] [id number]   Medical Record Number  [id number]      Age  78 y.o. PCP Johny Warner, MD   Admit date:  1/24/2018    Room Number  [phone] [id number]  @ [facility]   Date of Service  2/1/2018          PSYCHOTHERAPY SESSION NOTE:  Length of psychotherapy session: 15 minutes    Main condition/diagnosis/issues treated during session today, 2/1/2018 : med , meal and group non compliance    I employed Cognitive Behavioral therapy techniques, Reality-Oriented psychotherapy, as well as supportive psychotherapy in regards to various ongoing psychosocial stressors, including the following: pre-admission and current problems; housing issues; occupational issues; medical issues; and stress of hospitalization. Interpersonal relationship issues and psychodynamic conflicts explored. Attempts made to alleviate maladaptive patterns. We, also, worked on issues of denial & effects of substance dependency/use     Overall, patient is not progressing    Treatment Plan Update (reviewed an updated 2/1/2018) : I will modify psychotherapy tx plan by implementing more stress management strategies, building upon cognitive behavioral techniques, increasing coping skills, as well as shoring up psychological defenses). An extended energy and skill set was needed to engage pt in psychotherapy due to some of the following: resistiveness, complexity, negativity, confrontational nature, hostile behaviors, and/or severe abnormalities in thought processes/psychosis resulting in the loss of expressive/receptive language communication skills. E & M PROGRESS NOTE:         HISTORY       CC:  \"depression and non compliance with treatment \"  HISTORY OF PRESENT ILLNESS/INTERVAL HISTORY:  (reviewed/updated 2/1/2018).   per initial evaluation:     Alida Cloud presents/reports/evidences the following emotional symptoms today, 2/1/2018:depression. The above symptoms have been present for 1 years. These symptoms are of severe severity. The symptoms are constant  in nature. Additional symptomatology and features include anxiety. 1/27/18 he is not responding to questions and not engaging and not showing engagement and refuse medications and treatment   1/28/18 he is doing better and he engaged and not feeling sad or depressed and he eats better    1/29/18= Patient is deliberately refusing vital signs, labs and medications. He likes finger foods and eats those. Will meet with daughter on Tuesday to discuss possible guardianship. Will seek forced meds. 1/30/18- Pt. Agreed to comply with meds after extensive family meeting. Planning for SNF admission and possible transfer to home afterwards [address] [place].  1/31/18- Patient is intermittently cooperative with somatic meds. We discussed the risks to his health this can cause. I advised Wellbutrin for depression. This may help motivate treatment compliance. 2/1/18- Continues Rude, belligerent, uncooperative and disrespectful with staff. Is marginally compliant with diabetes management. Waiting for placement. SIDE EFFECTS: (reviewed/updated 2/1/2018)  None reported or admitted to. No noted toxicity with use of Depakote/Tegretol/lithium/Clozaril/TCAs   ALLERGIES:(reviewed/updated 2/1/2018)  Allergies   Allergen Reactions    Tetracycline Hives      MEDICATIONS PRIOR TO ADMISSION:(reviewed/updated 2/1/2018)  Prescriptions Prior to Admission   Medication Sig    hydrALAZINE (APRESOLINE) 100 mg tablet Take 1 Tab by mouth three (3) times daily.  amLODIPine (NORVASC) 10 mg tablet Take 1 Tab by mouth daily.  aspirin delayed-release 81 mg tablet Take 1 Tab by mouth daily.  atorvastatin (LIPITOR) 40 mg tablet Take 1 Tab by mouth daily.  carvedilol (COREG) 3.125 mg tablet Take 1 Tab by mouth two (2) times daily (with meals).     ferrous sulfate 325 mg (65 mg iron) tablet Take 1 Tab by mouth two (2) times daily (with meals).  mirtazapine (REMERON) 7.5 mg tablet Take 1 Tab by mouth nightly.  nicotine (NICODERM CQ) 21 mg/24 hr 1 Patch by TransDERmal route every twenty-four (24) hours for 30 days. PAST MEDICAL HISTORY: Past medical history from the initial psychiatric evaluation has been reviewed (reviewed/updated 2/1/2018) with no additional updates (I asked patient and no additional past medical history provided). Past Medical History:   Diagnosis Date    Arthritis     CAD (coronary artery disease) 2007    CKD (chronic kidney disease), stage III     DM type 2 causing renal disease (HCC)     DVT (deep venous thrombosis) (HCC)     Hx of DVT:  Xarelto stopped due to GI bleed. S/P IVC filter.  Gait abnormality     GI bleed     Heart murmur     Hepatitis C     History of blood transfusion     Hypercholesterolemia     Hypertension 11/7/2014    Neuropathy     Non compliance w medication regimen     Peripheral vascular disease ([facility] [address] 11/7/2014    A. S/P Right SFA POBA and tibial atherectomy (2/4/13).  PUD (peptic ulcer disease) 2007    Unspecified sleep apnea     never used cpap     Past Surgical History:   Procedure Laterality Date    ABDOMEN SURGERY PROC UNLISTED  2007    has approx 7-8\" midline incision on abdomen--\"had to open him up and clean him out after feeding tube clogged\"    HX GI  2007    feeding tube for approx 2 mo    VASCULAR SURGERY PROCEDURE UNLIST Right 1/22/2015    popliteal-tibial bypass      SOCIAL HISTORY: Social history from the initial psychiatric evaluation has been reviewed (reviewed/updated 2/1/2018) with no additional updates (I asked patient and no additional social history provided). Social History     Social History    Marital status:      Spouse name: N/A    Number of children: N/A    Years of education: N/A     Occupational History    Not on file.      Social History Main Topics    Smoking status: Current Every Day Smoker     Packs/day: 0.50    Smokeless tobacco: Not on file    Alcohol use No    Drug use: No      Comment: >20 years ago    Sexual activity: Not on file     Other Topics Concern    Not on file     Social History Narrative    78 year ols male admitted for depression and homelessness. Pt will be evicated from apartment due to non payment of bills. Girlfriend of 30 years left him to [facility] live in the [place] with others. \" Pt is a brittle diabetic, with treatment non compliance. He has bilarela lower extremity amputations. Patient has a long hx of substance abuse. FAMILY HISTORY: Family history from the initial psychiatric evaluation has been reviewed (reviewed/updated 2/1/2018) with no additional updates (I asked patient and no additional family history provided). Family History   Problem Relation Age of Onset    Hypertension Father        REVIEW OF SYSTEMS: (reviewed/updated 2/1/2018)  Appetite:decreased   Sleep: fitful   All other Review of Systems: Psychological ROS: positive for - behavioral disorder  Respiratory ROS: no cough, shortness of breath, or wheezing  Cardiovascular ROS: no chest pain or dyspnea on exertion         [facility] (MSE):    [facility] FINDINGS ARE WITHIN NORMAL LIMITS (WNL) UNLESS OTHERWISE STATED BELOW. ( ALL OF THE BELOW CATEGORIES OF THE MSE HAVE BEEN REVIEWED (reviewed 2/1/2018) AND UPDATED AS DEEMED APPROPRIATE )  General Presentation age appropriate, evasive and guarded   Orientation oriented to time, place and person   Vital Signs  See below (reviewed 2/1/2018); Vital Signs (BP, Pulse, & Temp) are within normal limits if not listed below.    Gait and Station Stable/steady, no ataxia   Musculoskeletal System No extrapyramidal symptoms (EPS); no abnormal muscular movements or Tardive Dyskinesia (TD); muscle strength and tone are within normal limits   Language No aphasia or dysarthria   Speech:  hypoverbal   Thought Processes concrete; normal rate of thoughts; poor abstract reasoning/computation   Thought Associations goal directed   Thought Content free of delusions and free of hallucinations   Suicidal Ideations none   Homicidal Ideations none   Mood:  irritable   Affect:  mood-congruent   Memory recent  fair   Memory remote:  fair   Concentration/Attention:  distractable   Fund of Knowledge significantly below average   Insight:  poor   Reliability poor   Judgment:  poor          VITALS:     Patient Vitals for the past 24 hrs:   Temp Pulse Resp BP SpO2   02/01/18 0935 98.1 °F (36.7 °C) 68 18 180/78 99 %     Wt Readings from Last 3 Encounters:   01/24/18 82.7 kg (182 lb 5.1 oz)   01/23/18 83.6 kg (184 lb 4.9 oz)   07/13/17 88.9 kg (196 lb)     Temp Readings from Last 3 Encounters:   02/01/18 98.1 °F (36.7 °C)   01/24/18 97.2 °F (36.2 °C)   07/20/17 97.9 °F (36.6 °C)     BP Readings from Last 3 Encounters:   02/01/18 180/78   01/24/18 172/68   07/20/17 175/76     Pulse Readings from Last 3 Encounters:   02/01/18 68   01/24/18 72   07/20/17 (!) 52            DATA     LABORATORY DATA:(reviewed/updated 2/1/2018)  Recent Results (from the past 24 hour(s))   GLUCOSE, POC    Collection Time: 02/01/18  9:08 AM   Result Value Ref Range    Glucose (POC) 79 65 - 100 mg/dL    Performed by Marc Okeefe    GLUCOSE, POC    Collection Time: 02/01/18  9:19 AM   Result Value Ref Range    Glucose (POC) 86 65 - 100 mg/dL    Performed by Rene Mclaughlin      No results found for: VALF2, VALAC, VALP, VALPR, DS6, CRBAM, CRBAMP, CARB2, XCRBAM  No results found for: LITHM   RADIOLOGY REPORTS:(reviewed/updated 2/1/2018)  Xr Chest Port    Result Date: 1/20/2018  EXAM: Portable CXR.  1800 hours. COMPARISON: 1/19/2015. INDICATION: cp, fatigue There is new interstitial pulmonary edema, cardiomegaly and small left pleural effusion. There is no focal infiltrate, pneumothorax or midline shift. IMPRESSION: CHF pattern.          MEDICATIONS     ALL MEDICATIONS:   Current Facility-Administered Medications   Medication Dose Route Frequency    buPROPion SR (WELLBUTRIN SR) tablet 100 mg  100 mg Oral DAILY    alum-mag hydroxide-simeth (MYLANTA) oral suspension 30 mL  30 mL Oral Q4H PRN    doxazosin (CARDURA) tablet 4 mg  4 mg Oral QHS    polyethylene glycol (MIRALAX) packet 17 g  17 g Oral DAILY    OLANZapine (ZyPREXA) tablet 2.5 mg  2.5 mg Oral Q6H PRN    ziprasidone (GEODON) 10 mg in sterile water (preservative free) 0.5 mL injection  10 mg IntraMUSCular BID PRN    benztropine (COGENTIN) tablet 1 mg  1 mg Oral BID PRN    benztropine (COGENTIN) injection 1 mg  1 mg IntraMUSCular BID PRN    zolpidem (AMBIEN) tablet 5 mg  5 mg Oral QHS PRN    acetaminophen (TYLENOL) tablet 650 mg  650 mg Oral Q4H PRN    magnesium hydroxide (MILK OF MAGNESIA) 400 mg/5 mL oral suspension 30 mL  30 mL Oral DAILY PRN    nicotine (NICODERM CQ) 21 mg/24 hr patch 1 Patch  1 Patch TransDERmal DAILY PRN    influenza vaccine 2017-18 (3 yrs+)(PF) (FLUZONE QUAD/FLUARIX QUAD) injection 0.5 mL  0.5 mL IntraMUSCular PRIOR TO DISCHARGE    amLODIPine (NORVASC) tablet 10 mg  10 mg Oral DAILY    aspirin delayed-release tablet 81 mg  81 mg Oral DAILY    atorvastatin (LIPITOR) tablet 40 mg  40 mg Oral DAILY    carvedilol (COREG) tablet 3.125 mg  3.125 mg Oral BID WITH MEALS    ferrous sulfate tablet 325 mg  325 mg Oral BID WITH MEALS    hydrALAZINE (APRESOLINE) tablet 100 mg  100 mg Oral TID    glucose chewable tablet 16 g  4 Tab Oral PRN    dextrose (D50W) injection syrg 12.5-25 g  12.5-25 g IntraVENous PRN    glucagon (GLUCAGEN) injection 1 mg  1 mg IntraMUSCular PRN    insulin lispro (HUMALOG) injection   SubCUTAneous AC&HS    mirtazapine (REMERON) tablet 7.5 mg  7.5 mg Oral QHS    nicotine (NICODERM CQ) 21 mg/24 hr patch 1 Patch  1 Patch TransDERmal Q24H      SCHEDULED MEDICATIONS:   Current Facility-Administered Medications   Medication Dose Route Frequency    buPROPion SR [facility] SR) tablet 100 mg  100 mg Oral DAILY    doxazosin (CARDURA) tablet 4 mg  4 mg Oral QHS    polyethylene glycol (MIRALAX) packet 17 g  17 g Oral DAILY    influenza vaccine 2017-18 (3 yrs+)(PF) (FLUZONE QUAD/FLUARIX QUAD) injection 0.5 mL  0.5 mL IntraMUSCular PRIOR TO DISCHARGE    amLODIPine (NORVASC) tablet 10 mg  10 mg Oral DAILY    aspirin delayed-release tablet 81 mg  81 mg Oral DAILY    atorvastatin (LIPITOR) tablet 40 mg  40 mg Oral DAILY    carvedilol (COREG) tablet 3.125 mg  3.125 mg Oral BID WITH MEALS    ferrous sulfate tablet 325 mg  325 mg Oral BID WITH MEALS    hydrALAZINE (APRESOLINE) tablet 100 mg  100 mg Oral TID    insulin lispro (HUMALOG) injection   SubCUTAneous AC&HS    mirtazapine (REMERON) tablet 7.5 mg  7.5 mg Oral QHS    nicotine (NICODERM CQ) 21 mg/24 hr patch 1 Patch  1 Patch TransDERmal Q24H          ASSESSMENT & PLAN     DIAGNOSES REQUIRING ACTIVE TREATMENT AND MONITORING: (reviewed/updated 2/1/2018)  Patient Active Hospital Problem List:   Depression (1/24/2018)    Assessment: sadness, helplessness, hopelessness, in reaction to recent bilateral lower leg amputationa and inability to pat water bill at home. Plan: consider forced medication/treatment, address social issues, daughter may be obtaining guardianship  1/27/18 need order to treat    1/28/18 continue his medications              In summary, Yosvany Greenberg, is a 78 y.o.  male who presents with a severe exacerbation of the principal diagnosis of Depression  Patient's condition is worsening/not improving/not stable Patient requires continued inpatient hospitalization for further stabilization, safety monitoring and medication management. I will continue to coordinate the provision of individual, milieu, occupational, group, and substance abuse therapies to address target symptoms/diagnoses as deemed appropriate for the individual patient.   A coordinated, multidisplinary treatment team round was conducted with the patient (this team consists of the nurse, psychiatric unit pharmcist,  and writer). Complete current electronic health record for patient has been reviewed today including consultant notes, ancillary staff notes, nurses and psychiatric tech notes. Suicide risk assessment completed and patient deemed to be of low risk for suicide at this time. The following regarding medications was addressed during rounds with patient:   the risks and benefits of the proposed medication. The patient was given the opportunity to ask questions. Informed consent given to the use of the above medications. Will continue to adjust psychiatric and non-psychiatric medications (see above \"medication\" section and orders section for details) as deemed appropriate & based upon diagnoses and response to treatment. I will continue to order blood tests/labs and diagnostic tests as deemed appropriate and review results as they become available (see orders for details and above listed lab/test results). I will order psychiatric records from previous [facility] hospitals to further elucidate the nature of patient's psychopathology and review once available. I will gather additional collateral information from friends, family and o/p treatment team to further elucidate the nature of patient's psychopathology and baselline level of psychiatric functioning. I certify that this patient's inpatient psychiatric hospital services furnished since the previous certification were, and continue to be, required for treatment that could reasonably be expected to improve the patient's condition, or for diagnostic study, and that the patient continues to need, on a daily basis, active treatment furnished directly by or requiring the supervision of inpatient psychiatric facility personnel.  In addition, the hospital records show that services furnished were intensive treatment services, admission or related services, or equivalent services.     EXPECTED DISCHARGE DATE/DAY: TBD     DISPOSITION: Home       Signed By:   Domo Haines MD  2/1/2018

## 2018-02-01 NOTE — PROGRESS NOTES
Problem: Falls - Risk of  Goal: *Absence of Falls  Document Bello Fall Risk and appropriate interventions in the flowsheet. Fall Risk Interventions:  Mobility Interventions: Communicate number of staff needed for ambulation/transfer, Bed/chair exit alarm, Mechanical lift, Patient to call before getting OOB, PT Consult for mobility concerns, PT Consult for assist device competence    Mentation Interventions: Adequate sleep, hydration, pain control, Bed/chair exit alarm, Door open when patient unattended, Evaluate medications/consider consulting pharmacy, More frequent rounding, Reorient patient, Room close to nurse's station, Toileting rounds    Medication Interventions: Patient to call before getting OOB, Evaluate medications/consider consulting pharmacy, Bed/chair exit alarm, Teach patient to arise slowly    Elimination Interventions: Bed/chair exit alarm, Patient to call for help with toileting needs, Toileting schedule/hourly rounds, Urinal in reach    History of Falls Interventions: Bed/chair exit alarm, Consult care management for discharge planning, Door open when patient unattended, Evaluate medications/consider consulting pharmacy        Problem: Depressed Mood (Adult/Pediatric)  Goal: *STG: Participates in treatment plan  Outcome: Progressing Towards Goal  Received patient asleep. Patient woke confused to time and place, reoriented. Guarded yet compliant to am care. Requested for the lift to sit in the dining area. Hesitated with medications, yet complied. Compliant with meals.

## 2018-02-01 NOTE — BH NOTES
[address] [facility]  Master Treatment Plan for Jada Wan    Date Treatment Plan Initiated: 1/31/18    Treatment Plan Modalities:  Type of Modality Amount  (x minutes) Frequency (x/week) Duration (x days) Name of Responsible Staff   [address] [place] [address] meetings to encourage peer interactions 15 7 1 Kortney Santillan RN     Group psychotherapy to assist in building coping skills and internal controls 60 7 1 Jose Salazar LCSW   Therapeutic activity groups to build coping skills 60 7 1 Jose Salazar LCSW   Psychoeducation in group setting to address:   Medication education   15 7 1 Parisa CHIRINOS RN   Coping skills         Relaxation techniques         Symptom management         Discharge planning         Spirituality    60 2 1801    60 1 1 Vol   Recovery/AA/NA   60 3 1 Vol   Physician medication management   15 7 1 Claritza Hickey   Family meeting/discharge planning

## 2018-02-01 NOTE — PROGRESS NOTES
Problem: Falls - Risk of  Goal: *Absence of Falls  Document Bello Fall Risk and appropriate interventions in the flowsheet. Outcome: Progressing Towards Goal  Fall Risk Interventions:  Mobility Interventions: Patient to call before getting OOB, Bed/chair exit alarm, Communicate number of staff needed for ambulation/transfer    Mentation Interventions: Adequate sleep, hydration, pain control, Bed/chair exit alarm, Door open when patient unattended, Room close to nurse's station    Medication Interventions: Patient to call before getting OOB    Elimination Interventions: Bed/chair exit alarm, Call light in reach, Patient to call for help with toileting needs    History of Falls Interventions: Bed/chair exit alarm, Door open when patient unattended, Room close to nurse's station     2340 Pt appears asleep in bed. Respirations even and unlabored. Bed alarm on, call bell and urinal within reach, door remains open. Will continue to monitor with Q 15 safety checks. 0100 Pt used urinal and partially incontinent of urine/stool. Had small formed bm. Pt agreeable to letting staff assisted with gary care and changing of gown and bed pad. Pt refused to have sheet changed. Assisted to pt reposition. 0630 Pt repositioned self frequently throughout night. Staff assisted with two repositions.

## 2018-02-01 NOTE — INTERDISCIPLINARY ROUNDS
Behavioral Health Interdisciplinary Rounds     Patient Name: Brianna Gruber  Age: 78 y.o. Room/Bed:  740/02  Primary Diagnosis: Depression   Admission Status: Involuntary Commitment     Readmission within 30 days: no  Power of  in place: yes - Daughter (Catalina Tsai - awaiting paperwork)  Patient requires a blocked bed: no          Reason for blocked bed: n/a    VTE Prophylaxis: Yes - ASA (refusing)  Flu vaccine given : no - pt refused   Mobility needs/Fall risk: yes    Nutritional Plan: yes  Consults: PT/OT, Diabetic Tx team, Hospitalist, & Wound Care following. Nephrology & Palliative Care doctor saw (will return if needed) (pt wants to remain a FULL Code)        Labs/Testing due today?: no    Sleep hours: 3.75 (broken segments)        Participation in Care/Groups:  no  Medication Compliant?: Refusing Psychiatric Medications and Refusing Medical Medications  PRNS (last 24 hours): None    Restraints (last 24 hours):  no  Substance Abuse:  no  CIWA (range last 24 hours):  COWS (range last 24 hours):   Alcohol screening (AUDIT) completed -  AUDIT Score: 0  If applicable, date SBIRT discussed in treatment team AND documented:   Tobacco - patient is a smoker: yes   Date tobacco education completed by RN: 1/24/18  24 hour chart check complete: yes     Patient goal(s) for today:   Treatment team focus/goals: Plan to encourage compliance with medications and treatment. Progress note - He has been marginally compliant with is medications. LOS:  8  Expected LOS: TBD    Financial concerns/prescription coverage:  Medicare   Date of last family contact:       Family requesting physician contact today:    Discharge plan: skilled nursing home placement   Guns in the home: no      Outpatient provider(s): TBD     Participating treatment team members:  Dr. Carmen Hartman,MIKHAIL

## 2018-02-01 NOTE — BH NOTES
1540:  Pt declined vitals. 1650:  Pt declined Accucheck. 1700: Staff offered dinner tray to pt. Pt kept eyes closed and remained silent. Tray set up for pt at bedside. 1720:  Lift team paged to help pt to bedside commode. 1740:  Pt stated \"I don't have to go anymore\" when lift team arrived. Pt declined to get out of bed. 1830:  Pt eating, siting on side of bed. Irritable, confrontational, labile. Selective with medication and interventions. Pt asks if leaving on a Saturday is a possibility. Feeds and turns self, verbalizes toileting and other needs. Staff goal to encourage trust and communication. Q 15 min checks. 2030:  Pt declined Accucheck. Vitals obtained. 2100:  Pt refused toileting and turning. HOB lowered to 20 degrees.

## 2018-02-01 NOTE — PROGRESS NOTES
Problem: Falls - Risk of  Goal: *Absence of Falls  Document Bello Fall Risk and appropriate interventions in the flowsheet.    Outcome: Progressing Towards Goal  Fall Risk Interventions:  Mobility Interventions: Bed/chair exit alarm, Communicate number of staff needed for ambulation/transfer, Mechanical lift, PT Consult for mobility concerns    Mentation Interventions: Adequate sleep, hydration, pain control, Bed/chair exit alarm, Door open when patient unattended, Reorient patient, Room close to nurse's station    Medication Interventions: Bed/chair exit alarm, Evaluate medications/consider consulting pharmacy, Patient to call before getting OOB    Elimination Interventions: Bed/chair exit alarm, Call light in reach, Patient to call for help with toileting needs, Toileting schedule/hourly rounds    History of Falls Interventions: Bed/chair exit alarm, Door open when patient unattended, Room close to nurse's station

## 2018-02-01 NOTE — BH NOTES
GROUP THERAPY PROGRESS NOTE    Zane Severe passively participated in a morning Process Group on the Geriatric Unit, with a focus identifying feelings, planning for the day, and singing. Group time: 45 minutes. Personal goal for participation: To increase the capacity to shift ones mood, prepare for the day, and share in group singing. Goal orientation: The patient will be able to prepare for the day through group singing. Group therapy participation: When prompted, this patient minimally participated in the group. Therapeutic interventions reviewed and discussed: The group members were introduce themselves by first names and participate in group singing as a way to increase their oxygen and blood flow and begin their day on a positive note. They were also asked to join in singing several songs. Impression of participation: The patient joined the group about [facility] through and did not respond to prompting. He sat up and appeared to be alert but was more focused on having his breakfast. He did not contribute to the music or the group's conversation. The patient expressed no SI/HI and displayed no overt psychosis, although this was difficult to verify given his lack of engagement with the group. His affect was flat and his mood matched his affect.

## 2018-02-02 LAB
GLUCOSE BLD STRIP.AUTO-MCNC: 102 MG/DL (ref 65–100)
GLUCOSE BLD STRIP.AUTO-MCNC: 108 MG/DL (ref 65–100)
GLUCOSE BLD STRIP.AUTO-MCNC: 110 MG/DL (ref 65–100)
GLUCOSE BLD STRIP.AUTO-MCNC: 150 MG/DL (ref 65–100)
SERVICE CMNT-IMP: ABNORMAL

## 2018-02-02 PROCEDURE — 74011250637 HC RX REV CODE- 250/637: Performed by: HOSPITALIST

## 2018-02-02 PROCEDURE — 74011250637 HC RX REV CODE- 250/637

## 2018-02-02 PROCEDURE — 74011250637 HC RX REV CODE- 250/637: Performed by: PSYCHIATRY & NEUROLOGY

## 2018-02-02 PROCEDURE — 74011250637 HC RX REV CODE- 250/637: Performed by: FAMILY MEDICINE

## 2018-02-02 PROCEDURE — 82962 GLUCOSE BLOOD TEST: CPT

## 2018-02-02 PROCEDURE — 65220000003 HC RM SEMIPRIVATE PSYCH

## 2018-02-02 RX ORDER — MIRTAZAPINE 15 MG/1
30 TABLET, FILM COATED ORAL
Status: DISCONTINUED | OUTPATIENT
Start: 2018-02-02 | End: 2018-03-03 | Stop reason: HOSPADM

## 2018-02-02 RX ADMIN — HYDRALAZINE HYDROCHLORIDE 100 MG: 50 TABLET, FILM COATED ORAL at 18:05

## 2018-02-02 RX ADMIN — ATORVASTATIN CALCIUM 40 MG: 40 TABLET, FILM COATED ORAL at 10:08

## 2018-02-02 RX ADMIN — BUPROPION HYDROCHLORIDE 150 MG: 150 TABLET, EXTENDED RELEASE ORAL at 10:08

## 2018-02-02 RX ADMIN — FERROUS SULFATE TAB 325 MG (65 MG ELEMENTAL FE) 325 MG: 325 (65 FE) TAB at 10:09

## 2018-02-02 RX ADMIN — AMLODIPINE BESYLATE 10 MG: 5 TABLET ORAL at 10:07

## 2018-02-02 RX ADMIN — LORAZEPAM 1 MG: 1 TABLET ORAL at 10:09

## 2018-02-02 RX ADMIN — CARVEDILOL 3.12 MG: 3.12 TABLET, FILM COATED ORAL at 10:08

## 2018-02-02 RX ADMIN — ASPIRIN 81 MG: 81 TABLET, COATED ORAL at 10:08

## 2018-02-02 RX ADMIN — FERROUS SULFATE TAB 325 MG (65 MG ELEMENTAL FE) 325 MG: 325 (65 FE) TAB at 18:05

## 2018-02-02 RX ADMIN — HYDRALAZINE HYDROCHLORIDE 100 MG: 50 TABLET, FILM COATED ORAL at 10:09

## 2018-02-02 RX ADMIN — CARVEDILOL 3.12 MG: 3.12 TABLET, FILM COATED ORAL at 18:06

## 2018-02-02 RX ADMIN — MIRTAZAPINE 30 MG: 15 TABLET, FILM COATED ORAL at 21:15

## 2018-02-02 RX ADMIN — DOXAZOSIN 4 MG: 2 TABLET ORAL at 21:15

## 2018-02-02 RX ADMIN — HYDRALAZINE HYDROCHLORIDE 100 MG: 50 TABLET, FILM COATED ORAL at 21:15

## 2018-02-02 RX ADMIN — LORAZEPAM 1.5 MG: 1 TABLET ORAL at 21:15

## 2018-02-02 RX ADMIN — ACETAMINOPHEN 650 MG: 325 TABLET, FILM COATED ORAL at 10:06

## 2018-02-02 NOTE — PROGRESS NOTES
Problem: Falls - Risk of  Goal: *Absence of Falls  Document Bello Fall Risk and appropriate interventions in the flowsheet. Outcome: Progressing Towards Goal  Fall Risk Interventions:  Mobility Interventions: Bed/chair exit alarm, Communicate number of staff needed for ambulation/transfer, Mechanical lift    Mentation Interventions: Adequate sleep, hydration, pain control, Bed/chair exit alarm, Door open when patient unattended, Evaluate medications/consider consulting pharmacy, Room close to nurse's station, Reorient patient, Toileting rounds    Medication Interventions: Assess postural VS orthostatic hypotension, Teach patient to arise slowly, Bed/chair exit alarm    Elimination Interventions: Bed/chair exit alarm, Call light in reach, Patient to call for help with toileting needs, Toileting schedule/hourly rounds, Urinal in reach    History of Falls Interventions: Bed/chair exit alarm, Investigate reason for fall, Room close to nurse's station     2315 Pt alert in recliner in dining room during initial rounds. Assisted to be with lift team and walt lift. Sheets changed. No needs expressed. Pt resting comfortably. Call bell and urinal within reach, bed alarm on, door remains open. Will continue to monitor with Q 15 safety checks. 0300 Pt had medium formed BM and incontinent of some urine. Sheets and gown changed again. 0600 Pt has called out frequently during night. Focused on food. Limits set. Pt declined all offers for prn medication. Pt sat up on side of bed several times after removing side rail himself, in spite of verbal redirection not to for safety (pt very close to edge of bed and could slide off easily). Will continue to educate and monitor.

## 2018-02-02 NOTE — PROGRESS NOTES
Problem: Falls - Risk of  Goal: *Absence of Falls  Document Bello Fall Risk and appropriate interventions in the flowsheet. Fall Risk Interventions:  Mobility Interventions: Bed/chair exit alarm, Mechanical lift, PT Consult for mobility concerns, Utilize walker, cane, or other assitive device    Mentation Interventions: Adequate sleep, hydration, pain control, Bed/chair exit alarm, Door open when patient unattended, More frequent rounding, Toileting rounds    Medication Interventions: Bed/chair exit alarm, Evaluate medications/consider consulting pharmacy, Patient to call before getting OOB    Elimination Interventions: Bed/chair exit alarm, Patient to call for help with toileting needs, Toileting schedule/hourly rounds, Urinal in reach    History of Falls Interventions: Bed/chair exit alarm, Door open when patient unattended, Evaluate medications/consider consulting pharmacy, Investigate reason for fall, Room close to nurse's station        Problem: Depressed Mood (Adult/Pediatric)  Goal: *STG: Participates in treatment plan  Outcome: Progressing Towards Goal  Patient is anxious and irritable. Irritable waiting for lift team and breakfast. Compliant with BP and BS and breakfast.   Goal: *STG: Attends activities and groups  Outcome: Progressing Towards Goal  Patient out in milieu eating breakfast.  Goal: *STG: Complies with medication therapy  Outcome: Progressing Towards Goal  Compliant with meals. Noncompliant with medication. 1005 patient compliant with taking medications.

## 2018-02-02 NOTE — BH NOTES
PSYCHIATRIC PROGRESS NOTE         Patient Name  Sharlene Harding   Date of Birth 1940   [facility] [id number]   Medical Record Number  [id number]      Age  78 y.o. PCP Johny Warner, MD   Admit date:  1/24/2018    Room Number  [phone] [id number]  @ [facility]   Date of Service  2/2/2018          PSYCHOTHERAPY SESSION NOTE:  Length of psychotherapy session: 15 minutes    Main condition/diagnosis/issues treated during session today, 2/2/2018 : med , meal and group non compliance    I employed Cognitive Behavioral therapy techniques, Reality-Oriented psychotherapy, as well as supportive psychotherapy in regards to various ongoing psychosocial stressors, including the following: pre-admission and current problems; housing issues; occupational issues; medical issues; and stress of hospitalization. Interpersonal relationship issues and psychodynamic conflicts explored. Attempts made to alleviate maladaptive patterns. We, also, worked on issues of denial & effects of substance dependency/use     Overall, patient is not progressing    Treatment Plan Update (reviewed an updated 2/2/2018) : I will modify psychotherapy tx plan by implementing more stress management strategies, building upon cognitive behavioral techniques, increasing coping skills, as well as shoring up psychological defenses). An extended energy and skill set was needed to engage pt in psychotherapy due to some of the following: resistiveness, complexity, negativity, confrontational nature, hostile behaviors, and/or severe abnormalities in thought processes/psychosis resulting in the loss of expressive/receptive language communication skills. E & M PROGRESS NOTE:         HISTORY       CC:  \"depression and non compliance with treatment \"  HISTORY OF PRESENT ILLNESS/INTERVAL HISTORY:  (reviewed/updated 2/2/2018).   per initial evaluation:     Sharlene Harding presents/reports/evidences the following emotional symptoms today, 2/2/2018:depression. The above symptoms have been present for 1 years. These symptoms are of severe severity. The symptoms are constant  in nature. Additional symptomatology and features include anxiety. 2/2/18- pATIENT HAS IMPROVED SPORADIC MED COMPLIANCE WITH INCREASED ATIVAN DOSE. WAS SOCIAL IN [facility] TODAY. SW TO CONTACT DAUGHTER TO LET HER KNOE THAT HE IS NOT BEING AS TREATMENT COMPLIANT AS HE TOLD HER HE WOULD BE.    1/27/18 he is not responding to questions and not engaging and not showing engagement and refuse medications and treatment   1/28/18 he is doing better and he engaged and not feeling sad or depressed and he eats better    1/29/18= Patient is deliberately refusing vital signs, labs and medications. He likes finger foods and eats those. Will meet with daughter on Tuesday to discuss possible guardianship. Will seek forced meds. 1/30/18- Pt. Agreed to comply with meds after extensive family meeting. Planning for SNF admission and possible transfer to home afterwards [address].  1/31/18- Patient is intermittently cooperative with somatic meds. We discussed the risks to his health this can cause. I advised Wellbutrin for depression. This may help motivate treatment compliance. 2/1/18- Continues Rude, belligerent, uncooperative and disrespectful with staff. Is marginally compliant with diabetes management. Waiting for placement. SIDE EFFECTS: (reviewed/updated 2/2/2018)  None reported or admitted to. No noted toxicity with use of Depakote/Tegretol/lithium/Clozaril/TCAs   ALLERGIES:(reviewed/updated 2/2/2018)  Allergies   Allergen Reactions    Tetracycline Hives      MEDICATIONS PRIOR TO ADMISSION:(reviewed/updated 2/2/2018)  Prescriptions Prior to Admission   Medication Sig    hydrALAZINE (APRESOLINE) 100 mg tablet Take 1 Tab by mouth three (3) times daily.  amLODIPine (NORVASC) 10 mg tablet Take 1 Tab by mouth daily.     aspirin delayed-release 81 mg tablet Take 1 Tab by mouth daily.  atorvastatin (LIPITOR) 40 mg tablet Take 1 Tab by mouth daily.  carvedilol (COREG) 3.125 mg tablet Take 1 Tab by mouth two (2) times daily (with meals).  ferrous sulfate 325 mg (65 mg iron) tablet Take 1 Tab by mouth two (2) times daily (with meals).  mirtazapine (REMERON) 7.5 mg tablet Take 1 Tab by mouth nightly.  nicotine (NICODERM CQ) 21 mg/24 hr 1 Patch by TransDERmal route every twenty-four (24) hours for 30 days. PAST MEDICAL HISTORY: Past medical history from the initial psychiatric evaluation has been reviewed (reviewed/updated 2/2/2018) with no additional updates (I asked patient and no additional past medical history provided). Past Medical History:   Diagnosis Date    Arthritis     CAD (coronary artery disease) 2007    CKD (chronic kidney disease), stage III     DM type 2 causing renal disease (HCC)     DVT (deep venous thrombosis) (HCC)     Hx of DVT:  Xarelto stopped due to GI bleed. S/P IVC filter.  Gait abnormality     GI bleed     Heart murmur     Hepatitis C     History of blood transfusion     Hypercholesterolemia     Hypertension 11/7/2014    Neuropathy     Non compliance w medication regimen     Peripheral vascular disease ([facility] [address] 11/7/2014    A. S/P Right SFA POBA and tibial atherectomy (2/4/13).  PUD (peptic ulcer disease) 2007    Unspecified sleep apnea     never used cpap     Past Surgical History:   Procedure Laterality Date    ABDOMEN SURGERY PROC UNLISTED  2007    has approx 7-8\" midline incision on abdomen--\"had to open him up and clean him out after feeding tube clogged\"    HX GI  2007    feeding tube for approx 2 mo    VASCULAR SURGERY PROCEDURE UNLIST Right 1/22/2015    popliteal-tibial bypass      SOCIAL HISTORY: Social history from the initial psychiatric evaluation has been reviewed (reviewed/updated 2/2/2018) with no additional updates (I asked patient and no additional social history provided). Social History     Social History    Marital status:      Spouse name: N/A    Number of children: N/A    Years of education: N/A     Occupational History    Not on file. Social History Main Topics    Smoking status: Current Every Day Smoker     Packs/day: 0.50    Smokeless tobacco: Not on file    Alcohol use No    Drug use: No      Comment: >20 years ago    Sexual activity: Not on file     Other Topics Concern    Not on file     Social History Narrative    78 year ols male admitted for depression and homelessness. Pt will be evicated from apartment due to non payment of bills. Girlfriend of 30 years left him to [facility] live in the [place] with others. \" Pt is a brittle diabetic, with treatment non compliance. He has bilarela lower extremity amputations. Patient has a long hx of substance abuse. FAMILY HISTORY: Family history from the initial psychiatric evaluation has been reviewed (reviewed/updated 2/2/2018) with no additional updates (I asked patient and no additional family history provided). Family History   Problem Relation Age of Onset    Hypertension Father        REVIEW OF SYSTEMS: (reviewed/updated 2/2/2018)  Appetite:decreased   Sleep: fitful   All other Review of Systems: Psychological ROS: positive for - behavioral disorder  Respiratory ROS: no cough, shortness of breath, or wheezing  Cardiovascular ROS: no chest pain or dyspnea on exertion         [facility] (MSE):    MSE FINDINGS ARE WITHIN NORMAL LIMITS (WNL) UNLESS OTHERWISE STATED BELOW. ( ALL OF THE BELOW CATEGORIES OF THE MSE HAVE BEEN REVIEWED (reviewed 2/2/2018) AND UPDATED AS DEEMED APPROPRIATE )  General Presentation age appropriate, evasive and guarded   Orientation oriented to time, place and person   Vital Signs  See below (reviewed 2/2/2018); Vital Signs (BP, Pulse, & Temp) are within normal limits if not listed below.    Gait and Station Stable/steady, no ataxia Musculoskeletal System No extrapyramidal symptoms (EPS); no abnormal muscular movements or Tardive Dyskinesia (TD); muscle strength and tone are within normal limits   Language No aphasia or dysarthria   Speech:  hypoverbal   Thought Processes concrete; normal rate of thoughts; poor abstract reasoning/computation   Thought Associations goal directed   Thought Content free of delusions and free of hallucinations   Suicidal Ideations none   Homicidal Ideations none   Mood:  irritable   Affect:  mood-congruent   Memory recent  fair   Memory remote:  fair   Concentration/Attention:  distractable   Fund of Knowledge significantly below average   Insight:  poor   Reliability poor   Judgment:  poor          VITALS:     Patient Vitals for the past 24 hrs:   Temp Pulse Resp BP SpO2   02/02/18 0818 97.8 °F (36.6 °C) 65 16 179/80 96 %   02/01/18 2045 98.2 °F (36.8 °C) 64 16 157/81 100 %     Wt Readings from Last 3 Encounters:   01/24/18 82.7 kg (182 lb 5.1 oz)   01/23/18 83.6 kg (184 lb 4.9 oz)   07/13/17 88.9 kg (196 lb)     Temp Readings from Last 3 Encounters:   02/02/18 97.8 °F (36.6 °C)   01/24/18 97.2 °F (36.2 °C)   07/20/17 97.9 °F (36.6 °C)     BP Readings from Last 3 Encounters:   02/02/18 179/80   01/24/18 172/68   07/20/17 175/76     Pulse Readings from Last 3 Encounters:   02/02/18 65   01/24/18 72   07/20/17 (!) 52            DATA     LABORATORY DATA:(reviewed/updated 2/2/2018)  Recent Results (from the past 24 hour(s))   GLUCOSE, POC    Collection Time: 02/01/18 10:00 PM   Result Value Ref Range    Glucose (POC) 119 (H) 65 - 100 mg/dL    Performed by Carmelo [id number], POC    Collection Time: 02/02/18  8:04 AM   Result Value Ref Range    Glucose (POC) 102 (H) 65 - 100 mg/dL    Performed by Shira [id number], POC    Collection Time: 02/02/18 12:00 PM   Result Value Ref Range    Glucose (POC) 150 (H) 65 - 100 mg/dL    Performed by Harris Kaur      No results found for: Marciano Alarcon, VALP, VALPR, DS6, CRBAM, CRBAMP, CARB2, XCRBAM  No results found for: LITHM   RADIOLOGY REPORTS:(reviewed/updated 2/2/2018)  Xr Chest Port    Result Date: 1/20/2018  EXAM: Portable CXR.  1800 hours. COMPARISON: 1/19/2015. INDICATION: cp, fatigue There is new interstitial pulmonary edema, cardiomegaly and small left pleural effusion. There is no focal infiltrate, pneumothorax or midline shift. IMPRESSION: CHF pattern.          MEDICATIONS     ALL MEDICATIONS:   Current Facility-Administered Medications   Medication Dose Route Frequency    LORazepam (ATIVAN) tablet 1.5 mg  1.5 mg Oral BID    mirtazapine (REMERON) tablet 30 mg  30 mg Oral QHS    buPROPion SR (WELLBUTRIN SR) tablet 150 mg  150 mg Oral DAILY    alum-mag hydroxide-simeth (MYLANTA) oral suspension 30 mL  30 mL Oral Q4H PRN    doxazosin (CARDURA) tablet 4 mg  4 mg Oral QHS    polyethylene glycol (MIRALAX) packet 17 g  17 g Oral DAILY    OLANZapine (ZyPREXA) tablet 2.5 mg  2.5 mg Oral Q6H PRN    ziprasidone (GEODON) 10 mg in sterile water (preservative free) 0.5 mL injection  10 mg IntraMUSCular BID PRN    benztropine (COGENTIN) tablet 1 mg  1 mg Oral BID PRN    benztropine (COGENTIN) injection 1 mg  1 mg IntraMUSCular BID PRN    zolpidem (AMBIEN) tablet 5 mg  5 mg Oral QHS PRN    acetaminophen (TYLENOL) tablet 650 mg  650 mg Oral Q4H PRN    magnesium hydroxide (MILK OF MAGNESIA) 400 mg/5 mL oral suspension 30 mL  30 mL Oral DAILY PRN    nicotine (NICODERM CQ) 21 mg/24 hr patch 1 Patch  1 Patch TransDERmal DAILY PRN    influenza vaccine 2017-18 (3 yrs+)(PF) (FLUZONE QUAD/FLUARIX QUAD) injection 0.5 mL  0.5 mL IntraMUSCular PRIOR TO DISCHARGE    amLODIPine (NORVASC) tablet 10 mg  10 mg Oral DAILY    aspirin delayed-release tablet 81 mg  81 mg Oral DAILY    atorvastatin (LIPITOR) tablet 40 mg  40 mg Oral DAILY    carvedilol (COREG) tablet 3.125 mg  3.125 mg Oral BID WITH MEALS    ferrous sulfate tablet 325 mg  325 mg Oral BID WITH MEALS    hydrALAZINE (APRESOLINE) tablet 100 mg  100 mg Oral TID    glucose chewable tablet 16 g  4 Tab Oral PRN    dextrose (D50W) injection syrg 12.5-25 g  12.5-25 g IntraVENous PRN    glucagon (GLUCAGEN) injection 1 mg  1 mg IntraMUSCular PRN    insulin lispro (HUMALOG) injection   SubCUTAneous AC&HS    nicotine (NICODERM CQ) 21 mg/24 hr patch 1 Patch  1 Patch TransDERmal Q24H      SCHEDULED MEDICATIONS:   Current Facility-Administered Medications   Medication Dose Route Frequency    LORazepam (ATIVAN) tablet 1.5 mg  1.5 mg Oral BID    mirtazapine (REMERON) tablet 30 mg  30 mg Oral QHS    buPROPion SR (WELLBUTRIN SR) tablet 150 mg  150 mg Oral DAILY    doxazosin (CARDURA) tablet 4 mg  4 mg Oral QHS    polyethylene glycol (MIRALAX) packet 17 g  17 g Oral DAILY    influenza vaccine 2017-18 (3 yrs+)(PF) (FLUZONE QUAD/FLUARIX QUAD) injection 0.5 mL  0.5 mL IntraMUSCular PRIOR TO DISCHARGE    amLODIPine (NORVASC) tablet 10 mg  10 mg Oral DAILY    aspirin delayed-release tablet 81 mg  81 mg Oral DAILY    atorvastatin (LIPITOR) tablet 40 mg  40 mg Oral DAILY    carvedilol (COREG) tablet 3.125 mg  3.125 mg Oral BID WITH MEALS    ferrous sulfate tablet 325 mg  325 mg Oral BID WITH MEALS    hydrALAZINE (APRESOLINE) tablet 100 mg  100 mg Oral TID    insulin lispro (HUMALOG) injection   SubCUTAneous AC&HS    nicotine (NICODERM CQ) 21 mg/24 hr patch 1 Patch  1 Patch TransDERmal Q24H          ASSESSMENT & PLAN     DIAGNOSES REQUIRING ACTIVE TREATMENT AND MONITORING: (reviewed/updated 2/2/2018)  Patient Active Hospital Problem List:   Depression (1/24/2018)    Assessment: sadness, helplessness, hopelessness, in reaction to recent bilateral lower leg amputationa and inability to pat water bill at home.     Plan: consider forced medication/treatment, address social issues, daughter may be obtaining guardianship  1/27/18 need order to treat    1/28/18 continue his medications              In summary, Eloy Amezcua Robles Gonzales, is a 78 y.o.  male who presents with a severe exacerbation of the principal diagnosis of Depression  Patient's condition is worsening/not improving/not stable Patient requires continued inpatient hospitalization for further stabilization, safety monitoring and medication management. I will continue to coordinate the provision of individual, milieu, occupational, group, and substance abuse therapies to address target symptoms/diagnoses as deemed appropriate for the individual patient. A coordinated, multidisplinary treatment team round was conducted with the patient (this team consists of the nurse, psychiatric unit pharmcist,  and writer). Complete current electronic health record for patient has been reviewed today including consultant notes, ancillary staff notes, nurses and psychiatric tech notes. Suicide risk assessment completed and patient deemed to be of low risk for suicide at this time. The following regarding medications was addressed during rounds with patient:   the risks and benefits of the proposed medication. The patient was given the opportunity to ask questions. Informed consent given to the use of the above medications. Will continue to adjust psychiatric and non-psychiatric medications (see above \"medication\" section and orders section for details) as deemed appropriate & based upon diagnoses and response to treatment. I will continue to order blood tests/labs and diagnostic tests as deemed appropriate and review results as they become available (see orders for details and above listed lab/test results). I will order psychiatric records from previous [facility] hospitals to further elucidate the nature of patient's psychopathology and review once available. I will gather additional collateral information from friends, family and o/p treatment team to further elucidate the nature of patient's psychopathology and baselline level of psychiatric functioning. I certify that this patient's inpatient psychiatric hospital services furnished since the previous certification were, and continue to be, required for treatment that could reasonably be expected to improve the patient's condition, or for diagnostic study, and that the patient continues to need, on a daily basis, active treatment furnished directly by or requiring the supervision of inpatient psychiatric facility personnel. In addition, the hospital records show that services furnished were intensive treatment services, admission or related services, or equivalent services.     EXPECTED DISCHARGE DATE/DAY: TBD     DISPOSITION: Home       Signed By:   Marie Rosales MD  2/2/2018

## 2018-02-02 NOTE — PROGRESS NOTES
Problem: Depressed Mood (Adult/Pediatric)  Goal: *STG: Complies with medication therapy  Outcome: Progressing Towards Goal  1530: greeted patient on unit in room resting quietly. Appears in no acute distress. No voiced complaints at present. Will continue to monitor on Q 15 minute safety checks.

## 2018-02-02 NOTE — INTERDISCIPLINARY ROUNDS
Behavioral Health Interdisciplinary Rounds     Patient Name: Katrin Jorge  Age: 78 y.o. Room/Bed:  740/02  Primary Diagnosis: Depression   Admission Status: Involuntary Commitment     Readmission within 30 days: no  Power of  in place: yes - Daughter (Gunnar Her - awaiting paperwork)  Patient requires a blocked bed: no          Reason for blocked bed: n/a    VTE Prophylaxis: Yes - ASA  Flu vaccine given : no - pt refused   Mobility needs/Fall risk: yes    Nutritional Plan: yes  Consults:  PT/OT, Diabetic Tx team, Hospitalist, & Wound Care following. Nephrology & Palliative Care doctor saw (will return if needed) (pt wants to remain a FULL Code)         Labs/Testing due today?: no    Sleep hours: 0.75         Participation in Care/Groups:  no  Medication Compliant?: Selective (compliant with AM and HS meds, refused all evening meds)  PRNS (last 24 hours): None    Restraints (last 24 hours):  no  Substance Abuse:  no  CIWA (range last 24 hours):  COWS (range last 24 hours):   Alcohol screening (AUDIT) completed -  AUDIT Score: 0  If applicable, date SBIRT discussed in treatment team AND documented:   Tobacco - patient is a smoker: yes   Date tobacco education completed by RN: 1/24/18  24 hour chart check complete: yes     Patient goal(s) for today:   Treatment team focus/goals: plan to encourge compliance with medications   Progress note -He has been complaint with selective medications. He did not sleep well last night. LOS:  9  Expected LOS: TBD    Financial concerns/prescription coverage: medicare    Date of last family contact:       Family requesting physician contact today:    Discharge plan: skilled nursing home placement   Guns in the home: no        Outpatient provider(s): TBD    Participating treatment team members:  Royal Vail ,RN

## 2018-02-02 NOTE — BH NOTES
0930: Wound care nurse Karol Harrington came to see patient. He was up in the [place] chair. She verbalized to call her once he's back in bed so she can assess his buttocks. 1257: Asked patient if he wants to get back in bed so we can call the wound care nurse to see him. He verbalized he wants to stay out in the milieu. 1305: Pt requested to get back in bed. Lift team called to help with moving pt back to bed with walt lift. Wound care nurse familia paged as well at this time. 1330: Lift team here and put patient back in bed. Diaper changed. Wound care nurse is here to assess patient. 1432: Radiology here to do chest xray on patient per MD orders. Patient is noncompliant. Please call [phone] when patient is up in the chair so they can come back and try.

## 2018-02-02 NOTE — BH NOTES
At 4567 patient refused all meds including prn tylenol.  Then at 1006 patient took medications and prn tylenol

## 2018-02-02 NOTE — BH NOTES
GROUP THERAPY PROGRESS NOTE    Delfina Rosales passively participated in a morning Process Group on the Geriatric Unit, with a focus identifying feelings, planning for the day, and singing. Group time: 45 minutes. Personal goal for participation: To increase the capacity to shift ones mood, prepare for the day, and share in group singing. Goal orientation: The patient will be able to prepare for the day through group singing. Group therapy participation: When prompted, this patient minimally participated in the group. Therapeutic interventions reviewed and discussed: The group members were introduce themselves by first names and participate in group singing as a way to increase their oxygen and blood flow and begin their day on a positive note. They were also asked to join in singing several songs. Impression of participation: The patient sat through the group. He may have mumbled a bit and spoke clearly once when he asked for his lunch. One of the nursing staff suggested lunch would be coming later. His eyes were open and he looked alert, but he may not have been oriented to time. The patient expressed no SI/HI and displayed no overt psychosis, but it was unclear if he were responding to internal stimuli. His flat face (empty mask impression) may be more of a symptom of possible demansia. His affect was flat and his mood matched his affect. He did not appear to be engaged in the group process.

## 2018-02-02 NOTE — WOUND CARE
WOCN Note:     Follow-up visit for buttocks. Chart shows:  Admitted for depression; history of sleep apnea, PVD, bilateral BKA's, DM, Hepatitis C     Assessment:   Patient has just been moved from recliner to bed by [facility] using the [facility] lift. He does not talk to us very much. PCT in room to assist in changing his brief after urinary incontinence. Aloe [place] applied. Versa Care bed with bed alarm   Patient reports no pain.      Sacral skin intact and without erythema.     1. Left central buttock with minute scratch that has now resurfaced and is hypopigmented pink. Skin Care & Pressure Relief Recommendations:  Turn/reposition approximately every 2 hours and offload heels. Manage incontinence / promote continence; Aloe [place] to buttocks and sacrum daily and as needed with incontinence care    Discussed above plan with RN. Transition of Care: Will sign off - please reconsult as needed.      MAKI SoaresN, RN, [facility]  Certified Wound, Ostomy, Continence Nurse  office [phone]  pager [phone] or call  to page

## 2018-02-02 NOTE — DIABETES MGMT
DTC Progress Note    Recommendations/ Comments: Chart reviewed d/t hypoglycemic event. Pt has not required any correctional insulin. Renal status noted. Current hospital DM medication: Correctional Lispro-high sensitivity     Chart reviewed on [facility]oss[facility]. Patient is a 78 y.o. male with known Type 2 Diabetes on diet at home. A1c:   Lab Results   Component Value Date/Time    Hemoglobin A1c 4.5 01/21/2018 02:21 AM    Hemoglobin A1c 4.6 07/14/2017 04:42 AM       Recent Glucose Results:   Lab Results   Component Value Date/Time    GLUCPOC 102 (H) 02/02/2018 08:04 AM    GLUCPOC 119 (H) 02/01/2018 10:00 PM        Lab Results   Component Value Date/Time    Creatinine 2.49 01/28/2018 08:14 PM     Estimated Creatinine Clearance: 27.3 mL/min (based on Cr of 2.49). Active Orders   Diet    DIET FINGER FOOD No options chosen        PO intake:   Patient Vitals for the past 72 hrs:   % Diet Eaten   02/01/18 2026 100 %   01/31/18 1800 100 %       Will continue to follow as needed. Thank you  Ebony Lee RD, CDE

## 2018-02-02 NOTE — BH NOTES
Received pt resting in bed. No voiced complaints or acute distress at present. Some confusion? Note pt refused vital signs and refused to  Speak  Too staff  Note pt is resistive towards staff while trying to perform adl for pt and changing linens on a urine saturated bed.   Needed assistance from male staff in doing so

## 2018-02-03 ENCOUNTER — HOSPITAL ENCOUNTER (OUTPATIENT)
Dept: GENERAL RADIOLOGY | Age: 78
Discharge: HOME | End: 2018-02-03
Attending: PSYCHIATRY & NEUROLOGY
Payer: MEDICARE

## 2018-02-03 LAB
GLUCOSE BLD STRIP.AUTO-MCNC: 116 MG/DL (ref 65–100)
GLUCOSE BLD STRIP.AUTO-MCNC: 135 MG/DL (ref 65–100)
GLUCOSE BLD STRIP.AUTO-MCNC: 150 MG/DL (ref 65–100)
GLUCOSE BLD STRIP.AUTO-MCNC: 172 MG/DL (ref 65–100)
GLUCOSE BLD STRIP.AUTO-MCNC: 73 MG/DL (ref 65–100)
GLUCOSE BLD STRIP.AUTO-MCNC: 75 MG/DL (ref 65–100)
SERVICE CMNT-IMP: ABNORMAL
SERVICE CMNT-IMP: NORMAL
SERVICE CMNT-IMP: NORMAL

## 2018-02-03 PROCEDURE — 82962 GLUCOSE BLOOD TEST: CPT

## 2018-02-03 PROCEDURE — 74011250637 HC RX REV CODE- 250/637: Performed by: PSYCHIATRY & NEUROLOGY

## 2018-02-03 PROCEDURE — 71045 X-RAY EXAM CHEST 1 VIEW: CPT

## 2018-02-03 PROCEDURE — 74011250637 HC RX REV CODE- 250/637: Performed by: FAMILY MEDICINE

## 2018-02-03 PROCEDURE — 74011250637 HC RX REV CODE- 250/637: Performed by: HOSPITALIST

## 2018-02-03 PROCEDURE — 65220000003 HC RM SEMIPRIVATE PSYCH

## 2018-02-03 RX ADMIN — ATORVASTATIN CALCIUM 40 MG: 40 TABLET, FILM COATED ORAL at 10:21

## 2018-02-03 RX ADMIN — MIRTAZAPINE 30 MG: 15 TABLET, FILM COATED ORAL at 21:48

## 2018-02-03 RX ADMIN — ASPIRIN 81 MG: 81 TABLET, COATED ORAL at 10:21

## 2018-02-03 RX ADMIN — DOXAZOSIN 4 MG: 2 TABLET ORAL at 21:30

## 2018-02-03 RX ADMIN — CARVEDILOL 3.12 MG: 3.12 TABLET, FILM COATED ORAL at 10:21

## 2018-02-03 RX ADMIN — HYDRALAZINE HYDROCHLORIDE 100 MG: 50 TABLET, FILM COATED ORAL at 10:21

## 2018-02-03 RX ADMIN — AMLODIPINE BESYLATE 10 MG: 5 TABLET ORAL at 10:21

## 2018-02-03 RX ADMIN — HYDRALAZINE HYDROCHLORIDE 100 MG: 50 TABLET, FILM COATED ORAL at 22:32

## 2018-02-03 RX ADMIN — FERROUS SULFATE TAB 325 MG (65 MG ELEMENTAL FE) 325 MG: 325 (65 FE) TAB at 16:43

## 2018-02-03 RX ADMIN — HYDRALAZINE HYDROCHLORIDE 100 MG: 50 TABLET, FILM COATED ORAL at 17:43

## 2018-02-03 RX ADMIN — LORAZEPAM 1.5 MG: 1 TABLET ORAL at 10:22

## 2018-02-03 RX ADMIN — BUPROPION HYDROCHLORIDE 150 MG: 150 TABLET, EXTENDED RELEASE ORAL at 10:21

## 2018-02-03 RX ADMIN — CARVEDILOL 3.12 MG: 3.12 TABLET, FILM COATED ORAL at 16:43

## 2018-02-03 RX ADMIN — LORAZEPAM 1.5 MG: 1 TABLET ORAL at 20:42

## 2018-02-03 RX ADMIN — FERROUS SULFATE TAB 325 MG (65 MG ELEMENTAL FE) 325 MG: 325 (65 FE) TAB at 10:21

## 2018-02-03 NOTE — BH NOTES
GROUP THERAPY PROGRESS NOTE    Esau Harry is not participating in Leisure-[employer]ive Group.      Group time: 15 minutes    Personal goal for participation: decrease anxiety    Goal orientation: relaxation    Group therapy participation: none    Therapeutic interventions reviewed and discussed:     Impression of participation: did not attend

## 2018-02-03 NOTE — BH NOTES
Behavioral Health Interdisciplinary Rounds     Patient Name: Delfina Rosales  Age: 78 y.o. Room/Bed:  740/02  Primary Diagnosis: Depression   Admission Status: Involuntary Commitment     Readmission within 30 days: no  Power of  in place: yes--daughter (Radha Liu--awaiting paperwork)  Patient requires a blocked bed: no          Reason for blocked bed: na    VTE Prophylaxis: Yes--ASA  Flu vaccine given : no--pt refused   Mobility needs/Fall risk: yes    Nutritional Plan: yes  Consults: PT/OT, Diabetic Tx team, Hospitalist, & Wound Care following. Labs/Testing due today?: no    Sleep hours:  3.5+     Participation in Care/Groups:  no  Medication Compliant?: yes  PRNS (last 24 hours): Pain    Restraints (last 24 hours):  no  Substance Abuse:  no  CIWA (range last 24 hours): na COWS (range last 24 hours): na  Alcohol screening (AUDIT) completed -  AUDIT Score: 0  If applicable, date SBIRT discussed in treatment team AND documented: na  Tobacco - patient is a smoker: yes   Date tobacco education completed by RN: 1/24/18  24 hour chart check complete: yes     Patient goal(s) for today:   Treatment team focus/goals:   Progress note     LOS:  10  Expected LOS: TBD    Financial concerns/prescription coverage:  Medicare  Date of last family contact:       Family requesting physician contact today:    Discharge plan: SNF Placement  Guns in the home: no        Outpatient provider(s): TBD    Participating treatment team members:  Delfina Rosales, * (assigned SW),

## 2018-02-03 NOTE — PROGRESS NOTES
Problem: Depressed Mood (Adult/Pediatric)  Goal: *STG: Participates in treatment plan  Outcome: Progressing Towards Goal  Pt is cooperative and labile. He is sitting out in the DR with no acute distress noted. Goal: *STG: Complies with medication therapy  Outcome: Progressing Towards Goal  Med/ Meal compliant   Compliant with chest xray this morning.

## 2018-02-03 NOTE — BH NOTES
1520:  Patient received as he is resting in bed with his eyes closed. He responds to greetings from oncoming staff with 'Good Morning'. He appears to be resting comfortably and voices no complaints or concerns at this time. Will maintain q 15 minute safety checks.

## 2018-02-04 LAB
GLUCOSE BLD STRIP.AUTO-MCNC: 101 MG/DL (ref 65–100)
GLUCOSE BLD STRIP.AUTO-MCNC: 142 MG/DL (ref 65–100)
GLUCOSE BLD STRIP.AUTO-MCNC: 85 MG/DL (ref 65–100)
SERVICE CMNT-IMP: ABNORMAL
SERVICE CMNT-IMP: ABNORMAL
SERVICE CMNT-IMP: NORMAL

## 2018-02-04 PROCEDURE — 74011250637 HC RX REV CODE- 250/637: Performed by: PSYCHIATRY & NEUROLOGY

## 2018-02-04 PROCEDURE — 74011250637 HC RX REV CODE- 250/637: Performed by: HOSPITALIST

## 2018-02-04 PROCEDURE — 82962 GLUCOSE BLOOD TEST: CPT

## 2018-02-04 PROCEDURE — 65220000003 HC RM SEMIPRIVATE PSYCH

## 2018-02-04 PROCEDURE — 74011250637 HC RX REV CODE- 250/637: Performed by: FAMILY MEDICINE

## 2018-02-04 RX ADMIN — HYDRALAZINE HYDROCHLORIDE 100 MG: 50 TABLET, FILM COATED ORAL at 20:14

## 2018-02-04 RX ADMIN — BUPROPION HYDROCHLORIDE 150 MG: 150 TABLET, EXTENDED RELEASE ORAL at 09:00

## 2018-02-04 RX ADMIN — MIRTAZAPINE 30 MG: 15 TABLET, FILM COATED ORAL at 20:17

## 2018-02-04 RX ADMIN — DOXAZOSIN 4 MG: 2 TABLET ORAL at 21:15

## 2018-02-04 RX ADMIN — FERROUS SULFATE TAB 325 MG (65 MG ELEMENTAL FE) 325 MG: 325 (65 FE) TAB at 20:15

## 2018-02-04 RX ADMIN — LORAZEPAM 1.5 MG: 1 TABLET ORAL at 22:16

## 2018-02-04 NOTE — BH NOTES
Behavioral Health Interdisciplinary Rounds     Patient Name: Gunner Stewart  Age: 78 y.o. Room/Bed:  740/02  Primary Diagnosis: Depression   Admission Status: Involuntary Commitment     Readmission within 30 days: no  Power of  in place: yes--daughter (Radha Liu--awaiting paperwork)  Patient requires a blocked bed: no          Reason for blocked bed: na    VTE Prophylaxis: Yes--ASA  Flu vaccine given : no--pt refused   Mobility needs/Fall risk: yes    Nutritional Plan: yes  Consults: PT/OT, Diabetic Tx team, Hospitalist, & Wound Care following         Labs/Testing due today?: no    Sleep hours:  7+      Participation in Care/Groups:  no  Medication Compliant?: Selective  PRNS (last 24 hours): None    Restraints (last 24 hours):  no  Substance Abuse:  no  CIWA (range last 24 hours): na COWS (range last 24 hours): na  Alcohol screening (AUDIT) completed -  AUDIT Score: 0  If applicable, date SBIRT discussed in treatment team AND documented: na  Tobacco - patient is a smoker: yes   Date tobacco education completed by RN: 1/24/18, but still asking staff for cigarettes  24 hour chart check complete: yes     Patient goal(s) for today:   Treatment team focus/goals:   Progress note     LOS:  11  Expected LOS: TBD    Financial concerns/prescription coverage:  Medicare  Date of last family contact:       Family requesting physician contact today:    Discharge plan: SNF placement  Guns in the home: no        Outpatient provider(s): TBD    Participating treatment team members:  Gunner Stewart, * (assigned SW),

## 2018-02-04 NOTE — BH NOTES
1540:  Patient initially received as he was sleeping comfortably in bed. Later, he calls out for assistance with the urinal and to request a cigarette. Reorientation provided. Patient denies pain at this time. Will maintain q 15 minute safety checks. 1700:  Patient has refused his vital signs and all of his scheduled medications at this time. Arce Gall is provided. Will continue to monitor. 1910:  Patient shouting out from his room and cursing at staff when redirection attempts made. Will continue to monitor.

## 2018-02-04 NOTE — PROGRESS NOTES
Problem: Falls - Risk of  Goal: *Absence of Falls  Document Bello Fall Risk and appropriate interventions in the flowsheet. Outcome: Progressing Towards Goal  Fall Risk Interventions:  Mobility Interventions: Bed/chair exit alarm, Mechanical lift    Mentation Interventions: Adequate sleep, hydration, pain control, Door open when patient unattended, Evaluate medications/consider consulting pharmacy, Reorient patient, Room close to nurse's station    Medication Interventions: Bed/chair exit alarm, Evaluate medications/consider consulting pharmacy    Elimination Interventions: Bed/chair exit alarm, Patient to call for help with toileting needs, Urinal in reach    History of Falls Interventions: Bed/chair exit alarm, Door open when patient unattended, Evaluate medications/consider consulting pharmacy, Room close to nurse's station        Problem: Depressed Mood (Adult/Pediatric)  Goal: *STG: Attends activities and groups  Outcome: Not Progressing Towards Goal  Patient has remained isolative to his room throughout this shift.

## 2018-02-04 NOTE — PROGRESS NOTES
Problem: Depressed Mood (Adult/Pediatric)  Goal: *STG: Participates in treatment plan  Outcome: Not Progressing Towards Goal  Pt encouraged to have breakfast with peers and take morning medications,pt declined and remains in bed

## 2018-02-04 NOTE — BH NOTES
PSYCHIATRIC PROGRESS NOTE         Patient Name  Josselin Royal   Date of Birth 1940   [facility] [id number]   Medical Record Number  [id number]      Age  78 y.o. PCP Johny Warner, MD   Admit date:  1/24/2018    Room Number  [phone]  @ [facility]   Date of Service  2/4/2018          PSYCHOTHERAPY SESSION NOTE:  Length of psychotherapy session: 15 minutes    Main condition/diagnosis/issues treated during session today, 2/4/2018 : med , meal and group non compliance    I employed Cognitive Behavioral therapy techniques, Reality-Oriented psychotherapy, as well as supportive psychotherapy in regards to various ongoing psychosocial stressors, including the following: pre-admission and current problems; housing issues; occupational issues; medical issues; and stress of hospitalization. Interpersonal relationship issues and psychodynamic conflicts explored. Attempts made to alleviate maladaptive patterns. We, also, worked on issues of denial & effects of substance dependency/use     Overall, patient is not progressing    Treatment Plan Update (reviewed an updated 2/4/2018) : I will modify psychotherapy tx plan by implementing more stress management strategies, building upon cognitive behavioral techniques, increasing coping skills, as well as shoring up psychological defenses). An extended energy and skill set was needed to engage pt in psychotherapy due to some of the following: resistiveness, complexity, negativity, confrontational nature, hostile behaviors, and/or severe abnormalities in thought processes/psychosis resulting in the loss of expressive/receptive language communication skills. E & M PROGRESS NOTE:         HISTORY       CC:  \"depression and non compliance with treatment \"  HISTORY OF PRESENT ILLNESS/INTERVAL HISTORY:  (reviewed/updated 2/4/2018).   per initial evaluation:     Josselin Royal presents/reports/evidences the following emotional symptoms today, 2/4/2018:depression. The above symptoms have been present for 1 years. These symptoms are of severe severity. The symptoms are constant  in nature. Additional symptomatology and features include anxiety. 2/2/18- pATIENT HAS IMPROVED SPORADIC MED COMPLIANCE WITH INCREASED ATIVAN DOSE. WAS SOCIAL IN [facility] TODAY. SW TO CONTACT DAUGHTER TO LET HER KNOE THAT HE IS NOT BEING AS TREATMENT COMPLIANT AS HE TOLD HER HE WOULD BE.    1/27/18 he is not responding to questions and not engaging and not showing engagement and refuse medications and treatment   1/28/18 he is doing better and he engaged and not feeling sad or depressed and he eats better    1/29/18= Patient is deliberately refusing vital signs, labs and medications. He likes finger foods and eats those. Will meet with daughter on Tuesday to discuss possible guardianship. Will seek forced meds. 1/30/18- Pt. Agreed to comply with meds after extensive family meeting. Planning for SNF admission and possible transfer to home afterwards [address].  1/31/18- Patient is intermittently cooperative with somatic meds. We discussed the risks to his health this can cause. I advised Wellbutrin for depression. This may help motivate treatment compliance. 2/1/18- Continues Rude, belligerent, uncooperative and disrespectful with staff. Is marginally compliant with diabetes management. Waiting for placement. 2/3/18- no complaints. More redirectable, less hostile and more compliant  2/4/18- Mr. Plummer Smoker very somnolent this morning. No agitation                  SIDE EFFECTS: (reviewed/updated 2/4/2018)  None reported or admitted to.   No noted toxicity with use of Depakote/Tegretol/lithium/Clozaril/TCAs   ALLERGIES:(reviewed/updated 2/4/2018)  Allergies   Allergen Reactions    Tetracycline Hives      MEDICATIONS PRIOR TO ADMISSION:(reviewed/updated 2/4/2018)  Prescriptions Prior to Admission   Medication Sig    hydrALAZINE (APRESOLINE) 100 mg tablet Take 1 Tab by mouth three (3) times daily.  amLODIPine (NORVASC) 10 mg tablet Take 1 Tab by mouth daily.  aspirin delayed-release 81 mg tablet Take 1 Tab by mouth daily.  atorvastatin (LIPITOR) 40 mg tablet Take 1 Tab by mouth daily.  carvedilol (COREG) 3.125 mg tablet Take 1 Tab by mouth two (2) times daily (with meals).  ferrous sulfate 325 mg (65 mg iron) tablet Take 1 Tab by mouth two (2) times daily (with meals).  mirtazapine (REMERON) 7.5 mg tablet Take 1 Tab by mouth nightly.  nicotine (NICODERM CQ) 21 mg/24 hr 1 Patch by TransDERmal route every twenty-four (24) hours for 30 days. PAST MEDICAL HISTORY: Past medical history from the initial psychiatric evaluation has been reviewed (reviewed/updated 2/4/2018) with no additional updates (I asked patient and no additional past medical history provided). Past Medical History:   Diagnosis Date    Arthritis     CAD (coronary artery disease) 2007    CKD (chronic kidney disease), stage III     DM type 2 causing renal disease (HCC)     DVT (deep venous thrombosis) (HCC)     Hx of DVT:  Xarelto stopped due to GI bleed. S/P IVC filter.  Gait abnormality     GI bleed     Heart murmur     Hepatitis C     History of blood transfusion     Hypercholesterolemia     Hypertension 11/7/2014    Neuropathy     Non compliance w medication regimen     Peripheral vascular disease ([facility] [address] 11/7/2014    A. S/P Right SFA POBA and tibial atherectomy (2/4/13).     PUD (peptic ulcer disease) 2007    Unspecified sleep apnea     never used cpap     Past Surgical History:   Procedure Laterality Date    ABDOMEN SURGERY PROC UNLISTED  2007    has approx 7-8\" midline incision on abdomen--\"had to open him up and clean him out after feeding tube clogged\"    HX GI  2007    feeding tube for approx 2 mo    VASCULAR SURGERY PROCEDURE UNLIST Right 1/22/2015    popliteal-tibial bypass      SOCIAL HISTORY: Social history from the initial psychiatric evaluation has been reviewed (reviewed/updated 2/4/2018) with no additional updates (I asked patient and no additional social history provided). Social History     Social History    Marital status:      Spouse name: N/A    Number of children: N/A    Years of education: N/A     Occupational History    Not on file. Social History Main Topics    Smoking status: Current Every Day Smoker     Packs/day: 0.50    Smokeless tobacco: Not on file    Alcohol use No    Drug use: No      Comment: >20 years ago    Sexual activity: Not on file     Other Topics Concern    Not on file     Social History Narrative    78 year ols male admitted for depression and homelessness. Pt will be evicated from apartment due to non payment of bills. [facility] of 30 [facility] [place] [facility]. \" Pt is a brittle diabetic, with treatment non compliance. He has bilarela lower extremity amputations. Patient has a long hx of substance abuse. FAMILY HISTORY: Family history from the initial psychiatric evaluation has been reviewed (reviewed/updated 2/4/2018) with no additional updates (I asked patient and no additional family history provided).    Family History   Problem Relation Age of Onset    Hypertension Father        REVIEW OF SYSTEMS: (reviewed/updated 2/4/2018)  Appetite:decreased   Sleep: fitful   All other Review of Systems: Psychological ROS: positive for - behavioral disorder  Respiratory ROS: no cough, shortness of breath, or wheezing  Cardiovascular ROS: no chest pain or dyspnea on exertion         [facility] (MSE):    MSE FINDINGS ARE WITHIN NORMAL LIMITS (WNL) UNLESS OTHERWISE STATED BELOW. ( ALL OF THE BELOW CATEGORIES OF THE MSE HAVE BEEN REVIEWED (reviewed 2/4/2018) AND UPDATED AS DEEMED APPROPRIATE )  General Presentation age appropriate, evasive and guarded   Orientation oriented to time, place and person   Vital Signs  See below (reviewed 2/4/2018); Vital Signs (BP, Pulse, & Temp) are within normal limits if not listed below.    Gait and Station Stable/steady, no ataxia   Musculoskeletal System No extrapyramidal symptoms (EPS); no abnormal muscular movements or Tardive Dyskinesia (TD); muscle strength and tone are within normal limits   Language No aphasia or dysarthria   Speech:  hypoverbal   Thought Processes concrete; normal rate of thoughts; poor abstract reasoning/computation   Thought Associations goal directed   Thought Content free of delusions and free of hallucinations   Suicidal Ideations none   Homicidal Ideations none   Mood:  irritable   Affect:  mood-congruent   Memory recent  fair   Memory remote:  fair   Concentration/Attention:  distractable   Fund of Knowledge significantly below average   Insight:  poor   Reliability poor   Judgment:  poor          VITALS:     Patient Vitals for the past 24 hrs:   Temp Pulse Resp BP SpO2   02/03/18 2037 97.5 °F (36.4 °C) 82 20 159/74 100 %     Wt Readings from Last 3 Encounters:   01/24/18 82.7 kg (182 lb 5.1 oz)   01/23/18 83.6 kg (184 lb 4.9 oz)   07/13/17 88.9 kg (196 lb)     Temp Readings from Last 3 Encounters:   01/24/18 97.2 °F (36.2 °C)   07/20/17 97.9 °F (36.6 °C)   09/08/15 98.7 °F (37.1 °C)     BP Readings from Last 3 Encounters:   02/03/18 159/74   01/24/18 172/68   07/20/17 175/76     Pulse Readings from Last 3 Encounters:   02/03/18 82   01/24/18 72   07/20/17 (!) 52            DATA     LABORATORY DATA:(reviewed/updated 2/4/2018)  Recent Results (from the past 24 hour(s))   GLUCOSE, POC    Collection Time: 02/03/18  6:28 PM   Result Value Ref Range    Glucose (POC) 172 (H) 65 - 100 mg/dL    Performed by [address], POC    Collection Time: 02/03/18  8:42 PM   Result Value Ref Range    Glucose (POC) 150 (H) 65 - 100 mg/dL    Performed by [address] [place] [address], POC    Collection Time: 02/04/18  7:40 AM   Result Value Ref Range    Glucose (POC) 85 65 - 100 mg/dL    Performed by [address]    GLUCOSE, POC    Collection Time: 02/04/18  4:08 PM   Result Value Ref Range    Glucose (POC) 142 (H) 65 - 100 mg/dL    Performed by Alvina Barber      No results found for: VALF2, VALAC, VALP, VALPR, DS6, CRBAM, CRBAMP, CARB2, XCRBAM  No results found for: LITHM   RADIOLOGY REPORTS:(reviewed/updated 2/4/2018)  Xr Chest Port    Result Date: 1/20/2018  EXAM: Portable CXR.  1800 hours. COMPARISON: 1/19/2015. INDICATION: cp, fatigue There is new interstitial pulmonary edema, cardiomegaly and small left pleural effusion. There is no focal infiltrate, pneumothorax or midline shift. IMPRESSION: CHF pattern.          MEDICATIONS     ALL MEDICATIONS:   Current Facility-Administered Medications   Medication Dose Route Frequency    LORazepam (ATIVAN) tablet 1.5 mg  1.5 mg Oral BID    mirtazapine (REMERON) tablet 30 mg  30 mg Oral QHS    buPROPion SR (WELLBUTRIN SR) tablet 150 mg  150 mg Oral DAILY    alum-mag hydroxide-simeth (MYLANTA) oral suspension 30 mL  30 mL Oral Q4H PRN    doxazosin (CARDURA) tablet 4 mg  4 mg Oral QHS    polyethylene glycol (MIRALAX) packet 17 g  17 g Oral DAILY    OLANZapine (ZyPREXA) tablet 2.5 mg  2.5 mg Oral Q6H PRN    ziprasidone (GEODON) 10 mg in sterile water (preservative free) 0.5 mL injection  10 mg IntraMUSCular BID PRN    benztropine (COGENTIN) tablet 1 mg  1 mg Oral BID PRN    benztropine (COGENTIN) injection 1 mg  1 mg IntraMUSCular BID PRN    zolpidem (AMBIEN) tablet 5 mg  5 mg Oral QHS PRN    acetaminophen (TYLENOL) tablet 650 mg  650 mg Oral Q4H PRN    magnesium hydroxide (MILK OF MAGNESIA) 400 mg/5 mL oral suspension 30 mL  30 mL Oral DAILY PRN    nicotine (NICODERM CQ) 21 mg/24 hr patch 1 Patch  1 Patch TransDERmal DAILY PRN    influenza vaccine 2017-18 (3 yrs+)(PF) (FLUZONE QUAD/FLUARIX QUAD) injection 0.5 mL  0.5 mL IntraMUSCular PRIOR TO DISCHARGE    amLODIPine (NORVASC) tablet 10 mg  10 mg Oral DAILY    aspirin delayed-release tablet 81 mg 81 mg Oral DAILY    atorvastatin (LIPITOR) tablet 40 mg  40 mg Oral DAILY    carvedilol (COREG) tablet 3.125 mg  3.125 mg Oral BID WITH MEALS    ferrous sulfate tablet 325 mg  325 mg Oral BID WITH MEALS    hydrALAZINE (APRESOLINE) tablet 100 mg  100 mg Oral TID    glucose chewable tablet 16 g  4 Tab Oral PRN    dextrose (D50W) injection syrg 12.5-25 g  12.5-25 g IntraVENous PRN    glucagon (GLUCAGEN) injection 1 mg  1 mg IntraMUSCular PRN    insulin lispro (HUMALOG) injection   SubCUTAneous AC&HS    nicotine (NICODERM CQ) 21 mg/24 hr patch 1 Patch  1 Patch TransDERmal Q24H      SCHEDULED MEDICATIONS:   Current Facility-Administered Medications   Medication Dose Route Frequency    LORazepam (ATIVAN) tablet 1.5 mg  1.5 mg Oral BID    mirtazapine (REMERON) tablet 30 mg  30 mg Oral QHS    buPROPion SR (WELLBUTRIN SR) tablet 150 mg  150 mg Oral DAILY    doxazosin (CARDURA) tablet 4 mg  4 mg Oral QHS    polyethylene glycol (MIRALAX) packet 17 g  17 g Oral DAILY    influenza vaccine 2017-18 (3 yrs+)(PF) (FLUZONE QUAD/FLUARIX QUAD) injection 0.5 mL  0.5 mL IntraMUSCular PRIOR TO DISCHARGE    amLODIPine (NORVASC) tablet 10 mg  10 mg Oral DAILY    aspirin delayed-release tablet 81 mg  81 mg Oral DAILY    atorvastatin (LIPITOR) tablet 40 mg  40 mg Oral DAILY    carvedilol (COREG) tablet 3.125 mg  3.125 mg Oral BID WITH MEALS    ferrous sulfate tablet 325 mg  325 mg Oral BID WITH MEALS    hydrALAZINE (APRESOLINE) tablet 100 mg  100 mg Oral TID    insulin lispro (HUMALOG) injection   SubCUTAneous AC&HS    nicotine (NICODERM CQ) 21 mg/24 hr patch 1 Patch  1 Patch TransDERmal Q24H          ASSESSMENT & PLAN     DIAGNOSES REQUIRING ACTIVE TREATMENT AND MONITORING: (reviewed/updated 2/4/2018)  Patient Active Hospital Problem List:   Depression (1/24/2018)    Assessment: sadness, helplessness, hopelessness, in reaction to recent bilateral lower leg amputationa and inability to pat water bill at home.     Plan: consider forced medication/treatment, address social issues, daughter may be obtaining guardianship  1/27/18 need order to treat    1/28/18 continue his medications              In summary, Greta Phalen, is a 78 y.o.  male who presents with a severe exacerbation of the principal diagnosis of Depression  Patient's condition is worsening/not improving/not stable Patient requires continued inpatient hospitalization for further stabilization, safety monitoring and medication management. I will continue to coordinate the provision of individual, milieu, occupational, group, and substance abuse therapies to address target symptoms/diagnoses as deemed appropriate for the individual patient. A coordinated, multidisplinary treatment team round was conducted with the patient (this team consists of the nurse, psychiatric unit pharmcist,  and writer). Complete current electronic health record for patient has been reviewed today including consultant notes, ancillary staff notes, nurses and psychiatric tech notes. Suicide risk assessment completed and patient deemed to be of low risk for suicide at this time. The following regarding medications was addressed during rounds with patient:   the risks and benefits of the proposed medication. The patient was given the opportunity to ask questions. Informed consent given to the use of the above medications. Will continue to adjust psychiatric and non-psychiatric medications (see above \"medication\" section and orders section for details) as deemed appropriate & based upon diagnoses and response to treatment. I will continue to order blood tests/labs and diagnostic tests as deemed appropriate and review results as they become available (see orders for details and above listed lab/test results).     I will order psychiatric records from previous [facility] hospitals to further elucidate the nature of patient's psychopathology and review once available. I will gather additional collateral information from friends, family and o/p treatment team to further elucidate the nature of patient's psychopathology and baselline level of psychiatric functioning. I certify that this patient's inpatient psychiatric hospital services furnished since the previous certification were, and continue to be, required for treatment that could reasonably be expected to improve the patient's condition, or for diagnostic study, and that the patient continues to need, on a daily basis, active treatment furnished directly by or requiring the supervision of inpatient psychiatric facility personnel. In addition, the hospital records show that services furnished were intensive treatment services, admission or related services, or equivalent services.     EXPECTED DISCHARGE DATE/DAY: TBD     DISPOSITION: Home       Signed By:   Sumaya Almaguer MD  2/4/2018

## 2018-02-04 NOTE — BH NOTES
Pt is non compliant with VS, medications, and food. He refused staff to change and clean him up  he was incontinent on his bed. After convincing patient for a while he allowed us to change and clean him.      1258: Patient complaint with lunch did not allow staff to do his POC

## 2018-02-04 NOTE — BH NOTES
PSYCHIATRIC PROGRESS NOTE         Patient Name  Wendy Aponte   Date of Birth 1940   [facility] [id number]   Medical Record Number  [id number]      Age  78 y.o. PCP Johny Warner, MD   Admit date:  1/24/2018    Room Number  [phone]  @ [facility]   Date of Service  2/3/2018          PSYCHOTHERAPY SESSION NOTE:  Length of psychotherapy session: 15 minutes    Main condition/diagnosis/issues treated during session today, 2/3/2018 : med , meal and group non compliance    I employed Cognitive Behavioral therapy techniques, Reality-Oriented psychotherapy, as well as supportive psychotherapy in regards to various ongoing psychosocial stressors, including the following: pre-admission and current problems; housing issues; occupational issues; medical issues; and stress of hospitalization. Interpersonal relationship issues and psychodynamic conflicts explored. Attempts made to alleviate maladaptive patterns. We, also, worked on issues of denial & effects of substance dependency/use     Overall, patient is not progressing    Treatment Plan Update (reviewed an updated 2/3/2018) : I will modify psychotherapy tx plan by implementing more stress management strategies, building upon cognitive behavioral techniques, increasing coping skills, as well as shoring up psychological defenses). An extended energy and skill set was needed to engage pt in psychotherapy due to some of the following: resistiveness, complexity, negativity, confrontational nature, hostile behaviors, and/or severe abnormalities in thought processes/psychosis resulting in the loss of expressive/receptive language communication skills. E & M PROGRESS NOTE:         HISTORY       CC:  \"depression and non compliance with treatment \"  HISTORY OF PRESENT ILLNESS/INTERVAL HISTORY:  (reviewed/updated 2/3/2018).   per initial evaluation:     Wendy Aponte presents/reports/evidences the following emotional symptoms today, 2/3/2018:depression. The above symptoms have been present for 1 years. These symptoms are of severe severity. The symptoms are constant  in nature. Additional symptomatology and features include anxiety. 2/2/18- pATIENT HAS IMPROVED SPORADIC MED COMPLIANCE WITH INCREASED ATIVAN DOSE. WAS SOCIAL IN [facility] TODAY. SW TO CONTACT DAUGHTER TO LET HER KNOE THAT HE IS NOT BEING AS TREATMENT COMPLIANT AS HE TOLD HER HE WOULD BE.    1/27/18 he is not responding to questions and not engaging and not showing engagement and refuse medications and treatment   1/28/18 he is doing better and he engaged and not feeling sad or depressed and he eats better    1/29/18= Patient is deliberately refusing vital signs, labs and medications. He likes finger foods and eats those. Will meet with daughter on Tuesday to discuss possible guardianship. Will seek forced meds. 1/30/18- Pt. Agreed to comply with meds after extensive family meeting. Planning for SNF admission and possible transfer to home afterwards [address].  1/31/18- Patient is intermittently cooperative with somatic meds. We discussed the risks to his health this can cause. I advised Wellbutrin for depression. This may help motivate treatment compliance. 2/1/18- Continues Rude, belligerent, uncooperative and disrespectful with staff. Is marginally compliant with diabetes management. Waiting for placement. 2/3/18- no complaints. More redirectable, less hostile and more compliant                  SIDE EFFECTS: (reviewed/updated 2/3/2018)  None reported or admitted to. No noted toxicity with use of Depakote/Tegretol/lithium/Clozaril/TCAs   ALLERGIES:(reviewed/updated 2/3/2018)  Allergies   Allergen Reactions    Tetracycline Hives      MEDICATIONS PRIOR TO ADMISSION:(reviewed/updated 2/3/2018)  Prescriptions Prior to Admission   Medication Sig    hydrALAZINE (APRESOLINE) 100 mg tablet Take 1 Tab by mouth three (3) times daily.     amLODIPine (NORVASC) 10 mg tablet Take 1 Tab by mouth daily.  aspirin delayed-release 81 mg tablet Take 1 Tab by mouth daily.  atorvastatin (LIPITOR) 40 mg tablet Take 1 Tab by mouth daily.  carvedilol (COREG) 3.125 mg tablet Take 1 Tab by mouth two (2) times daily (with meals).  ferrous sulfate 325 mg (65 mg iron) tablet Take 1 Tab by mouth two (2) times daily (with meals).  mirtazapine (REMERON) 7.5 mg tablet Take 1 Tab by mouth nightly.  nicotine (NICODERM CQ) 21 mg/24 hr 1 Patch by TransDERmal route every twenty-four (24) hours for 30 days. PAST MEDICAL HISTORY: Past medical history from the initial psychiatric evaluation has been reviewed (reviewed/updated 2/3/2018) with no additional updates (I asked patient and no additional past medical history provided). Past Medical History:   Diagnosis Date    Arthritis     CAD (coronary artery disease) 2007    CKD (chronic kidney disease), stage III     DM type 2 causing renal disease (HCC)     DVT (deep venous thrombosis) (HCC)     Hx of DVT:  Xarelto stopped due to GI bleed. S/P IVC filter.  Gait abnormality     GI bleed     Heart murmur     Hepatitis C     History of blood transfusion     Hypercholesterolemia     Hypertension 11/7/2014    Neuropathy     Non compliance w medication regimen     Peripheral vascular disease ([facility] [address] 11/7/2014    A. S/P Right SFA POBA and tibial atherectomy (2/4/13).     PUD (peptic ulcer disease) 2007    Unspecified sleep apnea     never used cpap     Past Surgical History:   Procedure Laterality Date    ABDOMEN SURGERY PROC UNLISTED  2007    has approx 7-8\" midline incision on abdomen--\"had to open him up and clean him out after feeding tube clogged\"    HX GI  2007    feeding tube for approx 2 mo    VASCULAR SURGERY PROCEDURE UNLIST Right 1/22/2015    popliteal-tibial bypass      SOCIAL HISTORY: Social history from the initial psychiatric evaluation has been reviewed (reviewed/updated 2/3/2018) with no additional updates (I asked patient and no additional social history provided). Social History     Social History    Marital status:      Spouse name: N/A    Number of children: N/A    Years of education: N/A     Occupational History    Not on file. Social History Main Topics    Smoking status: Current Every Day Smoker     Packs/day: 0.50    Smokeless tobacco: Not on file    Alcohol use No    Drug use: No      Comment: >20 years ago    Sexual activity: Not on file     Other Topics Concern    Not on file     Social History Narrative    78 year ols male admitted for depression and homelessness. Pt will be evicated from apartment due to non payment of bills. [facility]. \" Pt is a brittle diabetic, with treatment non compliance. He has bilarela lower extremity amputations. Patient has a long hx of substance abuse. FAMILY HISTORY: Family history from the initial psychiatric evaluation has been reviewed (reviewed/updated 2/3/2018) with no additional updates (I asked patient and no additional family history provided). Family History   Problem Relation Age of Onset    Hypertension Father        REVIEW OF SYSTEMS: (reviewed/updated 2/3/2018)  Appetite:decreased   Sleep: fitful   All other Review of Systems: Psychological ROS: positive for - behavioral disorder  Respiratory ROS: no cough, shortness of breath, or wheezing  Cardiovascular ROS: no chest pain or dyspnea on exertion         [facility] ([facility]    [facility] FINDINGS ARE WITHIN NORMAL LIMITS (WNL) UNLESS OTHERWISE STATED BELOW. ( ALL OF THE BELOW CATEGORIES OF THE MSE HAVE BEEN REVIEWED (reviewed 2/3/2018) AND UPDATED AS DEEMED APPROPRIATE )  General Presentation age appropriate, evasive and guarded   Orientation oriented to time, place and person   Vital Signs  See below (reviewed 2/3/2018);  Vital Signs (BP, Pulse, & Temp) are within normal limits if not listed below.    Gait and Station Stable/steady, no ataxia   Musculoskeletal System No extrapyramidal symptoms (EPS); no abnormal muscular movements or Tardive Dyskinesia (TD); muscle strength and tone are within normal limits   Language No aphasia or dysarthria   Speech:  hypoverbal   Thought Processes concrete; normal rate of thoughts; poor abstract reasoning/computation   Thought Associations goal directed   Thought Content free of delusions and free of hallucinations   Suicidal Ideations none   Homicidal Ideations none   Mood:  irritable   Affect:  mood-congruent   Memory recent  fair   Memory remote:  fair   Concentration/Attention:  distractable   Fund of Knowledge significantly below average   Insight:  poor   Reliability poor   Judgment:  poor          VITALS:     Patient Vitals for the past 24 hrs:   Temp Pulse Resp BP SpO2   02/03/18 1600 98 °F (36.7 °C) 66 16 140/65 100 %   02/03/18 0900 97.8 °F (36.6 °C) 75 18 165/75 100 %   02/02/18 2157 97.9 °F (36.6 °C) 62 20 150/63 97 %     Wt Readings from Last 3 Encounters:   01/24/18 82.7 kg (182 lb 5.1 oz)   01/23/18 83.6 kg (184 lb 4.9 oz)   07/13/17 88.9 kg (196 lb)     Temp Readings from Last 3 Encounters:   02/03/18 98 °F (36.7 °C)   01/24/18 97.2 °F (36.2 °C)   07/20/17 97.9 °F (36.6 °C)     BP Readings from Last 3 Encounters:   02/03/18 140/65   01/24/18 172/68   07/20/17 175/76     Pulse Readings from Last 3 Encounters:   02/03/18 66   01/24/18 72   07/20/17 (!) 52            DATA     LABORATORY DATA:(reviewed/updated 2/3/2018)  Recent Results (from the past 24 hour(s))   GLUCOSE, POC    Collection Time: 02/02/18  8:23 PM   Result Value Ref Range    Glucose (POC) 108 (H) 65 - 100 mg/dL    Performed by FERNY [address] [place] [address], POC    Collection Time: 02/03/18  7:36 AM   Result Value Ref Range    Glucose (POC) 73 65 - 100 mg/dL    Performed by [address] [place] [address], POC    Collection Time: 02/03/18  7:47 AM   Result Value Ref Range    Glucose (POC) 75 65 - 100 mg/dL    Performed by Paula Vences    GLUCOSE, POC    Collection Time: 02/03/18  8:09 AM   Result Value Ref Range    Glucose (POC) 135 (H) 65 - 100 mg/dL    Performed by Logan Tubbs, POC    Collection Time: 02/03/18 11:36 AM   Result Value Ref Range    Glucose (POC) 116 (H) 65 - 100 mg/dL    Performed by Paula Vences    GLUCOSE, POC    Collection Time: 02/03/18  6:28 PM   Result Value Ref Range    Glucose (POC) 172 (H) 65 - 100 mg/dL    Performed by Alvina Barber      No results found for: VALF2, VALAC, VALP, VALPR, DS6, CRBAM, CRBAMP, CARB2, XCRBAM  No results found for: LITHM   RADIOLOGY REPORTS:(reviewed/updated 2/3/2018)  Xr Chest Port    Result Date: 1/20/2018  EXAM: Portable CXR.  1800 hours. COMPARISON: 1/19/2015. INDICATION: cp, fatigue There is new interstitial pulmonary edema, cardiomegaly and small left pleural effusion. There is no focal infiltrate, pneumothorax or midline shift. IMPRESSION: CHF pattern.          MEDICATIONS     ALL MEDICATIONS:   Current Facility-Administered Medications   Medication Dose Route Frequency    LORazepam (ATIVAN) tablet 1.5 mg  1.5 mg Oral BID    mirtazapine (REMERON) tablet 30 mg  30 mg Oral QHS    buPROPion SR (WELLBUTRIN SR) tablet 150 mg  150 mg Oral DAILY    alum-mag hydroxide-simeth (MYLANTA) oral suspension 30 mL  30 mL Oral Q4H PRN    doxazosin (CARDURA) tablet 4 mg  4 mg Oral QHS    polyethylene glycol (MIRALAX) packet 17 g  17 g Oral DAILY    OLANZapine (ZyPREXA) tablet 2.5 mg  2.5 mg Oral Q6H PRN    ziprasidone (GEODON) 10 mg in sterile water (preservative free) 0.5 mL injection  10 mg IntraMUSCular BID PRN    benztropine (COGENTIN) tablet 1 mg  1 mg Oral BID PRN    benztropine (COGENTIN) injection 1 mg  1 mg IntraMUSCular BID PRN    zolpidem (AMBIEN) tablet 5 mg  5 mg Oral QHS PRN    acetaminophen (TYLENOL) tablet 650 mg  650 mg Oral Q4H PRN    magnesium hydroxide (MILK OF MAGNESIA) 400 mg/5 mL oral suspension 30 mL  30 mL Oral DAILY PRN    nicotine (NICODERM CQ) 21 mg/24 hr patch 1 Patch  1 Patch TransDERmal DAILY PRN    influenza vaccine 2017-18 (3 yrs+)(PF) (FLUZONE QUAD/FLUARIX QUAD) injection 0.5 mL  0.5 mL IntraMUSCular PRIOR TO DISCHARGE    amLODIPine (NORVASC) tablet 10 mg  10 mg Oral DAILY    aspirin delayed-release tablet 81 mg  81 mg Oral DAILY    atorvastatin (LIPITOR) tablet 40 mg  40 mg Oral DAILY    carvedilol (COREG) tablet 3.125 mg  3.125 mg Oral BID WITH MEALS    ferrous sulfate tablet 325 mg  325 mg Oral BID WITH MEALS    hydrALAZINE (APRESOLINE) tablet 100 mg  100 mg Oral TID    glucose chewable tablet 16 g  4 Tab Oral PRN    dextrose (D50W) injection syrg 12.5-25 g  12.5-25 g IntraVENous PRN    glucagon (GLUCAGEN) injection 1 mg  1 mg IntraMUSCular PRN    insulin lispro (HUMALOG) injection   SubCUTAneous AC&HS    nicotine (NICODERM CQ) 21 mg/24 hr patch 1 Patch  1 Patch TransDERmal Q24H      SCHEDULED MEDICATIONS:   Current Facility-Administered Medications   Medication Dose Route Frequency    LORazepam (ATIVAN) tablet 1.5 mg  1.5 mg Oral BID    mirtazapine (REMERON) tablet 30 mg  30 mg Oral QHS    buPROPion SR (WELLBUTRIN SR) tablet 150 mg  150 mg Oral DAILY    doxazosin (CARDURA) tablet 4 mg  4 mg Oral QHS    polyethylene glycol (MIRALAX) packet 17 g  17 g Oral DAILY    influenza vaccine 2017-18 (3 yrs+)(PF) (FLUZONE QUAD/FLUARIX QUAD) injection 0.5 mL  0.5 mL IntraMUSCular PRIOR TO DISCHARGE    amLODIPine (NORVASC) tablet 10 mg  10 mg Oral DAILY    aspirin delayed-release tablet 81 mg  81 mg Oral DAILY    atorvastatin (LIPITOR) tablet 40 mg  40 mg Oral DAILY    carvedilol (COREG) tablet 3.125 mg  3.125 mg Oral BID WITH MEALS    ferrous sulfate tablet 325 mg  325 mg Oral BID WITH MEALS    hydrALAZINE (APRESOLINE) tablet 100 mg  100 mg Oral TID    insulin lispro (HUMALOG) injection   SubCUTAneous AC&HS    nicotine (NICODERM CQ) 21 mg/24 hr patch 1 Patch  1 Patch TransDERmal Q24H ASSESSMENT & PLAN     DIAGNOSES REQUIRING ACTIVE TREATMENT AND MONITORING: (reviewed/updated 2/3/2018)  Patient Active Hospital Problem List:   Depression (1/24/2018)    Assessment: sadness, helplessness, hopelessness, in reaction to recent bilateral lower leg amputationa and inability to pat water bill at home. Plan: consider forced medication/treatment, address social issues, daughter may be obtaining guardianship  1/27/18 need order to treat    1/28/18 continue his medications              In summary, Delfina Rosales, is a 78 y.o.  male who presents with a severe exacerbation of the principal diagnosis of Depression  Patient's condition is worsening/not improving/not stable Patient requires continued inpatient hospitalization for further stabilization, safety monitoring and medication management. I will continue to coordinate the provision of individual, milieu, occupational, group, and substance abuse therapies to address target symptoms/diagnoses as deemed appropriate for the individual patient. A coordinated, multidisplinary treatment team round was conducted with the patient (this team consists of the nurse, psychiatric unit pharmcist,  and writer). Complete current electronic health record for patient has been reviewed today including consultant notes, ancillary staff notes, nurses and psychiatric tech notes. Suicide risk assessment completed and patient deemed to be of low risk for suicide at this time. The following regarding medications was addressed during rounds with patient:   the risks and benefits of the proposed medication. The patient was given the opportunity to ask questions. Informed consent given to the use of the above medications. Will continue to adjust psychiatric and non-psychiatric medications (see above \"medication\" section and orders section for details) as deemed appropriate & based upon diagnoses and response to treatment.      I will continue to order blood tests/labs and diagnostic tests as deemed appropriate and review results as they become available (see orders for details and above listed lab/test results). I will order psychiatric records from previous [facility] hospitals to further elucidate the nature of patient's psychopathology and review once available. I will gather additional collateral information from friends, family and o/p treatment team to further elucidate the nature of patient's psychopathology and baselline level of psychiatric functioning. I certify that this patient's inpatient psychiatric hospital services furnished since the previous certification were, and continue to be, required for treatment that could reasonably be expected to improve the patient's condition, or for diagnostic study, and that the patient continues to need, on a daily basis, active treatment furnished directly by or requiring the supervision of inpatient psychiatric facility personnel. In addition, the hospital records show that services furnished were intensive treatment services, admission or related services, or equivalent services.     EXPECTED DISCHARGE DATE/DAY: TBD     DISPOSITION: Home       Signed By:   William Urbina MD  2/3/2018

## 2018-02-05 LAB
GLUCOSE BLD STRIP.AUTO-MCNC: 103 MG/DL (ref 65–100)
GLUCOSE BLD STRIP.AUTO-MCNC: 178 MG/DL (ref 65–100)
GLUCOSE BLD STRIP.AUTO-MCNC: 73 MG/DL (ref 65–100)
SERVICE CMNT-IMP: ABNORMAL
SERVICE CMNT-IMP: ABNORMAL
SERVICE CMNT-IMP: NORMAL

## 2018-02-05 PROCEDURE — 74011250637 HC RX REV CODE- 250/637: Performed by: HOSPITALIST

## 2018-02-05 PROCEDURE — 74011250637 HC RX REV CODE- 250/637: Performed by: FAMILY MEDICINE

## 2018-02-05 PROCEDURE — 82962 GLUCOSE BLOOD TEST: CPT

## 2018-02-05 PROCEDURE — 74011250637 HC RX REV CODE- 250/637: Performed by: PSYCHIATRY & NEUROLOGY

## 2018-02-05 PROCEDURE — 65220000003 HC RM SEMIPRIVATE PSYCH

## 2018-02-05 RX ADMIN — BUPROPION HYDROCHLORIDE 150 MG: 150 TABLET, EXTENDED RELEASE ORAL at 09:49

## 2018-02-05 RX ADMIN — ATORVASTATIN CALCIUM 40 MG: 40 TABLET, FILM COATED ORAL at 09:49

## 2018-02-05 RX ADMIN — CARVEDILOL 3.12 MG: 3.12 TABLET, FILM COATED ORAL at 09:49

## 2018-02-05 RX ADMIN — FERROUS SULFATE TAB 325 MG (65 MG ELEMENTAL FE) 325 MG: 325 (65 FE) TAB at 09:49

## 2018-02-05 RX ADMIN — AMLODIPINE BESYLATE 10 MG: 5 TABLET ORAL at 09:49

## 2018-02-05 RX ADMIN — HYDRALAZINE HYDROCHLORIDE 100 MG: 50 TABLET, FILM COATED ORAL at 09:49

## 2018-02-05 RX ADMIN — ASPIRIN 81 MG: 81 TABLET, COATED ORAL at 09:49

## 2018-02-05 RX ADMIN — LORAZEPAM 1.5 MG: 1 TABLET ORAL at 09:49

## 2018-02-05 NOTE — PROGRESS NOTES
Problem: Falls - Risk of  Goal: *Absence of Falls  Document Bello Fall Risk and appropriate interventions in the flowsheet. Outcome: Progressing Towards Goal  Fall Risk Interventions:  Mobility Interventions: Bed/chair exit alarm, Communicate number of staff needed for ambulation/transfer, Mechanical lift    Mentation Interventions: Adequate sleep, hydration, pain control, Bed/chair exit alarm, Door open when patient unattended    Medication Interventions: Bed/chair exit alarm, Patient to call before getting OOB    Elimination Interventions: Bed/chair exit alarm, Call light in reach, Patient to call for help with toileting needs    History of Falls Interventions: Bed/chair exit alarm, Door open when patient unattended    No Falls. Bed alarm is on the bed and fall tool is completed and accurate. Patient assisted to chelsea chair with walt lift.

## 2018-02-05 NOTE — PROGRESS NOTES
Problem: Depressed Mood (Adult/Pediatric)  Goal: *STG: Participates in treatment plan  Outcome: Not Progressing Towards Goal  Patient continues to refuse patient care efforts, vital signs and medications when offered. He accepts these things only on his own terms and his own schedule. Goal: *STG: Attends activities and groups  Outcome: Not Progressing Towards Goal  Patient has remained isolative to his room throughout this shift.

## 2018-02-05 NOTE — BH NOTES
Pt declined accucheck    4148:  Pt declined assistance with turning. Pt frequently turns self. Will monitor. 1927: Pt declined vitals, accucheck, assistance with turning, toileting, and dinner. Pt states \"What the hell do you want?!\" Staff notified pt of and attempted to inspect bed for urine, pt states \"What the hell are you pulling my goddamn covers back for!?\" and grabbed blankets. Pt closed eyes and declined to respond to further interaction.

## 2018-02-05 NOTE — BH NOTES
PSYCHIATRIC PROGRESS NOTE         Patient Name  Harjit Weiner   Date of Birth 1940   [facility] [id number]   Medical Record Number  [id number]      Age  78 y.o. PCP Johny Warner, MD   Admit date:  1/24/2018    Room Number  [phone]  @ [facility]   Date of Service  2/5/2018          PSYCHOTHERAPY SESSION NOTE:  Length of psychotherapy session: 15 minutes    Main condition/diagnosis/issues treated during session today, 2/5/2018 : med , meal and group non compliance    I employed Cognitive Behavioral therapy techniques, Reality-Oriented psychotherapy, as well as supportive psychotherapy in regards to various ongoing psychosocial stressors, including the following: pre-admission and current problems; housing issues; occupational issues; medical issues; and stress of hospitalization. Interpersonal relationship issues and psychodynamic conflicts explored. Attempts made to alleviate maladaptive patterns. We, also, worked on issues of denial & effects of substance dependency/use     Overall, patient is not progressing    Treatment Plan Update (reviewed an updated 2/5/2018) : I will modify psychotherapy tx plan by implementing more stress management strategies, building upon cognitive behavioral techniques, increasing coping skills, as well as shoring up psychological defenses). An extended energy and skill set was needed to engage pt in psychotherapy due to some of the following: resistiveness, complexity, negativity, confrontational nature, hostile behaviors, and/or severe abnormalities in thought processes/psychosis resulting in the loss of expressive/receptive language communication skills. E & M PROGRESS NOTE:         HISTORY       CC:  \"depression and non compliance with treatment \"  HISTORY OF PRESENT ILLNESS/INTERVAL HISTORY:  (reviewed/updated 2/5/2018).   per initial evaluation:     Harjit Weiner presents/reports/evidences the following emotional symptoms today, 2/5/2018:depression. The above symptoms have been present for 1 years. These symptoms are of severe severity. The symptoms are constant  in nature. Additional symptomatology and features include anxiety. 2/2/18- pATIENT HAS IMPROVED SPORADIC MED COMPLIANCE WITH INCREASED ATIVAN DOSE. WAS SOCIAL IN [facility] TODAY. SW TO CONTACT DAUGHTER TO LET HER KNOE THAT HE IS NOT BEING AS TREATMENT COMPLIANT AS HE TOLD HER HE WOULD BE.    1/27/18 he is not responding to questions and not engaging and not showing engagement and refuse medications and treatment   1/28/18 he is doing better and he engaged and not feeling sad or depressed and he eats better    1/29/18= Patient is deliberately refusing vital signs, labs and medications. He likes finger foods and eats those. Will meet with daughter on Tuesday to discuss possible guardianship. Will seek forced meds. 1/30/18- Pt. Agreed to comply with meds after extensive family meeting. Planning for SNF admission and possible transfer to home afterwards [address].  1/31/18- Patient is intermittently cooperative with somatic meds. We discussed the risks to his health this can cause. I advised Wellbutrin for depression. This may help motivate treatment compliance. 2/1/18- Continues Rude, belligerent, uncooperative and disrespectful with staff. Is marginally compliant with diabetes management. Waiting for placement. 2/3/18- no complaints. More redirectable, less hostile and more compliant  2/4/18- Mr. Martino Freshwater very somnolent this morning. No agitation  2/5/18- Sporadic , selective compliance with medications. Denies SI/HI. Needs reminding of treatment goals to improve compliance for transfer to SNF. SIDE EFFECTS: (reviewed/updated 2/5/2018)  None reported or admitted to.   No noted toxicity with use of Depakote/Tegretol/lithium/Clozaril/TCAs   ALLERGIES:(reviewed/updated 2/5/2018)  Allergies   Allergen Reactions    Tetracycline Hives MEDICATIONS PRIOR TO ADMISSION:(reviewed/updated 2/5/2018)  Prescriptions Prior to Admission   Medication Sig    hydrALAZINE (APRESOLINE) 100 mg tablet Take 1 Tab by mouth three (3) times daily.  amLODIPine (NORVASC) 10 mg tablet Take 1 Tab by mouth daily.  aspirin delayed-release 81 mg tablet Take 1 Tab by mouth daily.  atorvastatin (LIPITOR) 40 mg tablet Take 1 Tab by mouth daily.  carvedilol (COREG) 3.125 mg tablet Take 1 Tab by mouth two (2) times daily (with meals).  ferrous sulfate 325 mg (65 mg iron) tablet Take 1 Tab by mouth two (2) times daily (with meals).  mirtazapine (REMERON) 7.5 mg tablet Take 1 Tab by mouth nightly.  nicotine (NICODERM CQ) 21 mg/24 hr 1 Patch by TransDERmal route every twenty-four (24) hours for 30 days. PAST MEDICAL HISTORY: Past medical history from the initial psychiatric evaluation has been reviewed (reviewed/updated 2/5/2018) with no additional updates (I asked patient and no additional past medical history provided). Past Medical History:   Diagnosis Date    Arthritis     CAD (coronary artery disease) 2007    CKD (chronic kidney disease), stage III     DM type 2 causing renal disease (HCC)     DVT (deep venous thrombosis) (HCC)     Hx of DVT:  Xarelto stopped due to GI bleed. S/P IVC filter.  Gait abnormality     GI bleed     Heart murmur     Hepatitis C     History of blood transfusion     Hypercholesterolemia     Hypertension 11/7/2014    Neuropathy     Non compliance w medication regimen     Peripheral vascular disease ([facility] [address] 11/7/2014    A. S/P Right SFA POBA and tibial atherectomy (2/4/13).     PUD (peptic ulcer disease) 2007    Unspecified sleep apnea     never used cpap     Past Surgical History:   Procedure Laterality Date    ABDOMEN SURGERY PROC UNLISTED  2007    has approx 7-8\" midline incision on abdomen--\"had to open him up and clean him out after feeding tube clogged\"    HX GI  2007    feeding tube for approx 2 mo  VASCULAR SURGERY PROCEDURE UNLIST Right 1/22/2015    popliteal-tibial bypass      SOCIAL HISTORY: Social history from the initial psychiatric evaluation has been reviewed (reviewed/updated 2/5/2018) with no additional updates (I asked patient and no additional social history provided). Social History     Social History    Marital status:      Spouse name: N/A    Number of children: N/A    Years of education: N/A     Occupational History    Not on file. Social History Main Topics    Smoking status: Current Every Day Smoker     Packs/day: 0.50    Smokeless tobacco: Not on file    Alcohol use No    Drug use: No      Comment: >20 years ago    Sexual activity: Not on file     Other Topics Concern    Not on file     Social History Narrative    78 year ols male admitted for depression and homelessness. Pt will be evicated from apartment due to non payment of bills. [facility] of 30 years [facility] [place]. \" Pt is a brittle diabetic, with treatment non compliance. He has bilarela lower extremity amputations. Patient has a long hx of substance abuse. FAMILY HISTORY: Family history from the initial psychiatric evaluation has been reviewed (reviewed/updated 2/5/2018) with no additional updates (I asked patient and no additional family history provided).    Family History   Problem Relation Age of Onset    Hypertension Father        REVIEW OF SYSTEMS: (reviewed/updated 2/5/2018)  Appetite:decreased   Sleep: fitful   All other Review of Systems: Psychological ROS: positive for - behavioral disorder  Respiratory ROS: no cough, shortness of breath, or wheezing  Cardiovascular ROS: no chest pain or dyspnea on exertion         MENTAL STATUS EXAM & VITALS     MENTAL STATUS EXAM (MSE):    MSE FINDINGS ARE WITHIN NORMAL LIMITS (WNL) UNLESS OTHERWISE STATED BELOW. ( ALL OF THE BELOW CATEGORIES OF THE MSE HAVE BEEN REVIEWED (reviewed 2/5/2018) AND UPDATED AS DEEMED APPROPRIATE )  General Presentation age appropriate, evasive and guarded   Orientation oriented to time, place and person   Vital Signs  See below (reviewed 2/5/2018); Vital Signs (BP, Pulse, & Temp) are within normal limits if not listed below.    Gait and Station Stable/steady, no ataxia   Musculoskeletal System No extrapyramidal symptoms (EPS); no abnormal muscular movements or Tardive Dyskinesia (TD); muscle strength and tone are within normal limits   Language No aphasia or dysarthria   Speech:  hypoverbal   Thought Processes concrete; normal rate of thoughts; poor abstract reasoning/computation   Thought Associations goal directed   Thought Content free of delusions and free of hallucinations   Suicidal Ideations none   Homicidal Ideations none   Mood:  irritable   Affect:  mood-congruent   Memory recent  fair   Memory remote:  fair   Concentration/Attention:  distractable   Fund of Knowledge significantly below average   Insight:  poor   Reliability poor   Judgment:  poor          VITALS:     Patient Vitals for the past 24 hrs:   Temp Pulse Resp BP SpO2   02/05/18 0740 98.1 °F (36.7 °C) 77 18 179/79 98 %   02/04/18 1930 97.6 °F (36.4 °C) 67 16 168/85 99 %     Wt Readings from Last 3 Encounters:   01/24/18 82.7 kg (182 lb 5.1 oz)   01/23/18 83.6 kg (184 lb 4.9 oz)   07/13/17 88.9 kg (196 lb)     Temp Readings from Last 3 Encounters:   02/05/18 98.1 °F (36.7 °C)   01/24/18 97.2 °F (36.2 °C)   07/20/17 97.9 °F (36.6 °C)     BP Readings from Last 3 Encounters:   02/05/18 179/79   01/24/18 172/68   07/20/17 175/76     Pulse Readings from Last 3 Encounters:   02/05/18 77   01/24/18 72   07/20/17 (!) 52            DATA     LABORATORY DATA:(reviewed/updated 2/5/2018)  Recent Results (from the past 24 hour(s))   GLUCOSE, POC    Collection Time: 02/04/18  4:08 PM   Result Value Ref Range    Glucose (POC) 142 (H) 65 - 100 mg/dL    Performed by [address] [place] [address], POC    Collection Time: 02/04/18  8:02 PM   Result Value Ref Range    Glucose (POC) 101 (H) 65 - 100 mg/dL    Performed by [address], POC    Collection Time: 02/05/18  7:58 AM   Result Value Ref Range    Glucose (POC) 73 65 - 100 mg/dL    Performed by Morgan Akhtar    GLUCOSE, POC    Collection Time: 02/05/18  8:17 AM   Result Value Ref Range    Glucose (POC) 103 (H) 65 - 100 mg/dL    Performed by Morgan Akhtar    GLUCOSE, POC    Collection Time: 02/05/18 11:52 AM   Result Value Ref Range    Glucose (POC) 178 (H) 65 - 100 mg/dL    Performed by Morgan Akhtar      No results found for: VALF2, VALAC, VALP, VALPR, DS6, CRBAM, CRBAMP, CARB2, XCRBAM  No results found for: LITHM   RADIOLOGY REPORTS:(reviewed/updated 2/5/2018)  Xr Chest Port    Result Date: 1/20/2018  EXAM: Portable CXR.  1800 hours. COMPARISON: 1/19/2015. INDICATION: cp, fatigue There is new interstitial pulmonary edema, cardiomegaly and small left pleural effusion. There is no focal infiltrate, pneumothorax or midline shift. IMPRESSION: CHF pattern.          MEDICATIONS     ALL MEDICATIONS:   Current Facility-Administered Medications   Medication Dose Route Frequency    LORazepam (ATIVAN) tablet 1.5 mg  1.5 mg Oral BID    mirtazapine (REMERON) tablet 30 mg  30 mg Oral QHS    buPROPion SR (WELLBUTRIN SR) tablet 150 mg  150 mg Oral DAILY    alum-mag hydroxide-simeth (MYLANTA) oral suspension 30 mL  30 mL Oral Q4H PRN    doxazosin (CARDURA) tablet 4 mg  4 mg Oral QHS    polyethylene glycol (MIRALAX) packet 17 g  17 g Oral DAILY    OLANZapine (ZyPREXA) tablet 2.5 mg  2.5 mg Oral Q6H PRN    ziprasidone (GEODON) 10 mg in sterile water (preservative free) 0.5 mL injection  10 mg IntraMUSCular BID PRN    benztropine (COGENTIN) tablet 1 mg  1 mg Oral BID PRN    benztropine (COGENTIN) injection 1 mg  1 mg IntraMUSCular BID PRN    zolpidem (AMBIEN) tablet 5 mg  5 mg Oral QHS PRN    acetaminophen (TYLENOL) tablet 650 mg  650 mg Oral Q4H PRN    magnesium hydroxide (MILK OF MAGNESIA) 400 mg/5 mL oral suspension 30 mL  30 mL Oral DAILY PRN    nicotine (NICODERM CQ) 21 mg/24 hr patch 1 Patch  1 Patch TransDERmal DAILY PRN    influenza vaccine 2017-18 (3 yrs+)(PF) (FLUZONE QUAD/FLUARIX QUAD) injection 0.5 mL  0.5 mL IntraMUSCular PRIOR TO DISCHARGE    amLODIPine (NORVASC) tablet 10 mg  10 mg Oral DAILY    aspirin delayed-release tablet 81 mg  81 mg Oral DAILY    atorvastatin (LIPITOR) tablet 40 mg  40 mg Oral DAILY    carvedilol (COREG) tablet 3.125 mg  3.125 mg Oral BID WITH MEALS    ferrous sulfate tablet 325 mg  325 mg Oral BID WITH MEALS    hydrALAZINE (APRESOLINE) tablet 100 mg  100 mg Oral TID    glucose chewable tablet 16 g  4 Tab Oral PRN    dextrose (D50W) injection syrg 12.5-25 g  12.5-25 g IntraVENous PRN    glucagon (GLUCAGEN) injection 1 mg  1 mg IntraMUSCular PRN    insulin lispro (HUMALOG) injection   SubCUTAneous AC&HS    nicotine (NICODERM CQ) 21 mg/24 hr patch 1 Patch  1 Patch TransDERmal Q24H      SCHEDULED MEDICATIONS:   Current Facility-Administered Medications   Medication Dose Route Frequency    LORazepam (ATIVAN) tablet 1.5 mg  1.5 mg Oral BID    mirtazapine (REMERON) tablet 30 mg  30 mg Oral QHS    buPROPion SR (WELLBUTRIN SR) tablet 150 mg  150 mg Oral DAILY    doxazosin (CARDURA) tablet 4 mg  4 mg Oral QHS    polyethylene glycol (MIRALAX) packet 17 g  17 g Oral DAILY    influenza vaccine 2017-18 (3 yrs+)(PF) (FLUZONE QUAD/FLUARIX QUAD) injection 0.5 mL  0.5 mL IntraMUSCular PRIOR TO DISCHARGE    amLODIPine (NORVASC) tablet 10 mg  10 mg Oral DAILY    aspirin delayed-release tablet 81 mg  81 mg Oral DAILY    atorvastatin (LIPITOR) tablet 40 mg  40 mg Oral DAILY    carvedilol (COREG) tablet 3.125 mg  3.125 mg Oral BID WITH MEALS    ferrous sulfate tablet 325 mg  325 mg Oral BID WITH MEALS    hydrALAZINE (APRESOLINE) tablet 100 mg  100 mg Oral TID    insulin lispro (HUMALOG) injection   SubCUTAneous AC&HS    nicotine (NICODERM CQ) 21 mg/24 hr patch 1 Patch 1 Patch TransDERmal Q24H          ASSESSMENT & PLAN     DIAGNOSES REQUIRING ACTIVE TREATMENT AND MONITORING: (reviewed/updated 2/5/2018)  Patient Active Hospital Problem List:   Depression (1/24/2018)    Assessment: sadness, helplessness, hopelessness, in reaction to recent bilateral lower leg amputationa and inability to pat water bill at home. Plan: consider forced medication/treatment, address social issues, daughter may be obtaining guardianship  1/27/18 need order to treat    1/28/18 continue his medications              In summary, Zane Severe, is a 78 y.o.  male who presents with a severe exacerbation of the principal diagnosis of Depression  Patient's condition is worsening/not improving/not stable Patient requires continued inpatient hospitalization for further stabilization, safety monitoring and medication management. I will continue to coordinate the provision of individual, milieu, occupational, group, and substance abuse therapies to address target symptoms/diagnoses as deemed appropriate for the individual patient. A coordinated, multidisplinary treatment team round was conducted with the patient (this team consists of the nurse, psychiatric unit pharmcist,  and writer). Complete current electronic health record for patient has been reviewed today including consultant notes, ancillary staff notes, nurses and psychiatric tech notes. Suicide risk assessment completed and patient deemed to be of low risk for suicide at this time. The following regarding medications was addressed during rounds with patient:   the risks and benefits of the proposed medication. The patient was given the opportunity to ask questions. Informed consent given to the use of the above medications.  Will continue to adjust psychiatric and non-psychiatric medications (see above \"medication\" section and orders section for details) as deemed appropriate & based upon diagnoses and response to treatment. I will continue to order blood tests/labs and diagnostic tests as deemed appropriate and review results as they become available (see orders for details and above listed lab/test results). I will order psychiatric records from previous [facility] hospitals to further elucidate the nature of patient's psychopathology and review once available. I will gather additional collateral information from friends, family and o/p treatment team to further elucidate the nature of patient's psychopathology and baselline level of psychiatric functioning. I certify that this patient's inpatient psychiatric hospital services furnished since the previous certification were, and continue to be, required for treatment that could reasonably be expected to improve the patient's condition, or for diagnostic study, and that the patient continues to need, on a daily basis, active treatment furnished directly by or requiring the supervision of inpatient psychiatric facility personnel. In addition, the hospital records show that services furnished were intensive treatment services, admission or related services, or equivalent services.     EXPECTED DISCHARGE DATE/DAY: TBD     DISPOSITION: Home       Signed By:   Dolores Raines MD  2/5/2018

## 2018-02-05 NOTE — BH NOTES
Behavioral Health Interdisciplinary Rounds     Patient Name: Sophy King  Age: 78 y.o. Room/Bed:  740/02  Primary Diagnosis: Depression   Admission Status: Involuntary Commitment     Readmission within 30 days: no  Power of  in place: yes--daughter (Radha Liu--awaiting paperwork)  Patient requires a blocked bed: no          Reason for blocked bed: na    VTE Prophylaxis: Yes--ASA  Flu vaccine given : no--pt refused   Mobility needs/Fall risk: yes--bilat BKA    Nutritional Plan: yes  Consults: PT/OT, Diabetic Tx team, Hospitalist & Wound Care following         Labs/Testing due today?: no    Sleep hours:  6.75+      Participation in Care/Groups:  no  Medication Compliant?: Selective  PRNS (last 24 hours): None    Restraints (last 24 hours):  no  Substance Abuse:  no  CIWA (range last 24 hours): na COWS (range last 24 hours): na  Alcohol screening (AUDIT) completed -  AUDIT Score: 0  If applicable, date SBIRT discussed in treatment team AND documented: na  Tobacco - patient is a smoker: yes   Date tobacco education completed by RN: 1/24/18, but still asking staff for cigarettes  24 hour chart check complete: yes     Patient goal(s) for today: Follow unit rules; comply with treatment  Treatment team focus/goals: Call daughter; complete [facility]  Progress note: Patient varies on compliance    LOS:  12  Expected LOS: TBD    Financial concerns/prescription coverage:  Medicare  Date of last family contact: 1/30 Daughter visited    Family requesting physician contact today:    Discharge plan: SNF placement  Guns in the home: no        Outpatient provider(s): TBD    Participating treatment team members:  DARRICK Motley; Dr. Jaylon Washington MD; Leticia Correia RN

## 2018-02-05 NOTE — PROGRESS NOTES
Problem: Depressed Mood (Adult/Pediatric)  Goal: *STG: Participates in treatment plan  Outcome: Progressing Towards Goal  Received this morning resting in bed. Was  agreeable with getting vitals this morning and taking morning medications, as well as blood sugars. Continues to be guarded  and having some slight confusion. Assisted with am cares,able to follow directions during cares. Has been sitting out on the unit most of the morning. Eating well,tolerating meals. Goal: *STG: Attends activities and groups  Outcome: Progressing Towards Goal  Attended music group this morning  and has been out on the unit most of the day. Goal: *STG: Complies with medication therapy  Outcome: Progressing Towards Goal  Has been medication and meal compliant today.

## 2018-02-05 NOTE — DIABETES MGMT
DTC Progress Note    Recommendations/ Comments: Chart reviewed d/t hypoglycemic event. Pt has not required any correctional insulin. Renal status noted. Current hospital DM medication: Correctional Lispro-high sensitivity     Chart reviewed on Itzel Bowen. Patient is a 78 y.o. male with known Type 2 Diabetes on diet at home. A1c:   Lab Results   Component Value Date/Time    Hemoglobin A1c 4.5 01/21/2018 02:21 AM    Hemoglobin A1c 4.6 07/14/2017 04:42 AM       Recent Glucose Results:   Lab Results   Component Value Date/Time    GLUCPOC 178 (H) 02/05/2018 11:52 AM    GLUCPOC 103 (H) 02/05/2018 08:17 AM    GLUCPOC 73 02/05/2018 07:58 AM        Lab Results   Component Value Date/Time    Creatinine 2.49 01/28/2018 08:14 PM     Estimated Creatinine Clearance: 27.3 mL/min (based on Cr of 2.49). Active Orders   Diet    DIET FINGER FOOD No options chosen        PO intake:   No data found. Will continue to follow as needed.     Thank you  Keyla Adams RD, CDE

## 2018-02-05 NOTE — BH NOTES
GROUP THERAPY PROGRESS NOTE    Judy Quesada did not participate in a morning Process Group on the Geriatric Unit with a focus on shifting ones feelings to a positive note and preparing for the day through group singing. If he got anything from group and the music is was not evident. He sat up and kept his eyes open, looking straight ahead. He did not speak when prompted and did not participate in the music. He was not engaged in the group.

## 2018-02-06 LAB
GLUCOSE BLD STRIP.AUTO-MCNC: 116 MG/DL (ref 65–100)
GLUCOSE BLD STRIP.AUTO-MCNC: 146 MG/DL (ref 65–100)
GLUCOSE BLD STRIP.AUTO-MCNC: 146 MG/DL (ref 65–100)
GLUCOSE BLD STRIP.AUTO-MCNC: 84 MG/DL (ref 65–100)
SERVICE CMNT-IMP: ABNORMAL
SERVICE CMNT-IMP: NORMAL

## 2018-02-06 PROCEDURE — 82962 GLUCOSE BLOOD TEST: CPT

## 2018-02-06 PROCEDURE — 74011250637 HC RX REV CODE- 250/637: Performed by: FAMILY MEDICINE

## 2018-02-06 PROCEDURE — 74011250637 HC RX REV CODE- 250/637: Performed by: HOSPITALIST

## 2018-02-06 PROCEDURE — 74011250637 HC RX REV CODE- 250/637: Performed by: PSYCHIATRY & NEUROLOGY

## 2018-02-06 PROCEDURE — 65220000003 HC RM SEMIPRIVATE PSYCH

## 2018-02-06 RX ADMIN — FERROUS SULFATE TAB 325 MG (65 MG ELEMENTAL FE) 325 MG: 325 (65 FE) TAB at 09:47

## 2018-02-06 RX ADMIN — LORAZEPAM 1.5 MG: 1 TABLET ORAL at 09:45

## 2018-02-06 RX ADMIN — AMLODIPINE BESYLATE 10 MG: 5 TABLET ORAL at 09:46

## 2018-02-06 RX ADMIN — HYDRALAZINE HYDROCHLORIDE 100 MG: 50 TABLET, FILM COATED ORAL at 09:47

## 2018-02-06 RX ADMIN — ATORVASTATIN CALCIUM 40 MG: 40 TABLET, FILM COATED ORAL at 09:46

## 2018-02-06 RX ADMIN — LORAZEPAM 1.5 MG: 1 TABLET ORAL at 20:44

## 2018-02-06 RX ADMIN — MIRTAZAPINE 30 MG: 15 TABLET, FILM COATED ORAL at 20:44

## 2018-02-06 RX ADMIN — HYDRALAZINE HYDROCHLORIDE 100 MG: 50 TABLET, FILM COATED ORAL at 17:13

## 2018-02-06 RX ADMIN — ASPIRIN 81 MG: 81 TABLET, COATED ORAL at 09:46

## 2018-02-06 RX ADMIN — HYDRALAZINE HYDROCHLORIDE 100 MG: 50 TABLET, FILM COATED ORAL at 20:44

## 2018-02-06 RX ADMIN — BUPROPION HYDROCHLORIDE 150 MG: 150 TABLET, EXTENDED RELEASE ORAL at 09:46

## 2018-02-06 RX ADMIN — CARVEDILOL 3.12 MG: 3.12 TABLET, FILM COATED ORAL at 09:47

## 2018-02-06 RX ADMIN — DOXAZOSIN 4 MG: 2 TABLET ORAL at 20:43

## 2018-02-06 RX ADMIN — CARVEDILOL 3.12 MG: 3.12 TABLET, FILM COATED ORAL at 17:12

## 2018-02-06 RX ADMIN — FERROUS SULFATE TAB 325 MG (65 MG ELEMENTAL FE) 325 MG: 325 (65 FE) TAB at 17:13

## 2018-02-06 NOTE — PROGRESS NOTES
Problem: Falls - Risk of  Goal: *Absence of Falls  Document Bello Fall Risk and appropriate interventions in the flowsheet. Outcome: Progressing Towards Goal  Fall Risk Interventions:  Mobility Interventions: Bed/chair exit alarm, Patient to call before getting OOB    Mentation Interventions: Adequate sleep, hydration, pain control, Bed/chair exit alarm, Door open when patient unattended, Room close to nurse's station    Medication Interventions: Bed/chair exit alarm    Elimination Interventions: Bed/chair exit alarm, Call light in reach, Urinal in reach    History of Falls Interventions: Bed/chair exit alarm, Door open when patient unattended, Room close to nurse's station    2315 Pt appears asleep in bed. Respirations even and unlabored. Bed alarm on, call bell and urinal within reach, door remains open. Will continue to monitor with Q 15 safety checks.

## 2018-02-06 NOTE — BH NOTES
Received pt on unit. Speaking on the phone with girl friend.    note no agitation thus far,some irritable behav noted

## 2018-02-06 NOTE — PROGRESS NOTES
Problem: Falls - Risk of  Goal: *Absence of Falls  Document Bello Fall Risk and appropriate interventions in the flowsheet. Outcome: Progressing Towards Goal  Fall Risk Interventions:  Mobility Interventions: Bed/chair exit alarm, Communicate number of staff needed for ambulation/transfer, Mechanical lift, PT Consult for mobility concerns    Mentation Interventions: Adequate sleep, hydration, pain control, Bed/chair exit alarm, Room close to nurse's station, Toileting rounds    Medication Interventions: Bed/chair exit alarm, Patient to call before getting OOB    Elimination Interventions: Bed/chair exit alarm, Call light in reach, Patient to call for help with toileting needs, Toilet paper/wipes in reach, Toileting schedule/hourly rounds, Urinal in reach    History of Falls Interventions: Bed/chair exit alarm, Room close to nurse's station        Problem: Depressed Mood (Adult/Pediatric)  Goal: *STG: Participates in treatment plan  Outcome: Not Progressing Towards Goal  Labile, guarded. Declines staff interventions, labs, and medications. Intermittently responsive to staff. Argumentative and difficult to redirect. Skips some meals. Staff provided education on nutrition, medication, and importance of interventions for diabetes. Continue to redirect, support, and encourage. Q 15 min checks. Goal: *STG: Attends activities and groups  Outcome: Progressing Towards Goal  Withdrawn 2145:  Pt agreed to let 3 staff members assist him with turning and toileting with much persuasion. Pt swung at staff prior to arrival of [place], to assist nurses.

## 2018-02-06 NOTE — PROGRESS NOTES
Problem: Depressed Mood (Adult/Pediatric)  Goal: *STG: Participates in treatment plan  Outcome: Progressing Towards Goal  Cooperative with meds this am. Patient demanding with staff.    Denies SI

## 2018-02-06 NOTE — INTERDISCIPLINARY ROUNDS
Behavioral Health Interdisciplinary Rounds     Patient Name: Jorge Pastor  Age: 78 y.o. Room/Bed:  740/02  Primary Diagnosis: Depression   Admission Status: Involuntary Commitment     Readmission within 30 days: no  Power of  in place: yes - daughter Marlys Cueto - awaiting paperwork)   Patient requires a blocked bed: no          Reason for blocked bed: n/a    VTE Prophylaxis: Yes - ASA  Flu vaccine given : no - refused   Mobility needs/Fall risk: yes    Nutritional Plan: yes  Consults: PT/OT, wound care, hospitalist following          Labs/Testing due today?: no    Sleep hours: 6.25       Participation in Care/Groups:  no  Medication Compliant?: Selective (compliant with AM meds, refused evening and HS meds)  PRNS (last 24 hours): None    Restraints (last 24 hours):  no  Substance Abuse:  no  CIWA (range last 24 hours):  COWS (range last 24 hours):   Alcohol screening (AUDIT) completed -  AUDIT Score: 0  If applicable, date SBIRT discussed in treatment team AND documented: N/A  Tobacco - patient is a smoker: yes   Date tobacco education completed by RN: 1/24/18  24 hour chart check complete: yes     Patient goal(s) for today:   Treatment team focus/goals: Call daughter/POA  Progress note: Patient demanding and irritable with staff. LOS:  13  Expected LOS: TBD    Financial concerns/prescription coverage: Medicaid; Medicare  Date of last family contact: 2/6 SW spoke to daughter     Family requesting physician contact today: No  Discharge plan: TBD  Guns in the home: no        Outpatient provider(s): To be linked    Participating treatment team members:  DARRICK Fallon; Dr. Tracy Randle MD; Henry Clark RN; Mioz Sawyer, DongD

## 2018-02-07 LAB
GLUCOSE BLD STRIP.AUTO-MCNC: 126 MG/DL (ref 65–100)
GLUCOSE BLD STRIP.AUTO-MCNC: 127 MG/DL (ref 65–100)
GLUCOSE BLD STRIP.AUTO-MCNC: 127 MG/DL (ref 65–100)
GLUCOSE BLD STRIP.AUTO-MCNC: 146 MG/DL (ref 65–100)
SERVICE CMNT-IMP: ABNORMAL

## 2018-02-07 PROCEDURE — 82962 GLUCOSE BLOOD TEST: CPT

## 2018-02-07 PROCEDURE — 74011250637 HC RX REV CODE- 250/637: Performed by: HOSPITALIST

## 2018-02-07 PROCEDURE — 65220000003 HC RM SEMIPRIVATE PSYCH

## 2018-02-07 PROCEDURE — 74011250637 HC RX REV CODE- 250/637: Performed by: PSYCHIATRY & NEUROLOGY

## 2018-02-07 PROCEDURE — 74011250637 HC RX REV CODE- 250/637: Performed by: FAMILY MEDICINE

## 2018-02-07 RX ADMIN — CARVEDILOL 3.12 MG: 3.12 TABLET, FILM COATED ORAL at 16:30

## 2018-02-07 RX ADMIN — HYDRALAZINE HYDROCHLORIDE 100 MG: 50 TABLET, FILM COATED ORAL at 16:55

## 2018-02-07 RX ADMIN — DOXAZOSIN 4 MG: 2 TABLET ORAL at 21:03

## 2018-02-07 RX ADMIN — BUPROPION HYDROCHLORIDE 150 MG: 150 TABLET, EXTENDED RELEASE ORAL at 09:31

## 2018-02-07 RX ADMIN — ASPIRIN 81 MG: 81 TABLET, COATED ORAL at 09:31

## 2018-02-07 RX ADMIN — MIRTAZAPINE 30 MG: 15 TABLET, FILM COATED ORAL at 22:30

## 2018-02-07 RX ADMIN — HYDRALAZINE HYDROCHLORIDE 100 MG: 50 TABLET, FILM COATED ORAL at 22:00

## 2018-02-07 RX ADMIN — CARVEDILOL 3.12 MG: 3.12 TABLET, FILM COATED ORAL at 09:31

## 2018-02-07 RX ADMIN — FERROUS SULFATE TAB 325 MG (65 MG ELEMENTAL FE) 325 MG: 325 (65 FE) TAB at 16:56

## 2018-02-07 RX ADMIN — ATORVASTATIN CALCIUM 40 MG: 40 TABLET, FILM COATED ORAL at 09:32

## 2018-02-07 RX ADMIN — FERROUS SULFATE TAB 325 MG (65 MG ELEMENTAL FE) 325 MG: 325 (65 FE) TAB at 09:31

## 2018-02-07 RX ADMIN — LORAZEPAM 1.5 MG: 1 TABLET ORAL at 09:31

## 2018-02-07 RX ADMIN — HYDRALAZINE HYDROCHLORIDE 100 MG: 50 TABLET, FILM COATED ORAL at 09:31

## 2018-02-07 RX ADMIN — LORAZEPAM 1.5 MG: 1 TABLET ORAL at 21:30

## 2018-02-07 RX ADMIN — AMLODIPINE BESYLATE 10 MG: 5 TABLET ORAL at 09:32

## 2018-02-07 NOTE — BH NOTES
PSYCHIATRIC PROGRESS NOTE         Patient Name  Katrin Jorge   Date of Birth 1940   [facility] [id number]   Medical Record Number  [id number]      Age  78 y.o. PCP Johny Warner, MD   Admit date:  1/24/2018    Room Number  [phone] [id number]  @ [facility]   Date of Service  2/6/2018          PSYCHOTHERAPY SESSION NOTE:  Length of psychotherapy session: 15 minutes    Main condition/diagnosis/issues treated during session today, 2/6/2018 : med , meal and group non compliance    I employed Cognitive Behavioral therapy techniques, Reality-Oriented psychotherapy, as well as supportive psychotherapy in regards to various ongoing psychosocial stressors, including the following: pre-admission and current problems; housing issues; occupational issues; medical issues; and stress of hospitalization. Interpersonal relationship issues and psychodynamic conflicts explored. Attempts made to alleviate maladaptive patterns. We, also, worked on issues of denial & effects of substance dependency/use     Overall, patient is not progressing    Treatment Plan Update (reviewed an updated 2/6/2018) : I will modify psychotherapy tx plan by implementing more stress management strategies, building upon cognitive behavioral techniques, increasing coping skills, as well as shoring up psychological defenses). An extended energy and skill set was needed to engage pt in psychotherapy due to some of the following: resistiveness, complexity, negativity, confrontational nature, hostile behaviors, and/or severe abnormalities in thought processes/psychosis resulting in the loss of expressive/receptive language communication skills. E & M PROGRESS NOTE:         HISTORY       CC:  \"depression and non compliance with treatment \"  HISTORY OF PRESENT ILLNESS/INTERVAL HISTORY:  (reviewed/updated 2/6/2018).   per initial evaluation:     Katrin Jorge presents/reports/evidences the following emotional symptoms today, 2/6/2018:depression. The above symptoms have been present for 1 years. These symptoms are of severe severity. The symptoms are constant  in nature. Additional symptomatology and features include anxiety. 2/2/18- pATIENT HAS IMPROVED SPORADIC MED COMPLIANCE WITH INCREASED ATIVAN DOSE. WAS SOCIAL IN [place] TODAY. SW TO CONTACT DAUGHTER TO LET HER KNOE THAT HE IS NOT BEING AS TREATMENT COMPLIANT AS HE TOLD HER HE WOULD BE.    1/27/18 he is not responding to questions and not engaging and not showing engagement and refuse medications and treatment   1/28/18 he is doing better and he engaged and not feeling sad or depressed and he eats better    1/29/18= Patient is deliberately refusing vital signs, labs and medications. He likes finger foods and eats those. Will meet with daughter on Tuesday to discuss possible guardianship. Will seek forced meds. 1/30/18- Pt. Agreed to comply with meds after extensive family meeting. Planning for SNF admission and possible transfer to home afterwards [address].  1/31/18- Patient is intermittently cooperative with somatic meds. We discussed the risks to his health this can cause. I advised Wellbutrin for depression. This may help motivate treatment compliance. 2/1/18- Continues Rude, belligerent, uncooperative and disrespectful with staff. Is marginally compliant with diabetes management. Waiting for placement. 2/3/18- no complaints. More redirectable, less hostile and more compliant  2/4/18- Mr. Waynetta Dandy very somnolent this morning. No agitation  2/5/18- Sporadic , selective compliance with medications. Denies SI/HI. Needs reminding of treatment goals to improve compliance for transfer to SNF.  2/6/18- mr. Waynetta Dandy was bright and positive this morning. Less demanding and hostile towards staff. Asking to go home. SIDE EFFECTS: (reviewed/updated 2/6/2018)  None reported or admitted to.   No noted toxicity with use of Depakote/Tegretol/lithium/Clozaril/TCAs   ALLERGIES:(reviewed/updated 2/6/2018)  Allergies   Allergen Reactions    Tetracycline Hives      MEDICATIONS PRIOR TO ADMISSION:(reviewed/updated 2/6/2018)  Prescriptions Prior to Admission   Medication Sig    hydrALAZINE (APRESOLINE) 100 mg tablet Take 1 Tab by mouth three (3) times daily.  amLODIPine (NORVASC) 10 mg tablet Take 1 Tab by mouth daily.  aspirin delayed-release 81 mg tablet Take 1 Tab by mouth daily.  atorvastatin (LIPITOR) 40 mg tablet Take 1 Tab by mouth daily.  carvedilol (COREG) 3.125 mg tablet Take 1 Tab by mouth two (2) times daily (with meals).  ferrous sulfate 325 mg (65 mg iron) tablet Take 1 Tab by mouth two (2) times daily (with meals).  mirtazapine (REMERON) 7.5 mg tablet Take 1 Tab by mouth nightly.  nicotine (NICODERM CQ) 21 mg/24 hr 1 Patch by TransDERmal route every twenty-four (24) hours for 30 days. PAST MEDICAL HISTORY: Past medical history from the initial psychiatric evaluation has been reviewed (reviewed/updated 2/6/2018) with no additional updates (I asked patient and no additional past medical history provided). Past Medical History:   Diagnosis Date    Arthritis     CAD (coronary artery disease) 2007    CKD (chronic kidney disease), stage III     DM type 2 causing renal disease (HCC)     DVT (deep venous thrombosis) (HCC)     Hx of DVT:  Xarelto stopped due to GI bleed. S/P IVC filter.  Gait abnormality     GI bleed     Heart murmur     Hepatitis C     History of blood transfusion     Hypercholesterolemia     Hypertension 11/7/2014    Neuropathy     Non compliance w medication regimen     Peripheral vascular disease ([facility] [address] 11/7/2014    A. S/P Right SFA POBA and tibial atherectomy (2/4/13).     PUD (peptic ulcer disease) 2007    Unspecified sleep apnea     never used cpap     Past Surgical History:   Procedure Laterality Date    ABDOMEN SURGERY PROC UNLISTED  2007    has approx 7-8\" midline incision on abdomen--\"had to open him up and clean him out after feeding tube clogged\"    HX GI  2007    feeding tube for approx 2 mo    VASCULAR SURGERY PROCEDURE UNLIST Right 1/22/2015    popliteal-tibial bypass      SOCIAL HISTORY: Social history from the initial psychiatric evaluation has been reviewed (reviewed/updated 2/6/2018) with no additional updates (I asked patient and no additional social history provided). Social History     Social History    Marital status:      Spouse name: N/A    Number of children: N/A    Years of education: N/A     Occupational History    Not on file. Social History Main Topics    Smoking status: Current Every Day Smoker     Packs/day: 0.50    Smokeless tobacco: Not on file    Alcohol use No    Drug use: No      Comment: >20 years ago    Sexual activity: Not on file     Other Topics Concern    Not on file     Social History Narrative    78 year ols male admitted for depression and homelessness. Pt will be evicated from apartment due to non payment of bills. Girlfriend of 30 years left him to [facility] live in the [place] with others. \" Pt is a brittle diabetic, with treatment non compliance. He has bilarela lower extremity amputations. Patient has a long hx of substance abuse. FAMILY HISTORY: Family history from the initial psychiatric evaluation has been reviewed (reviewed/updated 2/6/2018) with no additional updates (I asked patient and no additional family history provided).    Family History   Problem Relation Age of Onset    Hypertension Father        REVIEW OF SYSTEMS: (reviewed/updated 2/6/2018)  Appetite:decreased   Sleep: fitful   All other Review of Systems: Psychological ROS: positive for - behavioral disorder  Respiratory ROS: no cough, shortness of breath, or wheezing  Cardiovascular ROS: no chest pain or dyspnea on exertion         MENTAL STATUS EXAM & VITALS     MENTAL STATUS EXAM (MSE):    MSE FINDINGS ARE WITHIN NORMAL LIMITS (WNL) UNLESS OTHERWISE STATED BELOW. ( ALL OF THE BELOW CATEGORIES OF THE MSE HAVE BEEN REVIEWED (reviewed 2/6/2018) AND UPDATED AS DEEMED APPROPRIATE )  General Presentation age appropriate, evasive and guarded   Orientation oriented to time, place and person   Vital Signs  See below (reviewed 2/6/2018); Vital Signs (BP, Pulse, & Temp) are within normal limits if not listed below.    Gait and Station Stable/steady, no ataxia   Musculoskeletal System No extrapyramidal symptoms (EPS); no abnormal muscular movements or Tardive Dyskinesia (TD); muscle strength and tone are within normal limits   Language No aphasia or dysarthria   Speech:  hypoverbal   Thought Processes concrete; normal rate of thoughts; poor abstract reasoning/computation   Thought Associations goal directed   Thought Content free of delusions and free of hallucinations   Suicidal Ideations none   Homicidal Ideations none   Mood:  irritable   Affect:  mood-congruent   Memory recent  fair   Memory remote:  fair   Concentration/Attention:  distractable   Fund of Knowledge significantly below average   Insight:  poor   Reliability poor   Judgment:  poor          VITALS:     Patient Vitals for the past 24 hrs:   Temp Pulse Resp BP SpO2   02/06/18 2012 98.2 °F (36.8 °C) 97 14 129/79 94 %   02/06/18 1711 97.9 °F (36.6 °C) 69 20 142/77 100 %   02/06/18 0728 98.7 °F (37.1 °C) 74 18 158/72 96 %     Wt Readings from Last 3 Encounters:   01/24/18 82.7 kg (182 lb 5.1 oz)   01/23/18 83.6 kg (184 lb 4.9 oz)   07/13/17 88.9 kg (196 lb)     Temp Readings from Last 3 Encounters:   02/06/18 98.2 °F (36.8 °C)   01/24/18 97.2 °F (36.2 °C)   07/20/17 97.9 °F (36.6 °C)     BP Readings from Last 3 Encounters:   02/06/18 129/79   01/24/18 172/68   07/20/17 175/76     Pulse Readings from Last 3 Encounters:   02/06/18 97   01/24/18 72   07/20/17 (!) 52            DATA     LABORATORY DATA:(reviewed/updated 2/6/2018)  Recent Results (from the past 24 hour(s))   GLUCOSE, POC Collection Time: 02/06/18  7:35 AM   Result Value Ref Range    Glucose (POC) 84 65 - 100 mg/dL    Performed by Alisha Julian    GLUCOSE, POC    Collection Time: 02/06/18 11:34 AM   Result Value Ref Range    Glucose (POC) 116 (H) 65 - 100 mg/dL    Performed by Alisha Julian    GLUCOSE, POC    Collection Time: 02/06/18  4:10 PM   Result Value Ref Range    Glucose (POC) 146 (H) 65 - 100 mg/dL    Performed by Tl Smith    GLUCOSE, POC    Collection Time: 02/06/18  7:50 PM   Result Value Ref Range    Glucose (POC) 146 (H) 65 - 100 mg/dL    Performed by Maria Luisa Linder      No results found for: VALF2, VALAC, VALP, VALPR, DS6, CRBAM, CRBAMP, CARB2, XCRBAM  No results found for: LITHM   RADIOLOGY REPORTS:(reviewed/updated 2/6/2018)  Xr Chest Port    Result Date: 1/20/2018  EXAM: Portable CXR.  1800 hours. COMPARISON: 1/19/2015. INDICATION: cp, fatigue There is new interstitial pulmonary edema, cardiomegaly and small left pleural effusion. There is no focal infiltrate, pneumothorax or midline shift. IMPRESSION: CHF pattern.          MEDICATIONS     ALL MEDICATIONS:   Current Facility-Administered Medications   Medication Dose Route Frequency    LORazepam (ATIVAN) tablet 1.5 mg  1.5 mg Oral BID    mirtazapine (REMERON) tablet 30 mg  30 mg Oral QHS    buPROPion SR (WELLBUTRIN SR) tablet 150 mg  150 mg Oral DAILY    alum-mag hydroxide-simeth (MYLANTA) oral suspension 30 mL  30 mL Oral Q4H PRN    doxazosin (CARDURA) tablet 4 mg  4 mg Oral QHS    polyethylene glycol (MIRALAX) packet 17 g  17 g Oral DAILY    OLANZapine (ZyPREXA) tablet 2.5 mg  2.5 mg Oral Q6H PRN    ziprasidone (GEODON) 10 mg in sterile water (preservative free) 0.5 mL injection  10 mg IntraMUSCular BID PRN    benztropine (COGENTIN) tablet 1 mg  1 mg Oral BID PRN    benztropine (COGENTIN) injection 1 mg  1 mg IntraMUSCular BID PRN    zolpidem (AMBIEN) tablet 5 mg  5 mg Oral QHS PRN    acetaminophen (TYLENOL) tablet 650 mg  650 mg Oral Q4H PRN    magnesium hydroxide (MILK OF MAGNESIA) 400 mg/5 mL oral suspension 30 mL  30 mL Oral DAILY PRN    nicotine (NICODERM CQ) 21 mg/24 hr patch 1 Patch  1 Patch TransDERmal DAILY PRN    influenza vaccine 2017-18 (3 yrs+)(PF) (FLUZONE QUAD/FLUARIX QUAD) injection 0.5 mL  0.5 mL IntraMUSCular PRIOR TO DISCHARGE    amLODIPine (NORVASC) tablet 10 mg  10 mg Oral DAILY    aspirin delayed-release tablet 81 mg  81 mg Oral DAILY    atorvastatin (LIPITOR) tablet 40 mg  40 mg Oral DAILY    carvedilol (COREG) tablet 3.125 mg  3.125 mg Oral BID WITH MEALS    ferrous sulfate tablet 325 mg  325 mg Oral BID WITH MEALS    hydrALAZINE (APRESOLINE) tablet 100 mg  100 mg Oral TID    glucose chewable tablet 16 g  4 Tab Oral PRN    dextrose (D50W) injection syrg 12.5-25 g  12.5-25 g IntraVENous PRN    glucagon (GLUCAGEN) injection 1 mg  1 mg IntraMUSCular PRN    insulin lispro (HUMALOG) injection   SubCUTAneous AC&HS    nicotine (NICODERM CQ) 21 mg/24 hr patch 1 Patch  1 Patch TransDERmal Q24H      SCHEDULED MEDICATIONS:   Current Facility-Administered Medications   Medication Dose Route Frequency    LORazepam (ATIVAN) tablet 1.5 mg  1.5 mg Oral BID    mirtazapine (REMERON) tablet 30 mg  30 mg Oral QHS    buPROPion SR (WELLBUTRIN SR) tablet 150 mg  150 mg Oral DAILY    doxazosin (CARDURA) tablet 4 mg  4 mg Oral QHS    polyethylene glycol (MIRALAX) packet 17 g  17 g Oral DAILY    influenza vaccine 2017-18 (3 yrs+)(PF) (FLUZONE QUAD/FLUARIX QUAD) injection 0.5 mL  0.5 mL IntraMUSCular PRIOR TO DISCHARGE    amLODIPine (NORVASC) tablet 10 mg  10 mg Oral DAILY    aspirin delayed-release tablet 81 mg  81 mg Oral DAILY    atorvastatin (LIPITOR) tablet 40 mg  40 mg Oral DAILY    carvedilol (COREG) tablet 3.125 mg  3.125 mg Oral BID WITH MEALS    ferrous sulfate tablet 325 mg  325 mg Oral BID WITH MEALS    hydrALAZINE (APRESOLINE) tablet 100 mg  100 mg Oral TID    insulin lispro (HUMALOG) injection   SubCUTAneous AC&HS    nicotine (NICODERM CQ) 21 mg/24 hr patch 1 Patch  1 Patch TransDERmal Q24H          ASSESSMENT & PLAN     DIAGNOSES REQUIRING ACTIVE TREATMENT AND MONITORING: (reviewed/updated 2/6/2018)  Patient Active Hospital Problem List:   Depression (1/24/2018)    Assessment: sadness, helplessness, hopelessness, in reaction to recent bilateral lower leg amputationa and inability to pat water bill at home. Plan: consider forced medication/treatment, address social issues, daughter may be obtaining guardianship  1/27/18 need order to treat    1/28/18 continue his medications              In summary, Sharlene Harding, is a 78 y.o.  male who presents with a severe exacerbation of the principal diagnosis of Depression  Patient's condition is worsening/not improving/not stable Patient requires continued inpatient hospitalization for further stabilization, safety monitoring and medication management. I will continue to coordinate the provision of individual, milieu, occupational, group, and substance abuse therapies to address target symptoms/diagnoses as deemed appropriate for the individual patient. A coordinated, multidisplinary treatment team round was conducted with the patient (this team consists of the nurse, psychiatric unit pharmcist,  and writer). Complete current electronic health record for patient has been reviewed today including consultant notes, ancillary staff notes, nurses and psychiatric tech notes. Suicide risk assessment completed and patient deemed to be of low risk for suicide at this time. The following regarding medications was addressed during rounds with patient:   the risks and benefits of the proposed medication. The patient was given the opportunity to ask questions. Informed consent given to the use of the above medications.  Will continue to adjust psychiatric and non-psychiatric medications (see above \"medication\" section and orders section for details) as deemed appropriate & based upon diagnoses and response to treatment. I will continue to order blood tests/labs and diagnostic tests as deemed appropriate and review results as they become available (see orders for details and above listed lab/test results). I will order psychiatric records from previous [facility] to further elucidate the nature of patient's psychopathology and review once available. I will gather additional collateral information from friends, family and o/p treatment team to further elucidate the nature of patient's psychopathology and baselline level of psychiatric functioning. I certify that this patient's inpatient psychiatric hospital services furnished since the previous certification were, and continue to be, required for treatment that could reasonably be expected to improve the patient's condition, or for diagnostic study, and that the patient continues to need, on a daily basis, active treatment furnished directly by or requiring the supervision of inpatient psychiatric facility personnel. In addition, the hospital records show that services furnished were intensive treatment services, admission or related services, or equivalent services.     EXPECTED DISCHARGE DATE/DAY: TBD     DISPOSITION: Home       Signed By:   Bernardo See MD  2/6/2018

## 2018-02-07 NOTE — BH NOTES
GROUP THERAPY PROGRESS NOTE    Jeet Rodriguez is participating in Leisure[employer]ive Group.      Group time: 15 minutes    Personal goal for participation: decrease anxiety    Goal orientation: relaxation    Group therapy participation: active    Therapeutic interventions reviewed and discussed:     Impression of participation: fair

## 2018-02-07 NOTE — PROGRESS NOTES
Problem: Depressed Mood (Adult/Pediatric)  Goal: *STG: Participates in treatment plan  Outcome: Progressing Towards Goal  Received this morning resting in bed. Was alert and oriented,calling out for different needs. Can be demanding,needing limits to be set. Abl e to follow directions. Thoughts are clear and organized. Appears sad,voiced he was regretful about expressing SI prior to hospitalization. \" I would have never said what I said had I known I would end up here. I just want to go home. \" Blaming self and others when it comes to needs not being met quick enough. Assited to chair with walt lift and brought out to the dining room for breakfast. Tolerated well. Goal: *STG: Verbalizes anger, guilt, and other feelings in a constructive manor  Outcome: Progressing Towards Goal  Able to verbalize thought in a clear appropriate manner. Thoughts are clear and organized. Goal: *STG: Attends activities and groups  Outcome: Progressing Towards Goal  Attended music group this morning ad has been out on the unit this morning. Goal: *STG: Complies with medication therapy  Outcome: Progressing Towards Goal  Has been medication and meal compliant.     [address] [facility] [address]  Master Treatment Plan for Itzel Bowen    Date Treatment Plan Initiate2/7/18    Treatment Plan Modalities:  Type of Modality Amount  (x minutes) Frequency (x/week) Duration (x days) Name of Responsible Staff   Community & wrap-up meetings to encourage peer interactions 15 7 1   Emmy Figueredo RN   Group psychotherapy to assist in building coping skills and internal controls 60 7 1 Jose Tucker LCSW   Therapeutic activity groups to build coping skills 60 7 1 Jose Tucker LCSW   Psychoeducation in group setting to address:   Medication education   15 7 1 SHELBI hSelton RN   Coping skills         Relaxation techniques         Symptom management         Discharge planning         Spirituality    60   2 [facility]   60 7 1 Vol Recovery/AA/NA   60 7 1 Vol   Physician medication management   16 7 1 Dr. Obrien Backbone

## 2018-02-07 NOTE — PROGRESS NOTES
Problem: Falls - Risk of  Goal: *Absence of Falls  Document Bello Fall Risk and appropriate interventions in the flowsheet. Outcome: Progressing Towards Goal  Fall Risk Interventions:  Mobility Interventions: Bed/chair exit alarm, Communicate number of staff needed for ambulation/transfer, Mechanical lift, Patient to call before getting OOB    Mentation Interventions: Adequate sleep, hydration, pain control, Bed/chair exit alarm, Door open when patient unattended    Medication Interventions: Bed/chair exit alarm, Patient to call before getting OOB    Elimination Interventions: Bed/chair exit alarm, Call light in reach, Toileting schedule/hourly rounds    History of Falls Interventions: Bed/chair exit alarm, Door open when patient unattended    Free of falls. Bed alarm on the bed. Fall  Tool completed and accurate. Transferred to chair via walt lift.

## 2018-02-07 NOTE — BH NOTES
0700  Found pt sitting on side of bed with his legs dangling (1 about 8\" from floor). Reminded him not to get oob without the Lift Team to use walt. Pt has been incont, so will be cleaned up. SRs up x3. Monitoring continues. 0800  Pt has turned self in bed well. Cleaned up from incont urine & BM (small amt formed, brown BM)  Pt then voided 200 cc in urinal. Monitoring continues.

## 2018-02-07 NOTE — INTERDISCIPLINARY ROUNDS
Behavioral Health Interdisciplinary Rounds     Patient Name: Josue Vidal  Age: 78 y.o. Room/Bed:  740/02  Primary Diagnosis: Depression   Admission Status: Involuntary Commitment     Readmission within 30 days: no  Power of  in place: yes - daughter Ada Weiner - awaiting paperwork)   Patient requires a blocked bed: no          Reason for blocked bed: n/a    VTE Prophylaxis: Yes - ASA  Flu vaccine given : no - refused   Mobility needs/Fall risk: yes    Nutritional Plan: yes  Consults: PT/OT, wound care, hospitalist following          Labs/Testing due today?: no    Sleep hours: 6.25       Participation in Care/Groups:  no  Medication Compliant?: Selective (compliant with AM meds, refused evening and HS meds)  PRNS (last 24 hours): None    Restraints (last 24 hours):  no  Substance Abuse:  no  CIWA (range last 24 hours):  COWS (range last 24 hours):   Alcohol screening (AUDIT) completed -  AUDIT Score: 0  If applicable, date SBIRT discussed in treatment team AND documented: N/A  Tobacco - patient is a smoker: yes   Date tobacco education completed by RN: 1/24/18  24 hour chart check complete: yes     Patient goal(s) for today:   Treatment team focus/goals: Resubmit [facility]  Progress note: Patient demanding and irritable with staff. LOS:  14  Expected LOS: TBD    Financial concerns/prescription coverage: Medicaid; Medicare  Date of last family contact: 2/6 SW spoke to fiance     Family requesting physician contact today: No  Discharge plan: Placement at [facility] (2-) in the home: no        Outpatient provider(s): To be linked    Participating treatment team members:  DARRICK Almanza; Dr. Georgianna Cheadle, MD; Faith Oliva, RN; Janice Lobo, PharmD

## 2018-02-07 NOTE — PROGRESS NOTES
Problem: Falls - Risk of  Goal: *Absence of Falls  Document Bello Fall Risk and appropriate interventions in the flowsheet. Outcome: Progressing Towards Goal  Fall Risk Interventions:  Mobility Interventions: Bed/chair exit alarm, Communicate number of staff needed for ambulation/transfer, Mechanical lift, Patient to call before getting OOB    Mentation Interventions: Adequate sleep, hydration, pain control    Medication Interventions: Bed/chair exit alarm, Patient to call before getting OOB    Elimination Interventions: Bed/chair exit alarm, Patient to call for help with toileting needs, Toilet paper/wipes in reach, Urinal in reach    History of Falls Interventions: Bed/chair exit alarm, Door open when patient unattended     Remains on q 2 hour turns. Requires Lift team to transfer in and out of bed. Tap bell with in reach. Problem: Depressed Mood (Adult/Pediatric)  Goal: *STG: Complies with medication therapy  Outcome: Progressing Towards Goal  Visible in DR until after Dinner. Ate 100% dinner and snacks. VSS. Focused on \"going to live with girl friend\". BNA on right and left leg. On q 2 hour turns. Required encouragement to take meds. Med compliant.

## 2018-02-07 NOTE — BH NOTES
GROUP THERAPY PROGRESS NOTE    Brianna Gruber passively participated in a morning Process Group on the Geriatric Unit, with a focus identifying feelings, planning for the day, and singing. Group time: 45 minutes. Personal goal for participation: To increase the capacity to shift ones mood, prepare for the day, and share in group singing. Goal orientation: The patient will be able to prepare for the day through group singing. Group therapy participation: When prompted, this patient minimally participated in the group. Therapeutic interventions reviewed and discussed: The group members were introduce themselves by first names and participate in group singing as a way to increase their oxygen and blood flow and begin their day on a positive note. They were also asked to join in singing several songs. Impression of participation: The patient responded with a hand wave when prompted and asked how he was feeling. He offered not verbal content and did not participate in the singing. He looked as if he were alert, if not fully oriented or engaged in the group process. He sat through the group without joining in the conversation or singing. The patient expressed no SI/HI and displayed no overt psychosis, although this was difficult to verify due to his lack of participation. His affect was restrained and his mood matched his affect.

## 2018-02-07 NOTE — BH NOTES
1530: Patient received as he is sitting in the Day Room. He greets oncoming staff with repeated requests for snacks despite being advised that we need to get his vital signs/blood sugar and that dinner will be here in less than an hour. He voices no other complaints or concerns at this time. Will maintain q 15 minute safety checks. 2100:  Patient has been sitting quietly in the Day Room throughout this shift. He has been dozing intermittently, but he has been cooperative with his vital signs and Accuchecks as well as med/meal compliant. Will continue to monitor.

## 2018-02-07 NOTE — BH NOTES
PSYCHIATRIC PROGRESS NOTE         Patient Name  Argenis Garcia   Date of Birth 1940   [facility] [id number]   Medical Record Number  [id number]      Age  78 y.o. PCP Johny Warner, MD   Admit date:  1/24/2018    Room Number  [phone]  @ [facility]   Date of Service  2/7/2018          PSYCHOTHERAPY SESSION NOTE:  Length of psychotherapy session: 15 minutes    Main condition/diagnosis/issues treated during session today, 2/7/2018 : med , meal and group non compliance    I employed Cognitive Behavioral therapy techniques, Reality-Oriented psychotherapy, as well as supportive psychotherapy in regards to various ongoing psychosocial stressors, including the following: pre-admission and current problems; housing issues; occupational issues; medical issues; and stress of hospitalization. Interpersonal relationship issues and psychodynamic conflicts explored. Attempts made to alleviate maladaptive patterns. We, also, worked on issues of denial & effects of substance dependency/use     Overall, patient is not progressing    Treatment Plan Update (reviewed an updated 2/7/2018) : I will modify psychotherapy tx plan by implementing more stress management strategies, building upon cognitive behavioral techniques, increasing coping skills, as well as shoring up psychological defenses). An extended energy and skill set was needed to engage pt in psychotherapy due to some of the following: resistiveness, complexity, negativity, confrontational nature, hostile behaviors, and/or severe abnormalities in thought processes/psychosis resulting in the loss of expressive/receptive language communication skills. E & M PROGRESS NOTE:         HISTORY       CC:  \"depression and non compliance with treatment \"  HISTORY OF PRESENT ILLNESS/INTERVAL HISTORY:  (reviewed/updated 2/7/2018).   per initial evaluation:     Argenis Garcia presents/reports/evidences the following emotional symptoms today, 2/7/2018:depression. The above symptoms have been present for 1 years. These symptoms are of severe severity. The symptoms are constant  in nature. Additional symptomatology and features include anxiety. 2/2/18- pATIENT HAS IMPROVED SPORADIC MED COMPLIANCE WITH INCREASED ATIVAN DOSE. WAS SOCIAL IN [place] TODAY. SW TO CONTACT DAUGHTER TO LET HER KNOE THAT HE IS NOT BEING AS TREATMENT COMPLIANT AS HE TOLD HER HE WOULD BE.    1/27/18 he is not responding to questions and not engaging and not showing engagement and refuse medications and treatment   1/28/18 he is doing better and he engaged and not feeling sad or depressed and he eats better    1/29/18= Patient is deliberately refusing vital signs, labs and medications. He likes finger foods and eats those. Will meet with daughter on Tuesday to discuss possible guardianship. Will seek forced meds. 1/30/18- Pt. Agreed to comply with meds after extensive family meeting. Planning for SNF admission and possible transfer to home afterwards [address].  1/31/18- Patient is intermittently cooperative with somatic meds. We discussed the risks to his health this can cause. I advised Wellbutrin for depression. This may help motivate treatment compliance. 2/1/18- Continues Rude, belligerent, uncooperative and disrespectful with staff. Is marginally compliant with diabetes management. Waiting for placement. 2/3/18- no complaints. More redirectable, less hostile and more compliant  2/4/18- Mr. Roblse Gonzales very somnolent this morning. No agitation  2/5/18- Sporadic , selective compliance with medications. Denies SI/HI. Needs reminding of treatment goals to improve compliance for transfer to SNF.  2/6/18- mr. Robles Gonzales was bright and positive this morning. Less demanding and hostile towards staff. Asking to go home. 2/7/18- Apologized to writer for being rude and uncooperative with treatment. Focused on discharge.  Understandd house repairs need to be completed first.                SIDE EFFECTS: (reviewed/updated 2/7/2018)  None reported or admitted to. No noted toxicity with use of Depakote/Tegretol/lithium/Clozaril/TCAs   ALLERGIES:(reviewed/updated 2/7/2018)  Allergies   Allergen Reactions    Tetracycline Hives      MEDICATIONS PRIOR TO ADMISSION:(reviewed/updated 2/7/2018)  Prescriptions Prior to Admission   Medication Sig    hydrALAZINE (APRESOLINE) 100 mg tablet Take 1 Tab by mouth three (3) times daily.  amLODIPine (NORVASC) 10 mg tablet Take 1 Tab by mouth daily.  aspirin delayed-release 81 mg tablet Take 1 Tab by mouth daily.  atorvastatin (LIPITOR) 40 mg tablet Take 1 Tab by mouth daily.  carvedilol (COREG) 3.125 mg tablet Take 1 Tab by mouth two (2) times daily (with meals).  ferrous sulfate 325 mg (65 mg iron) tablet Take 1 Tab by mouth two (2) times daily (with meals).  mirtazapine (REMERON) 7.5 mg tablet Take 1 Tab by mouth nightly.  nicotine (NICODERM CQ) 21 mg/24 hr 1 Patch by TransDERmal route every twenty-four (24) hours for 30 days. PAST MEDICAL HISTORY: Past medical history from the initial psychiatric evaluation has been reviewed (reviewed/updated 2/7/2018) with no additional updates (I asked patient and no additional past medical history provided). Past Medical History:   Diagnosis Date    Arthritis     CAD (coronary artery disease) 2007    CKD (chronic kidney disease), stage III     DM type 2 causing renal disease (HCC)     DVT (deep venous thrombosis) ([facility])     Hx of DVT:  Xarelto stopped due to GI bleed. S/P IVC filter.  Gait abnormality     GI bleed     Heart murmur     Hepatitis C     History of blood transfusion     Hypercholesterolemia     Hypertension 11/7/2014    Neuropathy     Non compliance w medication regimen     Peripheral vascular disease ([facility] [address] 11/7/2014    A. S/P Right SFA POBA and tibial atherectomy (2/4/13).     PUD (peptic ulcer disease) 2007    Unspecified sleep apnea     never used cpap     Past Surgical History:   Procedure Laterality Date    ABDOMEN SURGERY PROC UNLISTED  2007    has approx 7-8\" midline incision on abdomen--\"had to open him up and clean him out after feeding tube clogged\"    HX GI  2007    feeding tube for approx 2 mo    VASCULAR SURGERY PROCEDURE UNLIST Right 1/22/2015    popliteal-tibial bypass      SOCIAL HISTORY: Social history from the initial psychiatric evaluation has been reviewed (reviewed/updated 2/7/2018) with no additional updates (I asked patient and no additional social history provided). Social History     Social History    Marital status:      Spouse name: N/A    Number of children: N/A    Years of education: N/A     Occupational History    Not on file. Social History Main Topics    Smoking status: Current Every Day Smoker     Packs/day: 0.50    Smokeless tobacco: Not on file    Alcohol use No    Drug use: No      Comment: >20 years ago    Sexual activity: Not on file     Other Topics Concern    Not on file     Social History Narrative    78 year ols male admitted for depression and homelessness. Pt will be evicated from apartment due to non payment of bills. Girlfriend of 30 years left him to [facility] live [place] the woods with others. \" Pt is a brittle diabetic, with treatment non compliance. He has bilarela lower extremity amputations. Patient has a long hx of substance abuse. FAMILY HISTORY: Family history from the initial psychiatric evaluation has been reviewed (reviewed/updated 2/7/2018) with no additional updates (I asked patient and no additional family history provided).    Family History   Problem Relation Age of Onset    Hypertension Father        REVIEW OF SYSTEMS: (reviewed/updated 2/7/2018)  Appetite:decreased   Sleep: fitful   All other Review of Systems: Psychological ROS: positive for - behavioral disorder  Respiratory ROS: no cough, shortness of breath, or wheezing  Cardiovascular ROS: no chest pain or dyspnea on exertion         MENTAL STATUS EXAM & VITALS     MENTAL STATUS EXAM (MSE):    MSE FINDINGS ARE WITHIN NORMAL LIMITS (WNL) UNLESS OTHERWISE STATED BELOW. ( ALL OF THE BELOW CATEGORIES OF THE MSE HAVE BEEN REVIEWED (reviewed 2/7/2018) AND UPDATED AS DEEMED APPROPRIATE )  General Presentation age appropriate, evasive and guarded   Orientation oriented to time, place and person   Vital Signs  See below (reviewed 2/7/2018); Vital Signs (BP, Pulse, & Temp) are within normal limits if not listed below.    Gait and Station Stable/steady, no ataxia   Musculoskeletal System No extrapyramidal symptoms (EPS); no abnormal muscular movements or Tardive Dyskinesia (TD); muscle strength and tone are within normal limits   Language No aphasia or dysarthria   Speech:  hypoverbal   Thought Processes concrete; normal rate of thoughts; poor abstract reasoning/computation   Thought Associations goal directed   Thought Content free of delusions and free of hallucinations   Suicidal Ideations none   Homicidal Ideations none   Mood:  irritable   Affect:  mood-congruent   Memory recent  fair   Memory remote:  fair   Concentration/Attention:  distractable   Fund of Knowledge significantly below average   Insight:  poor   Reliability poor   Judgment:  poor          VITALS:     Patient Vitals for the past 24 hrs:   Temp Pulse Resp BP SpO2   02/07/18 0736 97.4 °F (36.3 °C) 72 18 186/82 94 %   02/06/18 2012 98.2 °F (36.8 °C) 97 14 129/79 94 %   02/06/18 1711 97.9 °F (36.6 °C) 69 20 142/77 100 %     Wt Readings from Last 3 Encounters:   01/24/18 82.7 kg (182 lb 5.1 oz)   01/23/18 83.6 kg (184 lb 4.9 oz)   07/13/17 88.9 kg (196 lb)     Temp Readings from Last 3 Encounters:   02/07/18 97.4 °F (36.3 °C)   01/24/18 97.2 °F (36.2 °C)   07/20/17 97.9 °F (36.6 °C)     BP Readings from Last 3 Encounters:   02/07/18 186/82   01/24/18 172/68   07/20/17 175/76     Pulse Readings from Last 3 Encounters:   02/07/18 72   01/24/18 72   07/20/17 (!) 52 DATA     LABORATORY DATA:(reviewed/updated 2/7/2018)  Recent Results (from the past 24 hour(s))   GLUCOSE, POC    Collection Time: 02/06/18  4:10 PM   Result Value Ref Range    Glucose (POC) 146 (H) 65 - 100 mg/dL    Performed by Nighat Lewis    GLUCOSE, POC    Collection Time: 02/06/18  7:50 PM   Result Value Ref Range    Glucose (POC) 146 (H) 65 - 100 mg/dL    Performed by Raymond Bustillo, POC    Collection Time: 02/07/18  7:23 AM   Result Value Ref Range    Glucose (POC) 126 (H) 65 - 100 mg/dL    Performed by Karyle Lamp, POC    Collection Time: 02/07/18 11:39 AM   Result Value Ref Range    Glucose (POC) 146 (H) 65 - 100 mg/dL    Performed by Charisma Jordan      No results found for: VALF2, VALAC, VALP, VALPR, DS6, CRBAM, CRBAMP, CARB2, XCRBAM  No results found for: LITHM   RADIOLOGY REPORTS:(reviewed/updated 2/7/2018)  Xr Chest Port    Result Date: 1/20/2018  EXAM: Portable CXR.  1800 hours. COMPARISON: 1/19/2015. INDICATION: cp, fatigue There is new interstitial pulmonary edema, cardiomegaly and small left pleural effusion. There is no focal infiltrate, pneumothorax or midline shift. IMPRESSION: CHF pattern.          MEDICATIONS     ALL MEDICATIONS:   Current Facility-Administered Medications   Medication Dose Route Frequency    LORazepam (ATIVAN) tablet 1.5 mg  1.5 mg Oral BID    mirtazapine (REMERON) tablet 30 mg  30 mg Oral QHS    buPROPion SR (WELLBUTRIN SR) tablet 150 mg  150 mg Oral DAILY    alum-mag hydroxide-simeth (MYLANTA) oral suspension 30 mL  30 mL Oral Q4H PRN    doxazosin (CARDURA) tablet 4 mg  4 mg Oral QHS    polyethylene glycol (MIRALAX) packet 17 g  17 g Oral DAILY    OLANZapine (ZyPREXA) tablet 2.5 mg  2.5 mg Oral Q6H PRN    ziprasidone (GEODON) 10 mg in sterile water (preservative free) 0.5 mL injection  10 mg IntraMUSCular BID PRN    benztropine (COGENTIN) tablet 1 mg  1 mg Oral BID PRN    benztropine (COGENTIN) injection 1 mg  1 mg IntraMUSCular BID PRN    zolpidem (AMBIEN) tablet 5 mg  5 mg Oral QHS PRN    acetaminophen (TYLENOL) tablet 650 mg  650 mg Oral Q4H PRN    magnesium hydroxide (MILK OF MAGNESIA) 400 mg/5 mL oral suspension 30 mL  30 mL Oral DAILY PRN    nicotine (NICODERM CQ) 21 mg/24 hr patch 1 Patch  1 Patch TransDERmal DAILY PRN    influenza vaccine 2017-18 (3 yrs+)(PF) (FLUZONE QUAD/FLUARIX QUAD) injection 0.5 mL  0.5 mL IntraMUSCular PRIOR TO DISCHARGE    amLODIPine (NORVASC) tablet 10 mg  10 mg Oral DAILY    aspirin delayed-release tablet 81 mg  81 mg Oral DAILY    atorvastatin (LIPITOR) tablet 40 mg  40 mg Oral DAILY    carvedilol (COREG) tablet 3.125 mg  3.125 mg Oral BID WITH MEALS    ferrous sulfate tablet 325 mg  325 mg Oral BID WITH MEALS    hydrALAZINE (APRESOLINE) tablet 100 mg  100 mg Oral TID    glucose chewable tablet 16 g  4 Tab Oral PRN    dextrose (D50W) injection syrg 12.5-25 g  12.5-25 g IntraVENous PRN    glucagon (GLUCAGEN) injection 1 mg  1 mg IntraMUSCular PRN    insulin lispro (HUMALOG) injection   SubCUTAneous AC&HS    nicotine (NICODERM CQ) 21 mg/24 hr patch 1 Patch  1 Patch TransDERmal Q24H      SCHEDULED MEDICATIONS:   Current Facility-Administered Medications   Medication Dose Route Frequency    LORazepam (ATIVAN) tablet 1.5 mg  1.5 mg Oral BID    mirtazapine (REMERON) tablet 30 mg  30 mg Oral QHS    buPROPion SR (WELLBUTRIN SR) tablet 150 mg  150 mg Oral DAILY    doxazosin (CARDURA) tablet 4 mg  4 mg Oral QHS    polyethylene glycol (MIRALAX) packet 17 g  17 g Oral DAILY    influenza vaccine 2017-18 (3 yrs+)(PF) (FLUZONE QUAD/FLUARIX QUAD) injection 0.5 mL  0.5 mL IntraMUSCular PRIOR TO DISCHARGE    amLODIPine (NORVASC) tablet 10 mg  10 mg Oral DAILY    aspirin delayed-release tablet 81 mg  81 mg Oral DAILY    atorvastatin (LIPITOR) tablet 40 mg  40 mg Oral DAILY    carvedilol (COREG) tablet 3.125 mg  3.125 mg Oral BID WITH MEALS    ferrous sulfate tablet 325 mg  325 mg Oral BID WITH MEALS    hydrALAZINE (APRESOLINE) tablet 100 mg  100 mg Oral TID    insulin lispro (HUMALOG) injection   SubCUTAneous AC&HS    nicotine (NICODERM CQ) 21 mg/24 hr patch 1 Patch  1 Patch TransDERmal Q24H          ASSESSMENT & PLAN     DIAGNOSES REQUIRING ACTIVE TREATMENT AND MONITORING: (reviewed/updated 2/7/2018)  Patient Active Hospital Problem List:   Depression (1/24/2018)    Assessment: sadness, helplessness, hopelessness, in reaction to recent bilateral lower leg amputationa and inability to pat water bill at home. Plan: consider forced medication/treatment, address social issues, daughter may be obtaining guardianship  1/27/18 need order to treat    1/28/18 continue his medications              In summary, Katrin Jorge, is a 78 y.o.  male who presents with a severe exacerbation of the principal diagnosis of Depression  Patient's condition is worsening/not improving/not stable Patient requires continued inpatient hospitalization for further stabilization, safety monitoring and medication management. I will continue to coordinate the provision of individual, milieu, occupational, group, and substance abuse therapies to address target symptoms/diagnoses as deemed appropriate for the individual patient. A coordinated, multidisplinary treatment team round was conducted with the patient (this team consists of the nurse, psychiatric unit pharmcist,  and writer). Complete current electronic health record for patient has been reviewed today including consultant notes, ancillary staff notes, nurses and psychiatric tech notes. Suicide risk assessment completed and patient deemed to be of low risk for suicide at this time. The following regarding medications was addressed during rounds with patient:   the risks and benefits of the proposed medication. The patient was given the opportunity to ask questions. Informed consent given to the use of the above medications.  Will continue to adjust psychiatric and non-psychiatric medications (see above \"medication\" section and orders section for details) as deemed appropriate & based upon diagnoses and response to treatment. I will continue to order blood tests/labs and diagnostic tests as deemed appropriate and review results as they become available (see orders for details and above listed lab/test results). I will order psychiatric records from previous [facility] hospitals to further elucidate the nature of patient's psychopathology and review once available. I will gather additional collateral information from friends, family and o/p treatment team to further elucidate the nature of patient's psychopathology and baselline level of psychiatric functioning. I certify that this patient's inpatient psychiatric hospital services furnished since the previous certification were, and continue to be, required for treatment that could reasonably be expected to improve the patient's condition, or for diagnostic study, and that the patient continues to need, on a daily basis, active treatment furnished directly by or requiring the supervision of inpatient psychiatric facility personnel. In addition, the hospital records show that services furnished were intensive treatment services, admission or related services, or equivalent services.     EXPECTED DISCHARGE DATE/DAY: TBD     DISPOSITION: Home       Signed By:   Luisa Hare MD  2/7/2018

## 2018-02-07 NOTE — PROGRESS NOTES
The documentation for this period is being entered following the guidelines as defined in the [facility] policy by Kade Canas RN. [id number]

## 2018-02-08 LAB
GLUCOSE BLD STRIP.AUTO-MCNC: 117 MG/DL (ref 65–100)
GLUCOSE BLD STRIP.AUTO-MCNC: 128 MG/DL (ref 65–100)
GLUCOSE BLD STRIP.AUTO-MCNC: 77 MG/DL (ref 65–100)
GLUCOSE BLD STRIP.AUTO-MCNC: 86 MG/DL (ref 65–100)
SERVICE CMNT-IMP: ABNORMAL
SERVICE CMNT-IMP: ABNORMAL
SERVICE CMNT-IMP: NORMAL
SERVICE CMNT-IMP: NORMAL

## 2018-02-08 PROCEDURE — 74011250637 HC RX REV CODE- 250/637: Performed by: FAMILY MEDICINE

## 2018-02-08 PROCEDURE — 65220000003 HC RM SEMIPRIVATE PSYCH

## 2018-02-08 PROCEDURE — 74011250637 HC RX REV CODE- 250/637: Performed by: HOSPITALIST

## 2018-02-08 PROCEDURE — 74011250637 HC RX REV CODE- 250/637: Performed by: PSYCHIATRY & NEUROLOGY

## 2018-02-08 PROCEDURE — 82962 GLUCOSE BLOOD TEST: CPT

## 2018-02-08 RX ADMIN — ATORVASTATIN CALCIUM 40 MG: 40 TABLET, FILM COATED ORAL at 09:57

## 2018-02-08 RX ADMIN — LORAZEPAM 1.5 MG: 1 TABLET ORAL at 09:57

## 2018-02-08 RX ADMIN — CARVEDILOL 3.12 MG: 3.12 TABLET, FILM COATED ORAL at 09:57

## 2018-02-08 RX ADMIN — LORAZEPAM 1.5 MG: 1 TABLET ORAL at 22:15

## 2018-02-08 RX ADMIN — FERROUS SULFATE TAB 325 MG (65 MG ELEMENTAL FE) 325 MG: 325 (65 FE) TAB at 09:56

## 2018-02-08 RX ADMIN — DOXAZOSIN 4 MG: 2 TABLET ORAL at 21:17

## 2018-02-08 RX ADMIN — HYDRALAZINE HYDROCHLORIDE 100 MG: 50 TABLET, FILM COATED ORAL at 09:58

## 2018-02-08 RX ADMIN — AMLODIPINE BESYLATE 10 MG: 5 TABLET ORAL at 09:58

## 2018-02-08 RX ADMIN — MIRTAZAPINE 30 MG: 15 TABLET, FILM COATED ORAL at 21:18

## 2018-02-08 RX ADMIN — ASPIRIN 81 MG: 81 TABLET, COATED ORAL at 09:58

## 2018-02-08 RX ADMIN — HYDRALAZINE HYDROCHLORIDE 100 MG: 50 TABLET, FILM COATED ORAL at 21:30

## 2018-02-08 RX ADMIN — BUPROPION HYDROCHLORIDE 150 MG: 150 TABLET, EXTENDED RELEASE ORAL at 09:57

## 2018-02-08 NOTE — BH NOTES
GROUP THERAPY PROGRESS NOTE    Pérez Ward passively participated in a morning Process Group on the Geriatric Unit, with a focus identifying feelings, planning for the day, and singing. Group time: 50 minutes. Personal goal for participation: To increase the capacity to shift ones mood, prepare for the day, and share in group singing. Goal orientation: The patient will be able to prepare for the day through group singing. Group therapy participation: When prompted, this patient minimally participated in the group. Therapeutic interventions reviewed and discussed: The group members were introduce themselves by first names and participate in group singing as a way to increase their oxygen and blood flow and begin their day on a positive note. They were also asked to join in singing several songs. Impression of participation: The patient said nothing during the group and stared straight ahead. He also did not join in the singing. He expressed no SI/HI and displayed no overt psychosis, although this could not be verified. His affect was not easy to determine. He looked as if he were having negative feelings and as if he might be angry, but this might  be just a form of masking associated with demansia. His affect was restrained and his mood reflected his affect.

## 2018-02-08 NOTE — BH NOTES
1530:  Patient received as he appears to be resting comfortably in bed. His respirations are even/unlabored, and there are no signs/symptoms of pain or distress at this time. Will maintain q 15 minute safety checks. 1800:  Patient refuses his vital signs, his Accuchecks and his medication at this time. Will continue to monitor.

## 2018-02-08 NOTE — PROGRESS NOTES
Problem: Falls - Risk of  Goal: *Absence of Falls  Document Bello Fall Risk and appropriate interventions in the flowsheet. Outcome: Progressing Towards Goal  Fall Risk Interventions:  Mobility Interventions: Bed/chair exit alarm, Communicate number of staff needed for ambulation/transfer, Mechanical lift    Mentation Interventions: Adequate sleep, hydration, pain control, Bed/chair exit alarm, Door open when patient unattended    Medication Interventions: Bed/chair exit alarm    Elimination Interventions: Bed/chair exit alarm, Call light in reach, Toileting schedule/hourly rounds    History of Falls Interventions: Bed/chair exit alarm, Door open when patient unattended    No falls. Bed alarm on the bed. Fall tool completed and accurate. Patient transferred to [facility] chair with walt lift. Has remained sitting out on the unit with other patients  and staff.

## 2018-02-08 NOTE — BH NOTES
HYPOGLYCEMIC EPISODE DOCUMENTATION    Patient with hypoglycemic episode(s) at 7:52 on 2/8/18. BG value(s) pre-treatment 68  Was patient symptomatic?  [] yes, [x] no  Patient was treated with the following rescue medications/treatments: [] D50                [] Glucose tablets                [] Glucagon                [x] 4oz juice                [] 6oz reg soda                [] 8oz low fat milk  BG value post-treatment: breakfast  Once BG treated and value greater than 80mg/dl, pt was provided with the following:  [] snack  [] meal  Name of MD notified:Dr. Aris Salvador  The following orders were received: none

## 2018-02-08 NOTE — PROGRESS NOTES
Problem: Depressed Mood (Adult/Pediatric)  Goal: *STG: Participates in treatment plan  Outcome: Progressing Towards Goal  Received this morning resting in bed. Is alert and oriented,with some mild confusion. Can be intrusive and demanding,but does take re-direction. Assisted with am cares,able to follow directions. Some irritation noted when needs are not met quickly,but tends to settle down. Appears sad when spoken to  about discharge plan. Has spoken on the  phone with family. Remaisn sitting out on the unit. able to verbalize needs. Thoughts are clear and organized. Goal: *STG: Attends activities and groups  Outcome: Progressing Towards Goal  Attended music group this morning and has been out on the unit most of the day. Goal: *STG: Complies with medication therapy  Outcome: Progressing Towards Goal  Has been medication and meal compliant.

## 2018-02-08 NOTE — DIABETES MGMT
DTC Progress Note    Recommendations/ Comments: Pt with hypoglycemia this morning; last low BS was 2/5/17. Pt has not received any correction insulin in past 48 hours. PO intake not known. Will continue to follow BS    Current hospital DM medication: lispro insulin correction scale-high sensitivity    Chart reviewed on Harjit Weiner. Patient is a 78 y.o. male with known  Type 2 Diabetes on diet at home. A1c:   Lab Results   Component Value Date/Time    Hemoglobin A1c 4.5 01/21/2018 02:21 AM    Hemoglobin A1c 4.6 07/14/2017 04:42 AM       Recent Glucose Results:   Lab Results   Component Value Date/Time    GLUCPOC 117 (H) 02/08/2018 10:58 AM    GLUCPOC 86 02/08/2018 08:05 AM    GLUCPOC 77 02/08/2018 07:52 AM        Lab Results   Component Value Date/Time    Creatinine 2.49 (H) 01/28/2018 08:14 PM     Estimated Creatinine Clearance: 27.3 mL/min (based on Cr of 2.49). Active Orders   Diet    DIET FINGER FOOD No options chosen        PO intake: No data found. Will continue to follow as needed.     Thank you

## 2018-02-08 NOTE — BH NOTES
Behavioral Health Interdisciplinary Rounds     Patient Name: Hallie Torres  Age: 78 y.o. Room/Bed:  740/02  Primary Diagnosis: Depression   Admission Status: Involuntary Commitment     Readmission within 30 days: no  Power of  in place: yes--daughter (Radha Liu--awaiting paperwork)  Patient requires a blocked bed: no          Reason for blocked bed: na    VTE Prophylaxis: Yes--ASA  Flu vaccine given : no--refused   Mobility needs/Fall risk: yes    Nutritional Plan: yes  Consults: PT/OT, wound care, hospitalist following         Labs/Testing due today?: no    Sleep hours:  5+      Participation in Care/Groups:  Yes, passively  Medication Compliant?: Selective--refused Miralax  PRNS (last 24 hours): None    Restraints (last 24 hours):  no  Substance Abuse:  no  CIWA (range last 24 hours): na  COWS (range last 24 hours): na  Alcohol screening (AUDIT) completed -  AUDIT Score: 0  If applicable, date SBIRT discussed in treatment team AND documented: na  Tobacco - patient is a smoker: yes   Date tobacco education completed by RN: 1/24/18  24 hour chart check complete: yes     Patient goal(s) for today:   Treatment team focus/goals: Send Level II; work towards placement  Progress note: Patient irritable and angry towards staff about hospitalization and discharge plans     LOS:  15  Expected LOS: TBD    Financial concerns/prescription coverage:  Medicare; Medicaid  Date of last family contact: 2/6 SW spoke to Jaylon Rodriguez requesting physician contact today:  no  Discharge plan: Placement at [facility] ([facility]) in the home: no        Outpatient provider(s): To be linked    Participating treatment team members:  DARRICK Matute; Dr. Jessica Sparks MD; Monica Verdin, MIKHAIL; Pam Fenton, DongD

## 2018-02-08 NOTE — BH NOTES
0430  Pt awoke, calling out for his sister. Reminded him of his location. Said he wants some coffee. Reminded his of Unit's rules re coffee during night shift. Monitoring continues. 0443  Pt insisting he wants coffee. Reminded pt of Unit's rules of no coffee during the night until [facility] takes over at [address] [place] [address]. Monitoring continues.

## 2018-02-09 LAB
GLUCOSE BLD STRIP.AUTO-MCNC: 125 MG/DL (ref 65–100)
GLUCOSE BLD STRIP.AUTO-MCNC: 143 MG/DL (ref 65–100)
GLUCOSE BLD STRIP.AUTO-MCNC: 91 MG/DL (ref 65–100)
SERVICE CMNT-IMP: ABNORMAL
SERVICE CMNT-IMP: ABNORMAL
SERVICE CMNT-IMP: NORMAL

## 2018-02-09 PROCEDURE — 82962 GLUCOSE BLOOD TEST: CPT

## 2018-02-09 PROCEDURE — 65220000003 HC RM SEMIPRIVATE PSYCH

## 2018-02-09 PROCEDURE — 74011250637 HC RX REV CODE- 250/637: Performed by: FAMILY MEDICINE

## 2018-02-09 PROCEDURE — 74011636637 HC RX REV CODE- 636/637: Performed by: HOSPITALIST

## 2018-02-09 PROCEDURE — 74011250637 HC RX REV CODE- 250/637: Performed by: HOSPITALIST

## 2018-02-09 PROCEDURE — 74011250637 HC RX REV CODE- 250/637: Performed by: PSYCHIATRY & NEUROLOGY

## 2018-02-09 RX ADMIN — ATORVASTATIN CALCIUM 40 MG: 40 TABLET, FILM COATED ORAL at 10:15

## 2018-02-09 RX ADMIN — ASPIRIN 81 MG: 81 TABLET, COATED ORAL at 10:15

## 2018-02-09 RX ADMIN — MIRTAZAPINE 30 MG: 15 TABLET, FILM COATED ORAL at 20:07

## 2018-02-09 RX ADMIN — DOXAZOSIN 4 MG: 2 TABLET ORAL at 20:05

## 2018-02-09 RX ADMIN — HYDRALAZINE HYDROCHLORIDE 100 MG: 50 TABLET, FILM COATED ORAL at 21:05

## 2018-02-09 RX ADMIN — LORAZEPAM 1.5 MG: 1 TABLET ORAL at 10:15

## 2018-02-09 RX ADMIN — HYDRALAZINE HYDROCHLORIDE 100 MG: 50 TABLET, FILM COATED ORAL at 16:56

## 2018-02-09 RX ADMIN — CARVEDILOL 3.12 MG: 3.12 TABLET, FILM COATED ORAL at 10:15

## 2018-02-09 RX ADMIN — BUPROPION HYDROCHLORIDE 150 MG: 150 TABLET, EXTENDED RELEASE ORAL at 10:15

## 2018-02-09 RX ADMIN — FERROUS SULFATE TAB 325 MG (65 MG ELEMENTAL FE) 325 MG: 325 (65 FE) TAB at 16:57

## 2018-02-09 RX ADMIN — CARVEDILOL 3.12 MG: 3.12 TABLET, FILM COATED ORAL at 16:57

## 2018-02-09 RX ADMIN — FERROUS SULFATE TAB 325 MG (65 MG ELEMENTAL FE) 325 MG: 325 (65 FE) TAB at 10:15

## 2018-02-09 RX ADMIN — HYDRALAZINE HYDROCHLORIDE 100 MG: 50 TABLET, FILM COATED ORAL at 10:15

## 2018-02-09 RX ADMIN — AMLODIPINE BESYLATE 10 MG: 5 TABLET ORAL at 10:15

## 2018-02-09 RX ADMIN — LORAZEPAM 1.5 MG: 1 TABLET ORAL at 01:05

## 2018-02-09 NOTE — BH NOTES
PSYCHIATRIC PROGRESS NOTE         Patient Name  Esau Harry   Date of Birth 1940   [facility] [id number]   Medical Record Number  [id number]      Age  78 y.o. PCP Johny Warner, MD   Admit date:  1/24/2018    Room Number  [phone]  @ [facility]   Date of Service  2/9/2018          PSYCHOTHERAPY SESSION NOTE:  Length of psychotherapy session: 15 minutes    Main condition/diagnosis/issues treated during session today, 2/9/2018 : med , meal and group non compliance    I employed Cognitive Behavioral therapy techniques, Reality-Oriented psychotherapy, as well as supportive psychotherapy in regards to various ongoing psychosocial stressors, including the following: pre-admission and current problems; housing issues; occupational issues; medical issues; and stress of hospitalization. Interpersonal relationship issues and psychodynamic conflicts explored. Attempts made to alleviate maladaptive patterns. We, also, worked on issues of denial & effects of substance dependency/use     Overall, patient is not progressing    Treatment Plan Update (reviewed an updated 2/9/2018) : I will modify psychotherapy tx plan by implementing more stress management strategies, building upon cognitive behavioral techniques, increasing coping skills, as well as shoring up psychological defenses). An extended energy and skill set was needed to engage pt in psychotherapy due to some of the following: resistiveness, complexity, negativity, confrontational nature, hostile behaviors, and/or severe abnormalities in thought processes/psychosis resulting in the loss of expressive/receptive language communication skills. E & M PROGRESS NOTE:         HISTORY       CC:  \"depression and non compliance with treatment \"  HISTORY OF PRESENT ILLNESS/INTERVAL HISTORY:  (reviewed/updated 2/9/2018).   per initial evaluation:     Esau Harry presents/reports/evidences the following emotional symptoms today, 2/9/2018:depression. The above symptoms have been present for 1 years. These symptoms are of severe severity. The symptoms are constant  in nature. Additional symptomatology and features include anxiety. 2/2/18- pATIENT HAS IMPROVED SPORADIC MED COMPLIANCE WITH INCREASED ATIVAN DOSE. WAS SOCIAL IN [place] TODAY. SW TO CONTACT DAUGHTER TO LET HER KNOE THAT HE IS NOT BEING AS TREATMENT COMPLIANT AS HE TOLD HER HE WOULD BE.    1/27/18 he is not responding to questions and not engaging and not showing engagement and refuse medications and treatment   1/28/18 he is doing better and he engaged and not feeling sad or depressed and he eats better    1/29/18= Patient is deliberately refusing vital signs, labs and medications. He likes finger foods and eats those. Will meet with daughter on Tuesday to discuss possible guardianship. Will seek forced meds. 1/30/18- Pt. Agreed to comply with meds after extensive family meeting. Planning for SNF admission and possible transfer to home afterwards [address].  1/31/18- Patient is intermittently cooperative with somatic meds. We discussed the risks to his health this can cause. I advised Wellbutrin for depression. This may help motivate treatment compliance. 2/1/18- Continues Rude, belligerent, uncooperative and disrespectful with staff. Is marginally compliant with diabetes management. Waiting for placement. 2/3/18- no complaints. More redirectable, less hostile and more compliant  2/4/18- Mr. Yun Sevilla very somnolent this morning. No agitation  2/5/18- Sporadic , selective compliance with medications. Denies SI/HI. Needs reminding of treatment goals to improve compliance for transfer to SNF.  2/6/18- mr. Yun Sevilla was bright and positive this morning. Less demanding and hostile towards staff. Asking to go home. 2/7/18- Apologized to writer for being rude and uncooperative with treatment. Focused on discharge.  Understandd house repairs need to be completed first.  2/8/18- sleeping this morning. 2/9/18- grumpy, entitled and impatient re discharge[facility] SIDE EFFECTS: (reviewed/updated 2/9/2018)  None reported or admitted to. No noted toxicity with use of Depakote/Tegretol/lithium/Clozaril/TCAs   ALLERGIES:(reviewed/updated 2/9/2018)  Allergies   Allergen Reactions    Tetracycline Hives      MEDICATIONS PRIOR TO ADMISSION:(reviewed/updated 2/9/2018)  Prescriptions Prior to Admission   Medication Sig    hydrALAZINE (APRESOLINE) 100 mg tablet Take 1 Tab by mouth three (3) times daily.  amLODIPine (NORVASC) 10 mg tablet Take 1 Tab by mouth daily.  aspirin delayed-release 81 mg tablet Take 1 Tab by mouth daily.  atorvastatin (LIPITOR) 40 mg tablet Take 1 Tab by mouth daily.  carvedilol (COREG) 3.125 mg tablet Take 1 Tab by mouth two (2) times daily (with meals).  ferrous sulfate 325 mg (65 mg iron) tablet Take 1 Tab by mouth two (2) times daily (with meals).  mirtazapine (REMERON) 7.5 mg tablet Take 1 Tab by mouth nightly.  nicotine (NICODERM CQ) 21 mg/24 hr 1 Patch by TransDERmal route every twenty-four (24) hours for 30 days. PAST MEDICAL HISTORY: Past medical history from the initial psychiatric evaluation has been reviewed (reviewed/updated 2/9/2018) with no additional updates (I asked patient and no additional past medical history provided). Past Medical History:   Diagnosis Date    Arthritis     CAD (coronary artery disease) 2007    CKD (chronic kidney disease), stage III     DM type 2 causing renal disease (HCC)     DVT (deep venous thrombosis) (HCC)     Hx of DVT:  Xarelto stopped due to GI bleed. S/P IVC filter.  Gait abnormality     GI bleed     Heart murmur     Hepatitis C     History of blood transfusion     Hypercholesterolemia     Hypertension 11/7/2014    Neuropathy     Non compliance w medication regimen     Peripheral vascular disease ([facility] [address] 11/7/2014    A.   S/P Right SFA POBA and tibial atherectomy (2/4/13).  PUD (peptic ulcer disease) 2007    Unspecified sleep apnea     never used cpap     Past Surgical History:   Procedure Laterality Date    ABDOMEN SURGERY PROC UNLISTED  2007    has approx 7-8\" midline incision on abdomen--\"had to open him up and clean him out after feeding tube clogged\"    HX GI  2007    feeding tube for approx 2 mo    VASCULAR SURGERY PROCEDURE UNLIST Right 1/22/2015    popliteal-tibial bypass      SOCIAL HISTORY: Social history from the initial psychiatric evaluation has been reviewed (reviewed/updated 2/9/2018) with no additional updates (I asked patient and no additional social history provided). Social History     Social History    Marital status:      Spouse name: N/A    Number of children: N/A    Years of education: N/A     Occupational History    Not on file. Social History Main Topics    Smoking status: Current Every Day Smoker     Packs/day: 0.50    Smokeless tobacco: Not on file    Alcohol use No    Drug use: No      Comment: >20 years ago    Sexual activity: Not on file     Other Topics Concern    Not on file     Social History Narrative    78 year ols male admitted for depression and homelessness. Pt will be evicated from apartment due to non payment of bills. [facility] of 30 years [facility]. \" Pt is a brittle diabetic, with treatment non compliance. He has bilarela lower extremity amputations. Patient has a long hx of substance abuse. FAMILY HISTORY: Family history from the initial psychiatric evaluation has been reviewed (reviewed/updated 2/9/2018) with no additional updates (I asked patient and no additional family history provided).    Family History   Problem Relation Age of Onset    Hypertension Father        REVIEW OF SYSTEMS: (reviewed/updated 2/9/2018)  Appetite:decreased   Sleep: fitful   All other Review of Systems: Psychological ROS: positive for - behavioral disorder  Respiratory ROS: no cough, shortness of breath, or wheezing  Cardiovascular ROS: no chest pain or dyspnea on exertion         [facility] ([facility]    MSE FINDINGS ARE WITHIN NORMAL LIMITS (WNL) UNLESS OTHERWISE STATED BELOW. ( ALL OF THE BELOW CATEGORIES OF THE MSE HAVE BEEN REVIEWED (reviewed 2/9/2018) AND UPDATED AS DEEMED APPROPRIATE )  General Presentation age appropriate, evasive and guarded   Orientation oriented to time, place and person   Vital Signs  See below (reviewed 2/9/2018); Vital Signs (BP, Pulse, & Temp) are within normal limits if not listed below.    Gait and Station Stable/steady, no ataxia   Musculoskeletal System No extrapyramidal symptoms (EPS); no abnormal muscular movements or Tardive Dyskinesia (TD); muscle strength and tone are within normal limits   Language No aphasia or dysarthria   Speech:  hypoverbal   Thought Processes concrete; normal rate of thoughts; poor abstract reasoning/computation   Thought Associations goal directed   Thought Content free of delusions and free of hallucinations   Suicidal Ideations none   Homicidal Ideations none   Mood:  irritable   Affect:  mood-congruent   Memory recent  fair   Memory remote:  fair   Concentration/Attention:  distractable   Fund of Knowledge significantly below average   Insight:  poor   Reliability poor   Judgment:  poor          VITALS:     Patient Vitals for the past 24 hrs:   Temp Pulse Resp BP SpO2   02/09/18 1013 97.7 °F (36.5 °C) 67 16 148/71 100 %   02/09/18 0800 - - 16 - -   02/08/18 2200 - - - 120/70 -   02/08/18 2119 97.9 °F (36.6 °C) 69 20 115/54 100 %     Wt Readings from Last 3 Encounters:   01/24/18 82.7 kg (182 lb 5.1 oz)   01/23/18 83.6 kg (184 lb 4.9 oz)   07/13/17 88.9 kg (196 lb)     Temp Readings from Last 3 Encounters:   02/09/18 97.7 °F (36.5 °C)   01/24/18 97.2 °F (36.2 °C)   07/20/17 97.9 °F (36.6 °C)     BP Readings from Last 3 Encounters:   02/09/18 148/71   01/24/18 172/68   07/20/17 175/76     Pulse Readings from Last 3 Encounters:   02/09/18 67   01/24/18 72   07/20/17 (!) 52            DATA     LABORATORY DATA:(reviewed/updated 2/9/2018)  Recent Results (from the past 24 hour(s))   GLUCOSE, POC    Collection Time: 02/08/18  9:09 PM   Result Value Ref Range    Glucose (POC) 128 (H) 65 - 100 mg/dL    Performed by [address] [place] [address], POC    Collection Time: 02/09/18  8:46 AM   Result Value Ref Range    Glucose (POC) 91 65 - 100 mg/dL    Performed by Anna Vila      No results found for: VALF2, VALAC, VALP, VALPR, DS6, CRBAM, CRBAMP, CARB2, XCRBAM  No results found for: LITHM   RADIOLOGY REPORTS:(reviewed/updated 2/9/2018)  Xr Chest Port    Result Date: 1/20/2018  EXAM: Portable CXR.  1800 hours. COMPARISON: 1/19/2015. INDICATION: cp, fatigue There is new interstitial pulmonary edema, cardiomegaly and small left pleural effusion. There is no focal infiltrate, pneumothorax or midline shift. IMPRESSION: CHF pattern.          MEDICATIONS     ALL MEDICATIONS:   Current Facility-Administered Medications   Medication Dose Route Frequency    LORazepam (ATIVAN) tablet 1.5 mg  1.5 mg Oral BID    mirtazapine (REMERON) tablet 30 mg  30 mg Oral QHS    buPROPion SR (WELLBUTRIN SR) tablet 150 mg  150 mg Oral DAILY    alum-mag hydroxide-simeth (MYLANTA) oral suspension 30 mL  30 mL Oral Q4H PRN    doxazosin (CARDURA) tablet 4 mg  4 mg Oral QHS    polyethylene glycol (MIRALAX) packet 17 g  17 g Oral DAILY    OLANZapine (ZyPREXA) tablet 2.5 mg  2.5 mg Oral Q6H PRN    ziprasidone (GEODON) 10 mg in sterile water (preservative free) 0.5 mL injection  10 mg IntraMUSCular BID PRN    benztropine (COGENTIN) tablet 1 mg  1 mg Oral BID PRN    benztropine (COGENTIN) injection 1 mg  1 mg IntraMUSCular BID PRN    zolpidem (AMBIEN) tablet 5 mg  5 mg Oral QHS PRN    acetaminophen (TYLENOL) tablet 650 mg  650 mg Oral Q4H PRN    magnesium hydroxide (MILK OF MAGNESIA) 400 mg/5 mL oral suspension 30 mL  30 mL Oral DAILY PRN    nicotine (NICODERM CQ) 21 mg/24 hr patch 1 Patch  1 Patch TransDERmal DAILY PRN    influenza vaccine 2017-18 (3 yrs+)(PF) (FLUZONE QUAD/FLUARIX QUAD) injection 0.5 mL  0.5 mL IntraMUSCular PRIOR TO DISCHARGE    amLODIPine (NORVASC) tablet 10 mg  10 mg Oral DAILY    aspirin delayed-release tablet 81 mg  81 mg Oral DAILY    atorvastatin (LIPITOR) tablet 40 mg  40 mg Oral DAILY    carvedilol (COREG) tablet 3.125 mg  3.125 mg Oral BID WITH MEALS    ferrous sulfate tablet 325 mg  325 mg Oral BID WITH MEALS    hydrALAZINE (APRESOLINE) tablet 100 mg  100 mg Oral TID    glucose chewable tablet 16 g  4 Tab Oral PRN    dextrose (D50W) injection syrg 12.5-25 g  12.5-25 g IntraVENous PRN    glucagon (GLUCAGEN) injection 1 mg  1 mg IntraMUSCular PRN    insulin lispro (HUMALOG) injection   SubCUTAneous AC&HS    nicotine (NICODERM CQ) 21 mg/24 hr patch 1 Patch  1 Patch TransDERmal Q24H      SCHEDULED MEDICATIONS:   Current Facility-Administered Medications   Medication Dose Route Frequency    LORazepam (ATIVAN) tablet 1.5 mg  1.5 mg Oral BID    mirtazapine (REMERON) tablet 30 mg  30 mg Oral QHS    buPROPion SR (WELLBUTRIN SR) tablet 150 mg  150 mg Oral DAILY    doxazosin (CARDURA) tablet 4 mg  4 mg Oral QHS    polyethylene glycol (MIRALAX) packet 17 g  17 g Oral DAILY    influenza vaccine 2017-18 (3 yrs+)(PF) (FLUZONE QUAD/FLUARIX QUAD) injection 0.5 mL  0.5 mL IntraMUSCular PRIOR TO DISCHARGE    amLODIPine (NORVASC) tablet 10 mg  10 mg Oral DAILY    aspirin delayed-release tablet 81 mg  81 mg Oral DAILY    atorvastatin (LIPITOR) tablet 40 mg  40 mg Oral DAILY    carvedilol (COREG) tablet 3.125 mg  3.125 mg Oral BID WITH MEALS    ferrous sulfate tablet 325 mg  325 mg Oral BID WITH MEALS    hydrALAZINE (APRESOLINE) tablet 100 mg  100 mg Oral TID    insulin lispro (HUMALOG) injection   SubCUTAneous AC&HS    nicotine (NICODERM CQ) 21 mg/24 hr patch 1 Patch  1 Patch TransDERmal Q24H          ASSESSMENT & PLAN     DIAGNOSES REQUIRING ACTIVE TREATMENT AND MONITORING: (reviewed/updated 2/9/2018)  Patient Active Hospital Problem List:   Depression (1/24/2018)    Assessment: sadness, helplessness, hopelessness, in reaction to recent bilateral lower leg amputationa and inability to pat water bill at home. Plan: consider forced medication/treatment, address social issues, daughter may be obtaining guardianship  1/27/18 need order to treat    1/28/18 continue his medications              In summary, Elly Chaudhry, is a 78 y.o.  male who presents with a severe exacerbation of the principal diagnosis of Depression  Patient's condition is worsening/not improving/not stable Patient requires continued inpatient hospitalization for further stabilization, safety monitoring and medication management. I will continue to coordinate the provision of individual, milieu, occupational, group, and substance abuse therapies to address target symptoms/diagnoses as deemed appropriate for the individual patient. A coordinated, multidisplinary treatment team round was conducted with the patient (this team consists of the nurse, psychiatric unit pharmcist,  and writer). Complete current electronic health record for patient has been reviewed today including consultant notes, ancillary staff notes, nurses and psychiatric tech notes. Suicide risk assessment completed and patient deemed to be of low risk for suicide at this time. The following regarding medications was addressed during rounds with patient:   the risks and benefits of the proposed medication. The patient was given the opportunity to ask questions. Informed consent given to the use of the above medications. Will continue to adjust psychiatric and non-psychiatric medications (see above \"medication\" section and orders section for details) as deemed appropriate & based upon diagnoses and response to treatment. I will continue to order blood tests/labs and diagnostic tests as deemed appropriate and review results as they become available (see orders for details and above listed lab/test results). I will order psychiatric records from previous [facility] hospitals to further elucidate the nature of patient's psychopathology and review once available. I will gather additional collateral information from friends, family and o/p treatment team to further elucidate the nature of patient's psychopathology and baselline level of psychiatric functioning. I certify that this patient's inpatient psychiatric hospital services furnished since the previous certification were, and continue to be, required for treatment that could reasonably be expected to improve the patient's condition, or for diagnostic study, and that the patient continues to need, on a daily basis, active treatment furnished directly by or requiring the supervision of inpatient psychiatric facility personnel. In addition, the hospital records show that services furnished were intensive treatment services, admission or related services, or equivalent services.     EXPECTED DISCHARGE DATE/DAY: TBD     DISPOSITION: Home       Signed By:   Halle Harper MD  2/9/2018

## 2018-02-09 NOTE — BH NOTES
1530:  Patient received as he appears to be sleeping comfortably in bed. His respirations are regular/unlabored, and there are no signs/symptoms of pain or distress at this time. Will maintain q 15 minute safety checks. 1900:  Patient requested to be out on the unit for dinner with his peers. He is sitting quietly at the Dining Room table. He has been cooperative with his vital signs and Accuchecks this evening. Will continue to monitor.

## 2018-02-09 NOTE — PROGRESS NOTES
Problem: Depressed Mood (Adult/Pediatric)  Goal: *STG: Complies with medication therapy  Outcome: Progressing Towards Goal  Patient alert, vitals stable. Medication and meal compliant at the present time. Will continue to monitor on Q 15 minute safety checks.

## 2018-02-09 NOTE — BH NOTES
Behavioral Health Interdisciplinary Rounds     Patient Name: Gilberto Vazquez  Age: 78 y.o. Room/Bed:  740/02  Primary Diagnosis: Depression   Admission Status: Involuntary Commitment     Readmission within 30 days: no  Power of  in place: yes--daughter (Radha Liu--awaiting paperwork)  Patient requires a blocked bed: no          Reason for blocked bed: na    VTE Prophylaxis: Yes--ASA  Flu vaccine given : no --refused  Mobility needs/Fall risk: yes    Nutritional Plan: yes  Consults: PT/OT, wound care, hospitalist following         Labs/Testing due today?: no    Sleep hours:  7.5+ Since 2015      Participation in Care/Groups:  yes  Medication Compliant?: Selective  PRNS (last 24 hours): None    Restraints (last 24 hours):  no  Substance Abuse:  no  CIWA (range last 24 hours): na COWS (range last 24 hours): na  Alcohol screening (AUDIT) completed -  AUDIT Score: 0  If applicable, date SBIRT discussed in treatment team AND documented: na  Tobacco - patient is a smoker: yes   Date tobacco education completed by RN: 1/24/18  24 hour chart check complete: yes     Patient goal(s) for today:   Treatment team focus/goals: Continue working towards placement  Progress note     LOS:  16  Expected LOS: TBD    Financial concerns/prescription coverage:  Medicare; Medicaid  Date of last family contact: 2/6 SW spoke to Jaylon Rodriguez requesting physician contact today:  no  Discharge plan: Placement at [facility] (2-) in the home: no        Outpatient provider(s): To be linked    Participating treatment team members:  Kandee Kawasaki, MSW; Dr. Cherri Hernandez MD; Gavino Manuel RN; Tio Hill, DongD

## 2018-02-09 NOTE — PROGRESS NOTES
Pt confused. Alert. Oriented to person. Irritable. Demanding. Poor safety awareness. Refusing VS, refusing BS checks. Pt refusing education, refusing to allow staff to to clean pt, remove urine brief, wash pt skin, removed urine soaked clothing. Multiple staff attempt to help pt.

## 2018-02-09 NOTE — BH NOTES
GROUP THERAPY PROGRESS NOTE    Gabbi Harris passively participated in a morning Process Group on the Geriatric Unit, with a focus identifying feelings, planning for the day, and singing. Group time: 50 minutes. Personal goal for participation: To increase the capacity to shift ones mood, prepare for the day, and share in group singing. Goal orientation: The patient will be able to prepare for the day through group singing. Group therapy participation: When prompted, this patient minimally participated in the group. Therapeutic interventions reviewed and discussed: The group members were introduce themselves by first names and participate in group singing as a way to increase their oxygen and blood flow and begin their day on a positive note. They were also asked to join in singing several songs. Impression of participation: The patient said nothing in response to being prompted. He turned to look at me when he was addressed, but he said nothing. He sat up and mostly looked out to a distant point. He expressed no current SI/HI and displayed no overt psychosis, although it was difficult to assess whether he was responding to internal stimuli. His affect was restrained and his mood reflected his affect. He did not participate in the music but sat through the whole session.

## 2018-02-09 NOTE — BH NOTES
PSYCHIATRIC PROGRESS NOTE         Patient Name  Greta Phalen   Date of Birth 1940   [facility] [id number]   Medical Record Number  [id number]      Age  78 y.o. PCP Johny Warner, MD   Admit date:  1/24/2018    Room Number  [phone]  @ [facility]   Date of Service  2/8/2018          PSYCHOTHERAPY SESSION NOTE:  Length of psychotherapy session: 15 minutes    Main condition/diagnosis/issues treated during session today, 2/8/2018 : med , meal and group non compliance    I employed Cognitive Behavioral therapy techniques, Reality-Oriented psychotherapy, as well as supportive psychotherapy in regards to various ongoing psychosocial stressors, including the following: pre-admission and current problems; housing issues; occupational issues; medical issues; and stress of hospitalization. Interpersonal relationship issues and psychodynamic conflicts explored. Attempts made to alleviate maladaptive patterns. We, also, worked on issues of denial & effects of substance dependency/use     Overall, patient is not progressing    Treatment Plan Update (reviewed an updated 2/8/2018) : I will modify psychotherapy tx plan by implementing more stress management strategies, building upon cognitive behavioral techniques, increasing coping skills, as well as shoring up psychological defenses). An extended energy and skill set was needed to engage pt in psychotherapy due to some of the following: resistiveness, complexity, negativity, confrontational nature, hostile behaviors, and/or severe abnormalities in thought processes/psychosis resulting in the loss of expressive/receptive language communication skills. E & M PROGRESS NOTE:         HISTORY       CC:  \"depression and non compliance with treatment \"  HISTORY OF PRESENT ILLNESS/INTERVAL HISTORY:  (reviewed/updated 2/8/2018).   per initial evaluation:     Greta Phalen presents/reports/evidences the following emotional symptoms today, 2/8/2018:depression. The above symptoms have been present for 1 years. These symptoms are of severe severity. The symptoms are constant  in nature. Additional symptomatology and features include anxiety. 2/2/18- pATIENT HAS IMPROVED SPORADIC MED COMPLIANCE WITH INCREASED ATIVAN DOSE. WAS SOCIAL IN [place] TODAY. SW TO CONTACT DAUGHTER TO LET HER KNOE THAT HE IS NOT BEING AS TREATMENT COMPLIANT AS HE TOLD HER HE WOULD BE.    1/27/18 he is not responding to questions and not engaging and not showing engagement and refuse medications and treatment   1/28/18 he is doing better and he engaged and not feeling sad or depressed and he eats better    1/29/18= Patient is deliberately refusing vital signs, labs and medications. He likes finger foods and eats those. Will meet with daughter on Tuesday to discuss possible guardianship. Will seek forced meds. 1/30/18- Pt. Agreed to comply with meds after extensive family meeting. Planning for SNF admission and possible transfer to home afterwards [address].  1/31/18- Patient is intermittently cooperative with somatic meds. We discussed the risks to his health this can cause. I advised Wellbutrin for depression. This may help motivate treatment compliance. 2/1/18- Continues Rude, belligerent, uncooperative and disrespectful with staff. Is marginally compliant with diabetes management. Waiting for placement. 2/3/18- no complaints. More redirectable, less hostile and more compliant  2/4/18- Mr. Juliette Guerrero very somnolent this morning. No agitation  2/5/18- Sporadic , selective compliance with medications. Denies SI/HI. Needs reminding of treatment goals to improve compliance for transfer to SNF.  2/6/18- mr. Juliette Guerrero was bright and positive this morning. Less demanding and hostile towards staff. Asking to go home. 2/7/18- Apologized to writer for being rude and uncooperative with treatment. Focused on discharge.  Understandd house repairs need to be completed first.  2/8/18- sleeping this morning. SIDE EFFECTS: (reviewed/updated 2/8/2018)  None reported or admitted to. No noted toxicity with use of Depakote/Tegretol/lithium/Clozaril/TCAs   ALLERGIES:(reviewed/updated 2/8/2018)  Allergies   Allergen Reactions    Tetracycline Hives      MEDICATIONS PRIOR TO ADMISSION:(reviewed/updated 2/8/2018)  Prescriptions Prior to Admission   Medication Sig    hydrALAZINE (APRESOLINE) 100 mg tablet Take 1 Tab by mouth three (3) times daily.  amLODIPine (NORVASC) 10 mg tablet Take 1 Tab by mouth daily.  aspirin delayed-release 81 mg tablet Take 1 Tab by mouth daily.  atorvastatin (LIPITOR) 40 mg tablet Take 1 Tab by mouth daily.  carvedilol (COREG) 3.125 mg tablet Take 1 Tab by mouth two (2) times daily (with meals).  ferrous sulfate 325 mg (65 mg iron) tablet Take 1 Tab by mouth two (2) times daily (with meals).  mirtazapine (REMERON) 7.5 mg tablet Take 1 Tab by mouth nightly.  nicotine (NICODERM CQ) 21 mg/24 hr 1 Patch by TransDERmal route every twenty-four (24) hours for 30 days. PAST MEDICAL HISTORY: Past medical history from the initial psychiatric evaluation has been reviewed (reviewed/updated 2/8/2018) with no additional updates (I asked patient and no additional past medical history provided). Past Medical History:   Diagnosis Date    Arthritis     CAD (coronary artery disease) 2007    CKD (chronic kidney disease), stage III     DM type 2 causing renal disease (HCC)     DVT (deep venous thrombosis) (HCC)     Hx of DVT:  Xarelto stopped due to GI bleed. S/P IVC filter.  Gait abnormality     GI bleed     Heart murmur     Hepatitis C     History of blood transfusion     Hypercholesterolemia     Hypertension 11/7/2014    Neuropathy     Non compliance w medication regimen     Peripheral vascular disease ([facility] [address] 11/7/2014    A. S/P Right SFA POBA and tibial atherectomy (2/4/13).     PUD (peptic ulcer disease) 2007    Unspecified sleep apnea     never used cpap     Past Surgical History:   Procedure Laterality Date    ABDOMEN SURGERY PROC UNLISTED  2007    has approx 7-8\" midline incision on abdomen--\"had to open him up and clean him out after feeding tube clogged\"    HX GI  2007    feeding tube for approx 2 mo    VASCULAR SURGERY PROCEDURE UNLIST Right 1/22/2015    popliteal-tibial bypass      SOCIAL HISTORY: Social history from the initial psychiatric evaluation has been reviewed (reviewed/updated 2/8/2018) with no additional updates (I asked patient and no additional social history provided). Social History     Social History    Marital status:      Spouse name: N/A    Number of children: N/A    Years of education: N/A     Occupational History    Not on file. Social History Main Topics    Smoking status: Current Every Day Smoker     Packs/day: 0.50    Smokeless tobacco: Not on file    Alcohol use No    Drug use: No      Comment: >20 years ago    Sexual activity: Not on file     Other Topics Concern    Not on file     Social History Narrative    78 year ols male admitted for depression and homelessness. Pt will be evicated from apartment due to non payment of bills. Girlfriend of 30 years left him to [facility] live [place] the woods with others. \" Pt is a brittle diabetic, with treatment non compliance. He has bilarela lower extremity amputations. Patient has a long hx of substance abuse. FAMILY HISTORY: Family history from the initial psychiatric evaluation has been reviewed (reviewed/updated 2/8/2018) with no additional updates (I asked patient and no additional family history provided).    Family History   Problem Relation Age of Onset    Hypertension Father        REVIEW OF SYSTEMS: (reviewed/updated 2/8/2018)  Appetite:decreased   Sleep: fitful   All other Review of Systems: Psychological ROS: positive for - behavioral disorder  Respiratory ROS: no cough, shortness of breath, or wheezing  Cardiovascular ROS: no chest pain or dyspnea on exertion         [facility] (MSE):    MSE FINDINGS ARE WITHIN NORMAL LIMITS (WNL) UNLESS OTHERWISE STATED BELOW. ( ALL OF THE BELOW CATEGORIES OF THE MSE HAVE BEEN REVIEWED (reviewed 2/8/2018) AND UPDATED AS DEEMED APPROPRIATE )  General Presentation age appropriate, evasive and guarded   Orientation oriented to time, place and person   Vital Signs  See below (reviewed 2/8/2018); Vital Signs (BP, Pulse, & Temp) are within normal limits if not listed below.    Gait and Station Stable/steady, no ataxia   Musculoskeletal System No extrapyramidal symptoms (EPS); no abnormal muscular movements or Tardive Dyskinesia (TD); muscle strength and tone are within normal limits   Language No aphasia or dysarthria   Speech:  hypoverbal   Thought Processes concrete; normal rate of thoughts; poor abstract reasoning/computation   Thought Associations goal directed   Thought Content free of delusions and free of hallucinations   Suicidal Ideations none   Homicidal Ideations none   Mood:  irritable   Affect:  mood-congruent   Memory recent  fair   Memory remote:  fair   Concentration/Attention:  distractable   Fund of Knowledge significantly below average   Insight:  poor   Reliability poor   Judgment:  poor          VITALS:     Patient Vitals for the past 24 hrs:   Temp Pulse Resp BP SpO2   02/08/18 2200 - - - 120/70 -   02/08/18 2119 97.9 °F (36.6 °C) 69 20 115/54 100 %   02/08/18 0725 98 °F (36.7 °C) 72 16 142/65 100 %     Wt Readings from Last 3 Encounters:   01/24/18 82.7 kg (182 lb 5.1 oz)   01/23/18 83.6 kg (184 lb 4.9 oz)   07/13/17 88.9 kg (196 lb)     Temp Readings from Last 3 Encounters:   02/08/18 97.9 °F (36.6 °C)   01/24/18 97.2 °F (36.2 °C)   07/20/17 97.9 °F (36.6 °C)     BP Readings from Last 3 Encounters:   02/08/18 120/70   01/24/18 172/68   07/20/17 175/76     Pulse Readings from Last 3 Encounters:   02/08/18 69   01/24/18 72   07/20/17 (!) 52 DATA     LABORATORY DATA:(reviewed/updated 2/8/2018)  Recent Results (from the past 24 hour(s))   GLUCOSE, POC    Collection Time: 02/08/18  7:52 AM   Result Value Ref Range    Glucose (POC) 77 65 - 100 mg/dL    Performed by Rocio Collins    GLUCOSE, POC    Collection Time: 02/08/18  8:05 AM   Result Value Ref Range    Glucose (POC) 86 65 - 100 mg/dL    Performed by Rocio Collins    GLUCOSE, POC    Collection Time: 02/08/18 10:58 AM   Result Value Ref Range    Glucose (POC) 117 (H) 65 - 100 mg/dL    Performed by Kristin Abts, POC    Collection Time: 02/08/18  9:09 PM   Result Value Ref Range    Glucose (POC) 128 (H) 65 - 100 mg/dL    Performed by Amor Elam      No results found for: VALF2, VALAC, VALP, VALPR, DS6, CRBAM, CRBAMP, CARB2, XCRBAM  No results found for: LITHM   RADIOLOGY REPORTS:(reviewed/updated 2/8/2018)  Xr Chest Port    Result Date: 1/20/2018  EXAM: Portable CXR.  1800 hours. COMPARISON: 1/19/2015. INDICATION: cp, fatigue There is new interstitial pulmonary edema, cardiomegaly and small left pleural effusion. There is no focal infiltrate, pneumothorax or midline shift. IMPRESSION: CHF pattern.          MEDICATIONS     ALL MEDICATIONS:   Current Facility-Administered Medications   Medication Dose Route Frequency    LORazepam (ATIVAN) tablet 1.5 mg  1.5 mg Oral BID    mirtazapine (REMERON) tablet 30 mg  30 mg Oral QHS    buPROPion SR (WELLBUTRIN SR) tablet 150 mg  150 mg Oral DAILY    alum-mag hydroxide-simeth (MYLANTA) oral suspension 30 mL  30 mL Oral Q4H PRN    doxazosin (CARDURA) tablet 4 mg  4 mg Oral QHS    polyethylene glycol (MIRALAX) packet 17 g  17 g Oral DAILY    OLANZapine (ZyPREXA) tablet 2.5 mg  2.5 mg Oral Q6H PRN    ziprasidone (GEODON) 10 mg in sterile water (preservative free) 0.5 mL injection  10 mg IntraMUSCular BID PRN    benztropine (COGENTIN) tablet 1 mg  1 mg Oral BID PRN    benztropine (COGENTIN) injection 1 mg  1 mg IntraMUSCular BID PRN    zolpidem (AMBIEN) tablet 5 mg  5 mg Oral QHS PRN    acetaminophen (TYLENOL) tablet 650 mg  650 mg Oral Q4H PRN    magnesium hydroxide (MILK OF MAGNESIA) 400 mg/5 mL oral suspension 30 mL  30 mL Oral DAILY PRN    nicotine (NICODERM CQ) 21 mg/24 hr patch 1 Patch  1 Patch TransDERmal DAILY PRN    influenza vaccine 2017-18 (3 yrs+)(PF) (FLUZONE QUAD/FLUARIX QUAD) injection 0.5 mL  0.5 mL IntraMUSCular PRIOR TO DISCHARGE    amLODIPine (NORVASC) tablet 10 mg  10 mg Oral DAILY    aspirin delayed-release tablet 81 mg  81 mg Oral DAILY    atorvastatin (LIPITOR) tablet 40 mg  40 mg Oral DAILY    carvedilol (COREG) tablet 3.125 mg  3.125 mg Oral BID WITH MEALS    ferrous sulfate tablet 325 mg  325 mg Oral BID WITH MEALS    hydrALAZINE (APRESOLINE) tablet 100 mg  100 mg Oral TID    glucose chewable tablet 16 g  4 Tab Oral PRN    dextrose (D50W) injection syrg 12.5-25 g  12.5-25 g IntraVENous PRN    glucagon (GLUCAGEN) injection 1 mg  1 mg IntraMUSCular PRN    insulin lispro (HUMALOG) injection   SubCUTAneous AC&HS    nicotine (NICODERM CQ) 21 mg/24 hr patch 1 Patch  1 Patch TransDERmal Q24H      SCHEDULED MEDICATIONS:   Current Facility-Administered Medications   Medication Dose Route Frequency    LORazepam (ATIVAN) tablet 1.5 mg  1.5 mg Oral BID    mirtazapine (REMERON) tablet 30 mg  30 mg Oral QHS    buPROPion SR (WELLBUTRIN SR) tablet 150 mg  150 mg Oral DAILY    doxazosin (CARDURA) tablet 4 mg  4 mg Oral QHS    polyethylene glycol (MIRALAX) packet 17 g  17 g Oral DAILY    influenza vaccine 2017-18 (3 yrs+)(PF) (FLUZONE QUAD/FLUARIX QUAD) injection 0.5 mL  0.5 mL IntraMUSCular PRIOR TO DISCHARGE    amLODIPine (NORVASC) tablet 10 mg  10 mg Oral DAILY    aspirin delayed-release tablet 81 mg  81 mg Oral DAILY    atorvastatin (LIPITOR) tablet 40 mg  40 mg Oral DAILY    carvedilol (COREG) tablet 3.125 mg  3.125 mg Oral BID WITH MEALS    ferrous sulfate tablet 325 mg  325 mg Oral BID WITH MEALS    hydrALAZINE (APRESOLINE) tablet 100 mg  100 mg Oral TID    insulin lispro (HUMALOG) injection   SubCUTAneous AC&HS    nicotine (NICODERM CQ) 21 mg/24 hr patch 1 Patch  1 Patch TransDERmal Q24H          ASSESSMENT & PLAN     DIAGNOSES REQUIRING ACTIVE TREATMENT AND MONITORING: (reviewed/updated 2/8/2018)  Patient Active Hospital Problem List:   Depression (1/24/2018)    Assessment: sadness, helplessness, hopelessness, in reaction to recent bilateral lower leg amputationa and inability to pat water bill at home. Plan: consider forced medication/treatment, address social issues, daughter may be obtaining guardianship  1/27/18 need order to treat    1/28/18 continue his medications              In summary, Evelyn Charles, is a 78 y.o.  male who presents with a severe exacerbation of the principal diagnosis of Depression  Patient's condition is worsening/not improving/not stable Patient requires continued inpatient hospitalization for further stabilization, safety monitoring and medication management. I will continue to coordinate the provision of individual, milieu, occupational, group, and substance abuse therapies to address target symptoms/diagnoses as deemed appropriate for the individual patient. A coordinated, multidisplinary treatment team round was conducted with the patient (this team consists of the nurse, psychiatric unit pharmcist,  and writer). Complete current electronic health record for patient has been reviewed today including consultant notes, ancillary staff notes, nurses and psychiatric tech notes. Suicide risk assessment completed and patient deemed to be of low risk for suicide at this time. The following regarding medications was addressed during rounds with patient:   the risks and benefits of the proposed medication. The patient was given the opportunity to ask questions. Informed consent given to the use of the above medications.  Will continue to adjust psychiatric and non-psychiatric medications (see above \"medication\" section and orders section for details) as deemed appropriate & based upon diagnoses and response to treatment. I will continue to order blood tests/labs and diagnostic tests as deemed appropriate and review results as they become available (see orders for details and above listed lab/test results). I will order psychiatric records from previous [facility] hospitals to further elucidate the nature of patient's psychopathology and review once available. I will gather additional collateral information from friends, family and o/p treatment team to further elucidate the nature of patient's psychopathology and baselline level of psychiatric functioning. I certify that this patient's inpatient psychiatric hospital services furnished since the previous certification were, and continue to be, required for treatment that could reasonably be expected to improve the patient's condition, or for diagnostic study, and that the patient continues to need, on a daily basis, active treatment furnished directly by or requiring the supervision of inpatient psychiatric facility personnel. In addition, the hospital records show that services furnished were intensive treatment services, admission or related services, or equivalent services.     EXPECTED DISCHARGE DATE/DAY: TBD     DISPOSITION: Home       Signed By:   Vaibhav Romero MD  2/8/2018

## 2018-02-09 NOTE — PROGRESS NOTES
Problem: Depressed Mood (Adult/Pediatric)  Goal: *STG: Participates in treatment plan  Outcome: Not Progressing Towards Goal  Pt is irritable, depressed and sad. He verbalizes about wanting to go home. Called his fiance and told her that \" I am getting discharged today and when I come home they can look for a nursing home for him\" Janice verbalizes this to writer.    No S/I or H/I   Q 15 min checks   Goal: *STG: Complies with medication therapy  Outcome: Progressing Towards Goal  Med/Meal compliant

## 2018-02-09 NOTE — PROGRESS NOTES
Problem: Falls - Risk of  Goal: *Absence of Falls  Document Bello Fall Risk and appropriate interventions in the flowsheet. Outcome: Progressing Towards Goal  Fall Risk Interventions:  Mobility Interventions: Bed/chair exit alarm, Communicate number of staff needed for ambulation/transfer    Mentation Interventions: Adequate sleep, hydration, pain control, Bed/chair exit alarm, Door open when patient unattended, Evaluate medications/consider consulting pharmacy    Medication Interventions: Bed/chair exit alarm, Evaluate medications/consider consulting pharmacy    Elimination Interventions: Bed/chair exit alarm, Patient to call for help with toileting needs, Urinal in reach    History of Falls Interventions: Bed/chair exit alarm, Door open when patient unattended, Evaluate medications/consider consulting pharmacy, Room close to nurse's station        Problem: Depressed Mood (Adult/Pediatric)  Goal: *STG: Demonstrates reduction in symptoms and increase in insight into coping skills/future focused  Outcome: Not Progressing Towards Goal  Patient refuses to cooperate with staff for his vital signs/Accuchecks and scheduled medications.

## 2018-02-10 LAB
GLUCOSE BLD STRIP.AUTO-MCNC: 128 MG/DL (ref 65–100)
GLUCOSE BLD STRIP.AUTO-MCNC: 133 MG/DL (ref 65–100)
GLUCOSE BLD STRIP.AUTO-MCNC: 67 MG/DL (ref 65–100)
GLUCOSE BLD STRIP.AUTO-MCNC: 84 MG/DL (ref 65–100)
GLUCOSE BLD STRIP.AUTO-MCNC: 96 MG/DL (ref 65–100)
SERVICE CMNT-IMP: ABNORMAL
SERVICE CMNT-IMP: ABNORMAL
SERVICE CMNT-IMP: NORMAL

## 2018-02-10 PROCEDURE — 74011250637 HC RX REV CODE- 250/637

## 2018-02-10 PROCEDURE — 74011250637 HC RX REV CODE- 250/637: Performed by: HOSPITALIST

## 2018-02-10 PROCEDURE — 74011250637 HC RX REV CODE- 250/637: Performed by: PSYCHIATRY & NEUROLOGY

## 2018-02-10 PROCEDURE — 74011250637 HC RX REV CODE- 250/637: Performed by: FAMILY MEDICINE

## 2018-02-10 PROCEDURE — 82962 GLUCOSE BLOOD TEST: CPT

## 2018-02-10 PROCEDURE — 65220000003 HC RM SEMIPRIVATE PSYCH

## 2018-02-10 RX ORDER — LORAZEPAM 1 MG/1
1 TABLET ORAL 3 TIMES DAILY
Status: DISCONTINUED | OUTPATIENT
Start: 2018-02-10 | End: 2018-02-21

## 2018-02-10 RX ADMIN — ASPIRIN 81 MG: 81 TABLET, COATED ORAL at 09:47

## 2018-02-10 RX ADMIN — ATORVASTATIN CALCIUM 40 MG: 40 TABLET, FILM COATED ORAL at 09:46

## 2018-02-10 RX ADMIN — ZOLPIDEM TARTRATE 5 MG: 5 TABLET ORAL at 01:55

## 2018-02-10 RX ADMIN — OLANZAPINE 2.5 MG: 2.5 TABLET, FILM COATED ORAL at 20:09

## 2018-02-10 RX ADMIN — LORAZEPAM 1 MG: 1 TABLET ORAL at 20:09

## 2018-02-10 RX ADMIN — LORAZEPAM 1 MG: 1 TABLET ORAL at 14:30

## 2018-02-10 RX ADMIN — DOXAZOSIN 4 MG: 2 TABLET ORAL at 20:09

## 2018-02-10 RX ADMIN — MIRTAZAPINE 30 MG: 15 TABLET, FILM COATED ORAL at 20:09

## 2018-02-10 RX ADMIN — FERROUS SULFATE TAB 325 MG (65 MG ELEMENTAL FE) 325 MG: 325 (65 FE) TAB at 09:46

## 2018-02-10 RX ADMIN — HYDRALAZINE HYDROCHLORIDE 100 MG: 50 TABLET, FILM COATED ORAL at 20:09

## 2018-02-10 RX ADMIN — BUPROPION HYDROCHLORIDE 150 MG: 150 TABLET, EXTENDED RELEASE ORAL at 09:44

## 2018-02-10 RX ADMIN — ACETAMINOPHEN 650 MG: 325 TABLET, FILM COATED ORAL at 01:55

## 2018-02-10 RX ADMIN — LORAZEPAM 1.5 MG: 1 TABLET ORAL at 09:46

## 2018-02-10 NOTE — BH NOTES
1115-pt demanding to go back to bed. Lift team came per request with walt, pt became calling lift team member dahiana and stating life team  member was homosexual. Pt began threatening this writer citing \"you keep on making signals at me, I'll knock you the fuck out\" Pt gesturing the act of closed fist punching. Ginger redirected patient, and was pulled away from stimulation and prevented further agitating/upsetting other peers. 1210-pt eating in dining room, pt mumbling into phone and would not allow others to use phone. Attempted to take phone as patient was only mumbling and did not seem actively using phone. Pt again signaled the punching motion. 1230 lift team assisted patient back to bed with walt lift  Dr. Leonid Fair up to see patient  1300-pt remains in bed, bizarre nonsensical conversation about dogs, disoriented and confused citing he needed to put this pants on so that he could find his glasses.  Staff frequently reorient and redirect patient, however is quite labile, and oriented to self only  1415-Dr. Leonid Fair paged and reviewed ativan 1.5mg twice daily, however patient is increasingly hostile, angry and disruptive, order received to modify order to ativan 1mg three times daily

## 2018-02-10 NOTE — PROGRESS NOTES
Problem: Falls - Risk of  Goal: *Absence of Falls  Document Bello Fall Risk and appropriate interventions in the flowsheet.    Outcome: Progressing Towards Goal  Fall Risk Interventions:  Lying quietly in bed with eyes closed , respirations even and unlabored , NAD noted   Q15 min safety rounds continue , bed alarm blinking green , Side rails up x3 ,   Mobility Interventions: Bed/chair exit alarm    Mentation Interventions: Adequate sleep, hydration, pain control    Medication Interventions: Bed/chair exit alarm    Elimination Interventions: Bed/chair exit alarm    History of Falls Interventions: Bed/chair exit alarm, Evaluate medications/consider consulting pharmacy, Room close to nurse's station

## 2018-02-10 NOTE — BH NOTES
PRN Medication Documentation MEWS=1 , Pain 7/10   Specific patient behavior that led to need   for PRN medication: c/o of gum/mouth discomfort , not sleeping   Staff interventions attempted prior to PRN being given:    Offered room temp water  , repositioned           PRN medication given:  Tylenol and Ambien 5 mg po   Patient response/effectiveness of PRN medication: pending

## 2018-02-10 NOTE — PROGRESS NOTES
Problem: Depressed Mood (Adult/Pediatric)  Goal: *STG: Participates in treatment plan  Outcome: Progressing Towards Goal  Patient is out in the milieu sitting quietly   He ate 100% of his breakfast   Cooperative with staff  Gets irritable, anxious and confused at times.    Goal: *STG: Complies with medication therapy  Outcome: Progressing Towards Goal  Med/Meal compliant

## 2018-02-10 NOTE — BH NOTES
1515 Received patient in bed, yelling for his jeans. Explained his jeans were soiled and needed to be washed. On q 15 in. Safety checks. 1615 Voided in urinal 150 ml.     1628 VS taken and WNL. He asked staff \"cut the wires down there\". Redirected there were no wires in bed.     1630 Patient is talking on phone with girlfriend. 1730 Patient ate 80% dinner and all fluids.  Patient refused to be turned when lift team came to reposition him. He asked lift team staff \"do you want me to go with you, can we hang out together and have a man to man talk\". 2200 Patient did take his scheduled  meds with much encouragement. He was changed into clean briefs, night gown and turned on right side. He continues to be delusional;  Stated \"That man is trying to take me correction, my kids are out there ( pointed to ) and I need to take them to school\".

## 2018-02-10 NOTE — INTERDISCIPLINARY ROUNDS
Behavioral Health Interdisciplinary Rounds     Patient Name: Brianna Gruber  Age: 78 y.o. Room/Bed:  740/02  Primary Diagnosis: Depression   Admission Status: Involuntary Commitment     Readmission within 30 days: no   Power of  in place: yes - daughter Catalina Tsai - awaiting paperwork   Patient requires a blocked bed: no          Reason for blocked bed:     VTE Prophylaxis: Yes , ASA   Flu vaccine given : no , declined   Mobility needs/Fall risk: yes    Nutritional Plan: yes  Consults:PT/OT , wound care, Hospitalist        Labs/Testing due today?: no    Sleep hours:  6+       Participation in Care/Groups:  yes  Medication Compliant?: Yes   PRNS (last 24 hours): Sleep Aid and Pain    Restraints (last 24 hours):  no  Substance Abuse:  no  CIWA (range last 24 hours):  COWS (range last 24 hours):   Alcohol screening (AUDIT) completed -  AUDIT Score: 0  If applicable, date SBIRT discussed in treatment team AND documented:   Tobacco - patient is a smoke  Yes   Date tobacco education completed by RN: 1/24/18   24 hour chart check complete: yes     Patient goal(s) for today:   Treatment team focus/goals:   Progress note     LOS:  17  Expected LOS:   Financial concerns/prescription coverage:    Date of last family contact:     Family requesting physician contact today:   Discharge plan:   Guns in the home:        Outpatient provider(s):   Participating treatment team members:  Brianna Gruber, * (assigned SW),

## 2018-02-11 LAB
GLUCOSE BLD STRIP.AUTO-MCNC: 107 MG/DL (ref 65–100)
GLUCOSE BLD STRIP.AUTO-MCNC: 150 MG/DL (ref 65–100)
GLUCOSE BLD STRIP.AUTO-MCNC: 164 MG/DL (ref 65–100)
GLUCOSE BLD STRIP.AUTO-MCNC: 88 MG/DL (ref 65–100)
SERVICE CMNT-IMP: ABNORMAL
SERVICE CMNT-IMP: NORMAL

## 2018-02-11 PROCEDURE — 82962 GLUCOSE BLOOD TEST: CPT

## 2018-02-11 PROCEDURE — 74011250637 HC RX REV CODE- 250/637: Performed by: FAMILY MEDICINE

## 2018-02-11 PROCEDURE — 74011250637 HC RX REV CODE- 250/637

## 2018-02-11 PROCEDURE — 65220000003 HC RM SEMIPRIVATE PSYCH

## 2018-02-11 PROCEDURE — 74011250637 HC RX REV CODE- 250/637: Performed by: HOSPITALIST

## 2018-02-11 PROCEDURE — 74011250637 HC RX REV CODE- 250/637: Performed by: PSYCHIATRY & NEUROLOGY

## 2018-02-11 RX ADMIN — ASPIRIN 81 MG: 81 TABLET, COATED ORAL at 10:23

## 2018-02-11 RX ADMIN — ATORVASTATIN CALCIUM 40 MG: 40 TABLET, FILM COATED ORAL at 10:25

## 2018-02-11 RX ADMIN — HYDRALAZINE HYDROCHLORIDE 100 MG: 50 TABLET, FILM COATED ORAL at 10:25

## 2018-02-11 RX ADMIN — LORAZEPAM 1 MG: 1 TABLET ORAL at 10:24

## 2018-02-11 RX ADMIN — FERROUS SULFATE TAB 325 MG (65 MG ELEMENTAL FE) 325 MG: 325 (65 FE) TAB at 10:24

## 2018-02-11 RX ADMIN — BUPROPION HYDROCHLORIDE 150 MG: 150 TABLET, EXTENDED RELEASE ORAL at 10:24

## 2018-02-11 RX ADMIN — CARVEDILOL 3.12 MG: 3.12 TABLET, FILM COATED ORAL at 10:24

## 2018-02-11 RX ADMIN — AMLODIPINE BESYLATE 10 MG: 5 TABLET ORAL at 10:25

## 2018-02-11 NOTE — INTERDISCIPLINARY ROUNDS
Behavioral Health Interdisciplinary Rounds     Patient Name: Jeet Rodriguez  Age: 78 y.o. Room/Bed:  740/02  Primary Diagnosis: Depression   Admission Status: Involuntary Commitment     Readmission within 30 days: no  Power of  in place: yes , daughter Pauline Morse - awaiting paperwork   Patient requires a blocked bed: no          Reason for blocked bed:    VTE Prophylaxis: Yes , ASA   Flu vaccine given : no , declined   Mobility needs/Fall risk: yes    Nutritional Plan: yes  Consults: PT/OT, Wound care , Hospitalist        Labs/Testing due today?: no    Sleep hours:  5 hours      Participation in Care/Groups:  yes  Medication Compliant?: Selective   PRNS (last 24 hours): Sleep Aid    Restraints (last 24 hours):  no  Substance Abuse:  no  CIWA (range last 24 hours):  COWS (range last 24 hours):  Alcohol screening (AUDIT) completed -  AUDIT Score: 0  If applicable, date SBIRT discussed in treatment team AND documented:   Tobacco - patient is a smoker:  Yes    Date tobacco education completed by RN: 1/24/18  24 hour chart check complete: Yes     Patient goal(s) for today:   Treatment team focus/goals:   Progress note     LOS:  18  Expected LOS:     Financial concerns/prescription coverage:    Date of last family contact:      Family requesting physician contact today:   Discharge plan:   Guns in the home:        Outpatient provider(s):     Participating treatment team members:  Jeet Rodriguez, * (assigned SW),

## 2018-02-11 NOTE — PROGRESS NOTES
Problem: Falls - Risk of  Goal: *Absence of Falls  Document Bello Fall Risk and appropriate interventions in the flowsheet.    Outcome: Progressing Towards Goal  Fall Risk Interventions:  Lying quietly in bed with eyes closed , respirations even and unlabored , NAD noted   Q15 min safety rounds continue , Bed alarm blinking green , Tap bell within reach at bedside ,   Mobility Interventions: Bed/chair exit alarm    Mentation Interventions: Adequate sleep, hydration, pain control    Medication Interventions: Bed/chair exit alarm    Elimination Interventions: Bed/chair exit alarm, Patient to call for help with toileting needs, Toileting schedule/hourly rounds    History of Falls Interventions: Bed/chair exit alarm, Door open when patient unattended, Room close to nurse's station

## 2018-02-11 NOTE — PROGRESS NOTES
Problem: Depressed Mood (Adult/Pediatric)  Goal: *STG: Verbalizes anger, guilt, and other feelings in a constructive manor  Variance: Patient Uncooperative  Comments: Patient is uncooperative, refusing to talk to staff, angry, disruptive to milieu, requiring PRN meds.

## 2018-02-11 NOTE — BH NOTES
PSYCHIATRIC PROGRESS NOTE         Patient Name  Wendy Aponte   Date of Birth 1940   [facility] [id number]   Medical Record Number  [id number]      Age  78 y.o. PCP Johny Warner, MD   Admit date:  1/24/2018    Room Number  [phone]  @ [facility]   Date of Service  2/10/2018          PSYCHOTHERAPY SESSION NOTE:  Length of psychotherapy session: 15 minutes    Main condition/diagnosis/issues treated during session today, 2/10/2018 : med , meal and group non compliance    I employed Cognitive Behavioral therapy techniques, Reality-Oriented psychotherapy, as well as supportive psychotherapy in regards to various ongoing psychosocial stressors, including the following: pre-admission and current problems; housing issues; occupational issues; medical issues; and stress of hospitalization. Interpersonal relationship issues and psychodynamic conflicts explored. Attempts made to alleviate maladaptive patterns. We, also, worked on issues of denial & effects of substance dependency/use     Overall, patient is not progressing    Treatment Plan Update (reviewed an updated 2/10/2018) : I will modify psychotherapy tx plan by implementing more stress management strategies, building upon cognitive behavioral techniques, increasing coping skills, as well as shoring up psychological defenses). An extended energy and skill set was needed to engage pt in psychotherapy due to some of the following: resistiveness, complexity, negativity, confrontational nature, hostile behaviors, and/or severe abnormalities in thought processes/psychosis resulting in the loss of expressive/receptive language communication skills. E & M PROGRESS NOTE:         HISTORY       CC:  \"depression and non compliance with treatment \"  HISTORY OF PRESENT ILLNESS/INTERVAL HISTORY:  (reviewed/updated 2/10/2018).   per initial evaluation:     Wendy Aponte presents/reports/evidences the following emotional symptoms today, 2/10/2018:depression. The above symptoms have been present for 1 years. These symptoms are of severe severity. The symptoms are constant  in nature. Additional symptomatology and features include anxiety. 2/2/18- pATIENT HAS IMPROVED SPORADIC MED COMPLIANCE WITH INCREASED ATIVAN DOSE. WAS SOCIAL IN [place] TODAY. SW TO CONTACT DAUGHTER TO LET HER KNOE THAT HE IS NOT BEING AS TREATMENT COMPLIANT AS HE TOLD HER HE WOULD BE.    1/27/18 he is not responding to questions and not engaging and not showing engagement and refuse medications and treatment   1/28/18 he is doing better and he engaged and not feeling sad or depressed and he eats better    1/29/18= Patient is deliberately refusing vital signs, labs and medications. He likes finger foods and eats those. Will meet with daughter on Tuesday to discuss possible guardianship. Will seek forced meds. 1/30/18- Pt. Agreed to comply with meds after extensive family meeting. Planning for SNF admission and possible transfer to home afterwards [address].  1/31/18- Patient is intermittently cooperative with somatic meds. We discussed the risks to his health this can cause. I advised Wellbutrin for depression. This may help motivate treatment compliance. 2/1/18- Continues Rude, belligerent, uncooperative and disrespectful with staff. Is marginally compliant with diabetes management. Waiting for placement. 2/3/18- no complaints. More redirectable, less hostile and more compliant  2/4/18- Mr. Riri Vargas very somnolent this morning. No agitation  2/5/18- Sporadic , selective compliance with medications. Denies SI/HI. Needs reminding of treatment goals to improve compliance for transfer to SNF.  2/6/18- mr. Riri Vargas was bright and positive this morning. Less demanding and hostile towards staff. Asking to go home. 2/7/18- Apologized to writer for being rude and uncooperative with treatment. Focused on discharge.  Understandd house repairs need to be completed first.  2/8/18- sleeping this morning. 2/9/18- grumpy, entitled and impatient re discharge, accepts inerim SNF placement. 2/10/18: intermittently agitated, verbally abusive and  hostile, making gesture to strike out,focused on discharge, no finish, confuse,non redirectable, received prn ativan and his dose of schedule ativan was adjusted. SIDE EFFECTS: (reviewed/updated 2/10/2018)  None reported or admitted to. No noted toxicity with use of Depakote/Tegretol/lithium/Clozaril/TCAs   ALLERGIES:(reviewed/updated 2/10/2018)  Allergies   Allergen Reactions    Tetracycline Hives      MEDICATIONS PRIOR TO ADMISSION:(reviewed/updated 2/10/2018)  Prescriptions Prior to Admission   Medication Sig    hydrALAZINE (APRESOLINE) 100 mg tablet Take 1 Tab by mouth three (3) times daily.  amLODIPine (NORVASC) 10 mg tablet Take 1 Tab by mouth daily.  aspirin delayed-release 81 mg tablet Take 1 Tab by mouth daily.  atorvastatin (LIPITOR) 40 mg tablet Take 1 Tab by mouth daily.  carvedilol (COREG) 3.125 mg tablet Take 1 Tab by mouth two (2) times daily (with meals).  ferrous sulfate 325 mg (65 mg iron) tablet Take 1 Tab by mouth two (2) times daily (with meals).  mirtazapine (REMERON) 7.5 mg tablet Take 1 Tab by mouth nightly.  nicotine (NICODERM CQ) 21 mg/24 hr 1 Patch by TransDERmal route every twenty-four (24) hours for 30 days. PAST MEDICAL HISTORY: Past medical history from the initial psychiatric evaluation has been reviewed (reviewed/updated 2/10/2018) with no additional updates (I asked patient and no additional past medical history provided). Past Medical History:   Diagnosis Date    Arthritis     CAD (coronary artery disease) 2007    CKD (chronic kidney disease), stage III     DM type 2 causing renal disease (HCC)     DVT (deep venous thrombosis) (HCC)     Hx of DVT:  Xarelto stopped due to GI bleed. S/P IVC filter.     Gait abnormality     GI bleed     Heart murmur     Hepatitis C     History of blood transfusion     Hypercholesterolemia     Hypertension 11/7/2014    Neuropathy     Non compliance w medication regimen     Peripheral vascular disease ([facility] [address] 11/7/2014    A. S/P Right SFA POBA and tibial atherectomy (2/4/13).  PUD (peptic ulcer disease) 2007    Unspecified sleep apnea     never used cpap     Past Surgical History:   Procedure Laterality Date    ABDOMEN SURGERY PROC UNLISTED  2007    has approx 7-8\" midline incision on abdomen--\"had to open him up and clean him out after feeding tube clogged\"    HX GI  2007    feeding tube for approx 2 mo    VASCULAR SURGERY PROCEDURE UNLIST Right 1/22/2015    popliteal-tibial bypass      SOCIAL HISTORY: Social history from the initial psychiatric evaluation has been reviewed (reviewed/updated 2/10/2018) with no additional updates (I asked patient and no additional social history provided). Social History     Social History    Marital status:      Spouse name: N/A    Number of children: N/A    Years of education: N/A     Occupational History    Not on file. Social History Main Topics    Smoking status: Current Every Day Smoker     Packs/day: 0.50    Smokeless tobacco: Not on file    Alcohol use No    Drug use: No      Comment: >20 years ago    Sexual activity: Not on file     Other Topics Concern    Not on file     Social History Narrative    78 year ols male admitted for depression and homelessness. Pt will be evicated from apartment due to non payment of bills. [facility] of 30 years [facility] [place] [facility]. \" Pt is a brittle diabetic, with treatment non compliance. He has bilarela lower extremity amputations. Patient has a long hx of substance abuse. FAMILY HISTORY: Family history from the initial psychiatric evaluation has been reviewed (reviewed/updated 2/10/2018) with no additional updates (I asked patient and no additional family history provided).    Family History Problem Relation Age of Onset    Hypertension Father        REVIEW OF SYSTEMS: (reviewed/updated 2/10/2018)  Appetite:decreased   Sleep: fitful   All other Review of Systems: Psychological ROS: positive for - behavioral disorder  Respiratory ROS: no cough, shortness of breath, or wheezing  Cardiovascular ROS: no chest pain or dyspnea on exertion         [address] (MSE):    MSE FINDINGS ARE WITHIN NORMAL LIMITS (WNL) UNLESS OTHERWISE STATED BELOW. ( ALL OF THE BELOW CATEGORIES OF THE MSE HAVE BEEN REVIEWED (reviewed 2/10/2018) AND UPDATED AS DEEMED APPROPRIATE )  General Presentation age appropriate, evasive and guarded   Orientation oriented to time, place and person   Vital Signs  See below (reviewed 2/10/2018); Vital Signs (BP, Pulse, & Temp) are within normal limits if not listed below.    Gait and Station Stable/steady, no ataxia   Musculoskeletal System No extrapyramidal symptoms (EPS); no abnormal muscular movements or Tardive Dyskinesia (TD); muscle strength and tone are within normal limits   Language No aphasia or dysarthria   Speech:  hypoverbal   Thought Processes concrete; normal rate of thoughts; poor abstract reasoning/computation   Thought Associations goal directed   Thought Content free of delusions and free of hallucinations   Suicidal Ideations none   Homicidal Ideations none   Mood:  irritable   Affect:  mood-congruent   Memory recent  fair   Memory remote:  fair   Concentration/Attention:  distractable   Fund of Knowledge significantly below average   Insight:  poor   Reliability poor   Judgment:  poor          VITALS:     Patient Vitals for the past 24 hrs:   Temp Pulse Resp BP SpO2   02/10/18 1945 98.5 °F (36.9 °C) 69 16 145/65 100 %   02/10/18 1628 98.2 °F (36.8 °C) 72 17 146/64 100 %   02/10/18 0837 96.8 °F (36 °C) 67 16 119/67 99 %   02/10/18 0200 98.4 °F (36.9 °C) 67 16 136/68 99 %     Wt Readings from Last 3 Encounters:   01/24/18 82.7 kg (182 lb 5.1 oz)   01/23/18 83.6 kg (184 lb 4.9 oz)   07/13/17 88.9 kg (196 lb)     Temp Readings from Last 3 Encounters:   02/10/18 98.5 °F (36.9 °C)   01/24/18 97.2 °F (36.2 °C)   07/20/17 97.9 °F (36.6 °C)     BP Readings from Last 3 Encounters:   02/10/18 145/65   01/24/18 172/68   07/20/17 175/76     Pulse Readings from Last 3 Encounters:   02/10/18 69   01/24/18 72   07/20/17 (!) 52            DATA     LABORATORY DATA:(reviewed/updated 2/10/2018)  Recent Results (from the past 24 hour(s))   GLUCOSE, POC    Collection Time: 02/10/18  7:42 AM   Result Value Ref Range    Glucose (POC) 67 65 - 100 mg/dL    Performed by Dorene Khalil    GLUCOSE, POC    Collection Time: 02/10/18  7:57 AM   Result Value Ref Range    Glucose (POC) 84 65 - 100 mg/dL    Performed by Dorene Khalil    GLUCOSE, POC    Collection Time: 02/10/18 11:15 AM   Result Value Ref Range    Glucose (POC) 96 65 - 100 mg/dL    Performed by Dorene Khalil    GLUCOSE, POC    Collection Time: 02/10/18  4:17 PM   Result Value Ref Range    Glucose (POC) 128 (H) 65 - 100 mg/dL    Performed by Paula Sutton, POC    Collection Time: 02/10/18  7:57 PM   Result Value Ref Range    Glucose (POC) 133 (H) 65 - 100 mg/dL    Performed by Justine Vicente      No results found for: VALF2, VALAC, VALP, VALPR, DS6, CRBAM, CRBAMP, CARB2, XCRBAM  No results found for: LITHM   RADIOLOGY REPORTS:(reviewed/updated 2/10/2018)  Xr Chest Port    Result Date: 1/20/2018  EXAM: Portable CXR.  1800 hours. COMPARISON: 1/19/2015. INDICATION: cp, fatigue There is new interstitial pulmonary edema, cardiomegaly and small left pleural effusion. There is no focal infiltrate, pneumothorax or midline shift. IMPRESSION: CHF pattern.          MEDICATIONS     ALL MEDICATIONS:   Current Facility-Administered Medications   Medication Dose Route Frequency    LORazepam (ATIVAN) tablet 1 mg  1 mg Oral TID    mirtazapine (REMERON) tablet 30 mg  30 mg Oral QHS    buPROPion SR [facility] SR) tablet 150 mg  150 mg Oral DAILY    alum-mag hydroxide-simeth (MYLANTA) oral suspension 30 mL  30 mL Oral Q4H PRN    doxazosin (CARDURA) tablet 4 mg  4 mg Oral QHS    polyethylene glycol (MIRALAX) packet 17 g  17 g Oral DAILY    OLANZapine (ZyPREXA) tablet 2.5 mg  2.5 mg Oral Q6H PRN    ziprasidone (GEODON) 10 mg in sterile water (preservative free) 0.5 mL injection  10 mg IntraMUSCular BID PRN    benztropine (COGENTIN) tablet 1 mg  1 mg Oral BID PRN    benztropine (COGENTIN) injection 1 mg  1 mg IntraMUSCular BID PRN    zolpidem (AMBIEN) tablet 5 mg  5 mg Oral QHS PRN    acetaminophen (TYLENOL) tablet 650 mg  650 mg Oral Q4H PRN    magnesium hydroxide (MILK OF MAGNESIA) 400 mg/5 mL oral suspension 30 mL  30 mL Oral DAILY PRN    nicotine (NICODERM CQ) 21 mg/24 hr patch 1 Patch  1 Patch TransDERmal DAILY PRN    influenza vaccine 2017-18 (3 yrs+)(PF) (FLUZONE QUAD/FLUARIX QUAD) injection 0.5 mL  0.5 mL IntraMUSCular PRIOR TO DISCHARGE    amLODIPine (NORVASC) tablet 10 mg  10 mg Oral DAILY    aspirin delayed-release tablet 81 mg  81 mg Oral DAILY    atorvastatin (LIPITOR) tablet 40 mg  40 mg Oral DAILY    carvedilol (COREG) tablet 3.125 mg  3.125 mg Oral BID WITH MEALS    ferrous sulfate tablet 325 mg  325 mg Oral BID WITH MEALS    hydrALAZINE (APRESOLINE) tablet 100 mg  100 mg Oral TID    glucose chewable tablet 16 g  4 Tab Oral PRN    dextrose (D50W) injection syrg 12.5-25 g  12.5-25 g IntraVENous PRN    glucagon (GLUCAGEN) injection 1 mg  1 mg IntraMUSCular PRN    insulin lispro (HUMALOG) injection   SubCUTAneous AC&HS    nicotine (NICODERM CQ) 21 mg/24 hr patch 1 Patch  1 Patch TransDERmal Q24H      SCHEDULED MEDICATIONS:   Current Facility-Administered Medications   Medication Dose Route Frequency    LORazepam (ATIVAN) tablet 1 mg  1 mg Oral TID    mirtazapine (REMERON) tablet 30 mg  30 mg Oral QHS    buPROPion SR (WELLBUTRIN SR) tablet 150 mg  150 mg Oral DAILY    doxazosin (CARDURA) tablet 4 mg  4 mg Oral QHS    polyethylene glycol (MIRALAX) packet 17 g  17 g Oral DAILY    influenza vaccine 2017-18 (3 yrs+)(PF) (FLUZONE QUAD/FLUARIX QUAD) injection 0.5 mL  0.5 mL IntraMUSCular PRIOR TO DISCHARGE    amLODIPine (NORVASC) tablet 10 mg  10 mg Oral DAILY    aspirin delayed-release tablet 81 mg  81 mg Oral DAILY    atorvastatin (LIPITOR) tablet 40 mg  40 mg Oral DAILY    carvedilol (COREG) tablet 3.125 mg  3.125 mg Oral BID WITH MEALS    ferrous sulfate tablet 325 mg  325 mg Oral BID WITH MEALS    hydrALAZINE (APRESOLINE) tablet 100 mg  100 mg Oral TID    insulin lispro (HUMALOG) injection   SubCUTAneous AC&HS    nicotine (NICODERM CQ) 21 mg/24 hr patch 1 Patch  1 Patch TransDERmal Q24H          ASSESSMENT & PLAN     DIAGNOSES REQUIRING ACTIVE TREATMENT AND MONITORING: (reviewed/updated 2/10/2018)  Patient Active Hospital Problem List:   Depression (1/24/2018)    Assessment: sadness, helplessness, hopelessness, in reaction to recent bilateral lower leg amputationa and inability to pat water bill at home. Plan: consider forced medication/treatment, address social issues, daughter may be obtaining guardianship  1/27/18 need order to treat    1/28/18 continue his medications    02/18/18: Continue current treatment, Ativan dose was adjusted. In summary, Adal Virk, is a 78 y.o.  male who presents with a severe exacerbation of the principal diagnosis of Depression  Patient's condition is worsening/not improving/not stable Patient requires continued inpatient hospitalization for further stabilization, safety monitoring and medication management. I will continue to coordinate the provision of individual, milieu, occupational, group, and substance abuse therapies to address target symptoms/diagnoses as deemed appropriate for the individual patient.   A coordinated, multidisplinary treatment team round was conducted with the patient (this team consists of the nurse, psychiatric unit pharmcist,  and writer). Complete current electronic health record for patient has been reviewed today including consultant notes, ancillary staff notes, nurses and psychiatric tech notes. Suicide risk assessment completed and patient deemed to be of low risk for suicide at this time. The following regarding medications was addressed during rounds with patient:   the risks and benefits of the proposed medication. The patient was given the opportunity to ask questions. Informed consent given to the use of the above medications. Will continue to adjust psychiatric and non-psychiatric medications (see above \"medication\" section and orders section for details) as deemed appropriate & based upon diagnoses and response to treatment. I will continue to order blood tests/labs and diagnostic tests as deemed appropriate and review results as they become available (see orders for details and above listed lab/test results). I will order psychiatric records from previous [facility] hospitals to further elucidate the nature of patient's psychopathology and review once available. I will gather additional collateral information from friends, family and o/p treatment team to further elucidate the nature of patient's psychopathology and baselline level of psychiatric functioning. I certify that this patient's inpatient psychiatric hospital services furnished since the previous certification were, and continue to be, required for treatment that could reasonably be expected to improve the patient's condition, or for diagnostic study, and that the patient continues to need, on a daily basis, active treatment furnished directly by or requiring the supervision of inpatient psychiatric facility personnel. In addition, the hospital records show that services furnished were intensive treatment services, admission or related services, or equivalent services.     EXPECTED DISCHARGE DATE/DAY: TBD     DISPOSITION: Home       Signed By:   Mauro Valladares MD  2/10/2018

## 2018-02-11 NOTE — PROGRESS NOTES
Problem: Depressed Mood (Adult/Pediatric)  Goal: *STG: Complies with medication therapy  Outcome: Progressing Towards Goal  Inconsistent. Requires much encouragement to have VS. Accuchecks and take scheduled meds. Refused to be repositioned.

## 2018-02-11 NOTE — BH NOTES
PSYCHIATRIC PROGRESS NOTE         Patient Name  Katrin Jorge   Date of Birth 1940   [facility] [id number]   Medical Record Number  [id number]      Age  78 y.o. PCP Johny Warner, MD   Admit date:  1/24/2018    Room Number  ([phone] [id number]  @ [facility]   Date of Service  2/11/2018          PSYCHOTHERAPY SESSION NOTE:  Length of psychotherapy session: 15 minutes    Main condition/diagnosis/issues treated during session today, 2/11/2018 : med , meal and group non compliance    I employed Cognitive Behavioral therapy techniques, Reality-Oriented psychotherapy, as well as supportive psychotherapy in regards to various ongoing psychosocial stressors, including the following: pre-admission and current problems; housing issues; occupational issues; medical issues; and stress of hospitalization. Interpersonal relationship issues and psychodynamic conflicts explored. Attempts made to alleviate maladaptive patterns. We, also, worked on issues of denial & effects of substance dependency/use     Overall, patient is not progressing    Treatment Plan Update (reviewed an updated 2/11/2018) : I will modify psychotherapy tx plan by implementing more stress management strategies, building upon cognitive behavioral techniques, increasing coping skills, as well as shoring up psychological defenses). An extended energy and skill set was needed to engage pt in psychotherapy due to some of the following: resistiveness, complexity, negativity, confrontational nature, hostile behaviors, and/or severe abnormalities in thought processes/psychosis resulting in the loss of expressive/receptive language communication skills. E & M PROGRESS NOTE:         HISTORY       CC:  \"depression and non compliance with treatment \"  HISTORY OF PRESENT ILLNESS/INTERVAL HISTORY:  (reviewed/updated 2/11/2018).   per initial evaluation:     Katrin Jorge presents/reports/evidences the following emotional symptoms today, 2/11/2018:depression. The above symptoms have been present for 1 years. These symptoms are of severe severity. The symptoms are constant  in nature. Additional symptomatology and features include anxiety. 2/2/18- pATIENT HAS IMPROVED SPORADIC MED COMPLIANCE WITH INCREASED ATIVAN DOSE. WAS SOCIAL IN [place] TODAY. SW TO CONTACT DAUGHTER TO LET HER KNOE THAT HE IS NOT BEING AS TREATMENT COMPLIANT AS HE TOLD HER HE WOULD BE.    1/27/18 he is not responding to questions and not engaging and not showing engagement and refuse medications and treatment   1/28/18 he is doing better and he engaged and not feeling sad or depressed and he eats better    1/29/18= Patient is deliberately refusing vital signs, labs and medications. He likes finger foods and eats those. Will meet with daughter on Tuesday to discuss possible guardianship. Will seek forced meds. 1/30/18- Pt. Agreed to comply with meds after extensive family meeting. Planning for SNF admission and possible transfer to home afterwards [address].  1/31/18- Patient is intermittently cooperative with somatic meds. We discussed the risks to his health this can cause. I advised Wellbutrin for depression. This may help motivate treatment compliance. 2/1/18- Continues Rude, belligerent, uncooperative and disrespectful with staff. Is marginally compliant with diabetes management. Waiting for placement. 2/3/18- no complaints. More redirectable, less hostile and more compliant  2/4/18- Mr. Rodolfo Black very somnolent this morning. No agitation  2/5/18- Sporadic , selective compliance with medications. Denies SI/HI. Needs reminding of treatment goals to improve compliance for transfer to SNF.  2/6/18- mr. Rodolfo Black was bright and positive this morning. Less demanding and hostile towards staff. Asking to go home. 2/7/18- Apologized to writer for being rude and uncooperative with treatment. Focused on discharge.  Understandd house repairs need to be completed first.  2/8/18- sleeping this morning. 2/9/18- grumpy, entitled and impatient re discharge, accepts inerim SNF placement. 2/10/18: intermittently agitated, verbally abusive and  hostile, making gesture to strike out,focused on discharge, no finish, confuse,non redirectable, received prn ativan and his dose of schedule ativan was adjusted. 02/11/18:Patient was seen today, As per staff patient continues to demand to go home and discharge, Thought process tangential, poor insight, non redirectable,gets usp set when staff redirect him. Accepting medications and meals. Does not want to go to NH. SIDE EFFECTS: (reviewed/updated 2/11/2018)  None reported or admitted to. No noted toxicity with use of Depakote/Tegretol/lithium/Clozaril/TCAs   ALLERGIES:(reviewed/updated 2/11/2018)  Allergies   Allergen Reactions    Tetracycline Hives      MEDICATIONS PRIOR TO ADMISSION:(reviewed/updated 2/11/2018)  Prescriptions Prior to Admission   Medication Sig    hydrALAZINE (APRESOLINE) 100 mg tablet Take 1 Tab by mouth three (3) times daily.  amLODIPine (NORVASC) 10 mg tablet Take 1 Tab by mouth daily.  aspirin delayed-release 81 mg tablet Take 1 Tab by mouth daily.  atorvastatin (LIPITOR) 40 mg tablet Take 1 Tab by mouth daily.  carvedilol (COREG) 3.125 mg tablet Take 1 Tab by mouth two (2) times daily (with meals).  ferrous sulfate 325 mg (65 mg iron) tablet Take 1 Tab by mouth two (2) times daily (with meals).  mirtazapine (REMERON) 7.5 mg tablet Take 1 Tab by mouth nightly.  nicotine (NICODERM CQ) 21 mg/24 hr 1 Patch by TransDERmal route every twenty-four (24) hours for 30 days. PAST MEDICAL HISTORY: Past medical history from the initial psychiatric evaluation has been reviewed (reviewed/updated 2/11/2018) with no additional updates (I asked patient and no additional past medical history provided).    Past Medical History:   Diagnosis Date    Arthritis     CAD (coronary artery disease) 2007  CKD (chronic kidney disease), stage III     DM type 2 causing renal disease ([facility]     DVT (deep venous thrombosis) (HCC)     Hx of DVT:  Xarelto stopped due to GI bleed. S/P IVC filter.  Gait abnormality     GI bleed     Heart murmur     Hepatitis C     History of blood transfusion     Hypercholesterolemia     Hypertension 11/7/2014    Neuropathy     Non compliance w medication regimen     Peripheral vascular disease ([facility] [address] 11/7/2014    A. S/P Right SFA POBA and tibial atherectomy (2/4/13).  PUD (peptic ulcer disease) 2007    Unspecified sleep apnea     never used cpap     Past Surgical History:   Procedure Laterality Date    ABDOMEN SURGERY PROC UNLISTED  2007    has approx 7-8\" midline incision on abdomen--\"had to open him up and clean him out after feeding tube clogged\"    HX GI  2007    feeding tube for approx 2 mo    VASCULAR SURGERY PROCEDURE UNLIST Right 1/22/2015    popliteal-tibial bypass      SOCIAL HISTORY: Social history from the initial psychiatric evaluation has been reviewed (reviewed/updated 2/11/2018) with no additional updates (I asked patient and no additional social history provided). Social History     Social History    Marital status:      Spouse name: N/A    Number of children: N/A    Years of education: N/A     Occupational History    Not on file. Social History Main Topics    Smoking status: Current Every Day Smoker     Packs/day: 0.50    Smokeless tobacco: Not on file    Alcohol use No    Drug use: No      Comment: >20 years ago    Sexual activity: Not on file     Other Topics Concern    Not on file     Social History Narrative    78 year ols male admitted for depression and homelessness. Pt will be evicated from apartment due to non payment of bills. Girlfriend of 30 years left him to [facility] live [place] the woods with others. \" Pt is a brittle diabetic, with treatment non compliance. He has bilarela lower extremity amputations. Patient has a long hx of substance abuse. FAMILY HISTORY: Family history from the initial psychiatric evaluation has been reviewed (reviewed/updated 2/11/2018) with no additional updates (I asked patient and no additional family history provided). Family History   Problem Relation Age of Onset    Hypertension Father        REVIEW OF SYSTEMS: (reviewed/updated 2/11/2018)  Appetite:decreased   Sleep: fitful   All other Review of Systems: Psychological ROS: positive for - behavioral disorder  Respiratory ROS: no cough, shortness of breath, or wheezing  Cardiovascular ROS: no chest pain or dyspnea on exertion         [facility] (MSE):    MSE FINDINGS ARE WITHIN NORMAL LIMITS (WNL) UNLESS OTHERWISE STATED BELOW. ( ALL OF THE BELOW CATEGORIES OF THE MSE HAVE BEEN REVIEWED (reviewed 2/11/2018) AND UPDATED AS DEEMED APPROPRIATE )  General Presentation age appropriate, evasive and guarded   Orientation oriented to time, place and person   Vital Signs  See below (reviewed 2/11/2018); Vital Signs (BP, Pulse, & Temp) are within normal limits if not listed below.    Gait and Station Stable/steady, no ataxia   Musculoskeletal System No extrapyramidal symptoms (EPS); no abnormal muscular movements or Tardive Dyskinesia (TD); muscle strength and tone are within normal limits   Language No aphasia or dysarthria   Speech:  hypoverbal   Thought Processes concrete; normal rate of thoughts; poor abstract reasoning/computation   Thought Associations goal directed   Thought Content free of delusions and free of hallucinations   Suicidal Ideations none   Homicidal Ideations none   Mood:  irritable   Affect:  mood-congruent   Memory recent  fair   Memory remote:  fair   Concentration/Attention:  distractable   Fund of Knowledge significantly below average   Insight:  poor   Reliability poor   Judgment:  poor          VITALS:     Patient Vitals for the past 24 hrs:   Temp Pulse Resp BP SpO2   02/11/18 1609 97.9 °F (36.6 °C) 80 16 159/67 100 %   02/11/18 0929 98 °F (36.7 °C) 82 18 169/70 96 %   02/10/18 1945 98.5 °F (36.9 °C) 69 16 145/65 100 %     Wt Readings from Last 3 Encounters:   01/24/18 82.7 kg (182 lb 5.1 oz)   01/23/18 83.6 kg (184 lb 4.9 oz)   07/13/17 88.9 kg (196 lb)     Temp Readings from Last 3 Encounters:   02/11/18 97.9 °F (36.6 °C)   01/24/18 97.2 °F (36.2 °C)   07/20/17 97.9 °F (36.6 °C)     BP Readings from Last 3 Encounters:   02/11/18 159/67   01/24/18 172/68   07/20/17 175/76     Pulse Readings from Last 3 Encounters:   02/11/18 80   01/24/18 72   07/20/17 (!) 52            DATA     LABORATORY DATA:(reviewed/updated 2/11/2018)  Recent Results (from the past 24 hour(s))   GLUCOSE, POC    Collection Time: 02/10/18  7:57 PM   Result Value Ref Range    Glucose (POC) 133 (H) 65 - 100 mg/dL    Performed by Paula Sutton, POC    Collection Time: 02/11/18  7:57 AM   Result Value Ref Range    Glucose (POC) 88 65 - 100 mg/dL    Performed by Leny Malave    GLUCOSE, POC    Collection Time: 02/11/18 11:48 AM   Result Value Ref Range    Glucose (POC) 107 (H) 65 - 100 mg/dL    Performed by Riri Cano    GLUCOSE, POC    Collection Time: 02/11/18  4:10 PM   Result Value Ref Range    Glucose (POC) 164 (H) 65 - 100 mg/dL    Performed by Dejuan Sparks      No results found for: VALF2, VALAC, VALP, VALPR, DS6, CRBAM, CRBAMP, CARB2, XCRBAM  No results found for: LITHM   RADIOLOGY REPORTS:(reviewed/updated 2/11/2018)  Xr Chest Port    Result Date: 1/20/2018  EXAM: Portable CXR.  1800 hours. COMPARISON: 1/19/2015. INDICATION: cp, fatigue There is new interstitial pulmonary edema, cardiomegaly and small left pleural effusion. There is no focal infiltrate, pneumothorax or midline shift. IMPRESSION: CHF pattern.          MEDICATIONS     ALL MEDICATIONS:   Current Facility-Administered Medications   Medication Dose Route Frequency    LORazepam (ATIVAN) tablet 1 mg  1 mg Oral TID    mirtazapine (REMERON) tablet 30 mg  30 mg Oral QHS    buPROPion SR (WELLBUTRIN SR) tablet 150 mg  150 mg Oral DAILY    alum-mag hydroxide-simeth (MYLANTA) oral suspension 30 mL  30 mL Oral Q4H PRN    doxazosin (CARDURA) tablet 4 mg  4 mg Oral QHS    polyethylene glycol (MIRALAX) packet 17 g  17 g Oral DAILY    OLANZapine (ZyPREXA) tablet 2.5 mg  2.5 mg Oral Q6H PRN    ziprasidone (GEODON) 10 mg in sterile water (preservative free) 0.5 mL injection  10 mg IntraMUSCular BID PRN    benztropine (COGENTIN) tablet 1 mg  1 mg Oral BID PRN    benztropine (COGENTIN) injection 1 mg  1 mg IntraMUSCular BID PRN    zolpidem (AMBIEN) tablet 5 mg  5 mg Oral QHS PRN    acetaminophen (TYLENOL) tablet 650 mg  650 mg Oral Q4H PRN    magnesium hydroxide (MILK OF MAGNESIA) 400 mg/5 mL oral suspension 30 mL  30 mL Oral DAILY PRN    nicotine (NICODERM CQ) 21 mg/24 hr patch 1 Patch  1 Patch TransDERmal DAILY PRN    influenza vaccine 2017-18 (3 yrs+)(PF) (FLUZONE QUAD/FLUARIX QUAD) injection 0.5 mL  0.5 mL IntraMUSCular PRIOR TO DISCHARGE    amLODIPine (NORVASC) tablet 10 mg  10 mg Oral DAILY    aspirin delayed-release tablet 81 mg  81 mg Oral DAILY    atorvastatin (LIPITOR) tablet 40 mg  40 mg Oral DAILY    carvedilol (COREG) tablet 3.125 mg  3.125 mg Oral BID WITH MEALS    ferrous sulfate tablet 325 mg  325 mg Oral BID WITH MEALS    hydrALAZINE (APRESOLINE) tablet 100 mg  100 mg Oral TID    glucose chewable tablet 16 g  4 Tab Oral PRN    dextrose (D50W) injection syrg 12.5-25 g  12.5-25 g IntraVENous PRN    glucagon (GLUCAGEN) injection 1 mg  1 mg IntraMUSCular PRN    insulin lispro (HUMALOG) injection   SubCUTAneous AC&HS    nicotine (NICODERM CQ) 21 mg/24 hr patch 1 Patch  1 Patch TransDERmal Q24H      SCHEDULED MEDICATIONS:   Current Facility-Administered Medications   Medication Dose Route Frequency    LORazepam (ATIVAN) tablet 1 mg  1 mg Oral TID    mirtazapine (REMERON) tablet 30 mg  30 mg Oral QHS    buPROPion SR [facility] SR) tablet 150 mg  150 mg Oral DAILY    doxazosin (CARDURA) tablet 4 mg  4 mg Oral QHS    polyethylene glycol (MIRALAX) packet 17 g  17 g Oral DAILY    influenza vaccine 2017-18 (3 yrs+)(PF) (FLUZONE QUAD/FLUARIX QUAD) injection 0.5 mL  0.5 mL IntraMUSCular PRIOR TO DISCHARGE    amLODIPine (NORVASC) tablet 10 mg  10 mg Oral DAILY    aspirin delayed-release tablet 81 mg  81 mg Oral DAILY    atorvastatin (LIPITOR) tablet 40 mg  40 mg Oral DAILY    carvedilol (COREG) tablet 3.125 mg  3.125 mg Oral BID WITH MEALS    ferrous sulfate tablet 325 mg  325 mg Oral BID WITH MEALS    hydrALAZINE (APRESOLINE) tablet 100 mg  100 mg Oral TID    insulin lispro (HUMALOG) injection   SubCUTAneous AC&HS    nicotine (NICODERM CQ) 21 mg/24 hr patch 1 Patch  1 Patch TransDERmal Q24H          ASSESSMENT & PLAN     DIAGNOSES REQUIRING ACTIVE TREATMENT AND MONITORING: (reviewed/updated 2/11/2018)  Patient Active Hospital Problem List:   Depression (1/24/2018)    Assessment: sadness, helplessness, hopelessness, in reaction to recent bilateral lower leg amputationa and inability to pay water bill at home. Plan: consider forced medication/treatment, address social issues, daughter may be obtaining guardianship  1/27/18 need order to treat    1/28/18 continue his medications    02/10/18: Continue current treatment, Ativan dose was adjusted. 02/11/18: Continue current treatment. In summary, Rob Meeks, is a 78 y.o.  male who presents with a severe exacerbation of the principal diagnosis of Depression  Patient's condition is worsening/not improving/not stable Patient requires continued inpatient hospitalization for further stabilization, safety monitoring and medication management. I will continue to coordinate the provision of individual, milieu, occupational, group, and substance abuse therapies to address target symptoms/diagnoses as deemed appropriate for the individual patient.   A coordinated, multidisplinary treatment team round was conducted with the patient (this team consists of the nurse, psychiatric unit pharmcist,  and writer). Complete current electronic health record for patient has been reviewed today including consultant notes, ancillary staff notes, nurses and psychiatric tech notes. Suicide risk assessment completed and patient deemed to be of low risk for suicide at this time. The following regarding medications was addressed during rounds with patient:   the risks and benefits of the proposed medication. The patient was given the opportunity to ask questions. Informed consent given to the use of the above medications. Will continue to adjust psychiatric and non-psychiatric medications (see above \"medication\" section and orders section for details) as deemed appropriate & based upon diagnoses and response to treatment. I will continue to order blood tests/labs and diagnostic tests as deemed appropriate and review results as they become available (see orders for details and above listed lab/test results). I will order psychiatric records from previous [facility] hospitals to further elucidate the nature of patient's psychopathology and review once available. I will gather additional collateral information from friends, family and o/p treatment team to further elucidate the nature of patient's psychopathology and baselline level of psychiatric functioning. I certify that this patient's inpatient psychiatric hospital services furnished since the previous certification were, and continue to be, required for treatment that could reasonably be expected to improve the patient's condition, or for diagnostic study, and that the patient continues to need, on a daily basis, active treatment furnished directly by or requiring the supervision of inpatient psychiatric facility personnel.  In addition, the hospital records show that services furnished were intensive treatment services, admission or related services, or equivalent services.     EXPECTED DISCHARGE DATE/DAY: TBD     DISPOSITION: Home       Signed By:   Alyssa Phan MD  2/11/2018

## 2018-02-11 NOTE — BH NOTES
PRN Medication Documentation    Specific patient behavior that led to need for PRN medication: pt calling out, restless, hostile  Staff interventions attempted prior to PRN being given: verbal redirection, offered food  PRN medication given: Zyprexa 2.5mg po  Patient response/effectiveness of PRN medication: upon reassessment, pt resting in bed, less restless.

## 2018-02-11 NOTE — PROGRESS NOTES
Problem: Depressed Mood (Adult/Pediatric)  Goal: *STG: Participates in treatment plan  Outcome: Not Progressing Towards Goal  Pt is angry, anxious and irritable   He is focused on discharge and wanting to go back home. Keeps calling his fiance asking her to pick him up from the hosptial stating he is getting discahrged. His fiance asked writer to inform team to talk to patients daughter who has his POA. Discussed this with patient as well. Kept reminding patient about temporary placement the plan which was discaussed with him and his family.    Goal: *STG: Complies with medication therapy  Outcome: Progressing Towards Goal  Med/meal compliant

## 2018-02-12 LAB
GLUCOSE BLD STRIP.AUTO-MCNC: 105 MG/DL (ref 65–100)
GLUCOSE BLD STRIP.AUTO-MCNC: 140 MG/DL (ref 65–100)
GLUCOSE BLD STRIP.AUTO-MCNC: 85 MG/DL (ref 65–100)
GLUCOSE BLD STRIP.AUTO-MCNC: 96 MG/DL (ref 65–100)
SERVICE CMNT-IMP: ABNORMAL
SERVICE CMNT-IMP: ABNORMAL
SERVICE CMNT-IMP: NORMAL
SERVICE CMNT-IMP: NORMAL

## 2018-02-12 PROCEDURE — 74011250637 HC RX REV CODE- 250/637: Performed by: HOSPITALIST

## 2018-02-12 PROCEDURE — 74011250637 HC RX REV CODE- 250/637

## 2018-02-12 PROCEDURE — 65220000003 HC RM SEMIPRIVATE PSYCH

## 2018-02-12 PROCEDURE — 74011250637 HC RX REV CODE- 250/637: Performed by: PSYCHIATRY & NEUROLOGY

## 2018-02-12 PROCEDURE — 74011250637 HC RX REV CODE- 250/637: Performed by: FAMILY MEDICINE

## 2018-02-12 PROCEDURE — 82962 GLUCOSE BLOOD TEST: CPT

## 2018-02-12 RX ADMIN — HYDRALAZINE HYDROCHLORIDE 100 MG: 50 TABLET, FILM COATED ORAL at 17:08

## 2018-02-12 RX ADMIN — FERROUS SULFATE TAB 325 MG (65 MG ELEMENTAL FE) 325 MG: 325 (65 FE) TAB at 17:08

## 2018-02-12 RX ADMIN — CARVEDILOL 3.12 MG: 3.12 TABLET, FILM COATED ORAL at 16:00

## 2018-02-12 RX ADMIN — HYDRALAZINE HYDROCHLORIDE 100 MG: 50 TABLET, FILM COATED ORAL at 10:24

## 2018-02-12 RX ADMIN — HYDRALAZINE HYDROCHLORIDE 100 MG: 50 TABLET, FILM COATED ORAL at 21:58

## 2018-02-12 RX ADMIN — ATORVASTATIN CALCIUM 40 MG: 40 TABLET, FILM COATED ORAL at 10:24

## 2018-02-12 RX ADMIN — LORAZEPAM 1 MG: 1 TABLET ORAL at 10:24

## 2018-02-12 RX ADMIN — FERROUS SULFATE TAB 325 MG (65 MG ELEMENTAL FE) 325 MG: 325 (65 FE) TAB at 10:24

## 2018-02-12 RX ADMIN — CARVEDILOL 3.12 MG: 3.12 TABLET, FILM COATED ORAL at 10:24

## 2018-02-12 RX ADMIN — DOXAZOSIN 4 MG: 2 TABLET ORAL at 21:35

## 2018-02-12 RX ADMIN — ASPIRIN 81 MG: 81 TABLET, COATED ORAL at 10:23

## 2018-02-12 RX ADMIN — MIRTAZAPINE 30 MG: 15 TABLET, FILM COATED ORAL at 21:00

## 2018-02-12 RX ADMIN — POLYETHYLENE GLYCOL (3350) 17 G: 17 POWDER, FOR SOLUTION ORAL at 10:27

## 2018-02-12 RX ADMIN — AMLODIPINE BESYLATE 10 MG: 5 TABLET ORAL at 10:24

## 2018-02-12 RX ADMIN — LORAZEPAM 1 MG: 1 TABLET ORAL at 20:34

## 2018-02-12 RX ADMIN — LORAZEPAM 1 MG: 1 TABLET ORAL at 13:34

## 2018-02-12 RX ADMIN — BUPROPION HYDROCHLORIDE 150 MG: 150 TABLET, EXTENDED RELEASE ORAL at 10:24

## 2018-02-12 NOTE — BH NOTES
PSYCHIATRIC PROGRESS NOTE         Patient Name  Gilberto Vazquez   Date of Birth 1940   [facility] [id number]   Medical Record Number  [id number]      Age  78 y.o. PCP Johny Warner, MD   Admit date:  1/24/2018    Room Number  [phone]  @ [facility]   Date of Service  2/12/2018          PSYCHOTHERAPY SESSION NOTE:  Length of psychotherapy session: 15 minutes    Main condition/diagnosis/issues treated during session today, 2/12/2018 : med , meal and group non compliance    I employed Cognitive Behavioral therapy techniques, Reality-Oriented psychotherapy, as well as supportive psychotherapy in regards to various ongoing psychosocial stressors, including the following: pre-admission and current problems; housing issues; occupational issues; medical issues; and stress of hospitalization. Interpersonal relationship issues and psychodynamic conflicts explored. Attempts made to alleviate maladaptive patterns. We, also, worked on issues of denial & effects of substance dependency/use     Overall, patient is not progressing    Treatment Plan Update (reviewed an updated 2/12/2018) : I will modify psychotherapy tx plan by implementing more stress management strategies, building upon cognitive behavioral techniques, increasing coping skills, as well as shoring up psychological defenses). An extended energy and skill set was needed to engage pt in psychotherapy due to some of the following: resistiveness, complexity, negativity, confrontational nature, hostile behaviors, and/or severe abnormalities in thought processes/psychosis resulting in the loss of expressive/receptive language communication skills. E & M PROGRESS NOTE:         HISTORY       CC:  \"depression and non compliance with treatment \"  HISTORY OF PRESENT ILLNESS/INTERVAL HISTORY:  (reviewed/updated 2/12/2018).   per initial evaluation:     Gilberto Vazquez presents/reports/evidences the following emotional symptoms today, 2/12/2018:depression. The above symptoms have been present for 1 years. These symptoms are of severe severity. The symptoms are constant  in nature. Additional symptomatology and features include anxiety. 2/2/18- pATIENT HAS IMPROVED SPORADIC MED COMPLIANCE WITH INCREASED ATIVAN DOSE. WAS SOCIAL IN [place] TODAY. SW TO CONTACT DAUGHTER TO LET HER KNOE THAT HE IS NOT BEING AS TREATMENT COMPLIANT AS HE TOLD HER HE WOULD BE.    1/27/18 he is not responding to questions and not engaging and not showing engagement and refuse medications and treatment   1/28/18 he is doing better and he engaged and not feeling sad or depressed and he eats better    1/29/18= Patient is deliberately refusing vital signs, labs and medications. He likes finger foods and eats those. Will meet with daughter on Tuesday to discuss possible guardianship. Will seek forced meds. 1/30/18- Pt. Agreed to comply with meds after extensive family meeting. Planning for SNF admission and possible transfer to home afterwards [address].  1/31/18- Patient is intermittently cooperative with somatic meds. We discussed the risks to his health this can cause. I advised Wellbutrin for depression. This may help motivate treatment compliance. 2/1/18- Continues Rude, belligerent, uncooperative and disrespectful with staff. Is marginally compliant with diabetes management. Waiting for placement. 2/3/18- no complaints. More redirectable, less hostile and more compliant  2/4/18- Mr. Juliette Guerrero very somnolent this morning. No agitation  2/5/18- Sporadic , selective compliance with medications. Denies SI/HI. Needs reminding of treatment goals to improve compliance for transfer to SNF.  2/6/18- mr. Juliette Guerrero was bright and positive this morning. Less demanding and hostile towards staff. Asking to go home. 2/7/18- Apologized to writer for being rude and uncooperative with treatment. Focused on discharge.  Understandd house repairs need to be completed first.  2/8/18- sleeping this morning. 2/9/18- grumpy, entitled and impatient re discharge, accepts inerim SNF placement. 2/10/18: intermittently agitated, verbally abusive and  hostile, making gesture to strike out,focused on discharge, no finish, confuse,non redirectable, received prn ativan and his dose of schedule ativan was adjusted. 02/11/18:Patient was seen today, As per staff patient continues to demand to go home and discharge, Thought process tangential, poor insight, non redirectable,gets usp set when staff redirect him. Accepting medications and meals. Does not want to go to NH.   2/12/18- med compliance improved. Understands his home is not habitable until repairs are made, however insistes on focusing with staff on immediate discharge. More appropriate and cordial lately. SIDE EFFECTS: (reviewed/updated 2/12/2018)  None reported or admitted to. No noted toxicity with use of Depakote/Tegretol/lithium/Clozaril/TCAs   ALLERGIES:(reviewed/updated 2/12/2018)  Allergies   Allergen Reactions    Tetracycline Hives      MEDICATIONS PRIOR TO ADMISSION:(reviewed/updated 2/12/2018)  Prescriptions Prior to Admission   Medication Sig    hydrALAZINE (APRESOLINE) 100 mg tablet Take 1 Tab by mouth three (3) times daily.  amLODIPine (NORVASC) 10 mg tablet Take 1 Tab by mouth daily.  aspirin delayed-release 81 mg tablet Take 1 Tab by mouth daily.  atorvastatin (LIPITOR) 40 mg tablet Take 1 Tab by mouth daily.  carvedilol (COREG) 3.125 mg tablet Take 1 Tab by mouth two (2) times daily (with meals).  ferrous sulfate 325 mg (65 mg iron) tablet Take 1 Tab by mouth two (2) times daily (with meals).  mirtazapine (REMERON) 7.5 mg tablet Take 1 Tab by mouth nightly.  nicotine (NICODERM CQ) 21 mg/24 hr 1 Patch by TransDERmal route every twenty-four (24) hours for 30 days.       PAST MEDICAL HISTORY: Past medical history from the initial psychiatric evaluation has been reviewed (reviewed/updated 2/12/2018) with no additional updates (I asked patient and no additional past medical history provided). Past Medical History:   Diagnosis Date    Arthritis     CAD (coronary artery disease) 2007    CKD (chronic kidney disease), stage III     DM type 2 causing renal disease (HCC)     DVT (deep venous thrombosis) (HCC)     Hx of DVT:  Xarelto stopped due to GI bleed. S/P IVC filter.  Gait abnormality     GI bleed     Heart murmur     Hepatitis C     History of blood transfusion     Hypercholesterolemia     Hypertension 11/7/2014    Neuropathy     Non compliance w medication regimen     Peripheral vascular disease ([facility] [address] 11/7/2014    A. S/P Right SFA POBA and tibial atherectomy (2/4/13).  PUD (peptic ulcer disease) 2007    Unspecified sleep apnea     never used cpap     Past Surgical History:   Procedure Laterality Date    ABDOMEN SURGERY PROC UNLISTED  2007    has approx 7-8\" midline incision on abdomen--\"had to open him up and clean him out after feeding tube clogged\"    HX GI  2007    feeding tube for approx 2 mo    VASCULAR SURGERY PROCEDURE UNLIST Right 1/22/2015    popliteal-tibial bypass      SOCIAL HISTORY: Social history from the initial psychiatric evaluation has been reviewed (reviewed/updated 2/12/2018) with no additional updates (I asked patient and no additional social history provided). Social History     Social History    Marital status:      Spouse name: N/A    Number of children: N/A    Years of education: N/A     Occupational History    Not on file. Social History Main Topics    Smoking status: Current Every Day Smoker     Packs/day: 0.50    Smokeless tobacco: Not on file    Alcohol use No    Drug use: No      Comment: >20 years ago    Sexual activity: Not on file     Other Topics Concern    Not on file     Social History Narrative    78 year ols male admitted for depression and homelessness.  Pt will be evicated from apartment due to non payment of bills. Girlfriend of 30 years left him [place]. \" Pt is a brittle diabetic, with treatment non compliance. He has bilarela lower extremity amputations. Patient has a long hx of substance abuse. FAMILY HISTORY: Family history from the initial psychiatric evaluation has been reviewed (reviewed/updated 2/12/2018) with no additional updates (I asked patient and no additional family history provided). Family History   Problem Relation Age of Onset    Hypertension Father        REVIEW OF SYSTEMS: (reviewed/updated 2/12/2018)  Appetite:decreased   Sleep: fitful   All other Review of Systems: Psychological ROS: positive for - behavioral disorder  Respiratory ROS: no cough, shortness of breath, or wheezing  Cardiovascular ROS: no chest pain or dyspnea on exertion         [address] [facility] ([facility]):    [facility] FINDINGS ARE WITHIN NORMAL LIMITS (WNL) UNLESS OTHERWISE STATED BELOW. ( ALL OF THE BELOW CATEGORIES OF THE MSE HAVE BEEN REVIEWED (reviewed 2/12/2018) AND UPDATED AS DEEMED APPROPRIATE )  General Presentation age appropriate, evasive and guarded   Orientation oriented to time, place and person   Vital Signs  See below (reviewed 2/12/2018); Vital Signs (BP, Pulse, & Temp) are within normal limits if not listed below.    Gait and Station Stable/steady, no ataxia   Musculoskeletal System No extrapyramidal symptoms (EPS); no abnormal muscular movements or Tardive Dyskinesia (TD); muscle strength and tone are within normal limits   Language No aphasia or dysarthria   Speech:  hypoverbal   Thought Processes concrete; normal rate of thoughts; poor abstract reasoning/computation   Thought Associations goal directed   Thought Content free of delusions and free of hallucinations   Suicidal Ideations none   Homicidal Ideations none   Mood:  irritable   Affect:  mood-congruent   Memory recent  fair   Memory remote:  fair   Concentration/Attention: distractable   Fund of Knowledge significantly below average   Insight:  poor   Reliability poor   Judgment:  poor          VITALS:     Patient Vitals for the past 24 hrs:   Temp Pulse Resp BP SpO2   02/12/18 1533 98.1 °F (36.7 °C) 64 18 132/52 100 %   02/12/18 0933 97.8 °F (36.6 °C) 71 16 164/83 98 %   02/11/18 1918 - 80 - 133/60 -   02/11/18 1916 98.8 °F (37.1 °C) 72 16 127/48 -     Wt Readings from Last 3 Encounters:   01/24/18 82.7 kg (182 lb 5.1 oz)   01/23/18 83.6 kg (184 lb 4.9 oz)   07/13/17 88.9 kg (196 lb)     Temp Readings from Last 3 Encounters:   02/12/18 98.1 °F (36.7 °C)   01/24/18 97.2 °F (36.2 °C)   07/20/17 97.9 °F (36.6 °C)     BP Readings from Last 3 Encounters:   02/12/18 132/52   01/24/18 172/68   07/20/17 175/76     Pulse Readings from Last 3 Encounters:   02/12/18 64   01/24/18 72   07/20/17 (!) 52            DATA     LABORATORY DATA:(reviewed/updated 2/12/2018)  Recent Results (from the past 24 hour(s))   GLUCOSE, POC    Collection Time: 02/11/18  7:45 PM   Result Value Ref Range    Glucose (POC) 150 (H) 65 - 100 mg/dL    Performed by [facility], POC    Collection Time: 02/12/18  7:36 AM   Result Value Ref Range    Glucose (POC) 85 65 - 100 mg/dL    Performed by [address], POC    Collection Time: 02/12/18 11:55 AM   Result Value Ref Range    Glucose (POC) 96 65 - 100 mg/dL    Performed by [address], POC    Collection Time: 02/12/18  4:35 PM   Result Value Ref Range    Glucose (POC) 105 (H) 65 - 100 mg/dL    Performed by [facility]      No results found for: VALF2, VALAC, VALP, VALPR, DS6, CRBAM, CRBAMP, CARB2, XCRBAM  No results found for: LITHM   RADIOLOGY REPORTS:(reviewed/updated 2/12/2018)  Xr Chest Port    Result Date: 1/20/2018  EXAM: Portable CXR.  1800 hours. COMPARISON: 1/19/2015. INDICATION: cp, fatigue There is new interstitial pulmonary edema, cardiomegaly and small left pleural effusion.  There is no focal infiltrate, pneumothorax or midline shift. IMPRESSION: CHF pattern.          MEDICATIONS     ALL MEDICATIONS:   Current Facility-Administered Medications   Medication Dose Route Frequency    LORazepam (ATIVAN) tablet 1 mg  1 mg Oral TID    mirtazapine (REMERON) tablet 30 mg  30 mg Oral QHS    buPROPion SR (WELLBUTRIN SR) tablet 150 mg  150 mg Oral DAILY    alum-mag hydroxide-simeth (MYLANTA) oral suspension 30 mL  30 mL Oral Q4H PRN    doxazosin (CARDURA) tablet 4 mg  4 mg Oral QHS    polyethylene glycol (MIRALAX) packet 17 g  17 g Oral DAILY    OLANZapine (ZyPREXA) tablet 2.5 mg  2.5 mg Oral Q6H PRN    ziprasidone (GEODON) 10 mg in sterile water (preservative free) 0.5 mL injection  10 mg IntraMUSCular BID PRN    benztropine (COGENTIN) tablet 1 mg  1 mg Oral BID PRN    benztropine (COGENTIN) injection 1 mg  1 mg IntraMUSCular BID PRN    zolpidem (AMBIEN) tablet 5 mg  5 mg Oral QHS PRN    acetaminophen (TYLENOL) tablet 650 mg  650 mg Oral Q4H PRN    magnesium hydroxide (MILK OF MAGNESIA) 400 mg/5 mL oral suspension 30 mL  30 mL Oral DAILY PRN    nicotine (NICODERM CQ) 21 mg/24 hr patch 1 Patch  1 Patch TransDERmal DAILY PRN    influenza vaccine 2017-18 (3 yrs+)(PF) (FLUZONE QUAD/FLUARIX QUAD) injection 0.5 mL  0.5 mL IntraMUSCular PRIOR TO DISCHARGE    amLODIPine (NORVASC) tablet 10 mg  10 mg Oral DAILY    aspirin delayed-release tablet 81 mg  81 mg Oral DAILY    atorvastatin (LIPITOR) tablet 40 mg  40 mg Oral DAILY    carvedilol (COREG) tablet 3.125 mg  3.125 mg Oral BID WITH MEALS    ferrous sulfate tablet 325 mg  325 mg Oral BID WITH MEALS    hydrALAZINE (APRESOLINE) tablet 100 mg  100 mg Oral TID    glucose chewable tablet 16 g  4 Tab Oral PRN    dextrose (D50W) injection syrg 12.5-25 g  12.5-25 g IntraVENous PRN    glucagon (GLUCAGEN) injection 1 mg  1 mg IntraMUSCular PRN    insulin lispro (HUMALOG) injection   SubCUTAneous AC&HS    nicotine (NICODERM CQ) 21 mg/24 hr patch 1 Patch  1 Patch TransDERmal Q24H      SCHEDULED MEDICATIONS:   Current Facility-Administered Medications   Medication Dose Route Frequency    LORazepam (ATIVAN) tablet 1 mg  1 mg Oral TID    mirtazapine (REMERON) tablet 30 mg  30 mg Oral QHS    buPROPion SR (WELLBUTRIN SR) tablet 150 mg  150 mg Oral DAILY    doxazosin (CARDURA) tablet 4 mg  4 mg Oral QHS    polyethylene glycol (MIRALAX) packet 17 g  17 g Oral DAILY    influenza vaccine 2017-18 (3 yrs+)(PF) (FLUZONE QUAD/FLUARIX QUAD) injection 0.5 mL  0.5 mL IntraMUSCular PRIOR TO DISCHARGE    amLODIPine (NORVASC) tablet 10 mg  10 mg Oral DAILY    aspirin delayed-release tablet 81 mg  81 mg Oral DAILY    atorvastatin (LIPITOR) tablet 40 mg  40 mg Oral DAILY    carvedilol (COREG) tablet 3.125 mg  3.125 mg Oral BID WITH MEALS    ferrous sulfate tablet 325 mg  325 mg Oral BID WITH MEALS    hydrALAZINE (APRESOLINE) tablet 100 mg  100 mg Oral TID    insulin lispro (HUMALOG) injection   SubCUTAneous AC&HS    nicotine (NICODERM CQ) 21 mg/24 hr patch 1 Patch  1 Patch TransDERmal Q24H          ASSESSMENT & PLAN     DIAGNOSES REQUIRING ACTIVE TREATMENT AND MONITORING: (reviewed/updated 2/12/2018)  Patient Active Hospital Problem List:   Depression (1/24/2018)    Assessment: sadness, helplessness, hopelessness, in reaction to recent bilateral lower leg amputationa and inability to pay water bill at home. Plan: consider forced medication/treatment, address social issues, daughter may be obtaining guardianship  1/27/18 need order to treat    1/28/18 continue his medications    02/10/18: Continue current treatment, Ativan dose was adjusted. 02/11/18: Continue current treatment.         In summary, Judy Quesada, is a 78 y.o.  male who presents with a severe exacerbation of the principal diagnosis of Depression  Patient's condition is worsening/not improving/not stable Patient requires continued inpatient hospitalization for further stabilization, safety monitoring and medication management. I will continue to coordinate the provision of individual, milieu, occupational, group, and substance abuse therapies to address target symptoms/diagnoses as deemed appropriate for the individual patient. A coordinated, multidisplinary treatment team round was conducted with the patient (this team consists of the nurse, psychiatric unit pharmcist,  and writer). Complete current electronic health record for patient has been reviewed today including consultant notes, ancillary staff notes, nurses and psychiatric tech notes. Suicide risk assessment completed and patient deemed to be of low risk for suicide at this time. The following regarding medications was addressed during rounds with patient:   the risks and benefits of the proposed medication. The patient was given the opportunity to ask questions. Informed consent given to the use of the above medications. Will continue to adjust psychiatric and non-psychiatric medications (see above \"medication\" section and orders section for details) as deemed appropriate & based upon diagnoses and response to treatment. I will continue to order blood tests/labs and diagnostic tests as deemed appropriate and review results as they become available (see orders for details and above listed lab/test results). I will order psychiatric records from previous [facility] hospitals to further elucidate the nature of patient's psychopathology and review once available. I will gather additional collateral information from friends, family and o/p treatment team to further elucidate the nature of patient's psychopathology and baselline level of psychiatric functioning.          I certify that this patient's inpatient psychiatric hospital services furnished since the previous certification were, and continue to be, required for treatment that could reasonably be expected to improve the patient's condition, or for diagnostic study, and that the patient continues to need, on a daily basis, active treatment furnished directly by or requiring the supervision of inpatient psychiatric facility personnel. In addition, the hospital records show that services furnished were intensive treatment services, admission or related services, or equivalent services.     EXPECTED DISCHARGE DATE/DAY: TBD     DISPOSITION: Home       Signed By:   Mackenzie Zurita MD  2/12/2018

## 2018-02-12 NOTE — BH NOTES
Pt refused 1600, 1700 meds. RN tried to use redirection and education to see if patient would take meds at [facility]. 47 and patient still refused.

## 2018-02-12 NOTE — BH NOTES
GROUP THERAPY PROGRESS NOTE    Brianna Gruber passively participated in a morning Process Group on the Geriatric Unit, with a focus identifying feelings, planning for the day, and singing. Group time: 50 minutes. Personal goal for participation: To increase the capacity to shift ones mood, prepare for the day, and share in group singing. Goal orientation: The patient will be able to prepare for the day through group singing. Group therapy participation: When prompted, this patient minimally participated in the group. Therapeutic interventions reviewed and discussed: The group members were introduce themselves by first names and participate in group singing as a way to increase their oxygen and blood flow and begin their day on a positive note. They were also asked to join in singing several songs. Impression of participation: The patient did not respond when prompted, but he did sit through the session. He looked like he was more focused on the banana and coffee, he had asked for,  than the group. He did not contribute to the group's conversation and participate in the music. He expressed no SI/HI and displayed no overt psychosis. He was alert and appeared generally oriented. He expressed no interest in group participation. His affect was labile, with quiet anger and disregard. His mood matched his affect. It was difficult to determine if these behaviors were a result of depression and/or anxiety.

## 2018-02-12 NOTE — PROGRESS NOTES
Problem: Falls - Risk of  Goal: *Absence of Falls  Document Bello Fall Risk and appropriate interventions in the flowsheet.    Outcome: Progressing Towards Goal  Fall Risk Interventions:  Lying quietly in bed with eyes closed, respirations even and unlabored , NAD noted   Q15 min safety rounds continue , Side rails up x3, Bed alarm blinking green ,   Mobility Interventions: Bed/chair exit alarm, Communicate number of staff needed for ambulation/transfer, Mechanical lift, Patient to call before getting OOB    Mentation Interventions: Adequate sleep, hydration, pain control, Bed/chair exit alarm, More frequent rounding, Reorient patient    Medication Interventions: Bed/chair exit alarm, Patient to call before getting OOB, Teach patient to arise slowly    Elimination Interventions: Call light in reach, Patient to call for help with toileting needs, Toileting schedule/hourly rounds, Urinal in reach    History of Falls Interventions: Bed/chair exit alarm, Door open when patient unattended, Room close to nurse's station

## 2018-02-12 NOTE — PROGRESS NOTES
Problem: Falls - Risk of  Goal: *Absence of Falls  Document Bello Fall Risk and appropriate interventions in the flowsheet. Outcome: Progressing Towards Goal  Fall Risk Interventions:  Mobility Interventions: Bed/chair exit alarm, Communicate number of staff needed for ambulation/transfer, Mechanical lift, Patient to call before getting OOB    Mentation Interventions: Adequate sleep, hydration, pain control, Bed/chair exit alarm, Door open when patient unattended    Medication Interventions: Bed/chair exit alarm, Patient to call before getting OOB    Elimination Interventions: Bed/chair exit alarm, Call light in reach, Patient to call for help with toileting needs    History of Falls Interventions: Bed/chair exit alarm, Door open when patient unattended    Free of falls at present. Bed alarm on the bed. Fall tool completed and accurate. Patient transferred to [facility] chair via walt lift,able to re-position self as needed.

## 2018-02-12 NOTE — PROGRESS NOTES
Problem: Depressed Mood (Adult/Pediatric)  Goal: *STG: Participates in treatment plan  Outcome: Progressing Towards Goal  Received this morning resting in bed. Was alert and oriented. Requesting to get out of bed. Assisted with am cares,able to follow directions. Transfered to [facility] via [email or web address]. Asking appropriate questions about discharge,voicing he wants to go home. Thoughts are clear and organized,may show some mild confusion from time to time. Has been sitting out in the day room. Compliant with some irritation noted from time to time,but does take re-direction. Goal: *STG: Attends activities and groups  Outcome: Progressing Towards Goal  Attended music group this morning. Has been out on the unit with staff and other patients. Goal: *STG: Complies with medication therapy  Outcome: Progressing Towards Goal  Has been medication and meal compliant.

## 2018-02-12 NOTE — BH NOTES
[id number] [phone] [id number]:  Patient received as he is sitting quietly in the Day Room. He greets oncoming staff cordially and requests assistance to go back to bed. The Lift Team is called to bring the [employer]. Will maintain q 15 minute safety checks.

## 2018-02-13 LAB
GLUCOSE BLD STRIP.AUTO-MCNC: 104 MG/DL (ref 65–100)
GLUCOSE BLD STRIP.AUTO-MCNC: 137 MG/DL (ref 65–100)
GLUCOSE BLD STRIP.AUTO-MCNC: 141 MG/DL (ref 65–100)
GLUCOSE BLD STRIP.AUTO-MCNC: 79 MG/DL (ref 65–100)
GLUCOSE BLD STRIP.AUTO-MCNC: 84 MG/DL (ref 65–100)
SERVICE CMNT-IMP: ABNORMAL
SERVICE CMNT-IMP: NORMAL
SERVICE CMNT-IMP: NORMAL

## 2018-02-13 PROCEDURE — 74011250637 HC RX REV CODE- 250/637: Performed by: PSYCHIATRY & NEUROLOGY

## 2018-02-13 PROCEDURE — 74011250637 HC RX REV CODE- 250/637: Performed by: HOSPITALIST

## 2018-02-13 PROCEDURE — 74011250637 HC RX REV CODE- 250/637: Performed by: FAMILY MEDICINE

## 2018-02-13 PROCEDURE — 74011250637 HC RX REV CODE- 250/637

## 2018-02-13 PROCEDURE — 65220000003 HC RM SEMIPRIVATE PSYCH

## 2018-02-13 PROCEDURE — 82962 GLUCOSE BLOOD TEST: CPT

## 2018-02-13 RX ADMIN — ATORVASTATIN CALCIUM 40 MG: 40 TABLET, FILM COATED ORAL at 10:36

## 2018-02-13 RX ADMIN — CARVEDILOL 3.12 MG: 3.12 TABLET, FILM COATED ORAL at 10:35

## 2018-02-13 RX ADMIN — LORAZEPAM 1 MG: 1 TABLET ORAL at 21:41

## 2018-02-13 RX ADMIN — DOXAZOSIN 4 MG: 2 TABLET ORAL at 21:41

## 2018-02-13 RX ADMIN — LORAZEPAM 1 MG: 1 TABLET ORAL at 10:35

## 2018-02-13 RX ADMIN — ASPIRIN 81 MG: 81 TABLET, COATED ORAL at 10:35

## 2018-02-13 RX ADMIN — BUPROPION HYDROCHLORIDE 150 MG: 150 TABLET, EXTENDED RELEASE ORAL at 10:35

## 2018-02-13 RX ADMIN — AMLODIPINE BESYLATE 10 MG: 5 TABLET ORAL at 10:36

## 2018-02-13 RX ADMIN — HYDRALAZINE HYDROCHLORIDE 100 MG: 50 TABLET, FILM COATED ORAL at 16:48

## 2018-02-13 RX ADMIN — FERROUS SULFATE TAB 325 MG (65 MG ELEMENTAL FE) 325 MG: 325 (65 FE) TAB at 10:35

## 2018-02-13 RX ADMIN — HYDRALAZINE HYDROCHLORIDE 100 MG: 50 TABLET, FILM COATED ORAL at 10:36

## 2018-02-13 RX ADMIN — HYDRALAZINE HYDROCHLORIDE 100 MG: 50 TABLET, FILM COATED ORAL at 21:41

## 2018-02-13 RX ADMIN — MIRTAZAPINE 30 MG: 15 TABLET, FILM COATED ORAL at 21:41

## 2018-02-13 RX ADMIN — FERROUS SULFATE TAB 325 MG (65 MG ELEMENTAL FE) 325 MG: 325 (65 FE) TAB at 16:49

## 2018-02-13 RX ADMIN — CARVEDILOL 3.12 MG: 3.12 TABLET, FILM COATED ORAL at 16:49

## 2018-02-13 NOTE — BH NOTES
PSYCHIATRIC PROGRESS NOTE         Patient Name  sEau Harry   Date of Birth 1940   [facility] [id number]   Medical Record Number  [id number]      Age  78 y.o. PCP Johny Warner, MD   Admit date:  1/24/2018    Room Number  [phone]  @ [facility]   Date of Service  2/13/2018          PSYCHOTHERAPY SESSION NOTE:  Length of psychotherapy session: 15 minutes    Main condition/diagnosis/issues treated during session today, 2/13/2018 : med , meal and group non compliance    I employed Cognitive Behavioral therapy techniques, Reality-Oriented psychotherapy, as well as supportive psychotherapy in regards to various ongoing psychosocial stressors, including the following: pre-admission and current problems; housing issues; occupational issues; medical issues; and stress of hospitalization. Interpersonal relationship issues and psychodynamic conflicts explored. Attempts made to alleviate maladaptive patterns. We, also, worked on issues of denial & effects of substance dependency/use     Overall, patient is not progressing    Treatment Plan Update (reviewed an updated 2/13/2018) : I will modify psychotherapy tx plan by implementing more stress management strategies, building upon cognitive behavioral techniques, increasing coping skills, as well as shoring up psychological defenses). An extended energy and skill set was needed to engage pt in psychotherapy due to some of the following: resistiveness, complexity, negativity, confrontational nature, hostile behaviors, and/or severe abnormalities in thought processes/psychosis resulting in the loss of expressive/receptive language communication skills. E & M PROGRESS NOTE:         HISTORY       CC:  \"depression and non compliance with treatment \"  HISTORY OF PRESENT ILLNESS/INTERVAL HISTORY:  (reviewed/updated 2/13/2018).   per initial evaluation:     Esau Harry presents/reports/evidences the following emotional symptoms today, 2/13/2018:depression. The above symptoms have been present for 1 years. These symptoms are of severe severity. The symptoms are constant  in nature. Additional symptomatology and features include anxiety. 2/2/18- pATIENT HAS IMPROVED SPORADIC MED COMPLIANCE WITH INCREASED ATIVAN DOSE. WAS SOCIAL IN [place] TODAY. SW TO CONTACT DAUGHTER TO LET HER KNOE THAT HE IS NOT BEING AS TREATMENT COMPLIANT AS HE TOLD HER HE WOULD BE.    1/27/18 he is not responding to questions and not engaging and not showing engagement and refuse medications and treatment   1/28/18 he is doing better and he engaged and not feeling sad or depressed and he eats better    1/29/18= Patient is deliberately refusing vital signs, labs and medications. He likes finger foods and eats those. Will meet with daughter on Tuesday to discuss possible guardianship. Will seek forced meds. 1/30/18- Pt. Agreed to comply with meds after extensive family meeting. Planning for SNF admission and possible transfer to home afterwards [address].  1/31/18- Patient is intermittently cooperative with somatic meds. We discussed the risks to his health this can cause. I advised Wellbutrin for depression. This may help motivate treatment compliance. 2/1/18- Continues Rude, belligerent, uncooperative and disrespectful with staff. Is marginally compliant with diabetes management. Waiting for placement. 2/3/18- no complaints. More redirectable, less hostile and more compliant  2/4/18- Mr. Alejandro Lee very somnolent this morning. No agitation  2/5/18- Sporadic , selective compliance with medications. Denies SI/HI. Needs reminding of treatment goals to improve compliance for transfer to SNF.  2/6/18- mr. Alejandro Lee was bright and positive this morning. Less demanding and hostile towards staff. Asking to go home. 2/7/18- Apologized to writer for being rude and uncooperative with treatment. Focused on discharge.  Understandd house repairs need to be completed first.  2/8/18- sleeping this morning. 2/9/18- grumpy, entitled and impatient re discharge, accepts inerim SNF placement. 2/10/18: intermittently agitated, verbally abusive and  hostile, making gesture to strike out,focused on discharge, no finish, confuse,non redirectable, received prn ativan and his dose of schedule ativan was adjusted. 02/11/18:Patient was seen today, As per staff patient continues to demand to go home and discharge, Thought process tangential, poor insight, non redirectable,gets usp set when staff redirect him. Accepting medications and meals. Does not want to go to NH.   2/12/18- med compliance improved. Understands his home is not habitable until repairs are made, however insistes on focusing with staff on immediate discharge. More appropriate and cordial lately. 2/13/18- Recently has maintained appropriate social behavior and cordiality with staff. Denies SI ans HI. Is compliant with treatment. SIDE EFFECTS: (reviewed/updated 2/13/2018)  None reported or admitted to. No noted toxicity with use of Depakote/Tegretol/lithium/Clozaril/TCAs   ALLERGIES:(reviewed/updated 2/13/2018)  Allergies   Allergen Reactions    Tetracycline Hives      MEDICATIONS PRIOR TO ADMISSION:(reviewed/updated 2/13/2018)  Prescriptions Prior to Admission   Medication Sig    hydrALAZINE (APRESOLINE) 100 mg tablet Take 1 Tab by mouth three (3) times daily.  amLODIPine (NORVASC) 10 mg tablet Take 1 Tab by mouth daily.  aspirin delayed-release 81 mg tablet Take 1 Tab by mouth daily.  atorvastatin (LIPITOR) 40 mg tablet Take 1 Tab by mouth daily.  carvedilol (COREG) 3.125 mg tablet Take 1 Tab by mouth two (2) times daily (with meals).  ferrous sulfate 325 mg (65 mg iron) tablet Take 1 Tab by mouth two (2) times daily (with meals).  mirtazapine (REMERON) 7.5 mg tablet Take 1 Tab by mouth nightly.     nicotine (NICODERM CQ) 21 mg/24 hr 1 Patch by TransDERmal route every twenty-four (24) hours for 30 days. PAST MEDICAL HISTORY: Past medical history from the initial psychiatric evaluation has been reviewed (reviewed/updated 2/13/2018) with no additional updates (I asked patient and no additional past medical history provided). Past Medical History:   Diagnosis Date    Arthritis     CAD (coronary artery disease) 2007    CKD (chronic kidney disease), stage III     DM type 2 causing renal disease (HCC)     DVT (deep venous thrombosis) (HCC)     Hx of DVT:  Xarelto stopped due to GI bleed. S/P IVC filter.  Gait abnormality     GI bleed     Heart murmur     Hepatitis C     History of blood transfusion     Hypercholesterolemia     Hypertension 11/7/2014    Neuropathy     Non compliance w medication regimen     Peripheral vascular disease ([facility] [address] 11/7/2014    A. S/P Right SFA POBA and tibial atherectomy (2/4/13).  PUD (peptic ulcer disease) 2007    Unspecified sleep apnea     never used cpap     Past Surgical History:   Procedure Laterality Date    ABDOMEN SURGERY PROC UNLISTED  2007    has approx 7-8\" midline incision on abdomen--\"had to open him up and clean him out after feeding tube clogged\"    HX GI  2007    feeding tube for approx 2 mo    VASCULAR SURGERY PROCEDURE UNLIST Right 1/22/2015    popliteal-tibial bypass      SOCIAL HISTORY: Social history from the initial psychiatric evaluation has been reviewed (reviewed/updated 2/13/2018) with no additional updates (I asked patient and no additional social history provided). Social History     Social History    Marital status:      Spouse name: N/A    Number of children: N/A    Years of education: N/A     Occupational History    Not on file.      Social History Main Topics    Smoking status: Current Every Day Smoker     Packs/day: 0.50    Smokeless tobacco: Not on file    Alcohol use No    Drug use: No      Comment: >20 years ago    Sexual activity: Not on file     Other Topics Concern    Not on file     Social History Narrative    78 year ols male admitted for depression and homelessness. Pt will be evicated from apartment due to non payment of bills. [facility] of 30 [facility]. \" Pt is a brittle diabetic, with treatment non compliance. He has bilarela lower extremity amputations. Patient has a long hx of substance abuse. FAMILY HISTORY: Family history from the initial psychiatric evaluation has been reviewed (reviewed/updated 2/13/2018) with no additional updates (I asked patient and no additional family history provided). Family History   Problem Relation Age of Onset    Hypertension Father        REVIEW OF SYSTEMS: (reviewed/updated 2/13/2018)  Appetite:decreased   Sleep: fitful   All other Review of Systems: Psychological ROS: positive for - behavioral disorder  Respiratory ROS: no cough, shortness of breath, or wheezing  Cardiovascular ROS: no chest pain or dyspnea on exertion         [facility] ([facility]):    [facility] FINDINGS ARE WITHIN NORMAL LIMITS (WNL) UNLESS OTHERWISE STATED BELOW. ( ALL OF THE BELOW CATEGORIES OF THE [facility] HAVE BEEN REVIEWED (reviewed 2/13/2018) AND UPDATED AS DEEMED APPROPRIATE )  General Presentation age appropriate, evasive and guarded   Orientation oriented to time, place and person   Vital Signs  See below (reviewed 2/13/2018); Vital Signs (BP, Pulse, & Temp) are within normal limits if not listed below.    Gait and Station Stable/steady, no ataxia   Musculoskeletal System No extrapyramidal symptoms (EPS); no abnormal muscular movements or Tardive Dyskinesia (TD); muscle strength and tone are within normal limits   Language No aphasia or dysarthria   Speech:  hypoverbal   Thought Processes concrete; normal rate of thoughts; poor abstract reasoning/computation   Thought Associations goal directed   Thought Content free of delusions and free of hallucinations   Suicidal Ideations none Homicidal Ideations none   Mood:  irritable   Affect:  mood-congruent   Memory recent  fair   Memory remote:  fair   Concentration/Attention:  distractable   Fund of Knowledge significantly below average   Insight:  poor   Reliability poor   Judgment:  poor          VITALS:     Patient Vitals for the past 24 hrs:   Temp Pulse Resp BP SpO2   02/13/18 0900 97.4 °F (36.3 °C) 70 16 156/78 100 %   02/12/18 2000 98.2 °F (36.8 °C) 68 18 129/61 99 %   02/12/18 1533 98.1 °F (36.7 °C) 64 18 132/52 100 %     Wt Readings from Last 3 Encounters:   01/24/18 82.7 kg (182 lb 5.1 oz)   01/23/18 83.6 kg (184 lb 4.9 oz)   07/13/17 88.9 kg (196 lb)     Temp Readings from Last 3 Encounters:   02/13/18 97.4 °F (36.3 °C)   01/24/18 97.2 °F (36.2 °C)   07/20/17 97.9 °F (36.6 °C)     BP Readings from Last 3 Encounters:   02/13/18 156/78   01/24/18 172/68   07/20/17 175/76     Pulse Readings from Last 3 Encounters:   02/13/18 70   01/24/18 72   07/20/17 (!) 52            DATA     LABORATORY DATA:(reviewed/updated 2/13/2018)  Recent Results (from the past 24 hour(s))   GLUCOSE, POC    Collection Time: 02/12/18  4:35 PM   Result Value Ref Range    Glucose (POC) 105 (H) 65 - 100 mg/dL    Performed by Rehana Bridge    GLUCOSE, POC    Collection Time: 02/12/18  7:56 PM   Result Value Ref Range    Glucose (POC) 140 (H) 65 - 100 mg/dL    Performed by Rehana Bridge    GLUCOSE, POC    Collection Time: 02/13/18  7:47 AM   Result Value Ref Range    Glucose (POC) 79 65 - 100 mg/dL    Performed by Arin Hal    GLUCOSE, POC    Collection Time: 02/13/18  7:56 AM   Result Value Ref Range    Glucose (POC) 84 65 - 100 mg/dL    Performed by Arin Hal    GLUCOSE, POC    Collection Time: 02/13/18 11:18 AM   Result Value Ref Range    Glucose (POC) 104 (H) 65 - 100 mg/dL    Performed by Arin Hong      No results found for: VALF2, VALAC, VALP, VALPR, DS6, CRBAM, CRBAMP, CARB2, XCRBAM  No results found for: LITHM   RADIOLOGY REPORTS:(reviewed/updated 2/13/2018)  Xr Chest Port    Result Date: 1/20/2018  EXAM: Portable CXR.  1800 hours. COMPARISON: 1/19/2015. INDICATION: cp, fatigue There is new interstitial pulmonary edema, cardiomegaly and small left pleural effusion. There is no focal infiltrate, pneumothorax or midline shift. IMPRESSION: CHF pattern.          MEDICATIONS     ALL MEDICATIONS:   Current Facility-Administered Medications   Medication Dose Route Frequency    LORazepam (ATIVAN) tablet 1 mg  1 mg Oral TID    mirtazapine (REMERON) tablet 30 mg  30 mg Oral QHS    buPROPion SR (WELLBUTRIN SR) tablet 150 mg  150 mg Oral DAILY    alum-mag hydroxide-simeth (MYLANTA) oral suspension 30 mL  30 mL Oral Q4H PRN    doxazosin (CARDURA) tablet 4 mg  4 mg Oral QHS    polyethylene glycol (MIRALAX) packet 17 g  17 g Oral DAILY    OLANZapine (ZyPREXA) tablet 2.5 mg  2.5 mg Oral Q6H PRN    ziprasidone (GEODON) 10 mg in sterile water (preservative free) 0.5 mL injection  10 mg IntraMUSCular BID PRN    benztropine (COGENTIN) tablet 1 mg  1 mg Oral BID PRN    benztropine (COGENTIN) injection 1 mg  1 mg IntraMUSCular BID PRN    zolpidem (AMBIEN) tablet 5 mg  5 mg Oral QHS PRN    acetaminophen (TYLENOL) tablet 650 mg  650 mg Oral Q4H PRN    magnesium hydroxide (MILK OF MAGNESIA) 400 mg/5 mL oral suspension 30 mL  30 mL Oral DAILY PRN    nicotine (NICODERM CQ) 21 mg/24 hr patch 1 Patch  1 Patch TransDERmal DAILY PRN    influenza vaccine 2017-18 (3 yrs+)(PF) (FLUZONE QUAD/FLUARIX QUAD) injection 0.5 mL  0.5 mL IntraMUSCular PRIOR TO DISCHARGE    amLODIPine (NORVASC) tablet 10 mg  10 mg Oral DAILY    aspirin delayed-release tablet 81 mg  81 mg Oral DAILY    atorvastatin (LIPITOR) tablet 40 mg  40 mg Oral DAILY    carvedilol (COREG) tablet 3.125 mg  3.125 mg Oral BID WITH MEALS    ferrous sulfate tablet 325 mg  325 mg Oral BID WITH MEALS    hydrALAZINE (APRESOLINE) tablet 100 mg  100 mg Oral TID    glucose chewable tablet 16 g  4 Tab Oral PRN    dextrose (D50W) injection syrg 12.5-25 g  12.5-25 g IntraVENous PRN    glucagon (GLUCAGEN) injection 1 mg  1 mg IntraMUSCular PRN    insulin lispro (HUMALOG) injection   SubCUTAneous AC&HS    nicotine (NICODERM CQ) 21 mg/24 hr patch 1 Patch  1 Patch TransDERmal Q24H      SCHEDULED MEDICATIONS:   Current Facility-Administered Medications   Medication Dose Route Frequency    LORazepam (ATIVAN) tablet 1 mg  1 mg Oral TID    mirtazapine (REMERON) tablet 30 mg  30 mg Oral QHS    buPROPion SR (WELLBUTRIN SR) tablet 150 mg  150 mg Oral DAILY    doxazosin (CARDURA) tablet 4 mg  4 mg Oral QHS    polyethylene glycol (MIRALAX) packet 17 g  17 g Oral DAILY    influenza vaccine 2017-18 (3 yrs+)(PF) (FLUZONE QUAD/FLUARIX QUAD) injection 0.5 mL  0.5 mL IntraMUSCular PRIOR TO DISCHARGE    amLODIPine (NORVASC) tablet 10 mg  10 mg Oral DAILY    aspirin delayed-release tablet 81 mg  81 mg Oral DAILY    atorvastatin (LIPITOR) tablet 40 mg  40 mg Oral DAILY    carvedilol (COREG) tablet 3.125 mg  3.125 mg Oral BID WITH MEALS    ferrous sulfate tablet 325 mg  325 mg Oral BID WITH MEALS    hydrALAZINE (APRESOLINE) tablet 100 mg  100 mg Oral TID    insulin lispro (HUMALOG) injection   SubCUTAneous AC&HS    nicotine (NICODERM CQ) 21 mg/24 hr patch 1 Patch  1 Patch TransDERmal Q24H          ASSESSMENT & PLAN     DIAGNOSES REQUIRING ACTIVE TREATMENT AND MONITORING: (reviewed/updated 2/13/2018)  Patient Active Hospital Problem List:   Depression (1/24/2018)    Assessment: sadness, helplessness, hopelessness, in reaction to recent bilateral lower leg amputationa and inability to pay water bill at home. Plan: consider forced medication/treatment, address social issues, daughter may be obtaining guardianship  1/27/18 need order to treat    1/28/18 continue his medications    02/10/18: Continue current treatment, Ativan dose was adjusted. 02/11/18: Continue current treatment.         In summary, Roger Mckeon Scar Edward, is a 78 y.o.  male who presents with a severe exacerbation of the principal diagnosis of Depression  Patient's condition is worsening/not improving/not stable Patient requires continued inpatient hospitalization for further stabilization, safety monitoring and medication management. I will continue to coordinate the provision of individual, milieu, occupational, group, and substance abuse therapies to address target symptoms/diagnoses as deemed appropriate for the individual patient. A coordinated, multidisplinary treatment team round was conducted with the patient (this team consists of the nurse, psychiatric unit pharmcist,  and writer). Complete current electronic health record for patient has been reviewed today including consultant notes, ancillary staff notes, nurses and psychiatric tech notes. Suicide risk assessment completed and patient deemed to be of low risk for suicide at this time. The following regarding medications was addressed during rounds with patient:   the risks and benefits of the proposed medication. The patient was given the opportunity to ask questions. Informed consent given to the use of the above medications. Will continue to adjust psychiatric and non-psychiatric medications (see above \"medication\" section and orders section for details) as deemed appropriate & based upon diagnoses and response to treatment. I will continue to order blood tests/labs and diagnostic tests as deemed appropriate and review results as they become available (see orders for details and above listed lab/test results). I will order psychiatric records from previous [facility] to further elucidate the nature of patient's psychopathology and review once available.     I will gather additional collateral information from friends, family and o/p treatment team to further elucidate the nature of patient's psychopathology and baselline level of psychiatric functioning. I certify that this patient's inpatient psychiatric hospital services furnished since the previous certification were, and continue to be, required for treatment that could reasonably be expected to improve the patient's condition, or for diagnostic study, and that the patient continues to need, on a daily basis, active treatment furnished directly by or requiring the supervision of inpatient psychiatric facility personnel. In addition, the hospital records show that services furnished were intensive treatment services, admission or related services, or equivalent services.     EXPECTED DISCHARGE DATE/DAY: TBD     DISPOSITION: Home       Signed By:   Teddy Ricardo MD  2/13/2018

## 2018-02-13 NOTE — BH NOTES
Behavioral Health Interdisciplinary Rounds     Patient Name: Hallie Torres  Age: 78 y.o. Room/Bed:  740/02  Primary Diagnosis: Depression   Admission Status: Involuntary Commitment     Readmission within 30 days: no  Power of  in place: yes--daughter (Radha Liu--awaiting paperwork)  Patient requires a blocked bed: no          Reason for blocked bed: na    VTE Prophylaxis: Yes--ASA  Flu vaccine given : no --refused  Mobility needs/Fall risk: yes    Nutritional Plan: yes  Consults: PT/OT, wound care, hospitalist following         Labs/Testing due today?: no    Sleep hours:  8.25+      Participation in Care/Groups:  yes  Medication Compliant?: Selective  PRNS (last 24 hours): None    Restraints (last 24 hours):  no  Substance Abuse:  no  CIWA (range last 24 hours): na COWS (range last 24 hours): na  Alcohol screening (AUDIT) completed -  AUDIT Score: 0  If applicable, date SBIRT discussed in treatment team AND documented: na  Tobacco - patient is a smoker: yes   Date tobacco education completed by RN: 1/24/18  24 hour chart check complete: yes     Patient goal(s) for today:   Treatment team focus/goals: Check on referral to [address] [place] [address] note     LOS:  20  Expected LOS: TBD    Financial concerns/prescription coverage:  Medicare; Medicaid  Date of last family contact:       Family requesting physician contact today:    Discharge plan: Placement  Guns in the home: no        Outpatient provider(s): To be linked    Participating treatment team members:  DARRICK Matute; Dr. Jorge King MD; Monica Verdin RN; Pam Fenton, PharmD

## 2018-02-13 NOTE — PROGRESS NOTES
Problem: Falls - Risk of  Goal: *Absence of Falls  Document Bello Fall Risk and appropriate interventions in the flowsheet. Outcome: Progressing Towards Goal  Fall Risk Interventions:  Mobility Interventions: Bed/chair exit alarm, Communicate number of staff needed for ambulation/transfer, Mechanical lift    Mentation Interventions: Adequate sleep, hydration, pain control, Bed/chair exit alarm, Evaluate medications/consider consulting pharmacy    Medication Interventions: Bed/chair exit alarm, Evaluate medications/consider consulting pharmacy, Patient to call before getting OOB    Elimination Interventions: Bed/chair exit alarm, Call light in reach, Patient to call for help with toileting needs    History of Falls Interventions: Bed/chair exit alarm, Evaluate medications/consider consulting pharmacy        Problem: Depressed Mood (Adult/Pediatric)  Goal: *STG: Demonstrates reduction in symptoms and increase in insight into coping skills/future focused  Outcome: Progressing Towards Goal  Patient with brighter affect this evening. He is pleasant and smiling - tells staff \"we got a house\".

## 2018-02-13 NOTE — PROGRESS NOTES
Problem: Falls - Risk of  Goal: *Absence of Falls  Document Bello Fall Risk and appropriate interventions in the flowsheet. Outcome: Progressing Towards Goal  Fall Risk Interventions:  Mobility Interventions: Bed/chair exit alarm, Communicate number of staff needed for ambulation/transfer, Mechanical lift    Mentation Interventions: Adequate sleep, hydration, pain control, Bed/chair exit alarm, Door open when patient unattended    Medication Interventions: Bed/chair exit alarm, Patient to call before getting OOB    Elimination Interventions: Bed/chair exit alarm, Call light in reach    History of Falls Interventions: Bed/chair exit alarm, Door open when patient unattended    Free of falls. Bed alarm on the bed. Falls tool completed and accurate. Transferred to chelsea chair with walt lift.

## 2018-02-13 NOTE — BH NOTES
1540:  Patient received as he is sitting in a Doris chair at the Nurses' station. He greets oncoming staff cordially with a smile and is assisted with a phone call. He voices no complaints or concerns at this time. Will maintain q 15 minute safety checks.

## 2018-02-13 NOTE — PROGRESS NOTES
Problem: Depressed Mood (Adult/Pediatric)  Goal: *STG: Participates in treatment plan  Outcome: Progressing Towards Goal  Received this morning resting in bed. Is alert and oriented. Thoughts appear clear and organized. Voiced,\"They had found a house . \" referring to his family. Keeps talking about possible discharge. Has been pleasant and cooperative with staff. Less demanding and more patient with staff. Able to verbalize thoughts and feelings in an appropriate manner. Assisted with am care,remains incontinent at times. Transferred to [place]lsea chair with walt lift and remains sitting out in the dining room at present. Goal: *STG: Attends activities and groups  Outcome: Progressing Towards Goal  Has been out on the unit interacting with staff and other patients in an appropriate manner. Goal: *STG: Complies with medication therapy  Outcome: Progressing Towards Goal  Has been medication and meal compliant.

## 2018-02-13 NOTE — BH NOTES
GROUP THERAPY PROGRESS NOTE    Jorge Pastor passively participated in a morning Process Group on the Geriatric Unit, with a focus identifying feelings, planning for the day, and singing. Group time: 45 minutes. Personal goal for participation: To increase the capacity to shift ones mood, prepare for the day, and share in group singing. Goal orientation: The patient will be able to prepare for the day through group singing. Group therapy participation: When prompted, this patient minimally participated in the group. Therapeutic interventions reviewed and discussed: The group members were introduce themselves by first names and participate in group singing as a way to increase their oxygen and blood flow and begin their day on a positive note. They were also asked to join in singing several songs. Impression of participation: The patient did not speak, even when prompted. He sat quietly through the group. He was alert but not engaged in the session. He expressed no SI/HI and displayed no overt psychosis. He mostly looked straight ahead. His affect was restricted and his mood matched his affect.

## 2018-02-14 LAB
GLUCOSE BLD STRIP.AUTO-MCNC: 113 MG/DL (ref 65–100)
GLUCOSE BLD STRIP.AUTO-MCNC: 122 MG/DL (ref 65–100)
GLUCOSE BLD STRIP.AUTO-MCNC: 159 MG/DL (ref 65–100)
GLUCOSE BLD STRIP.AUTO-MCNC: 75 MG/DL (ref 65–100)
GLUCOSE BLD STRIP.AUTO-MCNC: 83 MG/DL (ref 65–100)
GLUCOSE BLD STRIP.AUTO-MCNC: 88 MG/DL (ref 65–100)
SERVICE CMNT-IMP: ABNORMAL
SERVICE CMNT-IMP: NORMAL

## 2018-02-14 PROCEDURE — 74011250637 HC RX REV CODE- 250/637: Performed by: PSYCHIATRY & NEUROLOGY

## 2018-02-14 PROCEDURE — 74011250637 HC RX REV CODE- 250/637

## 2018-02-14 PROCEDURE — 82962 GLUCOSE BLOOD TEST: CPT

## 2018-02-14 PROCEDURE — 74011250637 HC RX REV CODE- 250/637: Performed by: FAMILY MEDICINE

## 2018-02-14 PROCEDURE — 65220000003 HC RM SEMIPRIVATE PSYCH

## 2018-02-14 PROCEDURE — 74011250637 HC RX REV CODE- 250/637: Performed by: HOSPITALIST

## 2018-02-14 RX ADMIN — ATORVASTATIN CALCIUM 40 MG: 40 TABLET, FILM COATED ORAL at 10:57

## 2018-02-14 RX ADMIN — HYDRALAZINE HYDROCHLORIDE 100 MG: 50 TABLET, FILM COATED ORAL at 16:24

## 2018-02-14 RX ADMIN — BUPROPION HYDROCHLORIDE 150 MG: 150 TABLET, EXTENDED RELEASE ORAL at 10:57

## 2018-02-14 RX ADMIN — AMLODIPINE BESYLATE 10 MG: 5 TABLET ORAL at 10:57

## 2018-02-14 RX ADMIN — HYDRALAZINE HYDROCHLORIDE 100 MG: 50 TABLET, FILM COATED ORAL at 10:57

## 2018-02-14 RX ADMIN — FERROUS SULFATE TAB 325 MG (65 MG ELEMENTAL FE) 325 MG: 325 (65 FE) TAB at 10:57

## 2018-02-14 RX ADMIN — LORAZEPAM 1 MG: 1 TABLET ORAL at 13:37

## 2018-02-14 RX ADMIN — ASPIRIN 81 MG: 81 TABLET, COATED ORAL at 10:58

## 2018-02-14 RX ADMIN — CARVEDILOL 3.12 MG: 3.12 TABLET, FILM COATED ORAL at 10:57

## 2018-02-14 RX ADMIN — ZOLPIDEM TARTRATE 5 MG: 5 TABLET ORAL at 00:30

## 2018-02-14 RX ADMIN — LORAZEPAM 1 MG: 1 TABLET ORAL at 10:57

## 2018-02-14 RX ADMIN — FERROUS SULFATE TAB 325 MG (65 MG ELEMENTAL FE) 325 MG: 325 (65 FE) TAB at 16:24

## 2018-02-14 NOTE — BH NOTES
Results for Ilene Burgos (MRN [id number]) as of 2/14/2018 09:20   Ref. Range 2/14/2018 07:17 2/14/2018 07:19 2/14/2018 07:35   GLUCOSE,FAST - POC Latest Ref Range: 65 - 100 mg/dL 75 83 88       HYPOGLYCEMIC EPISODE DOCUMENTATION    PER MIKHAIL COSTA GIVING REPORT TO Lon Hogan RN     Patient with hypoglycemic episode(s) at [phone] (time) on 02/14/18 (date). BG value(s) pre-treatment 76    Was patient symptomatic?  [] yes, [x] no  Patient was treated with the following rescue medications/treatments: [] D50                [] Glucose tablets                [] Glucagon                [x] 4oz juice                [] 6oz reg soda                [] 8oz low fat milk  BG value post-treatment: 83  Once BG treated and value greater than 80mg/dl, pt was provided with the following:  [] snack  [x] meal  Name of MD notified:Dr. Gilbert Mathew   The following orders were received: none

## 2018-02-14 NOTE — BH NOTES
PRN Medication Documentation  Specific patient behavior that led to need for PRN medication: not sleeping , pt request for sleep aid   Staff interventions attempted prior to PRN being given: repositioned   PRN medication given:  Ambien 5 mg po   Patient response/effectiveness of PRN medication: pending

## 2018-02-14 NOTE — PROGRESS NOTES
Problem: Falls - Risk of  Goal: *Absence of Falls  Document Bello Fall Risk and appropriate interventions in the flowsheet.    Outcome: Progressing Towards Goal  Fall Risk Interventions:  Lying quietly in bed with eyes closed , respirations even and unlabored , NAD noted   Q15 min safety rounds continued , side rails up x3, Tap bell within reach at bedside   Mobility Interventions: Bed/chair exit alarm    Mentation Interventions: Adequate sleep, hydration, pain control    Medication Interventions: Bed/chair exit alarm    Elimination Interventions: Bed/chair exit alarm    History of Falls Interventions: Bed/chair exit alarm, Evaluate medications/consider consulting pharmacy, Room close to nurse's station

## 2018-02-14 NOTE — BH NOTES
1600:  Patient received as he is sitting quietly in the Day Room. He greets oncoming staff and is assisted with a phone call to his daughter. He is pleasant and cooperative with no voices complaints or concerns at this time. Will maintain q 15 minute safety checks. 1915:  Patient returned to bed via the [facility] lift at this time. He was pleasant and cooperative with changing his brief and changing him into a gown. 1930:  Patient's daughter here to visit. 2100:  Patient  refused his scheduled medication at this time.

## 2018-02-14 NOTE — BH NOTES
PSYCHIATRIC PROGRESS NOTE         Patient Name  Kev Jenkins   Date of Birth 1940   [facility] [id number]   Medical Record Number  [id number]      Age  78 y.o. PCP Johny Warner, MD   Admit date:  1/24/2018    Room Number  [phone]  @ [facility]   Date of Service  2/14/2018          PSYCHOTHERAPY SESSION NOTE:  Length of psychotherapy session: 15 minutes    Main condition/diagnosis/issues treated during session today, 2/14/2018 : med , meal and group non compliance    I employed Cognitive Behavioral therapy techniques, Reality-Oriented psychotherapy, as well as supportive psychotherapy in regards to various ongoing psychosocial stressors, including the following: pre-admission and current problems; housing issues; occupational issues; medical issues; and stress of hospitalization. Interpersonal relationship issues and psychodynamic conflicts explored. Attempts made to alleviate maladaptive patterns. We, also, worked on issues of denial & effects of substance dependency/use     Overall, patient is not progressing    Treatment Plan Update (reviewed an updated 2/14/2018) : I will modify psychotherapy tx plan by implementing more stress management strategies, building upon cognitive behavioral techniques, increasing coping skills, as well as shoring up psychological defenses). An extended energy and skill set was needed to engage pt in psychotherapy due to some of the following: resistiveness, complexity, negativity, confrontational nature, hostile behaviors, and/or severe abnormalities in thought processes/psychosis resulting in the loss of expressive/receptive language communication skills. E & M PROGRESS NOTE:         HISTORY       CC:  \"depression and non compliance with treatment \"  HISTORY OF PRESENT ILLNESS/INTERVAL HISTORY:  (reviewed/updated 2/14/2018).   per initial evaluation:     Kev Jenkins presents/reports/evidences the following emotional symptoms today, 2/14/2018:depression. The above symptoms have been present for 1 years. These symptoms are of severe severity. The symptoms are constant  in nature. Additional symptomatology and features include anxiety. 2/2/18- pATIENT HAS IMPROVED SPORADIC MED COMPLIANCE WITH INCREASED ATIVAN DOSE. WAS SOCIAL IN [place] TODAY. SW TO CONTACT DAUGHTER TO LET HER KNOE THAT HE IS NOT BEING AS TREATMENT COMPLIANT AS HE TOLD HER HE WOULD BE.    1/27/18 he is not responding to questions and not engaging and not showing engagement and refuse medications and treatment   1/28/18 he is doing better and he engaged and not feeling sad or depressed and he eats better    1/29/18= Patient is deliberately refusing vital signs, labs and medications. He likes finger foods and eats those. Will meet with daughter on Tuesday to discuss possible guardianship. Will seek forced meds. 1/30/18- Pt. Agreed to comply with meds after extensive family meeting. Planning for SNF admission and possible transfer to home afterwards [address].  1/31/18- Patient is intermittently cooperative with somatic meds. We discussed the risks to his health this can cause. I advised Wellbutrin for depression. This may help motivate treatment compliance. 2/1/18- Continues Rude, belligerent, uncooperative and disrespectful with staff. Is marginally compliant with diabetes management. Waiting for placement. 2/3/18- no complaints. More redirectable, less hostile and more compliant  2/4/18- Mr. Waynetta Dandy very somnolent this morning. No agitation  2/5/18- Sporadic , selective compliance with medications. Denies SI/HI. Needs reminding of treatment goals to improve compliance for transfer to SNF.  2/6/18- mr. Waynetta Dandy was bright and positive this morning. Less demanding and hostile towards staff. Asking to go home. 2/7/18- Apologized to writer for being rude and uncooperative with treatment. Focused on discharge.  Understandd house repairs need to be completed first.  2/8/18- sleeping this morning. 2/9/18- grumpy, entitled and impatient re discharge, accepts inerim SNF placement. 2/10/18: intermittently agitated, verbally abusive and  hostile, making gesture to strike out,focused on discharge, no finish, confuse,non redirectable, received prn ativan and his dose of schedule ativan was adjusted. 02/11/18:Patient was seen today, As per staff patient continues to demand to go home and discharge, Thought process tangential, poor insight, non redirectable,gets usp set when staff redirect him. Accepting medications and meals. Does not want to go to NH.   2/12/18- med compliance improved. Understands his home is not habitable until repairs are made, however insistes on focusing with staff on immediate discharge. More appropriate and cordial lately. 2/13/18- Recently has maintained appropriate social behavior and cordiality with staff. Denies SI ans HI. Is compliant with treatment. 2/14/18- More polite and showing patience about waiting for SNF to accept. SIDE EFFECTS: (reviewed/updated 2/14/2018)  None reported or admitted to. No noted toxicity with use of Depakote/Tegretol/lithium/Clozaril/TCAs   ALLERGIES:(reviewed/updated 2/14/2018)  Allergies   Allergen Reactions    Tetracycline Hives      MEDICATIONS PRIOR TO ADMISSION:(reviewed/updated 2/14/2018)  Prescriptions Prior to Admission   Medication Sig    hydrALAZINE (APRESOLINE) 100 mg tablet Take 1 Tab by mouth three (3) times daily.  amLODIPine (NORVASC) 10 mg tablet Take 1 Tab by mouth daily.  aspirin delayed-release 81 mg tablet Take 1 Tab by mouth daily.  atorvastatin (LIPITOR) 40 mg tablet Take 1 Tab by mouth daily.  carvedilol (COREG) 3.125 mg tablet Take 1 Tab by mouth two (2) times daily (with meals).  ferrous sulfate 325 mg (65 mg iron) tablet Take 1 Tab by mouth two (2) times daily (with meals).  mirtazapine (REMERON) 7.5 mg tablet Take 1 Tab by mouth nightly.     nicotine (NICODERM CQ) 21 mg/24 hr 1 Patch by TransDERmal route every twenty-four (24) hours for 30 days. PAST MEDICAL HISTORY: Past medical history from the initial psychiatric evaluation has been reviewed (reviewed/updated 2/14/2018) with no additional updates (I asked patient and no additional past medical history provided). Past Medical History:   Diagnosis Date    Arthritis     CAD (coronary artery disease) 2007    CKD (chronic kidney disease), stage III     DM type 2 causing renal disease (HCC)     DVT (deep venous thrombosis) (HCC)     Hx of DVT:  Xarelto stopped due to GI bleed. S/P IVC filter.  Gait abnormality     GI bleed     Heart murmur     Hepatitis C     History of blood transfusion     Hypercholesterolemia     Hypertension 11/7/2014    Neuropathy     Non compliance w medication regimen     Peripheral vascular disease ([facility] [address] 11/7/2014    A. S/P Right SFA POBA and tibial atherectomy (2/4/13).  PUD (peptic ulcer disease) 2007    Unspecified sleep apnea     never used cpap     Past Surgical History:   Procedure Laterality Date    ABDOMEN SURGERY PROC UNLISTED  2007    has approx 7-8\" midline incision on abdomen--\"had to open him up and clean him out after feeding tube clogged\"    HX GI  2007    feeding tube for approx 2 mo    VASCULAR SURGERY PROCEDURE UNLIST Right 1/22/2015    popliteal-tibial bypass      SOCIAL HISTORY: Social history from the initial psychiatric evaluation has been reviewed (reviewed/updated 2/14/2018) with no additional updates (I asked patient and no additional social history provided). Social History     Social History    Marital status:      Spouse name: N/A    Number of children: N/A    Years of education: N/A     Occupational History    Not on file.      Social History Main Topics    Smoking status: Current Every Day Smoker     Packs/day: 0.50    Smokeless tobacco: Not on file    Alcohol use No    Drug use: No      Comment: >20 years ago    Sexual activity: Not on file     Other Topics Concern    Not on file     Social History Narrative    78 year ols male admitted for depression and homelessness. Pt will be evicated from apartment due to non payment of bills. Girlfriend of 30 years left him [place]. \" Pt is a brittle diabetic, with treatment non compliance. He has bilarela lower extremity amputations. Patient has a long hx of substance abuse. FAMILY HISTORY: Family history from the initial psychiatric evaluation has been reviewed (reviewed/updated 2/14/2018) with no additional updates (I asked patient and no additional family history provided). Family History   Problem Relation Age of Onset    Hypertension Father        REVIEW OF SYSTEMS: (reviewed/updated 2/14/2018)  Appetite:decreased   Sleep: fitful   All other Review of Systems: Psychological ROS: positive for - behavioral disorder  Respiratory ROS: no cough, shortness of breath, or wheezing  Cardiovascular ROS: no chest pain or dyspnea on exertion         [facility] (MSE):    MSE FINDINGS ARE WITHIN NORMAL LIMITS (WNL) UNLESS OTHERWISE STATED BELOW. ( ALL OF THE BELOW CATEGORIES OF THE MSE HAVE BEEN REVIEWED (reviewed 2/14/2018) AND UPDATED AS DEEMED APPROPRIATE )  General Presentation age appropriate, evasive and guarded   Orientation oriented to time, place and person   Vital Signs  See below (reviewed 2/14/2018); Vital Signs (BP, Pulse, & Temp) are within normal limits if not listed below.    Gait and Station Stable/steady, no ataxia   Musculoskeletal System No extrapyramidal symptoms (EPS); no abnormal muscular movements or Tardive Dyskinesia (TD); muscle strength and tone are within normal limits   Language No aphasia or dysarthria   Speech:  hypoverbal   Thought Processes concrete; normal rate of thoughts; poor abstract reasoning/computation   Thought Associations goal directed   Thought Content free of delusions and free of hallucinations   Suicidal Ideations none   Homicidal Ideations none   Mood:  irritable   Affect:  mood-congruent   Memory recent  fair   Memory remote:  fair   Concentration/Attention:  distractable   Fund of Knowledge significantly below average   Insight:  poor   Reliability poor   Judgment:  poor          VITALS:     Patient Vitals for the past 24 hrs:   Temp Pulse Resp BP SpO2   02/14/18 1056 - 62 - 149/68 -   02/14/18 0800 97.5 °F (36.4 °C) 67 16 166/80 100 %   02/13/18 2141 - 67 - 151/70 -   02/13/18 2039 97.7 °F (36.5 °C) 67 18 139/63 100 %   02/13/18 1620 98 °F (36.7 °C) 64 18 149/62 100 %     Wt Readings from Last 3 Encounters:   01/24/18 82.7 kg (182 lb 5.1 oz)   01/23/18 83.6 kg (184 lb 4.9 oz)   07/13/17 88.9 kg (196 lb)     Temp Readings from Last 3 Encounters:   02/14/18 97.5 °F (36.4 °C)   01/24/18 97.2 °F (36.2 °C)   07/20/17 97.9 °F (36.6 °C)     BP Readings from Last 3 Encounters:   02/14/18 149/68   01/24/18 172/68   07/20/17 175/76     Pulse Readings from Last 3 Encounters:   02/14/18 62   01/24/18 72   07/20/17 (!) 52            DATA     LABORATORY DATA:(reviewed/updated 2/14/2018)  Recent Results (from the past 24 hour(s))   GLUCOSE, POC    Collection Time: 02/13/18  4:21 PM   Result Value Ref Range    Glucose (POC) 141 (H) 65 - 100 mg/dL    Performed by [id number], POC    Collection Time: 02/13/18  7:41 PM   Result Value Ref Range    Glucose (POC) 137 (H) 65 - 100 mg/dL    Performed by [address], POC    Collection Time: 02/14/18  7:17 AM   Result Value Ref Range    Glucose (POC) 75 65 - 100 mg/dL    Performed by [address], POC    Collection Time: 02/14/18  7:19 AM   Result Value Ref Range    Glucose (POC) 83 65 - 100 mg/dL    Performed by [address], POC    Collection Time: 02/14/18  7:35 AM   Result Value Ref Range    Glucose (POC) 88 65 - 100 mg/dL    Performed by [address], POC Collection Time: 02/14/18 11:01 AM   Result Value Ref Range    Glucose (POC) 122 (H) 65 - 100 mg/dL    Performed by Brett Hunter      No results found for: VALF2, VALAC, VALP, VALPR, DS6, CRBAM, CRBAMP, CARB2, XCRBAM  No results found for: LITHM   RADIOLOGY REPORTS:(reviewed/updated 2/14/2018)  Xr Chest Port    Result Date: 1/20/2018  EXAM: Portable CXR.  1800 hours. COMPARISON: 1/19/2015. INDICATION: cp, fatigue There is new interstitial pulmonary edema, cardiomegaly and small left pleural effusion. There is no focal infiltrate, pneumothorax or midline shift. IMPRESSION: CHF pattern.          MEDICATIONS     ALL MEDICATIONS:   Current Facility-Administered Medications   Medication Dose Route Frequency    LORazepam (ATIVAN) tablet 1 mg  1 mg Oral TID    mirtazapine (REMERON) tablet 30 mg  30 mg Oral QHS    buPROPion SR (WELLBUTRIN SR) tablet 150 mg  150 mg Oral DAILY    alum-mag hydroxide-simeth (MYLANTA) oral suspension 30 mL  30 mL Oral Q4H PRN    doxazosin (CARDURA) tablet 4 mg  4 mg Oral QHS    polyethylene glycol (MIRALAX) packet 17 g  17 g Oral DAILY    OLANZapine (ZyPREXA) tablet 2.5 mg  2.5 mg Oral Q6H PRN    ziprasidone (GEODON) 10 mg in sterile water (preservative free) 0.5 mL injection  10 mg IntraMUSCular BID PRN    benztropine (COGENTIN) tablet 1 mg  1 mg Oral BID PRN    benztropine (COGENTIN) injection 1 mg  1 mg IntraMUSCular BID PRN    zolpidem (AMBIEN) tablet 5 mg  5 mg Oral QHS PRN    acetaminophen (TYLENOL) tablet 650 mg  650 mg Oral Q4H PRN    magnesium hydroxide (MILK OF MAGNESIA) 400 mg/5 mL oral suspension 30 mL  30 mL Oral DAILY PRN    nicotine (NICODERM CQ) 21 mg/24 hr patch 1 Patch  1 Patch TransDERmal DAILY PRN    influenza vaccine 2017-18 (3 yrs+)(PF) (FLUZONE QUAD/FLUARIX QUAD) injection 0.5 mL  0.5 mL IntraMUSCular PRIOR TO DISCHARGE    amLODIPine (NORVASC) tablet 10 mg  10 mg Oral DAILY    aspirin delayed-release tablet 81 mg  81 mg Oral DAILY    atorvastatin (LIPITOR) tablet 40 mg  40 mg Oral DAILY    carvedilol (COREG) tablet 3.125 mg  3.125 mg Oral BID WITH MEALS    ferrous sulfate tablet 325 mg  325 mg Oral BID WITH MEALS    hydrALAZINE (APRESOLINE) tablet 100 mg  100 mg Oral TID    glucose chewable tablet 16 g  4 Tab Oral PRN    dextrose (D50W) injection syrg 12.5-25 g  12.5-25 g IntraVENous PRN    glucagon (GLUCAGEN) injection 1 mg  1 mg IntraMUSCular PRN    insulin lispro (HUMALOG) injection   SubCUTAneous AC&HS    nicotine (NICODERM CQ) 21 mg/24 hr patch 1 Patch  1 Patch TransDERmal Q24H      SCHEDULED MEDICATIONS:   Current Facility-Administered Medications   Medication Dose Route Frequency    LORazepam (ATIVAN) tablet 1 mg  1 mg Oral TID    mirtazapine (REMERON) tablet 30 mg  30 mg Oral QHS    buPROPion SR (WELLBUTRIN SR) tablet 150 mg  150 mg Oral DAILY    doxazosin (CARDURA) tablet 4 mg  4 mg Oral QHS    polyethylene glycol (MIRALAX) packet 17 g  17 g Oral DAILY    influenza vaccine 2017-18 (3 yrs+)(PF) (FLUZONE QUAD/FLUARIX QUAD) injection 0.5 mL  0.5 mL IntraMUSCular PRIOR TO DISCHARGE    amLODIPine (NORVASC) tablet 10 mg  10 mg Oral DAILY    aspirin delayed-release tablet 81 mg  81 mg Oral DAILY    atorvastatin (LIPITOR) tablet 40 mg  40 mg Oral DAILY    carvedilol (COREG) tablet 3.125 mg  3.125 mg Oral BID WITH MEALS    ferrous sulfate tablet 325 mg  325 mg Oral BID WITH MEALS    hydrALAZINE (APRESOLINE) tablet 100 mg  100 mg Oral TID    insulin lispro (HUMALOG) injection   SubCUTAneous AC&HS    nicotine (NICODERM CQ) 21 mg/24 hr patch 1 Patch  1 Patch TransDERmal Q24H          ASSESSMENT & PLAN     DIAGNOSES REQUIRING ACTIVE TREATMENT AND MONITORING: (reviewed/updated 2/14/2018)  Patient Active Hospital Problem List:   Depression (1/24/2018)    Assessment: sadness, helplessness, hopelessness, in reaction to recent bilateral lower leg amputationa and inability to pay water bill at home.     Plan: consider forced medication/treatment, address social issues, daughter may be obtaining guardianship  1/27/18 need order to treat    1/28/18 continue his medications    02/10/18: Continue current treatment, Ativan dose was adjusted. 02/11/18: Continue current treatment. In summary, Josselin Royal, is a 78 y.o.  male who presents with a severe exacerbation of the principal diagnosis of Depression  Patient's condition is worsening/not improving/not stable Patient requires continued inpatient hospitalization for further stabilization, safety monitoring and medication management. I will continue to coordinate the provision of individual, milieu, occupational, group, and substance abuse therapies to address target symptoms/diagnoses as deemed appropriate for the individual patient. A coordinated, multidisplinary treatment team round was conducted with the patient (this team consists of the nurse, psychiatric unit pharmcist,  and writer). Complete current electronic health record for patient has been reviewed today including consultant notes, ancillary staff notes, nurses and psychiatric tech notes. Suicide risk assessment completed and patient deemed to be of low risk for suicide at this time. The following regarding medications was addressed during rounds with patient:   the risks and benefits of the proposed medication. The patient was given the opportunity to ask questions. Informed consent given to the use of the above medications. Will continue to adjust psychiatric and non-psychiatric medications (see above \"medication\" section and orders section for details) as deemed appropriate & based upon diagnoses and response to treatment. I will continue to order blood tests/labs and diagnostic tests as deemed appropriate and review results as they become available (see orders for details and above listed lab/test results).     I will order psychiatric records from previous [facility] hospitals to further elucidate the nature of patient's psychopathology and review once available. I will gather additional collateral information from friends, family and o/p treatment team to further elucidate the nature of patient's psychopathology and baselline level of psychiatric functioning. I certify that this patient's inpatient psychiatric hospital services furnished since the previous certification were, and continue to be, required for treatment that could reasonably be expected to improve the patient's condition, or for diagnostic study, and that the patient continues to need, on a daily basis, active treatment furnished directly by or requiring the supervision of inpatient psychiatric facility personnel. In addition, the hospital records show that services furnished were intensive treatment services, admission or related services, or equivalent services.     EXPECTED DISCHARGE DATE/DAY: TBD     DISPOSITION: Home       Signed By:   Luisa Hare MD  2/14/2018

## 2018-02-14 NOTE — BH NOTES
GROUP THERAPY PROGRESS NOTE    Kev Jenkins did not participate in a morning Process Group on the Geriatric Unit with a focus on shifting ones feelings to a positive note and preparing for the day through group singing.

## 2018-02-14 NOTE — PROGRESS NOTES
Problem: Depressed Mood (Adult/Pediatric)  Goal: *STG: Participates in treatment plan  Outcome: Progressing Towards Goal  Patient denies SI. Med and meal compliant. Cooperative. Irritable. Does no  Goal: Interventions  Outcome: Progressing Towards Goal  Medication management. Q 15 min safety rounds. Group therapy.

## 2018-02-14 NOTE — INTERDISCIPLINARY ROUNDS
Behavioral Health Interdisciplinary Rounds     Patient Name: Alida Cloud  Age: 78 y.o. Room/Bed:  740/02  Primary Diagnosis: Depression   Admission Status: Involuntary Commitment     Readmission within 30 days: no  Power of  in place: yes , - daughter Nando Michele - paperwork pending   Patient requires a blocked bed: no         Reason for blocked bed:     VTE Prophylaxis: Yes , ASA   Flu vaccine given : no , refused   Mobility needs/Fall risk: yes    Nutritional Plan: yes  Consults:        Labs/Testing due today?: no    Sleep hours:  5 +      Participation in Care/Groups:  yes  Medication Compliant?: Selective  PRNS (last 24 hours): Sleep Aid    Restraints (last 24 hours):  n  Substance Abuse:  no  CIWA (range last 24 hours):  COWS (range last 24 hours):   Alcohol screening (AUDIT) completed -  AUDIT Score: 0  If applicable, date SBIRT discussed in treatment team AND documented:   Tobacco - patient is a smoker:  yes  Date tobacco education completed by RN:   24 hour chart check complete: yes    Patient goal(s) for today:  Treatment team focus/goals: continue working towards placement   Progress note     LOS:  21  Expected LOS: tbd    Financial concerns/prescription coverage:  medicaid  Date of last family contact:      Family requesting physician contact today:   Discharge plan: placement at [facility] in the home: no     Outpatient provider(s): tbd    Participating treatment team members:  DARRICK Brink; Dr. Celine Hamman, MD; Pavithra Bright, MIKHAIL; Marie Wu, PharmD

## 2018-02-15 LAB
GLUCOSE BLD STRIP.AUTO-MCNC: 111 MG/DL (ref 65–100)
GLUCOSE BLD STRIP.AUTO-MCNC: 119 MG/DL (ref 65–100)
GLUCOSE BLD STRIP.AUTO-MCNC: 151 MG/DL (ref 65–100)
GLUCOSE BLD STRIP.AUTO-MCNC: 81 MG/DL (ref 65–100)
SERVICE CMNT-IMP: ABNORMAL
SERVICE CMNT-IMP: NORMAL

## 2018-02-15 PROCEDURE — 82962 GLUCOSE BLOOD TEST: CPT

## 2018-02-15 PROCEDURE — 74011250637 HC RX REV CODE- 250/637

## 2018-02-15 PROCEDURE — 74011250637 HC RX REV CODE- 250/637: Performed by: HOSPITALIST

## 2018-02-15 PROCEDURE — 74011250637 HC RX REV CODE- 250/637: Performed by: FAMILY MEDICINE

## 2018-02-15 PROCEDURE — 74011250637 HC RX REV CODE- 250/637: Performed by: PSYCHIATRY & NEUROLOGY

## 2018-02-15 PROCEDURE — 65220000003 HC RM SEMIPRIVATE PSYCH

## 2018-02-15 RX ADMIN — BUPROPION HYDROCHLORIDE 150 MG: 150 TABLET, EXTENDED RELEASE ORAL at 09:55

## 2018-02-15 RX ADMIN — DOXAZOSIN 4 MG: 2 TABLET ORAL at 20:53

## 2018-02-15 RX ADMIN — CARVEDILOL 3.12 MG: 3.12 TABLET, FILM COATED ORAL at 16:06

## 2018-02-15 RX ADMIN — ATORVASTATIN CALCIUM 40 MG: 40 TABLET, FILM COATED ORAL at 09:55

## 2018-02-15 RX ADMIN — MIRTAZAPINE 30 MG: 15 TABLET, FILM COATED ORAL at 20:54

## 2018-02-15 RX ADMIN — FERROUS SULFATE TAB 325 MG (65 MG ELEMENTAL FE) 325 MG: 325 (65 FE) TAB at 09:56

## 2018-02-15 RX ADMIN — LORAZEPAM 1 MG: 1 TABLET ORAL at 09:55

## 2018-02-15 RX ADMIN — LORAZEPAM 1 MG: 1 TABLET ORAL at 20:54

## 2018-02-15 RX ADMIN — HYDRALAZINE HYDROCHLORIDE 100 MG: 50 TABLET, FILM COATED ORAL at 09:55

## 2018-02-15 RX ADMIN — LORAZEPAM 1 MG: 1 TABLET ORAL at 13:05

## 2018-02-15 RX ADMIN — CARVEDILOL 3.12 MG: 3.12 TABLET, FILM COATED ORAL at 09:56

## 2018-02-15 RX ADMIN — HYDRALAZINE HYDROCHLORIDE 100 MG: 50 TABLET, FILM COATED ORAL at 16:07

## 2018-02-15 RX ADMIN — AMLODIPINE BESYLATE 10 MG: 5 TABLET ORAL at 09:55

## 2018-02-15 RX ADMIN — HYDRALAZINE HYDROCHLORIDE 100 MG: 50 TABLET, FILM COATED ORAL at 20:54

## 2018-02-15 RX ADMIN — FERROUS SULFATE TAB 325 MG (65 MG ELEMENTAL FE) 325 MG: 325 (65 FE) TAB at 16:06

## 2018-02-15 RX ADMIN — ASPIRIN 81 MG: 81 TABLET, COATED ORAL at 09:55

## 2018-02-15 NOTE — PROGRESS NOTES
Problem: Depressed Mood (Adult/Pediatric)  Goal: *STG: Complies with medication therapy  Outcome: Progressing Towards Goal  1515 Received patient watching TV in [place]  Ate 100% dinner and snacks  Diabetic with AC & HS    BS were 119 and 111 which did not require insulin coverage  On 2 hour turns , Bilateral BKA. Focused on Discharge and called Mallory Cortes . Also reported \"my sister  and I need to call my people\". Took his bedtime meds with much encouragement.

## 2018-02-15 NOTE — BH NOTES
GROUP THERAPY PROGRESS NOTE    Judy Quesada did not participate in a morning Process Group on the Geriatric Unit with a focus on shifting ones feelings to a positive note and preparing for the day through group singing.

## 2018-02-15 NOTE — PROGRESS NOTES
Problem: Falls - Risk of  Goal: *Absence of Falls  Document Bello Fall Risk and appropriate interventions in the flowsheet. Outcome: Progressing Towards Goal  Fall Risk Interventions:  Mobility Interventions: Bed/chair exit alarm    Mentation Interventions: Adequate sleep, hydration, pain control, Door open when patient unattended    Medication Interventions: Bed/chair exit alarm    Elimination Interventions: Bed/chair exit alarm, Patient to call for help with toileting needs    History of Falls Interventions: Bed/chair exit alarm, Evaluate medications/consider consulting pharmacy    1021: declined to get out of bed and use of lift team for transfer today    Problem: Pressure Injury - Risk of  Goal: *Prevention of pressure ulcer  Outcome: Progressing Towards Goal  Refused to turn q2 this am and declined to get out of bed. With assist x 2 changed brief and linens. Pt refused to let us use barrier cream or turn positions at this time    Problem: Depressed Mood (Adult/Pediatric)  Goal: *STG: Participates in treatment plan  Outcome: Progressing Towards Goal  Compliant with meds, up in bed resting. Declined to engaged in groups/activiites. States \"I am in a bad mood, leave me alone\". Staff offer support and reassurance.  Declined to further discuss his plan of care

## 2018-02-15 NOTE — BH NOTES
PSYCHIATRIC PROGRESS NOTE         Patient Name  Elly Chaudhry   Date of Birth 1940   [facility] [id number]   Medical Record Number  [id number]      Age  78 y.o. PCP Johny Warner, MD   Admit date:  1/24/2018    Room Number  [phone]  @ [facility]   Date of Service  2/15/2018          PSYCHOTHERAPY SESSION NOTE:  Length of psychotherapy session: 15 minutes    Main condition/diagnosis/issues treated during session today, 2/15/2018 : med , meal and group non compliance    I employed Cognitive Behavioral therapy techniques, Reality-Oriented psychotherapy, as well as supportive psychotherapy in regards to various ongoing psychosocial stressors, including the following: pre-admission and current problems; housing issues; occupational issues; medical issues; and stress of hospitalization. Interpersonal relationship issues and psychodynamic conflicts explored. Attempts made to alleviate maladaptive patterns. We, also, worked on issues of denial & effects of substance dependency/use     Overall, patient is not progressing    Treatment Plan Update (reviewed an updated 2/15/2018) : I will modify psychotherapy tx plan by implementing more stress management strategies, building upon cognitive behavioral techniques, increasing coping skills, as well as shoring up psychological defenses). An extended energy and skill set was needed to engage pt in psychotherapy due to some of the following: resistiveness, complexity, negativity, confrontational nature, hostile behaviors, and/or severe abnormalities in thought processes/psychosis resulting in the loss of expressive/receptive language communication skills. E & M PROGRESS NOTE:         HISTORY       CC:  \"depression and non compliance with treatment \"  HISTORY OF PRESENT ILLNESS/INTERVAL HISTORY:  (reviewed/updated 2/15/2018).   per initial evaluation:     Elly Chaudhry presents/reports/evidences the following emotional symptoms today, 2/15/2018:depression. The above symptoms have been present for 1 years. These symptoms are of severe severity. The symptoms are constant  in nature. Additional symptomatology and features include anxiety. 2/2/18- pATIENT HAS IMPROVED SPORADIC MED COMPLIANCE WITH INCREASED ATIVAN DOSE. WAS SOCIAL IN [place] TODAY. SW TO CONTACT DAUGHTER TO LET HER KNOE THAT HE IS NOT BEING AS TREATMENT COMPLIANT AS HE TOLD HER HE WOULD BE.    1/27/18 he is not responding to questions and not engaging and not showing engagement and refuse medications and treatment   1/28/18 he is doing better and he engaged and not feeling sad or depressed and he eats better    1/29/18= Patient is deliberately refusing vital signs, labs and medications. He likes finger foods and eats those. Will meet with daughter on Tuesday to discuss possible guardianship. Will seek forced meds. 1/30/18- Pt. Agreed to comply with meds after extensive family meeting. Planning for SNF admission and possible transfer to home afterwards [address].  1/31/18- Patient is intermittently cooperative with somatic meds. We discussed the risks to his health this can cause. I advised Wellbutrin for depression. This may help motivate treatment compliance. 2/1/18- Continues Rude, belligerent, uncooperative and disrespectful with staff. Is marginally compliant with diabetes management. Waiting for placement. 2/3/18- no complaints. More redirectable, less hostile and more compliant  2/4/18- Mr. Harsha Hahn very somnolent this morning. No agitation  2/5/18- Sporadic , selective compliance with medications. Denies SI/HI. Needs reminding of treatment goals to improve compliance for transfer to SNF.  2/6/18- mr. Harsha Hahn was bright and positive this morning. Less demanding and hostile towards staff. Asking to go home. 2/7/18- Apologized to writer for being rude and uncooperative with treatment. Focused on discharge.  Understandd house repairs need to be completed first.  2/8/18- sleeping this morning. 2/9/18- grumpy, entitled and impatient re discharge, accepts inerim SNF placement. 2/10/18: intermittently agitated, verbally abusive and  hostile, making gesture to strike out,focused on discharge, no finish, confuse,non redirectable, received prn ativan and his dose of schedule ativan was adjusted. 02/11/18:Patient was seen today, As per staff patient continues to demand to go home and discharge, Thought process tangential, poor insight, non redirectable,gets usp set when staff redirect him. Accepting medications and meals. Does not want to go to NH.   2/12/18- med compliance improved. Understands his home is not habitable until repairs are made, however insistes on focusing with staff on immediate discharge. More appropriate and cordial lately. 2/13/18- Recently has maintained appropriate social behavior and cordiality with staff. Denies SI ans HI. Is compliant with treatment. 2/14/18- More polite and showing patience about waiting for SNF to accept. 2/15/18- Focused on discharge. No insight that home is not safe to due to disrepair and no running h2o. Irritable and demanding. SIDE EFFECTS: (reviewed/updated 2/15/2018)  None reported or admitted to. No noted toxicity with use of Depakote/Tegretol/lithium/Clozaril/TCAs   ALLERGIES:(reviewed/updated 2/15/2018)  Allergies   Allergen Reactions    Tetracycline Hives      MEDICATIONS PRIOR TO ADMISSION:(reviewed/updated 2/15/2018)  Prescriptions Prior to Admission   Medication Sig    hydrALAZINE (APRESOLINE) 100 mg tablet Take 1 Tab by mouth three (3) times daily.  amLODIPine (NORVASC) 10 mg tablet Take 1 Tab by mouth daily.  aspirin delayed-release 81 mg tablet Take 1 Tab by mouth daily.  atorvastatin (LIPITOR) 40 mg tablet Take 1 Tab by mouth daily.  carvedilol (COREG) 3.125 mg tablet Take 1 Tab by mouth two (2) times daily (with meals).     ferrous sulfate 325 mg (65 mg iron) tablet Take 1 Tab by mouth two (2) times daily (with meals).  mirtazapine (REMERON) 7.5 mg tablet Take 1 Tab by mouth nightly.  nicotine (NICODERM CQ) 21 mg/24 hr 1 Patch by TransDERmal route every twenty-four (24) hours for 30 days. PAST MEDICAL HISTORY: Past medical history from the initial psychiatric evaluation has been reviewed (reviewed/updated 2/15/2018) with no additional updates (I asked patient and no additional past medical history provided). Past Medical History:   Diagnosis Date    Arthritis     CAD (coronary artery disease) 2007    CKD (chronic kidney disease), stage III     DM type 2 causing renal disease (HCC)     DVT (deep venous thrombosis) (HCC)     Hx of DVT:  Xarelto stopped due to GI bleed. S/P IVC filter.  Gait abnormality     GI bleed     Heart murmur     Hepatitis C     History of blood transfusion     Hypercholesterolemia     Hypertension 11/7/2014    Neuropathy     Non compliance w medication regimen     Peripheral vascular disease ([facility] [address] 11/7/2014    A. S/P Right SFA POBA and tibial atherectomy (2/4/13).  PUD (peptic ulcer disease) 2007    Unspecified sleep apnea     never used cpap     Past Surgical History:   Procedure Laterality Date    ABDOMEN SURGERY PROC UNLISTED  2007    has approx 7-8\" midline incision on abdomen--\"had to open him up and clean him out after feeding tube clogged\"    HX GI  2007    feeding tube for approx 2 mo    VASCULAR SURGERY PROCEDURE UNLIST Right 1/22/2015    popliteal-tibial bypass      SOCIAL HISTORY: Social history from the initial psychiatric evaluation has been reviewed (reviewed/updated 2/15/2018) with no additional updates (I asked patient and no additional social history provided). Social History     Social History    Marital status:      Spouse name: N/A    Number of children: N/A    Years of education: N/A     Occupational History    Not on file.      Social History Main Topics    Smoking status: Current Every Day Smoker     Packs/day: 0.50    Smokeless tobacco: Not on file    Alcohol use No    Drug use: No      Comment: >20 years ago    Sexual activity: Not on file     Other Topics Concern    Not on file     Social History Narrative    78 year ols male admitted for depression and homelessness. Pt will be evicated from apartment due to non payment of bills. [facility] of 30 years [facility] [place] [facility]. \" Pt is a brittle diabetic, with treatment non compliance. He has bilarela lower extremity amputations. Patient has a long hx of substance abuse. FAMILY HISTORY: Family history from the initial psychiatric evaluation has been reviewed (reviewed/updated 2/15/2018) with no additional updates (I asked patient and no additional family history provided). Family History   Problem Relation Age of Onset    Hypertension Father        REVIEW OF SYSTEMS: (reviewed/updated 2/15/2018)  Appetite:decreased   Sleep: fitful   All other Review of Systems: Psychological ROS: positive for - behavioral disorder  Respiratory ROS: no cough, shortness of breath, or wheezing  Cardiovascular ROS: no chest pain or dyspnea on exertion         [facility] (MSE):    [facility] FINDINGS ARE WITHIN NORMAL LIMITS (WNL) UNLESS OTHERWISE STATED BELOW. ( ALL OF THE BELOW CATEGORIES OF THE [facility] HAVE BEEN REVIEWED (reviewed 2/15/2018) AND UPDATED AS DEEMED APPROPRIATE )  General Presentation age appropriate, evasive and guarded   Orientation oriented to time, place and person   Vital Signs  See below (reviewed 2/15/2018); Vital Signs (BP, Pulse, & Temp) are within normal limits if not listed below.    Gait and Station Stable/steady, no ataxia   Musculoskeletal System No extrapyramidal symptoms (EPS); no abnormal muscular movements or Tardive Dyskinesia (TD); muscle strength and tone are within normal limits   Language No aphasia or dysarthria   Speech:  hypoverbal   Thought Processes concrete; normal rate of thoughts; poor abstract reasoning/computation   Thought Associations goal directed   Thought Content free of delusions and free of hallucinations   Suicidal Ideations none   Homicidal Ideations none   Mood:  irritable   Affect:  mood-congruent   Memory recent  fair   Memory remote:  fair   Concentration/Attention:  distractable   Fund of Knowledge significantly below average   Insight:  poor   Reliability poor   Judgment:  poor          VITALS:     Patient Vitals for the past 24 hrs:   Temp Pulse Resp BP SpO2   02/15/18 0720 97.8 °F (36.6 °C) 76 16 167/75 100 %   02/14/18 2053 97.8 °F (36.6 °C) 71 16 145/68 -   02/14/18 1600 97.5 °F (36.4 °C) (!) 57 16 122/63 99 %     Wt Readings from Last 3 Encounters:   01/24/18 82.7 kg (182 lb 5.1 oz)   01/23/18 83.6 kg (184 lb 4.9 oz)   07/13/17 88.9 kg (196 lb)     Temp Readings from Last 3 Encounters:   02/15/18 97.8 °F (36.6 °C)   01/24/18 97.2 °F (36.2 °C)   07/20/17 97.9 °F (36.6 °C)     BP Readings from Last 3 Encounters:   02/15/18 167/75   01/24/18 172/68   07/20/17 175/76     Pulse Readings from Last 3 Encounters:   02/15/18 76   01/24/18 72   07/20/17 (!) 52            DATA     LABORATORY DATA:(reviewed/updated 2/15/2018)  Recent Results (from the past 24 hour(s))   GLUCOSE, POC    Collection Time: 02/14/18  4:09 PM   Result Value Ref Range    Glucose (POC) 113 (H) 65 - 100 mg/dL    Performed by Sara Cassette    GLUCOSE, POC    Collection Time: 02/14/18  7:20 PM   Result Value Ref Range    Glucose (POC) 159 (H) 65 - 100 mg/dL    Performed by Sara Cassette    GLUCOSE, POC    Collection Time: 02/15/18  7:39 AM   Result Value Ref Range    Glucose (POC) 81 65 - 100 mg/dL    Performed by Alisha Julian    GLUCOSE, POC    Collection Time: 02/15/18 11:18 AM   Result Value Ref Range    Glucose (POC) 151 (H) 65 - 100 mg/dL    Performed by Alisha Jordan      No results found for: VALF2, VALAC, VALP, VALPR, DS6, CRBAM, CRBAMP, CARB2, XCRBAM  No results found for: [facility]   RADIOLOGY REPORTS:(reviewed/updated 2/15/2018)  Xr Chest Port    Result Date: 1/20/2018  EXAM: Portable CXR.  1800 hours. COMPARISON: 1/19/2015. INDICATION: cp, fatigue There is new interstitial pulmonary edema, cardiomegaly and small left pleural effusion. There is no focal infiltrate, pneumothorax or midline shift. IMPRESSION: CHF pattern.          MEDICATIONS     ALL MEDICATIONS:   Current Facility-Administered Medications   Medication Dose Route Frequency    LORazepam (ATIVAN) tablet 1 mg  1 mg Oral TID    mirtazapine (REMERON) tablet 30 mg  30 mg Oral QHS    buPROPion SR (WELLBUTRIN SR) tablet 150 mg  150 mg Oral DAILY    alum-mag hydroxide-simeth (MYLANTA) oral suspension 30 mL  30 mL Oral Q4H PRN    doxazosin (CARDURA) tablet 4 mg  4 mg Oral QHS    polyethylene glycol (MIRALAX) packet 17 g  17 g Oral DAILY    OLANZapine (ZyPREXA) tablet 2.5 mg  2.5 mg Oral Q6H PRN    ziprasidone (GEODON) 10 mg in sterile water (preservative free) 0.5 mL injection  10 mg IntraMUSCular BID PRN    benztropine (COGENTIN) tablet 1 mg  1 mg Oral BID PRN    benztropine (COGENTIN) injection 1 mg  1 mg IntraMUSCular BID PRN    zolpidem (AMBIEN) tablet 5 mg  5 mg Oral QHS PRN    acetaminophen (TYLENOL) tablet 650 mg  650 mg Oral Q4H PRN    magnesium hydroxide (MILK OF MAGNESIA) 400 mg/5 mL oral suspension 30 mL  30 mL Oral DAILY PRN    nicotine (NICODERM CQ) 21 mg/24 hr patch 1 Patch  1 Patch TransDERmal DAILY PRN    influenza vaccine 2017-18 (3 yrs+)(PF) (FLUZONE QUAD/FLUARIX QUAD) injection 0.5 mL  0.5 mL IntraMUSCular PRIOR TO DISCHARGE    amLODIPine (NORVASC) tablet 10 mg  10 mg Oral DAILY    aspirin delayed-release tablet 81 mg  81 mg Oral DAILY    atorvastatin (LIPITOR) tablet 40 mg  40 mg Oral DAILY    carvedilol (COREG) tablet 3.125 mg  3.125 mg Oral BID WITH MEALS    ferrous sulfate tablet 325 mg  325 mg Oral BID WITH MEALS    hydrALAZINE (APRESOLINE) tablet 100 mg  100 mg Oral TID    glucose chewable tablet 16 g  4 Tab Oral PRN    dextrose (D50W) injection syrg 12.5-25 g  12.5-25 g IntraVENous PRN    glucagon (GLUCAGEN) injection 1 mg  1 mg IntraMUSCular PRN    insulin lispro (HUMALOG) injection   SubCUTAneous AC&HS    nicotine (NICODERM CQ) 21 mg/24 hr patch 1 Patch  1 Patch TransDERmal Q24H      SCHEDULED MEDICATIONS:   Current Facility-Administered Medications   Medication Dose Route Frequency    LORazepam (ATIVAN) tablet 1 mg  1 mg Oral TID    mirtazapine (REMERON) tablet 30 mg  30 mg Oral QHS    buPROPion SR (WELLBUTRIN SR) tablet 150 mg  150 mg Oral DAILY    doxazosin (CARDURA) tablet 4 mg  4 mg Oral QHS    polyethylene glycol (MIRALAX) packet 17 g  17 g Oral DAILY    influenza vaccine 2017-18 (3 yrs+)(PF) (FLUZONE QUAD/FLUARIX QUAD) injection 0.5 mL  0.5 mL IntraMUSCular PRIOR TO DISCHARGE    amLODIPine (NORVASC) tablet 10 mg  10 mg Oral DAILY    aspirin delayed-release tablet 81 mg  81 mg Oral DAILY    atorvastatin (LIPITOR) tablet 40 mg  40 mg Oral DAILY    carvedilol (COREG) tablet 3.125 mg  3.125 mg Oral BID WITH MEALS    ferrous sulfate tablet 325 mg  325 mg Oral BID WITH MEALS    hydrALAZINE (APRESOLINE) tablet 100 mg  100 mg Oral TID    insulin lispro (HUMALOG) injection   SubCUTAneous AC&HS    nicotine (NICODERM CQ) 21 mg/24 hr patch 1 Patch  1 Patch TransDERmal Q24H          ASSESSMENT & PLAN     DIAGNOSES REQUIRING ACTIVE TREATMENT AND MONITORING: (reviewed/updated 2/15/2018)  Patient Active Hospital Problem List:   Depression (1/24/2018)    Assessment: sadness, helplessness, hopelessness, in reaction to recent bilateral lower leg amputationa and inability to pay water bill at home. Plan: consider forced medication/treatment, address social issues, daughter may be obtaining guardianship  1/27/18 need order to treat    1/28/18 continue his medications    02/10/18: Continue current treatment, Ativan dose was adjusted.    02/11/18: Continue current treatment. In summary, Germán White, is a 78 y.o.  male who presents with a severe exacerbation of the principal diagnosis of Depression  Patient's condition is worsening/not improving/not stable Patient requires continued inpatient hospitalization for further stabilization, safety monitoring and medication management. I will continue to coordinate the provision of individual, milieu, occupational, group, and substance abuse therapies to address target symptoms/diagnoses as deemed appropriate for the individual patient. A coordinated, multidisplinary treatment team round was conducted with the patient (this team consists of the nurse, psychiatric unit pharmcist,  and writer). Complete current electronic health record for patient has been reviewed today including consultant notes, ancillary staff notes, nurses and psychiatric tech notes. Suicide risk assessment completed and patient deemed to be of low risk for suicide at this time. The following regarding medications was addressed during rounds with patient:   the risks and benefits of the proposed medication. The patient was given the opportunity to ask questions. Informed consent given to the use of the above medications. Will continue to adjust psychiatric and non-psychiatric medications (see above \"medication\" section and orders section for details) as deemed appropriate & based upon diagnoses and response to treatment. I will continue to order blood tests/labs and diagnostic tests as deemed appropriate and review results as they become available (see orders for details and above listed lab/test results). I will order psychiatric records from previous [facility] hospitals to further elucidate the nature of patient's psychopathology and review once available.     I will gather additional collateral information from friends, family and o/p treatment team to further elucidate the nature of patient's psychopathology and [facility] level of psychiatric functioning. I certify that this patient's inpatient psychiatric hospital services furnished since the previous certification were, and continue to be, required for treatment that could reasonably be expected to improve the patient's condition, or for diagnostic study, and that the patient continues to need, on a daily basis, active treatment furnished directly by or requiring the supervision of inpatient psychiatric facility personnel. In addition, the hospital records show that services furnished were intensive treatment services, admission or related services, or equivalent services.     EXPECTED DISCHARGE DATE/DAY: TBD     DISPOSITION: Home       Signed By:   Teddy Ricardo MD  2/15/2018

## 2018-02-15 NOTE — BH NOTES
Behavioral Health Interdisciplinary Rounds     Patient Name: Evelyn Charles  Age: 78 y.o. Room/Bed:  740/02  Primary Diagnosis: Depression   Admission Status: Involuntary Commitment     Readmission within 30 days: no  Power of  in place: yes--daughter (Radha Liu)--paperwork pending  Patient requires a blocked bed: no          Reason for blocked bed: na    VTE Prophylaxis: Yes--ASA  Flu vaccine given : no --refused  Mobility needs/Fall risk: yes    Nutritional Plan: yes  Consults:          Labs/Testing due today?: no    Sleep hours:  6.75+      Participation in Care/Groups:  no  Medication Compliant?: Selective  PRNS (last 24 hours): Sleep Aid    Restraints (last 24 hours):  no  Substance Abuse:  no  CIWA (range last 24 hours): na COWS (range last 24 hours): na  Alcohol screening (AUDIT) completed -  AUDIT Score: 0  If applicable, date SBIRT discussed in treatment team AND documented: na  Tobacco - patient is a smoker: yes   Date tobacco education completed by RN:   24 hour chart check complete: yes     Patient goal(s) for today:   Treatment team focus/goals:   Progress note     LOS:  22  Expected LOS: TBD    Financial concerns/prescription coverage:  Medicare; Medicaid  Date of last family contact:       Family requesting physician contact today:    Discharge plan: placement at [facility] ([facility]) in the home: no        Outpatient provider(s): TBD    Participating treatment team members:  Evelyn Charles, * (assigned SW), intact/delivered spontaneously

## 2018-02-15 NOTE — BH NOTES
Pt saying he needs a bigger bed. He has told 3 staff members this this shift. Pt was sitting at FOB, trying to get oob at 0110. He said he was going to bed. Reminded him he was already in bed. 3 staff assisted pt back into bed. He now says he is comfortable. Offered to try to get him some medicine if he needs it. Will check on his meds. Monitoring continues. SRs up x3.

## 2018-02-16 LAB
GLUCOSE BLD STRIP.AUTO-MCNC: 111 MG/DL (ref 65–100)
GLUCOSE BLD STRIP.AUTO-MCNC: 76 MG/DL (ref 65–100)
GLUCOSE BLD STRIP.AUTO-MCNC: 94 MG/DL (ref 65–100)
GLUCOSE BLD STRIP.AUTO-MCNC: 95 MG/DL (ref 65–100)
GLUCOSE BLD STRIP.AUTO-MCNC: 98 MG/DL (ref 65–100)
SERVICE CMNT-IMP: ABNORMAL
SERVICE CMNT-IMP: NORMAL

## 2018-02-16 PROCEDURE — 74011250637 HC RX REV CODE- 250/637: Performed by: HOSPITALIST

## 2018-02-16 PROCEDURE — 74011250637 HC RX REV CODE- 250/637: Performed by: FAMILY MEDICINE

## 2018-02-16 PROCEDURE — 74011250637 HC RX REV CODE- 250/637

## 2018-02-16 PROCEDURE — 82962 GLUCOSE BLOOD TEST: CPT

## 2018-02-16 PROCEDURE — 65220000003 HC RM SEMIPRIVATE PSYCH

## 2018-02-16 PROCEDURE — 74011250637 HC RX REV CODE- 250/637: Performed by: PSYCHIATRY & NEUROLOGY

## 2018-02-16 RX ADMIN — ATORVASTATIN CALCIUM 40 MG: 40 TABLET, FILM COATED ORAL at 09:06

## 2018-02-16 RX ADMIN — CARVEDILOL 3.12 MG: 3.12 TABLET, FILM COATED ORAL at 09:05

## 2018-02-16 RX ADMIN — CARVEDILOL 3.12 MG: 3.12 TABLET, FILM COATED ORAL at 16:58

## 2018-02-16 RX ADMIN — FERROUS SULFATE TAB 325 MG (65 MG ELEMENTAL FE) 325 MG: 325 (65 FE) TAB at 16:58

## 2018-02-16 RX ADMIN — AMLODIPINE BESYLATE 10 MG: 5 TABLET ORAL at 09:06

## 2018-02-16 RX ADMIN — MIRTAZAPINE 30 MG: 15 TABLET, FILM COATED ORAL at 22:09

## 2018-02-16 RX ADMIN — HYDRALAZINE HYDROCHLORIDE 100 MG: 50 TABLET, FILM COATED ORAL at 22:02

## 2018-02-16 RX ADMIN — ASPIRIN 81 MG: 81 TABLET, COATED ORAL at 09:05

## 2018-02-16 RX ADMIN — LORAZEPAM 1 MG: 1 TABLET ORAL at 09:05

## 2018-02-16 RX ADMIN — DOXAZOSIN 4 MG: 2 TABLET ORAL at 21:02

## 2018-02-16 RX ADMIN — HYDRALAZINE HYDROCHLORIDE 100 MG: 50 TABLET, FILM COATED ORAL at 16:58

## 2018-02-16 RX ADMIN — LORAZEPAM 1 MG: 1 TABLET ORAL at 14:48

## 2018-02-16 RX ADMIN — LORAZEPAM 1 MG: 1 TABLET ORAL at 21:30

## 2018-02-16 RX ADMIN — HYDRALAZINE HYDROCHLORIDE 100 MG: 50 TABLET, FILM COATED ORAL at 09:05

## 2018-02-16 RX ADMIN — BUPROPION HYDROCHLORIDE 150 MG: 150 TABLET, EXTENDED RELEASE ORAL at 09:06

## 2018-02-16 RX ADMIN — FERROUS SULFATE TAB 325 MG (65 MG ELEMENTAL FE) 325 MG: 325 (65 FE) TAB at 09:05

## 2018-02-16 NOTE — INTERDISCIPLINARY ROUNDS
Behavioral Health Interdisciplinary Rounds     Patient Name: Kev Jenkins  Age: 78 y.o. Room/Bed:  740/02  Primary Diagnosis: Depression   Admission Status: Involuntary Commitment     Readmission within 30 days: no  Power of  in place: yes-Daughter  Patient requires a blocked bed: no          Reason for blocked bed: n/a    VTE Prophylaxis: Yes-ASA  Flu vaccine given : no-refused   Mobility needs/Fall risk: yes    Nutritional Plan: yes  Consults: Pastoral care         Labs/Testing due today?: no    Sleep hours: 7:30       Participation in Care/Groups:  no  Medication Compliant?: Yes  PRNS (last 24 hours): None    Restraints (last 24 hours):  no  Substance Abuse:  no  CIWA (range last 24 hours):  COWS (range last 24 hours):   Alcohol screening (AUDIT) completed -  AUDIT Score: 0  If applicable, date SBIRT discussed in treatment team AND documented: n/a  Tobacco - patient is a smoker: yes   Date tobacco education completed by RN: to be completed  24 hour chart check complete: yes     Patient goal(s) for today:   Treatment team focus/goals: Call [facility] re: referral  Progress note: Patient continues to be frustrated by discharge plans. LOS:  23  Expected LOS: ~26-30    Financial concerns/prescription coverage:  VA Medicare; Medicaid  Date of last family contact:       Family requesting physician contact today:    Discharge plan: Placement  Guns in the home:  No      Outpatient provider(s): [facility]    Participating treatment team members:  DARRICK Diaz; Dr. Eros Bustillo MD; Biju Ahuja RN

## 2018-02-16 NOTE — BH NOTES
PSYCHIATRIC PROGRESS NOTE         Patient Name  Shaheed Lieberman   Date of Birth 1940   [facility] [id number]   Medical Record Number  [id number]      Age  78 y.o. PCP Johny Warner, MD   Admit date:  1/24/2018    Room Number  [phone]  @ [facility]   Date of Service  2/16/2018          PSYCHOTHERAPY SESSION NOTE:  Length of psychotherapy session: 15 minutes    Main condition/diagnosis/issues treated during session today, 2/16/2018 : med , meal and group non compliance    I employed Cognitive Behavioral therapy techniques, Reality-Oriented psychotherapy, as well as supportive psychotherapy in regards to various ongoing psychosocial stressors, including the following: pre-admission and current problems; housing issues; occupational issues; medical issues; and stress of hospitalization. Interpersonal relationship issues and psychodynamic conflicts explored. Attempts made to alleviate maladaptive patterns. We, also, worked on issues of denial & effects of substance dependency/use     Overall, patient is not progressing    Treatment Plan Update (reviewed an updated 2/16/2018) : I will modify psychotherapy tx plan by implementing more stress management strategies, building upon cognitive behavioral techniques, increasing coping skills, as well as shoring up psychological defenses). An extended energy and skill set was needed to engage pt in psychotherapy due to some of the following: resistiveness, complexity, negativity, confrontational nature, hostile behaviors, and/or severe abnormalities in thought processes/psychosis resulting in the loss of expressive/receptive language communication skills. E & M PROGRESS NOTE:         HISTORY       CC:  \"depression and non compliance with treatment \"  HISTORY OF PRESENT ILLNESS/INTERVAL HISTORY:  (reviewed/updated 2/16/2018).   per initial evaluation:     Shaheed Lieberman presents/reports/evidences the following emotional symptoms today, 2/16/2018:depression. The above symptoms have been present for 1 years. These symptoms are of severe severity. The symptoms are constant  in nature. Additional symptomatology and features include anxiety. 2/2/18- pATIENT HAS IMPROVED SPORADIC MED COMPLIANCE WITH INCREASED ATIVAN DOSE. WAS SOCIAL IN [place] TODAY. SW TO CONTACT DAUGHTER TO LET HER KNOE THAT HE IS NOT BEING AS TREATMENT COMPLIANT AS HE TOLD HER HE WOULD BE.    1/27/18 he is not responding to questions and not engaging and not showing engagement and refuse medications and treatment   1/28/18 he is doing better and he engaged and not feeling sad or depressed and he eats better    1/29/18= Patient is deliberately refusing vital signs, labs and medications. He likes finger foods and eats those. Will meet with daughter on Tuesday to discuss possible guardianship. Will seek forced meds. 1/30/18- Pt. Agreed to comply with meds after extensive family meeting. Planning for SNF admission and possible transfer to home afterwards [address].  1/31/18- Patient is intermittently cooperative with somatic meds. We discussed the risks to his health this can cause. I advised Wellbutrin for depression. This may help motivate treatment compliance. 2/1/18- Continues Rude, belligerent, uncooperative and disrespectful with staff. Is marginally compliant with diabetes management. Waiting for placement. 2/3/18- no complaints. More redirectable, less hostile and more compliant  2/4/18- Mr. Gonzalez Class very somnolent this morning. No agitation  2/5/18- Sporadic , selective compliance with medications. Denies SI/HI. Needs reminding of treatment goals to improve compliance for transfer to SNF.  2/6/18- mr. Gonzalez Class was bright and positive this morning. Less demanding and hostile towards staff. Asking to go home. 2/7/18- Apologized to writer for being rude and uncooperative with treatment. Focused on discharge.  Understandd house repairs need to be completed first.  2/8/18- sleeping this morning. 2/9/18- grumpy, entitled and impatient re discharge, accepts inerim SNF placement. 2/10/18: intermittently agitated, verbally abusive and  hostile, making gesture to strike out,focused on discharge, no finish, confuse,non redirectable, received prn ativan and his dose of schedule ativan was adjusted. 02/11/18:Patient was seen today, As per staff patient continues to demand to go home and discharge, Thought process tangential, poor insight, non redirectable,gets usp set when staff redirect him. Accepting medications and meals. Does not want to go to NH.   2/12/18- med compliance improved. Understands his home is not habitable until repairs are made, however insistes on focusing with staff on immediate discharge. More appropriate and cordial lately. 2/13/18- Recently has maintained appropriate social behavior and cordiality with staff. Denies SI ans HI. Is compliant with treatment. 2/14/18- More polite and showing patience about waiting for SNF to accept. 2/15/18- Focused on discharge. No insight that home is not safe to due to disrepair and no running h2o. Irritable and demanding. 2/16/18- Continues sarcastic, irritable and has no insight that he cannot go live in a house without running water. Expects preferential treatment when it comes to SNF waiting list. Asking multiple staff members the same discharge question- even though he has not forgotten the answer. (clearly able to recall othetr tghings long and short term)                        SIDE EFFECTS: (reviewed/updated 2/16/2018)  None reported or admitted to.   No noted toxicity with use of Depakote/Tegretol/lithium/Clozaril/TCAs   ALLERGIES:(reviewed/updated 2/16/2018)  Allergies   Allergen Reactions    Tetracycline Hives      MEDICATIONS PRIOR TO ADMISSION:(reviewed/updated 2/16/2018)  Prescriptions Prior to Admission   Medication Sig    hydrALAZINE (APRESOLINE) 100 mg tablet Take 1 Tab by mouth three (3) times daily.    amLODIPine (NORVASC) 10 mg tablet Take 1 Tab by mouth daily.  aspirin delayed-release 81 mg tablet Take 1 Tab by mouth daily.  atorvastatin (LIPITOR) 40 mg tablet Take 1 Tab by mouth daily.  carvedilol (COREG) 3.125 mg tablet Take 1 Tab by mouth two (2) times daily (with meals).  ferrous sulfate 325 mg (65 mg iron) tablet Take 1 Tab by mouth two (2) times daily (with meals).  mirtazapine (REMERON) 7.5 mg tablet Take 1 Tab by mouth nightly.  nicotine (NICODERM CQ) 21 mg/24 hr 1 Patch by TransDERmal route every twenty-four (24) hours for 30 days. PAST MEDICAL HISTORY: Past medical history from the initial psychiatric evaluation has been reviewed (reviewed/updated 2/16/2018) with no additional updates (I asked patient and no additional past medical history provided). Past Medical History:   Diagnosis Date    Arthritis     CAD (coronary artery disease) 2007    CKD (chronic kidney disease), stage III     DM type 2 causing renal disease (HCC)     DVT (deep venous thrombosis) (HCC)     Hx of DVT:  Xarelto stopped due to GI bleed. S/P IVC filter.  Gait abnormality     GI bleed     Heart murmur     Hepatitis C     History of blood transfusion     Hypercholesterolemia     Hypertension 11/7/2014    Neuropathy     Non compliance w medication regimen     Peripheral vascular disease ([facility] [address] 11/7/2014    A. S/P Right SFA POBA and tibial atherectomy (2/4/13).     PUD (peptic ulcer disease) 2007    Unspecified sleep apnea     never used cpap     Past Surgical History:   Procedure Laterality Date    ABDOMEN SURGERY PROC UNLISTED  2007    has approx 7-8\" midline incision on abdomen--\"had to open him up and clean him out after feeding tube clogged\"    HX GI  2007    feeding tube for approx 2 mo    VASCULAR SURGERY PROCEDURE UNLIST Right 1/22/2015    popliteal-tibial bypass      SOCIAL HISTORY: Social history from the initial psychiatric evaluation has been reviewed (reviewed/updated 2/16/2018) with no additional updates (I asked patient and no additional social history provided). Social History     Social History    Marital status:      Spouse name: N/A    Number of children: N/A    Years of education: N/A     Occupational History    Not on file. Social History Main Topics    Smoking status: Current Every Day Smoker     Packs/day: 0.50    Smokeless tobacco: Not on file    Alcohol use No    Drug use: No      Comment: >20 years ago    Sexual activity: Not on file     Other Topics Concern    Not on file     Social History Narrative    78 year ols male admitted for depression and homelessness. Pt will be evicated from apartment due to non payment of bills. [facility]. \" Pt is a brittle diabetic, with treatment non compliance. He has bilarela lower extremity amputations. Patient has a long hx of substance abuse. FAMILY HISTORY: Family history from the initial psychiatric evaluation has been reviewed (reviewed/updated 2/16/2018) with no additional updates (I asked patient and no additional family history provided). Family History   Problem Relation Age of Onset    Hypertension Father        REVIEW OF SYSTEMS: (reviewed/updated 2/16/2018)  Appetite:decreased   Sleep: fitful   All other Review of Systems: Psychological ROS: positive for - behavioral disorder  Respiratory ROS: no cough, shortness of breath, or wheezing  Cardiovascular ROS: no chest pain or dyspnea on exertion         [facility] (MSE):    MSE FINDINGS ARE WITHIN NORMAL LIMITS (WNL) UNLESS OTHERWISE STATED BELOW. ( ALL OF THE BELOW CATEGORIES OF THE MSE HAVE BEEN REVIEWED (reviewed 2/16/2018) AND UPDATED AS DEEMED APPROPRIATE )  General Presentation age appropriate, evasive and guarded   Orientation oriented to time, place and person   Vital Signs  See below (reviewed 2/16/2018);  Vital Signs (BP, Pulse, & Temp) are within normal limits if not listed below.    Gait and Station Stable/steady, no ataxia   Musculoskeletal System No extrapyramidal symptoms (EPS); no abnormal muscular movements or Tardive Dyskinesia (TD); muscle strength and tone are within normal limits   Language No aphasia or dysarthria   Speech:  hypoverbal   Thought Processes concrete; normal rate of thoughts; poor abstract reasoning/computation   Thought Associations goal directed   Thought Content free of delusions and free of hallucinations   Suicidal Ideations none   Homicidal Ideations none   Mood:  irritable   Affect:  mood-congruent   Memory recent  fair   Memory remote:  fair   Concentration/Attention:  distractable   Fund of Knowledge significantly below average   Insight:  poor   Reliability poor   Judgment:  poor          VITALS:     Patient Vitals for the past 24 hrs:   Temp Pulse Resp BP SpO2   02/16/18 0753 97.3 °F (36.3 °C) 69 16 157/76 100 %   02/15/18 1910 98 °F (36.7 °C) 66 18 149/68 100 %   02/15/18 1538 97.9 °F (36.6 °C) 67 18 155/67 99 %     Wt Readings from Last 3 Encounters:   01/24/18 82.7 kg (182 lb 5.1 oz)   01/23/18 83.6 kg (184 lb 4.9 oz)   07/13/17 88.9 kg (196 lb)     Temp Readings from Last 3 Encounters:   02/16/18 97.3 °F (36.3 °C)   01/24/18 97.2 °F (36.2 °C)   07/20/17 97.9 °F (36.6 °C)     BP Readings from Last 3 Encounters:   02/16/18 157/76   01/24/18 172/68   07/20/17 175/76     Pulse Readings from Last 3 Encounters:   02/16/18 69   01/24/18 72   07/20/17 (!) 52            DATA     LABORATORY DATA:(reviewed/updated 2/16/2018)  Recent Results (from the past 24 hour(s))   GLUCOSE, POC    Collection Time: 02/15/18  3:54 PM   Result Value Ref Range    Glucose (POC) 119 (H) 65 - 100 mg/dL    Performed by Chloé Best    GLUCOSE, POC    Collection Time: 02/15/18  7:57 PM   Result Value Ref Range    Glucose (POC) 111 (H) 65 - 100 mg/dL    Performed by LIBRA Andrew    Collection Time: 02/16/18  7:54 AM   Result Value Ref Range    Glucose (POC) 76 65 - 100 mg/dL    Performed by Kristina Wall, POC    Collection Time: 02/16/18  8:09 AM   Result Value Ref Range    Glucose (POC) 95 65 - 100 mg/dL    Performed by Kristina Wall, POC    Collection Time: 02/16/18 11:45 AM   Result Value Ref Range    Glucose (POC) 98 65 - 100 mg/dL    Performed by Douglas Blair      No results found for: VALF2, VALAC, VALP, VALPR, DS6, CRBAM, CRBAMP, CARB2, XCRBAM  No results found for: LITHM   RADIOLOGY REPORTS:(reviewed/updated 2/16/2018)  Xr Chest Port    Result Date: 1/20/2018  EXAM: Portable CXR.  1800 hours. COMPARISON: 1/19/2015. INDICATION: cp, fatigue There is new interstitial pulmonary edema, cardiomegaly and small left pleural effusion. There is no focal infiltrate, pneumothorax or midline shift. IMPRESSION: CHF pattern.          MEDICATIONS     ALL MEDICATIONS:   Current Facility-Administered Medications   Medication Dose Route Frequency    LORazepam (ATIVAN) tablet 1 mg  1 mg Oral TID    mirtazapine (REMERON) tablet 30 mg  30 mg Oral QHS    buPROPion SR (WELLBUTRIN SR) tablet 150 mg  150 mg Oral DAILY    alum-mag hydroxide-simeth (MYLANTA) oral suspension 30 mL  30 mL Oral Q4H PRN    doxazosin (CARDURA) tablet 4 mg  4 mg Oral QHS    polyethylene glycol (MIRALAX) packet 17 g  17 g Oral DAILY    OLANZapine (ZyPREXA) tablet 2.5 mg  2.5 mg Oral Q6H PRN    ziprasidone (GEODON) 10 mg in sterile water (preservative free) 0.5 mL injection  10 mg IntraMUSCular BID PRN    benztropine (COGENTIN) tablet 1 mg  1 mg Oral BID PRN    benztropine (COGENTIN) injection 1 mg  1 mg IntraMUSCular BID PRN    zolpidem (AMBIEN) tablet 5 mg  5 mg Oral QHS PRN    acetaminophen (TYLENOL) tablet 650 mg  650 mg Oral Q4H PRN    magnesium hydroxide (MILK OF MAGNESIA) 400 mg/5 mL oral suspension 30 mL  30 mL Oral DAILY PRN    nicotine (NICODERM CQ) 21 mg/24 hr patch 1 Patch  1 Patch TransDERmal DAILY PRN    influenza vaccine 2017-18 (3 yrs+)(PF) (FLUZONE QUAD/FLUARIX QUAD) injection 0.5 mL  0.5 mL IntraMUSCular PRIOR TO DISCHARGE    amLODIPine (NORVASC) tablet 10 mg  10 mg Oral DAILY    aspirin delayed-release tablet 81 mg  81 mg Oral DAILY    atorvastatin (LIPITOR) tablet 40 mg  40 mg Oral DAILY    carvedilol (COREG) tablet 3.125 mg  3.125 mg Oral BID WITH MEALS    ferrous sulfate tablet 325 mg  325 mg Oral BID WITH MEALS    hydrALAZINE (APRESOLINE) tablet 100 mg  100 mg Oral TID    glucose chewable tablet 16 g  4 Tab Oral PRN    dextrose (D50W) injection syrg 12.5-25 g  12.5-25 g IntraVENous PRN    glucagon (GLUCAGEN) injection 1 mg  1 mg IntraMUSCular PRN    insulin lispro (HUMALOG) injection   SubCUTAneous AC&HS    nicotine (NICODERM CQ) 21 mg/24 hr patch 1 Patch  1 Patch TransDERmal Q24H      SCHEDULED MEDICATIONS:   Current Facility-Administered Medications   Medication Dose Route Frequency    LORazepam (ATIVAN) tablet 1 mg  1 mg Oral TID    mirtazapine (REMERON) tablet 30 mg  30 mg Oral QHS    buPROPion SR (WELLBUTRIN SR) tablet 150 mg  150 mg Oral DAILY    doxazosin (CARDURA) tablet 4 mg  4 mg Oral QHS    polyethylene glycol (MIRALAX) packet 17 g  17 g Oral DAILY    influenza vaccine 2017-18 (3 yrs+)(PF) (FLUZONE QUAD/FLUARIX QUAD) injection 0.5 mL  0.5 mL IntraMUSCular PRIOR TO DISCHARGE    amLODIPine (NORVASC) tablet 10 mg  10 mg Oral DAILY    aspirin delayed-release tablet 81 mg  81 mg Oral DAILY    atorvastatin (LIPITOR) tablet 40 mg  40 mg Oral DAILY    carvedilol (COREG) tablet 3.125 mg  3.125 mg Oral BID WITH MEALS    ferrous sulfate tablet 325 mg  325 mg Oral BID WITH MEALS    hydrALAZINE (APRESOLINE) tablet 100 mg  100 mg Oral TID    insulin lispro (HUMALOG) injection   SubCUTAneous AC&HS    nicotine (NICODERM CQ) 21 mg/24 hr patch 1 Patch  1 Patch TransDERmal Q24H          ASSESSMENT & PLAN     DIAGNOSES REQUIRING ACTIVE TREATMENT AND MONITORING: (reviewed/updated 2/16/2018)  Patient Active Hospital Problem List:   Depression (1/24/2018)    Assessment: sadness, helplessness, hopelessness, in reaction to recent bilateral lower leg amputationa and inability to pay water bill at home. Plan: consider forced medication/treatment, address social issues, daughter may be obtaining guardianship  1/27/18 need order to treat    1/28/18 continue his medications    02/10/18: Continue current treatment, Ativan dose was adjusted. 02/11/18: Continue current treatment. In summary, Josue Vidal, is a 78 y.o.  male who presents with a severe exacerbation of the principal diagnosis of Depression  Patient's condition is worsening/not improving/not stable Patient requires continued inpatient hospitalization for further stabilization, safety monitoring and medication management. I will continue to coordinate the provision of individual, milieu, occupational, group, and substance abuse therapies to address target symptoms/diagnoses as deemed appropriate for the individual patient. A coordinated, multidisplinary treatment team round was conducted with the patient (this team consists of the nurse, psychiatric unit pharmcist,  and writer). Complete current electronic health record for patient has been reviewed today including consultant notes, ancillary staff notes, nurses and psychiatric tech notes. Suicide risk assessment completed and patient deemed to be of low risk for suicide at this time. The following regarding medications was addressed during rounds with patient:   the risks and benefits of the proposed medication. The patient was given the opportunity to ask questions. Informed consent given to the use of the above medications. Will continue to adjust psychiatric and non-psychiatric medications (see above \"medication\" section and orders section for details) as deemed appropriate & based upon diagnoses and response to treatment.      I will continue to order blood tests/labs and diagnostic tests as deemed appropriate and review results as they become available (see orders for details and above listed lab/test results). I will order psychiatric records from previous [facility] hospitals to further elucidate the nature of patient's psychopathology and review once available. I will gather additional collateral information from friends, family and o/p treatment team to further elucidate the nature of patient's psychopathology and baselline level of psychiatric functioning. I certify that this patient's inpatient psychiatric hospital services furnished since the previous certification were, and continue to be, required for treatment that could reasonably be expected to improve the patient's condition, or for diagnostic study, and that the patient continues to need, on a daily basis, active treatment furnished directly by or requiring the supervision of inpatient psychiatric facility personnel. In addition, the hospital records show that services furnished were intensive treatment services, admission or related services, or equivalent services.     EXPECTED DISCHARGE DATE/DAY: TBD     DISPOSITION: Home       Signed By:   Pat Gould MD  2/16/2018

## 2018-02-16 NOTE — DIABETES MGMT
NURSING: HYPOGLYCEMIC RISK ASSESSMENT    Esau Harry has an increased risk for hypoglycemia due to to the following conditions: poor po intake, ckd    Noted glucose events: 76 on 2/16    Please continue to monitor BG levels, document po intake and follow the hypoglycemia protocol for treatment (for glucose less than 80 mg/dL). Please document treatment, rechecked value, and physician notified in progress notes. You can use the smart text \". hypoglyce\" to document each episode. Any questions please call [facility] at [phone] (pager). Thank you.     Maria R Lugo.  Aviva Childs MPH, RN, BSN, [address]

## 2018-02-16 NOTE — BH NOTES
Pt currently resting comfortably in bed. Respirations even and unlabored. NAD. Continue to monitor via 15 minute observation. 24 hour chart review completed. 8805-pt has been turning self since last turn. Pt checked for incontinence and was dry at this time. Continue to monitor and support.

## 2018-02-16 NOTE — PROGRESS NOTES
Problem: Depressed Mood (Adult/Pediatric)  Goal: *STG: Participates in treatment plan  Outcome: Not Progressing Towards Goal  Pt is anxious, irritable, sad and depressed   Verbalizes wants to go home   explained to him about discharge plans. Cooperative. Out In the dining room in [place] chair     Goal: *STG: Complies with medication therapy  Outcome: Progressing Towards Goal  Med/meal complaint     1415: Called [facility] team to help patient back to bed so he can be cleaned and changed. They stated they were busy at the moment and would come to the unit as soon they get some time.

## 2018-02-17 LAB
GLUCOSE BLD STRIP.AUTO-MCNC: 137 MG/DL (ref 65–100)
GLUCOSE BLD STRIP.AUTO-MCNC: 210 MG/DL (ref 65–100)
GLUCOSE BLD STRIP.AUTO-MCNC: 74 MG/DL (ref 65–100)
GLUCOSE BLD STRIP.AUTO-MCNC: 85 MG/DL (ref 65–100)
GLUCOSE BLD STRIP.AUTO-MCNC: 88 MG/DL (ref 65–100)
SERVICE CMNT-IMP: ABNORMAL
SERVICE CMNT-IMP: ABNORMAL
SERVICE CMNT-IMP: NORMAL

## 2018-02-17 PROCEDURE — 74011250637 HC RX REV CODE- 250/637: Performed by: PSYCHIATRY & NEUROLOGY

## 2018-02-17 PROCEDURE — 74011250637 HC RX REV CODE- 250/637

## 2018-02-17 PROCEDURE — 74011250637 HC RX REV CODE- 250/637: Performed by: FAMILY MEDICINE

## 2018-02-17 PROCEDURE — 74011250637 HC RX REV CODE- 250/637: Performed by: HOSPITALIST

## 2018-02-17 PROCEDURE — 82962 GLUCOSE BLOOD TEST: CPT

## 2018-02-17 PROCEDURE — 74011636637 HC RX REV CODE- 636/637: Performed by: HOSPITALIST

## 2018-02-17 PROCEDURE — 65220000003 HC RM SEMIPRIVATE PSYCH

## 2018-02-17 RX ADMIN — HYDRALAZINE HYDROCHLORIDE 100 MG: 50 TABLET, FILM COATED ORAL at 09:08

## 2018-02-17 RX ADMIN — CARVEDILOL 3.12 MG: 3.12 TABLET, FILM COATED ORAL at 17:28

## 2018-02-17 RX ADMIN — FERROUS SULFATE TAB 325 MG (65 MG ELEMENTAL FE) 325 MG: 325 (65 FE) TAB at 09:08

## 2018-02-17 RX ADMIN — BUPROPION HYDROCHLORIDE 150 MG: 150 TABLET, EXTENDED RELEASE ORAL at 09:08

## 2018-02-17 RX ADMIN — LORAZEPAM 1 MG: 1 TABLET ORAL at 09:09

## 2018-02-17 RX ADMIN — ACETAMINOPHEN 650 MG: 325 TABLET, FILM COATED ORAL at 04:53

## 2018-02-17 RX ADMIN — ASPIRIN 81 MG: 81 TABLET, COATED ORAL at 09:08

## 2018-02-17 RX ADMIN — HYDRALAZINE HYDROCHLORIDE 100 MG: 50 TABLET, FILM COATED ORAL at 22:30

## 2018-02-17 RX ADMIN — FERROUS SULFATE TAB 325 MG (65 MG ELEMENTAL FE) 325 MG: 325 (65 FE) TAB at 17:28

## 2018-02-17 RX ADMIN — LORAZEPAM 1 MG: 1 TABLET ORAL at 21:00

## 2018-02-17 RX ADMIN — ATORVASTATIN CALCIUM 40 MG: 40 TABLET, FILM COATED ORAL at 09:09

## 2018-02-17 RX ADMIN — INSULIN LISPRO 210 UNITS: 100 INJECTION, SOLUTION INTRAVENOUS; SUBCUTANEOUS at 21:50

## 2018-02-17 RX ADMIN — HYDRALAZINE HYDROCHLORIDE 100 MG: 50 TABLET, FILM COATED ORAL at 18:00

## 2018-02-17 RX ADMIN — MIRTAZAPINE 30 MG: 15 TABLET, FILM COATED ORAL at 21:42

## 2018-02-17 RX ADMIN — DOXAZOSIN 4 MG: 2 TABLET ORAL at 21:42

## 2018-02-17 RX ADMIN — AMLODIPINE BESYLATE 10 MG: 5 TABLET ORAL at 09:08

## 2018-02-17 RX ADMIN — CARVEDILOL 3.12 MG: 3.12 TABLET, FILM COATED ORAL at 09:08

## 2018-02-17 NOTE — PROGRESS NOTES
HYPOGLYCEMIC EPISODE DOCUMENTATION    Patient with hypoglycemic episode(s) at 0728(time) on 2/17/18(date). BG value(s) pre-treatment 76    Was patient symptomatic? [] yes, [x] no  Patient was treated with the following rescue medications/treatments: [] D50                [] Glucose tablets                [] Glucagon                [] 4oz juice                [x] 6oz reg soda                [] 8oz low fat milk  BG value post-treatment: 85  Once BG treated and value greater than 80mg/dl, pt was provided with the following:  [] snack  [x] meal  Name of MD notified:Charge nurse [address] [place] [address] RN to discuss with Dr. Patricia Klein in treatment team.   The following orders were received: provide meals and snacks, monitor for signs and symptoms of hypoglycemia. Education provided.

## 2018-02-17 NOTE — BH NOTES
1530:  Patient received as he is resting comfortably in bed. He is smiling and reaches for writer's hand stating that he wants to go home, and that if he is still here on Monday he will be leaving. He also reports that he is wet, his brief is changed for comfort at this time. Will maintain q 15 minute safety checks. 2100:  Patient complaining of abdominal cramping wants to go to the ED and/or have 911 called. Patient's vital signs are WNL and pain or anxiety medication is offered, but patient declines stating that he wants to go to the ED. After speaking with his fiancee, patient is overheard placing a call to 911, and he refuses to give up the phone requiring staff to unplug it from the wall. Patient states that, by calling 911, then he will get out of here and is told that's not exactly the right way to do it. Will continue to monitor.

## 2018-02-17 NOTE — PROGRESS NOTES
Problem: Depressed Mood (Adult/Pediatric)  Goal: *STG: Participates in treatment plan  Outcome: Not Progressing Towards Goal  Pt is agitated, anxious and irritable   He repeats wanting to go home. Unhappy with everything offered to him. Writer keeps reminding patient that discharge will be on a weekday and that he spoke to his  about discharge planning on Friday 2/16/18.   Goal: *STG: Complies with medication therapy  Outcome: Progressing Towards Goal  Med/Meal compliant

## 2018-02-17 NOTE — PROGRESS NOTES
HYPOGLYCEMIC EPISODE DOCUMENTATION    Patient with hypoglycemic episode(s) at 0754(time) on 2/16/18(date). BG value(s) pre-treatment 68    Was patient symptomatic?  [] yes, [x] no  Patient was treated with the following rescue medications/treatments: [] D50                [] Glucose tablets                [] Glucagon                [x] 4oz juice                [] 6oz reg soda                [] 8oz low fat milk  BG value post-treatment: 95  Once BG treated and value greater than 80mg/dl, pt was provided with the following:  [] snack  [x] meal  Name of MD notified: Charge nurse [address] [place] [address] RN discuss with Dr. Shima Werner in Treatment team  The following orders were received: continue to monitor BS provide meals and snacks, monitor for signs and symptoms of hypoglycemia, provide pt and staff education

## 2018-02-17 NOTE — PROGRESS NOTES
Problem: Falls - Risk of  Goal: *Absence of Falls  Document Bello Fall Risk and appropriate interventions in the flowsheet. Outcome: Progressing Towards Goal  Fall Risk Interventions:  Mobility Interventions: Bed/chair exit alarm, Communicate number of staff needed for ambulation/transfer, Mechanical lift, PT Consult for mobility concerns    Mentation Interventions: Adequate sleep, hydration, pain control, Bed/chair exit alarm, Door open when patient unattended, Reorient patient, Room close to nurse's station, Toileting rounds    Medication Interventions: Bed/chair exit alarm, Patient to call before getting OOB    Elimination Interventions: Bed/chair exit alarm, Call light in reach, Toileting schedule/hourly rounds, Urinal in reach    History of Falls Interventions: Bed/chair exit alarm, Door open when patient unattended, Room close to nurse's station    Problem: Depressed Mood (Adult/Pediatric)  Goal: *STG: Participates in treatment plan  Outcome: Progressing Towards Goal  Cooperative, irritable. Sad affect. Pt is withdrawn, sits in chair but declines to sit at table with peers. Guarded with staff, makes eye contact, responds to staff occasionally. Accepts medications. Pt requested to be put to bed. Pt declined when lift team arrived and smacked lift team's hand. Pt repositioned in chair in dining room. Legs elevated, pillow behind back. Pt declined further postioning. Pt's brief is wet. Pt declines to let staff assist him to clean, states \"What the hell are you touching my blanket for? \" and \"Hell no! \" when staff asked if he would like assistance. Pt swatted at staff. Will monitor and offer toileting again.

## 2018-02-17 NOTE — PROGRESS NOTES
Problem: Falls - Risk of  Goal: *Absence of Falls  Document Bello Fall Risk and appropriate interventions in the flowsheet. Outcome: Progressing Towards Goal  Fall Risk Interventions:  Mobility Interventions: Mechanical lift, Bed/chair exit alarm    Mentation Interventions: Adequate sleep, hydration, pain control, Bed/chair exit alarm, Door open when patient unattended, Room close to nurse's station    Medication Interventions: Bed/chair exit alarm, Patient to call before getting OOB    Elimination Interventions: Bed/chair exit alarm, Call light in reach, Patient to call for help with toileting needs, Toileting schedule/hourly rounds    History of Falls Interventions: Bed/chair exit alarm, Door open when patient unattended, Room close to nurse's station    0000 Pt appears asleep in bed. Respirations even and unlabored. Bed alarm on, call bell and urinal within reach, door remains open. Will continue to monitor with Q 15 safety checks. 0500 PRN Medication Documentation    Specific patient behavior that led to need for PRN medication: leg pain  Staff interventions attempted prior to PRN being given: repositioned   PRN medication given: Tylenol 650 mg  Patient response/effectiveness of PRN medication: 0550 Pt appears asleep.

## 2018-02-17 NOTE — INTERDISCIPLINARY ROUNDS
Behavioral Health Interdisciplinary Rounds     Patient Name: Katrin Jorge  Age: 78 y.o. Room/Bed:  740/02  Primary Diagnosis: Depression   Admission Status: Involuntary Commitment     Readmission within 30 days: no  Power of  in place: yes - Daughter   Patient requires a blocked bed: no          Reason for blocked bed: n/a    VTE Prophylaxis: Yes - ASA  Flu vaccine given : no - refused   Mobility needs/Fall risk: yes    Nutritional Plan: yes  Consults: pastoral care         Labs/Testing due today?: no    Sleep hours: 6.0      Participation in Care/Groups:  no  Medication Compliant?: Yes  PRNS (last 24 hours): pain   Restraints (last 24 hours):  no  Substance Abuse:  no  CIWA (range last 24 hours):  COWS (range last 24 hours):   Alcohol screening (AUDIT) completed -  AUDIT Score: 0  If applicable, date SBIRT discussed in treatment team AND documented:   Tobacco - patient is a smoker: yes   Date tobacco education completed by RN: to be completed   24 hour chart check complete: yes     Patient goal(s) for today:   Treatment team focus/goals:   Progress note     LOS:  24  Expected LOS:    Financial concerns/prescription coverage:   Date of last family contact:       Family requesting physician contact today:    Discharge plan:   Guns in the home:       Outpatient provider(s):     Participating treatment team members:  Katrin Jorge, * (assigned SW),

## 2018-02-18 LAB
GLUCOSE BLD STRIP.AUTO-MCNC: 109 MG/DL (ref 65–100)
GLUCOSE BLD STRIP.AUTO-MCNC: 173 MG/DL (ref 65–100)
GLUCOSE BLD STRIP.AUTO-MCNC: 188 MG/DL (ref 65–100)
GLUCOSE BLD STRIP.AUTO-MCNC: 74 MG/DL (ref 65–100)
SERVICE CMNT-IMP: ABNORMAL
SERVICE CMNT-IMP: NORMAL

## 2018-02-18 PROCEDURE — 74011250637 HC RX REV CODE- 250/637: Performed by: FAMILY MEDICINE

## 2018-02-18 PROCEDURE — 74011250637 HC RX REV CODE- 250/637: Performed by: HOSPITALIST

## 2018-02-18 PROCEDURE — 65220000003 HC RM SEMIPRIVATE PSYCH

## 2018-02-18 PROCEDURE — 82962 GLUCOSE BLOOD TEST: CPT

## 2018-02-18 PROCEDURE — 74011250637 HC RX REV CODE- 250/637: Performed by: PSYCHIATRY & NEUROLOGY

## 2018-02-18 PROCEDURE — 74011250637 HC RX REV CODE- 250/637

## 2018-02-18 RX ADMIN — FERROUS SULFATE TAB 325 MG (65 MG ELEMENTAL FE) 325 MG: 325 (65 FE) TAB at 17:43

## 2018-02-18 RX ADMIN — MIRTAZAPINE 30 MG: 15 TABLET, FILM COATED ORAL at 21:22

## 2018-02-18 RX ADMIN — BUPROPION HYDROCHLORIDE 150 MG: 150 TABLET, EXTENDED RELEASE ORAL at 09:58

## 2018-02-18 RX ADMIN — LORAZEPAM 1 MG: 1 TABLET ORAL at 13:01

## 2018-02-18 RX ADMIN — CARVEDILOL 3.12 MG: 3.12 TABLET, FILM COATED ORAL at 17:00

## 2018-02-18 RX ADMIN — HYDRALAZINE HYDROCHLORIDE 100 MG: 50 TABLET, FILM COATED ORAL at 22:22

## 2018-02-18 RX ADMIN — HYDRALAZINE HYDROCHLORIDE 100 MG: 50 TABLET, FILM COATED ORAL at 17:43

## 2018-02-18 RX ADMIN — HYDRALAZINE HYDROCHLORIDE 100 MG: 50 TABLET, FILM COATED ORAL at 09:59

## 2018-02-18 RX ADMIN — CARVEDILOL 3.12 MG: 3.12 TABLET, FILM COATED ORAL at 09:58

## 2018-02-18 RX ADMIN — AMLODIPINE BESYLATE 10 MG: 5 TABLET ORAL at 09:58

## 2018-02-18 RX ADMIN — DOXAZOSIN 4 MG: 2 TABLET ORAL at 21:02

## 2018-02-18 RX ADMIN — FERROUS SULFATE TAB 325 MG (65 MG ELEMENTAL FE) 325 MG: 325 (65 FE) TAB at 09:57

## 2018-02-18 RX ADMIN — ATORVASTATIN CALCIUM 40 MG: 40 TABLET, FILM COATED ORAL at 09:57

## 2018-02-18 RX ADMIN — LORAZEPAM 1 MG: 1 TABLET ORAL at 21:22

## 2018-02-18 RX ADMIN — LORAZEPAM 1 MG: 1 TABLET ORAL at 09:57

## 2018-02-18 RX ADMIN — ASPIRIN 81 MG: 81 TABLET, COATED ORAL at 09:57

## 2018-02-18 NOTE — PROGRESS NOTES
Problem: Falls - Risk of  Goal: *Absence of Falls  Document Bello Fall Risk and appropriate interventions in the flowsheet. Outcome: Progressing Towards Goal  Fall Risk Interventions:  Mobility Interventions: Bed/chair exit alarm, Patient to call before getting OOB    Mentation Interventions: Adequate sleep, hydration, pain control, Bed/chair exit alarm, Door open when patient unattended, Room close to nurse's station    Medication Interventions: Bed/chair exit alarm    Elimination Interventions: Bed/chair exit alarm, Call light in reach, Patient to call for help with toileting needs    History of Falls Interventions: Bed/chair exit alarm, Door open when patient unattended, Room close to nurse's station    2300 Pt complaining of soreness in rectum because of BM. Pt had very large and hard BM, pt assisted with gary care - thorough cleaning and lotion applied. Pt assisted to change gown and brief. Resting comfortably and denies pain. 2350 Pt appears asleep in bed. Respirations even and unlabored. Bed alarm on, call bell and urinal within reach, side rails up x3. Will continue to monitor with Q 15 safety checks.

## 2018-02-18 NOTE — BH NOTES
PSYCHIATRIC PROGRESS NOTE         Patient Name  Yosvany Poster   Date of Birth 1940   [facility] [id number]   Medical Record Number  [id number]      Age  78 y.o. PCP Johny Warner, MD   Admit date:  1/24/2018    Room Number  [phone]  @ [facility]   Date of Service  2/17/2018          PSYCHOTHERAPY SESSION NOTE:  Length of psychotherapy session: 15 minutes    Main condition/diagnosis/issues treated during session today, 2/17/2018 : med , meal and group non compliance    I employed Cognitive Behavioral therapy techniques, Reality-Oriented psychotherapy, as well as supportive psychotherapy in regards to various ongoing psychosocial stressors, including the following: pre-admission and current problems; housing issues; occupational issues; medical issues; and stress of hospitalization. Interpersonal relationship issues and psychodynamic conflicts explored. Attempts made to alleviate maladaptive patterns. We, also, worked on issues of denial & effects of substance dependency/use     Overall, patient is not progressing    Treatment Plan Update (reviewed an updated 2/17/2018) : I will modify psychotherapy tx plan by implementing more stress management strategies, building upon cognitive behavioral techniques, increasing coping skills, as well as shoring up psychological defenses). An extended energy and skill set was needed to engage pt in psychotherapy due to some of the following: resistiveness, complexity, negativity, confrontational nature, hostile behaviors, and/or severe abnormalities in thought processes/psychosis resulting in the loss of expressive/receptive language communication skills. E & M PROGRESS NOTE:         HISTORY       CC:  \"depression and non compliance with treatment \"  HISTORY OF PRESENT ILLNESS/INTERVAL HISTORY:  (reviewed/updated 2/17/2018).   per initial evaluation:     Yosvany Poster presents/reports/evidences the following emotional symptoms today, 2/17/2018:depression. The above symptoms have been present for 1 years. These symptoms are of severe severity. The symptoms are constant  in nature. Additional symptomatology and features include anxiety. 2/2/18- pATIENT HAS IMPROVED SPORADIC MED COMPLIANCE WITH INCREASED ATIVAN DOSE. WAS SOCIAL IN [place] TODAY. SW TO CONTACT DAUGHTER TO LET HER KNOE THAT HE IS NOT BEING AS TREATMENT COMPLIANT AS HE TOLD HER HE WOULD BE.    1/27/18 he is not responding to questions and not engaging and not showing engagement and refuse medications and treatment   1/28/18 he is doing better and he engaged and not feeling sad or depressed and he eats better    1/29/18= Patient is deliberately refusing vital signs, labs and medications. He likes finger foods and eats those. Will meet with daughter on Tuesday to discuss possible guardianship. Will seek forced meds. 1/30/18- Pt. Agreed to comply with meds after extensive family meeting. Planning for SNF admission and possible transfer to home afterwards [address].  1/31/18- Patient is intermittently cooperative with somatic meds. We discussed the risks to his health this can cause. I advised Wellbutrin for depression. This may help motivate treatment compliance. 2/1/18- Continues Rude, belligerent, uncooperative and disrespectful with staff. Is marginally compliant with diabetes management. Waiting for placement. 2/3/18- no complaints. More redirectable, less hostile and more compliant  2/4/18- Mr. Penelope Urrutia very somnolent this morning. No agitation  2/5/18- Sporadic , selective compliance with medications. Denies SI/HI. Needs reminding of treatment goals to improve compliance for transfer to SNF.  2/6/18- mr. Penelope Urrutia was bright and positive this morning. Less demanding and hostile towards staff. Asking to go home. 2/7/18- Apologized to writer for being rude and uncooperative with treatment. Focused on discharge.  Understandd house repairs need to be completed first.  2/8/18- sleeping this morning. 2/9/18- grumpy, entitled and impatient re discharge, accepts inerim SNF placement. 2/10/18: intermittently agitated, verbally abusive and  hostile, making gesture to strike out,focused on discharge, no finish, confuse,non redirectable, received prn ativan and his dose of schedule ativan was adjusted. 02/11/18:Patient was seen today, As per staff patient continues to demand to go home and discharge, Thought process tangential, poor insight, non redirectable,gets usp set when staff redirect him. Accepting medications and meals. Does not want to go to NH.   2/12/18- med compliance improved. Understands his home is not habitable until repairs are made, however insistes on focusing with staff on immediate discharge. More appropriate and cordial lately. 2/13/18- Recently has maintained appropriate social behavior and cordiality with staff. Denies SI ans HI. Is compliant with treatment. 2/14/18- More polite and showing patience about waiting for SNF to accept. 2/15/18- Focused on discharge. No insight that home is not safe to due to disrepair and no running h2o. Irritable and demanding. 2/16/18- Continues sarcastic, irritable and has no insight that he cannot go live in a house without running water. Expects preferential treatment when it comes to SNF waiting list. Asking multiple staff members the same discharge question- even though he has not forgotten the answer. (clearly able to recall othetr tghings long and short term)  1/17/18 he is the same and he is irritable and angry and not happy about being here and no self harm  Behavior and no issues with sleep and no self harm behavior                          SIDE EFFECTS: (reviewed/updated 2/17/2018)  None reported or admitted to.   No noted toxicity with use of Depakote/Tegretol/lithium/Clozaril/TCAs   ALLERGIES:(reviewed/updated 2/17/2018)  Allergies   Allergen Reactions    Tetracycline Hives      MEDICATIONS PRIOR TO ADMISSION:(reviewed/updated 2/17/2018)  Prescriptions Prior to Admission   Medication Sig    hydrALAZINE (APRESOLINE) 100 mg tablet Take 1 Tab by mouth three (3) times daily.  amLODIPine (NORVASC) 10 mg tablet Take 1 Tab by mouth daily.  aspirin delayed-release 81 mg tablet Take 1 Tab by mouth daily.  atorvastatin (LIPITOR) 40 mg tablet Take 1 Tab by mouth daily.  carvedilol (COREG) 3.125 mg tablet Take 1 Tab by mouth two (2) times daily (with meals).  ferrous sulfate 325 mg (65 mg iron) tablet Take 1 Tab by mouth two (2) times daily (with meals).  mirtazapine (REMERON) 7.5 mg tablet Take 1 Tab by mouth nightly.  nicotine (NICODERM CQ) 21 mg/24 hr 1 Patch by TransDERmal route every twenty-four (24) hours for 30 days. PAST MEDICAL HISTORY: Past medical history from the initial psychiatric evaluation has been reviewed (reviewed/updated 2/17/2018) with no additional updates (I asked patient and no additional past medical history provided). Past Medical History:   Diagnosis Date    Arthritis     CAD (coronary artery disease) 2007    CKD (chronic kidney disease), stage III     DM type 2 causing renal disease (HCC)     DVT (deep venous thrombosis) (HCC)     Hx of DVT:  Xarelto stopped due to GI bleed. S/P IVC filter.  Gait abnormality     GI bleed     Heart murmur     Hepatitis C     History of blood transfusion     Hypercholesterolemia     Hypertension 11/7/2014    Neuropathy     Non compliance w medication regimen     Peripheral vascular disease ([facility] [address] 11/7/2014    A. S/P Right SFA POBA and tibial atherectomy (2/4/13).     PUD (peptic ulcer disease) 2007    Unspecified sleep apnea     never used cpap     Past Surgical History:   Procedure Laterality Date    ABDOMEN SURGERY PROC UNLISTED  2007    has approx 7-8\" midline incision on abdomen--\"had to open him up and clean him out after feeding tube clogged\"    HX GI  2007    feeding tube for approx 2 mo    VASCULAR SURGERY PROCEDURE UNLIST Right 1/22/2015    popliteal-tibial bypass      SOCIAL HISTORY: Social history from the initial psychiatric evaluation has been reviewed (reviewed/updated 2/17/2018) with no additional updates (I asked patient and no additional social history provided). Social History     Social History    Marital status:      Spouse name: N/A    Number of children: N/A    Years of education: N/A     Occupational History    Not on file. Social History Main Topics    Smoking status: Current Every Day Smoker     Packs/day: 0.50    Smokeless tobacco: Not on file    Alcohol use No    Drug use: No      Comment: >20 years ago    Sexual activity: Not on file     Other Topics Concern    Not on file     Social History Narrative    78 year ols male admitted for depression and homelessness. Pt will be evicated from apartment due to non payment of bills. [facility] of 30 [facility] [place] [facility]. \" Pt is a brittle diabetic, with treatment non compliance. He has bilarela lower extremity amputations. Patient has a long hx of substance abuse. FAMILY HISTORY: Family history from the initial psychiatric evaluation has been reviewed (reviewed/updated 2/17/2018) with no additional updates (I asked patient and no additional family history provided).    Family History   Problem Relation Age of Onset    Hypertension Father        REVIEW OF SYSTEMS: (reviewed/updated 2/17/2018)  Appetite:decreased   Sleep: fitful   All other Review of Systems: Psychological ROS: positive for - behavioral disorder  Respiratory ROS: no cough, shortness of breath, or wheezing  Cardiovascular ROS: no chest pain or dyspnea on exertion         MENTAL STATUS EXAM & VITALS     MENTAL STATUS EXAM (MSE):    MSE FINDINGS ARE WITHIN NORMAL LIMITS (WNL) UNLESS OTHERWISE STATED BELOW. ( ALL OF THE BELOW CATEGORIES OF THE MSE HAVE BEEN REVIEWED (reviewed 2/17/2018) AND UPDATED AS DEEMED APPROPRIATE )  General Presentation age appropriate, evasive and guarded   Orientation oriented to time, place and person   Vital Signs  See below (reviewed 2/17/2018); Vital Signs (BP, Pulse, & Temp) are within normal limits if not listed below.    Gait and Station Stable/steady, no ataxia   Musculoskeletal System No extrapyramidal symptoms (EPS); no abnormal muscular movements or Tardive Dyskinesia (TD); muscle strength and tone are within normal limits   Language No aphasia or dysarthria   Speech:  hypoverbal   Thought Processes concrete; normal rate of thoughts; poor abstract reasoning/computation   Thought Associations goal directed   Thought Content free of delusions and free of hallucinations   Suicidal Ideations none   Homicidal Ideations none   Mood:  irritable   Affect:  mood-congruent   Memory recent  fair   Memory remote:  fair   Concentration/Attention:  distractable   Fund of Knowledge significantly below average   Insight:  poor   Reliability poor   Judgment:  poor          VITALS:     Patient Vitals for the past 24 hrs:   Temp Pulse Resp BP SpO2   02/17/18 1600 97.4 °F (36.3 °C) 68 18 137/62 100 %   02/17/18 0828 98.4 °F (36.9 °C) 80 18 128/61 97 %     Wt Readings from Last 3 Encounters:   01/24/18 82.7 kg (182 lb 5.1 oz)   01/23/18 83.6 kg (184 lb 4.9 oz)   07/13/17 88.9 kg (196 lb)     Temp Readings from Last 3 Encounters:   02/17/18 97.4 °F (36.3 °C)   01/24/18 97.2 °F (36.2 °C)   07/20/17 97.9 °F (36.6 °C)     BP Readings from Last 3 Encounters:   02/17/18 137/62   01/24/18 172/68   07/20/17 175/76     Pulse Readings from Last 3 Encounters:   02/17/18 68   01/24/18 72   07/20/17 (!) 52            DATA     LABORATORY DATA:(reviewed/updated 2/17/2018)  Recent Results (from the past 24 hour(s))   GLUCOSE, POC    Collection Time: 02/16/18  8:11 PM   Result Value Ref Range    Glucose (POC) 94 65 - 100 mg/dL    Performed by [address] [place] [address], POC    Collection Time: 02/17/18  7:28 AM   Result Value Ref Range    Glucose (POC) 74 65 - 100 mg/dL    Performed by Yassine Gorman, POC    Collection Time: 02/17/18  8:02 AM   Result Value Ref Range    Glucose (POC) 85 65 - 100 mg/dL    Performed by [address] [place] [address] Drive, POC    Collection Time: 02/17/18 11:48 AM   Result Value Ref Range    Glucose (POC) 88 65 - 100 mg/dL    Performed by Rachel Ron    GLUCOSE, POC    Collection Time: 02/17/18  4:12 PM   Result Value Ref Range    Glucose (POC) 137 (H) 65 - 100 mg/dL    Performed by Amanda Mckeon      No results found for: VALF2, VALAC, VALP, VALPR, DS6, CRBAM, CRBAMP, CARB2, XCRBAM  No results found for: LITHM   RADIOLOGY REPORTS:(reviewed/updated 2/17/2018)  Xr Chest Port    Result Date: 1/20/2018  EXAM: Portable CXR.  1800 hours. COMPARISON: 1/19/2015. INDICATION: cp, fatigue There is new interstitial pulmonary edema, cardiomegaly and small left pleural effusion. There is no focal infiltrate, pneumothorax or midline shift. IMPRESSION: CHF pattern.          MEDICATIONS     ALL MEDICATIONS:   Current Facility-Administered Medications   Medication Dose Route Frequency    LORazepam (ATIVAN) tablet 1 mg  1 mg Oral TID    mirtazapine (REMERON) tablet 30 mg  30 mg Oral QHS    buPROPion SR (WELLBUTRIN SR) tablet 150 mg  150 mg Oral DAILY    alum-mag hydroxide-simeth (MYLANTA) oral suspension 30 mL  30 mL Oral Q4H PRN    doxazosin (CARDURA) tablet 4 mg  4 mg Oral QHS    polyethylene glycol (MIRALAX) packet 17 g  17 g Oral DAILY    OLANZapine (ZyPREXA) tablet 2.5 mg  2.5 mg Oral Q6H PRN    ziprasidone (GEODON) 10 mg in sterile water (preservative free) 0.5 mL injection  10 mg IntraMUSCular BID PRN    benztropine (COGENTIN) tablet 1 mg  1 mg Oral BID PRN    benztropine (COGENTIN) injection 1 mg  1 mg IntraMUSCular BID PRN    zolpidem (AMBIEN) tablet 5 mg  5 mg Oral QHS PRN    acetaminophen (TYLENOL) tablet 650 mg  650 mg Oral Q4H PRN    magnesium hydroxide (MILK OF MAGNESIA) 400 mg/5 mL oral suspension 30 mL 30 mL Oral DAILY PRN    nicotine (NICODERM CQ) 21 mg/24 hr patch 1 Patch  1 Patch TransDERmal DAILY PRN    influenza vaccine 2017-18 (3 yrs+)(PF) (FLUZONE QUAD/FLUARIX QUAD) injection 0.5 mL  0.5 mL IntraMUSCular PRIOR TO DISCHARGE    amLODIPine (NORVASC) tablet 10 mg  10 mg Oral DAILY    aspirin delayed-release tablet 81 mg  81 mg Oral DAILY    atorvastatin (LIPITOR) tablet 40 mg  40 mg Oral DAILY    carvedilol (COREG) tablet 3.125 mg  3.125 mg Oral BID WITH MEALS    ferrous sulfate tablet 325 mg  325 mg Oral BID WITH MEALS    hydrALAZINE (APRESOLINE) tablet 100 mg  100 mg Oral TID    glucose chewable tablet 16 g  4 Tab Oral PRN    dextrose (D50W) injection syrg 12.5-25 g  12.5-25 g IntraVENous PRN    glucagon (GLUCAGEN) injection 1 mg  1 mg IntraMUSCular PRN    insulin lispro (HUMALOG) injection   SubCUTAneous AC&HS    nicotine (NICODERM CQ) 21 mg/24 hr patch 1 Patch  1 Patch TransDERmal Q24H      SCHEDULED MEDICATIONS:   Current Facility-Administered Medications   Medication Dose Route Frequency    LORazepam (ATIVAN) tablet 1 mg  1 mg Oral TID    mirtazapine (REMERON) tablet 30 mg  30 mg Oral QHS    buPROPion SR (WELLBUTRIN SR) tablet 150 mg  150 mg Oral DAILY    doxazosin (CARDURA) tablet 4 mg  4 mg Oral QHS    polyethylene glycol (MIRALAX) packet 17 g  17 g Oral DAILY    influenza vaccine 2017-18 (3 yrs+)(PF) (FLUZONE QUAD/FLUARIX QUAD) injection 0.5 mL  0.5 mL IntraMUSCular PRIOR TO DISCHARGE    amLODIPine (NORVASC) tablet 10 mg  10 mg Oral DAILY    aspirin delayed-release tablet 81 mg  81 mg Oral DAILY    atorvastatin (LIPITOR) tablet 40 mg  40 mg Oral DAILY    carvedilol (COREG) tablet 3.125 mg  3.125 mg Oral BID WITH MEALS    ferrous sulfate tablet 325 mg  325 mg Oral BID WITH MEALS    hydrALAZINE (APRESOLINE) tablet 100 mg  100 mg Oral TID    insulin lispro (HUMALOG) injection   SubCUTAneous AC&HS    nicotine (NICODERM CQ) 21 mg/24 hr patch 1 Patch  1 Patch TransDERmal Q24H ASSESSMENT & PLAN     DIAGNOSES REQUIRING ACTIVE TREATMENT AND MONITORING: (reviewed/updated 2/17/2018)  Patient Active Hospital Problem List:   Depression (1/24/2018)    Assessment: sadness, helplessness, hopelessness, in reaction to recent bilateral lower leg amputationa and inability to pay water bill at home. Plan: consider forced medication/treatment, address social issues, daughter may be obtaining guardianship  1/27/18 need order to treat    1/28/18 continue his medications    02/10/18: Continue current treatment, Ativan dose was adjusted. 02/11/18: Continue current treatment. 2/17/18 continue same medications         In summary, Germán White, is a 78 y.o.  male who presents with a severe exacerbation of the principal diagnosis of Depression  Patient's condition is worsening/not improving/not stable Patient requires continued inpatient hospitalization for further stabilization, safety monitoring and medication management. I will continue to coordinate the provision of individual, milieu, occupational, group, and substance abuse therapies to address target symptoms/diagnoses as deemed appropriate for the individual patient. A coordinated, multidisplinary treatment team round was conducted with the patient (this team consists of the nurse, psychiatric unit pharmcist,  and writer). Complete current electronic health record for patient has been reviewed today including consultant notes, ancillary staff notes, nurses and psychiatric tech notes. Suicide risk assessment completed and patient deemed to be of low risk for suicide at this time. The following regarding medications was addressed during rounds with patient:   the risks and benefits of the proposed medication. The patient was given the opportunity to ask questions. Informed consent given to the use of the above medications.  Will continue to adjust psychiatric and non-psychiatric medications (see above \"medication\" section and orders section for details) as deemed appropriate & based upon diagnoses and response to treatment. I will continue to order blood tests/labs and diagnostic tests as deemed appropriate and review results as they become available (see orders for details and above listed lab/test results). I will order psychiatric records from previous [facility] hospitals to further elucidate the nature of patient's psychopathology and review once available. I will gather additional collateral information from friends, family and o/p treatment team to further elucidate the nature of patient's psychopathology and baselline level of psychiatric functioning. I certify that this patient's inpatient psychiatric hospital services furnished since the previous certification were, and continue to be, required for treatment that could reasonably be expected to improve the patient's condition, or for diagnostic study, and that the patient continues to need, on a daily basis, active treatment furnished directly by or requiring the supervision of inpatient psychiatric facility personnel. In addition, the hospital records show that services furnished were intensive treatment services, admission or related services, or equivalent services.     EXPECTED DISCHARGE DATE/DAY: TBD     DISPOSITION: Home       Signed By:   Bari Harada, MD  2/17/2018

## 2018-02-18 NOTE — BH NOTES
1530:  Patient received as he is sitting in the Day Room using the phone and chatting with staff. He states that he is feeling better today and voices no complaints or concerns at this time. Will maintain q 15 minute safety checks. 2030:  Patient is ecstatic and all smiles after phone conversation with his sister - he reports that he and his wife will be able to move in with her. He is expecting to be discharged tomorrow for his sister's pickup in the morning.

## 2018-02-18 NOTE — PROGRESS NOTES
Problem: Falls - Risk of  Goal: *Absence of Falls  Document Bello Fall Risk and appropriate interventions in the flowsheet. Outcome: Progressing Towards Goal  Fall Risk Interventions:  Mobility Interventions: Bed/chair exit alarm, Mechanical lift    Mentation Interventions: Adequate sleep, hydration, pain control, Evaluate medications/consider consulting pharmacy    Medication Interventions: Bed/chair exit alarm, Evaluate medications/consider consulting pharmacy    Elimination Interventions: Bed/chair exit alarm, Patient to call for help with toileting needs, Urinal in reach    History of Falls Interventions: Bed/chair exit alarm, Door open when patient unattended, Room close to nurse's station        Problem: Depressed Mood (Adult/Pediatric)  Goal: *STG: Demonstrates reduction in symptoms and increase in insight into coping skills/future focused  Outcome: Not Progressing Towards Goal  Patient with inappropriate phone call to 911 this evening - states that his stomach hurts and he needs to go to the ER. He then tells staff - this is how I will get out of here.

## 2018-02-18 NOTE — PROGRESS NOTES
Problem: Depressed Mood (Adult/Pediatric)  Goal: *STG: Participates in treatment plan  Outcome: Not Progressing Towards Goal  Pt is depressed, sad and irritable at times. Verbalizes his sister passed away and he wanted to be at her .   He is pleasent cooperative and smiling today  Appropriate with staff and peers.      Goal: *STG: Complies with medication therapy  Outcome: Progressing Towards Goal  Med/meal compliant

## 2018-02-19 LAB
GLUCOSE BLD STRIP.AUTO-MCNC: 130 MG/DL (ref 65–100)
GLUCOSE BLD STRIP.AUTO-MCNC: 139 MG/DL (ref 65–100)
GLUCOSE BLD STRIP.AUTO-MCNC: 168 MG/DL (ref 65–100)
GLUCOSE BLD STRIP.AUTO-MCNC: 71 MG/DL (ref 65–100)
GLUCOSE BLD STRIP.AUTO-MCNC: 86 MG/DL (ref 65–100)
SERVICE CMNT-IMP: ABNORMAL
SERVICE CMNT-IMP: NORMAL
SERVICE CMNT-IMP: NORMAL

## 2018-02-19 PROCEDURE — 82962 GLUCOSE BLOOD TEST: CPT

## 2018-02-19 PROCEDURE — 65220000003 HC RM SEMIPRIVATE PSYCH

## 2018-02-19 PROCEDURE — 74011250637 HC RX REV CODE- 250/637: Performed by: FAMILY MEDICINE

## 2018-02-19 PROCEDURE — 74011250637 HC RX REV CODE- 250/637: Performed by: HOSPITALIST

## 2018-02-19 PROCEDURE — 74011250637 HC RX REV CODE- 250/637: Performed by: PSYCHIATRY & NEUROLOGY

## 2018-02-19 PROCEDURE — 74011250637 HC RX REV CODE- 250/637

## 2018-02-19 RX ADMIN — BUPROPION HYDROCHLORIDE 150 MG: 150 TABLET, EXTENDED RELEASE ORAL at 09:29

## 2018-02-19 RX ADMIN — AMLODIPINE BESYLATE 10 MG: 5 TABLET ORAL at 09:30

## 2018-02-19 RX ADMIN — DOXAZOSIN 4 MG: 2 TABLET ORAL at 21:01

## 2018-02-19 RX ADMIN — CARVEDILOL 3.12 MG: 3.12 TABLET, FILM COATED ORAL at 17:11

## 2018-02-19 RX ADMIN — ASPIRIN 81 MG: 81 TABLET, COATED ORAL at 09:29

## 2018-02-19 RX ADMIN — ATORVASTATIN CALCIUM 40 MG: 40 TABLET, FILM COATED ORAL at 09:29

## 2018-02-19 RX ADMIN — HYDRALAZINE HYDROCHLORIDE 100 MG: 50 TABLET, FILM COATED ORAL at 21:01

## 2018-02-19 RX ADMIN — FERROUS SULFATE TAB 325 MG (65 MG ELEMENTAL FE) 325 MG: 325 (65 FE) TAB at 17:11

## 2018-02-19 RX ADMIN — FERROUS SULFATE TAB 325 MG (65 MG ELEMENTAL FE) 325 MG: 325 (65 FE) TAB at 09:28

## 2018-02-19 RX ADMIN — LORAZEPAM 1 MG: 1 TABLET ORAL at 09:28

## 2018-02-19 RX ADMIN — HYDRALAZINE HYDROCHLORIDE 100 MG: 50 TABLET, FILM COATED ORAL at 09:30

## 2018-02-19 RX ADMIN — LORAZEPAM 1 MG: 1 TABLET ORAL at 14:07

## 2018-02-19 RX ADMIN — CARVEDILOL 3.12 MG: 3.12 TABLET, FILM COATED ORAL at 09:28

## 2018-02-19 RX ADMIN — LORAZEPAM 1 MG: 1 TABLET ORAL at 21:01

## 2018-02-19 RX ADMIN — HYDRALAZINE HYDROCHLORIDE 100 MG: 50 TABLET, FILM COATED ORAL at 17:11

## 2018-02-19 RX ADMIN — MIRTAZAPINE 30 MG: 15 TABLET, FILM COATED ORAL at 21:01

## 2018-02-19 NOTE — BH NOTES
GROUP THERAPY PROGRESS NOTE    Wendy Aponte participated in a morning Process Group on the Geriatric Unit, with a focus identifying feelings, planning for the day, and singing.     Group time: 40 minutes.     Personal goal for participation: To increase the capacity to shift ones mood, prepare for the day, and share in group singing.     Goal orientation: The patient will be able to prepare for the day through group singing.      Group therapy participation: When prompted, this patient partially participated in the group.     Therapeutic interventions reviewed and discussed: The group members were introduce themselves by first names and participate in group singing as a way to increase their oxygen and blood flow and begin their day on a positive note. They were also asked to join in singing several songs.     Impression of participation: The patient said he was feeling alright. He was alert and, when prompted, spoke a relevant word or two. His eyes were bright and he had a pleasant, non-dysphoric facial expression this morning. He expressed no current SI/HI and displayed no overt psychosis. His affect was much less depressed or anxious than on admission. His mood showed a wider range of affect. He did not join in the singing but his alertness and responsiveness was better than the previous week.

## 2018-02-19 NOTE — PROGRESS NOTES
Problem: Falls - Risk of  Goal: *Absence of Falls  Document Bello Fall Risk and appropriate interventions in the flowsheet. Outcome: Progressing Towards Goal  Fall Risk Interventions:  Mobility Interventions: Bed/chair exit alarm, Communicate number of staff needed for ambulation/transfer, Mechanical lift    Mentation Interventions: Adequate sleep, hydration, pain control, Bed/chair exit alarm, Door open when patient unattended    Medication Interventions: Bed/chair exit alarm    Elimination Interventions: Bed/chair exit alarm, Call light in reach, Toileting schedule/hourly rounds    History of Falls Interventions: Bed/chair exit alarm, Door open when patient unattended, Room close to nurse's station    No falls. Bed alarm on the bed. Fall tool completed and accurate. Patient transferred to chelsea chair at 0900 via walt lift. Able to re-position self while sitting up.

## 2018-02-19 NOTE — PROGRESS NOTES
Problem: Falls - Risk of  Goal: *Absence of Falls  Document Bello Fall Risk and appropriate interventions in the flowsheet. Outcome: Progressing Towards Goal  Fall Risk Interventions:  Mobility Interventions: Bed/chair exit alarm, Communicate number of staff needed for ambulation/transfer, Mechanical lift    Mentation Interventions: Adequate sleep, hydration, pain control, Evaluate medications/consider consulting pharmacy, Room close to nurse's station    Medication Interventions: Bed/chair exit alarm, Evaluate medications/consider consulting pharmacy, Patient to call before getting OOB    Elimination Interventions: Bed/chair exit alarm, Patient to call for help with toileting needs, Urinal in reach    History of Falls Interventions: Bed/chair exit alarm, Door open when patient unattended, Room close to nurse's station    Transfers with the Fabiene Read Lift        Problem: Depressed Mood (Adult/Pediatric)  Goal: *STG: Attends activities and groups  Outcome: Progressing Towards Goal  Patient has been visible on the unit throughout this shift. He has been pleasant/cooperative, less demanding and more insightful this evening than previously. Goal: *STG: Demonstrates reduction in symptoms and increase in insight into coping skills/future focused  Outcome: Progressing Towards Goal  Patient excited at the prospect of moving in with his sister. He reports that she has an extra room so that he and his wife can move in with her.

## 2018-02-19 NOTE — PROGRESS NOTES
Problem: Depressed Mood (Adult/Pediatric)  Goal: *STG: Participates in treatment plan  Outcome: Progressing Towards Goal  Received this morning resting in bed. Is alert,but has some mild confusion. Is pleasant and cooperative with staff,showing some good insight in regards to waiting patiently when requesting a need. Slight smile noted from time to time. Interacting appropriately with staff and others. Able to follow directions. Thoughts are clear and organized during am cares. Presently sitting out on the unit. Goal: *STG: Attends activities and groups  Outcome: Progressing Towards Goal  Attended music group this morning. Goal: *STG: Complies with medication therapy  Outcome: Progressing Towards Goal  Has been medication and meal compliant.

## 2018-02-19 NOTE — PROGRESS NOTES
Problem: Falls - Risk of  Goal: *Absence of Falls  Document Bello Fall Risk and appropriate interventions in the flowsheet. Outcome: Progressing Towards Goal  Fall Risk Interventions:  Mobility Interventions: Bed/chair exit alarm    Mentation Interventions: Adequate sleep, hydration, pain control, Bed/chair exit alarm, Door open when patient unattended, Room close to nurse's station    Medication Interventions: Bed/chair exit alarm    Elimination Interventions: Bed/chair exit alarm, Call light in reach, Urinal in reach    History of Falls Interventions: Bed/chair exit alarm, Door open when patient unattended, Room close to nurse's station    0000 Pt appears asleep in bed. Respirations even and unlabored. Will maintain on Q 2 hour turns. Bed alarm on, call bell and urinal within reach, door remains open. Will continue to monitor with Q 15 safety checks.

## 2018-02-19 NOTE — PROGRESS NOTES
Problem: Depressed Mood (Adult/Pediatric)  Goal: *STG: Complies with medication therapy  Outcome: Progressing Towards Goal  1515 Received patient talking on the phone in [place] Sitting in recliner. Able to move in recliner. Also asked staff when he is going to be discharged.     VSS    Ate  100% dinner and snacks    Blood sugars AC and HS   And did not require insulin coverage  On q 2 hour position turns

## 2018-02-19 NOTE — BH NOTES
PSYCHIATRIC PROGRESS NOTE         Patient Name  Zane Severe   Date of Birth 1940   [facility] [id number]   Medical Record Number  [id number]      Age  78 y.o. PCP Johny Warner, MD   Admit date:  1/24/2018    Room Number  [phone]  @ [facility]   Date of Service  2/19/2018          PSYCHOTHERAPY SESSION NOTE:  Length of psychotherapy session: 15 minutes    Main condition/diagnosis/issues treated during session today, 2/19/2018 : med , meal and group non compliance    I employed Cognitive Behavioral therapy techniques, Reality-Oriented psychotherapy, as well as supportive psychotherapy in regards to various ongoing psychosocial stressors, including the following: pre-admission and current problems; housing issues; occupational issues; medical issues; and stress of hospitalization. Interpersonal relationship issues and psychodynamic conflicts explored. Attempts made to alleviate maladaptive patterns. We, also, worked on issues of denial & effects of substance dependency/use     Overall, patient is not progressing    Treatment Plan Update (reviewed an updated 2/19/2018) : I will modify psychotherapy tx plan by implementing more stress management strategies, building upon cognitive behavioral techniques, increasing coping skills, as well as shoring up psychological defenses). An extended energy and skill set was needed to engage pt in psychotherapy due to some of the following: resistiveness, complexity, negativity, confrontational nature, hostile behaviors, and/or severe abnormalities in thought processes/psychosis resulting in the loss of expressive/receptive language communication skills. E & M PROGRESS NOTE:         HISTORY       CC:  \"depression and non compliance with treatment \"  HISTORY OF PRESENT ILLNESS/INTERVAL HISTORY:  (reviewed/updated 2/19/2018).   per initial evaluation:     Zane Severe presents/reports/evidences the following emotional symptoms today, 2/19/2018:depression. The above symptoms have been present for 1 years. These symptoms are of severe severity. The symptoms are constant  in nature. Additional symptomatology and features include anxiety. 2/2/18- pATIENT HAS IMPROVED SPORADIC MED COMPLIANCE WITH INCREASED ATIVAN DOSE. WAS SOCIAL IN [facility] TODAY. SW TO CONTACT DAUGHTER TO LET HER KNOE THAT HE IS NOT BEING AS TREATMENT COMPLIANT AS HE TOLD HER HE WOULD BE.    1/27/18 he is not responding to questions and not engaging and not showing engagement and refuse medications and treatment   1/28/18 he is doing better and he engaged and not feeling sad or depressed and he eats better    1/29/18= Patient is deliberately refusing vital signs, labs and medications. He likes finger foods and eats those. Will meet with daughter on Tuesday to discuss possible guardianship. Will seek forced meds. 1/30/18- Pt. Agreed to comply with meds after extensive family meeting. Planning for SNF admission and possible transfer to home afterwards [address].  1/31/18- Patient is intermittently cooperative with somatic meds. We discussed the risks to his health this can cause. I advised Wellbutrin for depression. This may help motivate treatment compliance. 2/1/18- Continues Rude, belligerent, uncooperative and disrespectful with staff. Is marginally compliant with diabetes management. Waiting for placement. 2/3/18- no complaints. More redirectable, less hostile and more compliant  2/4/18- Mr. Karime Hinkle very somnolent this morning. No agitation  2/5/18- Sporadic , selective compliance with medications. Denies SI/HI. Needs reminding of treatment goals to improve compliance for transfer to SNF.  2/6/18- mr. Karime Hinkle was bright and positive this morning. Less demanding and hostile towards staff. Asking to go home. 2/7/18- Apologized to writer for being rude and uncooperative with treatment. Focused on discharge.  Understandd house repairs need to be completed first.  2/8/18- sleeping this morning. 2/9/18- grumpy, entitled and impatient re discharge, accepts inerim SNF placement. 2/10/18: intermittently agitated, verbally abusive and  hostile, making gesture to strike out,focused on discharge, no finish, confuse,non redirectable, received prn ativan and his dose of schedule ativan was adjusted. 02/11/18:Patient was seen today, As per staff patient continues to demand to go home and discharge, Thought process tangential, poor insight, non redirectable,gets usp set when staff redirect him. Accepting medications and meals. Does not want to go to NH.   2/12/18- med compliance improved. Understands his home is not habitable until repairs are made, however insistes on focusing with staff on immediate discharge. More appropriate and cordial lately. 2/13/18- Recently has maintained appropriate social behavior and cordiality with staff. Denies SI ans HI. Is compliant with treatment. 2/14/18- More polite and showing patience about waiting for SNF to accept. 2/15/18- Focused on discharge. No insight that home is not safe to due to disrepair and no running h2o. Irritable and demanding. 2/16/18- Continues sarcastic, irritable and has no insight that he cannot go live in a house without running water. Expects preferential treatment when it comes to SNF waiting list. Asking multiple staff members the same discharge question- even though he has not forgotten the answer. (clearly able to recall othetr tghings long and short term)  2/17/18 he is the same and he is irritable and angry and not happy about being here and no self harm  Behavior and no issues with sleep and no self harm behavior    2/18/18 he is not happy about being here and not feeling happy about being here and still gets angry and cano and still has issues with staff   2/19/18- Not requiring PRN's but still irritable, bored and demanding.                       SIDE EFFECTS: (reviewed/updated 2/19/2018)  None reported or admitted to. No noted toxicity with use of Depakote/Tegretol/lithium/Clozaril/TCAs   ALLERGIES:(reviewed/updated 2/19/2018)  Allergies   Allergen Reactions    Tetracycline Hives      MEDICATIONS PRIOR TO ADMISSION:(reviewed/updated 2/19/2018)  Prescriptions Prior to Admission   Medication Sig    hydrALAZINE (APRESOLINE) 100 mg tablet Take 1 Tab by mouth three (3) times daily.  amLODIPine (NORVASC) 10 mg tablet Take 1 Tab by mouth daily.  aspirin delayed-release 81 mg tablet Take 1 Tab by mouth daily.  atorvastatin (LIPITOR) 40 mg tablet Take 1 Tab by mouth daily.  carvedilol (COREG) 3.125 mg tablet Take 1 Tab by mouth two (2) times daily (with meals).  ferrous sulfate 325 mg (65 mg iron) tablet Take 1 Tab by mouth two (2) times daily (with meals).  mirtazapine (REMERON) 7.5 mg tablet Take 1 Tab by mouth nightly.  nicotine (NICODERM CQ) 21 mg/24 hr 1 Patch by TransDERmal route every twenty-four (24) hours for 30 days. PAST MEDICAL HISTORY: Past medical history from the initial psychiatric evaluation has been reviewed (reviewed/updated 2/19/2018) with no additional updates (I asked patient and no additional past medical history provided). Past Medical History:   Diagnosis Date    Arthritis     CAD (coronary artery disease) 2007    CKD (chronic kidney disease), stage III     DM type 2 causing renal disease (HCC)     DVT (deep venous thrombosis) (HCC)     Hx of DVT:  Xarelto stopped due to GI bleed. S/P IVC filter.  Gait abnormality     GI bleed     Heart murmur     Hepatitis C     History of blood transfusion     Hypercholesterolemia     Hypertension 11/7/2014    Neuropathy     Non compliance w medication regimen     Peripheral vascular disease ([facility] [address] 11/7/2014    A. S/P Right SFA POBA and tibial atherectomy (2/4/13).     PUD (peptic ulcer disease) 2007    Unspecified sleep apnea     never used cpap     Past Surgical History:   Procedure Laterality Date    ABDOMEN SURGERY PROC UNLISTED  2007    has approx 7-8\" midline incision on abdomen--\"had to open him up and clean him out after feeding tube clogged\"    HX GI  2007    feeding tube for approx 2 mo    VASCULAR SURGERY PROCEDURE UNLIST Right 1/22/2015    popliteal-tibial bypass      SOCIAL HISTORY: Social history from the initial psychiatric evaluation has been reviewed (reviewed/updated 2/19/2018) with no additional updates (I asked patient and no additional social history provided). Social History     Social History    Marital status:      Spouse name: N/A    Number of children: N/A    Years of education: N/A     Occupational History    Not on file. Social History Main Topics    Smoking status: Current Every Day Smoker     Packs/day: 0.50    Smokeless tobacco: Not on file    Alcohol use No    Drug use: No      Comment: >20 years ago    Sexual activity: Not on file     Other Topics Concern    Not on file     Social History Narrative    78 year ols male admitted for depression and homelessness. Pt will be evicated from apartment due to non payment of bills. Girlfriend of 30 years left him to [facility] live in the woods with others. \" Pt is a brittle diabetic, with treatment non compliance. He has bilarela lower extremity amputations. Patient has a long hx of substance abuse. FAMILY HISTORY: Family history from the initial psychiatric evaluation has been reviewed (reviewed/updated 2/19/2018) with no additional updates (I asked patient and no additional family history provided).    Family History   Problem Relation Age of Onset    Hypertension Father        REVIEW OF SYSTEMS: (reviewed/updated 2/19/2018)  Appetite:decreased   Sleep: fitful   All other Review of Systems: Psychological ROS: positive for - behavioral disorder  Respiratory ROS: no cough, shortness of breath, or wheezing  Cardiovascular ROS: no chest pain or dyspnea on exertion         [address] [facility] ([facility]): MSE FINDINGS ARE WITHIN NORMAL LIMITS (WNL) UNLESS OTHERWISE STATED BELOW. ( ALL OF THE BELOW CATEGORIES OF THE MSE HAVE BEEN REVIEWED (reviewed 2/19/2018) AND UPDATED AS DEEMED APPROPRIATE )  General Presentation age appropriate, evasive and guarded   Orientation oriented to time, place and person   Vital Signs  See below (reviewed 2/19/2018); Vital Signs (BP, Pulse, & Temp) are within normal limits if not listed below.    Gait and Station Stable/steady, no ataxia   Musculoskeletal System No extrapyramidal symptoms (EPS); no abnormal muscular movements or Tardive Dyskinesia (TD); muscle strength and tone are within normal limits   Language No aphasia or dysarthria   Speech:  hypoverbal   Thought Processes concrete; normal rate of thoughts; poor abstract reasoning/computation   Thought Associations goal directed   Thought Content free of delusions and free of hallucinations   Suicidal Ideations none   Homicidal Ideations none   Mood:  irritable   Affect:  mood-congruent   Memory recent  fair   Memory remote:  fair   Concentration/Attention:  distractable   Fund of Knowledge significantly below average   Insight:  poor   Reliability poor   Judgment:  poor          VITALS:     Patient Vitals for the past 24 hrs:   Temp Pulse Resp BP SpO2   02/19/18 0928 97.7 °F (36.5 °C) 71 16 150/66 100 %   02/18/18 1900 98.1 °F (36.7 °C) 70 16 161/77 100 %   02/18/18 1600 98 °F (36.7 °C) 65 16 144/68 99 %     Wt Readings from Last 3 Encounters:   02/18/18 90.4 kg (199 lb 3.2 oz)   01/23/18 83.6 kg (184 lb 4.9 oz)   07/13/17 88.9 kg (196 lb)     Temp Readings from Last 3 Encounters:   02/19/18 97.7 °F (36.5 °C)   01/24/18 97.2 °F (36.2 °C)   07/20/17 97.9 °F (36.6 °C)     BP Readings from Last 3 Encounters:   02/19/18 150/66   01/24/18 172/68   07/20/17 175/76     Pulse Readings from Last 3 Encounters:   02/19/18 71   01/24/18 72   07/20/17 (!) 52            DATA     LABORATORY DATA:(reviewed/updated 2/19/2018)  Recent Results (from the past 24 hour(s))   GLUCOSE, POC    Collection Time: 02/18/18  4:06 PM   Result Value Ref Range    Glucose (POC) 188 (H) 65 - 100 mg/dL    Performed by Alvina Barber    GLUCOSE, POC    Collection Time: 02/18/18  8:03 PM   Result Value Ref Range    Glucose (POC) 173 (H) 65 - 100 mg/dL    Performed by Nelly 74, POC    Collection Time: 02/19/18  8:28 AM   Result Value Ref Range    Glucose (POC) 71 65 - 100 mg/dL    Performed by Stormy Ovalle    GLUCOSE, POC    Collection Time: 02/19/18  9:08 AM   Result Value Ref Range    Glucose (POC) 86 65 - 100 mg/dL    Performed by Stormy Ovalle    GLUCOSE, POC    Collection Time: 02/19/18 11:53 AM   Result Value Ref Range    Glucose (POC) 139 (H) 65 - 100 mg/dL    Performed by Stormy Ovalle      No results found for: VALF2, VALAC, VALP, VALPR, DS6, CRBAM, CRBAMP, CARB2, XCRBAM  No results found for: LITHM   RADIOLOGY REPORTS:(reviewed/updated 2/19/2018)  Xr Chest Port    Result Date: 1/20/2018  EXAM: Portable CXR.  1800 hours. COMPARISON: 1/19/2015. INDICATION: cp, fatigue There is new interstitial pulmonary edema, cardiomegaly and small left pleural effusion. There is no focal infiltrate, pneumothorax or midline shift. IMPRESSION: CHF pattern.          MEDICATIONS     ALL MEDICATIONS:   Current Facility-Administered Medications   Medication Dose Route Frequency    LORazepam (ATIVAN) tablet 1 mg  1 mg Oral TID    mirtazapine (REMERON) tablet 30 mg  30 mg Oral QHS    buPROPion SR (WELLBUTRIN SR) tablet 150 mg  150 mg Oral DAILY    alum-mag hydroxide-simeth (MYLANTA) oral suspension 30 mL  30 mL Oral Q4H PRN    doxazosin (CARDURA) tablet 4 mg  4 mg Oral QHS    polyethylene glycol (MIRALAX) packet 17 g  17 g Oral DAILY    OLANZapine (ZyPREXA) tablet 2.5 mg  2.5 mg Oral Q6H PRN    ziprasidone (GEODON) 10 mg in sterile water (preservative free) 0.5 mL injection  10 mg IntraMUSCular BID PRN    benztropine (COGENTIN) tablet 1 mg  1 mg Oral BID PRN    benztropine (COGENTIN) injection 1 mg  1 mg IntraMUSCular BID PRN    zolpidem (AMBIEN) tablet 5 mg  5 mg Oral QHS PRN    acetaminophen (TYLENOL) tablet 650 mg  650 mg Oral Q4H PRN    magnesium hydroxide (MILK OF MAGNESIA) 400 mg/5 mL oral suspension 30 mL  30 mL Oral DAILY PRN    nicotine (NICODERM CQ) 21 mg/24 hr patch 1 Patch  1 Patch TransDERmal DAILY PRN    influenza vaccine 2017-18 (3 yrs+)(PF) (FLUZONE QUAD/FLUARIX QUAD) injection 0.5 mL  0.5 mL IntraMUSCular PRIOR TO DISCHARGE    amLODIPine (NORVASC) tablet 10 mg  10 mg Oral DAILY    aspirin delayed-release tablet 81 mg  81 mg Oral DAILY    atorvastatin (LIPITOR) tablet 40 mg  40 mg Oral DAILY    carvedilol (COREG) tablet 3.125 mg  3.125 mg Oral BID WITH MEALS    ferrous sulfate tablet 325 mg  325 mg Oral BID WITH MEALS    hydrALAZINE (APRESOLINE) tablet 100 mg  100 mg Oral TID    glucose chewable tablet 16 g  4 Tab Oral PRN    dextrose (D50W) injection syrg 12.5-25 g  12.5-25 g IntraVENous PRN    glucagon (GLUCAGEN) injection 1 mg  1 mg IntraMUSCular PRN    insulin lispro (HUMALOG) injection   SubCUTAneous AC&HS    nicotine (NICODERM CQ) 21 mg/24 hr patch 1 Patch  1 Patch TransDERmal Q24H      SCHEDULED MEDICATIONS:   Current Facility-Administered Medications   Medication Dose Route Frequency    LORazepam (ATIVAN) tablet 1 mg  1 mg Oral TID    mirtazapine (REMERON) tablet 30 mg  30 mg Oral QHS    buPROPion SR (WELLBUTRIN SR) tablet 150 mg  150 mg Oral DAILY    doxazosin (CARDURA) tablet 4 mg  4 mg Oral QHS    polyethylene glycol (MIRALAX) packet 17 g  17 g Oral DAILY    influenza vaccine 2017-18 (3 yrs+)(PF) (FLUZONE QUAD/FLUARIX QUAD) injection 0.5 mL  0.5 mL IntraMUSCular PRIOR TO DISCHARGE    amLODIPine (NORVASC) tablet 10 mg  10 mg Oral DAILY    aspirin delayed-release tablet 81 mg  81 mg Oral DAILY    atorvastatin (LIPITOR) tablet 40 mg  40 mg Oral DAILY    carvedilol (COREG) tablet 3.125 mg  3.125 mg Oral BID WITH MEALS    ferrous sulfate tablet 325 mg  325 mg Oral BID WITH MEALS    hydrALAZINE (APRESOLINE) tablet 100 mg  100 mg Oral TID    insulin lispro (HUMALOG) injection   SubCUTAneous AC&HS    nicotine (NICODERM CQ) 21 mg/24 hr patch 1 Patch  1 Patch TransDERmal Q24H          ASSESSMENT & PLAN     DIAGNOSES REQUIRING ACTIVE TREATMENT AND MONITORING: (reviewed/updated 2/19/2018)  Patient Active Hospital Problem List:   Depression (1/24/2018)    Assessment: sadness, helplessness, hopelessness, in reaction to recent bilateral lower leg amputationa and inability to pay water bill at home. Plan: consider forced medication/treatment, address social issues, daughter may be obtaining guardianship  1/27/18 need order to treat    1/28/18 continue his medications    02/10/18: Continue current treatment, Ativan dose was adjusted. 02/11/18: Continue current treatment. 2/17/18 continue same medications   2/18/18 continue same medications         In summary, Esau Harry, is a 78 y.o.  male who presents with a severe exacerbation of the principal diagnosis of Depression  Patient's condition is worsening/not improving/not stable Patient requires continued inpatient hospitalization for further stabilization, safety monitoring and medication management. I will continue to coordinate the provision of individual, milieu, occupational, group, and substance abuse therapies to address target symptoms/diagnoses as deemed appropriate for the individual patient. A coordinated, multidisplinary treatment team round was conducted with the patient (this team consists of the nurse, psychiatric unit pharmcist,  and writer). Complete current electronic health record for patient has been reviewed today including consultant notes, ancillary staff notes, nurses and psychiatric tech notes. Suicide risk assessment completed and patient deemed to be of low risk for suicide at this time.      The following regarding medications was addressed during rounds with patient:   the risks and benefits of the proposed medication. The patient was given the opportunity to ask questions. Informed consent given to the use of the above medications. Will continue to adjust psychiatric and non-psychiatric medications (see above \"medication\" section and orders section for details) as deemed appropriate & based upon diagnoses and response to treatment. I will continue to order blood tests/labs and diagnostic tests as deemed appropriate and review results as they become available (see orders for details and above listed lab/test results). I will order psychiatric records from previous [facility] hospitals to further elucidate the nature of patient's psychopathology and review once available. I will gather additional collateral information from friends, family and o/p treatment team to further elucidate the nature of patient's psychopathology and baselline level of psychiatric functioning. I certify that this patient's inpatient psychiatric hospital services furnished since the previous certification were, and continue to be, required for treatment that could reasonably be expected to improve the patient's condition, or for diagnostic study, and that the patient continues to need, on a daily basis, active treatment furnished directly by or requiring the supervision of inpatient psychiatric facility personnel. In addition, the hospital records show that services furnished were intensive treatment services, admission or related services, or equivalent services.     EXPECTED DISCHARGE DATE/DAY: TBD     DISPOSITION: Home       Signed By:   Lupe Holliday MD  2/19/2018

## 2018-02-19 NOTE — BH NOTES
PSYCHIATRIC PROGRESS NOTE         Patient Name  Venita George   Date of Birth 1940   [facility] [id number]   Medical Record Number  [id number]      Age  78 y.o. PCP Johny Warner, MD   Admit date:  1/24/2018    Room Number  [phone]  @ [facility]   Date of Service  2/18/2018          PSYCHOTHERAPY SESSION NOTE:  Length of psychotherapy session: 15 minutes    Main condition/diagnosis/issues treated during session today, 2/18/2018 : med , meal and group non compliance    I employed Cognitive Behavioral therapy techniques, Reality-Oriented psychotherapy, as well as supportive psychotherapy in regards to various ongoing psychosocial stressors, including the following: pre-admission and current problems; housing issues; occupational issues; medical issues; and stress of hospitalization. Interpersonal relationship issues and psychodynamic conflicts explored. Attempts made to alleviate maladaptive patterns. We, also, worked on issues of denial & effects of substance dependency/use     Overall, patient is not progressing    Treatment Plan Update (reviewed an updated 2/18/2018) : I will modify psychotherapy tx plan by implementing more stress management strategies, building upon cognitive behavioral techniques, increasing coping skills, as well as shoring up psychological defenses). An extended energy and skill set was needed to engage pt in psychotherapy due to some of the following: resistiveness, complexity, negativity, confrontational nature, hostile behaviors, and/or severe abnormalities in thought processes/psychosis resulting in the loss of expressive/receptive language communication skills. E & M PROGRESS NOTE:         HISTORY       CC:  \"depression and non compliance with treatment \"  HISTORY OF PRESENT ILLNESS/INTERVAL HISTORY:  (reviewed/updated 2/18/2018).   per initial evaluation:     Venita George presents/reports/evidences the following emotional symptoms today, 2/18/2018:depression. The above symptoms have been present for 1 years. These symptoms are of severe severity. The symptoms are constant  in nature. Additional symptomatology and features include anxiety. 2/2/18- pATIENT HAS IMPROVED SPORADIC MED COMPLIANCE WITH INCREASED ATIVAN DOSE. WAS SOCIAL IN [place] TODAY. SW TO CONTACT DAUGHTER TO LET HER KNOE THAT HE IS NOT BEING AS TREATMENT COMPLIANT AS HE TOLD HER HE WOULD BE.    1/27/18 he is not responding to questions and not engaging and not showing engagement and refuse medications and treatment   1/28/18 he is doing better and he engaged and not feeling sad or depressed and he eats better    1/29/18= Patient is deliberately refusing vital signs, labs and medications. He likes finger foods and eats those. Will meet with daughter on Tuesday to discuss possible guardianship. Will seek forced meds. 1/30/18- Pt. Agreed to comply with meds after extensive family meeting. Planning for SNF admission and possible transfer to home afterwards [address].  1/31/18- Patient is intermittently cooperative with somatic meds. We discussed the risks to his health this can cause. I advised Wellbutrin for depression. This may help motivate treatment compliance. 2/1/18- Continues Rude, belligerent, uncooperative and disrespectful with staff. Is marginally compliant with diabetes management. Waiting for placement. 2/3/18- no complaints. More redirectable, less hostile and more compliant  2/4/18- Mr. Yaz Cowart very somnolent this morning. No agitation  2/5/18- Sporadic , selective compliance with medications. Denies SI/HI. Needs reminding of treatment goals to improve compliance for transfer to SNF.  2/6/18- mr. Yaz Cowart was bright and positive this morning. Less demanding and hostile towards staff. Asking to go home. 2/7/18- Apologized to writer for being rude and uncooperative with treatment. Focused on discharge.  Understandd house repairs need to be completed first.  2/8/18- sleeping this morning. 2/9/18- grumpy, entitled and impatient re discharge, accepts inerim SNF placement. 2/10/18: intermittently agitated, verbally abusive and  hostile, making gesture to strike out,focused on discharge, no finish, confuse,non redirectable, received prn ativan and his dose of schedule ativan was adjusted. 02/11/18:Patient was seen today, As per staff patient continues to demand to go home and discharge, Thought process tangential, poor insight, non redirectable,gets usp set when staff redirect him. Accepting medications and meals. Does not want to go to NH.   2/12/18- med compliance improved. Understands his home is not habitable until repairs are made, however insistes on focusing with staff on immediate discharge. More appropriate and cordial lately. 2/13/18- Recently has maintained appropriate social behavior and cordiality with staff. Denies SI ans HI. Is compliant with treatment. 2/14/18- More polite and showing patience about waiting for SNF to accept. 2/15/18- Focused on discharge. No insight that home is not safe to due to disrepair and no running h2o. Irritable and demanding. 2/16/18- Continues sarcastic, irritable and has no insight that he cannot go live in a house without running water. Expects preferential treatment when it comes to SNF waiting list. Asking multiple staff members the same discharge question- even though he has not forgotten the answer. (clearly able to recall othetr tghings long and short term)  2/17/18 he is the same and he is irritable and angry and not happy about being here and no self harm  Behavior and no issues with sleep and no self harm behavior    2/18/18 he is not happy about being here and not feeling happy about being here and still gets angry and cano and still has issues with staff                         SIDE EFFECTS: (reviewed/updated 2/18/2018)  None reported or admitted to.   No noted toxicity with use of Depakote/Tegretol/lithium/Clozaril/TCAs   ALLERGIES:(reviewed/updated 2/18/2018)  Allergies   Allergen Reactions    Tetracycline Hives      MEDICATIONS PRIOR TO ADMISSION:(reviewed/updated 2/18/2018)  Prescriptions Prior to Admission   Medication Sig    hydrALAZINE (APRESOLINE) 100 mg tablet Take 1 Tab by mouth three (3) times daily.  amLODIPine (NORVASC) 10 mg tablet Take 1 Tab by mouth daily.  aspirin delayed-release 81 mg tablet Take 1 Tab by mouth daily.  atorvastatin (LIPITOR) 40 mg tablet Take 1 Tab by mouth daily.  carvedilol (COREG) 3.125 mg tablet Take 1 Tab by mouth two (2) times daily (with meals).  ferrous sulfate 325 mg (65 mg iron) tablet Take 1 Tab by mouth two (2) times daily (with meals).  mirtazapine (REMERON) 7.5 mg tablet Take 1 Tab by mouth nightly.  nicotine (NICODERM CQ) 21 mg/24 hr 1 Patch by TransDERmal route every twenty-four (24) hours for 30 days. PAST MEDICAL HISTORY: Past medical history from the initial psychiatric evaluation has been reviewed (reviewed/updated 2/18/2018) with no additional updates (I asked patient and no additional past medical history provided). Past Medical History:   Diagnosis Date    Arthritis     CAD (coronary artery disease) 2007    CKD (chronic kidney disease), stage III     DM type 2 causing renal disease (HCC)     DVT (deep venous thrombosis) (HCC)     Hx of DVT:  Xarelto stopped due to GI bleed. S/P IVC filter.  Gait abnormality     GI bleed     Heart murmur     Hepatitis C     History of blood transfusion     Hypercholesterolemia     Hypertension 11/7/2014    Neuropathy     Non compliance w medication regimen     Peripheral vascular disease ([facility] [address] 11/7/2014    A. S/P Right SFA POBA and tibial atherectomy (2/4/13).     PUD (peptic ulcer disease) 2007    Unspecified sleep apnea     never used cpap     Past Surgical History:   Procedure Laterality Date    ABDOMEN SURGERY PROC UNLISTED  2007    has approx 7-8\" midline incision on abdomen--\"had to open him up and clean him out after feeding tube clogged\"    HX GI  2007    feeding tube for approx 2 mo    VASCULAR SURGERY PROCEDURE UNLIST Right 1/22/2015    popliteal-tibial bypass      SOCIAL HISTORY: Social history from the initial psychiatric evaluation has been reviewed (reviewed/updated 2/18/2018) with no additional updates (I asked patient and no additional social history provided). Social History     Social History    Marital status:      Spouse name: N/A    Number of children: N/A    Years of education: N/A     Occupational History    Not on file. Social History Main Topics    Smoking status: Current Every Day Smoker     Packs/day: 0.50    Smokeless tobacco: Not on file    Alcohol use No    Drug use: No      Comment: >20 years ago    Sexual activity: Not on file     Other Topics Concern    Not on file     Social History Narrative    78 year ols male admitted for depression and homelessness. Pt will be evicated from apartment due to non payment of bills. Girlfriend of 30 years left him to [facility] live in the [place] with others. \" Pt is a brittle diabetic, with treatment non compliance. He has bilarela lower extremity amputations. Patient has a long hx of substance abuse. FAMILY HISTORY: Family history from the initial psychiatric evaluation has been reviewed (reviewed/updated 2/18/2018) with no additional updates (I asked patient and no additional family history provided).    Family History   Problem Relation Age of Onset    Hypertension Father        REVIEW OF SYSTEMS: (reviewed/updated 2/18/2018)  Appetite:decreased   Sleep: fitful   All other Review of Systems: Psychological ROS: positive for - behavioral disorder  Respiratory ROS: no cough, shortness of breath, or wheezing  Cardiovascular ROS: no chest pain or dyspnea on exertion         MENTAL STATUS EXAM & VITALS     MENTAL STATUS EXAM (MSE):    MSE FINDINGS ARE WITHIN NORMAL LIMITS (WNL) UNLESS OTHERWISE STATED BELOW. ( ALL OF THE BELOW CATEGORIES OF THE MSE HAVE BEEN REVIEWED (reviewed 2/18/2018) AND UPDATED AS DEEMED APPROPRIATE )  General Presentation age appropriate, evasive and guarded   Orientation oriented to time, place and person   Vital Signs  See below (reviewed 2/18/2018); Vital Signs (BP, Pulse, & Temp) are within normal limits if not listed below.    Gait and Station Stable/steady, no ataxia   Musculoskeletal System No extrapyramidal symptoms (EPS); no abnormal muscular movements or Tardive Dyskinesia (TD); muscle strength and tone are within normal limits   Language No aphasia or dysarthria   Speech:  hypoverbal   Thought Processes concrete; normal rate of thoughts; poor abstract reasoning/computation   Thought Associations goal directed   Thought Content free of delusions and free of hallucinations   Suicidal Ideations none   Homicidal Ideations none   Mood:  irritable   Affect:  mood-congruent   Memory recent  fair   Memory remote:  fair   Concentration/Attention:  distractable   Fund of Knowledge significantly below average   Insight:  poor   Reliability poor   Judgment:  poor          VITALS:     Patient Vitals for the past 24 hrs:   Temp Pulse Resp BP SpO2   02/18/18 1900 98.1 °F (36.7 °C) 70 16 161/77 100 %   02/18/18 1600 98 °F (36.7 °C) 65 16 144/68 99 %   02/18/18 0900 98 °F (36.7 °C) 77 18 151/58 96 %   02/17/18 2000 97.7 °F (36.5 °C) 75 18 159/70 98 %     Wt Readings from Last 3 Encounters:   02/18/18 90.4 kg (199 lb 3.2 oz)   01/23/18 83.6 kg (184 lb 4.9 oz)   07/13/17 88.9 kg (196 lb)     Temp Readings from Last 3 Encounters:   02/18/18 98.1 °F (36.7 °C)   01/24/18 97.2 °F (36.2 °C)   07/20/17 97.9 °F (36.6 °C)     BP Readings from Last 3 Encounters:   02/18/18 161/77   01/24/18 172/68   07/20/17 175/76     Pulse Readings from Last 3 Encounters:   02/18/18 70   01/24/18 72   07/20/17 (!) 52            DATA     LABORATORY DATA:(reviewed/updated 2/18/2018)  Recent Results (from the past 24 hour(s))   GLUCOSE, POC    Collection Time: 02/17/18  8:04 PM   Result Value Ref Range    Glucose (POC) 210 (H) 65 - 100 mg/dL    Performed by Jessica Purvis, POC    Collection Time: 02/18/18  7:57 AM   Result Value Ref Range    Glucose (POC) 74 65 - 100 mg/dL    Performed by Billie Delgado    GLUCOSE, POC    Collection Time: 02/18/18  8:11 AM   Result Value Ref Range    Glucose (POC) 109 (H) 65 - 100 mg/dL    Performed by Billie Delgado    GLUCOSE, POC    Collection Time: 02/18/18  4:06 PM   Result Value Ref Range    Glucose (POC) 188 (H) 65 - 100 mg/dL    Performed by Elli Hill      No results found for: VALF2, VALAC, VALP, VALPR, DS6, CRBAM, CRBAMP, CARB2, XCRBAM  No results found for: LITHM   RADIOLOGY REPORTS:(reviewed/updated 2/18/2018)  Xr Chest Port    Result Date: 1/20/2018  EXAM: Portable CXR.  1800 hours. COMPARISON: 1/19/2015. INDICATION: cp, fatigue There is new interstitial pulmonary edema, cardiomegaly and small left pleural effusion. There is no focal infiltrate, pneumothorax or midline shift. IMPRESSION: CHF pattern.          MEDICATIONS     ALL MEDICATIONS:   Current Facility-Administered Medications   Medication Dose Route Frequency    LORazepam (ATIVAN) tablet 1 mg  1 mg Oral TID    mirtazapine (REMERON) tablet 30 mg  30 mg Oral QHS    buPROPion SR (WELLBUTRIN SR) tablet 150 mg  150 mg Oral DAILY    alum-mag hydroxide-simeth (MYLANTA) oral suspension 30 mL  30 mL Oral Q4H PRN    doxazosin (CARDURA) tablet 4 mg  4 mg Oral QHS    polyethylene glycol (MIRALAX) packet 17 g  17 g Oral DAILY    OLANZapine (ZyPREXA) tablet 2.5 mg  2.5 mg Oral Q6H PRN    ziprasidone (GEODON) 10 mg in sterile water (preservative free) 0.5 mL injection  10 mg IntraMUSCular BID PRN    benztropine (COGENTIN) tablet 1 mg  1 mg Oral BID PRN    benztropine (COGENTIN) injection 1 mg  1 mg IntraMUSCular BID PRN    zolpidem (AMBIEN) tablet 5 mg  5 mg Oral QHS PRN    acetaminophen (TYLENOL) tablet 650 mg  650 mg Oral Q4H PRN    magnesium hydroxide (MILK OF MAGNESIA) 400 mg/5 mL oral suspension 30 mL  30 mL Oral DAILY PRN    nicotine (NICODERM CQ) 21 mg/24 hr patch 1 Patch  1 Patch TransDERmal DAILY PRN    influenza vaccine 2017-18 (3 yrs+)(PF) (FLUZONE QUAD/FLUARIX QUAD) injection 0.5 mL  0.5 mL IntraMUSCular PRIOR TO DISCHARGE    amLODIPine (NORVASC) tablet 10 mg  10 mg Oral DAILY    aspirin delayed-release tablet 81 mg  81 mg Oral DAILY    atorvastatin (LIPITOR) tablet 40 mg  40 mg Oral DAILY    carvedilol (COREG) tablet 3.125 mg  3.125 mg Oral BID WITH MEALS    ferrous sulfate tablet 325 mg  325 mg Oral BID WITH MEALS    hydrALAZINE (APRESOLINE) tablet 100 mg  100 mg Oral TID    glucose chewable tablet 16 g  4 Tab Oral PRN    dextrose (D50W) injection syrg 12.5-25 g  12.5-25 g IntraVENous PRN    glucagon (GLUCAGEN) injection 1 mg  1 mg IntraMUSCular PRN    insulin lispro (HUMALOG) injection   SubCUTAneous AC&HS    nicotine (NICODERM CQ) 21 mg/24 hr patch 1 Patch  1 Patch TransDERmal Q24H      SCHEDULED MEDICATIONS:   Current Facility-Administered Medications   Medication Dose Route Frequency    LORazepam (ATIVAN) tablet 1 mg  1 mg Oral TID    mirtazapine (REMERON) tablet 30 mg  30 mg Oral QHS    buPROPion SR (WELLBUTRIN SR) tablet 150 mg  150 mg Oral DAILY    doxazosin (CARDURA) tablet 4 mg  4 mg Oral QHS    polyethylene glycol (MIRALAX) packet 17 g  17 g Oral DAILY    influenza vaccine 2017-18 (3 yrs+)(PF) (FLUZONE QUAD/FLUARIX QUAD) injection 0.5 mL  0.5 mL IntraMUSCular PRIOR TO DISCHARGE    amLODIPine (NORVASC) tablet 10 mg  10 mg Oral DAILY    aspirin delayed-release tablet 81 mg  81 mg Oral DAILY    atorvastatin (LIPITOR) tablet 40 mg  40 mg Oral DAILY    carvedilol (COREG) tablet 3.125 mg  3.125 mg Oral BID WITH MEALS    ferrous sulfate tablet 325 mg  325 mg Oral BID WITH MEALS    hydrALAZINE (APRESOLINE) tablet 100 mg  100 mg Oral TID    insulin lispro (HUMALOG) injection   SubCUTAneous AC&HS    nicotine (NICODERM CQ) 21 mg/24 hr patch 1 Patch  1 Patch TransDERmal Q24H          ASSESSMENT & PLAN     DIAGNOSES REQUIRING ACTIVE TREATMENT AND MONITORING: (reviewed/updated 2/18/2018)  Patient Active Hospital Problem List:   Depression (1/24/2018)    Assessment: sadness, helplessness, hopelessness, in reaction to recent bilateral lower leg amputationa and inability to pay water bill at home. Plan: consider forced medication/treatment, address social issues, daughter may be obtaining guardianship  1/27/18 need order to treat    1/28/18 continue his medications    02/10/18: Continue current treatment, Ativan dose was adjusted. 02/11/18: Continue current treatment. 2/17/18 continue same medications   2/18/18 continue same medications         In summary, Sharlene Harding, is a 78 y.o.  male who presents with a severe exacerbation of the principal diagnosis of Depression  Patient's condition is worsening/not improving/not stable Patient requires continued inpatient hospitalization for further stabilization, safety monitoring and medication management. I will continue to coordinate the provision of individual, milieu, occupational, group, and substance abuse therapies to address target symptoms/diagnoses as deemed appropriate for the individual patient. A coordinated, multidisplinary treatment team round was conducted with the patient (this team consists of the nurse, psychiatric unit pharmcist,  and writer). Complete current electronic health record for patient has been reviewed today including consultant notes, ancillary staff notes, nurses and psychiatric tech notes. Suicide risk assessment completed and patient deemed to be of low risk for suicide at this time. The following regarding medications was addressed during rounds with patient:   the risks and benefits of the proposed medication.  The patient was given the opportunity to ask questions. Informed consent given to the use of the above medications. Will continue to adjust psychiatric and non-psychiatric medications (see above \"medication\" section and orders section for details) as deemed appropriate & based upon diagnoses and response to treatment. I will continue to order blood tests/labs and diagnostic tests as deemed appropriate and review results as they become available (see orders for details and above listed lab/test results). I will order psychiatric records from previous [facility] hospitals to further elucidate the nature of patient's psychopathology and review once available. I will gather additional collateral information from friends, family and o/p treatment team to further elucidate the nature of patient's psychopathology and baselline level of psychiatric functioning. I certify that this patient's inpatient psychiatric hospital services furnished since the previous certification were, and continue to be, required for treatment that could reasonably be expected to improve the patient's condition, or for diagnostic study, and that the patient continues to need, on a daily basis, active treatment furnished directly by or requiring the supervision of inpatient psychiatric facility personnel. In addition, the hospital records show that services furnished were intensive treatment services, admission or related services, or equivalent services.     EXPECTED DISCHARGE DATE/DAY: TBD     DISPOSITION: Home       Signed By:   Nehal Ewing MD  2/18/2018

## 2018-02-19 NOTE — INTERDISCIPLINARY ROUNDS
Behavioral Health Interdisciplinary Rounds     Patient Name: Esau Harry  Age: 78 y.o. Room/Bed:  740/02  Primary Diagnosis: Depression   Admission Status: Involuntary Commitment     Readmission within 30 days: no  Power of  in place: yes - daughter   Patient requires a blocked bed: no          Reason for blocked bed: n/a    VTE Prophylaxis: Yes - ASA  Flu vaccine given : no - refused   Mobility needs/Fall risk: yes    Nutritional Plan: yes  Consults: no         Labs/Testing due today?: no    Sleep hours: 4.0      Participation in Care/Groups:  no  Medication Compliant?: Yes  PRNS (last 24 hours): None    Restraints (last 24 hours):  no  Substance Abuse:  no  CIWA (range last 24 hours):  COWS (range last 24 hours):   Alcohol screening (AUDIT) completed -  AUDIT Score: 0  If applicable, date SBIRT discussed in treatment team AND documented: N/A  Tobacco - patient is a smoker: yes   Date tobacco education completed by RN: 2/17/18  24 hour chart check complete: yes     Patient goal(s) for today:   Treatment team focus/goals: Continue working towards placement; call [facility]  Progress note     LOS:  26  Expected LOS: TBD    Financial concerns/prescription coverage: Medicaid  Date of last family contact:       Family requesting physician contact today:    Discharge plan: Placement  Guns in the home: no        Outpatient provider(s): TBD    Participating treatment team members:  DARRICK Mary; Dr. Ludin Rivera MD; Yusuf Villalobos RN

## 2018-02-20 LAB
GLUCOSE BLD STRIP.AUTO-MCNC: 100 MG/DL (ref 65–100)
GLUCOSE BLD STRIP.AUTO-MCNC: 130 MG/DL (ref 65–100)
SERVICE CMNT-IMP: ABNORMAL
SERVICE CMNT-IMP: NORMAL

## 2018-02-20 PROCEDURE — 74011250637 HC RX REV CODE- 250/637: Performed by: PSYCHIATRY & NEUROLOGY

## 2018-02-20 PROCEDURE — 74011250637 HC RX REV CODE- 250/637

## 2018-02-20 PROCEDURE — 65220000003 HC RM SEMIPRIVATE PSYCH

## 2018-02-20 PROCEDURE — 74011250637 HC RX REV CODE- 250/637: Performed by: HOSPITALIST

## 2018-02-20 PROCEDURE — 74011250637 HC RX REV CODE- 250/637: Performed by: FAMILY MEDICINE

## 2018-02-20 PROCEDURE — 82962 GLUCOSE BLOOD TEST: CPT

## 2018-02-20 RX ADMIN — LORAZEPAM 1 MG: 1 TABLET ORAL at 21:01

## 2018-02-20 RX ADMIN — ACETAMINOPHEN 650 MG: 325 TABLET, FILM COATED ORAL at 01:39

## 2018-02-20 RX ADMIN — HYDRALAZINE HYDROCHLORIDE 100 MG: 50 TABLET, FILM COATED ORAL at 21:01

## 2018-02-20 RX ADMIN — HYDRALAZINE HYDROCHLORIDE 100 MG: 50 TABLET, FILM COATED ORAL at 17:13

## 2018-02-20 RX ADMIN — FERROUS SULFATE TAB 325 MG (65 MG ELEMENTAL FE) 325 MG: 325 (65 FE) TAB at 17:12

## 2018-02-20 RX ADMIN — MIRTAZAPINE 30 MG: 15 TABLET, FILM COATED ORAL at 21:01

## 2018-02-20 RX ADMIN — ZOLPIDEM TARTRATE 5 MG: 5 TABLET ORAL at 01:39

## 2018-02-20 RX ADMIN — DOXAZOSIN 4 MG: 2 TABLET ORAL at 21:01

## 2018-02-20 RX ADMIN — CARVEDILOL 3.12 MG: 3.12 TABLET, FILM COATED ORAL at 17:12

## 2018-02-20 NOTE — BH NOTES
PRN Medication Documentation  Specific patient behavior that led to need for PRN medication: pt request for sleeping med   Staff interventions attempted prior to PRN being given: quiet , dim lighting   PRN medication given: Ambien 5 mg po , Tylenol 650 mg po   Patient response/effectiveness of PRN medication: pending

## 2018-02-20 NOTE — BH NOTES
PSYCHIATRIC PROGRESS NOTE         Patient Name  Wendy Aponte   Date of Birth 1940   [facility] [id number]   Medical Record Number  [id number]      Age  78 y.o. PCP Johny Warner, MD   Admit date:  1/24/2018    Room Number  [phone]  @ Replaced by [facility]   Date of Service  2/20/2018          PSYCHOTHERAPY SESSION NOTE:  Length of psychotherapy session: 15 minutes    Main condition/diagnosis/issues treated during session today, 2/20/2018 : med , meal and group non compliance    I employed Cognitive Behavioral therapy techniques, Reality-Oriented psychotherapy, as well as supportive psychotherapy in regards to various ongoing psychosocial stressors, including the following: pre-admission and current problems; housing issues; occupational issues; medical issues; and stress of hospitalization. Interpersonal relationship issues and psychodynamic conflicts explored. Attempts made to alleviate maladaptive patterns. We, also, worked on issues of denial & effects of substance dependency/use     Overall, patient is not progressing    Treatment Plan Update (reviewed an updated 2/20/2018) : I will modify psychotherapy tx plan by implementing more stress management strategies, building upon cognitive behavioral techniques, increasing coping skills, as well as shoring up psychological defenses). An extended energy and skill set was needed to engage pt in psychotherapy due to some of the following: resistiveness, complexity, negativity, confrontational nature, hostile behaviors, and/or severe abnormalities in thought processes/psychosis resulting in the loss of expressive/receptive language communication skills. E & M PROGRESS NOTE:         HISTORY       CC:  \"depression and non compliance with treatment \"  HISTORY OF PRESENT ILLNESS/INTERVAL HISTORY:  (reviewed/updated 2/20/2018).   per initial evaluation:     Wendy Aponte presents/reports/evidences the following emotional symptoms today, 2/20/2018:depression. The above symptoms have been present for 1 years. These symptoms are of severe severity. The symptoms are constant  in nature. Additional symptomatology and features include anxiety. 2/2/18- pATIENT HAS IMPROVED SPORADIC MED COMPLIANCE WITH INCREASED ATIVAN DOSE. WAS SOCIAL IN [place] TODAY. SW TO CONTACT DAUGHTER TO LET HER KNOE THAT HE IS NOT BEING AS TREATMENT COMPLIANT AS HE TOLD HER HE WOULD BE.    1/27/18 he is not responding to questions and not engaging and not showing engagement and refuse medications and treatment   1/28/18 he is doing better and he engaged and not feeling sad or depressed and he eats better    1/29/18= Patient is deliberately refusing vital signs, labs and medications. He likes finger foods and eats those. Will meet with daughter on Tuesday to discuss possible guardianship. Will seek forced meds. 1/30/18- Pt. Agreed to comply with meds after extensive family meeting. Planning for SNF admission and possible transfer to home afterwards [address].  1/31/18- Patient is intermittently cooperative with somatic meds. We discussed the risks to his health this can cause. I advised Wellbutrin for depression. This may help motivate treatment compliance. 2/1/18- Continues Rude, belligerent, uncooperative and disrespectful with staff. Is marginally compliant with diabetes management. Waiting for placement. 2/3/18- no complaints. More redirectable, less hostile and more compliant  2/4/18- Mr. Gonzalez Class very somnolent this morning. No agitation  2/5/18- Sporadic , selective compliance with medications. Denies SI/HI. Needs reminding of treatment goals to improve compliance for transfer to SNF.  2/6/18- mr. Gonzalez Class was bright and positive this morning. Less demanding and hostile towards staff. Asking to go home. 2/7/18- Apologized to writer for being rude and uncooperative with treatment. Focused on discharge.  Understandd house repairs need to be completed first.  2/8/18- sleeping this morning. 2/9/18- grumpy, entitled and impatient re discharge, accepts inerim SNF placement. 2/10/18: intermittently agitated, verbally abusive and  hostile, making gesture to strike out,focused on discharge, no finish, confuse,non redirectable, received prn ativan and his dose of schedule ativan was adjusted. 02/11/18:Patient was seen today, As per staff patient continues to demand to go home and discharge, Thought process tangential, poor insight, non redirectable,gets usp set when staff redirect him. Accepting medications and meals. Does not want to go to NH.   2/12/18- med compliance improved. Understands his home is not habitable until repairs are made, however insistes on focusing with staff on immediate discharge. More appropriate and cordial lately. 2/13/18- Recently has maintained appropriate social behavior and cordiality with staff. Denies SI ans HI. Is compliant with treatment. 2/14/18- More polite and showing patience about waiting for SNF to accept. 2/15/18- Focused on discharge. No insight that home is not safe to due to disrepair and no running h2o. Irritable and demanding. 2/16/18- Continues sarcastic, irritable and has no insight that he cannot go live in a house without running water. Expects preferential treatment when it comes to SNF waiting list. Asking multiple staff members the same discharge question- even though he has not forgotten the answer. (clearly able to recall othetr tghings long and short term)  2/17/18 he is the same and he is irritable and angry and not happy about being here and no self harm  Behavior and no issues with sleep and no self harm behavior    2/18/18 he is not happy about being here and not feeling happy about being here and still gets angry and cano and still has issues with staff   2/19/18- Not requiring PRN's but still irritable, bored and demanding. 2/20/18- Waiting for placement. Very entitled, unpleasant to staff.  No social with other patients. Bitter over life circumstances and blames others for all of his troubles. SIDE EFFECTS: (reviewed/updated 2/20/2018)  None reported or admitted to. No noted toxicity with use of Depakote/Tegretol/lithium/Clozaril/TCAs   ALLERGIES:(reviewed/updated 2/20/2018)  Allergies   Allergen Reactions    Tetracycline Hives      MEDICATIONS PRIOR TO ADMISSION:(reviewed/updated 2/20/2018)  Prescriptions Prior to Admission   Medication Sig    hydrALAZINE (APRESOLINE) 100 mg tablet Take 1 Tab by mouth three (3) times daily.  amLODIPine (NORVASC) 10 mg tablet Take 1 Tab by mouth daily.  aspirin delayed-release 81 mg tablet Take 1 Tab by mouth daily.  atorvastatin (LIPITOR) 40 mg tablet Take 1 Tab by mouth daily.  carvedilol (COREG) 3.125 mg tablet Take 1 Tab by mouth two (2) times daily (with meals).  ferrous sulfate 325 mg (65 mg iron) tablet Take 1 Tab by mouth two (2) times daily (with meals).  mirtazapine (REMERON) 7.5 mg tablet Take 1 Tab by mouth nightly.  nicotine (NICODERM CQ) 21 mg/24 hr 1 Patch by TransDERmal route every twenty-four (24) hours for 30 days. PAST MEDICAL HISTORY: Past medical history from the initial psychiatric evaluation has been reviewed (reviewed/updated 2/20/2018) with no additional updates (I asked patient and no additional past medical history provided). Past Medical History:   Diagnosis Date    Arthritis     CAD (coronary artery disease) 2007    CKD (chronic kidney disease), stage III     DM type 2 causing renal disease (HCC)     DVT (deep venous thrombosis) ([facility])     Hx of DVT:  Xarelto stopped due to GI bleed. S/P IVC filter.  Gait abnormality     GI bleed     Heart murmur     Hepatitis C     History of blood transfusion     Hypercholesterolemia     Hypertension 11/7/2014    Neuropathy     Non compliance w medication regimen     Peripheral vascular disease ([facility] [address] 11/7/2014    A.   S/P Right SFA POBA and tibial atherectomy (2/4/13).  PUD (peptic ulcer disease) 2007    Unspecified sleep apnea     never used cpap     Past Surgical History:   Procedure Laterality Date    ABDOMEN SURGERY PROC UNLISTED  2007    has approx 7-8\" midline incision on abdomen--\"had to open him up and clean him out after feeding tube clogged\"    HX GI  2007    feeding tube for approx 2 mo    VASCULAR SURGERY PROCEDURE UNLIST Right 1/22/2015    popliteal-tibial bypass      SOCIAL HISTORY: Social history from the initial psychiatric evaluation has been reviewed (reviewed/updated 2/20/2018) with no additional updates (I asked patient and no additional social history provided). Social History     Social History    Marital status:      Spouse name: N/A    Number of children: N/A    Years of education: N/A     Occupational History    Not on file. Social History Main Topics    Smoking status: Current Every Day Smoker     Packs/day: 0.50    Smokeless tobacco: Not on file    Alcohol use No    Drug use: No      Comment: >20 years ago    Sexual activity: Not on file     Other Topics Concern    Not on file     Social History Narrative    78 year ols male admitted for depression and homelessness. Pt will be evicated from apartment due to non payment of bills. [facility] 30 [facility]. \" Pt is a brittle diabetic, with treatment non compliance. He has bilarela lower extremity amputations. Patient has a long hx of substance abuse. FAMILY HISTORY: Family history from the initial psychiatric evaluation has been reviewed (reviewed/updated 2/20/2018) with no additional updates (I asked patient and no additional family history provided).    Family History   Problem Relation Age of Onset    Hypertension Father        REVIEW OF SYSTEMS: (reviewed/updated 2/20/2018)  Appetite:decreased   Sleep: fitful   All other Review of Systems: Psychological ROS: positive for - behavioral disorder  Respiratory ROS: no cough, shortness of breath, or wheezing  Cardiovascular ROS: no chest pain or dyspnea on exertion         [address] [facility] [place] [place]    MSE FINDINGS ARE WITHIN NORMAL LIMITS (WNL) UNLESS OTHERWISE STATED BELOW. ( ALL OF THE BELOW CATEGORIES OF THE MSE HAVE BEEN REVIEWED (reviewed 2/20/2018) AND UPDATED AS DEEMED APPROPRIATE )  General Presentation age appropriate, evasive and guarded   Orientation oriented to time, place and person   Vital Signs  See below (reviewed 2/20/2018); Vital Signs (BP, Pulse, & Temp) are within normal limits if not listed below.    Gait and Station Stable/steady, no ataxia   Musculoskeletal System No extrapyramidal symptoms (EPS); no abnormal muscular movements or Tardive Dyskinesia (TD); muscle strength and tone are within normal limits   Language No aphasia or dysarthria   Speech:  hypoverbal   Thought Processes concrete; normal rate of thoughts; poor abstract reasoning/computation   Thought Associations goal directed   Thought Content free of delusions and free of hallucinations   Suicidal Ideations none   Homicidal Ideations none   Mood:  irritable   Affect:  mood-congruent   Memory recent  fair   Memory remote:  fair   Concentration/Attention:  distractable   Fund of Knowledge significantly below average   Insight:  poor   Reliability poor   Judgment:  poor          VITALS:     Patient Vitals for the past 24 hrs:   Temp Pulse Resp BP SpO2   02/20/18 0750 97.3 °F (36.3 °C) 74 16 146/64 100 %   02/19/18 2029 97.9 °F (36.6 °C) 76 16 166/72 100 %   02/19/18 1646 97.5 °F (36.4 °C) 67 16 145/65 100 %     Wt Readings from Last 3 Encounters:   02/18/18 90.4 kg (199 lb 3.2 oz)   01/23/18 83.6 kg (184 lb 4.9 oz)   07/13/17 88.9 kg (196 lb)     Temp Readings from Last 3 Encounters:   02/20/18 97.3 °F (36.3 °C)   01/24/18 97.2 °F (36.2 °C)   07/20/17 97.9 °F (36.6 °C)     BP Readings from Last 3 Encounters:   02/20/18 146/64   01/24/18 172/68   07/20/17 175/76 Pulse Readings from Last 3 Encounters:   02/20/18 74   01/24/18 72   07/20/17 (!) 52            DATA     LABORATORY DATA:(reviewed/updated 2/20/2018)  Recent Results (from the past 24 hour(s))   GLUCOSE, POC    Collection Time: 02/19/18  4:39 PM   Result Value Ref Range    Glucose (POC) 168 (H) 65 - 100 mg/dL    Performed by [employer] POC    Collection Time: 02/19/18  8:07 PM   Result Value Ref Range    Glucose (POC) 130 (H) 65 - 100 mg/dL    Performed by [employer] POC    Collection Time: 02/20/18  8:21 AM   Result Value Ref Range    Glucose (POC) 100 65 - 100 mg/dL    Performed by Larisa Franks      No results found for: VALF2, VALAC, VALP, VALPR, DS6, CRBAM, CRBAMP, CARB2, XCRBAM  No results found for: LITHM   RADIOLOGY REPORTS:(reviewed/updated 2/20/2018)  Xr Chest Port    Result Date: 1/20/2018  EXAM: Portable CXR.  1800 hours. COMPARISON: 1/19/2015. INDICATION: cp, fatigue There is new interstitial pulmonary edema, cardiomegaly and small left pleural effusion. There is no focal infiltrate, pneumothorax or midline shift. IMPRESSION: CHF pattern.          MEDICATIONS     ALL MEDICATIONS:   Current Facility-Administered Medications   Medication Dose Route Frequency    LORazepam (ATIVAN) tablet 1 mg  1 mg Oral TID    mirtazapine (REMERON) tablet 30 mg  30 mg Oral QHS    buPROPion SR (WELLBUTRIN SR) tablet 150 mg  150 mg Oral DAILY    alum-mag hydroxide-simeth (MYLANTA) oral suspension 30 mL  30 mL Oral Q4H PRN    doxazosin (CARDURA) tablet 4 mg  4 mg Oral QHS    polyethylene glycol (MIRALAX) packet 17 g  17 g Oral DAILY    OLANZapine (ZyPREXA) tablet 2.5 mg  2.5 mg Oral Q6H PRN    ziprasidone (GEODON) 10 mg in sterile water (preservative free) 0.5 mL injection  10 mg IntraMUSCular BID PRN    benztropine (COGENTIN) tablet 1 mg  1 mg Oral BID PRN    benztropine (COGENTIN) injection 1 mg  1 mg IntraMUSCular BID PRN    zolpidem (AMBIEN) tablet 5 mg  5 mg Oral QHS PRN  acetaminophen (TYLENOL) tablet 650 mg  650 mg Oral Q4H PRN    magnesium hydroxide (MILK OF MAGNESIA) 400 mg/5 mL oral suspension 30 mL  30 mL Oral DAILY PRN    nicotine (NICODERM CQ) 21 mg/24 hr patch 1 Patch  1 Patch TransDERmal DAILY PRN    influenza vaccine 2017-18 (3 yrs+)(PF) (FLUZONE QUAD/FLUARIX QUAD) injection 0.5 mL  0.5 mL IntraMUSCular PRIOR TO DISCHARGE    amLODIPine (NORVASC) tablet 10 mg  10 mg Oral DAILY    aspirin delayed-release tablet 81 mg  81 mg Oral DAILY    atorvastatin (LIPITOR) tablet 40 mg  40 mg Oral DAILY    carvedilol (COREG) tablet 3.125 mg  3.125 mg Oral BID WITH MEALS    ferrous sulfate tablet 325 mg  325 mg Oral BID WITH MEALS    hydrALAZINE (APRESOLINE) tablet 100 mg  100 mg Oral TID    glucose chewable tablet 16 g  4 Tab Oral PRN    dextrose (D50W) injection syrg 12.5-25 g  12.5-25 g IntraVENous PRN    glucagon (GLUCAGEN) injection 1 mg  1 mg IntraMUSCular PRN    insulin lispro (HUMALOG) injection   SubCUTAneous AC&HS    nicotine (NICODERM CQ) 21 mg/24 hr patch 1 Patch  1 Patch TransDERmal Q24H      SCHEDULED MEDICATIONS:   Current Facility-Administered Medications   Medication Dose Route Frequency    LORazepam (ATIVAN) tablet 1 mg  1 mg Oral TID    mirtazapine (REMERON) tablet 30 mg  30 mg Oral QHS    buPROPion SR (WELLBUTRIN SR) tablet 150 mg  150 mg Oral DAILY    doxazosin (CARDURA) tablet 4 mg  4 mg Oral QHS    polyethylene glycol (MIRALAX) packet 17 g  17 g Oral DAILY    influenza vaccine 2017-18 (3 yrs+)(PF) (FLUZONE QUAD/FLUARIX QUAD) injection 0.5 mL  0.5 mL IntraMUSCular PRIOR TO DISCHARGE    amLODIPine (NORVASC) tablet 10 mg  10 mg Oral DAILY    aspirin delayed-release tablet 81 mg  81 mg Oral DAILY    atorvastatin (LIPITOR) tablet 40 mg  40 mg Oral DAILY    carvedilol (COREG) tablet 3.125 mg  3.125 mg Oral BID WITH MEALS    ferrous sulfate tablet 325 mg  325 mg Oral BID WITH MEALS    hydrALAZINE (APRESOLINE) tablet 100 mg  100 mg Oral TID    insulin lispro (HUMALOG) injection   SubCUTAneous AC&HS    nicotine (NICODERM CQ) 21 mg/24 hr patch 1 Patch  1 Patch TransDERmal Q24H          ASSESSMENT & PLAN     DIAGNOSES REQUIRING ACTIVE TREATMENT AND MONITORING: (reviewed/updated 2/20/2018)  Patient Active Hospital Problem List:   Depression (1/24/2018)    Assessment: sadness, helplessness, hopelessness, in reaction to recent bilateral lower leg amputationa and inability to pay water bill at home. Plan: consider forced medication/treatment, address social issues, daughter may be obtaining guardianship  1/27/18 need order to treat    1/28/18 continue his medications    02/10/18: Continue current treatment, Ativan dose was adjusted. 02/11/18: Continue current treatment. 2/17/18 continue same medications   2/18/18 continue same medications         In summary, Shaheed Lieberman, is a 78 y.o.  male who presents with a severe exacerbation of the principal diagnosis of Depression  Patient's condition is worsening/not improving/not stable Patient requires continued inpatient hospitalization for further stabilization, safety monitoring and medication management. I will continue to coordinate the provision of individual, milieu, occupational, group, and substance abuse therapies to address target symptoms/diagnoses as deemed appropriate for the individual patient. A coordinated, multidisplinary treatment team round was conducted with the patient (this team consists of the nurse, psychiatric unit pharmcist,  and writer). Complete current electronic health record for patient has been reviewed today including consultant notes, ancillary staff notes, nurses and psychiatric tech notes. Suicide risk assessment completed and patient deemed to be of low risk for suicide at this time. The following regarding medications was addressed during rounds with patient:   the risks and benefits of the proposed medication.  The patient was given the opportunity to ask questions. Informed consent given to the use of the above medications. Will continue to adjust psychiatric and non-psychiatric medications (see above \"medication\" section and orders section for details) as deemed appropriate & based upon diagnoses and response to treatment. I will continue to order blood tests/labs and diagnostic tests as deemed appropriate and review results as they become available (see orders for details and above listed lab/test results). I will order psychiatric records from previous [facility] hospitals to further elucidate the nature of patient's psychopathology and review once available. I will gather additional collateral information from friends, family and o/p treatment team to further elucidate the nature of patient's psychopathology and baselline level of psychiatric functioning. I certify that this patient's inpatient psychiatric hospital services furnished since the previous certification were, and continue to be, required for treatment that could reasonably be expected to improve the patient's condition, or for diagnostic study, and that the patient continues to need, on a daily basis, active treatment furnished directly by or requiring the supervision of inpatient psychiatric facility personnel. In addition, the hospital records show that services furnished were intensive treatment services, admission or related services, or equivalent services.     EXPECTED DISCHARGE DATE/DAY: TBD     DISPOSITION: Home       Signed By:   Jameel Tim MD  2/20/2018

## 2018-02-20 NOTE — PROGRESS NOTES
Problem: Falls - Risk of  Goal: *Absence of Falls  Document Bello Fall Risk and appropriate interventions in the flowsheet. Outcome: Progressing Towards Goal  Fall Risk Interventions:  Mobility Interventions: Bed/chair exit alarm, Communicate number of staff needed for ambulation/transfer, Mechanical lift    Mentation Interventions: Adequate sleep, hydration, pain control, Bed/chair exit alarm, Door open when patient unattended    Medication Interventions: Bed/chair exit alarm, Patient to call before getting OOB    Elimination Interventions: Bed/chair exit alarm, Call light in reach, Toileting schedule/hourly rounds    History of Falls Interventions: Bed/chair exit alarm, Door open when patient unattended, Room close to nurse's station      Free of falls. Bed alarm on the bed. Falls tool  completed and accurate. Patient transferred to [facility] chair with walt lift and presently sitting out in the dining room.

## 2018-02-20 NOTE — BH NOTES
GROUP THERAPY PROGRESS NOTE    Gunner Stewart passively and partially participated in a morning Process Group on the Geriatric Unit, with a focus identifying feelings, planning for the day, and singing. Group time: 45 minutes. Personal goal for participation: To increase the capacity to shift ones mood, prepare for the day, and share in group singing. Goal orientation: The patient will be able to prepare for the day through group singing. Group therapy participation: When prompted, this patient passively and partially participated in the group. Therapeutic interventions reviewed and discussed: The group members were introduce themselves by first names and participate in group singing as a way to increase their oxygen and blood flow and begin their day on a positive note. They were also asked to join in singing several songs. Impression of participation: The patient joined the group with about ten minutes left in the session. He looked alert but did not respond when prompted. He quietly listened to the music without joining in the singing or contributing to the group conversation. The patient expressed no SI/HI and displayed no overt psychosis. His affect was restrained and his mood matched his affect. He was not fully engaged in this session.

## 2018-02-20 NOTE — BH NOTES
[facility] assisted patient with using Urinal.  130 ml urine output. Incontinent of urine prior to using urinal. Cleaned and changed into clean brief.

## 2018-02-20 NOTE — PROGRESS NOTES
Problem: Falls - Risk of  Goal: *Absence of Falls  Document Bello Fall Risk and appropriate interventions in the flowsheet.    Outcome: Progressing Towards Goal  Fall Risk Interventions:  Lying quietly in bed with eyes closed , respirations even and unlabored , NAD noted   Q15 min safety rounds continue , Bed alarm blinking green , Side rails up x3 , tap bell at bedside   Mobility Interventions: Bed/chair exit alarm    Mentation Interventions: Bed/chair exit alarm    Medication Interventions: Bed/chair exit alarm    Elimination Interventions: Call light in reach    History of Falls Interventions: Bed/chair exit alarm, Door open when patient unattended, Room close to nurse's station

## 2018-02-20 NOTE — PROGRESS NOTES
Problem: Depressed Mood (Adult/Pediatric)  Goal: *STG: Participates in treatment plan  Outcome: Not Progressing Towards Goal  Received sleeping in bed this morning. Woke alert and oriented. Some mild confusion noted. More demanding and manipulative this morning. Not taking medications until his requests are met. Irritable at times. Able to verbalize his thoughts. Able to follow some directions. Assisted out to the dining room after am cares. Presently sitting out on the unit. Has not been medication compliant this  Morning. Staff will encourage better coping skills. Goal: *STG: Attends activities and groups  Outcome: Progressing Towards Goal  Attended music group. Goal: *STG: Complies with medication therapy  Outcome: Not Progressing Towards Goal  Has not been medication compliant.

## 2018-02-20 NOTE — BH NOTES
1530:  Patient received as he is sitting in the Day Room talking on the phone. He asks how to get hold of a  to get him out of here. He is talking to his wife/fiancee and they are arguing about getting him help and getting him out of here as well as his money and getting a . Will maintain q 15 minute safety checks.

## 2018-02-20 NOTE — INTERDISCIPLINARY ROUNDS
Behavioral Health Interdisciplinary Rounds     Patient Name: Evelyn Charles  Age: 78 y.o. Room/Bed:  740/02  Primary Diagnosis: Depression   Admission Status: Involuntary Commitment     Readmission within 30 days: no  Power of  in place: yes - daughter  Patient requires a blocked bed: no          Reason for blocked bed: n/a    VTE Prophylaxis: Yes - ASA  Flu vaccine given : no - refused   Mobility needs/Fall risk: yes    Nutritional Plan: yes  Consults:          Labs/Testing due today?: no    Sleep hours:  4 3/4 hrs      Participation in Care/Groups:  yes  Medication Compliant?: Yes  PRNS (last 24 hours): None    Restraints (last 24 hours):  no  Substance Abuse:  no  CIWA (range last 24 hours):  COWS (range last 24 hours):   Alcohol screening (AUDIT) completed -  AUDIT Score: 0  If applicable, date SBIRT discussed in treatment team AND documented: N/A  Tobacco - patient is a smoker: yes   Date tobacco education completed by RN: 2/17/2018  24 hour chart check complete: yes     Patient goal(s) for today: Practice polite words towards staff  Treatment team focus/goals: Continue working on placement  Progress note: Patient stating he would like to discharge to the homeless shelter; Pt's daughter/POA states to continue seeking placement at [facility]     LOS:  27  Expected LOS: TBD    Financial concerns/prescription coverage: [employer] Medicare; Medicaid  Date of last family contact: 2/20 SW spoke to daughter/POA      Family requesting physician contact today: No  Discharge plan: TBD  Guns in the home: No        Outpatient provider(s): TBD    Participating treatment team members:  DARRICK Chopra; Dr. Shamir Bethea MD; Janette Zurita RN

## 2018-02-21 LAB
GLUCOSE BLD STRIP.AUTO-MCNC: 102 MG/DL (ref 65–100)
GLUCOSE BLD STRIP.AUTO-MCNC: 159 MG/DL (ref 65–100)
GLUCOSE BLD STRIP.AUTO-MCNC: 70 MG/DL (ref 65–100)
GLUCOSE BLD STRIP.AUTO-MCNC: 98 MG/DL (ref 65–100)
SERVICE CMNT-IMP: ABNORMAL
SERVICE CMNT-IMP: ABNORMAL
SERVICE CMNT-IMP: NORMAL
SERVICE CMNT-IMP: NORMAL

## 2018-02-21 PROCEDURE — 82962 GLUCOSE BLOOD TEST: CPT

## 2018-02-21 PROCEDURE — 74011250637 HC RX REV CODE- 250/637: Performed by: HOSPITALIST

## 2018-02-21 PROCEDURE — 74011250637 HC RX REV CODE- 250/637: Performed by: FAMILY MEDICINE

## 2018-02-21 PROCEDURE — 74011250637 HC RX REV CODE- 250/637: Performed by: PSYCHIATRY & NEUROLOGY

## 2018-02-21 PROCEDURE — 65220000003 HC RM SEMIPRIVATE PSYCH

## 2018-02-21 PROCEDURE — 74011250637 HC RX REV CODE- 250/637

## 2018-02-21 RX ORDER — LANOLIN ALCOHOL/MO/W.PET/CERES
325 CREAM (GRAM) TOPICAL 2 TIMES DAILY WITH MEALS
Qty: 60 TAB | Refills: 0 | Status: ON HOLD | OUTPATIENT
Start: 2018-02-21 | End: 2019-02-11

## 2018-02-21 RX ORDER — ATORVASTATIN CALCIUM 40 MG/1
40 TABLET, FILM COATED ORAL DAILY
Qty: 30 TAB | Refills: 0 | Status: SHIPPED | OUTPATIENT
Start: 2018-02-22 | End: 2018-05-20

## 2018-02-21 RX ORDER — LORAZEPAM 0.5 MG/1
0.5 TABLET ORAL 3 TIMES DAILY
Status: DISCONTINUED | OUTPATIENT
Start: 2018-02-21 | End: 2018-02-25

## 2018-02-21 RX ORDER — MIRTAZAPINE 30 MG/1
30 TABLET, FILM COATED ORAL
Qty: 30 TAB | Refills: 0 | Status: ON HOLD | OUTPATIENT
Start: 2018-02-21 | End: 2019-02-11

## 2018-02-21 RX ORDER — DOXAZOSIN 4 MG/1
4 TABLET ORAL
Qty: 30 TAB | Refills: 0 | Status: ON HOLD | OUTPATIENT
Start: 2018-02-21 | End: 2019-02-11

## 2018-02-21 RX ORDER — BUPROPION HYDROCHLORIDE 150 MG/1
150 TABLET, EXTENDED RELEASE ORAL DAILY
Qty: 30 TAB | Refills: 0 | Status: SHIPPED | OUTPATIENT
Start: 2018-02-22 | End: 2018-05-20

## 2018-02-21 RX ORDER — CARVEDILOL 3.12 MG/1
3.12 TABLET ORAL 2 TIMES DAILY WITH MEALS
Qty: 60 TAB | Refills: 0 | Status: ON HOLD | OUTPATIENT
Start: 2018-02-21 | End: 2019-02-11

## 2018-02-21 RX ORDER — LORAZEPAM 0.5 MG/1
0.5 TABLET ORAL 3 TIMES DAILY
Qty: 24 TAB | Refills: 0 | Status: SHIPPED | OUTPATIENT
Start: 2018-02-21 | End: 2018-03-01

## 2018-02-21 RX ORDER — AMLODIPINE BESYLATE 10 MG/1
10 TABLET ORAL DAILY
Qty: 30 TAB | Refills: 0 | Status: SHIPPED | OUTPATIENT
Start: 2018-02-22 | End: 2018-05-20

## 2018-02-21 RX ORDER — HYDRALAZINE HYDROCHLORIDE 100 MG/1
100 TABLET, FILM COATED ORAL 3 TIMES DAILY
Qty: 90 TAB | Refills: 0 | Status: SHIPPED | OUTPATIENT
Start: 2018-02-21 | End: 2018-05-20

## 2018-02-21 RX ADMIN — FERROUS SULFATE TAB 325 MG (65 MG ELEMENTAL FE) 325 MG: 325 (65 FE) TAB at 17:12

## 2018-02-21 RX ADMIN — AMLODIPINE BESYLATE 10 MG: 5 TABLET ORAL at 10:05

## 2018-02-21 RX ADMIN — LORAZEPAM 1 MG: 1 TABLET ORAL at 10:06

## 2018-02-21 RX ADMIN — LORAZEPAM 0.5 MG: 0.5 TABLET ORAL at 20:50

## 2018-02-21 RX ADMIN — BUPROPION HYDROCHLORIDE 150 MG: 150 TABLET, EXTENDED RELEASE ORAL at 10:05

## 2018-02-21 RX ADMIN — FERROUS SULFATE TAB 325 MG (65 MG ELEMENTAL FE) 325 MG: 325 (65 FE) TAB at 10:06

## 2018-02-21 RX ADMIN — CARVEDILOL 3.12 MG: 3.12 TABLET, FILM COATED ORAL at 10:06

## 2018-02-21 RX ADMIN — DOXAZOSIN 4 MG: 2 TABLET ORAL at 21:54

## 2018-02-21 RX ADMIN — HYDRALAZINE HYDROCHLORIDE 100 MG: 50 TABLET, FILM COATED ORAL at 21:00

## 2018-02-21 RX ADMIN — HYDRALAZINE HYDROCHLORIDE 100 MG: 50 TABLET, FILM COATED ORAL at 17:12

## 2018-02-21 RX ADMIN — MIRTAZAPINE 30 MG: 15 TABLET, FILM COATED ORAL at 20:51

## 2018-02-21 RX ADMIN — CARVEDILOL 3.12 MG: 3.12 TABLET, FILM COATED ORAL at 17:12

## 2018-02-21 RX ADMIN — LORAZEPAM 0.5 MG: 0.5 TABLET ORAL at 13:38

## 2018-02-21 RX ADMIN — ATORVASTATIN CALCIUM 40 MG: 40 TABLET, FILM COATED ORAL at 10:05

## 2018-02-21 RX ADMIN — ASPIRIN 81 MG: 81 TABLET, COATED ORAL at 10:06

## 2018-02-21 RX ADMIN — HYDRALAZINE HYDROCHLORIDE 100 MG: 50 TABLET, FILM COATED ORAL at 10:05

## 2018-02-21 NOTE — PROGRESS NOTES
The documentation for this period is being entered following the guidelines as defined [facility] the [facility] by Ronda Fan RN. 2-4 am

## 2018-02-21 NOTE — PROGRESS NOTES
Problem: Falls - Risk of  Goal: *Absence of Falls  Document Bello Fall Risk and appropriate interventions in the flowsheet. Outcome: Progressing Towards Goal  Fall Risk Interventions:  Mobility Interventions: Bed/chair exit alarm, Mechanical lift    Mentation Interventions: Adequate sleep, hydration, pain control, Bed/chair exit alarm, Evaluate medications/consider consulting pharmacy, Eyeglasses and hearing aids    Medication Interventions: Bed/chair exit alarm, Evaluate medications/consider consulting pharmacy, Patient to call before getting OOB    Elimination Interventions: Bed/chair exit alarm, Call light in reach, Patient to call for help with toileting needs, Urinal in reach    History of Falls Interventions: Bed/chair exit alarm, Door open when patient unattended, Room close to nurse's station        Problem: Depressed Mood (Adult/Pediatric)  Goal: *STG: Participates in treatment plan  Outcome: Not Progressing Towards Goal  Patient continues to refuse care efforts at will and capriciously dependent upon his mood. He refused to have his blood sugar checked this evening at bedtime. Goal: *STG: Demonstrates reduction in symptoms and increase in insight into coping skills/future focused  Outcome: Progressing Towards Goal  Patient is impatient for discharge stating tonight that he would go to a homeless shelter to get out of here.

## 2018-02-21 NOTE — INTERDISCIPLINARY ROUNDS
Behavioral Health Interdisciplinary Rounds     Patient Name: Judy Quesada  Age: 78 y.o. Room/Bed:  740/02  Primary Diagnosis: Depression   Admission Status: Involuntary Commitment     Readmission within 30 days: no  Power of  in place: yes , daughter   Patient requires a blocked bed: no          Reason for blocked bed:     VTE Prophylaxis: Yes , ASA   Flu vaccine given : no , refused   Mobility needs/Fall risk: yes    Nutritional Plan: yes  Consults:        Labs/Testing due today?: no    Sleep hours:  4 , broken hours       Participation in Care/Groups:  yes  Medication Compliant?: Yes  PRNS (last 24 hours): Ambien    Restraints (last 24 hours):  no  Substance Abuse:  no  CIWA (range last 24 hours):  COWS (range last 24 hours):   Alcohol screening (AUDIT) completed -  AUDIT Score: 0  If applicable, date SBIRT discussed in treatment team AND documented: N/A  Tobacco - patient is a smoker:  Yes   Date tobacco education completed by RN: 2/17/18  24 hour chart check complete: yes     Patient goal(s) for today: Visit with sister  Treatment team focus/goals: Speak with sister  Progress note: Patient's sister is supposed to visit today; Pt's daughter/POA stated if sister is willing to take Pt, he can discharge into her care. LOS:  28  Expected LOS: TBD    Financial concerns/prescription coverage: [employer] Medicare; Medicaid  Date of last family contact: 2/20 SW spoke to daughter/POA    Family requesting physician contact today: No   Discharge plan: Placement or sister's home? Guns in the home: No      Outpatient provider(s): TBD    Participating treatment team members:  DARRICK Ghosh; Dr. Corinne Soho, MD; Mary Puckett RN

## 2018-02-21 NOTE — PROGRESS NOTES
Problem: Falls - Risk of  Goal: *Absence of Falls  Document Bello Fall Risk and appropriate interventions in the flowsheet.    Outcome: Progressing Towards Goal  Fall Risk Interventions:  Lying quietly in bed with eyes closed, respirations even and unlabored ,   Q15 min safety rounds continue , turns self and assisted with turns Q2 hours, voiding in urinal   Mobility Interventions: Bed/chair exit alarm    Mentation Interventions: Adequate sleep, hydration, pain control    Medication Interventions: Bed/chair exit alarm    Elimination Interventions: Bed/chair exit alarm    History of Falls Interventions: Bed/chair exit alarm, Door open when patient unattended, Room close to nurse's station

## 2018-02-21 NOTE — BH NOTES
1540:  Patient received as he is sitting quietly in a [place] chair at the [place] table. He waves and smiles in greeting oncoming staff and voices no complaints/concerns at this time. He has received a Bible from [facility] [place]. as was requested last evening. Will maintain q 15 minute safety checks.

## 2018-02-21 NOTE — BH NOTES
PSYCHIATRIC PROGRESS NOTE         Patient Name  Shaheed Lieberman   Date of Birth 1940   [facility] [id number]   Medical Record Number  [id number]      Age  78 y.o. PCP Johny Warner, MD   Admit date:  1/24/2018    Room Number  [phone]  @ [facility]   Date of Service  2/21/2018          PSYCHOTHERAPY SESSION NOTE:  Length of psychotherapy session: 15 minutes    Main condition/diagnosis/issues treated during session today, 2/21/2018 : med , meal and group non compliance    I employed Cognitive Behavioral therapy techniques, Reality-Oriented psychotherapy, as well as supportive psychotherapy in regards to various ongoing psychosocial stressors, including the following: pre-admission and current problems; housing issues; occupational issues; medical issues; and stress of hospitalization. Interpersonal relationship issues and psychodynamic conflicts explored. Attempts made to alleviate maladaptive patterns. We, also, worked on issues of denial & effects of substance dependency/use     Overall, patient is not progressing    Treatment Plan Update (reviewed an updated 2/21/2018) : I will modify psychotherapy tx plan by implementing more stress management strategies, building upon cognitive behavioral techniques, increasing coping skills, as well as shoring up psychological defenses). An extended energy and skill set was needed to engage pt in psychotherapy due to some of the following: resistiveness, complexity, negativity, confrontational nature, hostile behaviors, and/or severe abnormalities in thought processes/psychosis resulting in the loss of expressive/receptive language communication skills. E & M PROGRESS NOTE:         HISTORY       CC:  \"depression and non compliance with treatment \"  HISTORY OF PRESENT ILLNESS/INTERVAL HISTORY:  (reviewed/updated 2/21/2018).   per initial evaluation:     Shaheed Lieberman presents/reports/evidences the following emotional symptoms today, 2/21/2018:depression. The above symptoms have been present for 1 years. These symptoms are of severe severity. The symptoms are constant  in nature. Additional symptomatology and features include anxiety. 2/2/18- pATIENT HAS IMPROVED SPORADIC MED COMPLIANCE WITH INCREASED ATIVAN DOSE. WAS SOCIAL IN [place] TODAY. SW TO CONTACT DAUGHTER TO LET HER KNOE THAT HE IS NOT BEING AS TREATMENT COMPLIANT AS HE TOLD HER HE WOULD BE.    1/27/18 he is not responding to questions and not engaging and not showing engagement and refuse medications and treatment   1/28/18 he is doing better and he engaged and not feeling sad or depressed and he eats better    1/29/18= Patient is deliberately refusing vital signs, labs and medications. He likes finger foods and eats those. Will meet with daughter on Tuesday to discuss possible guardianship. Will seek forced meds. 1/30/18- Pt. Agreed to comply with meds after extensive family meeting. Planning for SNF admission and possible transfer to home afterwards [address].  1/31/18- Patient is intermittently cooperative with somatic meds. We discussed the risks to his health this can cause. I advised Wellbutrin for depression. This may help motivate treatment compliance. 2/1/18- Continues Rude, belligerent, uncooperative and disrespectful with staff. Is marginally compliant with diabetes management. Waiting for placement. 2/3/18- no complaints. More redirectable, less hostile and more compliant  2/4/18- Mr. Michael Schroeder very somnolent this morning. No agitation  2/5/18- Sporadic , selective compliance with medications. Denies SI/HI. Needs reminding of treatment goals to improve compliance for transfer to SNF.  2/6/18- mr. Michael Schroeder was bright and positive this morning. Less demanding and hostile towards staff. Asking to go home. 2/7/18- Apologized to writer for being rude and uncooperative with treatment. Focused on discharge.  Understandd house repairs need to be completed first.  2/8/18- sleeping this morning. 2/9/18- grumpy, entitled and impatient re discharge, accepts inerim SNF placement. 2/10/18: intermittently agitated, verbally abusive and  hostile, making gesture to strike out,focused on discharge, no finish, confuse,non redirectable, received prn ativan and his dose of schedule ativan was adjusted. 02/11/18:Patient was seen today, As per staff patient continues to demand to go home and discharge, Thought process tangential, poor insight, non redirectable,gets usp set when staff redirect him. Accepting medications and meals. Does not want to go to NH.   2/12/18- med compliance improved. Understands his home is not habitable until repairs are made, however insistes on focusing with staff on immediate discharge. More appropriate and cordial lately. 2/13/18- Recently has maintained appropriate social behavior and cordiality with staff. Denies SI ans HI. Is compliant with treatment. 2/14/18- More polite and showing patience about waiting for SNF to accept. 2/15/18- Focused on discharge. No insight that home is not safe to due to disrepair and no running h2o. Irritable and demanding. 2/16/18- Continues sarcastic, irritable and has no insight that he cannot go live in a house without running water. Expects preferential treatment when it comes to SNF waiting list. Asking multiple staff members the same discharge question- even though he has not forgotten the answer. (clearly able to recall othetr tghings long and short term)  2/17/18 he is the same and he is irritable and angry and not happy about being here and no self harm  Behavior and no issues with sleep and no self harm behavior    2/18/18 he is not happy about being here and not feeling happy about being here and still gets angry and cano and still has issues with staff   2/19/18- Not requiring PRN's but still irritable, bored and demanding. 2/20/18- Waiting for placement. Very entitled, unpleasant to staff.  No social with other patients. Bitter over life circumstances and blames others for all of his troubles. 2/21/18- Very irritable and rude to staff. Repeatedly demending to be discharged immediately, although his house has no water or electric power. Sister may possibly visit and may take him in to live with her. Med and meal compliant. SIDE EFFECTS: (reviewed/updated 2/21/2018)  None reported or admitted to. No noted toxicity with use of Depakote/Tegretol/lithium/Clozaril/TCAs   ALLERGIES:(reviewed/updated 2/21/2018)  Allergies   Allergen Reactions    Tetracycline Hives      MEDICATIONS PRIOR TO ADMISSION:(reviewed/updated 2/21/2018)  Prescriptions Prior to Admission   Medication Sig    hydrALAZINE (APRESOLINE) 100 mg tablet Take 1 Tab by mouth three (3) times daily.  amLODIPine (NORVASC) 10 mg tablet Take 1 Tab by mouth daily.  aspirin delayed-release 81 mg tablet Take 1 Tab by mouth daily.  atorvastatin (LIPITOR) 40 mg tablet Take 1 Tab by mouth daily.  carvedilol (COREG) 3.125 mg tablet Take 1 Tab by mouth two (2) times daily (with meals).  ferrous sulfate 325 mg (65 mg iron) tablet Take 1 Tab by mouth two (2) times daily (with meals).  mirtazapine (REMERON) 7.5 mg tablet Take 1 Tab by mouth nightly.  nicotine (NICODERM CQ) 21 mg/24 hr 1 Patch by TransDERmal route every twenty-four (24) hours for 30 days. PAST MEDICAL HISTORY: Past medical history from the initial psychiatric evaluation has been reviewed (reviewed/updated 2/21/2018) with no additional updates (I asked patient and no additional past medical history provided). Past Medical History:   Diagnosis Date    Arthritis     CAD (coronary artery disease) 2007    CKD (chronic kidney disease), stage III     DM type 2 causing renal disease (HCC)     [facility] venous thrombosis) ([facility])     Hx of DVT:  Xarelto stopped due to GI bleed. S/P IVC filter.     Gait abnormality     GI bleed     Heart murmur     Hepatitis C     History of blood transfusion     Hypercholesterolemia     Hypertension 11/7/2014    Neuropathy     Non compliance w medication regimen     Peripheral vascular disease ([facility] [address] 11/7/2014    A. S/P Right SFA POBA and tibial atherectomy (2/4/13).  PUD (peptic ulcer disease) 2007    Unspecified sleep apnea     never used cpap     Past Surgical History:   Procedure Laterality Date    ABDOMEN SURGERY PROC UNLISTED  2007    has approx 7-8\" midline incision on abdomen--\"had to open him up and clean him out after feeding tube clogged\"    HX GI  2007    feeding tube for approx 2 mo    VASCULAR SURGERY PROCEDURE UNLIST Right 1/22/2015    popliteal-tibial bypass      SOCIAL HISTORY: Social history from the initial psychiatric evaluation has been reviewed (reviewed/updated 2/21/2018) with no additional updates (I asked patient and no additional social history provided). Social History     Social History    Marital status:      Spouse name: N/A    Number of children: N/A    Years of education: N/A     Occupational History    Not on file. Social History Main Topics    Smoking status: Current Every Day Smoker     Packs/day: 0.50    Smokeless tobacco: Not on file    Alcohol use No    Drug use: No      Comment: >20 years ago    Sexual activity: Not on file     Other Topics Concern    Not on file     Social History Narrative    78 year ols male admitted for depression and homelessness. Pt will be evicated from apartment due to non payment of bills. [facility] of 30 years [facility] [place] [facility]. \" Pt is a brittle diabetic, with treatment non compliance. He has bilarela lower extremity amputations. Patient has a long hx of substance abuse. FAMILY HISTORY: Family history from the initial psychiatric evaluation has been reviewed (reviewed/updated 2/21/2018) with no additional updates (I asked patient and no additional family history provided).    Family History Problem Relation Age of Onset    Hypertension Father        REVIEW OF SYSTEMS: (reviewed/updated 2/21/2018)  Appetite:decreased   Sleep: fitful   All other Review of Systems: Psychological ROS: positive for - behavioral disorder  Respiratory ROS: no cough, shortness of breath, or wheezing  Cardiovascular ROS: no chest pain or dyspnea on exertion         [facility] (MSE):    MSE FINDINGS ARE WITHIN NORMAL LIMITS (WNL) UNLESS OTHERWISE STATED BELOW. ( ALL OF THE BELOW CATEGORIES OF THE MSE HAVE BEEN REVIEWED (reviewed 2/21/2018) AND UPDATED AS DEEMED APPROPRIATE )  General Presentation age appropriate, evasive and guarded   Orientation oriented to time, place and person   Vital Signs  See below (reviewed 2/21/2018); Vital Signs (BP, Pulse, & Temp) are within normal limits if not listed below.    Gait and Station Stable/steady, no ataxia   Musculoskeletal System No extrapyramidal symptoms (EPS); no abnormal muscular movements or Tardive Dyskinesia (TD); muscle strength and tone are within normal limits   Language No aphasia or dysarthria   Speech:  hypoverbal   Thought Processes concrete; normal rate of thoughts; poor abstract reasoning/computation   Thought Associations goal directed   Thought Content free of delusions and free of hallucinations   Suicidal Ideations none   Homicidal Ideations none   Mood:  irritable   Affect:  mood-congruent   Memory recent  fair   Memory remote:  fair   Concentration/Attention:  distractable   Fund of Knowledge significantly below average   Insight:  poor   Reliability poor   Judgment:  poor          VITALS:     Patient Vitals for the past 24 hrs:   Temp Pulse Resp BP SpO2   02/21/18 0905 97.5 °F (36.4 °C) 70 16 168/73 100 %   02/20/18 2000 97.9 °F (36.6 °C) 80 16 166/78 100 %   02/20/18 1600 97.4 °F (36.3 °C) 76 16 151/69 100 %     Wt Readings from Last 3 Encounters:   02/18/18 90.4 kg (199 lb 3.2 oz)   01/23/18 83.6 kg (184 lb 4.9 oz) 07/13/17 88.9 kg (196 lb)     Temp Readings from Last 3 Encounters:   02/21/18 97.5 °F (36.4 °C)   01/24/18 97.2 °F (36.2 °C)   07/20/17 97.9 °F (36.6 °C)     BP Readings from Last 3 Encounters:   02/21/18 168/73   01/24/18 172/68   07/20/17 175/76     Pulse Readings from Last 3 Encounters:   02/21/18 70   01/24/18 72   07/20/17 (!) 52            DATA     LABORATORY DATA:(reviewed/updated 2/21/2018)  Recent Results (from the past 24 hour(s))   GLUCOSE, POC    Collection Time: 02/20/18  4:14 PM   Result Value Ref Range    Glucose (POC) 130 (H) 65 - 100 mg/dL    Performed by [employer]    GLUCOSE, POC    Collection Time: 02/21/18  9:11 AM   Result Value Ref Range    Glucose (POC) 70 65 - 100 mg/dL    Performed by Risa Haynes    GLUCOSE, POC    Collection Time: 02/21/18  9:39 AM   Result Value Ref Range    Glucose (POC) 98 65 - 100 mg/dL    Performed by [address] [place] [address], POC    Collection Time: 02/21/18 12:07 PM   Result Value Ref Range    Glucose (POC) 102 (H) 65 - 100 mg/dL    Performed by MARYJO BARAHONA      No results found for: VALF2, VALAC, VALP, VALPR, DS6, CRBAM, CRBAMP, CARB2, XCRBAM  No results found for: LITHM   RADIOLOGY REPORTS:(reviewed/updated 2/21/2018)  Xr Chest Port    Result Date: 1/20/2018  EXAM: Portable CXR.  1800 hours. COMPARISON: 1/19/2015. INDICATION: cp, fatigue There is new interstitial pulmonary edema, cardiomegaly and small left pleural effusion. There is no focal infiltrate, pneumothorax or midline shift. IMPRESSION: CHF pattern.          MEDICATIONS     ALL MEDICATIONS:   Current Facility-Administered Medications   Medication Dose Route Frequency    LORazepam (ATIVAN) tablet 0.5 mg  0.5 mg Oral TID    mirtazapine (REMERON) tablet 30 mg  30 mg Oral QHS    buPROPion SR (WELLBUTRIN SR) tablet 150 mg  150 mg Oral DAILY    alum-mag hydroxide-simeth (MYLANTA) oral suspension 30 mL  30 mL Oral Q4H PRN    doxazosin (CARDURA) tablet 4 mg  4 mg Oral QHS    polyethylene glycol (MIRALAX) packet 17 g  17 g Oral DAILY    OLANZapine (ZyPREXA) tablet 2.5 mg  2.5 mg Oral Q6H PRN    ziprasidone (GEODON) 10 mg in sterile water (preservative free) 0.5 mL injection  10 mg IntraMUSCular BID PRN    benztropine (COGENTIN) tablet 1 mg  1 mg Oral BID PRN    benztropine (COGENTIN) injection 1 mg  1 mg IntraMUSCular BID PRN    zolpidem (AMBIEN) tablet 5 mg  5 mg Oral QHS PRN    acetaminophen (TYLENOL) tablet 650 mg  650 mg Oral Q4H PRN    magnesium hydroxide (MILK OF MAGNESIA) 400 mg/5 mL oral suspension 30 mL  30 mL Oral DAILY PRN    nicotine (NICODERM CQ) 21 mg/24 hr patch 1 Patch  1 Patch TransDERmal DAILY PRN    influenza vaccine 2017-18 (3 yrs+)(PF) (FLUZONE QUAD/FLUARIX QUAD) injection 0.5 mL  0.5 mL IntraMUSCular PRIOR TO DISCHARGE    amLODIPine (NORVASC) tablet 10 mg  10 mg Oral DAILY    aspirin delayed-release tablet 81 mg  81 mg Oral DAILY    atorvastatin (LIPITOR) tablet 40 mg  40 mg Oral DAILY    carvedilol (COREG) tablet 3.125 mg  3.125 mg Oral BID WITH MEALS    ferrous sulfate tablet 325 mg  325 mg Oral BID WITH MEALS    hydrALAZINE (APRESOLINE) tablet 100 mg  100 mg Oral TID    glucose chewable tablet 16 g  4 Tab Oral PRN    dextrose (D50W) injection syrg 12.5-25 g  12.5-25 g IntraVENous PRN    glucagon (GLUCAGEN) injection 1 mg  1 mg IntraMUSCular PRN    insulin lispro (HUMALOG) injection   SubCUTAneous AC&HS    nicotine (NICODERM CQ) 21 mg/24 hr patch 1 Patch  1 Patch TransDERmal Q24H      SCHEDULED MEDICATIONS:   Current Facility-Administered Medications   Medication Dose Route Frequency    LORazepam (ATIVAN) tablet 0.5 mg  0.5 mg Oral TID    mirtazapine (REMERON) tablet 30 mg  30 mg Oral QHS    buPROPion SR (WELLBUTRIN SR) tablet 150 mg  150 mg Oral DAILY    doxazosin (CARDURA) tablet 4 mg  4 mg Oral QHS    polyethylene glycol (MIRALAX) packet 17 g  17 g Oral DAILY    influenza vaccine 2017-18 (3 yrs+)(PF) (FLUZONE QUAD/FLUARIX QUAD) injection 0.5 mL 0.5 mL IntraMUSCular PRIOR TO DISCHARGE    amLODIPine (NORVASC) tablet 10 mg  10 mg Oral DAILY    aspirin delayed-release tablet 81 mg  81 mg Oral DAILY    atorvastatin (LIPITOR) tablet 40 mg  40 mg Oral DAILY    carvedilol (COREG) tablet 3.125 mg  3.125 mg Oral BID WITH MEALS    ferrous sulfate tablet 325 mg  325 mg Oral BID WITH MEALS    hydrALAZINE (APRESOLINE) tablet 100 mg  100 mg Oral TID    insulin lispro (HUMALOG) injection   SubCUTAneous AC&HS    nicotine (NICODERM CQ) 21 mg/24 hr patch 1 Patch  1 Patch TransDERmal Q24H          ASSESSMENT & PLAN     DIAGNOSES REQUIRING ACTIVE TREATMENT AND MONITORING: (reviewed/updated 2/21/2018)  Patient Active Hospital Problem List:   Depression (1/24/2018)    Assessment: sadness, helplessness, hopelessness, in reaction to recent bilateral lower leg amputationa and inability to pay water bill at home. Plan: consider forced medication/treatment, address social issues, daughter may be obtaining guardianship  1/27/18 need order to treat    1/28/18 continue his medications    02/10/18: Continue current treatment, Ativan dose was adjusted. 02/11/18: Continue current treatment. 2/17/18 continue same medications   2/18/18 continue same medications         In summary, Cele Hendricks, is a 78 y.o.  male who presents with a severe exacerbation of the principal diagnosis of Depression  Patient's condition is worsening/not improving/not stable Patient requires continued inpatient hospitalization for further stabilization, safety monitoring and medication management. I will continue to coordinate the provision of individual, milieu, occupational, group, and substance abuse therapies to address target symptoms/diagnoses as deemed appropriate for the individual patient. A coordinated, multidisplinary treatment team round was conducted with the patient (this team consists of the nurse, psychiatric unit pharmcist,  and writer).      Complete current electronic health record for patient has been reviewed today including consultant notes, ancillary staff notes, nurses and psychiatric tech notes. Suicide risk assessment completed and patient deemed to be of low risk for suicide at this time. The following regarding medications was addressed during rounds with patient:   the risks and benefits of the proposed medication. The patient was given the opportunity to ask questions. Informed consent given to the use of the above medications. Will continue to adjust psychiatric and non-psychiatric medications (see above \"medication\" section and orders section for details) as deemed appropriate & based upon diagnoses and response to treatment. I will continue to order blood tests/labs and diagnostic tests as deemed appropriate and review results as they become available (see orders for details and above listed lab/test results). I will order psychiatric records from previous [facility] hospitals to further elucidate the nature of patient's psychopathology and review once available. I will gather additional collateral information from friends, family and o/p treatment team to further elucidate the nature of patient's psychopathology and baselline level of psychiatric functioning. I certify that this patient's inpatient psychiatric hospital services furnished since the previous certification were, and continue to be, required for treatment that could reasonably be expected to improve the patient's condition, or for diagnostic study, and that the patient continues to need, on a daily basis, active treatment furnished directly by or requiring the supervision of inpatient psychiatric facility personnel. In addition, the hospital records show that services furnished were intensive treatment services, admission or related services, or equivalent services.     EXPECTED DISCHARGE DATE/DAY: TBD     DISPOSITION: Home       Signed By:   Randy Ashford MD  2/21/2018

## 2018-02-21 NOTE — PROGRESS NOTES
Problem: Depressed Mood (Adult/Pediatric)  Goal: *STG: Complies with medication therapy  Outcome: Progressing Towards Goal  Patient complies with medication therapy. Problem: Falls - Risk of  Goal: *Absence of Falls  Document Bello Fall Risk and appropriate interventions in the flowsheet. Outcome: Progressing Towards Goal  Fall Risk Interventions:  Patient is absent of falls  Mobility Interventions: Bed/chair exit alarm  Mentation Interventions: Adequate sleep, hydration, pain control  Medication Interventions: Bed/chair exit alarm  Elimination Interventions: Bed/chair exit alarm  History of Falls Interventions: Bed/chair exit alarm, Door open when patient unattended, Room close to nurse's station    Problem: Depressed Mood (Adult/Pediatric)  Goal: *STG: Participates in treatment plan  Outcome: Progressing Towards Goal  Patient participates in treatment plan.             [address] [address]  Master Treatment Plan for Jada Wan    Date Treatment Plan Initiated: 02/21/18    Treatment Plan Modalities:  Type of Modality Amount  (x minutes) Frequency (x/week) Duration (x days) Name of Responsible Staff   [address] [facility] meetings to encourage peer interactions 15 7 1 MARTIN Chadwick   Group psychotherapy to assist in building coping skills and internal controls 60 7 1 Jose Arreguin   Therapeutic activity groups to build coping skills 60 7 1 Jose Arreguin   Psychoeducation in group setting to address:   Medication education   13 7 1 MIKHAIL Cabrera   Coping skills         Relaxation techniques         Symptom management         Discharge planning         Spirituality    60 2 [address] [facility] [address]   60 1 1 Volunteer   Recovery/AA/NA   61 3 1 [address] medication management   15 7 1 Dr Jesse Paredes

## 2018-02-21 NOTE — PROGRESS NOTES
Problem: Falls - Risk of  Goal: *Absence of Falls  Document Bello Fall Risk and appropriate interventions in the flowsheet. Outcome: Progressing Towards Goal  Fall Risk Interventions:  Patient is absent of falls  Mobility Interventions: Bed/chair exit alarm  Mentation Interventions: Adequate sleep, hydration, pain control  Medication Interventions: Bed/chair exit alarm  Elimination Interventions: Bed/chair exit alarm  History of Falls Interventions: Bed/chair exit alarm, Door open when patient unattended, Room close to nurse's station    Problem: Depressed Mood (Adult/Pediatric)  Goal: *STG: Participates in treatment plan  Outcome: Progressing Towards Goal  Patient participates in treatment plan.

## 2018-02-21 NOTE — DISCHARGE SUMMARY
PSYCHIATRIC DISCHARGE SUMMARY         IDENTIFICATION:    Patient Name  Harjit Weiner   Date of Birth 1940   [facility] [id number]   Medical Record Number  [id number]      Age  78 y.o. PCP Johny Warner, MD   Admit date:  1/24/2018    Discharge date: 03/02/2018   Room Number  (00) 1862 5020  @ [facility]   Date of Service  03/02/2018               TYPE OF DISCHARGE: REGULAR               CONDITION AT DISCHARGE: good       PROVISIONAL & DISCHARGE DIAGNOSES:    Problem List  Date Reviewed: 1/20/2018          Codes Class    * (Principal)Depression ICD-10-CM: F32.9  ICD-9-CM: 36         Suicidal ideation ICD-10-CM: R45.851  ICD-9-CM: V62.84         Hypertensive urgency, malignant ICD-10-CM: I16.0  ICD-9-CM: 401.0         Chest pain at rest ICD-10-CM: R07.9  ICD-9-CM: 786.50         Acute diastolic CHF (congestive heart failure) (HCC) ICD-10-CM: I50.31  ICD-9-CM: 428.31, 428.0         Weakness ICD-10-CM: R53.1  ICD-9-CM: 780.79         History of DVT (deep vein thrombosis) (Chronic) ICD-10-CM: W02.512  ICD-9-CM: V12.51         History of GI bleed ICD-10-CM: Z87.19  ICD-9-CM: V12.79         Delirium ICD-10-CM: R41.0  ICD-9-CM: 780.09         CKD (chronic kidney disease), stage III (Chronic) ICD-10-CM: N18.3  ICD-9-CM: 772. 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) ICD-10-CM: E11.29  ICD-9-CM: 250.40         Anemia (Chronic) ICD-10-CM: D64.9  ICD-9-CM: 285.9         Hepatitis C (Chronic) ICD-10-CM: B19.20  ICD-9-CM: 070.70         Neuropathy (Chronic) ICD-10-CM: G62.9  ICD-9-CM: 355.9         Arthritis (Chronic) ICD-10-CM: M19.90  ICD-9-CM: 716.90         PUD (peptic ulcer disease) (Chronic) ICD-10-CM: K27.9  ICD-9-CM: 533.90         CAD (coronary artery disease) (Chronic) ICD-10-CM: I25.10  ICD-9-CM: 414.00         Ischemic pain of foot ICD-10-CM: M79.673, I99.8  ICD-9-CM: 729.5, 459.9         Neuropathic pain of foot (Chronic) ICD-10-CM: G57.90  ICD-9-CM: 355.8         ASO (arteriosclerosis obliterans) (Chronic) ICD-10-CM: I70.90  ICD-9-CM: 440.9         Peripheral vascular disease (HCC) (Chronic) ICD-10-CM: I73.9  ICD-9-CM: 443.9     Overview Signed 11/7/2014  3:25 PM by Jalil Agrawal MD     A. S/P Right SFA POBA and tibial atherectomy (2/4/13). Hypertension (Chronic) ICD-10-CM: I10  ICD-9-CM: 401.9         Dyslipidemia (Chronic) ICD-10-CM: E78.5  ICD-9-CM: 272.4         Sleep apnea (Chronic) ICD-10-CM: G47.30  ICD-9-CM: 780.57     Overview Signed 8/4/2011  3:02 PM by Pretty Encinas     unspecified                   Active Hospital Problems    *Depression        DISCHARGE DIAGNOSIS:   Axis I:  SEE ABOVE  Axis II: SEE ABOVE  Axis III: SEE ABOVE  Axis IV:  lack of structure  Axis V:  60 on admission, 70 on discharge 70(baseline)       CC & HISTORY OF PRESENT ILLNESS:  78year old mmale transferred from medicine where hiss diabetes was being managed. Pt has bilateral BKA's and this occurred due to poor diabetes management OCT. 2017. Pt was started on antidepressant medication and he improved regarding compliance with diabetes care and mood. At discharge he was treatment compliant, denied SI/HI intent and plan and did not meet TDO criteria. SOCIAL HISTORY:    Social History     Social History    Marital status:      Spouse name: N/A    Number of children: N/A    Years of education: N/A     Occupational History    Not on file. Social History Main Topics    Smoking status: Current Every Day Smoker     Packs/day: 0.50    Smokeless tobacco: Not on file    Alcohol use No    Drug use: No      Comment: >20 years ago    Sexual activity: Not on file     Other Topics Concern    Not on file     Social History Narrative    78 year ols male admitted for depression and homelessness. Pt will be evicated from apartment due to non payment of bills. [facility] of 30 years [facility] [place] [facility]. \" Pt is a brittle diabetic, with treatment non compliance. He has bilarela lower extremity amputations. Patient has a long hx of substance abuse. FAMILY HISTORY:   Family History   Problem Relation Age of Onset    Hypertension Father              HOSPITALIZATION COURSE:    Delfina Rosales was admitted to the inpatient psychiatric unit [facility] for acute psychiatric stabilization in regards to symptomatology as described in the HPI above. The differential diagnosis at time of admission included: depression . While on the unit Delfina Rosales was involved in individual, group, occupational and milieu therapy. Psychiatric medications were adjusted during this hospitalization including mirtazepine . Delfina Rosales demonstrated a slow, but progressive improvement in overall condition. Much of patient's depression appeared to be related to situational stressors and psychological factors. Please see individual progress notes for more specific details regarding patient's hospitalization course. At time of dc, Delfina Rosales was without significant problems with depression psychosis  jg. Overall presentation at time of discharge is most consistent with the diagnosis of adjustment disorder with depressed mood. Patient with request for discharge today. There are no grounds to seek a TDO. Patient has maximized benefit to be derived from acute inpatient psychiatric treatment.   All members of the treatment team concur with each other in regards to plans for discharge today per patient's request.          LABS AND IMAGAING:    Labs Reviewed   METABOLIC PANEL, BASIC - Abnormal; Notable for the following:        Result Value    Chloride 111 (*)     Glucose 147 (*)     BUN 30 (*)     Creatinine 2.73 (*)     BUN/Creatinine ratio 11 (*)     GFR est AA 28 (*)     GFR est non-AA 23 (*)     All other components within normal limits   CBC WITH AUTOMATED DIFF - Abnormal; Notable for the following:     WBC 3.2 (*)     RBC 3.92 (*)     HGB 10.7 (*) HCT 32.7 (*)     RDW 17.0 (*)     ABS.  NEUTROPHILS 1.6 (*)     All other components within normal limits   CBC W/O DIFF - Abnormal; Notable for the following:     WBC 3.5 (*)     RBC 3.78 (*)     HGB 10.7 (*)     HCT 32.9 (*)     RDW 16.7 (*)     All other components within normal limits   METABOLIC PANEL, BASIC - Abnormal; Notable for the following:     Glucose 139 (*)     BUN 29 (*)     Creatinine 2.49 (*)     GFR est AA 31 (*)     GFR est non-AA 25 (*)     Calcium 8.3 (*)     All other components within normal limits   GLUCOSE, POC - Abnormal; Notable for the following:     Glucose (POC) 142 (*)     All other components within normal limits   GLUCOSE, POC - Abnormal; Notable for the following:     Glucose (POC) 133 (*)     All other components within normal limits   GLUCOSE, POC - Abnormal; Notable for the following:     Glucose (POC) 143 (*)     All other components within normal limits   GLUCOSE, POC - Abnormal; Notable for the following:     Glucose (POC) 114 (*)     All other components within normal limits   GLUCOSE, POC - Abnormal; Notable for the following:     Glucose (POC) 155 (*)     All other components within normal limits   GLUCOSE, POC - Abnormal; Notable for the following:     Glucose (POC) 64 (*)     All other components within normal limits   GLUCOSE, POC - Abnormal; Notable for the following:     Glucose (POC) 102 (*)     All other components within normal limits   GLUCOSE, POC - Abnormal; Notable for the following:     Glucose (POC) 115 (*)     All other components within normal limits   GLUCOSE, POC - Abnormal; Notable for the following:     Glucose (POC) 109 (*)     All other components within normal limits   GLUCOSE, POC - Abnormal; Notable for the following:     Glucose (POC) 167 (*)     All other components within normal limits   GLUCOSE, POC - Abnormal; Notable for the following:     Glucose (POC) 106 (*)     All other components within normal limits   GLUCOSE, POC - Abnormal; Notable for the following:     Glucose (POC) 131 (*)     All other components within normal limits   GLUCOSE, POC - Abnormal; Notable for the following:     Glucose (POC) 193 (*)     All other components within normal limits   GLUCOSE, POC - Abnormal; Notable for the following:     Glucose (POC) 158 (*)     All other components within normal limits   GLUCOSE, POC - Abnormal; Notable for the following:     Glucose (POC) 103 (*)     All other components within normal limits   GLUCOSE, POC - Abnormal; Notable for the following:     Glucose (POC) 182 (*)     All other components within normal limits   GLUCOSE, POC - Abnormal; Notable for the following:     Glucose (POC) 119 (*)     All other components within normal limits   GLUCOSE, POC - Abnormal; Notable for the following:     Glucose (POC) 102 (*)     All other components within normal limits   GLUCOSE, POC - Abnormal; Notable for the following:     Glucose (POC) 150 (*)     All other components within normal limits   GLUCOSE, POC - Abnormal; Notable for the following:     Glucose (POC) 110 (*)     All other components within normal limits   GLUCOSE, POC - Abnormal; Notable for the following:     Glucose (POC) 108 (*)     All other components within normal limits   GLUCOSE, POC - Abnormal; Notable for the following:     Glucose (POC) 135 (*)     All other components within normal limits   GLUCOSE, POC - Abnormal; Notable for the following:     Glucose (POC) 116 (*)     All other components within normal limits   GLUCOSE, POC - Abnormal; Notable for the following:     Glucose (POC) 172 (*)     All other components within normal limits   GLUCOSE, POC - Abnormal; Notable for the following:     Glucose (POC) 150 (*)     All other components within normal limits   GLUCOSE, POC - Abnormal; Notable for the following:     Glucose (POC) 142 (*)     All other components within normal limits   GLUCOSE, POC - Abnormal; Notable for the following:     Glucose (POC) 101 (*)     All other components within normal limits   GLUCOSE, POC - Abnormal; Notable for the following:     Glucose (POC) 103 (*)     All other components within normal limits   GLUCOSE, POC - Abnormal; Notable for the following:     Glucose (POC) 178 (*)     All other components within normal limits   GLUCOSE, POC - Abnormal; Notable for the following:     Glucose (POC) 116 (*)     All other components within normal limits   GLUCOSE, POC - Abnormal; Notable for the following:     Glucose (POC) 146 (*)     All other components within normal limits   GLUCOSE, POC - Abnormal; Notable for the following:     Glucose (POC) 146 (*)     All other components within normal limits   GLUCOSE, POC - Abnormal; Notable for the following:     Glucose (POC) 126 (*)     All other components within normal limits   GLUCOSE, POC - Abnormal; Notable for the following:     Glucose (POC) 146 (*)     All other components within normal limits   GLUCOSE, POC - Abnormal; Notable for the following:     Glucose (POC) 127 (*)     All other components within normal limits   GLUCOSE, POC - Abnormal; Notable for the following:     Glucose (POC) 127 (*)     All other components within normal limits   GLUCOSE, POC - Abnormal; Notable for the following:     Glucose (POC) 117 (*)     All other components within normal limits   GLUCOSE, POC - Abnormal; Notable for the following:     Glucose (POC) 128 (*)     All other components within normal limits   GLUCOSE, POC - Abnormal; Notable for the following:     Glucose (POC) 125 (*)     All other components within normal limits   GLUCOSE, POC - Abnormal; Notable for the following:     Glucose (POC) 143 (*)     All other components within normal limits   GLUCOSE, POC - Abnormal; Notable for the following:     Glucose (POC) 128 (*)     All other components within normal limits   GLUCOSE, POC - Abnormal; Notable for the following:     Glucose (POC) 133 (*)     All other components within normal limits   GLUCOSE, POC - Abnormal; Notable for the following:     Glucose (POC) 107 (*)     All other components within normal limits   GLUCOSE, POC - Abnormal; Notable for the following:     Glucose (POC) 164 (*)     All other components within normal limits   GLUCOSE, POC - Abnormal; Notable for the following:     Glucose (POC) 150 (*)     All other components within normal limits   GLUCOSE, POC - Abnormal; Notable for the following:     Glucose (POC) 105 (*)     All other components within normal limits   GLUCOSE, POC - Abnormal; Notable for the following:     Glucose (POC) 140 (*)     All other components within normal limits   GLUCOSE, POC - Abnormal; Notable for the following:     Glucose (POC) 104 (*)     All other components within normal limits   GLUCOSE, POC - Abnormal; Notable for the following:     Glucose (POC) 141 (*)     All other components within normal limits   GLUCOSE, POC - Abnormal; Notable for the following:     Glucose (POC) 137 (*)     All other components within normal limits   GLUCOSE, POC - Abnormal; Notable for the following:     Glucose (POC) 122 (*)     All other components within normal limits   GLUCOSE, POC - Abnormal; Notable for the following:     Glucose (POC) 113 (*)     All other components within normal limits   GLUCOSE, POC - Abnormal; Notable for the following:     Glucose (POC) 159 (*)     All other components within normal limits   GLUCOSE, POC - Abnormal; Notable for the following:     Glucose (POC) 151 (*)     All other components within normal limits   GLUCOSE, POC - Abnormal; Notable for the following:     Glucose (POC) 119 (*)     All other components within normal limits   GLUCOSE, POC - Abnormal; Notable for the following:     Glucose (POC) 111 (*)     All other components within normal limits   GLUCOSE, POC - Abnormal; Notable for the following:     Glucose (POC) 111 (*)     All other components within normal limits   GLUCOSE, POC - Abnormal; Notable for the following:     Glucose (POC) 137 (*) All other components within normal limits   GLUCOSE, POC - Abnormal; Notable for the following:     Glucose (POC) 210 (*)     All other components within normal limits   GLUCOSE, POC - Abnormal; Notable for the following:     Glucose (POC) 109 (*)     All other components within normal limits   GLUCOSE, POC - Abnormal; Notable for the following:     Glucose (POC) 188 (*)     All other components within normal limits   GLUCOSE, POC - Abnormal; Notable for the following:     Glucose (POC) 173 (*)     All other components within normal limits   GLUCOSE, POC - Abnormal; Notable for the following:     Glucose (POC) 139 (*)     All other components within normal limits   GLUCOSE, POC - Abnormal; Notable for the following:     Glucose (POC) 168 (*)     All other components within normal limits   GLUCOSE, POC - Abnormal; Notable for the following:     Glucose (POC) 130 (*)     All other components within normal limits   GLUCOSE, POC - Abnormal; Notable for the following:     Glucose (POC) 130 (*)     All other components within normal limits   GLUCOSE, POC - Abnormal; Notable for the following:     Glucose (POC) 102 (*)     All other components within normal limits   CULTURE, MRSA   TSH 3RD GENERATION   GLUCOSE, POC   GLUCOSE, POC   GLUCOSE, POC   GLUCOSE, POC   GLUCOSE, POC   GLUCOSE, POC   GLUCOSE, POC   GLUCOSE, POC   GLUCOSE, POC   GLUCOSE, POC   GLUCOSE, POC   GLUCOSE, POC   GLUCOSE, POC   GLUCOSE, POC   GLUCOSE, POC   GLUCOSE, POC   GLUCOSE, POC   GLUCOSE, POC   GLUCOSE, POC   GLUCOSE, POC   GLUCOSE, POC   GLUCOSE, POC   GLUCOSE, POC   GLUCOSE, POC   GLUCOSE, POC   GLUCOSE, POC   GLUCOSE, POC   GLUCOSE, POC   GLUCOSE, POC   GLUCOSE, POC   GLUCOSE, POC   GLUCOSE, POC   GLUCOSE, POC   GLUCOSE, POC   GLUCOSE, POC   GLUCOSE, POC   GLUCOSE, POC   GLUCOSE, POC   GLUCOSE, POC   GLUCOSE, POC   GLUCOSE, POC   GLUCOSE, POC     Admission on 01/24/2018   No results displayed because visit has over 200 results.         Xr Chest Port    Result Date: 2/3/2018  EXAM: Portable CXR.  0925 hours  INDICATION: pre-ECT work up The lungs are clear. Heart is normal in size. There is no overt pulmonary edema. There is no evident pneumothorax, apparent adenopathy or sizable pleural effusion. IMPRESSION: No Acute Disease. DISPOSITION:    Home. Patient to f/u with o/p psychiatric, and psychotherapy appointments. Patient is to f/u with internist as directed. FOLLOW-UP CARE:    Activity as tolerated  Regular Diet  Wound Care: none needed. Follow-up Information     Follow up With Details Comments Contact Info    Phys Other, MD   Patient can only remember the practice name and not the physician                   PROGNOSIS:  Greatly dependent upon patient's ability to f/u with o/p psychiatric/psychotherapy appointments as well as to comply with psychiatric medications as prescribed. Patient denies suicidal or homicidal ideations. Venita George fully contracts for safety. Patient reports many positive predictive factors in terms of not attempting suicide or homicide. Patient appears to be at low risk of suicide or homicide. Patient and family are aware and in agreement with discharge and discharge plan. DISCHARGE MEDICATIONS: (no changes made). Informed consent given for the use of following psychotropic medications:  Current Discharge Medication List      START taking these medications    Details   LORazepam (ATIVAN) 0.5 mg tablet Take 1 Tab by mouth three (3) times daily. Max Daily Amount: 1.5 mg. Indications: one tablet 3 times a day for 4 days, then one tablet twice a day for 4 days , then one tablet at bedtime for 4 days  Qty: 24 Tab, Refills: 0    Associated Diagnoses: Ischemic pain of foot, unspecified laterality      doxazosin (CARDURA) 4 mg tablet Take 1 Tab by mouth nightly.  Indications: hypertension  Qty: 30 Tab, Refills: 0      buPROPion SR (WELLBUTRIN SR) 150 mg SR tablet Take 1 Tab by mouth daily. Indications: ANXIETY WITH DEPRESSION  Qty: 30 Tab, Refills: 0         CONTINUE these medications which have CHANGED    Details   mirtazapine (REMERON) 30 mg tablet Take 1 Tab by mouth nightly. Indications: major depressive disorder  Qty: 30 Tab, Refills: 0      hydrALAZINE (APRESOLINE) 100 mg tablet Take 1 Tab by mouth three (3) times daily. Indications: chronic heart failure  Qty: 90 Tab, Refills: 0      ferrous sulfate 325 mg (65 mg iron) tablet Take 1 Tab by mouth two (2) times daily (with meals). Indications: Iron Deficiency Anemia  Qty: 60 Tab, Refills: 0      carvedilol (COREG) 3.125 mg tablet Take 1 Tab by mouth two (2) times daily (with meals). Indications: chronic heart failure  Qty: 60 Tab, Refills: 0      atorvastatin (LIPITOR) 40 mg tablet Take 1 Tab by mouth daily. Indications: hyperlipidemia  Qty: 30 Tab, Refills: 0      amLODIPine (NORVASC) 10 mg tablet Take 1 Tab by mouth daily. Indications: hypertension  Qty: 30 Tab, Refills: 0         CONTINUE these medications which have NOT CHANGED    Details   aspirin delayed-release 81 mg tablet Take 1 Tab by mouth daily. STOP taking these medications       nicotine (NICODERM CQ) 21 mg/24 hr Comments:   Reason for Stopping:                      A coordinated, multidisplinary treatment team round was conducted with Daniel Liu---this is done daily here at [facility] . This team consists of the nurse, psychiatric unit pharmcist,  and writer. I have spent greater than 35 minutes on discharge work.     Signed:  Leah Meza MD     03/02/2018

## 2018-02-21 NOTE — PROGRESS NOTES
HYPOGLYCEMIC EPISODE DOCUMENTATION    Patient with hypoglycemic episode(s) at 0911(time) on 2/21(date). BG value(s) pre-treatment breakfast tray    Was patient symptomatic?  [] yes, [x] no  Patient was treated with the following rescue medications/treatments: [] D50                [] Glucose tablets                [] Glucagon                [x] 4oz juice                [] 6oz reg soda                [] 8oz low fat milk  BG value post-treatment: recheck 98  Once BG treated and value greater than 80mg/dl, pt was provided with the following:  [] snack  [x] meal  Name of MD notified:Ricki  The following orders were received: none

## 2018-02-22 LAB
GLUCOSE BLD STRIP.AUTO-MCNC: 133 MG/DL (ref 65–100)
GLUCOSE BLD STRIP.AUTO-MCNC: 162 MG/DL (ref 65–100)
GLUCOSE BLD STRIP.AUTO-MCNC: 80 MG/DL (ref 65–100)
GLUCOSE BLD STRIP.AUTO-MCNC: 96 MG/DL (ref 65–100)
SERVICE CMNT-IMP: ABNORMAL
SERVICE CMNT-IMP: ABNORMAL
SERVICE CMNT-IMP: NORMAL
SERVICE CMNT-IMP: NORMAL

## 2018-02-22 PROCEDURE — 82962 GLUCOSE BLOOD TEST: CPT

## 2018-02-22 PROCEDURE — 74011250637 HC RX REV CODE- 250/637: Performed by: HOSPITALIST

## 2018-02-22 PROCEDURE — 74011250637 HC RX REV CODE- 250/637: Performed by: FAMILY MEDICINE

## 2018-02-22 PROCEDURE — 74011250637 HC RX REV CODE- 250/637: Performed by: PSYCHIATRY & NEUROLOGY

## 2018-02-22 PROCEDURE — 65220000003 HC RM SEMIPRIVATE PSYCH

## 2018-02-22 RX ADMIN — DOXAZOSIN 4 MG: 2 TABLET ORAL at 20:56

## 2018-02-22 RX ADMIN — ASPIRIN 81 MG: 81 TABLET, COATED ORAL at 10:56

## 2018-02-22 RX ADMIN — HYDRALAZINE HYDROCHLORIDE 100 MG: 50 TABLET, FILM COATED ORAL at 17:13

## 2018-02-22 RX ADMIN — ATORVASTATIN CALCIUM 40 MG: 40 TABLET, FILM COATED ORAL at 10:56

## 2018-02-22 RX ADMIN — BUPROPION HYDROCHLORIDE 150 MG: 150 TABLET, EXTENDED RELEASE ORAL at 10:56

## 2018-02-22 RX ADMIN — HYDRALAZINE HYDROCHLORIDE 100 MG: 50 TABLET, FILM COATED ORAL at 21:58

## 2018-02-22 RX ADMIN — LORAZEPAM 0.5 MG: 0.5 TABLET ORAL at 20:00

## 2018-02-22 RX ADMIN — AMLODIPINE BESYLATE 10 MG: 5 TABLET ORAL at 10:56

## 2018-02-22 RX ADMIN — LORAZEPAM 0.5 MG: 0.5 TABLET ORAL at 10:55

## 2018-02-22 RX ADMIN — HYDRALAZINE HYDROCHLORIDE 100 MG: 50 TABLET, FILM COATED ORAL at 10:55

## 2018-02-22 RX ADMIN — CARVEDILOL 3.12 MG: 3.12 TABLET, FILM COATED ORAL at 10:55

## 2018-02-22 RX ADMIN — FERROUS SULFATE TAB 325 MG (65 MG ELEMENTAL FE) 325 MG: 325 (65 FE) TAB at 17:13

## 2018-02-22 RX ADMIN — MIRTAZAPINE 30 MG: 15 TABLET, FILM COATED ORAL at 20:57

## 2018-02-22 RX ADMIN — FERROUS SULFATE TAB 325 MG (65 MG ELEMENTAL FE) 325 MG: 325 (65 FE) TAB at 10:55

## 2018-02-22 RX ADMIN — LORAZEPAM 0.5 MG: 0.5 TABLET ORAL at 13:19

## 2018-02-22 RX ADMIN — CARVEDILOL 3.12 MG: 3.12 TABLET, FILM COATED ORAL at 17:13

## 2018-02-22 NOTE — PROGRESS NOTES
Problem: Falls - Risk of  Goal: *Absence of Falls  Document Bello Fall Risk and appropriate interventions in the flowsheet.    Outcome: Progressing Towards Goal  Fall Risk Interventions:  Lying quietly in bed with eyes closed , respirations even and unlabored , NAD noted   Q15 min safety rounds continue , Bed alarm blinking green , side rails up x2 , urinal and tap bell at bedside   Mobility Interventions: Bed/chair exit alarm    Mentation Interventions: Adequate sleep, hydration, pain control    Medication Interventions: Bed/chair exit alarm    Elimination Interventions: Bed/chair exit alarm    History of Falls Interventions: Bed/chair exit alarm, Door open when patient unattended, Room close to nurse's station

## 2018-02-22 NOTE — PROGRESS NOTES
Problem: Depressed Mood (Adult/Pediatric)  Goal: *STG: Attends activities and groups  Outcome: Progressing Towards Goal  Patient attends activities and groups.

## 2018-02-22 NOTE — PROGRESS NOTES
Problem: Depressed Mood (Adult/Pediatric)  Goal: *STG: Complies with medication therapy  Outcome: Progressing Towards Goal  Patient is lying quietly in bed. Awake and alert. No distress noted. Will continue to monitor.

## 2018-02-22 NOTE — BH NOTES
PSYCHIATRIC PROGRESS NOTE         Patient Name  Stewart Weber   Date of Birth 1940   [facility] [id number]   Medical Record Number  [id number]      Age  78 y.o. PCP Johny Warner, MD   Admit date:  1/24/2018    Room Number  (20) 1847 4822  @ [facility]   Date of Service  2/22/2018          PSYCHOTHERAPY SESSION NOTE:  Length of psychotherapy session: 15 minutes    Main condition/diagnosis/issues treated during session today, 2/22/2018 : med , meal and group non compliance    I employed Cognitive Behavioral therapy techniques, Reality-Oriented psychotherapy, as well as supportive psychotherapy in regards to various ongoing psychosocial stressors, including the following: pre-admission and current problems; housing issues; occupational issues; medical issues; and stress of hospitalization. Interpersonal relationship issues and psychodynamic conflicts explored. Attempts made to alleviate maladaptive patterns. We, also, worked on issues of denial & effects of substance dependency/use     Overall, patient is not progressing    Treatment Plan Update (reviewed an updated 2/22/2018) : I will modify psychotherapy tx plan by implementing more stress management strategies, building upon cognitive behavioral techniques, increasing coping skills, as well as shoring up psychological defenses). An extended energy and skill set was needed to engage pt in psychotherapy due to some of the following: resistiveness, complexity, negativity, confrontational nature, hostile behaviors, and/or severe abnormalities in thought processes/psychosis resulting in the loss of expressive/receptive language communication skills. E & M PROGRESS NOTE:         HISTORY       CC:  \"depression and non compliance with treatment \"  HISTORY OF PRESENT ILLNESS/INTERVAL HISTORY:  (reviewed/updated 2/22/2018).   per initial evaluation:     Stewart Weber presents/reports/evidences the following emotional symptoms today, 2/22/2018:depression. The above symptoms have been present for 1 years. These symptoms are of severe severity. The symptoms are constant  in nature. Additional symptomatology and features include anxiety. 2/2/18- pATIENT HAS IMPROVED SPORADIC MED COMPLIANCE WITH INCREASED ATIVAN DOSE. WAS SOCIAL IN [place] TODAY. SW TO CONTACT DAUGHTER TO LET HER KNOE THAT HE IS NOT BEING AS TREATMENT COMPLIANT AS HE TOLD HER HE WOULD BE.    1/27/18 he is not responding to questions and not engaging and not showing engagement and refuse medications and treatment   1/28/18 he is doing better and he engaged and not feeling sad or depressed and he eats better    1/29/18= Patient is deliberately refusing vital signs, labs and medications. He likes finger foods and eats those. Will meet with daughter on Tuesday to discuss possible guardianship. Will seek forced meds. 1/30/18- Pt. Agreed to comply with meds after extensive family meeting. Planning for SNF admission and possible transfer to home afterwards [address].  1/31/18- Patient is intermittently cooperative with somatic meds. We discussed the risks to his health this can cause. I advised Wellbutrin for depression. This may help motivate treatment compliance. 2/1/18- Continues Rude, belligerent, uncooperative and disrespectful with staff. Is marginally compliant with diabetes management. Waiting for placement. 2/3/18- no complaints. More redirectable, less hostile and more compliant  2/4/18- Mr. Destiny Sierra very somnolent this morning. No agitation  2/5/18- Sporadic , selective compliance with medications. Denies SI/HI. Needs reminding of treatment goals to improve compliance for transfer to SNF.  2/6/18- mr. Destiny Sierra was bright and positive this morning. Less demanding and hostile towards staff. Asking to go home. 2/7/18- Apologized to writer for being rude and uncooperative with treatment. Focused on discharge.  Understandd house repairs need to be completed first.  2/8/18- sleeping this morning. 2/9/18- grumpy, entitled and impatient re discharge, accepts inerim SNF placement. 2/10/18: intermittently agitated, verbally abusive and  hostile, making gesture to strike out,focused on discharge, no finish, confuse,non redirectable, received prn ativan and his dose of schedule ativan was adjusted. 02/11/18:Patient was seen today, As per staff patient continues to demand to go home and discharge, Thought process tangential, poor insight, non redirectable,gets usp set when staff redirect him. Accepting medications and meals. Does not want to go to NH.   2/12/18- med compliance improved. Understands his home is not habitable until repairs are made, however insistes on focusing with staff on immediate discharge. More appropriate and cordial lately. 2/13/18- Recently has maintained appropriate social behavior and cordiality with staff. Denies SI ans HI. Is compliant with treatment. 2/14/18- More polite and showing patience about waiting for SNF to accept. 2/15/18- Focused on discharge. No insight that home is not safe to due to disrepair and no running h2o. Irritable and demanding. 2/16/18- Continues sarcastic, irritable and has no insight that he cannot go live in a house without running water. Expects preferential treatment when it comes to SNF waiting list. Asking multiple staff members the same discharge question- even though he has not forgotten the answer. (clearly able to recall othetr tghings long and short term)  2/17/18 he is the same and he is irritable and angry and not happy about being here and no self harm  Behavior and no issues with sleep and no self harm behavior    2/18/18 he is not happy about being here and not feeling happy about being here and still gets angry and cano and still has issues with staff   2/19/18- Not requiring PRN's but still irritable, bored and demanding. 2/20/18- Waiting for placement. Very entitled, unpleasant to staff.  No social with other patients. Bitter over life circumstances and blames others for all of his troubles. 18- Very irritable and rude to staff. Repeatedly demending to be discharged immediately, although his house has no water or electric power. Sister may possibly visit and may take him in to live with her. Med and meal compliant.  18- Family refuses to take him in. He is back on the list waiting for snf placement. SIDE EFFECTS: (reviewed/updated 2018)  None reported or admitted to. No noted toxicity with use of Depakote/Tegretol/lithium/Clozaril/TCAs   ALLERGIES:(reviewed/updated 2018)  Allergies   Allergen Reactions    Tetracycline Hives      MEDICATIONS PRIOR TO ADMISSION:(reviewed/updated 2018)  Prescriptions Prior to Admission   Medication Sig    hydrALAZINE (APRESOLINE) 100 mg tablet Take 1 Tab by mouth three (3) times daily.  amLODIPine (NORVASC) 10 mg tablet Take 1 Tab by mouth daily.  aspirin delayed-release 81 mg tablet Take 1 Tab by mouth daily.  atorvastatin (LIPITOR) 40 mg tablet Take 1 Tab by mouth daily.  carvedilol (COREG) 3.125 mg tablet Take 1 Tab by mouth two (2) times daily (with meals).  ferrous sulfate 325 mg (65 mg iron) tablet Take 1 Tab by mouth two (2) times daily (with meals).  mirtazapine (REMERON) 7.5 mg tablet Take 1 Tab by mouth nightly.  [] nicotine (NICODERM CQ) 21 mg/24 hr 1 Patch by TransDERmal route every twenty-four (24) hours for 30 days. PAST MEDICAL HISTORY: Past medical history from the initial psychiatric evaluation has been reviewed (reviewed/updated 2018) with no additional updates (I asked patient and no additional past medical history provided).    Past Medical History:   Diagnosis Date    Arthritis     CAD (coronary artery disease)     CKD (chronic kidney disease), stage III     DM type 2 causing renal disease (HCC)     DVT (deep venous thrombosis) (HCC)     Hx of DVT:  Xarelto stopped due to GI bleed. S/P IVC filter.  Gait abnormality     GI bleed     Heart murmur     Hepatitis C     History of blood transfusion     Hypercholesterolemia     Hypertension 11/7/2014    Neuropathy     Non compliance w medication regimen     Peripheral vascular disease ([facility] [address] 11/7/2014    A. S/P Right SFA POBA and tibial atherectomy (2/4/13).  PUD (peptic ulcer disease) 2007    Unspecified sleep apnea     never used cpap     Past Surgical History:   Procedure Laterality Date    ABDOMEN SURGERY PROC UNLISTED  2007    has approx 7-8\" midline incision on abdomen--\"had to open him up and clean him out after feeding tube clogged\"    HX GI  2007    feeding tube for approx 2 mo    VASCULAR SURGERY PROCEDURE UNLIST Right 1/22/2015    popliteal-tibial bypass      SOCIAL HISTORY: Social history from the initial psychiatric evaluation has been reviewed (reviewed/updated 2/22/2018) with no additional updates (I asked patient and no additional social history provided). Social History     Social History    Marital status:      Spouse name: N/A    Number of children: N/A    Years of education: N/A     Occupational History    Not on file. Social History Main Topics    Smoking status: Current Every Day Smoker     Packs/day: 0.50    Smokeless tobacco: Not on file    Alcohol use No    Drug use: No      Comment: >20 years ago    Sexual activity: Not on file     Other Topics Concern    Not on file     Social History Narrative    78 year ols male admitted for depression and homelessness. Pt will be evicated from apartment due to non payment of bills. Girlfriend of 30 years left him to [facility] live in the woods with others. \" Pt is a brittle diabetic, with treatment non compliance. He has bilarela lower extremity amputations. Patient has a long hx of substance abuse.       FAMILY HISTORY: Family history from the initial psychiatric evaluation has been reviewed (reviewed/updated 2/22/2018) with no additional updates (I asked patient and no additional family history provided). Family History   Problem Relation Age of Onset    Hypertension Father        REVIEW OF SYSTEMS: (reviewed/updated 2/22/2018)  Appetite:decreased   Sleep: fitful   All other Review of Systems: Psychological ROS: positive for - behavioral disorder  Respiratory ROS: no cough, shortness of breath, or wheezing  Cardiovascular ROS: no chest pain or dyspnea on exertion         [facility] (MSE):    MSE FINDINGS ARE WITHIN NORMAL LIMITS (WNL) UNLESS OTHERWISE STATED BELOW. ( ALL OF THE BELOW CATEGORIES OF THE MSE HAVE BEEN REVIEWED (reviewed 2/22/2018) AND UPDATED AS DEEMED APPROPRIATE )  General Presentation age appropriate, evasive and guarded   Orientation oriented to time, place and person   Vital Signs  See below (reviewed 2/22/2018); Vital Signs (BP, Pulse, & Temp) are within normal limits if not listed below.    Gait and Station Stable/steady, no ataxia   Musculoskeletal System No extrapyramidal symptoms (EPS); no abnormal muscular movements or Tardive Dyskinesia (TD); muscle strength and tone are within normal limits   Language No aphasia or dysarthria   Speech:  hypoverbal   Thought Processes concrete; normal rate of thoughts; poor abstract reasoning/computation   Thought Associations goal directed   Thought Content free of delusions and free of hallucinations   Suicidal Ideations none   Homicidal Ideations none   Mood:  irritable   Affect:  mood-congruent   Memory recent  fair   Memory remote:  fair   Concentration/Attention:  distractable   Fund of Knowledge significantly below average   Insight:  poor   Reliability poor   Judgment:  poor          VITALS:     Patient Vitals for the past 24 hrs:   Temp Pulse Resp BP SpO2   02/22/18 1230 97.4 °F (36.3 °C) 65 16 152/69 100 %   02/22/18 0823 97.8 °F (36.6 °C) 70 16 163/72 99 %   02/21/18 1934 98.8 °F (37.1 °C) 74 20 168/82 99 %   02/21/18 1600 98.2 °F (36.8 °C) 68 16 142/62 99 %     Wt Readings from Last 3 Encounters:   02/18/18 90.4 kg (199 lb 3.2 oz)   01/23/18 83.6 kg (184 lb 4.9 oz)   07/13/17 88.9 kg (196 lb)     Temp Readings from Last 3 Encounters:   02/22/18 97.4 °F (36.3 °C)   01/24/18 97.2 °F (36.2 °C)   07/20/17 97.9 °F (36.6 °C)     BP Readings from Last 3 Encounters:   02/22/18 152/69   01/24/18 172/68   07/20/17 175/76     Pulse Readings from Last 3 Encounters:   02/22/18 65   01/24/18 72   07/20/17 (!) 52            DATA     LABORATORY DATA:(reviewed/updated 2/22/2018)  Recent Results (from the past 24 hour(s))   GLUCOSE, POC    Collection Time: 02/21/18  4:32 PM   Result Value Ref Range    Glucose (POC) 159 (H) 65 - 100 mg/dL    Performed by Raleigh Elias 61, POC    Collection Time: 02/22/18  8:19 AM   Result Value Ref Range    Glucose (POC) 80 65 - 100 mg/dL    Performed by Shira [id number], POC    Collection Time: 02/22/18 11:47 AM   Result Value Ref Range    Glucose (POC) 162 (H) 65 - 100 mg/dL    Performed by Roxy Hodges      No results found for: VALF2, VALAC, VALP, VALPR, DS6, CRBAM, CRBAMP, CARB2, XCRBAM  No results found for: LITHM   RADIOLOGY REPORTS:(reviewed/updated 2/22/2018)  Xr Chest Port    Result Date: 1/20/2018  EXAM: Portable CXR.  1800 hours. COMPARISON: 1/19/2015. INDICATION: cp, fatigue There is new interstitial pulmonary edema, cardiomegaly and small left pleural effusion. There is no focal infiltrate, pneumothorax or midline shift. IMPRESSION: CHF pattern.          MEDICATIONS     ALL MEDICATIONS:   Current Facility-Administered Medications   Medication Dose Route Frequency    LORazepam (ATIVAN) tablet 0.5 mg  0.5 mg Oral TID    mirtazapine (REMERON) tablet 30 mg  30 mg Oral QHS    buPROPion SR (WELLBUTRIN SR) tablet 150 mg  150 mg Oral DAILY    alum-mag hydroxide-simeth (MYLANTA) oral suspension 30 mL  30 mL Oral Q4H PRN    doxazosin (CARDURA) tablet 4 mg  4 mg Oral QHS    polyethylene glycol (MIRALAX) packet 17 g  17 g Oral DAILY    OLANZapine (ZyPREXA) tablet 2.5 mg  2.5 mg Oral Q6H PRN    ziprasidone (GEODON) 10 mg in sterile water (preservative free) 0.5 mL injection  10 mg IntraMUSCular BID PRN    benztropine (COGENTIN) tablet 1 mg  1 mg Oral BID PRN    benztropine (COGENTIN) injection 1 mg  1 mg IntraMUSCular BID PRN    zolpidem (AMBIEN) tablet 5 mg  5 mg Oral QHS PRN    acetaminophen (TYLENOL) tablet 650 mg  650 mg Oral Q4H PRN    magnesium hydroxide (MILK OF MAGNESIA) 400 mg/5 mL oral suspension 30 mL  30 mL Oral DAILY PRN    nicotine (NICODERM CQ) 21 mg/24 hr patch 1 Patch  1 Patch TransDERmal DAILY PRN    influenza vaccine 2017-18 (3 yrs+)(PF) (FLUZONE QUAD/FLUARIX QUAD) injection 0.5 mL  0.5 mL IntraMUSCular PRIOR TO DISCHARGE    amLODIPine (NORVASC) tablet 10 mg  10 mg Oral DAILY    aspirin delayed-release tablet 81 mg  81 mg Oral DAILY    atorvastatin (LIPITOR) tablet 40 mg  40 mg Oral DAILY    carvedilol (COREG) tablet 3.125 mg  3.125 mg Oral BID WITH MEALS    ferrous sulfate tablet 325 mg  325 mg Oral BID WITH MEALS    hydrALAZINE (APRESOLINE) tablet 100 mg  100 mg Oral TID    glucose chewable tablet 16 g  4 Tab Oral PRN    dextrose (D50W) injection syrg 12.5-25 g  12.5-25 g IntraVENous PRN    glucagon (GLUCAGEN) injection 1 mg  1 mg IntraMUSCular PRN    insulin lispro (HUMALOG) injection   SubCUTAneous AC&HS    nicotine (NICODERM CQ) 21 mg/24 hr patch 1 Patch  1 Patch TransDERmal Q24H      SCHEDULED MEDICATIONS:   Current Facility-Administered Medications   Medication Dose Route Frequency    LORazepam (ATIVAN) tablet 0.5 mg  0.5 mg Oral TID    mirtazapine (REMERON) tablet 30 mg  30 mg Oral QHS    buPROPion SR (WELLBUTRIN SR) tablet 150 mg  150 mg Oral DAILY    doxazosin (CARDURA) tablet 4 mg  4 mg Oral QHS    polyethylene glycol (MIRALAX) packet 17 g  17 g Oral DAILY    influenza vaccine 2017-18 (3 yrs+)(PF) (FLUZONE QUAD/FLUARIX QUAD) injection 0.5 mL 0.5 mL IntraMUSCular PRIOR TO DISCHARGE    amLODIPine (NORVASC) tablet 10 mg  10 mg Oral DAILY    aspirin delayed-release tablet 81 mg  81 mg Oral DAILY    atorvastatin (LIPITOR) tablet 40 mg  40 mg Oral DAILY    carvedilol (COREG) tablet 3.125 mg  3.125 mg Oral BID WITH MEALS    ferrous sulfate tablet 325 mg  325 mg Oral BID WITH MEALS    hydrALAZINE (APRESOLINE) tablet 100 mg  100 mg Oral TID    insulin lispro (HUMALOG) injection   SubCUTAneous AC&HS    nicotine (NICODERM CQ) 21 mg/24 hr patch 1 Patch  1 Patch TransDERmal Q24H          ASSESSMENT & PLAN     DIAGNOSES REQUIRING ACTIVE TREATMENT AND MONITORING: (reviewed/updated 2/22/2018)  Patient Active Hospital Problem List:   Depression (1/24/2018)    Assessment: sadness, helplessness, hopelessness, in reaction to recent bilateral lower leg amputationa and inability to pay water bill at home. Plan: consider forced medication/treatment, address social issues, daughter may be obtaining guardianship  1/27/18 need order to treat    1/28/18 continue his medications    02/10/18: Continue current treatment, Ativan dose was adjusted. 02/11/18: Continue current treatment. 2/17/18 continue same medications   2/18/18 continue same medications         In summary, Judy Quesada, is a 78 y.o.  male who presents with a severe exacerbation of the principal diagnosis of Depression  Patient's condition is worsening/not improving/not stable Patient requires continued inpatient hospitalization for further stabilization, safety monitoring and medication management. I will continue to coordinate the provision of individual, milieu, occupational, group, and substance abuse therapies to address target symptoms/diagnoses as deemed appropriate for the individual patient. A coordinated, multidisplinary treatment team round was conducted with the patient (this team consists of the nurse, psychiatric unit pharmcist,  and writer).      Complete current electronic health record for patient has been reviewed today including consultant notes, ancillary staff notes, nurses and psychiatric tech notes. Suicide risk assessment completed and patient deemed to be of low risk for suicide at this time. The following regarding medications was addressed during rounds with patient:   the risks and benefits of the proposed medication. The patient was given the opportunity to ask questions. Informed consent given to the use of the above medications. Will continue to adjust psychiatric and non-psychiatric medications (see above \"medication\" section and orders section for details) as deemed appropriate & based upon diagnoses and response to treatment. I will continue to order blood tests/labs and diagnostic tests as deemed appropriate and review results as they become available (see orders for details and above listed lab/test results). I will order psychiatric records from previous [facility] hospitals to further elucidate the nature of patient's psychopathology and review once available. I will gather additional collateral information from friends, family and o/p treatment team to further elucidate the nature of patient's psychopathology and baselline level of psychiatric functioning. I certify that this patient's inpatient psychiatric hospital services furnished since the previous certification were, and continue to be, required for treatment that could reasonably be expected to improve the patient's condition, or for diagnostic study, and that the patient continues to need, on a daily basis, active treatment furnished directly by or requiring the supervision of inpatient psychiatric facility personnel. In addition, the hospital records show that services furnished were intensive treatment services, admission or related services, or equivalent services.     EXPECTED DISCHARGE DATE/DAY: TBD     DISPOSITION: Home       Signed By:   Rose Marie Mccartney MD  2/22/2018

## 2018-02-22 NOTE — INTERDISCIPLINARY ROUNDS
Behavioral Health Interdisciplinary Rounds     Patient Name: Hallie Torres  Age: 78 y.o. Room/Bed:  740/02  Primary Diagnosis: Depression   Admission Status: Involuntary Commitment     Readmission within 30 days: no  Power of  in place: yes , daughter   Patient requires a blocked bed: no          Reason for blocked bed:     VTE Prophylaxis: Yes -ASA  Flu vaccine given : no - declined   Mobility needs/Fall risk: yes    Nutritional Plan: yes  Consults:       Labs/Testing due today?: no    Sleep hours: 6 1/2 hours      Participation in Care/Groups:  yes  Medication Compliant?: Yes  PRNS (last 24 hours): Sleep Aid    Restraints (last 24 hours):  no  Substance Abuse:  no  CIWA (range last 24 hours):  COWS (range last 24 hours):   Alcohol screening (AUDIT) completed -  AUDIT Score: 0  If applicable, date SBIRT discussed in treatment team AND documented: N/A  Tobacco - patient is a smoker:  Yes    Date tobacco education completed by RN: 2/17/18  24 hour chart check complete: yes    Patient goal(s) for today: Practice gratitude  Treatment team focus/goals: Continue working towards placement  Progress note: Patient practicing gratitude and polite behavior. LOS:  29  Expected LOS: TBD    Financial concerns/prescription coverage: Medicaid   Date of last family contact: 2/21 SW met with sisters    Family requesting physician contact today: No   Discharge plan: SNF Placement  Guns in the home: No      Outpatient provider(s): [facility]    Participating treatment team members:  DARRICK Matute; Dr. Jorge King MD; Staci Cuevas RN

## 2018-02-23 LAB
GLUCOSE BLD STRIP.AUTO-MCNC: 118 MG/DL (ref 65–100)
GLUCOSE BLD STRIP.AUTO-MCNC: 84 MG/DL (ref 65–100)
GLUCOSE BLD STRIP.AUTO-MCNC: 96 MG/DL (ref 65–100)
SERVICE CMNT-IMP: ABNORMAL
SERVICE CMNT-IMP: NORMAL
SERVICE CMNT-IMP: NORMAL

## 2018-02-23 PROCEDURE — 74011250637 HC RX REV CODE- 250/637: Performed by: FAMILY MEDICINE

## 2018-02-23 PROCEDURE — 65220000003 HC RM SEMIPRIVATE PSYCH

## 2018-02-23 PROCEDURE — 74011250637 HC RX REV CODE- 250/637: Performed by: PSYCHIATRY & NEUROLOGY

## 2018-02-23 PROCEDURE — 74011250637 HC RX REV CODE- 250/637: Performed by: HOSPITALIST

## 2018-02-23 PROCEDURE — 82962 GLUCOSE BLOOD TEST: CPT

## 2018-02-23 RX ADMIN — MIRTAZAPINE 30 MG: 15 TABLET, FILM COATED ORAL at 21:22

## 2018-02-23 RX ADMIN — DOXAZOSIN 4 MG: 2 TABLET ORAL at 21:21

## 2018-02-23 RX ADMIN — BUPROPION HYDROCHLORIDE 150 MG: 150 TABLET, EXTENDED RELEASE ORAL at 10:05

## 2018-02-23 RX ADMIN — LORAZEPAM 0.5 MG: 0.5 TABLET ORAL at 14:02

## 2018-02-23 RX ADMIN — ATORVASTATIN CALCIUM 40 MG: 40 TABLET, FILM COATED ORAL at 10:06

## 2018-02-23 RX ADMIN — CARVEDILOL 3.12 MG: 3.12 TABLET, FILM COATED ORAL at 10:06

## 2018-02-23 RX ADMIN — CARVEDILOL 3.12 MG: 3.12 TABLET, FILM COATED ORAL at 17:10

## 2018-02-23 RX ADMIN — ASPIRIN 81 MG: 81 TABLET, COATED ORAL at 10:05

## 2018-02-23 RX ADMIN — FERROUS SULFATE TAB 325 MG (65 MG ELEMENTAL FE) 325 MG: 325 (65 FE) TAB at 10:06

## 2018-02-23 RX ADMIN — LORAZEPAM 0.5 MG: 0.5 TABLET ORAL at 10:05

## 2018-02-23 RX ADMIN — LORAZEPAM 0.5 MG: 0.5 TABLET ORAL at 21:00

## 2018-02-23 RX ADMIN — AMLODIPINE BESYLATE 10 MG: 5 TABLET ORAL at 10:06

## 2018-02-23 RX ADMIN — HYDRALAZINE HYDROCHLORIDE 100 MG: 50 TABLET, FILM COATED ORAL at 17:30

## 2018-02-23 RX ADMIN — HYDRALAZINE HYDROCHLORIDE 100 MG: 50 TABLET, FILM COATED ORAL at 22:21

## 2018-02-23 RX ADMIN — FERROUS SULFATE TAB 325 MG (65 MG ELEMENTAL FE) 325 MG: 325 (65 FE) TAB at 17:10

## 2018-02-23 RX ADMIN — HYDRALAZINE HYDROCHLORIDE 100 MG: 50 TABLET, FILM COATED ORAL at 10:05

## 2018-02-23 NOTE — BH NOTES
PSYCHIATRIC PROGRESS NOTE         Patient Name  Josselin Royal   Date of Birth 1940   [facility] [id number]   Medical Record Number  [id number]      Age  78 y.o. PCP Johny Warner, MD   Admit date:  1/24/2018    Room Number  [phone]  @ [facility]   Date of Service  2/23/2018          PSYCHOTHERAPY SESSION NOTE:  Length of psychotherapy session: 15 minutes    Main condition/diagnosis/issues treated during session today, 2/23/2018 : med , meal and group non compliance    I employed Cognitive Behavioral therapy techniques, Reality-Oriented psychotherapy, as well as supportive psychotherapy in regards to various ongoing psychosocial stressors, including the following: pre-admission and current problems; housing issues; occupational issues; medical issues; and stress of hospitalization. Interpersonal relationship issues and psychodynamic conflicts explored. Attempts made to alleviate maladaptive patterns. We, also, worked on issues of denial & effects of substance dependency/use     Overall, patient is not progressing    Treatment Plan Update (reviewed an updated 2/23/2018) : I will modify psychotherapy tx plan by implementing more stress management strategies, building upon cognitive behavioral techniques, increasing coping skills, as well as shoring up psychological defenses). An extended energy and skill set was needed to engage pt in psychotherapy due to some of the following: resistiveness, complexity, negativity, confrontational nature, hostile behaviors, and/or severe abnormalities in thought processes/psychosis resulting in the loss of expressive/receptive language communication skills. E & M PROGRESS NOTE:         HISTORY       CC:  \"depression and non compliance with treatment \"  HISTORY OF PRESENT ILLNESS/INTERVAL HISTORY:  (reviewed/updated 2/23/2018).   per initial evaluation:     Josselin Royal presents/reports/evidences the following emotional symptoms today, 2/23/2018:depression. The above symptoms have been present for 1 years. These symptoms are of severe severity. The symptoms are constant  in nature. Additional symptomatology and features include anxiety. 2/2/18- pATIENT HAS IMPROVED SPORADIC MED COMPLIANCE WITH INCREASED ATIVAN DOSE. WAS SOCIAL IN [place] TODAY. SW TO CONTACT DAUGHTER TO LET HER KNOE THAT HE IS NOT BEING AS TREATMENT COMPLIANT AS HE TOLD HER HE WOULD BE.    1/27/18 he is not responding to questions and not engaging and not showing engagement and refuse medications and treatment   1/28/18 he is doing better and he engaged and not feeling sad or depressed and he eats better    1/29/18= Patient is deliberately refusing vital signs, labs and medications. He likes finger foods and eats those. Will meet with daughter on Tuesday to discuss possible guardianship. Will seek forced meds. 1/30/18- Pt. Agreed to comply with meds after extensive family meeting. Planning for SNF admission and possible transfer to home afterwards [address].  1/31/18- Patient is intermittently cooperative with somatic meds. We discussed the risks to his health this can cause. I advised Wellbutrin for depression. This may help motivate treatment compliance. 2/1/18- Continues Rude, belligerent, uncooperative and disrespectful with staff. Is marginally compliant with diabetes management. Waiting for placement. 2/3/18- no complaints. More redirectable, less hostile and more compliant  2/4/18- Mr. Waynetta Dandy very somnolent this morning. No agitation  2/5/18- Sporadic , selective compliance with medications. Denies SI/HI. Needs reminding of treatment goals to improve compliance for transfer to SNF.  2/6/18- mr. Waynetta Dandy was bright and positive this morning. Less demanding and hostile towards staff. Asking to go home. 2/7/18- Apologized to writer for being rude and uncooperative with treatment. Focused on discharge.  Understandd house repairs need to be completed first.  2/8/18- sleeping this morning. 2/9/18- grumpy, entitled and impatient re discharge, accepts inerim SNF placement. 2/10/18: intermittently agitated, verbally abusive and  hostile, making gesture to strike out,focused on discharge, no finish, confuse,non redirectable, received prn ativan and his dose of schedule ativan was adjusted. 02/11/18:Patient was seen today, As per staff patient continues to demand to go home and discharge, Thought process tangential, poor insight, non redirectable,gets usp set when staff redirect him. Accepting medications and meals. Does not want to go to NH.   2/12/18- med compliance improved. Understands his home is not habitable until repairs are made, however insistes on focusing with staff on immediate discharge. More appropriate and cordial lately. 2/13/18- Recently has maintained appropriate social behavior and cordiality with staff. Denies SI ans HI. Is compliant with treatment. 2/14/18- More polite and showing patience about waiting for SNF to accept. 2/15/18- Focused on discharge. No insight that home is not safe to due to disrepair and no running h2o. Irritable and demanding. 2/16/18- Continues sarcastic, irritable and has no insight that he cannot go live in a house without running water. Expects preferential treatment when it comes to SNF waiting list. Asking multiple staff members the same discharge question- even though he has not forgotten the answer. (clearly able to recall othetr tghings long and short term)  2/17/18 he is the same and he is irritable and angry and not happy about being here and no self harm  Behavior and no issues with sleep and no self harm behavior    2/18/18 he is not happy about being here and not feeling happy about being here and still gets angry and cano and still has issues with staff   2/19/18- Not requiring PRN's but still irritable, bored and demanding. 2/20/18- Waiting for placement. Very entitled, unpleasant to staff.  No social with other patients. Bitter over life circumstances and blames others for all of his troubles. 18- Very irritable and rude to staff. Repeatedly demending to be discharged immediately, although his house has no water or electric power. Sister may possibly visit and may take him in to live with her. Med and meal compliant.  18- Family refuses to take him in. He is back on the list waiting for snf placement. 18- No one in the family wants to take him in. Continues cantankerous, irritable, intrusive and rude. SIDE EFFECTS: (reviewed/updated 2018)  None reported or admitted to. No noted toxicity with use of Depakote/Tegretol/lithium/Clozaril/TCAs   ALLERGIES:(reviewed/updated 2018)  Allergies   Allergen Reactions    Tetracycline Hives      MEDICATIONS PRIOR TO ADMISSION:(reviewed/updated 2018)  Prescriptions Prior to Admission   Medication Sig    hydrALAZINE (APRESOLINE) 100 mg tablet Take 1 Tab by mouth three (3) times daily.  amLODIPine (NORVASC) 10 mg tablet Take 1 Tab by mouth daily.  aspirin delayed-release 81 mg tablet Take 1 Tab by mouth daily.  atorvastatin (LIPITOR) 40 mg tablet Take 1 Tab by mouth daily.  carvedilol (COREG) 3.125 mg tablet Take 1 Tab by mouth two (2) times daily (with meals).  ferrous sulfate 325 mg (65 mg iron) tablet Take 1 Tab by mouth two (2) times daily (with meals).  mirtazapine (REMERON) 7.5 mg tablet Take 1 Tab by mouth nightly.  [] nicotine (NICODERM CQ) 21 mg/24 hr 1 Patch by TransDERmal route every twenty-four (24) hours for 30 days. PAST MEDICAL HISTORY: Past medical history from the initial psychiatric evaluation has been reviewed (reviewed/updated 2018) with no additional updates (I asked patient and no additional past medical history provided).    Past Medical History:   Diagnosis Date    Arthritis     CAD (coronary artery disease)     CKD (chronic kidney disease), stage III     DM type 2 causing renal disease ([facility] [address]     DVT (deep venous thrombosis) (HCC)     Hx of DVT:  Xarelto stopped due to GI bleed. S/P IVC filter.  Gait abnormality     GI bleed     Heart murmur     Hepatitis C     History of blood transfusion     Hypercholesterolemia     Hypertension 11/7/2014    Neuropathy     Non compliance w medication regimen     Peripheral vascular disease ([facility] [address] 11/7/2014    A. S/P Right SFA POBA and tibial atherectomy (2/4/13).  PUD (peptic ulcer disease) 2007    Unspecified sleep apnea     never used cpap     Past Surgical History:   Procedure Laterality Date    ABDOMEN SURGERY PROC UNLISTED  2007    has approx 7-8\" midline incision on abdomen--\"had to open him up and clean him out after feeding tube clogged\"    HX GI  2007    feeding tube for approx 2 mo    VASCULAR SURGERY PROCEDURE UNLIST Right 1/22/2015    popliteal-tibial bypass      SOCIAL HISTORY: Social history from the initial psychiatric evaluation has been reviewed (reviewed/updated 2/23/2018) with no additional updates (I asked patient and no additional social history provided). Social History     Social History    Marital status:      Spouse name: N/A    Number of children: N/A    Years of education: N/A     Occupational History    Not on file. Social History Main Topics    Smoking status: Current Every Day Smoker     Packs/day: 0.50    Smokeless tobacco: Not on file    Alcohol use No    Drug use: No      Comment: >20 years ago    Sexual activity: Not on file     Other Topics Concern    Not on file     Social History Narrative    78 year ols male admitted for depression and homelessness. Pt will be evicated from apartment due to non payment of bills. Girlfriend of 30 years left him to [facility] live [place] the woods with others. \" Pt is a brittle diabetic, with treatment non compliance. He has bilarela lower extremity amputations. Patient has a long hx of substance abuse.       FAMILY HISTORY: Family history from the initial psychiatric evaluation has been reviewed (reviewed/updated 2/23/2018) with no additional updates (I asked patient and no additional family history provided). Family History   Problem Relation Age of Onset    Hypertension Father        REVIEW OF SYSTEMS: (reviewed/updated 2/23/2018)  Appetite:decreased   Sleep: fitful   All other Review of Systems: Psychological ROS: positive for - behavioral disorder  Respiratory ROS: no cough, shortness of breath, or wheezing  Cardiovascular ROS: no chest pain or dyspnea on exertion         [address] [facility] (MSE):    [facility] FINDINGS ARE WITHIN NORMAL LIMITS (WNL) UNLESS OTHERWISE STATED BELOW. ( ALL OF THE BELOW CATEGORIES OF THE MSE HAVE BEEN REVIEWED (reviewed 2/23/2018) AND UPDATED AS DEEMED APPROPRIATE )  General Presentation age appropriate, evasive and guarded   Orientation oriented to time, place and person   Vital Signs  See below (reviewed 2/23/2018); Vital Signs (BP, Pulse, & Temp) are within normal limits if not listed below.    Gait and Station Stable/steady, no ataxia   Musculoskeletal System No extrapyramidal symptoms (EPS); no abnormal muscular movements or Tardive Dyskinesia (TD); muscle strength and tone are within normal limits   Language No aphasia or dysarthria   Speech:  hypoverbal   Thought Processes concrete; normal rate of thoughts; poor abstract reasoning/computation   Thought Associations goal directed   Thought Content free of delusions and free of hallucinations   Suicidal Ideations none   Homicidal Ideations none   Mood:  irritable   Affect:  mood-congruent   Memory recent  fair   Memory remote:  fair   Concentration/Attention:  distractable   Fund of Knowledge significantly below average   Insight:  poor   Reliability poor   Judgment:  poor          VITALS:     Patient Vitals for the past 24 hrs:   Temp Pulse Resp BP SpO2   02/23/18 1525 98.2 °F (36.8 °C) 66 16 143/66 100 %   02/23/18 0806 98 °F (36.7 °C) 73 16 166/80 100 %   02/22/18 1913 97.6 °F (36.4 °C) 60 16 161/64 100 %     Wt Readings from Last 3 Encounters:   02/18/18 90.4 kg (199 lb 3.2 oz)   01/23/18 83.6 kg (184 lb 4.9 oz)   07/13/17 88.9 kg (196 lb)     Temp Readings from Last 3 Encounters:   02/23/18 98.2 °F (36.8 °C)   01/24/18 97.2 °F (36.2 °C)   07/20/17 97.9 °F (36.6 °C)     BP Readings from Last 3 Encounters:   02/23/18 143/66   01/24/18 172/68   07/20/17 175/76     Pulse Readings from Last 3 Encounters:   02/23/18 66   01/24/18 72   07/20/17 (!) 52            DATA     LABORATORY DATA:(reviewed/updated 2/23/2018)  Recent Results (from the past 24 hour(s))   GLUCOSE, POC    Collection Time: 02/22/18  8:08 PM   Result Value Ref Range    Glucose (POC) 133 (H) 65 - 100 mg/dL    Performed by Chloé Best    GLUCOSE, POC    Collection Time: 02/23/18  8:13 AM   Result Value Ref Range    Glucose (POC) 84 65 - 100 mg/dL    Performed by [address], POC    Collection Time: 02/23/18  4:17 PM   Result Value Ref Range    Glucose (POC) 96 65 - 100 mg/dL    Performed by Nickolas Kaufman      No results found for: VALF2, VALAC, VALP, VALPR, DS6, CRBAM, CRBAMP, CARB2, XCRBAM  No results found for: LITHM   RADIOLOGY REPORTS:(reviewed/updated 2/23/2018)  Xr Chest Port    Result Date: 1/20/2018  EXAM: Portable CXR.  1800 hours. COMPARISON: 1/19/2015. INDICATION: cp, fatigue There is new interstitial pulmonary edema, cardiomegaly and small left pleural effusion. There is no focal infiltrate, pneumothorax or midline shift. IMPRESSION: CHF pattern.          MEDICATIONS     ALL MEDICATIONS:   Current Facility-Administered Medications   Medication Dose Route Frequency    LORazepam (ATIVAN) tablet 0.5 mg  0.5 mg Oral TID    mirtazapine (REMERON) tablet 30 mg  30 mg Oral QHS    buPROPion SR (WELLBUTRIN SR) tablet 150 mg  150 mg Oral DAILY    alum-mag hydroxide-simeth (MYLANTA) oral suspension 30 mL  30 mL Oral Q4H PRN    doxazosin (CARDURA) tablet 4 mg  4 mg Oral QHS    polyethylene glycol (MIRALAX) packet 17 g  17 g Oral DAILY    OLANZapine (ZyPREXA) tablet 2.5 mg  2.5 mg Oral Q6H PRN    ziprasidone (GEODON) 10 mg in sterile water (preservative free) 0.5 mL injection  10 mg IntraMUSCular BID PRN    benztropine (COGENTIN) tablet 1 mg  1 mg Oral BID PRN    benztropine (COGENTIN) injection 1 mg  1 mg IntraMUSCular BID PRN    zolpidem (AMBIEN) tablet 5 mg  5 mg Oral QHS PRN    acetaminophen (TYLENOL) tablet 650 mg  650 mg Oral Q4H PRN    magnesium hydroxide (MILK OF MAGNESIA) 400 mg/5 mL oral suspension 30 mL  30 mL Oral DAILY PRN    nicotine (NICODERM CQ) 21 mg/24 hr patch 1 Patch  1 Patch TransDERmal DAILY PRN    influenza vaccine 2017-18 (3 yrs+)(PF) (FLUZONE QUAD/FLUARIX QUAD) injection 0.5 mL  0.5 mL IntraMUSCular PRIOR TO DISCHARGE    amLODIPine (NORVASC) tablet 10 mg  10 mg Oral DAILY    aspirin delayed-release tablet 81 mg  81 mg Oral DAILY    atorvastatin (LIPITOR) tablet 40 mg  40 mg Oral DAILY    carvedilol (COREG) tablet 3.125 mg  3.125 mg Oral BID WITH MEALS    ferrous sulfate tablet 325 mg  325 mg Oral BID WITH MEALS    hydrALAZINE (APRESOLINE) tablet 100 mg  100 mg Oral TID    glucose chewable tablet 16 g  4 Tab Oral PRN    dextrose (D50W) injection syrg 12.5-25 g  12.5-25 g IntraVENous PRN    glucagon (GLUCAGEN) injection 1 mg  1 mg IntraMUSCular PRN    insulin lispro (HUMALOG) injection   SubCUTAneous AC&HS    nicotine (NICODERM CQ) 21 mg/24 hr patch 1 Patch  1 Patch TransDERmal Q24H      SCHEDULED MEDICATIONS:   Current Facility-Administered Medications   Medication Dose Route Frequency    LORazepam (ATIVAN) tablet 0.5 mg  0.5 mg Oral TID    mirtazapine (REMERON) tablet 30 mg  30 mg Oral QHS    buPROPion SR (WELLBUTRIN SR) tablet 150 mg  150 mg Oral DAILY    doxazosin (CARDURA) tablet 4 mg  4 mg Oral QHS    polyethylene glycol (MIRALAX) packet 17 g  17 g Oral DAILY    influenza vaccine 2017-18 (3 yrs+)(PF) (FLUZONE QUAD/FLUARIX QUAD) injection 0.5 mL  0.5 mL IntraMUSCular PRIOR TO DISCHARGE    amLODIPine (NORVASC) tablet 10 mg  10 mg Oral DAILY    aspirin delayed-release tablet 81 mg  81 mg Oral DAILY    atorvastatin (LIPITOR) tablet 40 mg  40 mg Oral DAILY    carvedilol (COREG) tablet 3.125 mg  3.125 mg Oral BID WITH MEALS    ferrous sulfate tablet 325 mg  325 mg Oral BID WITH MEALS    hydrALAZINE (APRESOLINE) tablet 100 mg  100 mg Oral TID    insulin lispro (HUMALOG) injection   SubCUTAneous AC&HS    nicotine (NICODERM CQ) 21 mg/24 hr patch 1 Patch  1 Patch TransDERmal Q24H          ASSESSMENT & PLAN     DIAGNOSES REQUIRING ACTIVE TREATMENT AND MONITORING: (reviewed/updated 2/23/2018)  Patient Active Hospital Problem List:   Depression (1/24/2018)    Assessment: sadness, helplessness, hopelessness, in reaction to recent bilateral lower leg amputationa and inability to pay water bill at home. Plan: consider forced medication/treatment, address social issues, daughter may be obtaining guardianship  1/27/18 need order to treat    1/28/18 continue his medications    02/10/18: Continue current treatment, Ativan dose was adjusted. 02/11/18: Continue current treatment. 2/17/18 continue same medications   2/18/18 continue same medications         In summary, Venita George, is a 78 y.o.  male who presents with a severe exacerbation of the principal diagnosis of Depression  Patient's condition is worsening/not improving/not stable Patient requires continued inpatient hospitalization for further stabilization, safety monitoring and medication management. I will continue to coordinate the provision of individual, milieu, occupational, group, and substance abuse therapies to address target symptoms/diagnoses as deemed appropriate for the individual patient.   A coordinated, multidisplinary treatment team round was conducted with the patient (this team consists of the nurse, psychiatric unit pharmcist,  and writer). Complete current electronic health record for patient has been reviewed today including consultant notes, ancillary staff notes, nurses and psychiatric tech notes. Suicide risk assessment completed and patient deemed to be of low risk for suicide at this time. The following regarding medications was addressed during rounds with patient:   the risks and benefits of the proposed medication. The patient was given the opportunity to ask questions. Informed consent given to the use of the above medications. Will continue to adjust psychiatric and non-psychiatric medications (see above \"medication\" section and orders section for details) as deemed appropriate & based upon diagnoses and response to treatment. I will continue to order blood tests/labs and diagnostic tests as deemed appropriate and review results as they become available (see orders for details and above listed lab/test results). I will order psychiatric records from previous [facility] hospitals to further elucidate the nature of patient's psychopathology and review once available. I will gather additional collateral information from friends, family and o/p treatment team to further elucidate the nature of patient's psychopathology and baselline level of psychiatric functioning. I certify that this patient's inpatient psychiatric hospital services furnished since the previous certification were, and continue to be, required for treatment that could reasonably be expected to improve the patient's condition, or for diagnostic study, and that the patient continues to need, on a daily basis, active treatment furnished directly by or requiring the supervision of inpatient psychiatric facility personnel. In addition, the hospital records show that services furnished were intensive treatment services, admission or related services, or equivalent services.     EXPECTED DISCHARGE DATE/DAY: TBD DISPOSITION: Home       Signed By:   iGla Acosta MD  2/23/2018

## 2018-02-23 NOTE — PROGRESS NOTES
Problem: Falls - Risk of  Goal: *Absence of Falls  Document Bello Fall Risk and appropriate interventions in the flowsheet. Outcome: Progressing Towards Goal  Fall Risk Interventions:  Mobility Interventions: Bed/chair exit alarm    Mentation Interventions: Adequate sleep, hydration, pain control, Bed/chair exit alarm    Medication Interventions: Bed/chair exit alarm    Elimination Interventions: Bed/chair exit alarm, Call light in reach    History of Falls Interventions: Bed/chair exit alarm, Door open when patient unattended    Resting in bed with eyes closed, no complaints, no distress noted. Safety measures in place, will continue to monitor.

## 2018-02-23 NOTE — BH NOTES
1530:  Patient received as he is sitting in the [address] [place] [address] St. He asks oncoming staff if Day Shift has called someone to help clean him up - and the Lift Team has bee called. He greets oncoming staff cordially and is helpful with assisting some of the other patients. He voices no other complaints or concerns at this time. Will maintain q 15 minute safety checks.

## 2018-02-23 NOTE — INTERDISCIPLINARY ROUNDS
Behavioral Health Interdisciplinary Rounds     Patient Name: Zane Severe  Age: 78 y.o. Room/Bed:  740/02  Primary Diagnosis: Depression   Admission Status: Involuntary Commitment     Readmission within 30 days: no  Power of  in place: yes, daughter  Patient requires a blocked bed: no          Reason for blocked bed:     VTE Prophylaxis: Yes, ASA  Flu vaccine given : no declined  Mobility needs/Fall risk: yes    Nutritional Plan: yes  Consults:          Labs/Testing due today?: no    Sleep hours: 7       Participation in Care/Groups:  yes  Medication Compliant?: Yes  PRNS (last 24 hours): None    Restraints (last 24 hours):  no  Substance Abuse:  no  CIWA (range last 24 hours):  COWS (range last 24 hours):   Alcohol screening (AUDIT) completed -  AUDIT Score: 0  If applicable, date SBIRT discussed in treatment team AND documented: N/A  Tobacco - patient is a smoker: yes   Date tobacco education completed by RN: 2/17/18  24 hour chart check complete: yes     Patient goal(s) for today: Practice gratitude  Treatment team focus/goals: Call fiance; check referrals  Progress note: Patient reported he is unsatisfied with SW; becomes disrespectful, demanding, and confused at times. LOS:  30  Expected LOS: TBD    Financial concerns/prescription coverage: Medicaid; Medicare  Date of last family contact: 2/23 SW spoke to fiance      Family requesting physician contact today: No  Discharge plan: Placement  Guns in the home: No      Outpatient provider(s): TBD    Participating treatment team members:  DARRICK Velasquez; Dr. Koki Cunningham MD; Farida Freedman, MIKHAIL

## 2018-02-23 NOTE — PROGRESS NOTES
Problem: Depressed Mood (Adult/Pediatric)  Goal: *STG: Participates in treatment plan  Outcome: Progressing Towards Goal  Patient attended morning group   He is anxious, depressed and irritable   Verbalizes he wants to go home.    Refused afternoon POC   Out in the DR appropriate with staff and peers   Med/Meal compliant

## 2018-02-23 NOTE — BH NOTES
GROUP THERAPY PROGRESS NOTE    Wendy willard participated in a morning Process Group on the Geriatric Unit, with a focus identifying feelings, planning for the day, and singing. Group time: 45 minutes. Personal goal for participation: To increase the capacity to shift ones mood, prepare for the day, and share in group singing. Goal orientation: The patient will be able to prepare for the day through group singing. Group therapy participation: When prompted, this patient minimally participated in the group. Therapeutic interventions reviewed and discussed: The group members were introduce themselves by first names and participate in group singing as a way to increase their oxygen and blood flow and begin their day on a positive note. They were also asked to join in singing several songs. Impression of participation: The patient he felt \"okay,\" when he was addressed directly. He did not contribute otherwise to the group discussion or the singing. He was alert and sat through the entire session. He expressed no SI/HI and displayed no overt psychosis. His affect was restrained and his mood reflected his affect.

## 2018-02-24 LAB
GLUCOSE BLD STRIP.AUTO-MCNC: 108 MG/DL (ref 65–100)
GLUCOSE BLD STRIP.AUTO-MCNC: 112 MG/DL (ref 65–100)
SERVICE CMNT-IMP: ABNORMAL
SERVICE CMNT-IMP: ABNORMAL

## 2018-02-24 PROCEDURE — 74011250637 HC RX REV CODE- 250/637: Performed by: FAMILY MEDICINE

## 2018-02-24 PROCEDURE — 74011250637 HC RX REV CODE- 250/637: Performed by: HOSPITALIST

## 2018-02-24 PROCEDURE — 65220000003 HC RM SEMIPRIVATE PSYCH

## 2018-02-24 PROCEDURE — 74011250637 HC RX REV CODE- 250/637: Performed by: PSYCHIATRY & NEUROLOGY

## 2018-02-24 PROCEDURE — 82962 GLUCOSE BLOOD TEST: CPT

## 2018-02-24 RX ADMIN — ASPIRIN 81 MG: 81 TABLET, COATED ORAL at 11:32

## 2018-02-24 RX ADMIN — LORAZEPAM 0.5 MG: 0.5 TABLET ORAL at 13:51

## 2018-02-24 RX ADMIN — HYDRALAZINE HYDROCHLORIDE 100 MG: 50 TABLET, FILM COATED ORAL at 17:41

## 2018-02-24 RX ADMIN — MIRTAZAPINE 30 MG: 15 TABLET, FILM COATED ORAL at 20:15

## 2018-02-24 RX ADMIN — HYDRALAZINE HYDROCHLORIDE 100 MG: 50 TABLET, FILM COATED ORAL at 11:31

## 2018-02-24 RX ADMIN — FERROUS SULFATE TAB 325 MG (65 MG ELEMENTAL FE) 325 MG: 325 (65 FE) TAB at 11:32

## 2018-02-24 RX ADMIN — AMLODIPINE BESYLATE 10 MG: 5 TABLET ORAL at 11:32

## 2018-02-24 RX ADMIN — CARVEDILOL 3.12 MG: 3.12 TABLET, FILM COATED ORAL at 17:41

## 2018-02-24 RX ADMIN — BUPROPION HYDROCHLORIDE 150 MG: 150 TABLET, EXTENDED RELEASE ORAL at 11:32

## 2018-02-24 RX ADMIN — LORAZEPAM 0.5 MG: 0.5 TABLET ORAL at 11:32

## 2018-02-24 RX ADMIN — DOXAZOSIN 4 MG: 2 TABLET ORAL at 20:15

## 2018-02-24 RX ADMIN — ATORVASTATIN CALCIUM 40 MG: 40 TABLET, FILM COATED ORAL at 11:32

## 2018-02-24 RX ADMIN — LORAZEPAM 0.5 MG: 0.5 TABLET ORAL at 20:15

## 2018-02-24 RX ADMIN — HYDRALAZINE HYDROCHLORIDE 100 MG: 50 TABLET, FILM COATED ORAL at 20:15

## 2018-02-24 RX ADMIN — CARVEDILOL 3.12 MG: 3.12 TABLET, FILM COATED ORAL at 11:32

## 2018-02-24 RX ADMIN — FERROUS SULFATE TAB 325 MG (65 MG ELEMENTAL FE) 325 MG: 325 (65 FE) TAB at 17:41

## 2018-02-24 NOTE — PROGRESS NOTES
Problem: Falls - Risk of  Goal: *Absence of Falls  Document Bello Fall Risk and appropriate interventions in the flowsheet. Outcome: Progressing Towards Goal  Fall Risk Interventions:  Mobility Interventions: Bed/chair exit alarm    Mentation Interventions: Bed/chair exit alarm    Medication Interventions: Bed/chair exit alarm    Elimination Interventions: Bed/chair exit alarm    History of Falls Interventions: Bed/chair exit alarm, Door open when patient unattended        Problem: Depressed Mood (Adult/Pediatric)  Goal: *STG: Participates in treatment plan  Outcome: Progressing Towards Goal  Pt is demanding and irritable at times. Refused blood sugar this morning.

## 2018-02-24 NOTE — BH NOTES
PSYCHIATRIC PROGRESS NOTE         Patient Name  Dallin Hernandez   Date of Birth 1940   [facility] [id number]   Medical Record Number  [id number]      Age  78 y.o. PCP Johny Warner, MD   Admit date:  1/24/2018    Room Number  [phone]  @ [facility]   Date of Service  2/24/2018          PSYCHOTHERAPY SESSION NOTE:  Length of psychotherapy session: 15 minutes    Main condition/diagnosis/issues treated during session today, 2/24/2018 : med , meal and group non compliance    I employed Cognitive Behavioral therapy techniques, Reality-Oriented psychotherapy, as well as supportive psychotherapy in regards to various ongoing psychosocial stressors, including the following: pre-admission and current problems; housing issues; occupational issues; medical issues; and stress of hospitalization. Interpersonal relationship issues and psychodynamic conflicts explored. Attempts made to alleviate maladaptive patterns. We, also, worked on issues of denial & effects of substance dependency/use     Overall, patient is not progressing    Treatment Plan Update (reviewed an updated 2/24/2018) : I will modify psychotherapy tx plan by implementing more stress management strategies, building upon cognitive behavioral techniques, increasing coping skills, as well as shoring up psychological defenses). An extended energy and skill set was needed to engage pt in psychotherapy due to some of the following: resistiveness, complexity, negativity, confrontational nature, hostile behaviors, and/or severe abnormalities in thought processes/psychosis resulting in the loss of expressive/receptive language communication skills. E & M PROGRESS NOTE:         HISTORY       CC:  \"depression and non compliance with treatment \"  HISTORY OF PRESENT ILLNESS/INTERVAL HISTORY:  (reviewed/updated 2/24/2018).   per initial evaluation:     Dallin Hernandez presents/reports/evidences the following emotional symptoms today, 2/24/2018:depression. The above symptoms have been present for 1 years. These symptoms are of severe severity. The symptoms are constant  in nature. Additional symptomatology and features include anxiety. 2/2/18- pATIENT HAS IMPROVED SPORADIC MED COMPLIANCE WITH INCREASED ATIVAN DOSE. WAS SOCIAL IN [place] TODAY. SW TO CONTACT DAUGHTER TO LET HER KNOE THAT HE IS NOT BEING AS TREATMENT COMPLIANT AS HE TOLD HER HE WOULD BE.    1/27/18 he is not responding to questions and not engaging and not showing engagement and refuse medications and treatment   1/28/18 he is doing better and he engaged and not feeling sad or depressed and he eats better    1/29/18= Patient is deliberately refusing vital signs, labs and medications. He likes finger foods and eats those. Will meet with daughter on Tuesday to discuss possible guardianship. Will seek forced meds. 1/30/18- Pt. Agreed to comply with meds after extensive family meeting. Planning for SNF admission and possible transfer to home afterwards [address].  1/31/18- Patient is intermittently cooperative with somatic meds. We discussed the risks to his health this can cause. I advised Wellbutrin for depression. This may help motivate treatment compliance. 2/1/18- Continues Rude, belligerent, uncooperative and disrespectful with staff. Is marginally compliant with diabetes management. Waiting for placement. 2/3/18- no complaints. More redirectable, less hostile and more compliant  2/4/18- Mr. Bert Dixon very somnolent this morning. No agitation  2/5/18- Sporadic , selective compliance with medications. Denies SI/HI. Needs reminding of treatment goals to improve compliance for transfer to SNF.  2/6/18- mr. Bert Dixon was bright and positive this morning. Less demanding and hostile towards staff. Asking to go home. 2/7/18- Apologized to writer for being rude and uncooperative with treatment. Focused on discharge.  Understandd house repairs need to be completed first.  2/8/18- sleeping this morning. 2/9/18- grumpy, entitled and impatient re discharge, accepts inerim SNF placement. 2/10/18: intermittently agitated, verbally abusive and  hostile, making gesture to strike out,focused on discharge, no finish, confuse,non redirectable, received prn ativan and his dose of schedule ativan was adjusted. 02/11/18:Patient was seen today, As per staff patient continues to demand to go home and discharge, Thought process tangential, poor insight, non redirectable,gets usp set when staff redirect him. Accepting medications and meals. Does not want to go to NH.   2/12/18- med compliance improved. Understands his home is not habitable until repairs are made, however insistes on focusing with staff on immediate discharge. More appropriate and cordial lately. 2/13/18- Recently has maintained appropriate social behavior and cordiality with staff. Denies SI ans HI. Is compliant with treatment. 2/14/18- More polite and showing patience about waiting for SNF to accept. 2/15/18- Focused on discharge. No insight that home is not safe to due to disrepair and no running h2o. Irritable and demanding. 2/16/18- Continues sarcastic, irritable and has no insight that he cannot go live in a house without running water. Expects preferential treatment when it comes to SNF waiting list. Asking multiple staff members the same discharge question- even though he has not forgotten the answer. (clearly able to recall othetr tghings long and short term)  2/17/18 he is the same and he is irritable and angry and not happy about being here and no self harm  Behavior and no issues with sleep and no self harm behavior    2/18/18 he is not happy about being here and not feeling happy about being here and still gets angry and cano and still has issues with staff   2/19/18- Not requiring PRN's but still irritable, bored and demanding. 2/20/18- Waiting for placement. Very entitled, unpleasant to staff.  No social with other patients. Bitter over life circumstances and blames others for all of his troubles. 18- Very irritable and rude to staff. Repeatedly demending to be discharged immediately, although his house has no water or electric power. Sister may possibly visit and may take him in to live with her. Med and meal compliant.  18- Family refuses to take him in. He is back on the list waiting for snf placement. 18- No one in the family wants to take him in. Continues cantankerous, irritable, intrusive and rude. 18- Entitled and behaving as though he is more important than his peers. Interrupts this writer as she addressed another pt. Has no insight about how offensive his behavior is. SIDE EFFECTS: (reviewed/updated 2018)  None reported or admitted to. No noted toxicity with use of Depakote/Tegretol/lithium/Clozaril/TCAs   ALLERGIES:(reviewed/updated 2018)  Allergies   Allergen Reactions    Tetracycline Hives      MEDICATIONS PRIOR TO ADMISSION:(reviewed/updated 2018)  Prescriptions Prior to Admission   Medication Sig    hydrALAZINE (APRESOLINE) 100 mg tablet Take 1 Tab by mouth three (3) times daily.  amLODIPine (NORVASC) 10 mg tablet Take 1 Tab by mouth daily.  aspirin delayed-release 81 mg tablet Take 1 Tab by mouth daily.  atorvastatin (LIPITOR) 40 mg tablet Take 1 Tab by mouth daily.  carvedilol (COREG) 3.125 mg tablet Take 1 Tab by mouth two (2) times daily (with meals).  ferrous sulfate 325 mg (65 mg iron) tablet Take 1 Tab by mouth two (2) times daily (with meals).  mirtazapine (REMERON) 7.5 mg tablet Take 1 Tab by mouth nightly.  [] nicotine (NICODERM CQ) 21 mg/24 hr 1 Patch by TransDERmal route every twenty-four (24) hours for 30 days.       PAST MEDICAL HISTORY: Past medical history from the initial psychiatric evaluation has been reviewed (reviewed/updated 2018) with no additional updates (I asked patient and no additional past medical history provided). Past Medical History:   Diagnosis Date    Arthritis     CAD (coronary artery disease) 2007    CKD (chronic kidney disease), stage III     DM type 2 causing renal disease (HCC)     DVT (deep venous thrombosis) (HCC)     Hx of DVT:  Xarelto stopped due to GI bleed. S/P IVC filter.  Gait abnormality     GI bleed     Heart murmur     Hepatitis C     History of blood transfusion     Hypercholesterolemia     Hypertension 11/7/2014    Neuropathy     Non compliance w medication regimen     Peripheral vascular disease ([facility] [address] 11/7/2014    A. S/P Right SFA POBA and tibial atherectomy (2/4/13).  PUD (peptic ulcer disease) 2007    Unspecified sleep apnea     never used cpap     Past Surgical History:   Procedure Laterality Date    ABDOMEN SURGERY PROC UNLISTED  2007    has approx 7-8\" midline incision on abdomen--\"had to open him up and clean him out after feeding tube clogged\"    HX GI  2007    feeding tube for approx 2 mo    VASCULAR SURGERY PROCEDURE UNLIST Right 1/22/2015    popliteal-tibial bypass      SOCIAL HISTORY: Social history from the initial psychiatric evaluation has been reviewed (reviewed/updated 2/24/2018) with no additional updates (I asked patient and no additional social history provided). Social History     Social History    Marital status:      Spouse name: N/A    Number of children: N/A    Years of education: N/A     Occupational History    Not on file. Social History Main Topics    Smoking status: Current Every Day Smoker     Packs/day: 0.50    Smokeless tobacco: Not on file    Alcohol use No    Drug use: No      Comment: >20 years ago    Sexual activity: Not on file     Other Topics Concern    Not on file     Social History Narrative    78 year ols male admitted for depression and homelessness. Pt will be evicated from apartment due to non payment of bills.  Girlfriend of 30 years left him to [facility] in [facility] with others. \" Pt is a brittle diabetic, with treatment non compliance. He has bilarela lower extremity amputations. Patient has a long hx of substance abuse. FAMILY HISTORY: Family history from the initial psychiatric evaluation has been reviewed (reviewed/updated 2/24/2018) with no additional updates (I asked patient and no additional family history provided). Family History   Problem Relation Age of Onset    Hypertension Father        REVIEW OF SYSTEMS: (reviewed/updated 2/24/2018)  Appetite:decreased   Sleep: fitful   All other Review of Systems: Psychological ROS: positive for - behavioral disorder  Respiratory ROS: no cough, shortness of breath, or wheezing  Cardiovascular ROS: no chest pain or dyspnea on exertion         [facility] (MSE):    MSE FINDINGS ARE WITHIN NORMAL LIMITS (WNL) UNLESS OTHERWISE STATED BELOW. ( ALL OF THE BELOW CATEGORIES OF THE MSE HAVE BEEN REVIEWED (reviewed 2/24/2018) AND UPDATED AS DEEMED APPROPRIATE )  General Presentation age appropriate, evasive and guarded   Orientation oriented to time, place and person   Vital Signs  See below (reviewed 2/24/2018); Vital Signs (BP, Pulse, & Temp) are within normal limits if not listed below.    Gait and Station Stable/steady, no ataxia   Musculoskeletal System No extrapyramidal symptoms (EPS); no abnormal muscular movements or Tardive Dyskinesia (TD); muscle strength and tone are within normal limits   Language No aphasia or dysarthria   Speech:  hypoverbal   Thought Processes concrete; normal rate of thoughts; poor abstract reasoning/computation   Thought Associations goal directed   Thought Content free of delusions and free of hallucinations   Suicidal Ideations none   Homicidal Ideations none   Mood:  irritable   Affect:  mood-congruent   Memory recent  fair   Memory remote:  fair   Concentration/Attention:  distractable   Fund of Knowledge significantly below average   Insight:  poor Reliability poor   Judgment:  poor          VITALS:     Patient Vitals for the past 24 hrs:   Temp Pulse Resp BP SpO2   02/24/18 0800 99.1 °F (37.3 °C) 72 16 147/80 97 %   02/23/18 1930 98 °F (36.7 °C) 66 16 150/76 100 %   02/23/18 1525 98.2 °F (36.8 °C) 66 16 143/66 100 %     Wt Readings from Last 3 Encounters:   02/18/18 90.4 kg (199 lb 3.2 oz)   01/23/18 83.6 kg (184 lb 4.9 oz)   07/13/17 88.9 kg (196 lb)     Temp Readings from Last 3 Encounters:   02/24/18 99.1 °F (37.3 °C)   01/24/18 97.2 °F (36.2 °C)   07/20/17 97.9 °F (36.6 °C)     BP Readings from Last 3 Encounters:   02/24/18 147/80   01/24/18 172/68   07/20/17 175/76     Pulse Readings from Last 3 Encounters:   02/24/18 72   01/24/18 72   07/20/17 (!) 52            DATA     LABORATORY DATA:(reviewed/updated 2/24/2018)  Recent Results (from the past 24 hour(s))   GLUCOSE, POC    Collection Time: 02/23/18  4:17 PM   Result Value Ref Range    Glucose (POC) 96 65 - 100 mg/dL    Performed by [employer] [address] POC    Collection Time: 02/23/18  8:18 PM   Result Value Ref Range    Glucose (POC) 118 (H) 65 - 100 mg/dL    Performed by [employer] [address] POC    Collection Time: 02/24/18 11:20 AM   Result Value Ref Range    Glucose (POC) 112 (H) 65 - 100 mg/dL    Performed by Dagmar Short      No results found for: VALF2, VALAC, VALP, VALPR, DS6, CRBAM, CRBAMP, CARB2, XCRBAM  No results found for: LITHM   RADIOLOGY REPORTS:(reviewed/updated 2/24/2018)  Xr Chest Port    Result Date: 1/20/2018  EXAM: Portable CXR.  1800 hours. COMPARISON: 1/19/2015. INDICATION: cp, fatigue There is new interstitial pulmonary edema, cardiomegaly and small left pleural effusion. There is no focal infiltrate, pneumothorax or midline shift. IMPRESSION: CHF pattern.          MEDICATIONS     ALL MEDICATIONS:   Current Facility-Administered Medications   Medication Dose Route Frequency    LORazepam (ATIVAN) tablet 0.5 mg  0.5 mg Oral TID    mirtazapine (REMERON) tablet 30 mg  30 mg Oral QHS    buPROPion SR (WELLBUTRIN SR) tablet 150 mg  150 mg Oral DAILY    alum-mag hydroxide-simeth (MYLANTA) oral suspension 30 mL  30 mL Oral Q4H PRN    doxazosin (CARDURA) tablet 4 mg  4 mg Oral QHS    polyethylene glycol (MIRALAX) packet 17 g  17 g Oral DAILY    OLANZapine (ZyPREXA) tablet 2.5 mg  2.5 mg Oral Q6H PRN    ziprasidone (GEODON) 10 mg in sterile water (preservative free) 0.5 mL injection  10 mg IntraMUSCular BID PRN    benztropine (COGENTIN) tablet 1 mg  1 mg Oral BID PRN    benztropine (COGENTIN) injection 1 mg  1 mg IntraMUSCular BID PRN    zolpidem (AMBIEN) tablet 5 mg  5 mg Oral QHS PRN    acetaminophen (TYLENOL) tablet 650 mg  650 mg Oral Q4H PRN    magnesium hydroxide (MILK OF MAGNESIA) 400 mg/5 mL oral suspension 30 mL  30 mL Oral DAILY PRN    nicotine (NICODERM CQ) 21 mg/24 hr patch 1 Patch  1 Patch TransDERmal DAILY PRN    influenza vaccine 2017-18 (3 yrs+)(PF) (FLUZONE QUAD/FLUARIX QUAD) injection 0.5 mL  0.5 mL IntraMUSCular PRIOR TO DISCHARGE    amLODIPine (NORVASC) tablet 10 mg  10 mg Oral DAILY    aspirin delayed-release tablet 81 mg  81 mg Oral DAILY    atorvastatin (LIPITOR) tablet 40 mg  40 mg Oral DAILY    carvedilol (COREG) tablet 3.125 mg  3.125 mg Oral BID WITH MEALS    ferrous sulfate tablet 325 mg  325 mg Oral BID WITH MEALS    hydrALAZINE (APRESOLINE) tablet 100 mg  100 mg Oral TID    glucose chewable tablet 16 g  4 Tab Oral PRN    dextrose (D50W) injection syrg 12.5-25 g  12.5-25 g IntraVENous PRN    glucagon (GLUCAGEN) injection 1 mg  1 mg IntraMUSCular PRN    insulin lispro (HUMALOG) injection   SubCUTAneous AC&HS    nicotine (NICODERM CQ) 21 mg/24 hr patch 1 Patch  1 Patch TransDERmal Q24H      SCHEDULED MEDICATIONS:   Current Facility-Administered Medications   Medication Dose Route Frequency    LORazepam (ATIVAN) tablet 0.5 mg  0.5 mg Oral TID    mirtazapine (REMERON) tablet 30 mg  30 mg Oral QHS    buPROPion SR [facility] SR) tablet 150 mg  150 mg Oral DAILY    doxazosin (CARDURA) tablet 4 mg  4 mg Oral QHS    polyethylene glycol (MIRALAX) packet 17 g  17 g Oral DAILY    influenza vaccine 2017-18 (3 yrs+)(PF) (FLUZONE QUAD/FLUARIX QUAD) injection 0.5 mL  0.5 mL IntraMUSCular PRIOR TO DISCHARGE    amLODIPine (NORVASC) tablet 10 mg  10 mg Oral DAILY    aspirin delayed-release tablet 81 mg  81 mg Oral DAILY    atorvastatin (LIPITOR) tablet 40 mg  40 mg Oral DAILY    carvedilol (COREG) tablet 3.125 mg  3.125 mg Oral BID WITH MEALS    ferrous sulfate tablet 325 mg  325 mg Oral BID WITH MEALS    hydrALAZINE (APRESOLINE) tablet 100 mg  100 mg Oral TID    insulin lispro (HUMALOG) injection   SubCUTAneous AC&HS    nicotine (NICODERM CQ) 21 mg/24 hr patch 1 Patch  1 Patch TransDERmal Q24H          ASSESSMENT & PLAN     DIAGNOSES REQUIRING ACTIVE TREATMENT AND MONITORING: (reviewed/updated 2/24/2018)  Patient Active Hospital Problem List:   Depression (1/24/2018)    Assessment: sadness, helplessness, hopelessness, in reaction to recent bilateral lower leg amputationa and inability to pay water bill at home. Plan: consider forced medication/treatment, address social issues, daughter may be obtaining guardianship  1/27/18 need order to treat    1/28/18 continue his medications    02/10/18: Continue current treatment, Ativan dose was adjusted. 02/11/18: Continue current treatment. 2/17/18 continue same medications   2/18/18 continue same medications         In summary, Brianna Gruber, is a 78 y.o.  male who presents with a severe exacerbation of the principal diagnosis of Depression  Patient's condition is worsening/not improving/not stable Patient requires continued inpatient hospitalization for further stabilization, safety monitoring and medication management.   I will continue to coordinate the provision of individual, milieu, occupational, group, and substance abuse therapies to address target symptoms/diagnoses as deemed appropriate for the individual patient. A coordinated, multidisplinary treatment team round was conducted with the patient (this team consists of the nurse, psychiatric unit pharmcist,  and writer). Complete current electronic health record for patient has been reviewed today including consultant notes, ancillary staff notes, nurses and psychiatric tech notes. Suicide risk assessment completed and patient deemed to be of low risk for suicide at this time. The following regarding medications was addressed during rounds with patient:   the risks and benefits of the proposed medication. The patient was given the opportunity to ask questions. Informed consent given to the use of the above medications. Will continue to adjust psychiatric and non-psychiatric medications (see above \"medication\" section and orders section for details) as deemed appropriate & based upon diagnoses and response to treatment. I will continue to order blood tests/labs and diagnostic tests as deemed appropriate and review results as they become available (see orders for details and above listed lab/test results). I will order psychiatric records from previous [facility] hospitals to further elucidate the nature of patient's psychopathology and review once available. I will gather additional collateral information from friends, family and o/p treatment team to further elucidate the nature of patient's psychopathology and baselline level of psychiatric functioning.          I certify that this patient's inpatient psychiatric hospital services furnished since the previous certification were, and continue to be, required for treatment that could reasonably be expected to improve the patient's condition, or for diagnostic study, and that the patient continues to need, on a daily basis, active treatment furnished directly by or requiring the supervision of inpatient psychiatric facility personnel. In addition, the hospital records show that services furnished were intensive treatment services, admission or related services, or equivalent services.     EXPECTED DISCHARGE DATE/DAY: TBD     DISPOSITION: Home       Signed By:   Maame Carl MD  2/24/2018

## 2018-02-24 NOTE — BH NOTES
Received patient appears to sleep, respiration even and unlabored. NAD noted, will continue to monitor pt for safety q15 minutes. Patient appeared to sleep 6 hours. NAD noted, will continue to monitor pt for safety.

## 2018-02-24 NOTE — PROGRESS NOTES
Problem: Depressed Mood (Adult/Pediatric)  Goal: *STG: Complies with medication therapy  Outcome: Progressing Towards Goal  1515 Received patient reading the newspaper in [place]. On q 15 min safety checks. Visible in  eating dinner, listening to music, talking on phone, focused on \"looking for one bed room apt.to move into with girlfriend upon discharge\". Informed patient he has treatment team that is working on discharge plan. Ate 100% dinner and snacks. Refused to have bedtime BS taken.

## 2018-02-25 LAB
GLUCOSE BLD STRIP.AUTO-MCNC: 104 MG/DL (ref 65–100)
GLUCOSE BLD STRIP.AUTO-MCNC: 115 MG/DL (ref 65–100)
GLUCOSE BLD STRIP.AUTO-MCNC: 136 MG/DL (ref 65–100)
GLUCOSE BLD STRIP.AUTO-MCNC: 164 MG/DL (ref 65–100)
GLUCOSE BLD STRIP.AUTO-MCNC: 70 MG/DL (ref 65–100)
SERVICE CMNT-IMP: ABNORMAL
SERVICE CMNT-IMP: NORMAL

## 2018-02-25 PROCEDURE — 74011250637 HC RX REV CODE- 250/637: Performed by: HOSPITALIST

## 2018-02-25 PROCEDURE — 82962 GLUCOSE BLOOD TEST: CPT

## 2018-02-25 PROCEDURE — 74011250637 HC RX REV CODE- 250/637: Performed by: FAMILY MEDICINE

## 2018-02-25 PROCEDURE — 65220000003 HC RM SEMIPRIVATE PSYCH

## 2018-02-25 PROCEDURE — 74011250637 HC RX REV CODE- 250/637: Performed by: PSYCHIATRY & NEUROLOGY

## 2018-02-25 RX ORDER — LORAZEPAM 0.5 MG/1
0.25 TABLET ORAL 3 TIMES DAILY
Status: DISCONTINUED | OUTPATIENT
Start: 2018-02-25 | End: 2018-03-03 | Stop reason: HOSPADM

## 2018-02-25 RX ADMIN — HYDRALAZINE HYDROCHLORIDE 100 MG: 50 TABLET, FILM COATED ORAL at 20:19

## 2018-02-25 RX ADMIN — FERROUS SULFATE TAB 325 MG (65 MG ELEMENTAL FE) 325 MG: 325 (65 FE) TAB at 09:12

## 2018-02-25 RX ADMIN — ASPIRIN 81 MG: 81 TABLET, COATED ORAL at 09:12

## 2018-02-25 RX ADMIN — HYDRALAZINE HYDROCHLORIDE 100 MG: 50 TABLET, FILM COATED ORAL at 09:12

## 2018-02-25 RX ADMIN — ATORVASTATIN CALCIUM 40 MG: 40 TABLET, FILM COATED ORAL at 09:11

## 2018-02-25 RX ADMIN — HYDRALAZINE HYDROCHLORIDE 100 MG: 50 TABLET, FILM COATED ORAL at 16:49

## 2018-02-25 RX ADMIN — BUPROPION HYDROCHLORIDE 150 MG: 150 TABLET, EXTENDED RELEASE ORAL at 09:00

## 2018-02-25 RX ADMIN — LORAZEPAM 0.5 MG: 0.5 TABLET ORAL at 09:12

## 2018-02-25 RX ADMIN — CARVEDILOL 3.12 MG: 3.12 TABLET, FILM COATED ORAL at 16:49

## 2018-02-25 RX ADMIN — CARVEDILOL 3.12 MG: 3.12 TABLET, FILM COATED ORAL at 09:22

## 2018-02-25 RX ADMIN — AMLODIPINE BESYLATE 10 MG: 5 TABLET ORAL at 09:12

## 2018-02-25 RX ADMIN — LORAZEPAM 0.25 MG: 0.5 TABLET ORAL at 20:19

## 2018-02-25 RX ADMIN — MIRTAZAPINE 30 MG: 15 TABLET, FILM COATED ORAL at 20:18

## 2018-02-25 RX ADMIN — FERROUS SULFATE TAB 325 MG (65 MG ELEMENTAL FE) 325 MG: 325 (65 FE) TAB at 16:49

## 2018-02-25 RX ADMIN — DOXAZOSIN 4 MG: 2 TABLET ORAL at 20:19

## 2018-02-25 NOTE — PROGRESS NOTES
Problem: Falls - Risk of  Goal: *Absence of Falls  Document Bello Fall Risk and appropriate interventions in the flowsheet. Outcome: Progressing Towards Goal  Fall Risk Interventions:  Mobility Interventions: Bed/chair exit alarm, Communicate number of staff needed for ambulation/transfer    Mentation Interventions: Adequate sleep, hydration, pain control, Bed/chair exit alarm, Door open when patient unattended    Medication Interventions: Bed/chair exit alarm, Patient to call before getting OOB, Teach patient to arise slowly    Elimination Interventions: Bed/chair exit alarm, Patient to call for help with toileting needs, Toilet paper/wipes in reach, Urinal in reach    History of Falls Interventions: Bed/chair exit alarm, Door open when patient unattended    Resting in bed with eyes closed, no complaints, no distress noted. Safety measures in place, will continue to monitor.

## 2018-02-25 NOTE — BH NOTES
9002 Patient reported to staff \"I got an eviction notice that I have to move out of my house March 5th. Called my wife, she is not home. Only have my monthly long-term check of  $600.00  to live on. Don't know what to do\". Informed patient he can discuss his concerns with treatment team tomorrow.

## 2018-02-25 NOTE — BH NOTES
PSYCHIATRIC PROGRESS NOTE         Patient Name  Zenia Mccoy   Date of Birth 1940   [facility] [id number]   Medical Record Number  [id number]      Age  78 y.o. PCP Johny Warner, MD   Admit date:  1/24/2018    Room Number  [phone]  @ [facility]   Date of Service  2/25/2018          PSYCHOTHERAPY SESSION NOTE:  Length of psychotherapy session: 15 minutes    Main condition/diagnosis/issues treated during session today, 2/25/2018 : med , meal and group non compliance    I employed Cognitive Behavioral therapy techniques, Reality-Oriented psychotherapy, as well as supportive psychotherapy in regards to various ongoing psychosocial stressors, including the following: pre-admission and current problems; housing issues; occupational issues; medical issues; and stress of hospitalization. Interpersonal relationship issues and psychodynamic conflicts explored. Attempts made to alleviate maladaptive patterns. We, also, worked on issues of denial & effects of substance dependency/use     Overall, patient is not progressing    Treatment Plan Update (reviewed an updated 2/25/2018) : I will modify psychotherapy tx plan by implementing more stress management strategies, building upon cognitive behavioral techniques, increasing coping skills, as well as shoring up psychological defenses). An extended energy and skill set was needed to engage pt in psychotherapy due to some of the following: resistiveness, complexity, negativity, confrontational nature, hostile behaviors, and/or severe abnormalities in thought processes/psychosis resulting in the loss of expressive/receptive language communication skills. E & M PROGRESS NOTE:         HISTORY       CC:  \"depression and non compliance with treatment \"  HISTORY OF PRESENT ILLNESS/INTERVAL HISTORY:  (reviewed/updated 2/25/2018).   per initial evaluation:     Zenia Mccoy presents/reports/evidences the following emotional symptoms today, 2/25/2018:depression. The above symptoms have been present for 1 years. These symptoms are of severe severity. The symptoms are constant  in nature. Additional symptomatology and features include anxiety. 2/2/18- pATIENT HAS IMPROVED SPORADIC MED COMPLIANCE WITH INCREASED ATIVAN DOSE. WAS SOCIAL IN [place] TODAY. SW TO CONTACT DAUGHTER TO LET HER KNOE THAT HE IS NOT BEING AS TREATMENT COMPLIANT AS HE TOLD HER HE WOULD BE.    1/27/18 he is not responding to questions and not engaging and not showing engagement and refuse medications and treatment   1/28/18 he is doing better and he engaged and not feeling sad or depressed and he eats better    1/29/18= Patient is deliberately refusing vital signs, labs and medications. He likes finger foods and eats those. Will meet with daughter on Tuesday to discuss possible guardianship. Will seek forced meds. 1/30/18- Pt. Agreed to comply with meds after extensive family meeting. Planning for SNF admission and possible transfer to home afterwards [address].  1/31/18- Patient is intermittently cooperative with somatic meds. We discussed the risks to his health this can cause. I advised Wellbutrin for depression. This may help motivate treatment compliance. 2/1/18- Continues Rude, belligerent, uncooperative and disrespectful with staff. Is marginally compliant with diabetes management. Waiting for placement. 2/3/18- no complaints. More redirectable, less hostile and more compliant  2/4/18- Mr. Aris Hackett very somnolent this morning. No agitation  2/5/18- Sporadic , selective compliance with medications. Denies SI/HI. Needs reminding of treatment goals to improve compliance for transfer to SNF.  2/6/18- mr. Aris Hackett was bright and positive this morning. Less demanding and hostile towards staff. Asking to go home. 2/7/18- Apologized to writer for being rude and uncooperative with treatment. Focused on discharge.  Understandd house repairs need to be completed first.  2/8/18- sleeping this morning. 2/9/18- grumpy, entitled and impatient re discharge, accepts inerim SNF placement. 2/10/18: intermittently agitated, verbally abusive and  hostile, making gesture to strike out,focused on discharge, no finish, confuse,non redirectable, received prn ativan and his dose of schedule ativan was adjusted. 02/11/18:Patient was seen today, As per staff patient continues to demand to go home and discharge, Thought process tangential, poor insight, non redirectable,gets usp set when staff redirect him. Accepting medications and meals. Does not want to go to NH.   2/12/18- med compliance improved. Understands his home is not habitable until repairs are made, however insistes on focusing with staff on immediate discharge. More appropriate and cordial lately. 2/13/18- Recently has maintained appropriate social behavior and cordiality with staff. Denies SI ans HI. Is compliant with treatment. 2/14/18- More polite and showing patience about waiting for SNF to accept. 2/15/18- Focused on discharge. No insight that home is not safe to due to disrepair and no running h2o. Irritable and demanding. 2/16/18- Continues sarcastic, irritable and has no insight that he cannot go live in a house without running water. Expects preferential treatment when it comes to SNF waiting list. Asking multiple staff members the same discharge question- even though he has not forgotten the answer. (clearly able to recall othetr tghings long and short term)  2/17/18 he is the same and he is irritable and angry and not happy about being here and no self harm  Behavior and no issues with sleep and no self harm behavior    2/18/18 he is not happy about being here and not feeling happy about being here and still gets angry and cano and still has issues with staff   2/19/18- Not requiring PRN's but still irritable, bored and demanding. 2/20/18- Waiting for placement. Very entitled, unpleasant to staff.  No social with other patients. Bitter over life circumstances and blames others for all of his troubles. 18- Very irritable and rude to staff. Repeatedly demending to be discharged immediately, although his house has no water or electric power. Sister may possibly visit and may take him in to live with her. Med and meal compliant.  18- Family refuses to take him in. He is back on the list waiting for snf placement. 18- No one in the family wants to take him in. Continues cantankerous, irritable, intrusive and rude. 18- Entitled and behaving as though he is more important than his peers. Interrupts this writer as she addressed another pt. Has no insight about how offensive his behavior is.  18- Visible in milieu. Focused on discharge. Has no insight that his suicidal remarks on the medical floor earned him a place on the behavioral health unit. SIDE EFFECTS: (reviewed/updated 2018)  None reported or admitted to. No noted toxicity with use of Depakote/Tegretol/lithium/Clozaril/TCAs   ALLERGIES:(reviewed/updated 2018)  Allergies   Allergen Reactions    Tetracycline Hives      MEDICATIONS PRIOR TO ADMISSION:(reviewed/updated 2018)  Prescriptions Prior to Admission   Medication Sig    hydrALAZINE (APRESOLINE) 100 mg tablet Take 1 Tab by mouth three (3) times daily.  amLODIPine (NORVASC) 10 mg tablet Take 1 Tab by mouth daily.  aspirin delayed-release 81 mg tablet Take 1 Tab by mouth daily.  atorvastatin (LIPITOR) 40 mg tablet Take 1 Tab by mouth daily.  carvedilol (COREG) 3.125 mg tablet Take 1 Tab by mouth two (2) times daily (with meals).  ferrous sulfate 325 mg (65 mg iron) tablet Take 1 Tab by mouth two (2) times daily (with meals).  mirtazapine (REMERON) 7.5 mg tablet Take 1 Tab by mouth nightly.  [] nicotine (NICODERM CQ) 21 mg/24 hr 1 Patch by TransDERmal route every twenty-four (24) hours for 30 days.       PAST MEDICAL HISTORY: Past medical history from the initial psychiatric evaluation has been reviewed (reviewed/updated 2/25/2018) with no additional updates (I asked patient and no additional past medical history provided). Past Medical History:   Diagnosis Date    Arthritis     CAD (coronary artery disease) 2007    CKD (chronic kidney disease), stage III     DM type 2 causing renal disease (HCC)     DVT (deep venous thrombosis) (HCC)     Hx of DVT:  Xarelto stopped due to GI bleed. S/P IVC filter.  Gait abnormality     GI bleed     Heart murmur     Hepatitis C     History of blood transfusion     Hypercholesterolemia     Hypertension 11/7/2014    Neuropathy     Non compliance w medication regimen     Peripheral vascular disease ([facility] [address] 11/7/2014    A. S/P Right SFA POBA and tibial atherectomy (2/4/13).  PUD (peptic ulcer disease) 2007    Unspecified sleep apnea     never used cpap     Past Surgical History:   Procedure Laterality Date    ABDOMEN SURGERY PROC UNLISTED  2007    has approx 7-8\" midline incision on abdomen--\"had to open him up and clean him out after feeding tube clogged\"    HX GI  2007    feeding tube for approx 2 mo    VASCULAR SURGERY PROCEDURE UNLIST Right 1/22/2015    popliteal-tibial bypass      SOCIAL HISTORY: Social history from the initial psychiatric evaluation has been reviewed (reviewed/updated 2/25/2018) with no additional updates (I asked patient and no additional social history provided). Social History     Social History    Marital status:      Spouse name: N/A    Number of children: N/A    Years of education: N/A     Occupational History    Not on file.      Social History Main Topics    Smoking status: Current Every Day Smoker     Packs/day: 0.50    Smokeless tobacco: Not on file    Alcohol use No    Drug use: No      Comment: >20 years ago    Sexual activity: Not on file     Other Topics Concern    Not on file     Social History Narrative    78 year ols male admitted for depression and homelessness. Pt will be evicated from apartment due to non payment of bills. Girlfriend of 30 years left him to [facility] live in the [place] with others. \" Pt is a brittle diabetic, with treatment non compliance. He has bilarela lower extremity amputations. Patient has a long hx of substance abuse. FAMILY HISTORY: Family history from the initial psychiatric evaluation has been reviewed (reviewed/updated 2/25/2018) with no additional updates (I asked patient and no additional family history provided). Family History   Problem Relation Age of Onset    Hypertension Father        REVIEW OF SYSTEMS: (reviewed/updated 2/25/2018)  Appetite:decreased   Sleep: fitful   All other Review of Systems: Psychological ROS: positive for - behavioral disorder  Respiratory ROS: no cough, shortness of breath, or wheezing  Cardiovascular ROS: no chest pain or dyspnea on exertion         [facility] (MSE):    MSE FINDINGS ARE WITHIN NORMAL LIMITS (WNL) UNLESS OTHERWISE STATED BELOW. ( ALL OF THE BELOW CATEGORIES OF THE MSE HAVE BEEN REVIEWED (reviewed 2/25/2018) AND UPDATED AS DEEMED APPROPRIATE )  General Presentation age appropriate, evasive and guarded   Orientation oriented to time, place and person   Vital Signs  See below (reviewed 2/25/2018); Vital Signs (BP, Pulse, & Temp) are within normal limits if not listed below.    Gait and Station Stable/steady, no ataxia   Musculoskeletal System No extrapyramidal symptoms (EPS); no abnormal muscular movements or Tardive Dyskinesia (TD); muscle strength and tone are within normal limits   Language No aphasia or dysarthria   Speech:  hypoverbal   Thought Processes concrete; normal rate of thoughts; poor abstract reasoning/computation   Thought Associations goal directed   Thought Content free of delusions and free of hallucinations   Suicidal Ideations none   Homicidal Ideations none   Mood:  irritable   Affect: mood-congruent   Memory recent  fair   Memory remote:  fair   Concentration/Attention:  distractable   Fund of Knowledge significantly below average   Insight:  poor   Reliability poor   Judgment:  poor          VITALS:     Patient Vitals for the past 24 hrs:   Temp Pulse Resp BP SpO2   02/25/18 0730 97.2 °F (36.2 °C) 64 16 162/76 100 %   02/24/18 2015 98 °F (36.7 °C) 66 16 138/67 98 %   02/24/18 1607 97.5 °F (36.4 °C) 73 16 154/63 100 %     Wt Readings from Last 3 Encounters:   02/18/18 90.4 kg (199 lb 3.2 oz)   01/23/18 83.6 kg (184 lb 4.9 oz)   07/13/17 88.9 kg (196 lb)     Temp Readings from Last 3 Encounters:   02/25/18 97.2 °F (36.2 °C)   01/24/18 97.2 °F (36.2 °C)   07/20/17 97.9 °F (36.6 °C)     BP Readings from Last 3 Encounters:   02/25/18 162/76   01/24/18 172/68   07/20/17 175/76     Pulse Readings from Last 3 Encounters:   02/25/18 64   01/24/18 72   07/20/17 (!) 52            DATA     LABORATORY DATA:(reviewed/updated 2/25/2018)  Recent Results (from the past 24 hour(s))   GLUCOSE, POC    Collection Time: 02/24/18  4:03 PM   Result Value Ref Range    Glucose (POC) 108 (H) 65 - 100 mg/dL    Performed by Prashant Trevizo    GLUCOSE, POC    Collection Time: 02/25/18  8:03 AM   Result Value Ref Range    Glucose (POC) 70 65 - 100 mg/dL    Performed by Anjel Najera    GLUCOSE, POC    Collection Time: 02/25/18  8:20 AM   Result Value Ref Range    Glucose (POC) 104 (H) 65 - 100 mg/dL    Performed by Anjel Najera    GLUCOSE, POC    Collection Time: 02/25/18 11:55 AM   Result Value Ref Range    Glucose (POC) 115 (H) 65 - 100 mg/dL    Performed by MARYJO BARAHONA      No results found for: VALF2, VALAC, VALP, VALPR, DS6, CRBAM, CRBAMP, CARB2, XCRBAM  No results found for: LITHM   RADIOLOGY REPORTS:(reviewed/updated 2/25/2018)  Xr Chest Port    Result Date: 1/20/2018  EXAM: Portable CXR.  1800 hours. COMPARISON: 1/19/2015.   INDICATION: cp, fatigue There is new interstitial pulmonary edema, cardiomegaly and small left pleural effusion. There is no focal infiltrate, pneumothorax or midline shift. IMPRESSION: CHF pattern.          MEDICATIONS     ALL MEDICATIONS:   Current Facility-Administered Medications   Medication Dose Route Frequency    LORazepam (ATIVAN) tablet 0.5 mg  0.5 mg Oral TID    mirtazapine (REMERON) tablet 30 mg  30 mg Oral QHS    buPROPion SR (WELLBUTRIN SR) tablet 150 mg  150 mg Oral DAILY    alum-mag hydroxide-simeth (MYLANTA) oral suspension 30 mL  30 mL Oral Q4H PRN    doxazosin (CARDURA) tablet 4 mg  4 mg Oral QHS    polyethylene glycol (MIRALAX) packet 17 g  17 g Oral DAILY    OLANZapine (ZyPREXA) tablet 2.5 mg  2.5 mg Oral Q6H PRN    ziprasidone (GEODON) 10 mg in sterile water (preservative free) 0.5 mL injection  10 mg IntraMUSCular BID PRN    benztropine (COGENTIN) tablet 1 mg  1 mg Oral BID PRN    benztropine (COGENTIN) injection 1 mg  1 mg IntraMUSCular BID PRN    zolpidem (AMBIEN) tablet 5 mg  5 mg Oral QHS PRN    acetaminophen (TYLENOL) tablet 650 mg  650 mg Oral Q4H PRN    magnesium hydroxide (MILK OF MAGNESIA) 400 mg/5 mL oral suspension 30 mL  30 mL Oral DAILY PRN    nicotine (NICODERM CQ) 21 mg/24 hr patch 1 Patch  1 Patch TransDERmal DAILY PRN    influenza vaccine 2017-18 (3 yrs+)(PF) (FLUZONE QUAD/FLUARIX QUAD) injection 0.5 mL  0.5 mL IntraMUSCular PRIOR TO DISCHARGE    amLODIPine (NORVASC) tablet 10 mg  10 mg Oral DAILY    aspirin delayed-release tablet 81 mg  81 mg Oral DAILY    atorvastatin (LIPITOR) tablet 40 mg  40 mg Oral DAILY    carvedilol (COREG) tablet 3.125 mg  3.125 mg Oral BID WITH MEALS    ferrous sulfate tablet 325 mg  325 mg Oral BID WITH MEALS    hydrALAZINE (APRESOLINE) tablet 100 mg  100 mg Oral TID    glucose chewable tablet 16 g  4 Tab Oral PRN    dextrose (D50W) injection syrg 12.5-25 g  12.5-25 g IntraVENous PRN    glucagon (GLUCAGEN) injection 1 mg  1 mg IntraMUSCular PRN    insulin lispro (HUMALOG) injection   SubCUTAneous AC&HS    nicotine (NICODERM CQ) 21 mg/24 hr patch 1 Patch  1 Patch TransDERmal Q24H      SCHEDULED MEDICATIONS:   Current Facility-Administered Medications   Medication Dose Route Frequency    LORazepam (ATIVAN) tablet 0.5 mg  0.5 mg Oral TID    mirtazapine (REMERON) tablet 30 mg  30 mg Oral QHS    buPROPion SR (WELLBUTRIN SR) tablet 150 mg  150 mg Oral DAILY    doxazosin (CARDURA) tablet 4 mg  4 mg Oral QHS    polyethylene glycol (MIRALAX) packet 17 g  17 g Oral DAILY    influenza vaccine 2017-18 (3 yrs+)(PF) (FLUZONE QUAD/FLUARIX QUAD) injection 0.5 mL  0.5 mL IntraMUSCular PRIOR TO DISCHARGE    amLODIPine (NORVASC) tablet 10 mg  10 mg Oral DAILY    aspirin delayed-release tablet 81 mg  81 mg Oral DAILY    atorvastatin (LIPITOR) tablet 40 mg  40 mg Oral DAILY    carvedilol (COREG) tablet 3.125 mg  3.125 mg Oral BID WITH MEALS    ferrous sulfate tablet 325 mg  325 mg Oral BID WITH MEALS    hydrALAZINE (APRESOLINE) tablet 100 mg  100 mg Oral TID    insulin lispro (HUMALOG) injection   SubCUTAneous AC&HS    nicotine (NICODERM CQ) 21 mg/24 hr patch 1 Patch  1 Patch TransDERmal Q24H          ASSESSMENT & PLAN     DIAGNOSES REQUIRING ACTIVE TREATMENT AND MONITORING: (reviewed/updated 2/25/2018)  Patient Active Hospital Problem List:   Depression (1/24/2018)    Assessment: sadness, helplessness, hopelessness, in reaction to recent bilateral lower leg amputationa and inability to pay water bill at home. Plan: consider forced medication/treatment, address social issues, daughter may be obtaining guardianship  1/27/18 need order to treat    1/28/18 continue his medications    02/10/18: Continue current treatment, Ativan dose was adjusted. 02/11/18: Continue current treatment.   2/17/18 continue same medications   2/18/18 continue same medications         In summary, Greta Phalen, is a 78 y.o.  male who presents with a severe exacerbation of the principal diagnosis of Depression  Patient's condition is worsening/not improving/not stable Patient requires continued inpatient hospitalization for further stabilization, safety monitoring and medication management. I will continue to coordinate the provision of individual, milieu, occupational, group, and substance abuse therapies to address target symptoms/diagnoses as deemed appropriate for the individual patient. A coordinated, multidisplinary treatment team round was conducted with the patient (this team consists of the nurse, psychiatric unit pharmcist,  and writer). Complete current electronic health record for patient has been reviewed today including consultant notes, ancillary staff notes, nurses and psychiatric tech notes. Suicide risk assessment completed and patient deemed to be of low risk for suicide at this time. The following regarding medications was addressed during rounds with patient:   the risks and benefits of the proposed medication. The patient was given the opportunity to ask questions. Informed consent given to the use of the above medications. Will continue to adjust psychiatric and non-psychiatric medications (see above \"medication\" section and orders section for details) as deemed appropriate & based upon diagnoses and response to treatment. I will continue to order blood tests/labs and diagnostic tests as deemed appropriate and review results as they become available (see orders for details and above listed lab/test results). I will order psychiatric records from previous [facility] hospitals to further elucidate the nature of patient's psychopathology and review once available. I will gather additional collateral information from friends, family and o/p treatment team to further elucidate the nature of patient's psychopathology and baselline level of psychiatric functioning.          I certify that this patient's inpatient psychiatric hospital services furnished since the previous certification were, and continue to be, required for treatment that could reasonably be expected to improve the patient's condition, or for diagnostic study, and that the patient continues to need, on a daily basis, active treatment furnished directly by or requiring the supervision of inpatient psychiatric facility personnel. In addition, the hospital records show that services furnished were intensive treatment services, admission or related services, or equivalent services.     EXPECTED DISCHARGE DATE/DAY: TBD     DISPOSITION: Home       Signed By:   Teddy Ricardo MD  2/25/2018

## 2018-02-25 NOTE — INTERDISCIPLINARY ROUNDS
Behavioral Health Interdisciplinary Rounds     Patient Name: Pérez Ward  Age: 78 y.o. Room/Bed:  740/02  Primary Diagnosis: Depression   Admission Status: Involuntary Commitment     Readmission within 30 days: no  Power of  in place: yes, daughter  Patient requires a blocked bed: no          Reason for blocked bed:     VTE Prophylaxis: Yes ASA  Flu vaccine given : no declined   Mobility needs/Fall risk: yes    Nutritional Plan: yes  Consults:          Labs/Testing due today?: no    Sleep hours: 6.15       Participation in Care/Groups:  yes  Medication Compliant?: Yes  PRNS (last 24 hours): None    Restraints (last 24 hours):  no  Substance Abuse:  no  CIWA (range last 24 hours):  COWS (range last 24 hours):   Alcohol screening (AUDIT) completed -  AUDIT Score: 0  If applicable, date SBIRT discussed in treatment team AND documented:   Tobacco - patient is a smoker: yes   Date tobacco education completed by RN: 2/17/18  24 hour chart check complete: yes     Patient goal(s) for today:   Treatment team focus/goals:   Progress note     LOS:  32  Expected LOS:     Financial concerns/prescription coverage:    Date of last family contact:       Family requesting physician contact today:    Discharge plan:  Guns in the home:       Outpatient provider(s):     Participating treatment team members:  Pérezluis fernando Ward, * (assigned SW),

## 2018-02-25 NOTE — PROGRESS NOTES
HYPOGLYCEMIC EPISODE DOCUMENTATION    Patient with hypoglycemic episode(s) at 0803 am (time) on 02/25/18(date). BG value(s) pre-treatment 79    Was patient symptomatic?  [] yes, [x] no  Patient was treated with the following rescue medications/treatments: [] D50                [] Glucose tablets                [] Glucagon                [x] 4oz juice                [] 6oz reg soda                [] 8oz low fat milk  BG value post-treatment: 104  Once BG treated and value greater than 80mg/dl, pt was provided with the following:  [] snack  [x] meal  Name of MD notified:no  The following orders were received: none        Problem: Depressed Mood (Adult/Pediatric)  Goal: *STG: Participates in treatment plan  Outcome: Progressing Towards Goal  Pt seems brighter today  He is smiling, cooperative and appropriate with staff and peers   Goal is to be discharged and find an apartment  Med/Meal compliant

## 2018-02-26 LAB
GLUCOSE BLD STRIP.AUTO-MCNC: 102 MG/DL (ref 65–100)
GLUCOSE BLD STRIP.AUTO-MCNC: 103 MG/DL (ref 65–100)
GLUCOSE BLD STRIP.AUTO-MCNC: 75 MG/DL (ref 65–100)
GLUCOSE BLD STRIP.AUTO-MCNC: 76 MG/DL (ref 65–100)
GLUCOSE BLD STRIP.AUTO-MCNC: 85 MG/DL (ref 65–100)
SERVICE CMNT-IMP: ABNORMAL
SERVICE CMNT-IMP: ABNORMAL
SERVICE CMNT-IMP: NORMAL

## 2018-02-26 PROCEDURE — 74011250637 HC RX REV CODE- 250/637: Performed by: HOSPITALIST

## 2018-02-26 PROCEDURE — 74011250637 HC RX REV CODE- 250/637: Performed by: PSYCHIATRY & NEUROLOGY

## 2018-02-26 PROCEDURE — 82962 GLUCOSE BLOOD TEST: CPT

## 2018-02-26 PROCEDURE — 65220000003 HC RM SEMIPRIVATE PSYCH

## 2018-02-26 PROCEDURE — 74011250637 HC RX REV CODE- 250/637: Performed by: FAMILY MEDICINE

## 2018-02-26 RX ADMIN — BUPROPION HYDROCHLORIDE 150 MG: 150 TABLET, EXTENDED RELEASE ORAL at 09:03

## 2018-02-26 RX ADMIN — FERROUS SULFATE TAB 325 MG (65 MG ELEMENTAL FE) 325 MG: 325 (65 FE) TAB at 09:03

## 2018-02-26 RX ADMIN — LORAZEPAM 0.25 MG: 0.5 TABLET ORAL at 13:39

## 2018-02-26 RX ADMIN — DOXAZOSIN 4 MG: 2 TABLET ORAL at 20:48

## 2018-02-26 RX ADMIN — LORAZEPAM 0.25 MG: 0.5 TABLET ORAL at 09:04

## 2018-02-26 RX ADMIN — HYDRALAZINE HYDROCHLORIDE 100 MG: 50 TABLET, FILM COATED ORAL at 17:53

## 2018-02-26 RX ADMIN — AMLODIPINE BESYLATE 10 MG: 5 TABLET ORAL at 09:06

## 2018-02-26 RX ADMIN — ASPIRIN 81 MG: 81 TABLET, COATED ORAL at 09:04

## 2018-02-26 RX ADMIN — HYDRALAZINE HYDROCHLORIDE 100 MG: 50 TABLET, FILM COATED ORAL at 09:03

## 2018-02-26 RX ADMIN — MIRTAZAPINE 30 MG: 15 TABLET, FILM COATED ORAL at 21:15

## 2018-02-26 RX ADMIN — ATORVASTATIN CALCIUM 40 MG: 40 TABLET, FILM COATED ORAL at 09:03

## 2018-02-26 RX ADMIN — LORAZEPAM 0.25 MG: 0.5 TABLET ORAL at 20:50

## 2018-02-26 RX ADMIN — HYDRALAZINE HYDROCHLORIDE 100 MG: 50 TABLET, FILM COATED ORAL at 21:30

## 2018-02-26 RX ADMIN — CARVEDILOL 3.12 MG: 3.12 TABLET, FILM COATED ORAL at 09:04

## 2018-02-26 NOTE — PROGRESS NOTES
Problem: Depressed Mood (Adult/Pediatric)  Goal: *STG: Participates in treatment plan  Outcome: Progressing Towards Goal  Patient denies SI. Demanding. Un cooperative. Refused BS check. Yells at staff. Possible discharge. Patient did take meds and ate 100% breakfast. Has bilat bka and uses [facility] lift to transfer. Goal: Interventions  Outcome: Progressing Towards Goal  Med management. Q 15 min safety rounds. Group therapy.

## 2018-02-26 NOTE — BH NOTES
PSYCHIATRIC PROGRESS NOTE         Patient Name  Jorge Pastor   Date of Birth 1940   [facility] [id number]   Medical Record Number  [id number]      Age  78 y.o. PCP Johny Warner, MD   Admit date:  1/24/2018    Room Number  [phone]  @ [facility]   Date of Service  2/26/2018          PSYCHOTHERAPY SESSION NOTE:  Length of psychotherapy session: 15 minutes    Main condition/diagnosis/issues treated during session today, 2/26/2018 : med , meal and group non compliance    I employed Cognitive Behavioral therapy techniques, Reality-Oriented psychotherapy, as well as supportive psychotherapy in regards to various ongoing psychosocial stressors, including the following: pre-admission and current problems; housing issues; occupational issues; medical issues; and stress of hospitalization. Interpersonal relationship issues and psychodynamic conflicts explored. Attempts made to alleviate maladaptive patterns. We, also, worked on issues of denial & effects of substance dependency/use     Overall, patient is not progressing    Treatment Plan Update (reviewed an updated 2/26/2018) : I will modify psychotherapy tx plan by implementing more stress management strategies, building upon cognitive behavioral techniques, increasing coping skills, as well as shoring up psychological defenses). An extended energy and skill set was needed to engage pt in psychotherapy due to some of the following: resistiveness, complexity, negativity, confrontational nature, hostile behaviors, and/or severe abnormalities in thought processes/psychosis resulting in the loss of expressive/receptive language communication skills. E & M PROGRESS NOTE:         HISTORY       CC:  \"depression and non compliance with treatment \"  HISTORY OF PRESENT ILLNESS/INTERVAL HISTORY:  (reviewed/updated 2/26/2018).   per initial evaluation:     Jorge Pastor presents/reports/evidences the following emotional symptoms today, 2/26/2018:depression. The above symptoms have been present for 1 years. These symptoms are of severe severity. The symptoms are constant  in nature. Additional symptomatology and features include anxiety. 2/2/18- pATIENT HAS IMPROVED SPORADIC MED COMPLIANCE WITH INCREASED ATIVAN DOSE. WAS SOCIAL IN [place] TODAY. SW TO CONTACT DAUGHTER TO LET HER KNOE THAT HE IS NOT BEING AS TREATMENT COMPLIANT AS HE TOLD HER HE WOULD BE.    1/27/18 he is not responding to questions and not engaging and not showing engagement and refuse medications and treatment   1/28/18 he is doing better and he engaged and not feeling sad or depressed and he eats better    1/29/18= Patient is deliberately refusing vital signs, labs and medications. He likes finger foods and eats those. Will meet with daughter on Tuesday to discuss possible guardianship. Will seek forced meds. 1/30/18- Pt. Agreed to comply with meds after extensive family meeting. Planning for SNF admission and possible transfer to home afterwards [address].  1/31/18- Patient is intermittently cooperative with somatic meds. We discussed the risks to his health this can cause. I advised Wellbutrin for depression. This may help motivate treatment compliance. 2/1/18- Continues Rude, belligerent, uncooperative and disrespectful with staff. Is marginally compliant with diabetes management. Waiting for placement. 2/3/18- no complaints. More redirectable, less hostile and more compliant  2/4/18- Mr. Aris Hackett very somnolent this morning. No agitation  2/5/18- Sporadic , selective compliance with medications. Denies SI/HI. Needs reminding of treatment goals to improve compliance for transfer to SNF.  2/6/18- mr. Aris Hackett was bright and positive this morning. Less demanding and hostile towards staff. Asking to go home. 2/7/18- Apologized to writer for being rude and uncooperative with treatment. Focused on discharge.  Understandd house repairs need to be completed first.  2/8/18- sleeping this morning. 2/9/18- grumpy, entitled and impatient re discharge, accepts inerim SNF placement. 2/10/18: intermittently agitated, verbally abusive and  hostile, making gesture to strike out,focused on discharge, no finish, confuse,non redirectable, received prn ativan and his dose of schedule ativan was adjusted. 02/11/18:Patient was seen today, As per staff patient continues to demand to go home and discharge, Thought process tangential, poor insight, non redirectable,gets usp set when staff redirect him. Accepting medications and meals. Does not want to go to NH.   2/12/18- med compliance improved. Understands his home is not habitable until repairs are made, however insistes on focusing with staff on immediate discharge. More appropriate and cordial lately. 2/13/18- Recently has maintained appropriate social behavior and cordiality with staff. Denies SI ans HI. Is compliant with treatment. 2/14/18- More polite and showing patience about waiting for SNF to accept. 2/15/18- Focused on discharge. No insight that home is not safe to due to disrepair and no running h2o. Irritable and demanding. 2/16/18- Continues sarcastic, irritable and has no insight that he cannot go live in a house without running water. Expects preferential treatment when it comes to SNF waiting list. Asking multiple staff members the same discharge question- even though he has not forgotten the answer. (clearly able to recall othetr tghings long and short term)  2/17/18 he is the same and he is irritable and angry and not happy about being here and no self harm  Behavior and no issues with sleep and no self harm behavior    2/18/18 he is not happy about being here and not feeling happy about being here and still gets angry and cano and still has issues with staff   2/19/18- Not requiring PRN's but still irritable, bored and demanding. 2/20/18- Waiting for placement. Very entitled, unpleasant to staff.  No social with other patients. Bitter over life circumstances and blames others for all of his troubles. 18- Very irritable and rude to staff. Repeatedly demending to be discharged immediately, although his house has no water or electric power. Sister may possibly visit and may take him in to live with her. Med and meal compliant.  18- Family refuses to take him in. He is back on the list waiting for snf placement. 18- No one in the family wants to take him in. Continues cantankerous, irritable, intrusive and rude. 18- Entitled and behaving as though he is more important than his peers. Interrupts this writer as she addressed another pt. Has no insight about how offensive his behavior is.  18- Visible in milieu. Focused on discharge. Has no insight that his suicidal remarks on the medical floor earned him a place on the behavioral health unit. 18- Stable. Still partially non compliant with somatic care. Waiting for placement. SIDE EFFECTS: (reviewed/updated 2018)  None reported or admitted to. No noted toxicity with use of Depakote/Tegretol/lithium/Clozaril/TCAs   ALLERGIES:(reviewed/updated 2018)  Allergies   Allergen Reactions    Tetracycline Hives      MEDICATIONS PRIOR TO ADMISSION:(reviewed/updated 2018)  Prescriptions Prior to Admission   Medication Sig    hydrALAZINE (APRESOLINE) 100 mg tablet Take 1 Tab by mouth three (3) times daily.  amLODIPine (NORVASC) 10 mg tablet Take 1 Tab by mouth daily.  aspirin delayed-release 81 mg tablet Take 1 Tab by mouth daily.  atorvastatin (LIPITOR) 40 mg tablet Take 1 Tab by mouth daily.  carvedilol (COREG) 3.125 mg tablet Take 1 Tab by mouth two (2) times daily (with meals).  ferrous sulfate 325 mg (65 mg iron) tablet Take 1 Tab by mouth two (2) times daily (with meals).  mirtazapine (REMERON) 7.5 mg tablet Take 1 Tab by mouth nightly.     [] nicotine (NICODERM CQ) 21 mg/24 hr 1 Patch by TransDERmal route every twenty-four (24) hours for 30 days. PAST MEDICAL HISTORY: Past medical history from the initial psychiatric evaluation has been reviewed (reviewed/updated 2/26/2018) with no additional updates (I asked patient and no additional past medical history provided). Past Medical History:   Diagnosis Date    Arthritis     CAD (coronary artery disease) 2007    CKD (chronic kidney disease), stage III     DM type 2 causing renal disease (HCC)     DVT (deep venous thrombosis) (HCC)     Hx of DVT:  Xarelto stopped due to GI bleed. S/P IVC filter.  Gait abnormality     GI bleed     Heart murmur     Hepatitis C     History of blood transfusion     Hypercholesterolemia     Hypertension 11/7/2014    Neuropathy     Non compliance w medication regimen     Peripheral vascular disease ([facility] [address] 11/7/2014    A. S/P Right SFA POBA and tibial atherectomy (2/4/13).  PUD (peptic ulcer disease) 2007    Unspecified sleep apnea     never used cpap     Past Surgical History:   Procedure Laterality Date    ABDOMEN SURGERY PROC UNLISTED  2007    has approx 7-8\" midline incision on abdomen--\"had to open him up and clean him out after feeding tube clogged\"    HX GI  2007    feeding tube for approx 2 mo    VASCULAR SURGERY PROCEDURE UNLIST Right 1/22/2015    popliteal-tibial bypass      SOCIAL HISTORY: Social history from the initial psychiatric evaluation has been reviewed (reviewed/updated 2/26/2018) with no additional updates (I asked patient and no additional social history provided). Social History     Social History    Marital status:      Spouse name: N/A    Number of children: N/A    Years of education: N/A     Occupational History    Not on file.      Social History Main Topics    Smoking status: Current Every Day Smoker     Packs/day: 0.50    Smokeless tobacco: Not on file    Alcohol use No    Drug use: No      Comment: >20 years ago    Sexual activity: Not on file Other Topics Concern    Not on file     Social History Narrative    78 year ols male admitted for depression and homelessness. Pt will be evicated from apartment due to non payment of bills. Girlfriend of 30 years left him to [place] in [place] with others. \" Pt is a brittle diabetic, with treatment non compliance. He has bilarela lower extremity amputations. Patient has a long hx of substance abuse. FAMILY HISTORY: Family history from the initial psychiatric evaluation has been reviewed (reviewed/updated 2/26/2018) with no additional updates (I asked patient and no additional family history provided). Family History   Problem Relation Age of Onset    Hypertension Father        REVIEW OF SYSTEMS: (reviewed/updated 2/26/2018)  Appetite:decreased   Sleep: fitful   All other Review of Systems: Psychological ROS: positive for - behavioral disorder  Respiratory ROS: no cough, shortness of breath, or wheezing  Cardiovascular ROS: no chest pain or dyspnea on exertion         [facility] ([facility]):    [facility] FINDINGS ARE WITHIN NORMAL LIMITS (WNL) UNLESS OTHERWISE STATED BELOW. ( ALL OF THE BELOW CATEGORIES OF THE [facility] HAVE BEEN REVIEWED (reviewed 2/26/2018) AND UPDATED AS DEEMED APPROPRIATE )  General Presentation age appropriate, evasive and guarded   Orientation oriented to time, place and person   Vital Signs  See below (reviewed 2/26/2018); Vital Signs (BP, Pulse, & Temp) are within normal limits if not listed below.    Gait and Station Stable/steady, no ataxia   Musculoskeletal System No extrapyramidal symptoms (EPS); no abnormal muscular movements or Tardive Dyskinesia (TD); muscle strength and tone are within normal limits   Language No aphasia or dysarthria   Speech:  hypoverbal   Thought Processes concrete; normal rate of thoughts; poor abstract reasoning/computation   Thought Associations goal directed   Thought Content free of delusions and free of hallucinations Suicidal Ideations none   Homicidal Ideations none   Mood:  irritable   Affect:  mood-congruent   Memory recent  fair   Memory remote:  fair   Concentration/Attention:  distractable   Fund of Knowledge significantly below average   Insight:  poor   Reliability poor   Judgment:  poor          VITALS:     Patient Vitals for the past 24 hrs:   Temp Pulse Resp BP SpO2   02/26/18 0725 - 66 16 130/72 100 %   02/25/18 2039 97.8 °F (36.6 °C) 66 16 158/71 -   02/25/18 1600 97.5 °F (36.4 °C) 63 16 157/66 99 %     Wt Readings from Last 3 Encounters:   02/25/18 93.7 kg (206 lb 9.6 oz)   01/23/18 83.6 kg (184 lb 4.9 oz)   07/13/17 88.9 kg (196 lb)     Temp Readings from Last 3 Encounters:   01/24/18 97.2 °F (36.2 °C)   07/20/17 97.9 °F (36.6 °C)   09/08/15 98.7 °F (37.1 °C)     BP Readings from Last 3 Encounters:   02/26/18 130/72   01/24/18 172/68   07/20/17 175/76     Pulse Readings from Last 3 Encounters:   02/26/18 66   01/24/18 72   07/20/17 (!) 52            DATA     LABORATORY DATA:(reviewed/updated 2/26/2018)  Recent Results (from the past 24 hour(s))   GLUCOSE, POC    Collection Time: 02/25/18  4:38 PM   Result Value Ref Range    Glucose (POC) 136 (H) 65 - 100 mg/dL    Performed by Edna Mora    GLUCOSE, POC    Collection Time: 02/25/18  7:47 PM   Result Value Ref Range    Glucose (POC) 164 (H) 65 - 100 mg/dL    Performed by Edna Mora    GLUCOSE, POC    Collection Time: 02/26/18 12:13 PM   Result Value Ref Range    Glucose (POC) 85 65 - 100 mg/dL    Performed by Dave Young      No results found for: VALF2, VALAC, VALP, VALPR, DS6, CRBAM, CRBAMP, CARB2, XCRBAM  No results found for: LITHM   RADIOLOGY REPORTS:(reviewed/updated 2/26/2018)  Xr Chest Port    Result Date: 1/20/2018  EXAM: Portable CXR.  1800 hours. COMPARISON: 1/19/2015. INDICATION: cp, fatigue There is new interstitial pulmonary edema, cardiomegaly and small left pleural effusion.  There is no focal infiltrate, pneumothorax or midline shift. IMPRESSION: CHF pattern.          MEDICATIONS     ALL MEDICATIONS:   Current Facility-Administered Medications   Medication Dose Route Frequency    LORazepam (ATIVAN) tablet 0.25 mg  0.25 mg Oral TID    mirtazapine (REMERON) tablet 30 mg  30 mg Oral QHS    buPROPion SR (WELLBUTRIN SR) tablet 150 mg  150 mg Oral DAILY    alum-mag hydroxide-simeth (MYLANTA) oral suspension 30 mL  30 mL Oral Q4H PRN    doxazosin (CARDURA) tablet 4 mg  4 mg Oral QHS    polyethylene glycol (MIRALAX) packet 17 g  17 g Oral DAILY    OLANZapine (ZyPREXA) tablet 2.5 mg  2.5 mg Oral Q6H PRN    ziprasidone (GEODON) 10 mg in sterile water (preservative free) 0.5 mL injection  10 mg IntraMUSCular BID PRN    benztropine (COGENTIN) tablet 1 mg  1 mg Oral BID PRN    benztropine (COGENTIN) injection 1 mg  1 mg IntraMUSCular BID PRN    zolpidem (AMBIEN) tablet 5 mg  5 mg Oral QHS PRN    acetaminophen (TYLENOL) tablet 650 mg  650 mg Oral Q4H PRN    magnesium hydroxide (MILK OF MAGNESIA) 400 mg/5 mL oral suspension 30 mL  30 mL Oral DAILY PRN    nicotine (NICODERM CQ) 21 mg/24 hr patch 1 Patch  1 Patch TransDERmal DAILY PRN    influenza vaccine 2017-18 (3 yrs+)(PF) (FLUZONE QUAD/FLUARIX QUAD) injection 0.5 mL  0.5 mL IntraMUSCular PRIOR TO DISCHARGE    amLODIPine (NORVASC) tablet 10 mg  10 mg Oral DAILY    aspirin delayed-release tablet 81 mg  81 mg Oral DAILY    atorvastatin (LIPITOR) tablet 40 mg  40 mg Oral DAILY    carvedilol (COREG) tablet 3.125 mg  3.125 mg Oral BID WITH MEALS    ferrous sulfate tablet 325 mg  325 mg Oral BID WITH MEALS    hydrALAZINE (APRESOLINE) tablet 100 mg  100 mg Oral TID    glucose chewable tablet 16 g  4 Tab Oral PRN    dextrose (D50W) injection syrg 12.5-25 g  12.5-25 g IntraVENous PRN    glucagon (GLUCAGEN) injection 1 mg  1 mg IntraMUSCular PRN    insulin lispro (HUMALOG) injection   SubCUTAneous AC&HS    nicotine (NICODERM CQ) 21 mg/24 hr patch 1 Patch  1 Patch TransDERmal Q24H      SCHEDULED MEDICATIONS:   Current Facility-Administered Medications   Medication Dose Route Frequency    LORazepam (ATIVAN) tablet 0.25 mg  0.25 mg Oral TID    mirtazapine (REMERON) tablet 30 mg  30 mg Oral QHS    buPROPion SR (WELLBUTRIN SR) tablet 150 mg  150 mg Oral DAILY    doxazosin (CARDURA) tablet 4 mg  4 mg Oral QHS    polyethylene glycol (MIRALAX) packet 17 g  17 g Oral DAILY    influenza vaccine 2017-18 (3 yrs+)(PF) (FLUZONE QUAD/FLUARIX QUAD) injection 0.5 mL  0.5 mL IntraMUSCular PRIOR TO DISCHARGE    amLODIPine (NORVASC) tablet 10 mg  10 mg Oral DAILY    aspirin delayed-release tablet 81 mg  81 mg Oral DAILY    atorvastatin (LIPITOR) tablet 40 mg  40 mg Oral DAILY    carvedilol (COREG) tablet 3.125 mg  3.125 mg Oral BID WITH MEALS    ferrous sulfate tablet 325 mg  325 mg Oral BID WITH MEALS    hydrALAZINE (APRESOLINE) tablet 100 mg  100 mg Oral TID    insulin lispro (HUMALOG) injection   SubCUTAneous AC&HS    nicotine (NICODERM CQ) 21 mg/24 hr patch 1 Patch  1 Patch TransDERmal Q24H          ASSESSMENT & PLAN     DIAGNOSES REQUIRING ACTIVE TREATMENT AND MONITORING: (reviewed/updated 2/26/2018)  Patient Active Hospital Problem List:   Depression (1/24/2018)    Assessment: sadness, helplessness, hopelessness, in reaction to recent bilateral lower leg amputationa and inability to pay water bill at home. Plan: consider forced medication/treatment, address social issues, daughter may be obtaining guardianship  1/27/18 need order to treat    1/28/18 continue his medications    02/10/18: Continue current treatment, Ativan dose was adjusted. 02/11/18: Continue current treatment.   2/17/18 continue same medications   2/18/18 continue same medications         In summary, Hallie Torres, is a 78 y.o.  male who presents with a severe exacerbation of the principal diagnosis of Depression  Patient's condition is worsening/not improving/not stable Patient requires continued inpatient hospitalization for further stabilization, safety monitoring and medication management. I will continue to coordinate the provision of individual, milieu, occupational, group, and substance abuse therapies to address target symptoms/diagnoses as deemed appropriate for the individual patient. A coordinated, multidisplinary treatment team round was conducted with the patient (this team consists of the nurse, psychiatric unit pharmcist,  and writer). Complete current electronic health record for patient has been reviewed today including consultant notes, ancillary staff notes, nurses and psychiatric tech notes. Suicide risk assessment completed and patient deemed to be of low risk for suicide at this time. The following regarding medications was addressed during rounds with patient:   the risks and benefits of the proposed medication. The patient was given the opportunity to ask questions. Informed consent given to the use of the above medications. Will continue to adjust psychiatric and non-psychiatric medications (see above \"medication\" section and orders section for details) as deemed appropriate & based upon diagnoses and response to treatment. I will continue to order blood tests/labs and diagnostic tests as deemed appropriate and review results as they become available (see orders for details and above listed lab/test results). I will order psychiatric records from previous [facility] hospitals to further elucidate the nature of patient's psychopathology and review once available. I will gather additional collateral information from friends, family and o/p treatment team to further elucidate the nature of patient's psychopathology and baselline level of psychiatric functioning.          I certify that this patient's inpatient psychiatric hospital services furnished since the previous certification were, and continue to be, required for treatment that could reasonably be expected to improve the patient's condition, or for diagnostic study, and that the patient continues to need, on a daily basis, active treatment furnished directly by or requiring the supervision of inpatient psychiatric facility personnel. In addition, the hospital records show that services furnished were intensive treatment services, admission or related services, or equivalent services.     EXPECTED DISCHARGE DATE/DAY: TBD     DISPOSITION: Home       Signed By:   Dominique Carvajal MD  2/26/2018

## 2018-02-26 NOTE — PROGRESS NOTES
Problem: Falls - Risk of  Goal: *Absence of Falls  Document Bello Fall Risk and appropriate interventions in the flowsheet.    Outcome: Progressing Towards Goal  Fall Risk Interventions:  Lying quietly in bed with eyes closed , respirations even and unlabored , NAD noted   Q15 min safety rounds continue , Tap bell at bedside , urinal within reach , side rail up x2    Mobility Interventions: Bed/chair exit alarm    Mentation Interventions: Adequate sleep, hydration, pain control    Medication Interventions: Bed/chair exit alarm    Elimination Interventions: Bed/chair exit alarm    History of Falls Interventions: Bed/chair exit alarm, Door open when patient unattended

## 2018-02-26 NOTE — PROGRESS NOTES
Problem: Depressed Mood (Adult/Pediatric)  Goal: *STG: Complies with medication therapy  Outcome: Progressing Towards Goal  1545: Greeted patient on unit in day room sitting with peers. No voiced concerns at present. Will continue to monitor on Q 15 minute safety checks. Medication compliant.

## 2018-02-26 NOTE — INTERDISCIPLINARY ROUNDS
Behavioral Health Interdisciplinary Rounds     Patient Name: Jada Wan  Age: 78 y.o. Room/Bed:  740/02  Primary Diagnosis: Depression   Admission Status: Involuntary Commitment     Readmission within 30 days: no  Power of  in place: yes , daughter   Patient requires a blocked bed: no          Reason for blocked bed:     VTE Prophylaxis: Yes , ASA  Flu vaccine given : no , Declined   Mobility needs/Fall risk: yes    Nutritional Plan: no  Consults:        Labs/Testing due today?: no    Sleep hours: 6      Participation in Care/Groups:  yes  Medication Compliant?: selective   PRNS (last 24 hours): None    Restraints (last 24 hours):  no  Substance Abuse:  no  CIWA (range last 24 hours):  COWS (range last 24 hours):   Alcohol screening (AUDIT) completed -  AUDIT Score: 0  If applicable, date SBIRT discussed in treatment team AND documented:   Tobacco - patient is a smoker: yes    Date tobacco education completed by RN: 2/17/18  24 hour chart check complete: yes     Patient goal(s) for today:  Treatment team focus/goals: Speak with fiance re: discharge  Progress note: Patient continuing to be cooperative    LOS:  33  Expected LOS: TBD    Financial concerns/prescription coverage: Medicaid; Medicare  Date of last family contact:     Family requesting physician contact today:   Discharge plan: Discharge to [facility]  Guns in the home: No     Outpatient provider(s): [facility]    Participating treatment team members:  DARRICK Cobos; Dr. Claritza Kennedy MD; Angelina Briceno RN

## 2018-02-26 NOTE — DIABETES MGMT
DTC Progress Note    Recommendations/ Comments:  Chart reviewed due to hypoglycemia. Pt with a blood sugar of 70 mg/dl yesterday morning. Pt's renal status noted. If appropriate, please consider changing diet to carb consistent and document po intake. Current hospital DM medication: correction scale Humalog, high sensitivity. Chart reviewed on [facility]. Patient is a 78 y.o. male with known diabetes on no diabetes medications at home. A1c:   Lab Results   Component Value Date/Time    Hemoglobin A1c 4.5 01/21/2018 02:21 AM    Hemoglobin A1c 4.6 07/14/2017 04:42 AM       Recent Glucose Results:   Lab Results   Component Value Date/Time    GLUCPOC 85 02/26/2018 12:13 PM    GLUCPOC 164 (H) 02/25/2018 07:47 PM    GLUCPOC 136 (H) 02/25/2018 04:38 PM        Lab Results   Component Value Date/Time    Creatinine 2.49 (H) 01/28/2018 08:14 PM     Estimated Creatinine Clearance: 30 mL/min (based on Cr of 2.49). Active Orders   Diet    DIET REGULAR        PO intake: No data found. Will continue to follow as needed.     Thank you  Keyla Adams, MARLEN, CDE

## 2018-02-26 NOTE — BH NOTES
1545:  Patient received as he is sitting quietly in the Day Room. He greets oncoming staff cordially and voices no complaints or concerns at this time. Will maintain q 15 minute safety checks. 1635:  Patient's blood glucose is 75 at this time. Juice provided - will recheck within 15 minutes. 1650:  Patient's blood glucose now 76 - more juice provided - will recheck within 15 minutes. 1703:  Patient's blood glucose now 102. HYPOGLYCEMIC EPISODE DOCUMENTATION    Patient with hypoglycemic episode(s) at 1650 on 2/26/18. BG value(s) pre-treatment 76    Was patient symptomatic?  [] yes, [x] no  Patient was treated with the following rescue medications/treatments: [] D50                [] Glucose tablets                [] Glucagon                [x] 4oz juice x 2                                      [] 6oz reg soda                [] 8oz low fat milk  BG value post-treatment: 76  Once BG treated and value greater than 80mg/dl, pt was provided with the following:  [] snack  [x] meal  Name of MD notified:N/A  The following orders were received: N/A

## 2018-02-27 LAB
GLUCOSE BLD STRIP.AUTO-MCNC: 121 MG/DL (ref 65–100)
GLUCOSE BLD STRIP.AUTO-MCNC: 192 MG/DL (ref 65–100)
GLUCOSE BLD STRIP.AUTO-MCNC: 73 MG/DL (ref 65–100)
GLUCOSE BLD STRIP.AUTO-MCNC: 84 MG/DL (ref 65–100)
GLUCOSE BLD STRIP.AUTO-MCNC: 97 MG/DL (ref 65–100)
SERVICE CMNT-IMP: ABNORMAL
SERVICE CMNT-IMP: ABNORMAL
SERVICE CMNT-IMP: NORMAL

## 2018-02-27 PROCEDURE — 74011250637 HC RX REV CODE- 250/637: Performed by: FAMILY MEDICINE

## 2018-02-27 PROCEDURE — 74011250637 HC RX REV CODE- 250/637: Performed by: PSYCHIATRY & NEUROLOGY

## 2018-02-27 PROCEDURE — 82962 GLUCOSE BLOOD TEST: CPT

## 2018-02-27 PROCEDURE — 65220000003 HC RM SEMIPRIVATE PSYCH

## 2018-02-27 PROCEDURE — 74011250637 HC RX REV CODE- 250/637: Performed by: HOSPITALIST

## 2018-02-27 RX ADMIN — BUPROPION HYDROCHLORIDE 150 MG: 150 TABLET, EXTENDED RELEASE ORAL at 08:53

## 2018-02-27 RX ADMIN — DOXAZOSIN 4 MG: 2 TABLET ORAL at 20:51

## 2018-02-27 RX ADMIN — ATORVASTATIN CALCIUM 40 MG: 40 TABLET, FILM COATED ORAL at 08:53

## 2018-02-27 RX ADMIN — CARVEDILOL 3.12 MG: 3.12 TABLET, FILM COATED ORAL at 17:02

## 2018-02-27 RX ADMIN — LORAZEPAM 0.25 MG: 0.5 TABLET ORAL at 08:53

## 2018-02-27 RX ADMIN — HYDRALAZINE HYDROCHLORIDE 100 MG: 50 TABLET, FILM COATED ORAL at 08:53

## 2018-02-27 RX ADMIN — FERROUS SULFATE TAB 325 MG (65 MG ELEMENTAL FE) 325 MG: 325 (65 FE) TAB at 08:53

## 2018-02-27 RX ADMIN — FERROUS SULFATE TAB 325 MG (65 MG ELEMENTAL FE) 325 MG: 325 (65 FE) TAB at 17:03

## 2018-02-27 RX ADMIN — ASPIRIN 81 MG: 81 TABLET, COATED ORAL at 08:53

## 2018-02-27 RX ADMIN — HYDRALAZINE HYDROCHLORIDE 100 MG: 50 TABLET, FILM COATED ORAL at 17:03

## 2018-02-27 RX ADMIN — MIRTAZAPINE 30 MG: 15 TABLET, FILM COATED ORAL at 20:50

## 2018-02-27 RX ADMIN — HYDRALAZINE HYDROCHLORIDE 100 MG: 50 TABLET, FILM COATED ORAL at 20:49

## 2018-02-27 RX ADMIN — CARVEDILOL 3.12 MG: 3.12 TABLET, FILM COATED ORAL at 08:54

## 2018-02-27 RX ADMIN — LORAZEPAM 0.25 MG: 0.5 TABLET ORAL at 20:52

## 2018-02-27 RX ADMIN — AMLODIPINE BESYLATE 10 MG: 5 TABLET ORAL at 08:53

## 2018-02-27 RX ADMIN — LORAZEPAM 0.25 MG: 0.5 TABLET ORAL at 13:00

## 2018-02-27 NOTE — INTERDISCIPLINARY ROUNDS
Behavioral Health Interdisciplinary Rounds     Patient Name: Argenis Garcia  Age: 78 y.o. Room/Bed:  740/02  Primary Diagnosis: Depression   Admission Status: Voluntary Commitment     Readmission within 30 days: no  Power of  in place: yes, daughter   Patient requires a blocked bed: no          Reason for blocked bed: n/a    VTE Prophylaxis: Yes- ASA  Flu vaccine given : no - declined   Mobility needs/Fall risk: yes    Nutritional Plan: no  Consults: no         Labs/Testing due today?: no    Sleep hours: 5.0       Participation in Care/Groups:  yes  Medication Compliant?: selective  PRNS (last 24 hours): None    Restraints (last 24 hours):  no  Substance Abuse:  no  CIWA (range last 24 hours):  COWS (range last 24 hours):   Alcohol screening (AUDIT) completed -  AUDIT Score: 0  If applicable, date SBIRT discussed in treatment team AND documented: N/A  Tobacco - patient is a smoker: yes   Date tobacco education completed by RN: 2/17/18  24 hour chart check complete: yes     Patient goal(s) for today: Practice gratitude  Treatment team focus/goals: Call daughter and yuliya  Progress note: Pt requesting to discharge to a hotel on Saturday instead of SNF placement. LOS:  34  Expected LOS: TBD    Financial concerns/prescription coverage: Medicaid; [employer]  Date of last family contact: 2/27 SW spoke to fiance and daughter      Family requesting physician contact today: No  Discharge plan: Placement? Guns in the home: no        Outpatient provider(s): TBD    Participating treatment team members:  DARRICK Maher; Dr. Xena Andrade MD; Faizan Colon RN; Claude Fisher, PharmD

## 2018-02-27 NOTE — PROGRESS NOTES
Problem: Falls - Risk of  Goal: *Absence of Falls  Document Bello Fall Risk and appropriate interventions in the flowsheet. Outcome: Progressing Towards Goal  Fall Risk Interventions:  Mobility Interventions: Bed/chair exit alarm, Patient to call before getting OOB    Mentation Interventions: Adequate sleep, hydration, pain control, Toileting rounds    Medication Interventions: Bed/chair exit alarm, Patient to call before getting OOB    Elimination Interventions: Bed/chair exit alarm, Call light in reach    History of Falls Interventions: Bed/chair exit alarm    2345 Pt appears asleep in bed. Respirations even and unlabored. Bed alarm on, call bell within reach. Will continue to monitor with Q 15 safety checks.

## 2018-02-27 NOTE — DIABETES MGMT
DTC Progress Note    Recommendations/ Comments:  Chart reviewed due to hypoglycemia. Pt with a blood sugar of 73 mg/dl this morning. Pt's renal status noted. If appropriate, please consider changing diet to carb consistent and document po intake. Current hospital DM medication: correction scale Humalog, high sensitivity. Chart reviewed on [facility]. Patient is a 78 y.o. male with known diabetes on no diabetes medications at home. A1c:   Lab Results   Component Value Date/Time    Hemoglobin A1c 4.5 01/21/2018 02:21 AM    Hemoglobin A1c 4.6 07/14/2017 04:42 AM       Recent Glucose Results:   Lab Results   Component Value Date/Time    GLUCPOC 84 02/27/2018 07:45 AM    GLUCPOC 73 02/27/2018 07:27 AM    GLUCPOC 103 (H) 02/26/2018 08:14 PM        Lab Results   Component Value Date/Time    Creatinine 2.49 (H) 01/28/2018 08:14 PM     Estimated Creatinine Clearance: 30 mL/min (based on Cr of 2.49). Active Orders   Diet    DIET REGULAR        PO intake: No data found. Will continue to follow as needed.     Thank you  Ricco Barker RD, CDE

## 2018-02-27 NOTE — BH NOTES
GROUP THERAPY PROGRESS NOTE    Gilberto George willard participated in a morning Process Group on the Geriatric Unit, with a focus identifying feelings, planning for the day, and singing. Group time: 45 minutes. Personal goal for participation: To increase the capacity to shift ones mood, prepare for the day, and share in group singing. Goal orientation: The patient will be able to prepare for the day through group singing. Group therapy participation: When prompted, this patient minimally participated in the group. Therapeutic interventions reviewed and discussed: The group members were introduce themselves by first names and participate in group singing as a way to increase their oxygen and blood flow and begin their day on a positive note. They were also asked to join in singing several songs. Impression of participation: The patient sat quietly and appeared to be alert through this group session. He nodded in a positive fashion when addressed directly and he added little to the group's conversation. He also did not participate in the singing. He expressed no SI/HI and displayed no overt psychosis. His affect was restrained and his mood was irritable, speaking negatively once to nursing and receiving redirection from the member of the nursing staff. He did not appear to be engaged in the group's discussion or the music. It is difficult to assess how much of the group he took in. He seemed capable of participating more but decided not to, as if he is merely waiting.

## 2018-02-27 NOTE — BH NOTES
PSYCHIATRIC PROGRESS NOTE         Patient Name  Gilberto Vazquez   Date of Birth 1940   [facility] [id number]   Medical Record Number  [id number]      Age  78 y.o. PCP Johny Warner, MD   Admit date:  1/24/2018    Room Number  [phone]  @ [facility]   Date of Service  2/27/2018          PSYCHOTHERAPY SESSION NOTE:  Length of psychotherapy session: 15 minutes    Main condition/diagnosis/issues treated during session today, 2/27/2018 : med , meal and group non compliance    I employed Cognitive Behavioral therapy techniques, Reality-Oriented psychotherapy, as well as supportive psychotherapy in regards to various ongoing psychosocial stressors, including the following: pre-admission and current problems; housing issues; occupational issues; medical issues; and stress of hospitalization. Interpersonal relationship issues and psychodynamic conflicts explored. Attempts made to alleviate maladaptive patterns. We, also, worked on issues of denial & effects of substance dependency/use     Overall, patient is not progressing    Treatment Plan Update (reviewed an updated 2/27/2018) : I will modify psychotherapy tx plan by implementing more stress management strategies, building upon cognitive behavioral techniques, increasing coping skills, as well as shoring up psychological defenses). An extended energy and skill set was needed to engage pt in psychotherapy due to some of the following: resistiveness, complexity, negativity, confrontational nature, hostile behaviors, and/or severe abnormalities in thought processes/psychosis resulting in the loss of expressive/receptive language communication skills. E & M PROGRESS NOTE:         HISTORY       CC:  \"depression and non compliance with treatment \"  HISTORY OF PRESENT ILLNESS/INTERVAL HISTORY:  (reviewed/updated 2/27/2018).   per initial evaluation:     Gilberto Vazquez presents/reports/evidences the following emotional symptoms today, 2/27/2018:depression. The above symptoms have been present for 1 years. These symptoms are of severe severity. The symptoms are constant  in nature. Additional symptomatology and features include anxiety. 2/2/18- pATIENT HAS IMPROVED SPORADIC MED COMPLIANCE WITH INCREASED ATIVAN DOSE. WAS SOCIAL IN [place] TODAY. SW TO CONTACT DAUGHTER TO LET HER KNOE THAT HE IS NOT BEING AS TREATMENT COMPLIANT AS HE TOLD HER HE WOULD BE.    1/27/18 he is not responding to questions and not engaging and not showing engagement and refuse medications and treatment   1/28/18 he is doing better and he engaged and not feeling sad or depressed and he eats better    1/29/18= Patient is deliberately refusing vital signs, labs and medications. He likes finger foods and eats those. Will meet with daughter on Tuesday to discuss possible guardianship. Will seek forced meds. 1/30/18- Pt. Agreed to comply with meds after extensive family meeting. Planning for SNF admission and possible transfer to home afterwards [address].  1/31/18- Patient is intermittently cooperative with somatic meds. We discussed the risks to his health this can cause. I advised Wellbutrin for depression. This may help motivate treatment compliance. 2/1/18- Continues Rude, belligerent, uncooperative and disrespectful with staff. Is marginally compliant with diabetes management. Waiting for placement. 2/3/18- no complaints. More redirectable, less hostile and more compliant  2/4/18- Mr. Lilly Santacruz very somnolent this morning. No agitation  2/5/18- Sporadic , selective compliance with medications. Denies SI/HI. Needs reminding of treatment goals to improve compliance for transfer to SNF.  2/6/18- mr. Lilly Santacruz was bright and positive this morning. Less demanding and hostile towards staff. Asking to go home. 2/7/18- Apologized to writer for being rude and uncooperative with treatment. Focused on discharge.  Understandd house repairs need to be completed first.  2/8/18- sleeping this morning. 2/9/18- grumpy, entitled and impatient re discharge, accepts inerim SNF placement. 2/10/18: intermittently agitated, verbally abusive and  hostile, making gesture to strike out,focused on discharge, no finish, confuse,non redirectable, received prn ativan and his dose of schedule ativan was adjusted. 02/11/18:Patient was seen today, As per staff patient continues to demand to go home and discharge, Thought process tangential, poor insight, non redirectable,gets usp set when staff redirect him. Accepting medications and meals. Does not want to go to NH.   2/12/18- med compliance improved. Understands his home is not habitable until repairs are made, however insistes on focusing with staff on immediate discharge. More appropriate and cordial lately. 2/13/18- Recently has maintained appropriate social behavior and cordiality with staff. Denies SI ans HI. Is compliant with treatment. 2/14/18- More polite and showing patience about waiting for SNF to accept. 2/15/18- Focused on discharge. No insight that home is not safe to due to disrepair and no running h2o. Irritable and demanding. 2/16/18- Continues sarcastic, irritable and has no insight that he cannot go live in a house without running water. Expects preferential treatment when it comes to SNF waiting list. Asking multiple staff members the same discharge question- even though he has not forgotten the answer. (clearly able to recall othetr tghings long and short term)  2/17/18 he is the same and he is irritable and angry and not happy about being here and no self harm  Behavior and no issues with sleep and no self harm behavior    2/18/18 he is not happy about being here and not feeling happy about being here and still gets angry and cano and still has issues with staff   2/19/18- Not requiring PRN's but still irritable, bored and demanding. 2/20/18- Waiting for placement. Very entitled, unpleasant to staff.  No social with other patients. Bitter over life circumstances and blames others for all of his troubles. 2/21/18- Very irritable and rude to staff. Repeatedly demending to be discharged immediately, although his house has no water or electric power. Sister may possibly visit and may take him in to live with her. Med and meal compliant.  2/22/18- Family refuses to take him in. He is back on the list waiting for snf placement. 2/23/18- No one in the family wants to take him in. Continues cantankerous, irritable, intrusive and rude. 2/24/18- Entitled and behaving as though he is more important than his peers. Interrupts this writer as she addressed another pt. Has no insight about how offensive his behavior is.  2/25/18- Visible in milieu. Focused on discharge. Has no insight that his suicidal remarks on the medical floor earned him a place on the behavioral health unit. 2/26/18- Stable. Still partially non compliant with somatic care. Waiting for placement. 2/28/18- Visible in milieu. mainataning contact with family. Neatly groomed. Med and meal compliant. SIDE EFFECTS: (reviewed/updated 2/27/2018)  None reported or admitted to. No noted toxicity with use of Depakote/Tegretol/lithium/Clozaril/TCAs   ALLERGIES:(reviewed/updated 2/27/2018)  Allergies   Allergen Reactions    Tetracycline Hives      MEDICATIONS PRIOR TO ADMISSION:(reviewed/updated 2/27/2018)  Prescriptions Prior to Admission   Medication Sig    hydrALAZINE (APRESOLINE) 100 mg tablet Take 1 Tab by mouth three (3) times daily.  amLODIPine (NORVASC) 10 mg tablet Take 1 Tab by mouth daily.  aspirin delayed-release 81 mg tablet Take 1 Tab by mouth daily.  atorvastatin (LIPITOR) 40 mg tablet Take 1 Tab by mouth daily.  carvedilol (COREG) 3.125 mg tablet Take 1 Tab by mouth two (2) times daily (with meals).  ferrous sulfate 325 mg (65 mg iron) tablet Take 1 Tab by mouth two (2) times daily (with meals).     mirtazapine (REMERON) 7.5 mg tablet Take 1 Tab by mouth nightly.  [] nicotine (NICODERM CQ) 21 mg/24 hr 1 Patch by TransDERmal route every twenty-four (24) hours for 30 days. PAST MEDICAL HISTORY: Past medical history from the initial psychiatric evaluation has been reviewed (reviewed/updated 2018) with no additional updates (I asked patient and no additional past medical history provided). Past Medical History:   Diagnosis Date    Arthritis     CAD (coronary artery disease)     CKD (chronic kidney disease), stage III     DM type 2 causing renal disease (HCC)     DVT (deep venous thrombosis) (HCC)     Hx of DVT:  Xarelto stopped due to GI bleed. S/P IVC filter.  Gait abnormality     GI bleed     Heart murmur     Hepatitis C     History of blood transfusion     Hypercholesterolemia     Hypertension 2014    Neuropathy     Non compliance w medication regimen     Peripheral vascular disease ([address] 2014    A. S/P Right SFA POBA and tibial atherectomy (13).  PUD (peptic ulcer disease)     Unspecified sleep apnea     never used cpap     Past Surgical History:   Procedure Laterality Date    ABDOMEN SURGERY PROC UNLISTED      has approx 7-8\" midline incision on abdomen--\"had to open him up and clean him out after feeding tube clogged\"    HX GI      feeding tube for approx 2 mo    VASCULAR SURGERY PROCEDURE UNLIST Right 2015    popliteal-tibial bypass      SOCIAL HISTORY: Social history from the initial psychiatric evaluation has been reviewed (reviewed/updated 2018) with no additional updates (I asked patient and no additional social history provided). Social History     Social History    Marital status:      Spouse name: N/A    Number of children: N/A    Years of education: N/A     Occupational History    Not on file.      Social History Main Topics    Smoking status: Current Every Day Smoker     Packs/day: 0.50    Smokeless tobacco: Not on file  Alcohol use No    Drug use: No      Comment: >20 years ago    Sexual activity: Not on file     Other Topics Concern    Not on file     Social History Narrative    78 year ols male admitted for depression and homelessness. Pt will be evicated from apartment due to non payment of bills. Girlfriend of 30 years left him to [place]. \" Pt is a brittle diabetic, with treatment non compliance. He has bilarela lower extremity amputations. Patient has a long hx of substance abuse. FAMILY HISTORY: Family history from the initial psychiatric evaluation has been reviewed (reviewed/updated 2/27/2018) with no additional updates (I asked patient and no additional family history provided). Family History   Problem Relation Age of Onset    Hypertension Father        REVIEW OF SYSTEMS: (reviewed/updated 2/27/2018)  Appetite:decreased   Sleep: fitful   All other Review of Systems: Psychological ROS: positive for - behavioral disorder  Respiratory ROS: no cough, shortness of breath, or wheezing  Cardiovascular ROS: no chest pain or dyspnea on exertion         [facility] [place] [place]    [facility] [place]INDINGS ARE WITHIN NORMAL LIMITS (WNL) UNLESS OTHERWISE STATED BELOW. ( ALL OF THE BELOW CATEGORIES OF THE [facility] [place] HAVE BEEN REVIEWED (reviewed 2/27/2018) AND UPDATED AS DEEMED APPROPRIATE )  General Presentation age appropriate, evasive and guarded   Orientation oriented to time, place and person   Vital Signs  See below (reviewed 2/27/2018); Vital Signs (BP, Pulse, & Temp) are within normal limits if not listed below.    Gait and Station Stable/steady, no ataxia   Musculoskeletal System No extrapyramidal symptoms (EPS); no abnormal muscular movements or Tardive Dyskinesia (TD); muscle strength and tone are within normal limits   Language No aphasia or dysarthria   Speech:  hypoverbal   Thought Processes concrete; normal rate of thoughts; poor abstract reasoning/computation   Thought Associations goal directed   Thought Content free of delusions and free of hallucinations   Suicidal Ideations none   Homicidal Ideations none   Mood:  irritable   Affect:  mood-congruent   Memory recent  fair   Memory remote:  fair   Concentration/Attention:  distractable   Fund of Knowledge significantly below average   Insight:  poor   Reliability poor   Judgment:  poor          VITALS:     Patient Vitals for the past 24 hrs:   Temp Pulse Resp BP SpO2   02/26/18 1930 98.1 °F (36.7 °C) 68 16 148/78 100 %   02/26/18 1634 97.5 °F (36.4 °C) 66 17 143/60 100 %     Wt Readings from Last 3 Encounters:   02/25/18 93.7 kg (206 lb 9.6 oz)   01/23/18 83.6 kg (184 lb 4.9 oz)   07/13/17 88.9 kg (196 lb)     Temp Readings from Last 3 Encounters:   02/26/18 98.1 °F (36.7 °C)   01/24/18 97.2 °F (36.2 °C)   07/20/17 97.9 °F (36.6 °C)     BP Readings from Last 3 Encounters:   02/26/18 148/78   01/24/18 172/68   07/20/17 175/76     Pulse Readings from Last 3 Encounters:   02/26/18 68   01/24/18 72   07/20/17 (!) 52            DATA     LABORATORY DATA:(reviewed/updated 2/27/2018)  Recent Results (from the past 24 hour(s))   GLUCOSE, POC    Collection Time: 02/26/18  4:35 PM   Result Value Ref Range    Glucose (POC) 75 65 - 100 mg/dL    Performed by "[employer] POC    Collection Time: 02/26/18  4:50 PM   Result Value Ref Range    Glucose (POC) 76 65 - 100 mg/dL    Performed by "[employer] POC    Collection Time: 02/26/18  5:03 PM   Result Value Ref Range    Glucose (POC) 102 (H) 65 - 100 mg/dL    Performed by [address] POC    Collection Time: 02/26/18  8:14 PM   Result Value Ref Range    Glucose (POC) 103 (H) 65 - 100 mg/dL    Performed by Nadine Rubinstein    GLUCOSE, POC    Collection Time: 02/27/18  7:27 AM   Result Value Ref Range    Glucose (POC) 73 65 - 100 mg/dL    Performed by Selin Chavez    GLUCOSE, POC    Collection Time: 02/27/18  7:45 AM   Result Value Ref Range    Glucose (POC) 84 65 - 100 mg/dL    Performed by LIBRA Gay    Collection Time: 02/27/18 11:23 AM   Result Value Ref Range    Glucose (POC) 97 65 - 100 mg/dL    Performed by Elian Guo      No results found for: VALF2, VALAC, VALP, VALPR, DS6, CRBAM, CRBAMP, CARB2, XCRBAM  No results found for: LITHM   RADIOLOGY REPORTS:(reviewed/updated 2/27/2018)  Xr Chest Port    Result Date: 1/20/2018  EXAM: Portable CXR.  1800 hours. COMPARISON: 1/19/2015. INDICATION: cp, fatigue There is new interstitial pulmonary edema, cardiomegaly and small left pleural effusion. There is no focal infiltrate, pneumothorax or midline shift. IMPRESSION: CHF pattern.          MEDICATIONS     ALL MEDICATIONS:   Current Facility-Administered Medications   Medication Dose Route Frequency    LORazepam (ATIVAN) tablet 0.25 mg  0.25 mg Oral TID    mirtazapine (REMERON) tablet 30 mg  30 mg Oral QHS    buPROPion SR (WELLBUTRIN SR) tablet 150 mg  150 mg Oral DAILY    alum-mag hydroxide-simeth (MYLANTA) oral suspension 30 mL  30 mL Oral Q4H PRN    doxazosin (CARDURA) tablet 4 mg  4 mg Oral QHS    polyethylene glycol (MIRALAX) packet 17 g  17 g Oral DAILY    OLANZapine (ZyPREXA) tablet 2.5 mg  2.5 mg Oral Q6H PRN    ziprasidone (GEODON) 10 mg in sterile water (preservative free) 0.5 mL injection  10 mg IntraMUSCular BID PRN    benztropine (COGENTIN) tablet 1 mg  1 mg Oral BID PRN    benztropine (COGENTIN) injection 1 mg  1 mg IntraMUSCular BID PRN    zolpidem (AMBIEN) tablet 5 mg  5 mg Oral QHS PRN    acetaminophen (TYLENOL) tablet 650 mg  650 mg Oral Q4H PRN    magnesium hydroxide (MILK OF MAGNESIA) 400 mg/5 mL oral suspension 30 mL  30 mL Oral DAILY PRN    nicotine (NICODERM CQ) 21 mg/24 hr patch 1 Patch  1 Patch TransDERmal DAILY PRN    influenza vaccine 2017-18 (3 yrs+)(PF) (FLUZONE QUAD/FLUARIX QUAD) injection 0.5 mL  0.5 mL IntraMUSCular PRIOR TO DISCHARGE    amLODIPine (NORVASC) tablet 10 mg  10 mg Oral DAILY  aspirin delayed-release tablet 81 mg  81 mg Oral DAILY    atorvastatin (LIPITOR) tablet 40 mg  40 mg Oral DAILY    carvedilol (COREG) tablet 3.125 mg  3.125 mg Oral BID WITH MEALS    ferrous sulfate tablet 325 mg  325 mg Oral BID WITH MEALS    hydrALAZINE (APRESOLINE) tablet 100 mg  100 mg Oral TID    glucose chewable tablet 16 g  4 Tab Oral PRN    dextrose (D50W) injection syrg 12.5-25 g  12.5-25 g IntraVENous PRN    glucagon (GLUCAGEN) injection 1 mg  1 mg IntraMUSCular PRN    insulin lispro (HUMALOG) injection   SubCUTAneous AC&HS    nicotine (NICODERM CQ) 21 mg/24 hr patch 1 Patch  1 Patch TransDERmal Q24H      SCHEDULED MEDICATIONS:   Current Facility-Administered Medications   Medication Dose Route Frequency    LORazepam (ATIVAN) tablet 0.25 mg  0.25 mg Oral TID    mirtazapine (REMERON) tablet 30 mg  30 mg Oral QHS    buPROPion SR (WELLBUTRIN SR) tablet 150 mg  150 mg Oral DAILY    doxazosin (CARDURA) tablet 4 mg  4 mg Oral QHS    polyethylene glycol (MIRALAX) packet 17 g  17 g Oral DAILY    influenza vaccine 2017-18 (3 yrs+)(PF) (FLUZONE QUAD/FLUARIX QUAD) injection 0.5 mL  0.5 mL IntraMUSCular PRIOR TO DISCHARGE    amLODIPine (NORVASC) tablet 10 mg  10 mg Oral DAILY    aspirin delayed-release tablet 81 mg  81 mg Oral DAILY    atorvastatin (LIPITOR) tablet 40 mg  40 mg Oral DAILY    carvedilol (COREG) tablet 3.125 mg  3.125 mg Oral BID WITH MEALS    ferrous sulfate tablet 325 mg  325 mg Oral BID WITH MEALS    hydrALAZINE (APRESOLINE) tablet 100 mg  100 mg Oral TID    insulin lispro (HUMALOG) injection   SubCUTAneous AC&HS    nicotine (NICODERM CQ) 21 mg/24 hr patch 1 Patch  1 Patch TransDERmal Q24H          ASSESSMENT & PLAN     DIAGNOSES REQUIRING ACTIVE TREATMENT AND MONITORING: (reviewed/updated 2/27/2018)  Patient Active Hospital Problem List:   Depression (1/24/2018)    Assessment: sadness, helplessness, hopelessness, in reaction to recent bilateral lower leg amputationa and inability to pay water bill at home. Plan: consider forced medication/treatment, address social issues, daughter may be obtaining guardianship  1/27/18 need order to treat    1/28/18 continue his medications    02/10/18: Continue current treatment, Ativan dose was adjusted. 02/11/18: Continue current treatment. 2/17/18 continue same medications   2/18/18 continue same medications         In summary, Judy Quesada, is a 78 y.o.  male who presents with a severe exacerbation of the principal diagnosis of Depression  Patient's condition is worsening/not improving/not stable Patient requires continued inpatient hospitalization for further stabilization, safety monitoring and medication management. I will continue to coordinate the provision of individual, milieu, occupational, group, and substance abuse therapies to address target symptoms/diagnoses as deemed appropriate for the individual patient. A coordinated, multidisplinary treatment team round was conducted with the patient (this team consists of the nurse, psychiatric unit pharmcist,  and writer). Complete current electronic health record for patient has been reviewed today including consultant notes, ancillary staff notes, nurses and psychiatric tech notes. Suicide risk assessment completed and patient deemed to be of low risk for suicide at this time. The following regarding medications was addressed during rounds with patient:   the risks and benefits of the proposed medication. The patient was given the opportunity to ask questions. Informed consent given to the use of the above medications. Will continue to adjust psychiatric and non-psychiatric medications (see above \"medication\" section and orders section for details) as deemed appropriate & based upon diagnoses and response to treatment.      I will continue to order blood tests/labs and diagnostic tests as deemed appropriate and review results as they become available (see orders for details and above listed lab/test results). I will order psychiatric records from previous [facility] hospitals to further elucidate the nature of patient's psychopathology and review once available. I will gather additional collateral information from friends, family and o/p treatment team to further elucidate the nature of patient's psychopathology and baselline level of psychiatric functioning. I certify that this patient's inpatient psychiatric hospital services furnished since the previous certification were, and continue to be, required for treatment that could reasonably be expected to improve the patient's condition, or for diagnostic study, and that the patient continues to need, on a daily basis, active treatment furnished directly by or requiring the supervision of inpatient psychiatric facility personnel. In addition, the hospital records show that services furnished were intensive treatment services, admission or related services, or equivalent services.     EXPECTED DISCHARGE DATE/DAY: TBD     DISPOSITION: Home       Signed By:   Kaylene Sage MD  2/27/2018

## 2018-02-27 NOTE — PROGRESS NOTES
Problem: Depressed Mood (Adult/Pediatric)  Goal: *STG: Complies with medication therapy  Outcome: Progressing Towards Goal  1200: Greeted patient on unit in day room sitting quietly. No voiced concerns at present. Patient refused nicotine patch, but took all other medications. Will continue to monitor.

## 2018-02-27 NOTE — BH NOTES
1600:  Patient received as he is resting in bed - he is calling out to staff and demanding assistance to get out of bed despite assurances that the Lift Team has been called. He is impatient, argumentative with staff and attempting to transfer himself out of his bed independently. Later, sitting in the [address], he is monopolizing the patient phone and irritable with peers. Will maintain q 15 minute safety checks.

## 2018-02-27 NOTE — BH NOTES
Patient stating he does not want placement into SNF, but understands that he cannot return home due to safety issues (lack of water and electricity, structural decay, etc). Pt requesting to discharge into the care of his girlfriend/fiance to a hotel. Pt's POA has told Pt that she does not approve the discharge plan. Pt is currently presenting and alert/oriented and not cognitively impaired. SW to call Pt's daughter to explain that Pt can override POA decision while Pt is presenting as alert/oriented and making sound decisions.

## 2018-02-28 LAB
GLUCOSE BLD STRIP.AUTO-MCNC: 109 MG/DL (ref 65–100)
GLUCOSE BLD STRIP.AUTO-MCNC: 70 MG/DL (ref 65–100)
GLUCOSE BLD STRIP.AUTO-MCNC: 87 MG/DL (ref 65–100)
GLUCOSE BLD STRIP.AUTO-MCNC: 88 MG/DL (ref 65–100)
SERVICE CMNT-IMP: ABNORMAL
SERVICE CMNT-IMP: NORMAL

## 2018-02-28 PROCEDURE — 74011250637 HC RX REV CODE- 250/637: Performed by: FAMILY MEDICINE

## 2018-02-28 PROCEDURE — 65220000003 HC RM SEMIPRIVATE PSYCH

## 2018-02-28 PROCEDURE — 74011250637 HC RX REV CODE- 250/637: Performed by: HOSPITALIST

## 2018-02-28 PROCEDURE — 82962 GLUCOSE BLOOD TEST: CPT

## 2018-02-28 PROCEDURE — 74011250637 HC RX REV CODE- 250/637: Performed by: PSYCHIATRY & NEUROLOGY

## 2018-02-28 PROCEDURE — 74011250637 HC RX REV CODE- 250/637

## 2018-02-28 RX ADMIN — AMLODIPINE BESYLATE 10 MG: 5 TABLET ORAL at 10:16

## 2018-02-28 RX ADMIN — MIRTAZAPINE 30 MG: 15 TABLET, FILM COATED ORAL at 21:33

## 2018-02-28 RX ADMIN — DOXAZOSIN 4 MG: 2 TABLET ORAL at 20:32

## 2018-02-28 RX ADMIN — LORAZEPAM 0.25 MG: 0.5 TABLET ORAL at 13:49

## 2018-02-28 RX ADMIN — FERROUS SULFATE TAB 325 MG (65 MG ELEMENTAL FE) 325 MG: 325 (65 FE) TAB at 17:19

## 2018-02-28 RX ADMIN — HYDRALAZINE HYDROCHLORIDE 100 MG: 50 TABLET, FILM COATED ORAL at 17:19

## 2018-02-28 RX ADMIN — HYDRALAZINE HYDROCHLORIDE 100 MG: 50 TABLET, FILM COATED ORAL at 21:33

## 2018-02-28 RX ADMIN — FERROUS SULFATE TAB 325 MG (65 MG ELEMENTAL FE) 325 MG: 325 (65 FE) TAB at 10:15

## 2018-02-28 RX ADMIN — HYDRALAZINE HYDROCHLORIDE 100 MG: 50 TABLET, FILM COATED ORAL at 10:16

## 2018-02-28 RX ADMIN — CARVEDILOL 3.12 MG: 3.12 TABLET, FILM COATED ORAL at 10:16

## 2018-02-28 RX ADMIN — ACETAMINOPHEN 650 MG: 325 TABLET, FILM COATED ORAL at 20:31

## 2018-02-28 RX ADMIN — ASPIRIN 81 MG: 81 TABLET, COATED ORAL at 10:15

## 2018-02-28 RX ADMIN — BUPROPION HYDROCHLORIDE 150 MG: 150 TABLET, EXTENDED RELEASE ORAL at 10:16

## 2018-02-28 RX ADMIN — ZOLPIDEM TARTRATE 5 MG: 5 TABLET ORAL at 01:49

## 2018-02-28 RX ADMIN — ATORVASTATIN CALCIUM 40 MG: 40 TABLET, FILM COATED ORAL at 10:16

## 2018-02-28 RX ADMIN — CARVEDILOL 3.12 MG: 3.12 TABLET, FILM COATED ORAL at 17:19

## 2018-02-28 RX ADMIN — LORAZEPAM 0.25 MG: 0.5 TABLET ORAL at 20:30

## 2018-02-28 RX ADMIN — LORAZEPAM 0.25 MG: 0.5 TABLET ORAL at 10:16

## 2018-02-28 NOTE — PROGRESS NOTES
Problem: Falls - Risk of  Goal: *Absence of Falls  Document Bello Fall Risk and appropriate interventions in the flowsheet. Outcome: Progressing Towards Goal  Fall Risk Interventions:  Mobility Interventions: Bed/chair exit alarm, Mechanical lift    Mentation Interventions: Adequate sleep, hydration, pain control    Medication Interventions: Bed/chair exit alarm    Elimination Interventions: Call light in reach    History of Falls Interventions: Bed/chair exit alarm        Problem: Depressed Mood (Adult/Pediatric)  Goal: *STG: Participates in treatment plan  Outcome: Progressing Towards Goal  Pt med compliant. Eating well. Transfers with walt lift. Goal: *STG: Verbalizes anger, guilt, and other feelings in a constructive manor  Outcome: Progressing Towards Goal  Pt is labile, irritable at times.

## 2018-02-28 NOTE — BH NOTES
PSYCHIATRIC PROGRESS NOTE         Patient Name  Kev Jenkins   Date of Birth 1940   [facility] [id number]   Medical Record Number  [id number]      Age  78 y.o. PCP Johny Warner, MD   Admit date:  1/24/2018    Room Number  [phone]  @ [facility]   Date of Service  2/28/2018          PSYCHOTHERAPY SESSION NOTE:  Length of psychotherapy session: 15 minutes    Main condition/diagnosis/issues treated during session today, 2/28/2018 : med , meal and group non compliance    I employed Cognitive Behavioral therapy techniques, Reality-Oriented psychotherapy, as well as supportive psychotherapy in regards to various ongoing psychosocial stressors, including the following: pre-admission and current problems; housing issues; occupational issues; medical issues; and stress of hospitalization. Interpersonal relationship issues and psychodynamic conflicts explored. Attempts made to alleviate maladaptive patterns. We, also, worked on issues of denial & effects of substance dependency/use     Overall, patient is not progressing    Treatment Plan Update (reviewed an updated 2/28/2018) : I will modify psychotherapy tx plan by implementing more stress management strategies, building upon cognitive behavioral techniques, increasing coping skills, as well as shoring up psychological defenses). An extended energy and skill set was needed to engage pt in psychotherapy due to some of the following: resistiveness, complexity, negativity, confrontational nature, hostile behaviors, and/or severe abnormalities in thought processes/psychosis resulting in the loss of expressive/receptive language communication skills. E & M PROGRESS NOTE:         HISTORY       CC:  \"depression and non compliance with treatment \"  HISTORY OF PRESENT ILLNESS/INTERVAL HISTORY:  (reviewed/updated 2/28/2018).   per initial evaluation:     Kev Jenkins presents/reports/evidences the following emotional symptoms today, 2/28/2018:depression. The above symptoms have been present for 1 years. These symptoms are of severe severity. The symptoms are constant  in nature. Additional symptomatology and features include anxiety. 2/2/18- pATIENT HAS IMPROVED SPORADIC MED COMPLIANCE WITH INCREASED ATIVAN DOSE. WAS SOCIAL IN [place] TODAY. SW TO CONTACT DAUGHTER TO LET HER KNOE THAT HE IS NOT BEING AS TREATMENT COMPLIANT AS HE TOLD HER HE WOULD BE.    1/27/18 he is not responding to questions and not engaging and not showing engagement and refuse medications and treatment   1/28/18 he is doing better and he engaged and not feeling sad or depressed and he eats better    1/29/18= Patient is deliberately refusing vital signs, labs and medications. He likes finger foods and eats those. Will meet with daughter on Tuesday to discuss possible guardianship. Will seek forced meds. 1/30/18- Pt. Agreed to comply with meds after extensive family meeting. Planning for SNF admission and possible transfer to home afterwards [address].  1/31/18- Patient is intermittently cooperative with somatic meds. We discussed the risks to his health this can cause. I advised Wellbutrin for depression. This may help motivate treatment compliance. 2/1/18- Continues Rude, belligerent, uncooperative and disrespectful with staff. Is marginally compliant with diabetes management. Waiting for placement. 2/3/18- no complaints. More redirectable, less hostile and more compliant  2/4/18- Mr. Cristiano Johns very somnolent this morning. No agitation  2/5/18- Sporadic , selective compliance with medications. Denies SI/HI. Needs reminding of treatment goals to improve compliance for transfer to SNF.  2/6/18- mr. Cristiano Johns was bright and positive this morning. Less demanding and hostile towards staff. Asking to go home. 2/7/18- Apologized to writer for being rude and uncooperative with treatment. Focused on discharge.  Understandd house repairs need to be completed first.  2/8/18- sleeping this morning. 2/9/18- grumpy, entitled and impatient re discharge, accepts inerim SNF placement. 2/10/18: intermittently agitated, verbally abusive and  hostile, making gesture to strike out,focused on discharge, no finish, confuse,non redirectable, received prn ativan and his dose of schedule ativan was adjusted. 02/11/18:Patient was seen today, As per staff patient continues to demand to go home and discharge, Thought process tangential, poor insight, non redirectable,gets usp set when staff redirect him. Accepting medications and meals. Does not want to go to NH.   2/12/18- med compliance improved. Understands his home is not habitable until repairs are made, however insistes on focusing with staff on immediate discharge. More appropriate and cordial lately. 2/13/18- Recently has maintained appropriate social behavior and cordiality with staff. Denies SI ans HI. Is compliant with treatment. 2/14/18- More polite and showing patience about waiting for SNF to accept. 2/15/18- Focused on discharge. No insight that home is not safe to due to disrepair and no running h2o. Irritable and demanding. 2/16/18- Continues sarcastic, irritable and has no insight that he cannot go live in a house without running water. Expects preferential treatment when it comes to SNF waiting list. Asking multiple staff members the same discharge question- even though he has not forgotten the answer. (clearly able to recall othetr tghings long and short term)  2/17/18 he is the same and he is irritable and angry and not happy about being here and no self harm  Behavior and no issues with sleep and no self harm behavior    2/18/18 he is not happy about being here and not feeling happy about being here and still gets angry and cano and still has issues with staff   2/19/18- Not requiring PRN's but still irritable, bored and demanding. 2/20/18- Waiting for placement. Very entitled, unpleasant to staff.  No social with other patients. Bitter over life circumstances and blames others for all of his troubles. 2/21/18- Very irritable and rude to staff. Repeatedly demending to be discharged immediately, although his house has no water or electric power. Sister may possibly visit and may take him in to live with her. Med and meal compliant.  2/22/18- Family refuses to take him in. He is back on the list waiting for snf placement. 2/23/18- No one in the family wants to take him in. Continues cantankerous, irritable, intrusive and rude. 2/24/18- Entitled and behaving as though he is more important than his peers. Interrupts this writer as she addressed another pt. Has no insight about how offensive his behavior is.  2/25/18- Visible in milieu. Focused on discharge. Has no insight that his suicidal remarks on the medical floor earned him a place on the behavioral health unit. 2/26/18- Stable. Still partially non compliant with somatic care. Waiting for placement. 2/27/18- discussing plans to move into motel with avni. Continues impatient brusque and irrtable  2/28/18- Visible in milieu. mainataning contact with family. Neatly groomed. Med and meal compliant. SIDE EFFECTS: (reviewed/updated 2/28/2018)  None reported or admitted to. No noted toxicity with use of Depakote/Tegretol/lithium/Clozaril/TCAs   ALLERGIES:(reviewed/updated 2/28/2018)  Allergies   Allergen Reactions    Tetracycline Hives      MEDICATIONS PRIOR TO ADMISSION:(reviewed/updated 2/28/2018)  Prescriptions Prior to Admission   Medication Sig    hydrALAZINE (APRESOLINE) 100 mg tablet Take 1 Tab by mouth three (3) times daily.  amLODIPine (NORVASC) 10 mg tablet Take 1 Tab by mouth daily.  aspirin delayed-release 81 mg tablet Take 1 Tab by mouth daily.  atorvastatin (LIPITOR) 40 mg tablet Take 1 Tab by mouth daily.  carvedilol (COREG) 3.125 mg tablet Take 1 Tab by mouth two (2) times daily (with meals).     ferrous sulfate 325 mg (65 mg iron) tablet Take 1 Tab by mouth two (2) times daily (with meals).  mirtazapine (REMERON) 7.5 mg tablet Take 1 Tab by mouth nightly.  [] nicotine (NICODERM CQ) 21 mg/24 hr 1 Patch by TransDERmal route every twenty-four (24) hours for 30 days. PAST MEDICAL HISTORY: Past medical history from the initial psychiatric evaluation has been reviewed (reviewed/updated 2018) with no additional updates (I asked patient and no additional past medical history provided). Past Medical History:   Diagnosis Date    Arthritis     CAD (coronary artery disease)     CKD (chronic kidney disease), stage III     DM type 2 causing renal disease (HCC)     DVT (deep venous thrombosis) (HCC)     Hx of DVT:  Xarelto stopped due to GI bleed. S/P IVC filter.  Gait abnormality     GI bleed     Heart murmur     Hepatitis C     History of blood transfusion     Hypercholesterolemia     Hypertension 2014    Neuropathy     Non compliance w medication regimen     Peripheral vascular disease ([facility] [address] 2014    A. S/P Right SFA POBA and tibial atherectomy (13).  PUD (peptic ulcer disease)     Unspecified sleep apnea     never used cpap     Past Surgical History:   Procedure Laterality Date    ABDOMEN SURGERY PROC UNLISTED      has approx 7-8\" midline incision on abdomen--\"had to open him up and clean him out after feeding tube clogged\"    HX GI      feeding tube for approx 2 mo    VASCULAR SURGERY PROCEDURE UNLIST Right 2015    popliteal-tibial bypass      SOCIAL HISTORY: Social history from the initial psychiatric evaluation has been reviewed (reviewed/updated 2018) with no additional updates (I asked patient and no additional social history provided). Social History     Social History    Marital status:      Spouse name: N/A    Number of children: N/A    Years of education: N/A     Occupational History    Not on file.      Social History Main Topics    Smoking status: Current Every Day Smoker     Packs/day: 0.50    Smokeless tobacco: Not on file    Alcohol use No    Drug use: No      Comment: >20 years ago    Sexual activity: Not on file     Other Topics Concern    Not on file     Social History Narrative    78 year ols male admitted for depression and homelessness. Pt will be evicated from apartment due to non payment of bills. Girlfriend of 30 years left him to [facility] live in the [place] with others. \" Pt is a brittle diabetic, with treatment non compliance. He has bilarela lower extremity amputations. Patient has a long hx of substance abuse. FAMILY HISTORY: Family history from the initial psychiatric evaluation has been reviewed (reviewed/updated 2/28/2018) with no additional updates (I asked patient and no additional family history provided). Family History   Problem Relation Age of Onset    Hypertension Father        REVIEW OF SYSTEMS: (reviewed/updated 2/28/2018)  Appetite:decreased   Sleep: fitful   All other Review of Systems: Psychological ROS: positive for - behavioral disorder  Respiratory ROS: no cough, shortness of breath, or wheezing  Cardiovascular ROS: no chest pain or dyspnea on exertion         [facility] (MSE):    MSE FINDINGS ARE WITHIN NORMAL LIMITS (WNL) UNLESS OTHERWISE STATED BELOW. ( ALL OF THE BELOW CATEGORIES OF THE MSE HAVE BEEN REVIEWED (reviewed 2/28/2018) AND UPDATED AS DEEMED APPROPRIATE )  General Presentation age appropriate, evasive and guarded   Orientation oriented to time, place and person   Vital Signs  See below (reviewed 2/28/2018); Vital Signs (BP, Pulse, & Temp) are within normal limits if not listed below.    Gait and Station Stable/steady, no ataxia   Musculoskeletal System No extrapyramidal symptoms (EPS); no abnormal muscular movements or Tardive Dyskinesia (TD); muscle strength and tone are within normal limits   Language No aphasia or dysarthria   Speech: hypoverbal   Thought Processes concrete; normal rate of thoughts; poor abstract reasoning/computation   Thought Associations goal directed   Thought Content free of delusions and free of hallucinations   Suicidal Ideations none   Homicidal Ideations none   Mood:  irritable   Affect:  mood-congruent   Memory recent  fair   Memory remote:  fair   Concentration/Attention:  distractable   Fund of Knowledge significantly below average   Insight:  poor   Reliability poor   Judgment:  poor          VITALS:     Patient Vitals for the past 24 hrs:   Temp Pulse Resp BP SpO2   02/28/18 0930 98.2 °F (36.8 °C) 78 16 172/86 96 %   02/28/18 0130 97.4 °F (36.3 °C) 69 18 158/82 100 %   02/1940 97.5 °F (36.4 °C) 74 16 137/64 99 %   02/27/18 1530 98 °F (36.7 °C) 66 16 152/70 100 %     Wt Readings from Last 3 Encounters:   02/25/18 93.7 kg (206 lb 9.6 oz)   01/23/18 83.6 kg (184 lb 4.9 oz)   07/13/17 88.9 kg (196 lb)     Temp Readings from Last 3 Encounters:   02/28/18 98.2 °F (36.8 °C)   01/24/18 97.2 °F (36.2 °C)   07/20/17 97.9 °F (36.6 °C)     BP Readings from Last 3 Encounters:   02/28/18 172/86   01/24/18 172/68   07/20/17 175/76     Pulse Readings from Last 3 Encounters:   02/28/18 78   01/24/18 72   07/20/17 (!) 52            DATA     LABORATORY DATA:(reviewed/updated 2/28/2018)  Recent Results (from the past 24 hour(s))   GLUCOSE, POC    Collection Time: 02/27/18  4:05 PM   Result Value Ref Range    Glucose (POC) 121 (H) 65 - 100 mg/dL    Performed by Ermias Driver    GLUCOSE, POC    Collection Time: 02/27/18  7:56 PM   Result Value Ref Range    Glucose (POC) 192 (H) 65 - 100 mg/dL    Performed by [address], POC    Collection Time: 02/28/18  9:33 AM   Result Value Ref Range    Glucose (POC) 70 65 - 100 mg/dL    Performed by [address], POC    Collection Time: 02/28/18  9:55 AM   Result Value Ref Range    Glucose (POC) 88 65 - 100 mg/dL    Performed by Katja Steen      No results found for: VALF2, VALAC, VALP, VALPR, DS6, CRBAM, CRBAMP, CARB2, XCRBAM  No results found for: LITHM   RADIOLOGY REPORTS:(reviewed/updated 2/28/2018)  Xr Chest Port    Result Date: 1/20/2018  EXAM: Portable CXR.  1800 hours. COMPARISON: 1/19/2015. INDICATION: cp, fatigue There is new interstitial pulmonary edema, cardiomegaly and small left pleural effusion. There is no focal infiltrate, pneumothorax or midline shift. IMPRESSION: CHF pattern.          MEDICATIONS     ALL MEDICATIONS:   Current Facility-Administered Medications   Medication Dose Route Frequency    LORazepam (ATIVAN) tablet 0.25 mg  0.25 mg Oral TID    mirtazapine (REMERON) tablet 30 mg  30 mg Oral QHS    buPROPion SR (WELLBUTRIN SR) tablet 150 mg  150 mg Oral DAILY    alum-mag hydroxide-simeth (MYLANTA) oral suspension 30 mL  30 mL Oral Q4H PRN    doxazosin (CARDURA) tablet 4 mg  4 mg Oral QHS    polyethylene glycol (MIRALAX) packet 17 g  17 g Oral DAILY    OLANZapine (ZyPREXA) tablet 2.5 mg  2.5 mg Oral Q6H PRN    ziprasidone (GEODON) 10 mg in sterile water (preservative free) 0.5 mL injection  10 mg IntraMUSCular BID PRN    benztropine (COGENTIN) tablet 1 mg  1 mg Oral BID PRN    benztropine (COGENTIN) injection 1 mg  1 mg IntraMUSCular BID PRN    zolpidem (AMBIEN) tablet 5 mg  5 mg Oral QHS PRN    acetaminophen (TYLENOL) tablet 650 mg  650 mg Oral Q4H PRN    magnesium hydroxide (MILK OF MAGNESIA) 400 mg/5 mL oral suspension 30 mL  30 mL Oral DAILY PRN    nicotine (NICODERM CQ) 21 mg/24 hr patch 1 Patch  1 Patch TransDERmal DAILY PRN    influenza vaccine 2017-18 (3 yrs+)(PF) (FLUZONE QUAD/FLUARIX QUAD) injection 0.5 mL  0.5 mL IntraMUSCular PRIOR TO DISCHARGE    amLODIPine (NORVASC) tablet 10 mg  10 mg Oral DAILY    aspirin delayed-release tablet 81 mg  81 mg Oral DAILY    atorvastatin (LIPITOR) tablet 40 mg  40 mg Oral DAILY    carvedilol (COREG) tablet 3.125 mg  3.125 mg Oral BID WITH MEALS    ferrous sulfate tablet 325 mg  325 mg Oral BID WITH MEALS    hydrALAZINE (APRESOLINE) tablet 100 mg  100 mg Oral TID    glucose chewable tablet 16 g  4 Tab Oral PRN    dextrose (D50W) injection syrg 12.5-25 g  12.5-25 g IntraVENous PRN    glucagon (GLUCAGEN) injection 1 mg  1 mg IntraMUSCular PRN    insulin lispro (HUMALOG) injection   SubCUTAneous AC&HS    nicotine (NICODERM CQ) 21 mg/24 hr patch 1 Patch  1 Patch TransDERmal Q24H      SCHEDULED MEDICATIONS:   Current Facility-Administered Medications   Medication Dose Route Frequency    LORazepam (ATIVAN) tablet 0.25 mg  0.25 mg Oral TID    mirtazapine (REMERON) tablet 30 mg  30 mg Oral QHS    buPROPion SR (WELLBUTRIN SR) tablet 150 mg  150 mg Oral DAILY    doxazosin (CARDURA) tablet 4 mg  4 mg Oral QHS    polyethylene glycol (MIRALAX) packet 17 g  17 g Oral DAILY    influenza vaccine 2017-18 (3 yrs+)(PF) (FLUZONE QUAD/FLUARIX QUAD) injection 0.5 mL  0.5 mL IntraMUSCular PRIOR TO DISCHARGE    amLODIPine (NORVASC) tablet 10 mg  10 mg Oral DAILY    aspirin delayed-release tablet 81 mg  81 mg Oral DAILY    atorvastatin (LIPITOR) tablet 40 mg  40 mg Oral DAILY    carvedilol (COREG) tablet 3.125 mg  3.125 mg Oral BID WITH MEALS    ferrous sulfate tablet 325 mg  325 mg Oral BID WITH MEALS    hydrALAZINE (APRESOLINE) tablet 100 mg  100 mg Oral TID    insulin lispro (HUMALOG) injection   SubCUTAneous AC&HS    nicotine (NICODERM CQ) 21 mg/24 hr patch 1 Patch  1 Patch TransDERmal Q24H          ASSESSMENT & PLAN     DIAGNOSES REQUIRING ACTIVE TREATMENT AND MONITORING: (reviewed/updated 2/28/2018)  Patient Active Hospital Problem List:   Depression (1/24/2018)    Assessment: sadness, helplessness, hopelessness, in reaction to recent bilateral lower leg amputationa and inability to pay water bill at home.     Plan: consider forced medication/treatment, address social issues, daughter may be obtaining guardianship  1/27/18 need order to treat    1/28/18 continue his medications    02/10/18: Continue current treatment, Ativan dose was adjusted. 02/11/18: Continue current treatment. 2/17/18 continue same medications   2/18/18 continue same medications         In summary, Elly Chaudhry, is a 78 y.o.  male who presents with a severe exacerbation of the principal diagnosis of Depression  Patient's condition is worsening/not improving/not stable Patient requires continued inpatient hospitalization for further stabilization, safety monitoring and medication management. I will continue to coordinate the provision of individual, milieu, occupational, group, and substance abuse therapies to address target symptoms/diagnoses as deemed appropriate for the individual patient. A coordinated, multidisplinary treatment team round was conducted with the patient (this team consists of the nurse, psychiatric unit pharmcist,  and writer). Complete current electronic health record for patient has been reviewed today including consultant notes, ancillary staff notes, nurses and psychiatric tech notes. Suicide risk assessment completed and patient deemed to be of low risk for suicide at this time. The following regarding medications was addressed during rounds with patient:   the risks and benefits of the proposed medication. The patient was given the opportunity to ask questions. Informed consent given to the use of the above medications. Will continue to adjust psychiatric and non-psychiatric medications (see above \"medication\" section and orders section for details) as deemed appropriate & based upon diagnoses and response to treatment. I will continue to order blood tests/labs and diagnostic tests as deemed appropriate and review results as they become available (see orders for details and above listed lab/test results). I will order psychiatric records from previous [facility] hospitals to further elucidate the nature of patient's psychopathology and review once available.     I will gather additional collateral information from friends, family and o/p treatment team to further elucidate the nature of patient's psychopathology and baselline level of psychiatric functioning. I certify that this patient's inpatient psychiatric hospital services furnished since the previous certification were, and continue to be, required for treatment that could reasonably be expected to improve the patient's condition, or for diagnostic study, and that the patient continues to need, on a daily basis, active treatment furnished directly by or requiring the supervision of inpatient psychiatric facility personnel. In addition, the hospital records show that services furnished were intensive treatment services, admission or related services, or equivalent services.     EXPECTED DISCHARGE DATE/DAY: TBD     DISPOSITION: Home       Signed By:   Halle Harper MD  2/28/2018

## 2018-02-28 NOTE — PROGRESS NOTES
HYPOGLYCEMIC EPISODE DOCUMENTATION    Patient with hypoglycemic episode(s) at 0933(time) on 2/28(date). BG value(s) pre-treatment 79    Was patient symptomatic?  [] yes, [x] no  Patient was treated with the following rescue medications/treatments: [] D50                [] Glucose tablets                [] Glucagon                [x] 4oz juice                [] 6oz reg soda                [] 8oz low fat milk  BG value post-treatment: 88  Once BG treated and value greater than 80mg/dl, pt was provided with the following:  [] snack  [x] meal  Name of MD notified:mary  The following orders were received: none      1202 Pt refused lunch time accucheck

## 2018-02-28 NOTE — MASTER TREATMENT PLAN
[facility]  Master Treatment Plan [place]        Date Treatment Plan Initiated: 1/24      Treatment Plan Modalities:    Type of Modality Amount  (x minutes) Frequency (x/week) Duration (x days) Name of Responsible Staff   Community & wrap-up meetings to encourage peer interactions    15    7    1     Deborah RN,    Group psychotherapy to assist in building coping skills and internal controls    60    7    1    Jose Arreguin LCSW   Therapeutic activity groups to build coping skills    60    7    1    Jose Arreguin LCSW   Psychoeducation in group setting to address:   Medication education    15    7    1    Johnny Boswell RN   Coping skills    20    7    1    Rashad Goodwin RN   Relaxation techniques           Symptom management           Discharge planning    15    7    1    Rob Dowling ,    Spirituality     60    7    1    jose manuel NESBITT    60    7    1    Volunteer from [facility] [employer]   [facility]/AA/NA    60    7    1    Volunteer from [address] [place] [address] medication management    15    7    1    Dr. Jackelyn Martins meeting/discharge planning

## 2018-02-28 NOTE — PROGRESS NOTES
Problem: Falls - Risk of  Goal: *Absence of Falls  Document Bello Fall Risk and appropriate interventions in the flowsheet.    Outcome: Progressing Towards Goal  Fall Risk Interventions:  Lying quietly in bed with eyes closed , respirations even and unlabored , has been awake and requesting sleep aid , Q15 min safety rounds continue , Side rails up x2 , Tap bell at bedside within reach   Mobility Interventions: Bed/chair exit alarm    Mentation Interventions: Adequate sleep, hydration, pain control    Medication Interventions: Bed/chair exit alarm    Elimination Interventions: Bed/chair exit alarm    History of Falls Interventions: Bed/chair exit alarm

## 2018-02-28 NOTE — INTERDISCIPLINARY ROUNDS
Behavioral Health Interdisciplinary Rounds     Patient Name: Rob Meeks  Age: 78 y.o. Room/Bed:  740/02  Primary Diagnosis: Depression   Admission Status: Voluntary Commitment     Readmission within 30 days:   Power of  in place: yes daughter   Patient requires a blocked bed: no          Reason for blocked bed:     VTE Prophylaxis: Yes/ ASA   Flu vaccine given : no - declined   Mobility needs/Fall risk: -yes    Nutritional Plan: no  Consults:         Labs/Testing due today?: no    Sleep hours:  3       Participation in Care/Groups:  yes  Medication Compliant?: yes   PRNS (last 24 hours): None    Restraints (last 24 hours):  no  Substance Abuse:  no  CIWA (range last 24 hours):  COWS (range last 24 hours):   Alcohol screening (AUDIT) completed -  AUDIT Score: 0  If applicable, date SBIRT discussed in treatment team AND documented:   Tobacco - patient is a smoker:   Yes       Date tobacco education completed by RN: 2/17/18   24 hour chart check complete: yes     Patient goal(s) for today:Prepare for discharge tomorrow  Treatment team focus/goals: Prepare for discharge tomorrow  Progress note     LOS:  35  Expected LOS: 36    Financial concerns/prescription coverage: Medicaid; Medicare  Date of last family contact: 2/28 SW spoke to fiance    Family requesting physician contact today: No  Discharge plan: To hotel with family  Guns in the home: No       Outpatient provider(s): [facility]    Participating treatment team members:  DARRICK Guthrie; Dr. Prem Niño MD; Harsha Geiger RN

## 2018-02-28 NOTE — BH NOTES
PRN Medication Documentation MEWS=1   Specific patient behavior that led to need for PRN medication: not sleeping   Staff interventions attempted prior to PRN being given: dim lights , quiet   PRN medication given: Ambien 5 mg po   Patient response/effectiveness of PRN medication: pending

## 2018-02-28 NOTE — DIABETES MGMT
DTC Progress Note    Recommendations/ Comments:  Chart reviewed due to hypoglycemia. Pt with a blood sugar of 70 mg/dl this morning. Pt's renal status noted. If appropriate, please consider changing diet to carb consistent and document po intake. Current hospital DM medication: correction scale Humalog, high sensitivity. Chart reviewed on [facility]. Patient is a 78 y.o. male with known diabetes on no diabetes medications at home. A1c:   Lab Results   Component Value Date/Time    Hemoglobin A1c 4.5 01/21/2018 02:21 AM    Hemoglobin A1c 4.6 07/14/2017 04:42 AM       Recent Glucose Results:   Lab Results   Component Value Date/Time    GLUCPOC 88 02/28/2018 09:55 AM    GLUCPOC 70 02/28/2018 09:33 AM    GLUCPOC 192 (H) 02/27/2018 07:56 PM        Lab Results   Component Value Date/Time    Creatinine 2.49 (H) 01/28/2018 08:14 PM     Estimated Creatinine Clearance: 30 mL/min (based on Cr of 2.49). Active Orders   Diet    DIET REGULAR        PO intake: No data found. Will continue to follow as needed.     Thank you  Rema Cardenas RD, CDE

## 2018-02-28 NOTE — PROGRESS NOTES
Problem: Falls - Risk of  Goal: *Absence of Falls  Document Bello Fall Risk and appropriate interventions in the flowsheet. Outcome: Progressing Towards Goal  Fall Risk Interventions:  Mobility Interventions: Bed/chair exit alarm    Mentation Interventions: Adequate sleep, hydration, pain control    Medication Interventions: Bed/chair exit alarm, Patient to call before getting OOB    Elimination Interventions: Bed/chair exit alarm    History of Falls Interventions: Bed/chair exit alarm        Problem: Depressed Mood (Adult/Pediatric)  Goal: *STG: Attends activities and groups  Outcome: Progressing Towards Goal  Patient has been visible on the unit throughout this shift. He has been preoccupied with the phone but appropriately interactive with staff and peers.

## 2018-02-28 NOTE — BH NOTES
GROUP THERAPY PROGRESS NOTE    Katrin Jorge did not participate in a morning Process Group on the Geriatric Unit with a focus on shifting ones feelings to a positive note and preparing for the day through group singing.

## 2018-03-01 LAB
GLUCOSE BLD STRIP.AUTO-MCNC: 139 MG/DL (ref 65–100)
GLUCOSE BLD STRIP.AUTO-MCNC: 186 MG/DL (ref 65–100)
GLUCOSE BLD STRIP.AUTO-MCNC: 71 MG/DL (ref 65–100)
GLUCOSE BLD STRIP.AUTO-MCNC: 80 MG/DL (ref 65–100)
SERVICE CMNT-IMP: ABNORMAL
SERVICE CMNT-IMP: ABNORMAL
SERVICE CMNT-IMP: NORMAL
SERVICE CMNT-IMP: NORMAL

## 2018-03-01 PROCEDURE — 74011250637 HC RX REV CODE- 250/637: Performed by: HOSPITALIST

## 2018-03-01 PROCEDURE — 74011250637 HC RX REV CODE- 250/637: Performed by: FAMILY MEDICINE

## 2018-03-01 PROCEDURE — 65220000003 HC RM SEMIPRIVATE PSYCH

## 2018-03-01 PROCEDURE — 82962 GLUCOSE BLOOD TEST: CPT

## 2018-03-01 PROCEDURE — 74011250637 HC RX REV CODE- 250/637: Performed by: PSYCHIATRY & NEUROLOGY

## 2018-03-01 RX ORDER — LORAZEPAM 0.5 MG/1
0.5 TABLET ORAL
Qty: 5 TAB | Refills: 0 | Status: SHIPPED | OUTPATIENT
Start: 2018-03-01 | End: 2018-03-06

## 2018-03-01 RX ADMIN — LORAZEPAM 0.25 MG: 0.5 TABLET ORAL at 09:24

## 2018-03-01 RX ADMIN — AMLODIPINE BESYLATE 10 MG: 5 TABLET ORAL at 09:23

## 2018-03-01 RX ADMIN — LORAZEPAM 0.25 MG: 0.5 TABLET ORAL at 13:34

## 2018-03-01 RX ADMIN — CARVEDILOL 3.12 MG: 3.12 TABLET, FILM COATED ORAL at 09:24

## 2018-03-01 RX ADMIN — ASPIRIN 81 MG: 81 TABLET, COATED ORAL at 09:25

## 2018-03-01 RX ADMIN — FERROUS SULFATE TAB 325 MG (65 MG ELEMENTAL FE) 325 MG: 325 (65 FE) TAB at 09:22

## 2018-03-01 RX ADMIN — DOXAZOSIN 4 MG: 2 TABLET ORAL at 21:03

## 2018-03-01 RX ADMIN — MIRTAZAPINE 30 MG: 15 TABLET, FILM COATED ORAL at 21:03

## 2018-03-01 RX ADMIN — HYDRALAZINE HYDROCHLORIDE 100 MG: 50 TABLET, FILM COATED ORAL at 22:03

## 2018-03-01 RX ADMIN — BUPROPION HYDROCHLORIDE 150 MG: 150 TABLET, EXTENDED RELEASE ORAL at 09:25

## 2018-03-01 RX ADMIN — LORAZEPAM 0.25 MG: 0.5 TABLET ORAL at 21:02

## 2018-03-01 RX ADMIN — HYDRALAZINE HYDROCHLORIDE 100 MG: 50 TABLET, FILM COATED ORAL at 09:25

## 2018-03-01 RX ADMIN — ATORVASTATIN CALCIUM 40 MG: 40 TABLET, FILM COATED ORAL at 09:25

## 2018-03-01 NOTE — PROGRESS NOTES
Problem: Falls - Risk of  Goal: *Absence of Falls  Document Bello Fall Risk and appropriate interventions in the flowsheet.    Outcome: Progressing Towards Goal  Fall Risk Interventions:  Lying quietly in bed with eyes closed , respirations even and unlabored , NAD noted   Q!5 min safety rounds continue , bed alarm blinking green , side rails up x2 , Tap bell within reach at bed side   Mobility Interventions: Bed/chair exit alarm    Mentation Interventions: Adequate sleep, hydration, pain control    Medication Interventions: Bed/chair exit alarm    Elimination Interventions: Call light in reach    History of Falls Interventions: Bed/chair exit alarm      0730- Pt slept 7 hours

## 2018-03-01 NOTE — PROGRESS NOTES
HYPOGLYCEMIC EPISODE DOCUMENTATION    Patient with hypoglycemic episode(s) at 0802(time) on 03/01/18(date). BG value(s) pre-treatment 70    Was patient symptomatic?  [] yes, [x] no  Patient was treated with the following rescue medications/treatments: [] D50                [] Glucose tablets                [] Glucagon                [] 4oz juice                [x] 6oz reg soda                [] 8oz low fat milk  BG value post-treatment: 80  Once BG treated and value greater than 80mg/dl, pt was provided with the following:  [] snack  [x] meal  Name of MD notified:Dr. Prem Niño  The following orders were received:

## 2018-03-01 NOTE — PROGRESS NOTES
Problem: Depressed Mood (Adult/Pediatric)  Goal: *STG: Demonstrates reduction in symptoms and increase in insight into coping skills/future focused  Outcome: Progressing Towards Goal  Visible in DR all shift. Ate 100 % dinner and snacks. Brighter affect, smiling. Called Wife, Silver Dakins, said \" I am going to be discharged tomorrow to that motel on [address], will have MARTÍNEZ Carranza call you\". Read the newspaper, watched TV and socialized appropriately with peers. Positive outlook. Cooperative.

## 2018-03-01 NOTE — BH NOTES
Behavioral Health Transition Record to Provider    Patient Name: Jorge Pastor  YOB: 1940  Medical Record Number: [id number]  Date of Admission: 1/24/2018  Date of Discharge: 3/1/2018    Attending Provider: Ted Uriarte MD  Discharging Provider: Ted Uriarte MD  To contact this individual call [phone] and ask the  to page. If unavailable, ask to be transferred to [facility] Provider on call. [facility] Provider will be available on call 24/7 and during holidays. Primary Care Provider: Johny Warner MD    Allergies   Allergen Reactions    Tetracycline Hives       Reason for Admission: Patient was admitted under a temporary assisted order (TDO) for poor self care and homelessness and jg proving to be an imminent danger to self and others and an inability to care for self. Admission Diagnosis: depression  Depression    * No surgery found *    Results for orders placed or performed during the hospital encounter of 01/24/18   CULTURE, MRSA   Result Value Ref Range    Special Requests: NO SPECIAL REQUESTS      Culture result: MRSA NOT PRESENT      Culture result:            Screening of patient nares for MRSA is for surveillance purposes and, if positive, to facilitate isolation considerations in high risk settings. It is not intended for automatic decolonization interventions per se as regimens are not sufficiently effective to warrant routine use.    METABOLIC PANEL, BASIC   Result Value Ref Range    Sodium 142 136 - 145 mmol/L    Potassium 4.4 3.5 - 5.1 mmol/L    Chloride 111 (H) 97 - 108 mmol/L    CO2 24 21 - 32 mmol/L    Anion gap 7 5 - 15 mmol/L    Glucose 147 (H) 65 - 100 mg/dL    BUN 30 (H) 6 - 20 MG/DL    Creatinine 2.73 (H) 0.70 - 1.30 MG/DL    BUN/Creatinine ratio 11 (L) 12 - 20      GFR est AA 28 (L) >60 ml/min/1.73m2    GFR est non-AA 23 (L) >60 ml/min/1.73m2    Calcium 8.7 8.5 - 10.1 MG/DL   CBC WITH AUTOMATED DIFF   Result Value Ref Range WBC 3.2 (L) 4.1 - 11.1 K/uL    RBC 3.92 (L) 4.10 - 5.70 M/uL    HGB 10.7 (L) 12.1 - 17.0 g/dL    HCT 32.7 (L) 36.6 - 50.3 %    MCV 83.4 80.0 - 99.0 FL    MCH 27.3 26.0 - 34.0 PG    MCHC 32.7 30.0 - 36.5 g/dL    RDW 17.0 (H) 11.5 - 14.5 %    PLATELET 318 069 - 034 K/uL    NEUTROPHILS 49 32 - 75 %    LYMPHOCYTES 40 12 - 49 %    MONOCYTES 7 5 - 13 %    EOSINOPHILS 4 0 - 7 %    BASOPHILS 0 0 - 1 %    ABS. NEUTROPHILS 1.6 (L) 1.8 - 8.0 K/UL    ABS. LYMPHOCYTES 1.3 0.8 - 3.5 K/UL    ABS. MONOCYTES 0.2 0.0 - 1.0 K/UL    ABS. EOSINOPHILS 0.1 0.0 - 0.4 K/UL    ABS.  BASOPHILS 0.0 0.0 - 0.1 K/UL   CBC W/O DIFF   Result Value Ref Range    WBC 3.5 (L) 4.1 - 11.1 K/uL    RBC 3.78 (L) 4.10 - 5.70 M/uL    HGB 10.7 (L) 12.1 - 17.0 g/dL    HCT 32.9 (L) 36.6 - 50.3 %    MCV 87.0 80.0 - 99.0 FL    MCH 28.3 26.0 - 34.0 PG    MCHC 32.5 30.0 - 36.5 g/dL    RDW 16.7 (H) 11.5 - 14.5 %    PLATELET 940 885 - 079 K/uL    MPV 10.2 8.9 - 12.9 FL    NRBC 0.0 0  WBC    ABSOLUTE NRBC 0.00 0.00 - 1.38 K/uL   METABOLIC PANEL, BASIC   Result Value Ref Range    Sodium 139 136 - 145 mmol/L    Potassium 4.3 3.5 - 5.1 mmol/L    Chloride 107 97 - 108 mmol/L    CO2 25 21 - 32 mmol/L    Anion gap 7 5 - 15 mmol/L    Glucose 139 (H) 65 - 100 mg/dL    BUN 29 (H) 6 - 20 MG/DL    Creatinine 2.49 (H) 0.70 - 1.30 MG/DL    BUN/Creatinine ratio 12 12 - 20      GFR est AA 31 (L) >60 ml/min/1.73m2    GFR est non-AA 25 (L) >60 ml/min/1.73m2    Calcium 8.3 (L) 8.5 - 10.1 MG/DL   TSH 3RD GENERATION   Result Value Ref Range    TSH 3.25 0.36 - 3.74 uIU/mL   GLUCOSE, POC   Result Value Ref Range    Glucose (POC) 142 (H) 65 - 100 mg/dL    Performed by Wanda Sheets    GLUCOSE, POC   Result Value Ref Range    Glucose (POC) 93 65 - 100 mg/dL    Performed by Mckinley Gonsales    GLUCOSE, POC   Result Value Ref Range    Glucose (POC) 133 (H) 65 - 100 mg/dL    Performed by Verna Zapien    GLUCOSE, POC   Result Value Ref Range    Glucose (POC) 77 65 - 100 mg/dL    Performed by Sofia Santos    GLUCOSE, POC   Result Value Ref Range    Glucose (POC) 83 65 - 100 mg/dL    Performed by Rene Mclaughlin    GLUCOSE, POC   Result Value Ref Range    Glucose (POC) 143 (H) 65 - 100 mg/dL    Performed by Larisa Gibbs    GLUCOSE, POC   Result Value Ref Range    Glucose (POC) 114 (H) 65 - 100 mg/dL    Performed by FERNY [address]    GLUCOSE, POC   Result Value Ref Range    Glucose (POC) 155 (H) 65 - 100 mg/dL    Performed by FERNY [facility]    GLUCOSE, POC   Result Value Ref Range    Glucose (POC) 64 (L) 65 - 100 mg/dL    Performed by Rafi Alex    GLUCOSE, POC   Result Value Ref Range    Glucose (POC) 68 65 - 100 mg/dL    Performed by Rafi Alex    GLUCOSE, POC   Result Value Ref Range    Glucose (POC) 102 (H) 65 - 100 mg/dL    Performed by Mahsa Kaufman    GLUCOSE, POC   Result Value Ref Range    Glucose (POC) 115 (H) 65 - 100 mg/dL    Performed by Rafi Alex    GLUCOSE, POC   Result Value Ref Range    Glucose (POC) 75 65 - 100 mg/dL    Performed by [facility] Rickia    GLUCOSE, POC   Result Value Ref Range    Glucose (POC) 73 65 - 100 mg/dL    Performed by [facility] Rickia    GLUCOSE, POC   Result Value Ref Range    Glucose (POC) 109 (H) 65 - 100 mg/dL    Performed by [facility] Rickia    GLUCOSE, POC   Result Value Ref Range    Glucose (POC) 167 (H) 65 - 100 mg/dL    Performed by [facility] Rickia    GLUCOSE, POC   Result Value Ref Range    Glucose (POC) 106 (H) 65 - 100 mg/dL    Performed by [facility] Citlaly    GLUCOSE, POC   Result Value Ref Range    Glucose (POC) 131 (H) 65 - 100 mg/dL    Performed by [facility]    GLUCOSE, POC   Result Value Ref Range    Glucose (POC) 193 (H) 65 - 100 mg/dL    Performed by Perez Fair    GLUCOSE, POC   Result Value Ref Range    Glucose (POC) 158 (H) 65 - 100 mg/dL    Performed by Raimundo Meals    GLUCOSE, POC   Result Value Ref Range    Glucose (POC) 103 (H) 65 - 100 mg/dL    Performed by Raimundo Meals    GLUCOSE, POC   Result Value Ref Range Glucose (POC) 90 65 - 100 mg/dL    Performed by [address], POC   Result Value Ref Range    Glucose (POC) 182 (H) 65 - 100 mg/dL    Performed by [address], POC   Result Value Ref Range    Glucose (POC) 79 65 - 100 mg/dL    Performed by Justine Tyler    GLUCOSE, POC   Result Value Ref Range    Glucose (POC) 86 65 - 100 mg/dL    Performed by Rene Mclaughlin    GLUCOSE, POC   Result Value Ref Range    Glucose (POC) 119 (H) 65 - 100 mg/dL    Performed by Catina Hayes    GLUCOSE, POC   Result Value Ref Range    Glucose (POC) 102 (H) 65 - 100 mg/dL    Performed by [id number], POC   Result Value Ref Range    Glucose (POC) 150 (H) 65 - 100 mg/dL    Performed by Jacky Mack    GLUCOSE, POC   Result Value Ref Range    Glucose (POC) 110 (H) 65 - 100 mg/dL    Performed by Alvina Barber    GLUCOSE, POC   Result Value Ref Range    Glucose (POC) 108 (H) 65 - 100 mg/dL    Performed by Alvina Barber    GLUCOSE, POC   Result Value Ref Range    Glucose (POC) 73 65 - 100 mg/dL    Performed by [facility] Rickia    GLUCOSE, POC   Result Value Ref Range    Glucose (POC) 75 65 - 100 mg/dL    Performed by [facility] Rickia    GLUCOSE, POC   Result Value Ref Range    Glucose (POC) 135 (H) 65 - 100 mg/dL    Performed by Jimenez Duff    GLUCOSE, POC   Result Value Ref Range    Glucose (POC) 116 (H) 65 - 100 mg/dL    Performed by [facility] [place]    GLUCOSE, POC   Result Value Ref Range    Glucose (POC) 172 (H) 65 - 100 mg/dL    Performed by Alvina Barber    GLUCOSE, POC   Result Value Ref Range    Glucose (POC) 150 (H) 65 - 100 mg/dL    Performed by Alvina Barber    GLUCOSE, POC   Result Value Ref Range    Glucose (POC) 85 65 - 100 mg/dL    Performed by SELAM JOHNSTON [facility]  SARAB[place]IT    GLUCOSE, POC   Result Value Ref Range    Glucose (POC) 142 (H) 65 - 100 mg/dL    Performed by Alvina Barber    GLUCOSE, POC   Result Value Ref Range    Glucose (POC) 101 (H) 65 - 100 mg/dL    Performed by TOMEKA ROACH    GLUCOSE, POC Result Value Ref Range    Glucose (POC) 73 65 - 100 mg/dL    Performed by Garon Second    GLUCOSE, POC   Result Value Ref Range    Glucose (POC) 103 (H) 65 - 100 mg/dL    Performed by Garon Second    GLUCOSE, POC   Result Value Ref Range    Glucose (POC) 178 (H) 65 - 100 mg/dL    Performed by Garon Second    GLUCOSE, POC   Result Value Ref Range    Glucose (POC) 84 65 - 100 mg/dL    Performed by [employer] Second    GLUCOSE, POC   Result Value Ref Range    Glucose (POC) 116 (H) 65 - 100 mg/dL    Performed by Garon Second    GLUCOSE, POC   Result Value Ref Range    Glucose (POC) 146 (H) 65 - 100 mg/dL    Performed by Josue Willow [place]    GLUCOSE, POC   Result Value Ref Range    Glucose (POC) 146 (H) 65 - 100 mg/dL    Performed by Kennedy Wilder    GLUCOSE, POC   Result Value Ref Range    Glucose (POC) 126 (H) 65 - 100 mg/dL    Performed by Marlene Coup    GLUCOSE, POC   Result Value Ref Range    Glucose (POC) 146 (H) 65 - 100 mg/dL    Performed by Marlene Coup    GLUCOSE, POC   Result Value Ref Range    Glucose (POC) 127 (H) 65 - 100 mg/dL    Performed by Josue [place]    GLUCOSE, POC   Result Value Ref Range    Glucose (POC) 127 (H) 65 - 100 mg/dL    Performed by Josue [place]    GLUCOSE, POC   Result Value Ref Range    Glucose (POC) 77 65 - 100 mg/dL    Performed by Garon Second    GLUCOSE, POC   Result Value Ref Range    Glucose (POC) 86 65 - 100 mg/dL    Performed by Garon Second    GLUCOSE, POC   Result Value Ref Range    Glucose (POC) 117 (H) 65 - 100 mg/dL    Performed by Marlene Coup    GLUCOSE, POC   Result Value Ref Range    Glucose (POC) 128 (H) 65 - 100 mg/dL    Performed by Josue [place]    GLUCOSE, POC   Result Value Ref Range    Glucose (POC) 91 65 - 100 mg/dL    Performed by Pilar Henriquez    GLUCOSE, POC   Result Value Ref Range    Glucose (POC) 125 (H) 65 - 100 mg/dL    Performed by Yin Foster    GLUCOSE, POC   Result Value Ref Range    Glucose (POC) 143 (H) 65 - 100 mg/dL Performed by Elaine Pham    GLUCOSE, POC   Result Value Ref Range    Glucose (POC) 67 65 - 100 mg/dL    Performed by [address] [place] [address] “B” [place], POC   Result Value Ref Range    Glucose (POC) 84 65 - 100 mg/dL    Performed by [address] [place], [place] [address]B” [place], POC   Result Value Ref Range    Glucose (POC) 96 65 - 100 mg/dL    Performed by SELAM JOHNSTON [facility]  SARAB[place]IT    GLUCOSE, POC   Result Value Ref Range    Glucose (POC) 128 (H) 65 - 100 mg/dL    Performed by [address] POC   Result Value Ref Range    Glucose (POC) 133 (H) 65 - 100 mg/dL    Performed by [address] POC   Result Value Ref Range    Glucose (POC) 88 65 - 100 mg/dL    Performed by Douglas Blair    GLUCOSE, POC   Result Value Ref Range    Glucose (POC) 107 (H) 65 - 100 mg/dL    Performed by [facility] [place]    GLUCOSE, POC   Result Value Ref Range    Glucose (POC) 164 (H) 65 - 100 mg/dL    Performed by Walter Edmond    GLUCOSE, POC   Result Value Ref Range    Glucose (POC) 150 (H) 65 - 100 mg/dL    Performed by Walter Edmond    GLUCOSE, POC   Result Value Ref Range    Glucose (POC) 85 65 - 100 mg/dL    Performed by [address], POC   Result Value Ref Range    Glucose (POC) 96 65 - 100 mg/dL    Performed by ORLY KRAUSE    GLUCOSE, POC   Result Value Ref Range    Glucose (POC) 105 (H) 65 - 100 mg/dL    Performed by Ermias Cornejo    GLUCOSE, POC   Result Value Ref Range    Glucose (POC) 140 (H) 65 - 100 mg/dL    Performed by Ermias Cornejo    GLUCOSE, POC   Result Value Ref Range    Glucose (POC) 79 65 - 100 mg/dL    Performed by Carlos Dire    GLUCOSE, POC   Result Value Ref Range    Glucose (POC) 84 65 - 100 mg/dL    Performed by Carlos Dire    GLUCOSE, POC   Result Value Ref Range    Glucose (POC) 104 (H) 65 - 100 mg/dL    Performed by Carlos Dire    GLUCOSE, POC   Result Value Ref Range    Glucose (POC) 141 (H) 65 - 100 mg/dL    Performed by [address], POC Result Value Ref Range    Glucose (POC) 137 (H) 65 - 100 mg/dL    Performed by Rene Mclaughlin    GLUCOSE, POC   Result Value Ref Range    Glucose (POC) 75 65 - 100 mg/dL    Performed by [address], POC   Result Value Ref Range    Glucose (POC) 83 65 - 100 mg/dL    Performed by [address], POC   Result Value Ref Range    Glucose (POC) 88 65 - 100 mg/dL    Performed by [address], POC   Result Value Ref Range    Glucose (POC) 122 (H) 65 - 100 mg/dL    Performed by [address], POC   Result Value Ref Range    Glucose (POC) 113 (H) 65 - 100 mg/dL    Performed by Lilian Smith    GLUCOSE, POC   Result Value Ref Range    Glucose (POC) 159 (H) 65 - 100 mg/dL    Performed by Lilian Smith    GLUCOSE, POC   Result Value Ref Range    Glucose (POC) 81 65 - 100 mg/dL    Performed by Medina Powell    GLUCOSE, POC   Result Value Ref Range    Glucose (POC) 151 (H) 65 - 100 mg/dL    Performed by Medina Powell    GLUCOSE, POC   Result Value Ref Range    Glucose (POC) 119 (H) 65 - 100 mg/dL    Performed by Ermias Cornejo    GLUCOSE, POC   Result Value Ref Range    Glucose (POC) 111 (H) 65 - 100 mg/dL    Performed by Ermias Cornejo    GLUCOSE, POC   Result Value Ref Range    Glucose (POC) 76 65 - 100 mg/dL    Performed by Soni Davey    GLUCOSE, POC   Result Value Ref Range    Glucose (POC) 95 65 - 100 mg/dL    Performed by Soni Davey    GLUCOSE, POC   Result Value Ref Range    Glucose (POC) 98 65 - 100 mg/dL    Performed by Roque Dover    GLUCOSE, POC   Result Value Ref Range    Glucose (POC) 111 (H) 65 - 100 mg/dL    Performed by [address] POC   Result Value Ref Range    Glucose (POC) 94 65 - 100 mg/dL    Performed by Joao Andrea    GLUCOSE, POC   Result Value Ref Range    Glucose (POC) 74 65 - 100 mg/dL    Performed by [facility] [place]    GLUCOSE, POC   Result Value Ref Range    Glucose (POC) 85 65 - 100 mg/dL    Performed by [facility] Sonu    GLUCOSE, POC   Result Value Ref Range    Glucose (POC) 88 65 - 100 mg/dL    Performed by [facility] [place]    GLUCOSE, POC   Result Value Ref Range    Glucose (POC) 137 (H) 65 - 100 mg/dL    Performed by [address], POC   Result Value Ref Range    Glucose (POC) 210 (H) 65 - 100 mg/dL    Performed by Zoey Oren Corrales, POC   Result Value Ref Range    Glucose (POC) 74 65 - 100 mg/dL    Performed by [facility] [place]    GLUCOSE, POC   Result Value Ref Range    Glucose (POC) 109 (H) 65 - 100 mg/dL    Performed by [facility] Rickia    GLUCOSE, POC   Result Value Ref Range    Glucose (POC) 188 (H) 65 - 100 mg/dL    Performed by Any Singh    GLUCOSE, POC   Result Value Ref Range    Glucose (POC) 173 (H) 65 - 100 mg/dL    Performed by TOMEKA ROACH    GLUCOSE, POC   Result Value Ref Range    Glucose (POC) 71 65 - 100 mg/dL    Performed by Tunde Arm    GLUCOSE, POC   Result Value Ref Range    Glucose (POC) 86 65 - 100 mg/dL    Performed by Tunde Arm    GLUCOSE, POC   Result Value Ref Range    Glucose (POC) 139 (H) 65 - 100 mg/dL    Performed by Tunde Shiraz    GLUCOSE, POC   Result Value Ref Range    Glucose (POC) 168 (H) 65 - 100 mg/dL    Performed by [address] POC   Result Value Ref Range    Glucose (POC) 130 (H) 65 - 100 mg/dL    Performed by [address] POC   Result Value Ref Range    Glucose (POC) 100 65 - 100 mg/dL    Performed by Roberta Hooper    GLUCOSE, POC   Result Value Ref Range    Glucose (POC) 130 (H) 65 - 100 mg/dL    Performed by [employer]    GLUCOSE, POC   Result Value Ref Range    Glucose (POC) 70 65 - 100 mg/dL    Performed by MARYJO BARAHONA    GLUCOSE, POC   Result Value Ref Range    Glucose (POC) 98 65 - 100 mg/dL    Performed by Aguila Machuca    GLUCOSE, POC   Result Value Ref Range    Glucose (POC) 102 (H) 65 - 100 mg/dL    Performed by MARYJO BARAHONA    GLUCOSE, POC   Result Value Ref Range    Glucose (POC) 159 (H) 65 - 100 mg/dL    Performed by Patrica Santillan    GLUCOSE, POC   Result Value Ref Range    Glucose (POC) 80 65 - 100 mg/dL    Performed by [employer] [id number], POC   Result Value Ref Range    Glucose (POC) 162 (H) 65 - 100 mg/dL    Performed by [place] [employer]    GLUCOSE, POC   Result Value Ref Range    Glucose (POC) 96 65 - 100 mg/dL    Performed by [employer] [id number], POC   Result Value Ref Range    Glucose (POC) 133 (H) 65 - 100 mg/dL    Performed by [facility] Cit[facility]    GLUCOSE, POC   Result Value Ref Range    Glucose (POC) 84 65 - 100 mg/dL    Performed by Arcadio [employer]    GLUCOSE, POC   Result Value Ref Range    Glucose (POC) 96 65 - 100 mg/dL    Performed by [employer] [address] POC   Result Value Ref Range    Glucose (POC) 118 (H) 65 - 100 mg/dL    Performed by [employer] [address] POC   Result Value Ref Range    Glucose (POC) 112 (H) 65 - 100 mg/dL    Performed by Luisa Rey    GLUCOSE, POC   Result Value Ref Range    Glucose (POC) 108 (H) 65 - 100 mg/dL    Performed by [employer]    GLUCOSE, POC   Result Value Ref Range    Glucose (POC) 70 65 - 100 mg/dL    Performed by MARYJO BARAHONA    GLUCOSE, POC   Result Value Ref Range    Glucose (POC) 104 (H) 65 - 100 mg/dL    Performed by MARYJO BARAHONA    GLUCOSE, POC   Result Value Ref Range    Glucose (POC) 115 (H) 65 - 100 mg/dL    Performed by MARYJO BARAHONA    GLUCOSE, POC   Result Value Ref Range    Glucose (POC) 136 (H) 65 - 100 mg/dL    Performed by [employer]    GLUCOSE, POC   Result Value Ref Range    Glucose (POC) 164 (H) 65 - 100 mg/dL    Performed by [employer]    GLUCOSE, POC   Result Value Ref Range    Glucose (POC) 85 65 - 100 mg/dL    Performed by He Falk, POC   Result Value Ref Range    Glucose (POC) 75 65 - 100 mg/dL    Performed by [employer] [address] POC   Result Value Ref Range    Glucose (POC) 76 65 - 100 mg/dL    Performed by [employer] [address] POC   Result Value Ref Range    Glucose (POC) 102 (H) 65 - 100 mg/dL    Performed by [address] POC   Result Value Ref Range    Glucose (POC) 103 (H) 65 - 100 mg/dL    Performed by [address], POC   Result Value Ref Range    Glucose (POC) 73 65 - 100 mg/dL    Performed by [place] [address] [place]    GLUCOSE, POC   Result Value Ref Range    Glucose (POC) 84 65 - 100 mg/dL    Performed by [address]    GLUCOSE, POC   Result Value Ref Range    Glucose (POC) 97 65 - 100 mg/dL    Performed by [facility]    GLUCOSE, POC   Result Value Ref Range    Glucose (POC) 121 (H) 65 - 100 mg/dL    Performed by [facility]    GLUCOSE, POC   Result Value Ref Range    Glucose (POC) 192 (H) 65 - 100 mg/dL    Performed by Estrella Henao    GLUCOSE, POC   Result Value Ref Range    Glucose (POC) 70 65 - 100 mg/dL    Performed by [facility]    GLUCOSE, POC   Result Value Ref Range    Glucose (POC) 88 65 - 100 mg/dL    Performed by [facility]    GLUCOSE, POC   Result Value Ref Range    Glucose (POC) 87 65 - 100 mg/dL    Performed by [facility]    GLUCOSE, POC   Result Value Ref Range    Glucose (POC) 109 (H) 65 - 100 mg/dL    Performed by [facility]    GLUCOSE, POC   Result Value Ref Range    Glucose (POC) 71 65 - 100 mg/dL    Performed by [facility]    GLUCOSE, POC   Result Value Ref Range    Glucose (POC) 80 65 - 100 mg/dL    Performed by [facility]    GLUCOSE, POC   Result Value Ref Range    Glucose (POC) 186 (H) 65 - 100 mg/dL    Performed by Alva Null        Immunizations administered during this encounter:   Immunization History   Administered Date(s) Administered    Pneumococcal Polysaccharide (PPSV-23) 01/26/2015       Screening for Metabolic Disorders for Patients on Antipsychotic Medications  (Data obtained from the EMR)    Estimated Body Mass Index  Estimated body mass index is 28.81 kg/(m^2) as calculated from the following:    Height as of this encounter: 5' 11\" (1.803 m). Weight as of this encounter: 93.7 kg (206 lb 9.6 oz). Vital Signs/Blood Pressure  Visit Vitals    /69    Pulse 71    Temp 97.6 °F (36.4 °C)    Resp 16    Ht 5' 11\" (1.803 m)    Wt 93.7 kg (206 lb 9.6 oz)    SpO2 96%    BMI 28.81 kg/m2       Blood Glucose/Hemoglobin A1c  Lab Results   Component Value Date/Time    Glucose 139 (H) 2018 08:14 PM    Glucose (POC) 186 (H) 2018 11:31 AM       Lab Results   Component Value Date/Time    Hemoglobin A1c 4.5 2018 02:21 AM        Lipid Panel  Lab Results   Component Value Date/Time    Cholesterol, total 197 2018 02:21 AM    HDL Cholesterol 49 2018 02:21 AM    LDL, calculated 127.2 (H) 2018 02:21 AM    Triglyceride 104 2018 02:21 AM    CHOL/HDL Ratio 4.0 2018 02:21 AM        Discharge Diagnosis: Depression (ICD-10-CM: F32.9)    Discharge Plan: Patient discharged via Medicaid wheelchair Lisa Wilder to a hotel to meet his family. DISCHARGE SUMMARY    NAME:Hernan Garrido  : 1940  MRN: [id number]    The patient Alida Cloud exhibits the ability to control behavior in a less restrictive environment. Patient's level of functioning is improving. No assaultive/destructive behavior has been observed for the past 24 hours. No suicidal/homicidal threat or behavior has been observed for the past 24 hours. There is no evidence of serious medication side effects. Patient has not been in physical or protective restraints for at least the past 24 hours. If weapons involved, how are they secured? No weapons involved. Is patient aware of and in agreement with discharge plan? Yes    Arrangements for medication:  Prescriptions filled for patient. Referral for substance abuse treatment? Patient is not using substances/Not applicable. Referral for smoking cessation needed? Yes, refused.     Copy of discharge instructions to provider?:  [address] for transportation home:  Medicaid taxi    Keep all follow up appointments as scheduled, continue to take prescribed medications per physician instructions. Mental health crisis number:  [id number] or your local mental health crisis line number at [phone]. Discharge Medication List and Instructions:   Current Discharge Medication List      START taking these medications    Details   LORazepam (ATIVAN) 0.5 mg tablet Take 1 Tab by mouth nightly as needed for Anxiety for up to 5 days. Max Daily Amount: 0.5 mg. Indications: anxiety  Qty: 5 Tab, Refills: 0    Associated Diagnoses: Ischemic pain of foot, unspecified laterality      doxazosin (CARDURA) 4 mg tablet Take 1 Tab by mouth nightly. Indications: hypertension  Qty: 30 Tab, Refills: 0      buPROPion SR (WELLBUTRIN SR) 150 mg SR tablet Take 1 Tab by mouth daily. Indications: ANXIETY WITH DEPRESSION  Qty: 30 Tab, Refills: 0         CONTINUE these medications which have CHANGED    Details   mirtazapine (REMERON) 30 mg tablet Take 1 Tab by mouth nightly. Indications: major depressive disorder  Qty: 30 Tab, Refills: 0      hydrALAZINE (APRESOLINE) 100 mg tablet Take 1 Tab by mouth three (3) times daily. Indications: chronic heart failure  Qty: 90 Tab, Refills: 0      ferrous sulfate 325 mg (65 mg iron) tablet Take 1 Tab by mouth two (2) times daily (with meals). Indications: Iron Deficiency Anemia  Qty: 60 Tab, Refills: 0      carvedilol (COREG) 3.125 mg tablet Take 1 Tab by mouth two (2) times daily (with meals). Indications: chronic heart failure  Qty: 60 Tab, Refills: 0      atorvastatin (LIPITOR) 40 mg tablet Take 1 Tab by mouth daily. Indications: hyperlipidemia  Qty: 30 Tab, Refills: 0      amLODIPine (NORVASC) 10 mg tablet Take 1 Tab by mouth daily. Indications: hypertension  Qty: 30 Tab, Refills: 0         CONTINUE these medications which have NOT CHANGED    Details   aspirin delayed-release 81 mg tablet Take 1 Tab by mouth daily.          STOP taking these medications       nicotine (NICODERM CQ) 21 mg/24 hr Comments:   Reason for Stopping:               Unresulted Labs     None        To obtain results of studies pending at discharge, please contact [phone]. Follow-up Information     Follow up With Details Comments [facility] on 2/23/2018 Walk-in Monday between 8:00am and 2:00pm, Tuesday through Thursday between 8:00am and 4:00pm, and Friday between 8:00am and 2:00pm to enroll in mental health treatment, housing, and case management services. [address]  [phone]          Advanced Directive:   Does the patient have an appointed surrogate decision maker? Yes  Does the patient have a Medical Advance Directive? No  Does the patient have a Psychiatric Advance Directive? No  If the patient does not have a surrogate or Medical Advance Directive AND Psychiatric Advance Directive, the patient was offered information on these advance directives Yes and Patient declined to complete    Patient Instructions: Please continue all medications until otherwise directed by physician. What to do at Home:  Recommended activity: Activity as tolerated,      If you experience any of the following symptoms thoughts of harming self, feeling overwhelmed with hopelessness and lack of control over your life, please follow up with your local crisis number and assigned staff.     *  Please give a list of your current medications to your Primary Care Provider.     *  Please update this list whenever your medications are discontinued, doses are      changed, or new medications (including over-the-counter products) are added.     *  Please carry medication information at all times in case of emergency situations. Tobacco Cessation Discharge Plan:   Is the patient a smoker and needs referral for smoking cessation? Yes  Patient referred to the following for smoking cessation with an appointment?  Refused Patient was offered medication to assist with smoking cessation at discharge? Refused  Was education for smoking cessation added to the discharge instructions? Yes    Alcohol/Substance Abuse Discharge Plan:   Does the patient have a history of substance/alcohol abuse and requires a referral for treatment? No  Patient referred to the following for substance/alcohol abuse treatment with an appointment? Not applicable  Patient was offered medication to assist with alcohol cessation at discharge? Not applicable  Was education for substance/alcohol abuse added to discharge instructions? Not applicable    Patient discharged to Home; discussed with patient/caregiver and provided to the patient/caregiver either in hard copy or electronically.

## 2018-03-01 NOTE — PROGRESS NOTES
Problem: Falls - Risk of  Goal: *Absence of Falls  Document Bello Fall Risk and appropriate interventions in the flowsheet. Outcome: Progressing Towards Goal  Fall Risk Interventions:  Mobility Interventions: Assess mobility with egress test, Bed/chair exit alarm    Mentation Interventions: More frequent rounding, Reorient patient    Medication Interventions: Teach patient to arise slowly, Bed/chair exit alarm    Elimination Interventions: Bed/chair exit alarm    History of Falls Interventions: Bed/chair exit alarm        Problem: Depressed Mood (Adult/Pediatric)  Goal: *STG: Participates in treatment plan  Outcome: Progressing Towards Goal  Review meds, out on unit social w peers and staff. Mood and affect decrease lability and irritability. Demonstrates appropriate behaviors while on milieu. Demonstrates ability to follow staff direction and ability to follow unit routine.  D/c is planned for today

## 2018-03-01 NOTE — BH NOTES
Behavioral Health Interdisciplinary Rounds     Patient Name: Sharlene Harding  Age: 78 y.o. Room/Bed:  740/02  Primary Diagnosis: Depression   Admission Status: Voluntary Commitment     Readmission within 30 days: no  Power of  in place: yes, daughter  Patient requires a blocked bed: no          Reason for blocked bed: n/a    VTE Prophylaxis: Yes / ASA  Flu vaccine given : no , declined  Mobility needs: yes, fall risk   Nutritional Plan: no  Consults: n/a         Labs/Testing due today?: no    Sleep hours:        Participation in Care/Groups:  no  Medication Compliant?: Yes  PRNS (last 24 hours): None    Restraints (last 24 hours):  no  Substance Abuse:  no  CIWA (range last 24 hours): 0 COWS (range last 24 hours): 0  Alcohol screening (AUDIT) completed -  AUDIT Score: 0  If applicable, date SBIRT discussed in treatment team AND documented: n/a  Tobacco - patient is a smoker: no   Date tobacco education completed by RN: n/a  24 hour chart check complete: yes     Patient goal(s) for today:   Treatment team focus/goals:   Progress note     LOS:  36  Expected LOS:     Financial concerns/prescription coverage:    Date of last family contact:       Family requesting physician contact today:    Discharge plan:   Guns in the home:         Outpatient provider(s):     Participating treatment team members:  DARRICK Colon; Dr. Lauren Mckinley MD;

## 2018-03-01 NOTE — PROGRESS NOTES
Pharmacist Discharge Medication Reconciliation    Discharging Provider: Dr. Jonelle Raines PMH:   Past Medical History:   Diagnosis Date    Arthritis     CAD (coronary artery disease) 2007    CKD (chronic kidney disease), stage III     DM type 2 causing renal disease ([facility]     DVT (deep venous thrombosis) ([facility])     Hx of DVT:  Xarelto stopped due to GI bleed. S/P IVC filter.  Gait abnormality     GI bleed     Heart murmur     Hepatitis C     History of blood transfusion     Hypercholesterolemia     Hypertension 11/7/2014    Neuropathy     Non compliance w medication regimen     Peripheral vascular disease ([facility] 11/7/2014    A. S/P Right SFA POBA and tibial atherectomy (2/4/13).  PUD (peptic ulcer disease) 2007    Unspecified sleep apnea     never used cpap     Chief Complaint for this Admission: No chief complaint on file. Allergies: Tetracycline    Discharge Medications:   Current Discharge Medication List        START taking these medications    Details   LORazepam (ATIVAN) 0.5 mg tablet Take 1 Tab by mouth nightly as needed for Anxiety for up to 5 days. Max Daily Amount: 0.5 mg. Indications: anxiety  Qty: 5 Tab, Refills: 0    Associated Diagnoses: Ischemic pain of foot, unspecified laterality      doxazosin (CARDURA) 4 mg tablet Take 1 Tab by mouth nightly. Indications: hypertension  Qty: 30 Tab, Refills: 0      buPROPion SR (WELLBUTRIN SR) 150 mg SR tablet Take 1 Tab by mouth daily. Indications: ANXIETY WITH DEPRESSION  Qty: 30 Tab, Refills: 0           CONTINUE these medications which have CHANGED    Details   mirtazapine (REMERON) 30 mg tablet Take 1 Tab by mouth nightly. Indications: major depressive disorder  Qty: 30 Tab, Refills: 0      hydrALAZINE (APRESOLINE) 100 mg tablet Take 1 Tab by mouth three (3) times daily. Indications: chronic heart failure  Qty: 90 Tab, Refills: 0      ferrous sulfate 325 mg (65 mg iron) tablet Take 1 Tab by mouth two (2) times daily (with meals). Indications: Iron Deficiency Anemia  Qty: 60 Tab, Refills: 0      carvedilol (COREG) 3.125 mg tablet Take 1 Tab by mouth two (2) times daily (with meals). Indications: chronic heart failure  Qty: 60 Tab, Refills: 0      atorvastatin (LIPITOR) 40 mg tablet Take 1 Tab by mouth daily. Indications: hyperlipidemia  Qty: 30 Tab, Refills: 0      amLODIPine (NORVASC) 10 mg tablet Take 1 Tab by mouth daily. Indications: hypertension  Qty: 30 Tab, Refills: 0           CONTINUE these medications which have NOT CHANGED    Details   aspirin delayed-release 81 mg tablet Take 1 Tab by mouth daily.            STOP taking these medications       nicotine (NICODERM CQ) 21 mg/24 hr Comments:   Reason for Stopping:             The patient's chart, MAR and AVS were reviewed by Diana Sacks, PHARMD.

## 2018-03-01 NOTE — DIABETES MGMT
DTC Progress Note    Recommendations/ Comments:  Chart reviewed due to hypoglycemia. Pt with a blood sugar of 71 mg/dl this morning. Pt's renal status noted. If appropriate, please document po intake. Current hospital DM medication: correction scale Humalog, high sensitivity. Chart reviewed on [facility]. Patient is a 78 y.o. male with known diabetes on no diabetes medications at home. A1c:   Lab Results   Component Value Date/Time    Hemoglobin A1c 4.5 01/21/2018 02:21 AM    Hemoglobin A1c 4.6 07/14/2017 04:42 AM       Recent Glucose Results:   Lab Results   Component Value Date/Time    GLUCPOC 186 (H) 03/01/2018 11:31 AM    GLUCPOC 80 03/01/2018 08:19 AM    GLUCPOC 71 03/01/2018 08:02 AM        Lab Results   Component Value Date/Time    Creatinine 2.49 (H) 01/28/2018 08:14 PM     Estimated Creatinine Clearance: 30 mL/min (based on Cr of 2.49). Active Orders   Diet    DIET REGULAR        PO intake: No data found. Will continue to follow as needed.     Thank you  Tal Del Angel RD, CDE

## 2018-03-01 NOTE — BH NOTES
1559    Discharge expected this evening. 1648:    Lucero (patient's fiance') called and spoke to this RN. Stated the following:    \"I was supposed to get a room and get him today. They would not rent it to me. I can't get no room til Saturday so I can put his money with my money and get the room. So I can't get him til Saturday. \"     Alfredo Berry spoke to patient and informed him of this. Patient no longer being discharged.

## 2018-03-01 NOTE — BH NOTES
GROUP THERAPY PROGRESS NOTE    Zane Severe marginally participated in a morning Process Group on the Geriatric Unit, with a focus identifying feelings, planning for the day, and singing. Group time: 45 minutes. Personal goal for participation: To increase the capacity to shift ones mood, prepare for the day, and share in group singing. Goal orientation: The patient will be able to prepare for the day through group singing. Group therapy participation: When prompted, this patient minimally participated in the group. Therapeutic interventions reviewed and discussed: The group members were introduce themselves by first names and participate in group singing as a way to increase their oxygen and blood flow and begin their day on a positive note. They were also asked to join in singing several songs. Impression of participation: The patient acknowledged the undersigned both at the beginning of the group and at it's conclusion. He appeared to be alert and generally oriented. He expressed no current SI/HI and displayed no overt psychosis. He was not complaining at all this morning during the group session. His affect was flat and his mood reflected his affect. He did not express any dysphoria.

## 2018-03-02 VITALS
TEMPERATURE: 97.9 F | HEIGHT: 71 IN | SYSTOLIC BLOOD PRESSURE: 161 MMHG | WEIGHT: 206.6 LBS | RESPIRATION RATE: 16 BRPM | OXYGEN SATURATION: 100 % | BODY MASS INDEX: 28.92 KG/M2 | HEART RATE: 66 BPM | DIASTOLIC BLOOD PRESSURE: 69 MMHG

## 2018-03-02 LAB
GLUCOSE BLD STRIP.AUTO-MCNC: 109 MG/DL (ref 65–100)
GLUCOSE BLD STRIP.AUTO-MCNC: 113 MG/DL (ref 65–100)
GLUCOSE BLD STRIP.AUTO-MCNC: 135 MG/DL (ref 65–100)
SERVICE CMNT-IMP: ABNORMAL

## 2018-03-02 PROCEDURE — 74011250637 HC RX REV CODE- 250/637: Performed by: FAMILY MEDICINE

## 2018-03-02 PROCEDURE — 74011250637 HC RX REV CODE- 250/637: Performed by: PSYCHIATRY & NEUROLOGY

## 2018-03-02 PROCEDURE — 82962 GLUCOSE BLOOD TEST: CPT

## 2018-03-02 PROCEDURE — 74011250637 HC RX REV CODE- 250/637: Performed by: HOSPITALIST

## 2018-03-02 PROCEDURE — 74011250637 HC RX REV CODE- 250/637

## 2018-03-02 RX ADMIN — HYDRALAZINE HYDROCHLORIDE 100 MG: 50 TABLET, FILM COATED ORAL at 10:31

## 2018-03-02 RX ADMIN — CARVEDILOL 3.12 MG: 3.12 TABLET, FILM COATED ORAL at 10:36

## 2018-03-02 RX ADMIN — ACETAMINOPHEN 650 MG: 325 TABLET, FILM COATED ORAL at 01:31

## 2018-03-02 RX ADMIN — LORAZEPAM 0.25 MG: 0.5 TABLET ORAL at 13:58

## 2018-03-02 RX ADMIN — LORAZEPAM 0.25 MG: 0.5 TABLET ORAL at 10:36

## 2018-03-02 RX ADMIN — ATORVASTATIN CALCIUM 40 MG: 40 TABLET, FILM COATED ORAL at 10:32

## 2018-03-02 RX ADMIN — ASPIRIN 81 MG: 81 TABLET, COATED ORAL at 10:32

## 2018-03-02 RX ADMIN — FERROUS SULFATE TAB 325 MG (65 MG ELEMENTAL FE) 325 MG: 325 (65 FE) TAB at 10:36

## 2018-03-02 RX ADMIN — ZOLPIDEM TARTRATE 5 MG: 5 TABLET ORAL at 01:31

## 2018-03-02 RX ADMIN — AMLODIPINE BESYLATE 10 MG: 5 TABLET ORAL at 10:32

## 2018-03-02 RX ADMIN — BUPROPION HYDROCHLORIDE 150 MG: 150 TABLET, EXTENDED RELEASE ORAL at 10:32

## 2018-03-02 NOTE — BH NOTES
GROUP THERAPY PROGRESS NOTE    Jorge yuen participated in a morning Process Group on the Geriatric Unit, with a focus identifying feelings, planning for the day, and singing. Group time: 40 minutes. Personal goal for participation: To increase the capacity to shift ones mood, prepare for the day, and share in group singing. Goal orientation: The patient will be able to prepare for the day through group singing. Group therapy participation: When prompted, this patient marginally participated in the group. Therapeutic interventions reviewed and discussed: The group members were introduce themselves by first names and participate in group singing as a way to increase their oxygen and blood flow and begin their day on a positive note. They were also asked to join in singing several songs. Impression of participation: The patient sat by a supporting column in the room and neither sang nor contributed to the conversation. He was alert and sat through the complete session. He expressed no SI/HI and displayed no overt psychosis. His affect was restrained and his mood reflected his affect. He expressed no dysphoria this morning.

## 2018-03-02 NOTE — PROGRESS NOTES
Problem: Depressed Mood (Adult/Pediatric)  Goal: *STG: Participates in treatment plan  Outcome: Not Progressing Towards Goal  Pt is depressed, sad and anxious. Verbalizes he is ready to go be discharged however his fiance is not answering the phone. Explained to the  patient there is a discharge order for him and his fiance was not able to pick him yesterday and therefore he was not discharged.     Alert and Oriented X 3  Med/meal compliant

## 2018-03-02 NOTE — SENIOR SERVICES NOTE
Behavioral Health Interdisciplinary Rounds     Patient Name: Zane Severe  Age: 78 y.o. Room/Bed:  740/02  Primary Diagnosis: Depression   Admission Status: Voluntary Commitment     Readmission within 30 days: no  Power of  in place: yes, daughter  Patient requires a blocked bed: no          Reason for blocked bed: n/a    VTE Prophylaxis: Yes - ASA  Flu vaccine given : no - Declined  Mobility needs/Fall risk: yes    Nutritional Plan: no  Consults:          Labs/Testing due today?: no    Sleep hours: 5.25       Participation in Care/Groups:  yes  Medication Compliant?: Yes  PRNS (last 24 hours): Sleep Aid and Pain    Restraints (last 24 hours):  no  Substance Abuse:  no  CIWA (range last 24 hours):  COWS (range last 24 hours):   Alcohol screening (AUDIT) completed -  AUDIT Score: 0  If applicable, date SBIRT discussed in treatment team AND documented:   Tobacco - patient is a  Smoker: yes   Date tobacco education completed by RN: 2/17/18  24 hour chart check complete: yes     Patient goal(s) for today:   Treatment team focus/goals:   Progress note     LOS:  37  Expected LOS:     Financial concerns/prescription coverage:    Date of last family contact:       Family requesting physician contact today:    Discharge plan:   Guns in the home:         Outpatient provider(s):     Participating treatment team members:  Zane Severe, * (assigned SW),

## 2018-03-02 NOTE — BH NOTES
1530:  Patient received as he is sitting in the [place] chair in the hallway on the telephone - he hands the phone to oncoming staff to speak with his wife Rey. She advises writer to get patient ready to go as she is coming to get him. She is advised that we will look into his discharge. 1600:  Apparently - she was supposed to contact Social Work once she had a place for patient to go, and transportation would be arranged. She states now that she will come pick him up with his wheelchair. Dr. Shila Jaime called for Discharge Order and initial discharge charting worked on while awaiting her arrival.  Will continue to monitor. 1700:  Patient refused all scheduled medications at this time stating that he is leaving. 1740:  Still awaiting his pickup.

## 2018-03-02 NOTE — PROGRESS NOTES
Problem: Depressed Mood (Adult/Pediatric)  Goal: *STG: Participates in treatment plan  Outcome: Progressing Towards Goal  Patient did not get discharged today.

## 2018-03-02 NOTE — DISCHARGE INSTRUCTIONS
DISCHARGE SUMMARY    NAME:Hernan Wise  : 1940  MRN: [id number]    The patient Delfina Rosales exhibits the ability to control behavior in a less restrictive environment. Patient's level of functioning is improving. No assaultive/destructive behavior has been observed for the past 24 hours. No suicidal/homicidal threat or behavior has been observed for the past 24 hours. There is no evidence of serious medication side effects. Patient has not been in physical or protective restraints for at least the past 24 hours. If weapons involved, how are they secured? No weapons involved. Is patient aware of and in agreement with discharge plan? Yes    Arrangements for medication:  Prescriptions filled for patient. Referral for substance abuse treatment? Patient is not using substances/Not applicable. Referral for smoking cessation needed? Yes, refused. Copy of discharge instructions to provider?:  [address] for transportation home:  Medicaid taxi    Keep all follow up appointments as scheduled, continue to take prescribed medications per physician instructions. Mental health crisis number:  [phone] or your local mental health crisis line number at [phone]. DISCHARGE SUMMARY from Nurse    PATIENT INSTRUCTIONS:  What to do at Home:  Recommended activity: Activity as tolerated,     If you experience any of the following symptoms thoughts of harming self, feeling overwhelmed with hopelessness and lack of control over your life, please follow up with your local crisis number and assigned staff. *  Please give a list of your current medications to your Primary Care Provider. *  Please update this list whenever your medications are discontinued, doses are      changed, or new medications (including over-the-counter products) are added. *  Please carry medication information at all times in case of emergency situations.     These are general instructions for a healthy lifestyle:    No smoking/ No tobacco products/ Avoid exposure to second hand smoke  Surgeon General's Warning:  Quitting smoking now greatly reduces serious risk to your health. Obesity, smoking, and sedentary lifestyle greatly increases your risk for illness    A healthy diet, regular physical exercise & weight monitoring are important for maintaining a healthy lifestyle    You may be retaining fluid if you have a history of heart failure or if you experience any of the following symptoms:  Weight gain of 3 pounds or more overnight or 5 pounds in a week, increased swelling in our hands or feet or shortness of breath while lying flat in bed. Please call your doctor as soon as you notice any of these symptoms; do not wait until your next office visit. Recognize signs and symptoms of STROKE:    F-face looks uneven    A-arms unable to move or move unevenly    S-speech slurred or non-existent    T-time-call 911 as soon as signs and symptoms begin-DO NOT go       Back to bed or wait to see if you get better-TIME IS BRAIN. Warning Signs of HEART ATTACK     Call 911 if you have these symptoms:   Chest discomfort. Most heart attacks involve discomfort in the center of the chest that lasts more than a few minutes, or that goes away and comes back. It can feel like uncomfortable pressure, squeezing, fullness, or pain.  Discomfort in other areas of the upper body. Symptoms can include pain or discomfort in one or both arms, the back, neck, jaw, or stomach.  Shortness of breath with or without chest discomfort.  Other signs may include breaking out in a cold sweat, nausea, or lightheadedness. Don't wait more than five minutes to call 911 - MINUTES MATTER! Fast action can save your life. Calling 911 is almost always the fastest way to get lifesaving treatment.  Emergency Medical Services staff can begin treatment when they arrive -- up to an hour sooner than if someone gets to the hospital by car. The discharge information has been reviewed with the patient. The patient verbalized understanding. Discharge medications reviewed with the patient and appropriate educational materials and side effects teaching were provided.   ___________________________________________________________________________________________________________________________________

## 2018-03-02 NOTE — BH NOTES
Family Contact: MARTÍNEZ spoke top Ms Falguni Graff ( [phone]) who is assisting with d/c plans. SW asked if she has secured a place to stay at a local motel? According to Ms. Falguni Graff, she has talked to many places but unable to get a room. She I told pt that this morning and I need more time. SW asked if he had a wheelchair and a bed side commode and she said yes. Sw asked if she needed assistance with finding a room? She said no. I am still calling and looking. SW asked her to contact office when d/c plans have been completed.

## 2018-03-02 NOTE — PROGRESS NOTES
Problem: Falls - Risk of  Goal: *Absence of Falls  Document Bello Fall Risk and appropriate interventions in the flowsheet.     Outcome: Progressing Towards Goal  Fall Risk Interventions:  Lying quietly in bed with eyes closed , respirations even and unlabored , Tap bell at bedside , using urinal   Mobility Interventions: Bed/chair exit alarm    Mentation Interventions: Adequate sleep, hydration, pain control    Medication Interventions: Teach patient to arise slowly    Elimination Interventions: Bed/chair exit alarm    History of Falls Interventions: Bed/chair exit alarm

## 2018-04-09 ENCOUNTER — HOSPITAL ENCOUNTER (EMERGENCY)
Age: 78
Discharge: HOME | End: 2018-04-10
Attending: EMERGENCY MEDICINE
Payer: MEDICARE

## 2018-04-09 VITALS
HEART RATE: 67 BPM | TEMPERATURE: 98.4 F | OXYGEN SATURATION: 100 % | BODY MASS INDEX: 23.14 KG/M2 | DIASTOLIC BLOOD PRESSURE: 95 MMHG | RESPIRATION RATE: 20 BRPM | SYSTOLIC BLOOD PRESSURE: 180 MMHG | HEIGHT: 78 IN | WEIGHT: 200 LBS

## 2018-04-09 DIAGNOSIS — I10 ESSENTIAL HYPERTENSION: ICD-10-CM

## 2018-04-09 DIAGNOSIS — F32.89 OTHER DEPRESSION: Primary | ICD-10-CM

## 2018-04-09 PROCEDURE — 90791 PSYCH DIAGNOSTIC EVALUATION: CPT

## 2018-04-09 PROCEDURE — 99283 EMERGENCY DEPT VISIT LOW MDM: CPT

## 2018-04-10 NOTE — BSMART NOTE
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 Medical doctor is in agreement with psychiatrist disposition because this counselor conveyed to ED physician the recommendation of the on-call psychiatrist and they concurred. The plan is to discharge with transportation to [facility] and register for services at [facility] tomorrow. The on-call Psychiatrist consulted was Dr. Eda Cassidy. The admitting Psychiatrist will be Dr. Regis Chakraborty. The admitting Diagnosis is n/a. The Payor source is [address] [place] [address]. Section II - Integrated Summary Summary:  
Patient is a 77 yo black male who arrived from [facility] ED with chief complaint of depression and feeling frustrated. Patient reports [facility] refused to see him so he came here via ambulance. Patient reports depression for past 3 weeks. Patient stated legs were amputated a few months ago. Chart notes indicate [facility] psych admission on 1/24/18 and discharged on 2/3/18 with follow up plan to register for out patient services at [facility] Patient says he \"tried [facility] but they didn't do anything. \"  
Per nurse, patient stopped answering RN's questions so RN asked orientation questions and he expressed frustration, sarcasm and said, \"I'll make you feel pain. \" 
Patient presents as frustrated, demanding, and occasionally refuses to answer questions.   
He denies suicidal ideation which was verified by charge nurse, denies homicidal ideation, denies auditory/visual hallucinations, is not delusional, and is oriented X4. Patient's ETOH and drug screen were not processed due to patient's refusal to cooperate with lab draws. This counselor explained his need to register for services at [facility] in the morning which patient agreed to do. Counselor contacted [email or web address] ([facility]) who confirmed [place] was opened. Plan is to discharge patient to [facility] and follow up with [facility] tomorrow. The patient has demonstrated mental capacity to provide informed consent. The information is given by the patient and past medical records. The Chief Complaint is \"I'm homeless. I don't have any place to stay. \" The Precipitant Factors are non compliant with previous d/c plan, homeless, recent bilateral amputation. Previous Hospitalizations: [facility] 1/24/18 The patient has not previously been in restraints. Current Psychiatrist and/or  is n/a. Lethality Assessment: 
 
The potential for suicide noted by the following: vague plan . The potential for homicide is not noted. The patient has not been a perpetrator of sexual or physical abuse. There are not pending charges. The patient is not felt to be at risk for self harm or harm to others. The attending nurse was advised no further monitoring is necessary at this time. Section III - Psychosocial 
The patient's overall mood and attitude is irritable. Feelings of helplessness and hopelessness are not observed. Generalized anxiety is not observed. Panic is not observed. Phobias are not observed. Obsessive compulsive tendencies are not observed. Section IV - Mental Status Exam 
The patient's appearance is unkempt and shows poor hygiene. The patient's behavior is agitated. The patient is oriented to time, place, person and situation. The patient's speech is soft. The patient's mood is depressed and is irritable. The range of affect is constricted. The patient's thought content demonstrates no evidence of impairment.   The thought process shows no evidence of impairment. The patient's perception shows no evidence of impairment. The patient's memory shows no evidence of impairment. The patient's appetite shows no evidence of impairment. The patient's sleep shows no evidence of impairment. The patient's insight is blaming. The patient's judgement shows no evidence of impairment. Section V - Substance Abuse The patient is not using substances. Section VI - Living Arrangements The patient is . The patient lives alone. The patient has 15 children ages 31-64. The patient does not plan to return home upon discharge. The patient does not have legal issues pending. The patient's source of income comes from disability and social security. Sabianist and cultural practices have not been voiced at this time. The patient's greatest support comes from \"police\" and this person will not be involved with the treatment. The patient has been in an event described as horrible or outside the realm of ordinary life experience either currently or in the past. 
The patient has not been a victim of sexual/physical abuse. Section VII - Other Areas of Clinical Concern The highest grade achieved is 10th with the overall quality of school experience being described as fair. The patient is currently disabled and speaks [place] as a primary language. The patient has no communication impairments affecting communication. The patient's preference for learning can be described as: learns best by oral information.   The patient's hearing is normal.  The patient's vision is normal. 
 
 
Krishan Marino, MAXIMINO

## 2018-04-10 NOTE — ED NOTES
Assumed care of pt. RN attempted to talk with patient about his circumstances, pt selective with answering questions. RN explained the counselor would come speak to him and speak with psychiatry to determine admission or outpatient services. Pt stated \"then what\". RN explained again. Pt stops answering RN's questions so RN asks pt orientation questions and pt expresses frustration with sarcasm. RN offered to move pt's chair so he could see TV while he waitied, pt decline, call bell in reach 2330: Lift Team called for assistance of pt to bed. Pt instructed to not attempt and move himself as it could be unsafe for him 
0000: Lift team here to assist pt in bed. Attempted to draw blood x2, pt stated to RN \"the next time you put a needle in me and don't take it out when I say I'm going to make you feel pain\". BSMART at bedside 0045: RT at bedside to assist with arterial blood draw for labs, pt noncompliant with sarcastic attitude, informed RN that she need not interact with him. Unable to get blood via art stick. Pt refusing vitals 0100: Lift team here to assist pt in bed 
0210: MD reviewed discharge instructions and options with patient and patient verbalized understanding. RN reviewed discharge instructions using teachback method. Pt wheeled to exit without difficulty and in no signs of acute distress escorted by self. No complaints or needs expressed at this time. 0300: Pt in waiting room deciding to go to warm shelter or not

## 2018-04-10 NOTE — DISCHARGE INSTRUCTIONS
Depression and Chronic Disease: Care Instructions  Your Care Instructions    A chronic disease is one that you have for a long time. Some chronic diseases can be controlled, but they usually cannot be cured. Depression is common in people with chronic diseases, but it often goes unnoticed. Many people have concerns about seeking treatment for a mental health problem. You may think it's a sign of weakness, or you don't want people to know about it. It's important to overcome these reasons for not seeking treatment. Treating depression or anxiety is good for your health. Follow-up care is a key part of your treatment and safety. Be sure to make and go to all appointments, and call your doctor if you are having problems. It's also a good idea to know your test results and keep a list of the medicines you take. How can you care for yourself at home? Watch for symptoms of depression  The symptoms of depression are often subtle at first. You may think they are caused by your disease rather than depression. Or you may think it is normal to be depressed when you have a chronic disease. If you are depressed you may:  · Feel sad or hopeless. · Feel guilty or worthless. · Not enjoy the things you used to enjoy. · Feel hopeless, as though life is not worth living. · Have trouble thinking or remembering. · Have low energy, and you may not eat or sleep well. · Pull away from others. · Think often about death or killing yourself. (Keep the numbers for these national suicide hotlines: 7-829-813-TALK [1-293.870.5906] and 5-120-XGMNCEO [1-526.914.1164]. )  Get treatment  By treating your depression, you can feel more hopeful and have more energy. If you feel better, you may take better care of yourself, so your health may improve. · Talk to your doctor if you have any changes in mood during treatment for your disease. · Ask your doctor for help.  Counseling, antidepressant medicine, or a combination of the two can help most people with depression. Often a combination works best. Counseling can also help you cope with having a chronic disease. When should you call for help? Call 911 anytime you think you may need emergency care. For example, call if:  ? · You feel like hurting yourself or someone else. ? · Someone you know has depression and is about to attempt or is attempting suicide. ?Call your doctor now or seek immediate medical care if:  ? · You hear voices. ? · Someone you know has depression and:  ¨ Starts to give away his or her possessions. ¨ Uses illegal drugs or drinks alcohol heavily. ¨ Talks or writes about death, including writing suicide notes or talking about guns, knives, or pills. ¨ Starts to spend a lot of time alone. ¨ Acts very aggressively or suddenly appears calm. ? Watch closely for changes in your health, and be sure to contact your doctor if:  ? · You do not get better as expected. Where can you learn more? Go to http://kalpesh-jen.info/. Enter B081 in the search box to learn more about \"Depression and Chronic Disease: Care Instructions. \"  Current as of: May 12, 2017  Content Version: 11.4  © 0262-4125 [employer]. Care instructions adapted under license by [employer] (which disclaims liability or warranty for this information). If you have questions about a medical condition or this instruction, always ask your healthcare professional. [place] [address] any warranty or liability for your use of this information. High Blood Pressure: Care Instructions  Your Care Instructions    If your blood pressure is usually above 140/90, you have high blood pressure, or hypertension. That means the top number is 140 or higher or the bottom number is 90 or higher, or both. Despite what a lot of people think, high blood pressure usually doesn't cause headaches or make you feel dizzy or lightheaded. It usually has no symptoms. But it does increase your risk for heart attack, stroke, and kidney or eye damage. The higher your blood pressure, the more your risk increases. Your doctor will give you a goal for your blood pressure. Your goal will be based on your health and your age. An example of a goal is to keep your blood pressure below 140/90. Lifestyle changes, such as eating healthy and being active, are always important to help lower blood pressure. You might also take medicine to reach your blood pressure goal.  Follow-up care is a key part of your treatment and safety. Be sure to make and go to all appointments, and call your doctor if you are having problems. It's also a good idea to know your test results and keep a list of the medicines you take. How can you care for yourself at home? Medical treatment  · If you stop taking your medicine, your blood pressure will go back up. You may take one or more types of medicine to lower your blood pressure. Be safe with medicines. Take your medicine exactly as prescribed. Call your doctor if you think you are having a problem with your medicine. · Talk to your doctor before you start taking aspirin every day. Aspirin can help certain people lower their risk of a heart attack or stroke. But taking aspirin isn't right for everyone, because it can cause serious bleeding. · See your doctor regularly. You may need to see the doctor more often at first or until your blood pressure comes down. · If you are taking blood pressure medicine, talk to your doctor before you take decongestants or anti-inflammatory medicine, such as ibuprofen. Some of these medicines can raise blood pressure. · Learn how to check your blood pressure at home. Lifestyle changes  · Stay at a healthy weight. This is especially important if you put on weight around the waist. Losing even 10 pounds can help you lower your blood pressure. · If your doctor recommends it, get more exercise. Walking is a good choice.  Bit by bit, increase the amount you walk every day. Try for at least 30 minutes on most days of the week. You also may want to swim, bike, or do other activities. · Avoid or limit alcohol. Talk to your doctor about whether you can drink any alcohol. · Try to limit how much sodium you eat to less than 2,300 milligrams (mg) a day. Your doctor may ask you to try to eat less than 1,500 mg a day. · Eat plenty of fruits (such as bananas and oranges), vegetables, legumes, whole grains, and low-fat dairy products. · Lower the amount of saturated fat in your diet. Saturated fat is found in animal products such as milk, cheese, and meat. Limiting these foods may help you lose weight and also lower your risk for heart disease. · Do not smoke. Smoking increases your risk for heart attack and stroke. If you need help quitting, talk to your doctor about stop-smoking programs and medicines. These can increase your chances of quitting for good. When should you call for help? Call 911 anytime you think you may need emergency care. This may mean having symptoms that suggest that your blood pressure is causing a serious heart or blood vessel problem. Your blood pressure may be over 180/110. ? For example, call 911 if:  ? · You have symptoms of a heart attack. These may include:  ¨ Chest pain or pressure, or a strange feeling in the chest.  ¨ Sweating. ¨ Shortness of breath. ¨ Nausea or vomiting. ¨ Pain, pressure, or a strange feeling in the back, neck, jaw, or upper belly or in one or both shoulders or arms. ¨ Lightheadedness or sudden weakness. ¨ A fast or irregular heartbeat. ? · You have symptoms of a stroke. These may include:  ¨ Sudden numbness, tingling, weakness, or loss of movement in your face, arm, or leg, especially on only one side of your body. ¨ Sudden vision changes. ¨ Sudden trouble speaking. ¨ Sudden confusion or trouble understanding simple statements. ¨ Sudden problems with walking or balance.   ¨ A sudden, severe headache that is different from past headaches. ? · You have severe back or belly pain. ?Do not wait until your blood pressure comes down on its own. Get help right away. ?Call your doctor now or seek immediate care if:  ? · Your blood pressure is much higher than normal (such as 180/110 or higher), but you don't have symptoms. ? · You think high blood pressure is causing symptoms, such as:  ¨ Severe headache. ¨ Blurry vision. ? Watch closely for changes in your health, and be sure to contact your doctor if:  ? · Your blood pressure measures 140/90 or higher at least 2 times. That means the top number is 140 or higher or the bottom number is 90 or higher, or both. ? · You think you may be having side effects from your blood pressure medicine. ? · Your blood pressure is usually normal, but it goes above normal at least 2 times. Where can you learn more? Go to http://kalpesh-jen.info/. Enter S663 in the search box to learn more about \"High Blood Pressure: Care Instructions. \"  Current as of: September 21, 2016  Content Version: 11.4  © 7742-3102 [employer]. Care instructions adapted under license by [employer] (which disclaims liability or warranty for this information). If you have questions about a medical condition or this instruction, always ask your healthcare professional. [address] any warranty or liability for your use of this information.

## 2018-04-10 NOTE — ED NOTES
At 0100 patient contracts for safety, with [facility] Counselor Beatris July), and [address], at this time patient refuses for staff to touch him, patient states he did not our help, patient verbally abusive to staff, request discharge at this time.

## 2018-04-10 NOTE — ED PROVIDER NOTES
HPI Comments: 78 y.o. male with extensive past medical history (please see list) significant for CAD, CKD, DM, Arthritis, HTN, PVD, Neuropathy, GI bleed, DVT, Hep C, b/l bka, who presents from home via EMS with chief complaint of dysphoric mood. Pt reports 1 month hx of gradually worsening dysphoric mood. Pt states \"I'm just depressed\". He denies any specific exacerbating factors today. He has been evaluated by a psychiatrist in the past for symptoms as well as undergo psychiatric admission (\"january\"). He is not on any psychiatric medications. While in ED, pt c/o abdominal pain and headache x unspecified amount of time. He denies SI/HI or hallucinations. No chest pain, shortness of breath, cough, congestion, nausea, vomiting, diarrhea or constipation. There are no other acute medical concerns at this time. Social hx: Pt is a smoker (1ppd), He reports prior hx of cocaine use. No current illicit drug use or EtOH use. PCP: Ted Comer MD 
 
Note written by Roosevelt Arthur, as dictated by Carmita Griffiths MD 11:09 PM 
 
The history is provided by the patient. No  was used. Past Medical History:  
Diagnosis Date  Arthritis  CAD (coronary artery disease) 2007  CKD (chronic kidney disease), stage III   
 DM type 2 causing renal disease ([facility] [address]  DVT (deep venous thrombosis) ([facility] [address] Hx of DVT:  Xarelto stopped due to GI bleed. S/P IVC filter.  Gait abnormality  GI bleed  Heart murmur  Hepatitis C   
 History of blood transfusion  Hypercholesterolemia  Hypertension 11/7/2014  Neuropathy  Non compliance w medication regimen  Peripheral vascular disease ([facility] [address] 11/7/2014  
 A. S/P Right SFA POBA and tibial atherectomy (2/4/13).  PUD (peptic ulcer disease) 2007  Unspecified sleep apnea   
 never used cpap Past Surgical History:  
Procedure Laterality Date  ABDOMEN SURGERY PROC UNLISTED  2007  
 has approx 7-8\" midline incision on abdomen--\"had to open him up and clean him out after feeding tube clogged\"  HX GI  2007  
 feeding tube for approx 2 mo  VASCULAR SURGERY PROCEDURE UNLIST Right 1/22/2015  
 popliteal-tibial bypass Family History:  
Problem Relation Age of Onset  Hypertension Father Social History Social History  Marital status:  Spouse name: N/A  
 Number of children: N/A  
 Years of education: N/A Occupational History  Not on file. Social History Main Topics  Smoking status: Current Every Day Smoker Packs/day: 0.50  Smokeless tobacco: Not on file  Alcohol use No  
 Drug use: No  
   Comment: >20 years ago  Sexual activity: Not on file Other Topics Concern  Not on file Social History Narrative 78 year ols male admitted for depression and homelessness. Pt will be evicated from apartment due to non payment of bills. [facility] [place] [facility]. \" Pt is a brittle diabetic, with treatment non compliance. He has bilarela lower extremity amputations. Patient has a long hx of substance abuse. ALLERGIES: Tetracycline Review of Systems Constitutional: Negative for fever. HENT: Negative for congestion. Respiratory: Negative for cough and shortness of breath. Cardiovascular: Negative for chest pain. Gastrointestinal: Positive for abdominal pain. Negative for constipation, diarrhea, nausea and vomiting. Neurological: Positive for headaches. Psychiatric/Behavioral: Positive for dysphoric mood. All other systems reviewed and are negative. Vitals:  
 04/09/18 2038 BP: (!) 180/95 Pulse: 67 Resp: 20 Temp: 98.4 °F (36.9 °C) SpO2: 100% Weight: 90.7 kg (200 lb) Height: 6' 8\" (2.032 m) Physical Exam  
Nursing note and vitals reviewed. CONSTITUTIONAL: Well-appearing; well-nourished; in no apparent distress HEAD: Normocephalic; atraumatic EYES: PERRL; EOM intact; conjunctiva and sclera are clear bilaterally. ENT: No rhinorrhea; normal pharynx with no tonsillar hypertrophy; mucous membranes pink/moist, no erythema, no exudate. NECK: Supple; non-tender; no cervical lymphadenopathy CARD: Normal S1, S2; no murmurs, rubs, or gallops. Regular rate and rhythm. RESP: Normal respiratory effort; breath sounds clear and equal bilaterally; no wheezes, rhonchi, or rales. ABD: Normal bowel sounds; non-distended; non-tender; no palpable organomegaly, no masses, no bruits. Back Exam: Normal inspection; no vertebral point tenderness, no CVA tenderness. Normal range of motion. EXT: B/L BKA amputation; Normal ROM in all four extremities; non-tender to palpation; no swelling or deformity; distal pulses are normal, no edema. SKIN: Warm; dry; no rash. NEURO:Alert and oriented x 3, coherent, BORIS-XII grossly intact, sensory and motor are non-focal. 
Psych exam: Flat affect and depressed mood; Pt denies SI/HI 
 
 
 
MDM Number of Diagnoses or Management Options Essential hypertension:  
Other depression:  
Diagnosis management comments: Assessment: 53-year-old male with hypertension and severe depression, who appears hemodynamically stable. The patient is homeless and not compliant with treatment recommendation. Plan: lab/ [facility] consult for psychiatric evaluation and treatment recommendation/ Monitor and Reevaluate. Amount and/or Complexity of Data Reviewed Clinical lab tests: ordered and reviewed Tests in the radiology section of CPT®: ordered and reviewed Tests in the medicine section of CPT®: reviewed and ordered Discussion of test results with the performing providers: yes Decide to obtain previous medical records or to obtain history from someone other than the patient: yes Obtain history from someone other than the patient: yes Review and summarize past medical records: yes Discuss the patient with other providers: yes Independent visualization of images, tracings, or specimens: yes Risk of Complications, Morbidity, and/or Mortality Presenting problems: moderate Diagnostic procedures: moderate Management options: moderate ED Course Procedures Progress Note:  
Pt has been reexamined by Christal Murrieta MD. Pt is feeling much better. Symptoms have improved. The patient was seen and evaluated by [facility], who after discussion with the on call psychiatrist, recommends outpatient treatment. All available results have been reviewed with pt and any available family. Pt understands sx, dx, and tx in ED. Care plan has been outlined and questions have been answered. Pt is ready to go home. Will send home on HTN/ Depression. Outpatient referral with PCP/ Hilda Umanzor as needed.  Written by Christal Murrieta MD,2:10 AM

## 2018-05-20 ENCOUNTER — HOSPITAL ENCOUNTER (EMERGENCY)
Age: 78
Discharge: HOME | End: 2018-05-20
Attending: EMERGENCY MEDICINE
Payer: MEDICARE

## 2018-05-20 ENCOUNTER — APPOINTMENT (OUTPATIENT)
Dept: CT IMAGING | Age: 78
End: 2018-05-20
Attending: PHYSICIAN ASSISTANT
Payer: MEDICARE

## 2018-05-20 ENCOUNTER — APPOINTMENT (OUTPATIENT)
Dept: GENERAL RADIOLOGY | Age: 78
End: 2018-05-20
Attending: PHYSICIAN ASSISTANT
Payer: MEDICARE

## 2018-05-20 VITALS
SYSTOLIC BLOOD PRESSURE: 199 MMHG | OXYGEN SATURATION: 95 % | DIASTOLIC BLOOD PRESSURE: 75 MMHG | TEMPERATURE: 97.7 F | WEIGHT: 184.13 LBS | HEART RATE: 52 BPM | RESPIRATION RATE: 17 BRPM | BODY MASS INDEX: 20.23 KG/M2

## 2018-05-20 DIAGNOSIS — M51.36 DDD (DEGENERATIVE DISC DISEASE), LUMBAR: ICD-10-CM
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
## 2018-05-20 DIAGNOSIS — M47.812 OSTEOARTHRITIS OF CERVICAL SPINE, UNSPECIFIED SPINAL OSTEOARTHRITIS COMPLICATION STATUS: ICD-10-CM

## 2018-05-20 LAB
GLUCOSE BLD STRIP.AUTO-MCNC: 83 MG/DL (ref 65–100)
SERVICE CMNT-IMP: NORMAL

## 2018-05-20 PROCEDURE — 71045 X-RAY EXAM CHEST 1 VIEW: CPT

## 2018-05-20 PROCEDURE — 72125 CT NECK SPINE W/O DYE: CPT

## 2018-05-20 PROCEDURE — 82962 GLUCOSE BLOOD TEST: CPT

## 2018-05-20 PROCEDURE — 70450 CT HEAD/BRAIN W/O DYE: CPT

## 2018-05-20 PROCEDURE — 99285 EMERGENCY DEPT VISIT HI MDM: CPT

## 2018-05-20 PROCEDURE — 73060 X-RAY EXAM OF HUMERUS: CPT

## 2018-05-20 PROCEDURE — 72100 X-RAY EXAM L-S SPINE 2/3 VWS: CPT

## 2018-05-20 PROCEDURE — 93005 ELECTROCARDIOGRAM TRACING: CPT

## 2018-05-20 RX ORDER — ACETAMINOPHEN 500 MG
1000 TABLET ORAL
Qty: 20 TAB | Refills: 0 | Status: ON HOLD | OUTPATIENT
Start: 2018-05-20 | End: 2019-02-11

## 2018-05-20 RX ORDER — ROSUVASTATIN CALCIUM 10 MG/1
10 TABLET, COATED ORAL
COMMUNITY

## 2018-05-20 RX ORDER — AMOXICILLIN 500 MG/1
500 CAPSULE ORAL
Status: ON HOLD | COMMUNITY
End: 2019-02-11

## 2018-05-20 RX ORDER — NIFEDIPINE 60 MG/1
60 TABLET, EXTENDED RELEASE ORAL DAILY
Status: ON HOLD | COMMUNITY
End: 2019-02-13 | Stop reason: SDUPTHER

## 2018-05-20 NOTE — DISCHARGE INSTRUCTIONS
Learning About Degenerative Disc Disease  What is degenerative disc disease? Degenerative disc disease is not really a disease. It's a term used to describe the normal changes in your spinal discs as you age. Spinal discs are small, spongy discs that separate the bones (vertebrae) that make up the spine. The discs act as shock absorbers for the spine, so it can flex, bend, and twist.  Degenerative disc disease can take place in one or more places along the spine. It most often occurs in the discs in the lower back and the neck. What causes it? As we age, our spinal discs break down, or degenerate. This breakdown causes the symptoms of degenerative disc disease in some people. When the discs break down, they can lose fluid and dry out, and their outer layers can have tiny cracks or tears. This leads to less padding and less space between the bones in the spine. The body reacts to this by making bony growths on the spine called bone spurs. These spurs can press on the spinal nerve roots or spinal cord. This can cause pain and can affect how well the nerves work. What are the symptoms? Many people with degenerative disc disease have no pain. But others have severe pain or other symptoms that limit their activities. Some of the most common symptoms are:  · Pain in the back or neck. Where the pain occurs depends on which discs are affected. · Pain that gets worse when you move, such as bending over, reaching up, or twisting. · Pain that may occur in the rear end (buttocks), arm, or leg if a nerve is pinched. · Numbness or tingling in your arm or leg. How is it diagnosed? A doctor can often diagnose degenerative disc disease while doing a physical exam. If your exam shows no signs of a serious condition, imaging tests (such as an X-ray) aren't likely to help your doctor find the cause of your symptoms.   Sometimes degenerative disc disease is found when an X-ray is taken for another reason, such as an injury or other health problem. But even if the doctor finds degenerative disc disease, that doesn't always mean that you will have symptoms. How is it treated? There are several things you can do at home to manage pain from this problem. · To relieve pain, use ice or heat (whichever feels better) on the affected area. ¨ Put ice or a cold pack on the area for 10 to 20 minutes at a time. Put a thin cloth between the ice and your skin. ¨ Put a warm water bottle, a heating pad set on low, or a warm cloth on your back. Put a thin cloth between the heating pad and your skin. Do not go to sleep with a heating pad on your skin. · Ask your doctor if you can take acetaminophen (such as Tylenol) or nonsteroidal anti-inflammatory drugs, such as ibuprofen or naproxen. Your doctor can prescribe stronger medicines if needed. Be safe with medicines. Read and follow all instructions on the label. · Get some exercise every day. Exercise is one of the best ways to help your back feel better and stay better. It's best to start each exercise slowly. You may notice a little soreness, and that's okay. But if an exercise makes your pain worse, stop doing it. Here are things you can try:  ¨ Walking. It's the simplest and maybe the best activity for your back. It gets your blood moving and helps your muscles stay strong. ¨ Exercises that gently stretch and strengthen your stomach, back, and leg muscles. The stronger those muscles are, the better they're able to protect your back. If you have constant or severe pain in your back or spine, you may need other treatments, such as physical therapy. In some cases, your doctor may suggest surgery. Follow-up care is a key part of your treatment and safety. Be sure to make and go to all appointments, and call your doctor if you are having problems. It's also a good idea to know your test results and keep a list of the medicines you take. Where can you learn more?   Go to http://ashley.info/. Enter Y977 in the search box to learn more about \"Learning About Degenerative Disc Disease. \"  Current as of: March 21, 2017  Content Version: 11.5  © 5106-2563 [employer]. Care instructions adapted under license by [employer] (which disclaims liability or warranty for this information). If you have questions about a medical condition or this instruction, always ask your healthcare professional. [place] [address] any warranty or liability for your use of this information.

## 2018-05-20 NOTE — ED PROVIDER NOTES
EMERGENCY DEPARTMENT HISTORY AND PHYSICAL EXAM 
 
 
Date: 5/20/2018 Patient Name: hSona Chaudhary History of Presenting Illness No chief complaint on file. History Provided By: Patient Additional History (Context): Shona Chaudhary is a 78 y.o. male with diabetes, hypertension, hyperlipidemia, osteoarthritis, deep vein thrombosis and CAD, CKD, noncompliance, heart murmur, peripheral vascular disease, sleep apnea, Hepatitis C, GI bleed w/ IVC filter, abnormal gait d/t Bilateral BKA who presents with acute moderate generalized 9/10 headache, fatigue, Lt upper arm pain, low back pain and neck pain x 4 days after being dropped in his wheelchair while going to his PCP appointment at [facility] 4 days pta. States he hit the Rt side f his head and his posterior neck on step. No LOC. Wound to Rt eyebrow. No modifying factors. +lightheadedness, dizziness, vision changes (decreased), numbness/tingling bilateral hands. Denies feve,r chills, n/v, abd pain, cp, cough, sob, dyspnea, syncope, seizure, uri sxs, constipation, diarrhea, urinary sxs. PCP: Fatuma Campos MD 
 
Current Outpatient Prescriptions Medication Sig Dispense Refill  amoxicillin (AMOXIL) 500 mg capsule Take 500 mg by mouth.  NIFEdipine ER (ADALAT CC) 60 mg ER tablet Take 60 mg by mouth daily.  rosuvastatin (CRESTOR) 10 mg tablet Take 10 mg by mouth nightly.  acetaminophen (TYLENOL) 500 mg tablet Take 2 Tabs by mouth every six (6) hours as needed for Pain. 20 Tab 0  carvedilol (COREG) 3.125 mg tablet Take 1 Tab by mouth two (2) times daily (with meals). Indications: chronic heart failure 60 Tab 0  
 aspirin delayed-release 81 mg tablet Take 1 Tab by mouth daily.  doxazosin (CARDURA) 4 mg tablet Take 1 Tab by mouth nightly. Indications: hypertension 30 Tab 0  
 mirtazapine (REMERON) 30 mg tablet Take 1 Tab by mouth nightly.  Indications: major depressive disorder 30 Tab 0  
 ferrous sulfate 325 mg (65 mg iron) tablet Take 1 Tab by mouth two (2) times daily (with meals). Indications: Iron Deficiency Anemia 60 Tab 0 Past History Past Medical History: 
Past Medical History:  
Diagnosis Date  Arthritis  CAD (coronary artery disease) 2007  CKD (chronic kidney disease), stage III   
 DM type 2 causing renal disease ([facility] [address]  DVT (deep venous thrombosis) ([facility] Hx of DVT:  Xarelto stopped due to GI bleed. S/P IVC filter.  Gait abnormality  GI bleed  Heart murmur  Hepatitis C   
 History of blood transfusion  Hypercholesterolemia  Hypertension 11/7/2014  Neuropathy  Non compliance w medication regimen  Peripheral vascular disease ([facility] 11/7/2014  
 A. S/P Right SFA POBA and tibial atherectomy (2/4/13).  PUD (peptic ulcer disease) 2007  Unspecified sleep apnea   
 never used cpap Past Surgical History: 
Past Surgical History:  
Procedure Laterality Date [address] UNLISTED  2007  
 has approx 7-8\" midline incision on abdomen--\"had to open him up and clean him out after feeding tube clogged\"  HX GI  2007  
 feeding tube for approx 2 mo  VASCULAR SURGERY PROCEDURE UNLIST Right 1/22/2015  
 popliteal-tibial bypass Family History: 
Family History Problem Relation Age of Onset  Hypertension Father Social History: 
Social History Substance Use Topics  Smoking status: Current Every Day Smoker Packs/day: 0.50  Smokeless tobacco: Not on file  Alcohol use No  
 
 
Allergies: Allergies Allergen Reactions  Tetracycline Hives Review of Systems Review of Systems Constitutional: Positive for fatigue. Negative for activity change, chills, diaphoresis and fever. HENT: Negative for ear discharge, facial swelling, nosebleeds, postnasal drip, rhinorrhea, trouble swallowing and voice change. Eyes: Positive for visual disturbance. Negative for photophobia and pain.   
Respiratory: Negative for apnea, cough and shortness of breath. Cardiovascular: Negative for chest pain and palpitations. Gastrointestinal: Negative for abdominal pain, diarrhea, nausea and vomiting. Genitourinary: Negative for decreased urine volume, difficulty urinating and hematuria. Musculoskeletal: Positive for arthralgias, back pain, gait problem, myalgias and neck pain. Negative for joint swelling and neck stiffness. Skin: Negative for color change, pallor, rash and wound. Neurological: Positive for dizziness, light-headedness and headaches. Negative for seizures, syncope, facial asymmetry, speech difficulty, weakness and numbness. Psychiatric/Behavioral: Negative. Physical Exam  
 
Vitals:  
 05/20/18 1350 05/20/18 1400 05/20/18 1434 05/20/18 1437 BP:    (!) 208/74 Pulse: (!) 45 (!) 51 Resp: 11 13 Temp:      
SpO2: 100% 100% Weight:   83.5 kg (184 lb 2 oz) Physical Exam  
Constitutional: He is oriented to person, place, and time. He appears well-developed and well-nourished. No distress. HENT:  
Head: Normocephalic. Head is with abrasion (Rt eyebrow; healing) and with contusion. Head is without raccoon's eyes, without Minor's sign and without laceration. Hair is normal.  
Right Ear: Hearing and external ear normal.  
Left Ear: Hearing and external ear normal.  
Nose: Nose normal. No sinus tenderness or nasal deformity. Right sinus exhibits no maxillary sinus tenderness and no frontal sinus tenderness. Left sinus exhibits no maxillary sinus tenderness and no frontal sinus tenderness. Mouth/Throat: Uvula is midline, oropharynx is clear and moist and mucous membranes are normal.  
Eyes: Conjunctivae and EOM are normal. Pupils are equal, round, and reactive to light. Neck: Normal range of motion. Cardiovascular: Regular rhythm and intact distal pulses. Bradycardia present. Pulses: 
     Radial pulses are 2+ on the right side, and 2+ on the left side.   
Pulmonary/Chest: Effort normal and breath sounds normal. No accessory muscle usage. No respiratory distress. Talking in complete sentences NAD. Abdominal: There is no tenderness. Musculoskeletal: Normal range of motion. Right shoulder: Normal.  
     Left shoulder: Normal.  
     Right elbow: Normal. 
     Left elbow: Normal.  
     Cervical back: He exhibits tenderness, bony tenderness and pain. He exhibits normal range of motion, no swelling, no edema, no deformity, no laceration, no spasm and normal pulse. Thoracic back: Normal.  
     Lumbar back: He exhibits tenderness, bony tenderness and pain. He exhibits normal range of motion, no swelling, no edema, no deformity, no laceration, no spasm and normal pulse. Right upper arm: Normal.  
     Left upper arm: Normal.  
Bilateral BKA. Neurological: He is alert and oriented to person, place, and time. He has normal strength. He is not disoriented. No cranial nerve deficit or sensory deficit. GCS eye subscore is 4. GCS verbal subscore is 5. GCS motor subscore is 6. Skin: Skin is warm, dry and intact. He is not diaphoretic. No pallor. Psychiatric: He has a normal mood and affect. His speech is normal and behavior is normal. Judgment and thought content normal.  
Nursing note and vitals reviewed. Diagnostic Study Results Labs - Recent Results (from the past 12 hour(s)) EKG, 12 LEAD, INITIAL Collection Time: 05/20/18  1:10 PM  
Result Value Ref Range Ventricular Rate 51 BPM  
 Atrial Rate 51 BPM  
 P-R Interval 334 ms QRS Duration 96 ms  
 Q-T Interval 518 ms QTC Calculation (Bezet) 477 ms Calculated P Axis 86 degrees Calculated R Axis 21 degrees Calculated T Axis -146 degrees Diagnosis Sinus bradycardia with sinus arrhythmia with 1st degree AV block with fusion  
complexes with fusion or intermittent ventricular pre-excitation (WPW) Left ventricular hypertrophy with repolarization abnormality Abnormal ECG When compared with ECG of 20-JAN-2018 17:34, Significant changes have occurred GLUCOSE, POC Collection Time: 05/20/18  1:14 PM  
Result Value Ref Range Glucose (POC) 83 65 - 100 mg/dL Performed by Lisha Cuellar Radiologic Studies -  
CT SPINE CERV WO CONT Final Result XR CHEST PORT Final Result XR HUMERUS LT Final Result XR SPINE LUMB 2 OR 3 V Final Result CT HEAD WO CONT Final Result  
  
history: Fall with acute left upper extremity pain 
  
COMPARISON: None 
  
FINDINGS: 
  
2 views of the left humerus submitted for review. 
  
No evidence for acute fracture or dislocation. 
  
IMPRESSION IMPRESSION: 
  
No evidence for acute fracture or dislocation. history: Fall with acute low back pain 
  
COMPARISON: None 
  
FINDINGS: 
  
3 views of the lumbar spine submitted for review. 
  
Straightening of the lumbar spine without any malalignment. Moderate L3-4 
degenerative disc disease. Multilevel lower lumbosacral facet arthropathy. No 
evidence for acute fracture. IVC filter is seen. 
  
IMPRESSION IMPRESSION: 
  
Degenerative changes without acute abnormality CT Results  (Last 48 hours) 05/20/18 1410  CT HEAD WO CONT Final result Impression:  IMPRESSION:  
   
No evidence for acute intracranial abnormality Narrative:  INDICATION:   Closed head trauma and pain EXAM:  HEAD CT WITHOUT CONTRAST  
   
COMPARISON:  None TECHNIQUE:  Routine noncontrast axial head CT was performed. Coronal and  
sagittal multiplanar reconstructions were obtained. CT dose reduction was  
achieved through use of a standardized protocol tailored for this examination  
and automatic exposure control for dose modulation. FINDINGS:  
   
The ventricles and cortical sulci are within normal limits. No evidence for acute intracranial hemorrhage, midline shift, or mass effect.   
Mild to moderate bilateral subcortical and periventricular areas of patchy low  
attenuation is nonspecific but likely related to changes of chronic small vessel  
ischemic disease. The basal cisterns are patent. The visualized paranasal sinuses and mastoid air cells are clear. No calvarial abnormality. 05/20/18 1410  CT SPINE CERV WO CONT Final result Impression:  IMPRESSION:   
   
Moderate to severe cervical spondylosis without evidence for acute fracture. Mucous secretion seen in the upper trachea Narrative:  CLINICAL HISTORY: Trauma with acute cervical neck pain Comparison: None TECHNIQUE: Thin collimation axial images were obtained through the cervical  
spine without contrast. Coronal and sagittal reformatted images were  
subsequently created. CT dose reduction was achieved through use of a  
standardized protocol tailored for this examination and automatic exposure  
control for dose modulation. FINDINGS:     
   
Slight reversal the normal cervical lordosis with moderate to severe  
degenerative changes from C3-4 through C6-7, most pronounced at C5-6. There is  
ossification of the posterior longitudinal ligament from C3 through C6. There is  
at least moderate central canal narrowing at these levels. No evidence for acute  
fracture. No widening of the predental space. No prevertebral soft tissue  
swelling. Mucous secretions seen in the upper trachea. CXR Results  (Last 48 hours) 05/20/18 1422  XR CHEST PORT Final result Impression:  IMPRESSION:  
   
No acute abnormality Narrative:  history: Fall with acute chest pain and weakness COMPARISON: 2/3/2018 FINDINGS:  
   
Frontal views of the chest submitted for review. The heart is not enlarged. The lungs are clear. No pleural effusion or  
pneumothorax. No acute osseous abnormality is not seen. Medical Decision Making I am the first provider for this patient. I reviewed the vital signs, available nursing notes, past medical history, past surgical history, family history and social history. Vital Signs-Reviewed the patient's vital signs. Pulse Oximetry Analysis - 100% on RA 
 
EKG: Interpreted by the EP. Alia Curtis MD. Time Interpreted: 13:18 Rate: 51bpm 
 Rhythm: Sinus bradycardia with sinus arrhythmia with 1st degree AV block with fusion  
complexes with fusion or intermittent ventricular pre-excitation (WPW) Interpretation: WPW; Left ventricular hypertrophy with repolarization abnormality; Abnormal 
 Comparison: 1/20/2018. Changes have occurred. Records Reviewed: Nursing Notes, Old Medical Records, Previous electrocardiograms, Previous Radiology Studies and Previous Laboratory Studies ED Course:  
1:22 PM 
I reviewed pt's hx, sxs, vitals, EKG, rhythm strip and PE w/ attending, Dr. Ryan Arteaga. He is in agreement with the care plan. No cardiology consult at this time as EKG looks comparable to old EKG. Plan to wait for labs. 1:45 PM 
No peripheral IV access. Pt refusing EJ. Anesthesiology, Dr. Prashant Torres, consulted for [employer] placement. 2:27 PM 
Pt refusing central line placement at this time and refusing labs. States he does not want to stay. Educated pt extensively on risk of not obtaining labs and having continued symptoms, worsening symptoms, heart failure, and possible death. Pt endorses understanding and continues to refuse lab work and/or hospitalization if necessary. Pt has received no mind altering medications . He is awake & Oriented x 3. His wife is present in the room for this discussion and they have both been informed that should symptoms return or worsen they may return to ED at any time.   
 
Disposition: 
2:36 PM 
 
I informed the pt that he needed further workup for adequate evaluation for his dizziness/ lightheadedness, and that by refusing the above, he is at risk for myocardial infarction, arrhythmia, sudden death, further deterioration, coma. He is awake, alert, and he understands his condition and the risks involved in leaving. The patient has received no altering drugs. He is clinically aware of his surroundings and able to ask appropriate questions, the patients significant other and the nurse present confirms he is not clinically intoxicated and able to make medical decisions. He verbalized knowing the risks and understood it was recommended that he stay and could also return at any time. his significant other was present for the discussion and also verbalized that they understood both diagnosis, risks and could return at any time. They were both provided with warnings regarding worsening of his condition and were provided instructions to follow up with Ashley Pratt MD tomorrow or return to the Emergency Room as soon as possible. This discussion was witnessed by nurse Sheron Leon. DISCHARGE NOTE:  
2:47 PM 
 
Pt has been reexamined. Patient has no new complaints, changes, or physical findings. Care plan outlined and precautions discussed. Results of imaging results and EKG were reviewed with the patient. All medications were reviewed with the patient; will d/c home with tylenol. All of pt's questions and concerns were addressed. Patient was instructed and agrees to follow up with PCP/ Ortho, as well as to return to the ED upon further deterioration. Patient is ready to go home. Follow-up Information Follow up With Details Comments Contact Info Ashley Pratt MD Schedule an appointment as soon as possible for a visit in 3 days As needed [address] [facility] [place] [address] [id number] 
[phone] Current Discharge Medication List  
  
START taking these medications Details  
acetaminophen (TYLENOL) 500 mg tablet Take 2 Tabs by mouth every six (6) hours as needed for Pain. Qty: 20 Tab, Refills: 0 CONTINUE these medications which have NOT CHANGED  Details  
amoxicillin (AMOXIL) 500 mg capsule Take 500 mg by mouth. NIFEdipine ER (ADALAT CC) 60 mg ER tablet Take 60 mg by mouth daily. rosuvastatin (CRESTOR) 10 mg tablet Take 10 mg by mouth nightly. carvedilol (COREG) 3.125 mg tablet Take 1 Tab by mouth two (2) times daily (with meals). Indications: chronic heart failure Qty: 60 Tab, Refills: 0  
  
aspirin delayed-release 81 mg tablet Take 1 Tab by mouth daily. doxazosin (CARDURA) 4 mg tablet Take 1 Tab by mouth nightly. Indications: hypertension 
Qty: 30 Tab, Refills: 0  
  
mirtazapine (REMERON) 30 mg tablet Take 1 Tab by mouth nightly. Indications: major depressive disorder 
Qty: 30 Tab, Refills: 0  
  
ferrous sulfate 325 mg (65 mg iron) tablet Take 1 Tab by mouth two (2) times daily (with meals). Indications: Iron Deficiency Anemia Qty: 60 Tab, Refills: 0 Provider Notes (Medical Decision Making): DDx: fall, hemorrhage, hematoma, migraine vs tension headache, concussion, fx, dislocation, htn emergency/urgency, malignant htn, CKD, arrhythmia, acs, electrolyte abnormality, dehydration, rhabdo, hyperglycemia/ hypoglycemia Procedures: 
Procedures Diagnosis Clinical Impression: 1. Fall, initial encounter 2. DDD (degenerative disc disease), lumbar 3. Osteoarthritis of cervical spine, unspecified spinal osteoarthritis complication status

## 2018-05-20 NOTE — ED NOTES
Discharge instructions reviewed with patient. Patient able to verbalize events which would require immediate follow up

## 2018-05-20 NOTE — ED NOTES
Patient is now refusing a central line and labs. He originally refused an IV after being suck multiple times with no successful IV insertion.

## 2018-05-20 NOTE — ED NOTES
Patient (s)  given copy of dc instructions and 0 paper script(s) and 1 electronic scripts. Patient (s) 0 verbalized understanding of instructions and script (s). Patient given a current medication reconciliation form and verbalized understanding of their medications. Patient (s) verbalized understanding of the importance of discussing medications with  his or her physician or clinic they will be following up with. Patient alert and oriented and in no acute distress. Patient offered wheelchair from treatment area to hospital entrance, patient declined wheelchair. Patient was discharged by the provider

## 2018-05-21 LAB
ATRIAL RATE: 51 BPM
CALCULATED P AXIS, ECG09: 86 DEGREES
CALCULATED R AXIS, ECG10: 21 DEGREES
CALCULATED T AXIS, ECG11: -146 DEGREES
DIAGNOSIS, 93000: NORMAL
P-R INTERVAL, ECG05: 334 MS
Q-T INTERVAL, ECG07: 518 MS
QRS DURATION, ECG06: 96 MS
QTC CALCULATION (BEZET), ECG08: 477 MS
VENTRICULAR RATE, ECG03: 51 BPM

## 2018-07-19 NOTE — ED PROVIDER NOTES
HPI Comments: 78 y.o. male with past medical history significant for HTN, hypercholesterolemia, neuropathy, PVD, sleep apnea, PUD, hepatitis C, DM type 2, CKD, CAD, and vascular surgery who presents from home via EMS with chief complaint of leg pain. Pt c/o B/L leg pain and weakness. The pt reports that he was discharged a week marcella half ago from [facility] and believes that he was discharged too soon. [place] [facility] had a 5 pint blood transfusion due to a GI bleed. Pt reports that he was not scoped at [facility]. The pt was recently taken off Xeralto. The pt has not ambulated in years and uses a wheelchair. Pt reports that he has poor circulation in his legs. Pt denies CP, SOB, fever, N/V/D, and blood in stool. There are no other acute medical concerns at this time. Social hx: current smoker, no EtOH  
PCP: None Note written by Roosevelt Freitas, as dictated by Nata Johnson MD 4:57 PM 
 
 
 
The history is provided by the patient. No  was used. Past Medical History:  
Diagnosis Date  Arthritis  CAD (coronary artery disease) 2007  CKD (chronic kidney disease), stage III   
 DM type 2 causing renal disease ([address]  Gait abnormality  Heart murmur  Hepatitis C   
 Hypercholesterolemia  Hypertension  Hypertension 11/7/2014  Neuropathy  Peripheral vascular disease ([facility] [address] 11/7/2014  
 A. S/P Right SFA POBA and tibial atherectomy (2/4/13).  PUD (peptic ulcer disease) 2007  Unspecified sleep apnea   
 never used cpap Past Surgical History:  
Procedure Laterality Date [address] [place] UNLISTED  2007  
 has approx 7-8\" midline incision on abdomen--\"had to open him up and clean him out after feeding tube clogged\"  HX GI  2007  
 feeding tube for approx 2 mo  VASCULAR SURGERY PROCEDURE UNLIST Right 1/22/2015  
 popliteal-tibial bypass No family history on file. Social History Social History  Marital status:   
 Spouse name: N/A  
 Number of children: N/A  
 Years of education: N/A Occupational History  Not on file. Social History Main Topics  Smoking status: Current Every Day Smoker Packs/day: 0.50  Smokeless tobacco: Not on file  Alcohol use No  
 Drug use: No  
   Comment: >20 years ago  Sexual activity: Not on file Other Topics Concern  Not on file Social History Narrative ALLERGIES: Tetracycline Review of Systems Constitutional: Negative for fever. Respiratory: Negative for shortness of breath. Cardiovascular: Negative for chest pain. Gastrointestinal: Negative for blood in stool, diarrhea, nausea and vomiting. Musculoskeletal: B/l leg pain Neurological: Positive for weakness. All other systems reviewed and are negative. Vitals:  
 07/13/17 1627 BP: 181/86 Pulse: 66 Resp: 15 Temp: 98.2 °F (36.8 °C) SpO2: 99% Weight: 88.9 kg (196 lb) Height: 6' 8\" (2.032 m) Physical Exam  
Physical Examination: General appearance - alert, elderly, chronically ill appearing, oriented to person, place, and time and normal appearing weight Eyes - pupils equal and reactive, extraocular eye movements intact Neck - supple, no significant adenopathy Chest - clear to auscultation, no wheezes, rales or rhonchi, symmetric air entry Heart - normal rate, regular rhythm, normal S1, S2, no murmurs, rubs, clicks or gallops Abdomen - soft, nontender, nondistended, no masses or organomegaly Back exam - full range of motion, no tenderness, palpable spasm or pain on motion Neurological - alert, oriented, normal speech, no focal findings or movement disorder noted Musculoskeletal - no joint tenderness, deformity or swelling Extremities - diminished pulses in b/l LE, no pedal edema, no clubbing or cyanosis Skin - normal coloration and turgor, no rashes, no suspicious skin lesions noted MDM Number of Diagnoses or Management Options Bilateral leg pain:  
Chronic anemia:  
Chronic renal insufficiency, unspecified stage:  
  
Amount and/or Complexity of Data Reviewed Clinical lab tests: ordered and reviewed Decide to obtain previous medical records or to obtain history from someone other than the patient: yes Obtain history from someone other than the patient: yes (EMS) Review and summarize past medical records: yes Discuss the patient with other providers: yes (hospitalist) Independent visualization of images, tracings, or specimens: yes Patient Progress Patient progress: stable ED Course Procedures Procedure Note- Peripheral IV access with Ultrasound Guidance 6:19 PM 
Sophie Christiansen MD gained IV access using  20 gauge needle because the patient had no vascular access. After cleaning the site with alcohol prep, the right AC vein was localized with ultrasound guidance in an anterior approach. Line confirmation was obtained by direct visualization and good blood return. No anaesthetic was used. The line was successfully flushed with normal saline and was secured with transparent tape. The patient tolerated the procedure without complication. EKG interpretation: (Preliminary) Rhythm: normal sinus rhythm; and irregular. Rate (approx.): 64; Axis: normal; MN interval: prolonged; QRS interval: normal ; ST/T wave: non-specific changes; LVH, 1st degree AVB, no changes from EKG 8/28/2015, t wave inversion lateral leads Discussed with pt's wife via telephone. States they don't have electricity, running water, or money and he can't come home. States his children won't take him in either. Discussed with Dr. Becky Martin, hospitalist. Will admit. - Continue regular diet  - Increase ambulation.  - Continue tylenol, oxycodone PRN for pain control.  - Discontinue alvarado at 24 hrs post-op  - F/u AM CBC    Cassie Hinds PGY-1

## 2018-12-06 NOTE — PROGRESS NOTES
Patient continues to refuse medication and labs. We will follow peripherally.     Giovanna Cuadra MD No

## 2019-01-15 ENCOUNTER — HOSPITAL ENCOUNTER (EMERGENCY)
Age: 79
Discharge: HOME | End: 2019-01-15
Attending: EMERGENCY MEDICINE
Payer: MEDICARE

## 2019-01-15 VITALS
HEIGHT: 78 IN | WEIGHT: 180 LBS | RESPIRATION RATE: 17 BRPM | HEART RATE: 75 BPM | BODY MASS INDEX: 20.83 KG/M2 | TEMPERATURE: 97 F | SYSTOLIC BLOOD PRESSURE: 192 MMHG | OXYGEN SATURATION: 96 % | DIASTOLIC BLOOD PRESSURE: 116 MMHG

## 2019-01-15 DIAGNOSIS — F32.A DEPRESSION, UNSPECIFIED DEPRESSION TYPE: Primary | ICD-10-CM

## 2019-01-15 DIAGNOSIS — I10 HYPERTENSION, UNSPECIFIED TYPE: ICD-10-CM

## 2019-01-15 LAB
ALBUMIN SERPL-MCNC: 3 G/DL (ref 3.5–5)
ALBUMIN/GLOB SERPL: 0.6 {RATIO} (ref 1.1–2.2)
ALP SERPL-CCNC: 66 U/L (ref 45–117)
ALT SERPL-CCNC: 13 U/L (ref 12–78)
ANION GAP SERPL CALC-SCNC: 10 MMOL/L (ref 5–15)
AST SERPL-CCNC: 17 U/L (ref 15–37)
BASOPHILS # BLD: 0 K/UL (ref 0–0.1)
BASOPHILS NFR BLD: 1 % (ref 0–1)
BILIRUB SERPL-MCNC: 0.8 MG/DL (ref 0.2–1)
BUN SERPL-MCNC: 18 MG/DL (ref 6–20)
BUN/CREAT SERPL: 8 (ref 12–20)
CALCIUM SERPL-MCNC: 9.1 MG/DL (ref 8.5–10.1)
CHLORIDE SERPL-SCNC: 101 MMOL/L (ref 97–108)
CO2 SERPL-SCNC: 28 MMOL/L (ref 21–32)
COMMENT, HOLDF: NORMAL
CREAT SERPL-MCNC: 2.27 MG/DL (ref 0.7–1.3)
DIFFERENTIAL METHOD BLD: ABNORMAL
EOSINOPHIL # BLD: 0.2 K/UL (ref 0–0.4)
EOSINOPHIL NFR BLD: 6 % (ref 0–7)
ERYTHROCYTE [DISTWIDTH] IN BLOOD BY AUTOMATED COUNT: 16 % (ref 11.5–14.5)
ETHANOL SERPL-MCNC: <10 MG/DL
GLOBULIN SER CALC-MCNC: 5.4 G/DL (ref 2–4)
GLUCOSE SERPL-MCNC: 73 MG/DL (ref 65–100)
HCT VFR BLD AUTO: 34.2 % (ref 36.6–50.3)
HGB BLD-MCNC: 11 G/DL (ref 12.1–17)
IMM GRANULOCYTES # BLD AUTO: 0 K/UL (ref 0–0.04)
IMM GRANULOCYTES NFR BLD AUTO: 0 % (ref 0–0.5)
LYMPHOCYTES # BLD: 0.8 K/UL (ref 0.8–3.5)
LYMPHOCYTES NFR BLD: 30 % (ref 12–49)
MCH RBC QN AUTO: 27.4 PG (ref 26–34)
MCHC RBC AUTO-ENTMCNC: 32.2 G/DL (ref 30–36.5)
MCV RBC AUTO: 85.3 FL (ref 80–99)
MONOCYTES # BLD: 0.2 K/UL (ref 0–1)
MONOCYTES NFR BLD: 9 % (ref 5–13)
NEUTS SEG # BLD: 1.5 K/UL (ref 1.8–8)
NEUTS SEG NFR BLD: 55 % (ref 32–75)
NRBC # BLD: 0 K/UL (ref 0–0.01)
NRBC BLD-RTO: 0 PER 100 WBC
PLATELET # BLD AUTO: 185 K/UL (ref 150–400)
PMV BLD AUTO: 9.8 FL (ref 8.9–12.9)
POTASSIUM SERPL-SCNC: 4.1 MMOL/L (ref 3.5–5.1)
PROT SERPL-MCNC: 8.4 G/DL (ref 6.4–8.2)
RBC # BLD AUTO: 4.01 M/UL (ref 4.1–5.7)
SAMPLES BEING HELD,HOLD: NORMAL
SODIUM SERPL-SCNC: 139 MMOL/L (ref 136–145)
WBC # BLD AUTO: 2.8 K/UL (ref 4.1–11.1)

## 2019-01-15 PROCEDURE — 99285 EMERGENCY DEPT VISIT HI MDM: CPT

## 2019-01-15 PROCEDURE — 80053 COMPREHEN METABOLIC PANEL: CPT

## 2019-01-15 PROCEDURE — 80307 DRUG TEST PRSMV CHEM ANLYZR: CPT

## 2019-01-15 PROCEDURE — 85025 COMPLETE CBC W/AUTO DIFF WBC: CPT

## 2019-01-15 PROCEDURE — 74011250637 HC RX REV CODE- 250/637: Performed by: EMERGENCY MEDICINE

## 2019-01-15 PROCEDURE — 93005 ELECTROCARDIOGRAM TRACING: CPT

## 2019-01-15 PROCEDURE — 90791 PSYCH DIAGNOSTIC EVALUATION: CPT

## 2019-01-15 PROCEDURE — 36415 COLL VENOUS BLD VENIPUNCTURE: CPT

## 2019-01-15 RX ORDER — CLONIDINE HYDROCHLORIDE 0.1 MG/1
0.2 TABLET ORAL
Status: COMPLETED | OUTPATIENT
Start: 2019-01-15 | End: 2019-01-15

## 2019-01-15 RX ADMIN — CLONIDINE HYDROCHLORIDE 0.2 MG: 0.1 TABLET ORAL at 23:24

## 2019-01-15 NOTE — PROGRESS NOTES
1/15/2019  
4:48 PM 
CM placed TC to Letty Fletcher CM at [facility], attempted to reach [facility] manager, [phone], [facility]. CM placed TC to [facility][1] HOSPITAL PD non-emergency # [phone] requested assistance from dept to place pt. CM notified pt of plan, he was upset and called his girlfriend. CM notified by nursing pt's girlfriend asking to speak w/ someone as she expected hospital to help him. CM notified Resendez Matt, she will speak w/ family member. [facility]  3:55 PM 
EBEN spoke w/ [facility] [phone], she confirmed she called APS (state line) today and [facility], has not heard from agency, she has made attempts to find pt housing at [facility] and through crisis stabilization. Pt arrived late today for HD but she told me his HD was [facility]" he is next scheduled for HD 1/17/19 at 6:15 AM, she also has been in contact w/ Letty Fletcher sp? [phone] EBEN at [facility] as pt was discharged last week w/ arrangements made for HD. [facility]  3:29 PM 
EBEN spoke w/ nursing regarding this pt who was brought by transport here following HD today at [address] [place] [address] because pt ha no known address and has been \"staying in the [employer] off of [address]. Per notes pt is uncooperative and refusing care. CM placed TC to [facility], [phone] intake, based on their knowledge of pt from Shemar Mccoy at [facility] they are unable to accept pt as he is not able to perform ADLs independently. EBEN placed TC to Ms. Julissa Davis at [facility] she is also aware of the pt's situation and referred me to APS, TC placed to -[id number] [facility]. [facility]

## 2019-01-15 NOTE — ED PROVIDER NOTES
70 y.o. male with past medical history significant for DM, HTN, hypercholesterolemia, neuropathy, peripheral vascular disease, arthritis, hepatits C, CKD, CAD, DVT, who presents via private vehicle in wheelchair from [facility] to the ED with cc of referral. Per [facility] staff, [place]e. [facility] referred pt to ED for case management assistance pending medical clearance. Pt notes feeling depressed. He states his last meal was [employer] couple days ago. \" Pt denies all symptoms, specifically headache, chest pain, shortness of breath, fevers, chills, or dizziness. Full history, physical exam, and ROS unable to be obtained due to:  patient uncooperative. Social Hx: daily Tobacco use (0.5 ppd), denies EtOH use, denies Illicit Drug use PCP: Tl Huerta MD 
 
Note written by Roosevelt Malave, as dictated by Winter Groves MD 2:45 PM 
 
 
 
 
  
 
Past Medical History:  
Diagnosis Date  Arthritis  CAD (coronary artery disease) 2007  CKD (chronic kidney disease), stage III   
 DM type 2 causing renal disease ([facility] [address]  DVT (deep venous thrombosis) ([facility] [address] Hx of DVT:  Xarelto stopped due to GI bleed. S/P IVC filter.  Gait abnormality  GI bleed  Heart murmur  Hepatitis C   
 History of blood transfusion  Hypercholesterolemia  Hypertension 11/7/2014  Neuropathy  Non compliance w medication regimen  Peripheral vascular disease ([facility] [address] 11/7/2014  
 A. S/P Right SFA POBA and tibial atherectomy (2/4/13).  PUD (peptic ulcer disease) 2007  Unspecified sleep apnea   
 never used cpap Past Surgical History:  
Procedure Laterality Date [address] UNLISTED  2007  
 has approx 7-8\" midline incision on abdomen--\"had to open him up and clean him out after feeding tube clogged\"  HX GI  2007  
 feeding tube for approx 2 mo  VASCULAR SURGERY PROCEDURE UNLIST Right 1/22/2015  
 popliteal-tibial bypass Family History:  
Problem Relation Age of Onset  Hypertension Father Social History Socioeconomic History  Marital status:  Spouse name: Not on file  Number of children: Not on file  Years of education: Not on file  Highest education level: Not on file Social Needs  Financial resource strain: Not on file  Food insecurity - worry: Not on file  Food insecurity - inability: Not on file  Transportation needs - medical: Not on file  Transportation needs - non-medical: Not on file Occupational History  Not on file Tobacco Use  Smoking status: Current Every Day Smoker Packs/day: 0.50 Substance and Sexual Activity  Alcohol use: No  
 Drug use: No  
  Comment: >20 years ago  Sexual activity: Not on file Other Topics Concern  Not on file Social History Narrative 78 year ols male admitted for depression and homelessness. Pt will be evicated from apartment due to non payment of bills. [facility] 30 [facility] [place] [facility]. \" Pt is a brittle diabetic, with treatment non compliance. He has bilarela lower extremity amputations. Patient has a long hx of substance abuse. ALLERGIES: Tetracycline Review of Systems Unable to perform ROS: Other (pt uncooperative) Constitutional: Negative for chills and fever. Respiratory: Negative for shortness of breath. Cardiovascular: Negative for chest pain. Gastrointestinal: Negative for abdominal pain, constipation, diarrhea and vomiting. Neurological: Negative for dizziness and light-headedness. Psychiatric/Behavioral: Positive for dysphoric mood. All other systems reviewed and are negative. Vitals:  
 01/15/19 1453 BP: 184/90 Pulse: (!) 54 Resp: 14 Temp: 98 °F (36.7 °C) SpO2: 96% Weight: 81.6 kg (180 lb) Physical Exam  
Constitutional: He is oriented to person, place, and time. He appears well-developed. No distress.   
HENT:  
Head: Normocephalic and atraumatic. Eyes: Pupils are equal, round, and reactive to light. No scleral icterus. Neck: Normal range of motion. Neck supple. Cardiovascular: Normal rate and regular rhythm. Pulmonary/Chest: Effort normal. He has wheezes (expiratory). Abdominal: Soft. He exhibits no distension. There is no tenderness. There is no rebound and no guarding. Musculoskeletal: Normal range of motion. BL BKA Neurological: He is alert and oriented to person, place, and time. Skin: Skin is warm and dry. He is not diaphoretic. Psychiatric: He has a normal mood and affect. His behavior is normal. Thought content normal.  
Nursing note and vitals reviewed. Note written by Roosevelt Burr, as dictated by Shayy Lindo MD 2:45 PM 
 
MDM Number of Diagnoses or Management Options Depression, unspecified depression type: Hypertension, unspecified type:  
Diagnosis management comments: Patient presents initially for case management evaluation and assistance for housing. Despite contacting multiple outlets, we were unable to assist the patient with housing and were working on getting him a ride out of the department when he complained of depression. Not acutely suicidal with a plan and cleared by case management. Asymptomatic hypertension in a dialysis patient - treated with clonidine. No acute complaints and patient is without medical complaint or acute finding. Discharged from the hospital.  
 
  
 
Procedures 2:59 PM 
Refusing EKG and blood work. CONSULT NOTE: 
9:37 PM Shayy Lindo MD spoke with Donn Skiff for [facility]. Discussed available diagnostic tests and clinical findings. Will come evaluate pt in ED. 11:09 PM 
Pt was evaluated by [facility], cleared from a psychiatric perspective. Still awaiting remaining blood work, then plan for d/c. 
 
ED EKG interpretation: 
NSR, rate 56, no ST changes, prolonged QT, lateral T wave inversions, no ectopy.  
Note written by Dev Matos Keyur Parsons, as dictated by Kamran Alba MD 11:15 PM 
 
The patient's results have been reviewed with them and/or available family. Patient and/or family verbally conveyed their understanding and agreement of the patient's signs, symptoms, diagnosis, treatment and prognosis and additionally agree to follow up as recommended in the discharge instructions or to return to the Emergency Room should their condition change prior to their follow-up appointment. The patient/family verbally agrees with the care-plan and verbally conveys that all of their questions have been answered. The discharge instructions have also been provided to the patient and/or family with some educational information regarding the patient's diagnosis as well a list of reasons why the patient would want to return to the ER prior to their follow-up appointment, should their condition change.

## 2019-01-15 NOTE — ED TRIAGE NOTES
Pt dropped off from [address] [facility] [address]. Pt is currently homeless and living in [place] with his fiance Nena Gutierrez [phone]/ Pt was d/c from [facility] and is now on dialysis. Pt has transport through [facility] [phone]) until 1/19. Spoke with  at [facility] [phone]. She states she has contacts [facility] state line and filed report. She has tried to get pt crisis stabilization but pt does not qualify due to dialysis and need for ADL assistance. He has previously has assistance from [place] with [facility] [phone] who may be of some help. Winter Smith(?) was  at [facility] prior to discharge. Dialysis t/Th/Sat. Pt had completed dialysis this morning. Pt denies any complains at this time other than depression. Pt is uncooperative. ED Case management aware of situation

## 2019-01-16 LAB
ATRIAL RATE: 56 BPM
CALCULATED P AXIS, ECG09: 57 DEGREES
CALCULATED R AXIS, ECG10: 25 DEGREES
CALCULATED T AXIS, ECG11: -148 DEGREES
DIAGNOSIS, 93000: NORMAL
P-R INTERVAL, ECG05: 220 MS
Q-T INTERVAL, ECG07: 572 MS
QRS DURATION, ECG06: 92 MS
QTC CALCULATION (BEZET), ECG08: 551 MS
VENTRICULAR RATE, ECG03: 56 BPM

## 2019-01-16 NOTE — ED NOTES
Tried to assist patient to the rest room. He refused assistance to remove his pants. He refused assistance with transfer from wheelchair to toilet. Brought patient to use the phone to call his wife. I spoke to his wife and made her aware of the situation before I gave the phone to him.

## 2019-01-16 NOTE — ED TRIAGE NOTES
Patient arrives in triage with c/o depression. Patient states \"I'm going to try and get on the mental hermosillo because I am depressed\". Patient denies any further complaints aside from depression.

## 2019-01-16 NOTE — ED NOTES
Discharge given by provider. Patient denies any questions. Patient was assisted with clothing and belongings and is leaving by wheelchair.

## 2019-01-16 NOTE — ED TRIAGE NOTES
Pt. Decided to sign back in at this time. Pt. Has been in waiting room after his initial sign in 5 hours ago, see case management notes for timeline.

## 2019-01-16 NOTE — PROGRESS NOTES
01/15/19  
 
1700 Received notification from [address] [place] [address] Street[address] Floor regarding situation of homeless patient that was dropped off at the ED by transportation company. [facility] police are currently in waiting room with patient. [place] police spoke to their supervisor and they are unable to transport patient to any facilities. There are not any homeless shelters in [facility]. They can site the patient for trespassing, but will not transport to prison. Police gave 3 resources for homeless shelters that they recommend [facility] [phone]. They were closed when I called. [facility] [phone]. They are not an emergency shelter and could not accept him. [facility]. [phone]. They were closed. 1800 Spoke to [facility] of [facility]. Coordinator at [facility] to discuss possible acceptance for this patient. [facility] is very familiar with patient but is unable to accept him at this time. Patient resides in a homeless [place]ground off of [facility] with girlfriend. Has been in long term care twice and has checked self out. Not independent with ADL's. In wheelchair with bilateral amputations. aJvanelise Mileyfermín gave phone number for [facility] [phone]. They were closed at this time. [facility] [phone]. They were also closed at this time. [address] Discussed case with Administer on call and risk management to see if we could provide a motel for a couple of nights. The hospital was unable to provide this service. Ryan Updated ED charge nurse of situation. Unable to find safe transportation for this patient. Will need to stay in ED until AM. 
 
2000 Spoke to patient to offer cab voucher if he can provide an address for a safe transfer. Patient informed me he did not have one and did not have a discharge plan. During conversation, patient said his chest hurt and has decided he wanted to be seen by a provider. 2030 Updated AOC, and nursing supervisor of plan for this patient during the night. Case management will follow up tomorrow to help find shelter.

## 2019-01-16 NOTE — BSMART NOTE
Comprehensive Assessment Form Part 1 Section I - Disposition Axis I - Depressive Disorder NOS Malingering Nicotine Dependence Axis II - Deferred Axis III - Past Medical History Date Comments Hypercholesterolemia [E78.00] Heart murmur [R01.1] Neuropathy [G62.9] Gait abnormality [R26.9] Peripheral vascular disease ([facility] [I73.9] 11/7/2014 A.  S/P Right SFA POBA and tibial atherectomy (2/4/13). Hypertension [I10] 11/7/2014 Unspecified sleep apnea [G47.30]  never used cpap Arthritis [M19.90] PUD (peptic ulcer disease) [K27.9] 2007 Hepatitis C [B19.20] DM type 2 causing renal disease ([facility] [E11.29] CKD (chronic kidney disease), stage III ([facility] [N18.3] CAD (coronary artery disease) [I25.10] 2007 History of blood transfusion [Z92.89] DVT (deep venous thrombosis) ([facility]) [I82.409]  Hx of DVT:  Xarelto stopped due to GI bleed.  S/P IVC filter. GI bleed [K92.2] Non compliance w medication regimen [Z91.14] Axis IV - Homeless, finances, lacks support, health issues [place] V - 60 The Medical Doctor to Psychiatrist conference was not completed. The Medical Doctor is in agreement with Psychiatrist disposition because of (reason) patient is seeking a voluntary admission. Patient does not meet criteria for admission. The plan is discharge patient with mental health and community resources. The on-call Psychiatrist consulted was Dr. Dulce Maria Rosas. The admitting Psychiatrist will be Dr. Adriana Hill . The admitting Diagnosis is . The Payor source is [address] [place] [address] The name of the representative was . This was approved for  days. The authorization number is . Section II - Integrated Summary Summary:  Patient is 70year old male that was dropped off by [employer] earlier after dialysis due to patient not having any where to go and they reportedly did not want to leave him outside. Attempts were made when patient was initially dropped off to assist him with getting somewhere to go such case management, APS was call and attempt to contact friend that did not lead to assistance. The patient sat in ED waiting room and checked back in after five hours stated \"I'm going to try and get on the mental hermosillo because I am depressed\". At bedside, patient reported having suicidal thoughts without a plan, patient denied previous attempts to harm himself in the past. Patient denied homicidal thoughts or hallucinations. Patient reported he was hospitalized on psychiatric unit a long time ago. Patient reported he has been depressed a couple of weeks which has increase due to him being \"broke\" and he reported he lost his home months ago. Patient is currently homeless as reported he was staying in the [place]. Patient denied having any family or supports. Patient receives dialysis three days a week. Patient reported being compliant with dialysis and his medications. Patient uses wheelchair and is amputee. Patient was calm and cooperative with , patient required redirections to wake up and ask questions. The patient has demonstrated mental capacity to provide informed consent. The information is given by the patient and past medical records. The Chief Complaint is depressed, suicidal. 
The Precipitant Factors are homeless, finances, health. Previous Hospitalizations: yes The patient has not previously been in restraints. Current Psychiatrist and/or  is none. Lethality Assessment: 
 
The potential for suicide noted by the following: ideation . The potential for homicide is not noted. The patient has not been a perpetrator of sexual or physical abuse. There are not pending charges. The patient is not felt to be at risk for self harm or harm to others. The attending nurse was advised not noted.  
 
Section III - Psychosocial 
The patient's overall mood and attitude is calm, cooperative, and fatigued. Feelings of helplessness and hopelessness are not observed. Generalized anxiety is not observed. Panic is not observed. Phobias are not observed. Obsessive compulsive tendencies are not observed. Section IV - Mental Status Exam 
The patient's appearance shows no evidence of impairment. The patient's behavior shows no evidence of impairment. The patient is oriented to time, place, person and situation. The patient's speech is soft. The patient's mood is depressed. The range of affect shows no evidence of impairment. The patient's thought content demonstrates no evidence of impairment. The thought process shows no evidence of impairment. The patient's perception shows no evidence of impairment. The patient's memory shows no evidence of impairment. The patient's appetite shows no evidence of impairment. The patient's sleep shows no evidence of impairment. The patient's insight shows no evidence of impairment. The patient's judgement shows no evidence of impairment. Section V - Substance Abuse The patient is using substances. The patient is using tobacco by inhalation for greater than 10 years with last use on today. The patient has experienced the following withdrawal symptoms: N/A. Section VI - Living Arrangements The patient is . The spouses approximate age is unknown and appears to be in unknown health. The patient lives alone. The patient has 15 children ages 31-64. The patient does plan to return home upon discharge. The patient does not have legal issues pending. The patient's source of income comes from disability. Religion and cultural practices have not been voiced at this time. The patient's greatest support comes from no one reported and this person will not be involved with the treatment.    
The patient has not been in an event described as horrible or outside the realm of ordinary life experience either currently or in the past. 
The patient has not been a victim of sexual/physical abuse. Section VII - Other Areas of Clinical Concern The highest grade achieved is 10th with the overall quality of school experience being described as unknown. The patient is currently unemployed and speaks [place] as a primary language. The patient has no communication impairments affecting communication. The patient's preference for learning can be described as: can read and write adequately.   The patient's hearing is normal.  The patient's vision is normal. 
 
 
Vladislav Florez MA

## 2019-01-16 NOTE — BSMART NOTE
informed patient that he wasnot being admitted today to psychiatric unit and inquired if there was someone we could call for him and probed him about calling his daughter and patient shook his head no.

## 2019-02-08 ENCOUNTER — APPOINTMENT (OUTPATIENT)
Dept: CT IMAGING | Age: 79
DRG: 304 | End: 2019-02-08
Attending: EMERGENCY MEDICINE
Payer: MEDICARE

## 2019-02-08 ENCOUNTER — HOSPITAL ENCOUNTER (INPATIENT)
Age: 79
LOS: 1 days | Discharge: PSYCHIATRIC OR SUBSTANCE USE FACILITY | DRG: 304 | End: 2019-02-13
Attending: EMERGENCY MEDICINE | Admitting: INTERNAL MEDICINE
Payer: MEDICARE

## 2019-02-08 ENCOUNTER — APPOINTMENT (OUTPATIENT)
Dept: GENERAL RADIOLOGY | Age: 79
DRG: 304 | End: 2019-02-08
Attending: PHYSICIAN ASSISTANT
Payer: MEDICARE

## 2019-02-08 DIAGNOSIS — I15.0 RENOVASCULAR HYPERTENSION: Primary | ICD-10-CM

## 2019-02-08 DIAGNOSIS — K80.20 GALLSTONES: ICD-10-CM

## 2019-02-08 PROBLEM — I10 ACCELERATED HYPERTENSION: Status: ACTIVE | Noted: 2019-02-08

## 2019-02-08 PROBLEM — N18.6 ESRD NEEDING DIALYSIS (HCC): Status: ACTIVE | Noted: 2019-02-08

## 2019-02-08 PROBLEM — Z99.2 ESRD NEEDING DIALYSIS (HCC): Status: ACTIVE | Noted: 2019-02-08

## 2019-02-08 LAB
ALBUMIN SERPL-MCNC: 3.1 G/DL (ref 3.5–5)
ALBUMIN/GLOB SERPL: 0.6 {RATIO} (ref 1.1–2.2)
ALP SERPL-CCNC: 75 U/L (ref 45–117)
ALT SERPL-CCNC: 12 U/L (ref 12–78)
ANION GAP SERPL CALC-SCNC: 8 MMOL/L (ref 5–15)
AST SERPL-CCNC: 16 U/L (ref 15–37)
ATRIAL RATE: 52 BPM
BASOPHILS # BLD: 0 K/UL (ref 0–0.1)
BASOPHILS NFR BLD: 0 % (ref 0–1)
BILIRUB SERPL-MCNC: 0.5 MG/DL (ref 0.2–1)
BUN SERPL-MCNC: 38 MG/DL (ref 6–20)
BUN/CREAT SERPL: 11 (ref 12–20)
CALCIUM SERPL-MCNC: 8.8 MG/DL (ref 8.5–10.1)
CALCULATED R AXIS, ECG10: 18 DEGREES
CALCULATED T AXIS, ECG11: -141 DEGREES
CHLORIDE SERPL-SCNC: 105 MMOL/L (ref 97–108)
CO2 SERPL-SCNC: 25 MMOL/L (ref 21–32)
CREAT SERPL-MCNC: 3.51 MG/DL (ref 0.7–1.3)
DIAGNOSIS, 93000: NORMAL
DIFFERENTIAL METHOD BLD: ABNORMAL
EOSINOPHIL # BLD: 0.3 K/UL (ref 0–0.4)
EOSINOPHIL NFR BLD: 4 % (ref 0–7)
ERYTHROCYTE [DISTWIDTH] IN BLOOD BY AUTOMATED COUNT: 18.6 % (ref 11.5–14.5)
GLOBULIN SER CALC-MCNC: 5.5 G/DL (ref 2–4)
GLUCOSE BLD STRIP.AUTO-MCNC: 100 MG/DL (ref 65–100)
GLUCOSE BLD STRIP.AUTO-MCNC: 136 MG/DL (ref 65–100)
GLUCOSE BLD STRIP.AUTO-MCNC: 71 MG/DL (ref 65–100)
GLUCOSE SERPL-MCNC: 68 MG/DL (ref 65–100)
HCT VFR BLD AUTO: 35.5 % (ref 36.6–50.3)
HGB BLD-MCNC: 11.4 G/DL (ref 12.1–17)
IMM GRANULOCYTES # BLD AUTO: 0 K/UL (ref 0–0.04)
IMM GRANULOCYTES NFR BLD AUTO: 0 % (ref 0–0.5)
INR PPP: 1.1 (ref 0.9–1.1)
LACTATE SERPL-SCNC: 0.8 MMOL/L (ref 0.4–2)
LYMPHOCYTES # BLD: 1.1 K/UL (ref 0.8–3.5)
LYMPHOCYTES NFR BLD: 17 % (ref 12–49)
MCH RBC QN AUTO: 27.1 PG (ref 26–34)
MCHC RBC AUTO-ENTMCNC: 32.1 G/DL (ref 30–36.5)
MCV RBC AUTO: 84.3 FL (ref 80–99)
MONOCYTES # BLD: 0.4 K/UL (ref 0–1)
MONOCYTES NFR BLD: 6 % (ref 5–13)
NEUTS SEG # BLD: 4.6 K/UL (ref 1.8–8)
NEUTS SEG NFR BLD: 72 % (ref 32–75)
NRBC # BLD: 0 K/UL (ref 0–0.01)
NRBC BLD-RTO: 0 PER 100 WBC
P-R INTERVAL, ECG05: 184 MS
PLATELET # BLD AUTO: 171 K/UL (ref 150–400)
POTASSIUM SERPL-SCNC: 4.2 MMOL/L (ref 3.5–5.1)
PROT SERPL-MCNC: 8.6 G/DL (ref 6.4–8.2)
PROTHROMBIN TIME: 11.4 SEC (ref 9–11.1)
Q-T INTERVAL, ECG07: 474 MS
QRS DURATION, ECG06: 88 MS
QTC CALCULATION (BEZET), ECG08: 440 MS
RBC # BLD AUTO: 4.21 M/UL (ref 4.1–5.7)
SERVICE CMNT-IMP: ABNORMAL
SERVICE CMNT-IMP: NORMAL
SERVICE CMNT-IMP: NORMAL
SODIUM SERPL-SCNC: 138 MMOL/L (ref 136–145)
TROPONIN I SERPL-MCNC: <0.05 NG/ML
VENTRICULAR RATE, ECG03: 52 BPM
WBC # BLD AUTO: 6.4 K/UL (ref 4.1–11.1)

## 2019-02-08 PROCEDURE — 93005 ELECTROCARDIOGRAM TRACING: CPT

## 2019-02-08 PROCEDURE — 74177 CT ABD & PELVIS W/CONTRAST: CPT

## 2019-02-08 PROCEDURE — 74011250636 HC RX REV CODE- 250/636: Performed by: INTERNAL MEDICINE

## 2019-02-08 PROCEDURE — 83605 ASSAY OF LACTIC ACID: CPT

## 2019-02-08 PROCEDURE — 80053 COMPREHEN METABOLIC PANEL: CPT

## 2019-02-08 PROCEDURE — 84484 ASSAY OF TROPONIN QUANT: CPT

## 2019-02-08 PROCEDURE — 74011636320 HC RX REV CODE- 636/320: Performed by: EMERGENCY MEDICINE

## 2019-02-08 PROCEDURE — 96374 THER/PROPH/DIAG INJ IV PUSH: CPT

## 2019-02-08 PROCEDURE — 71046 X-RAY EXAM CHEST 2 VIEWS: CPT

## 2019-02-08 PROCEDURE — 36415 COLL VENOUS BLD VENIPUNCTURE: CPT

## 2019-02-08 PROCEDURE — 77010033678 HC OXYGEN DAILY

## 2019-02-08 PROCEDURE — 85025 COMPLETE CBC W/AUTO DIFF WBC: CPT

## 2019-02-08 PROCEDURE — 96372 THER/PROPH/DIAG INJ SC/IM: CPT

## 2019-02-08 PROCEDURE — 74011250637 HC RX REV CODE- 250/637: Performed by: INTERNAL MEDICINE

## 2019-02-08 PROCEDURE — 99218 HC RM OBSERVATION: CPT

## 2019-02-08 PROCEDURE — 85610 PROTHROMBIN TIME: CPT

## 2019-02-08 PROCEDURE — 82962 GLUCOSE BLOOD TEST: CPT

## 2019-02-08 PROCEDURE — 99285 EMERGENCY DEPT VISIT HI MDM: CPT

## 2019-02-08 RX ORDER — MAGNESIUM SULFATE 100 %
4 CRYSTALS MISCELLANEOUS AS NEEDED
Status: DISCONTINUED | OUTPATIENT
Start: 2019-02-08 | End: 2019-02-13 | Stop reason: HOSPADM

## 2019-02-08 RX ORDER — HYDROCODONE BITARTRATE AND ACETAMINOPHEN 5; 325 MG/1; MG/1
1 TABLET ORAL
Status: DISCONTINUED | OUTPATIENT
Start: 2019-02-08 | End: 2019-02-13 | Stop reason: HOSPADM

## 2019-02-08 RX ORDER — SODIUM CHLORIDE 0.9 % (FLUSH) 0.9 %
5-40 SYRINGE (ML) INJECTION EVERY 8 HOURS
Status: DISCONTINUED | OUTPATIENT
Start: 2019-02-08 | End: 2019-02-13 | Stop reason: HOSPADM

## 2019-02-08 RX ORDER — POLYETHYLENE GLYCOL 3350 17 G/17G
17 POWDER, FOR SOLUTION ORAL DAILY
Status: DISCONTINUED | OUTPATIENT
Start: 2019-02-08 | End: 2019-02-13 | Stop reason: HOSPADM

## 2019-02-08 RX ORDER — NIFEDIPINE 60 MG/1
60 TABLET, EXTENDED RELEASE ORAL
Status: DISPENSED | OUTPATIENT
Start: 2019-02-08 | End: 2019-02-09

## 2019-02-08 RX ORDER — SODIUM CHLORIDE 0.9 % (FLUSH) 0.9 %
5-40 SYRINGE (ML) INJECTION AS NEEDED
Status: DISCONTINUED | OUTPATIENT
Start: 2019-02-08 | End: 2019-02-13 | Stop reason: HOSPADM

## 2019-02-08 RX ORDER — ASPIRIN 81 MG/1
81 TABLET ORAL DAILY
Status: DISCONTINUED | OUTPATIENT
Start: 2019-02-09 | End: 2019-02-13 | Stop reason: HOSPADM

## 2019-02-08 RX ORDER — HYDRALAZINE HYDROCHLORIDE 20 MG/ML
20 INJECTION INTRAMUSCULAR; INTRAVENOUS
Status: DISCONTINUED | OUTPATIENT
Start: 2019-02-08 | End: 2019-02-11

## 2019-02-08 RX ORDER — HEPARIN SODIUM 5000 [USP'U]/ML
5000 INJECTION, SOLUTION INTRAVENOUS; SUBCUTANEOUS EVERY 8 HOURS
Status: DISCONTINUED | OUTPATIENT
Start: 2019-02-08 | End: 2019-02-13 | Stop reason: HOSPADM

## 2019-02-08 RX ORDER — CARVEDILOL 3.12 MG/1
3.12 TABLET ORAL 2 TIMES DAILY WITH MEALS
Status: DISCONTINUED | OUTPATIENT
Start: 2019-02-08 | End: 2019-02-11

## 2019-02-08 RX ORDER — NIFEDIPINE 60 MG/1
60 TABLET, EXTENDED RELEASE ORAL DAILY
Status: DISCONTINUED | OUTPATIENT
Start: 2019-02-09 | End: 2019-02-13 | Stop reason: HOSPADM

## 2019-02-08 RX ORDER — ONDANSETRON 2 MG/ML
4 INJECTION INTRAMUSCULAR; INTRAVENOUS
Status: DISCONTINUED | OUTPATIENT
Start: 2019-02-08 | End: 2019-02-13 | Stop reason: HOSPADM

## 2019-02-08 RX ORDER — INSULIN LISPRO 100 [IU]/ML
INJECTION, SOLUTION INTRAVENOUS; SUBCUTANEOUS
Status: DISCONTINUED | OUTPATIENT
Start: 2019-02-08 | End: 2019-02-13 | Stop reason: HOSPADM

## 2019-02-08 RX ORDER — ACETAMINOPHEN 325 MG/1
650 TABLET ORAL
Status: DISCONTINUED | OUTPATIENT
Start: 2019-02-08 | End: 2019-02-13 | Stop reason: HOSPADM

## 2019-02-08 RX ORDER — DEXTROSE 50 % IN WATER (D50W) INTRAVENOUS SYRINGE
12.5-25 AS NEEDED
Status: DISCONTINUED | OUTPATIENT
Start: 2019-02-08 | End: 2019-02-13 | Stop reason: HOSPADM

## 2019-02-08 RX ORDER — SODIUM CHLORIDE 0.9 % (FLUSH) 0.9 %
10 SYRINGE (ML) INJECTION
Status: COMPLETED | OUTPATIENT
Start: 2019-02-08 | End: 2019-02-08

## 2019-02-08 RX ORDER — NALOXONE HYDROCHLORIDE 0.4 MG/ML
0.4 INJECTION, SOLUTION INTRAMUSCULAR; INTRAVENOUS; SUBCUTANEOUS AS NEEDED
Status: DISCONTINUED | OUTPATIENT
Start: 2019-02-08 | End: 2019-02-13 | Stop reason: HOSPADM

## 2019-02-08 RX ORDER — ATORVASTATIN CALCIUM 10 MG/1
20 TABLET, FILM COATED ORAL
Status: DISCONTINUED | OUTPATIENT
Start: 2019-02-08 | End: 2019-02-13 | Stop reason: HOSPADM

## 2019-02-08 RX ORDER — DOXAZOSIN 2 MG/1
4 TABLET ORAL
Status: DISCONTINUED | OUTPATIENT
Start: 2019-02-08 | End: 2019-02-13 | Stop reason: HOSPADM

## 2019-02-08 RX ORDER — BISACODYL 5 MG
5 TABLET, DELAYED RELEASE (ENTERIC COATED) ORAL DAILY PRN
Status: DISCONTINUED | OUTPATIENT
Start: 2019-02-08 | End: 2019-02-13 | Stop reason: HOSPADM

## 2019-02-08 RX ADMIN — HYDROCODONE BITARTRATE AND ACETAMINOPHEN 1 TABLET: 5; 325 TABLET ORAL at 19:11

## 2019-02-08 RX ADMIN — DIATRIZOATE MEGLUMINE AND DIATRIZOATE SODIUM 30 ML: 600; 100 SOLUTION ORAL; RECTAL at 14:44

## 2019-02-08 RX ADMIN — HYDRALAZINE HYDROCHLORIDE 20 MG: 20 INJECTION INTRAMUSCULAR; INTRAVENOUS at 19:10

## 2019-02-08 RX ADMIN — CARVEDILOL 3.12 MG: 3.12 TABLET, FILM COATED ORAL at 19:11

## 2019-02-08 RX ADMIN — ATORVASTATIN CALCIUM 20 MG: 10 TABLET, FILM COATED ORAL at 21:35

## 2019-02-08 RX ADMIN — Medication 10 ML: at 21:36

## 2019-02-08 RX ADMIN — DOXAZOSIN 4 MG: 2 TABLET ORAL at 21:35

## 2019-02-08 RX ADMIN — ACETAMINOPHEN 650 MG: 325 TABLET ORAL at 21:35

## 2019-02-08 RX ADMIN — Medication 10 ML: at 16:33

## 2019-02-08 RX ADMIN — POLYETHYLENE GLYCOL 3350 17 G: 17 POWDER, FOR SOLUTION ORAL at 19:10

## 2019-02-08 RX ADMIN — IOPAMIDOL 100 ML: 755 INJECTION, SOLUTION INTRAVENOUS at 16:33

## 2019-02-08 RX ADMIN — HEPARIN SODIUM 5000 UNITS: 5000 INJECTION INTRAVENOUS; SUBCUTANEOUS at 19:10

## 2019-02-08 NOTE — ED PROVIDER NOTES
EMERGENCY DEPARTMENT HISTORY AND PHYSICAL EXAM 
 
 
Date: 2/8/2019 Patient Name: Jose Scuhlz History of Presenting Illness Chief Complaint Patient presents with  Back Pain Pt arrives via EMS c/o lower back pain and abd pain x several days Pt is Tues Thurs Sat dialysis patient Missed treatment yesterday History Provided By: Patient and EMS 
 
HPI: Jose Schulz, 70 y.o. male with PMHx significant for hypercholesterolemia, heart murmur, peripheral vascular disease, arthritis, PUD, DM, CKD, CAD, and DVT, presents via EMS to the ED with cc of new onset generalized body aches and diffuse aching abdominal pain x 2 days, as well as low back pain. Pt notes that he is a Tues, Thurs, Sat dialysis pt with his last dialysis treatment on Tuesday. He reports limited mobility d/t b/l BKA's, using wheelchair for mobility. He reports being homeless and having no social support. He denies any recent trauma, objective fever, or SOB. He reports pain over his \"whole body. \"  There are no clear relieving factors; pain exacerbated w/ movements/changing positions. He notes his sx's have been present for at least several days. No clear radiation of low back pain. There are no other complaints, changes, or physical findings at this time. PCP: Med Singh MD 
 
No current facility-administered medications on file prior to encounter. Current Outpatient Medications on File Prior to Encounter Medication Sig Dispense Refill  amoxicillin (AMOXIL) 500 mg capsule Take 500 mg by mouth.  NIFEdipine ER (ADALAT CC) 60 mg ER tablet Take 60 mg by mouth daily.  rosuvastatin (CRESTOR) 10 mg tablet Take 10 mg by mouth nightly.  acetaminophen (TYLENOL) 500 mg tablet Take 2 Tabs by mouth every six (6) hours as needed for Pain. 20 Tab 0  
 doxazosin (CARDURA) 4 mg tablet Take 1 Tab by mouth nightly.  Indications: hypertension 30 Tab 0  
 mirtazapine (REMERON) 30 mg tablet Take 1 Tab by mouth nightly. Indications: major depressive disorder 30 Tab 0  
 ferrous sulfate 325 mg (65 mg iron) tablet Take 1 Tab by mouth two (2) times daily (with meals). Indications: Iron Deficiency Anemia 60 Tab 0  carvedilol (COREG) 3.125 mg tablet Take 1 Tab by mouth two (2) times daily (with meals). Indications: chronic heart failure 60 Tab 0  
 aspirin delayed-release 81 mg tablet Take 1 Tab by mouth daily. Past History Past Medical History: 
Past Medical History:  
Diagnosis Date  Arthritis  CAD (coronary artery disease) 2007  CKD (chronic kidney disease), stage III   
 DM type 2 causing renal disease ([facility]  [facility] Hx of DVT:  Xarelto stopped due to GI bleed. S/P IVC filter.  Gait abnormality  GI bleed  Heart murmur  Hepatitis C   
 History of blood transfusion  Hypercholesterolemia  Hypertension 11/7/2014  Neuropathy  Non compliance w medication regimen  Peripheral vascular disease ([facility] 11/7/2014  
 A. S/P Right SFA POBA and tibial atherectomy (2/4/13).  PUD (peptic ulcer disease) 2007  Unspecified sleep apnea   
 never used cpap Past Surgical History: 
Past Surgical History:  
Procedure Laterality Date [address] [place] [address] UNLISTED  2007  
 has approx 7-8\" midline incision on abdomen--\"had to open him up and clean him out after feeding tube clogged\"  HX GI  2007  
 feeding tube for approx 2 mo  VASCULAR SURGERY PROCEDURE UNLIST Right 1/22/2015  
 popliteal-tibial bypass Family History: 
Family History Problem Relation Age of Onset  Hypertension Father Social History: 
Social History Tobacco Use  Smoking status: Current Every Day Smoker Packs/day: 0.50 Substance Use Topics  Alcohol use: No  
 Drug use: No  
  Comment: >20 years ago Allergies: Allergies Allergen Reactions  Tetracycline Hives Review of Systems Review of Systems Constitutional: Positive for fatigue. Negative for fever. HENT: Negative for congestion. Eyes: Negative for visual disturbance. Respiratory: Positive for cough (minimal). Negative for chest tightness and shortness of breath. Cardiovascular: Negative for chest pain and leg swelling. Gastrointestinal: Positive for abdominal pain. Negative for abdominal distention, diarrhea and vomiting. Endocrine: Negative for polyuria. Genitourinary: Negative for dysuria and frequency. Musculoskeletal: Positive for back pain, myalgias and neck pain. Skin: Negative for color change and wound. Allergic/Immunologic: Negative for immunocompromised state. Neurological: Negative for numbness. Physical Exam  
Physical Exam  
Nursing note and vitals reviewed. General appearance: non-toxic, resting in stretcher Eyes: PERRL, EOMI, conjunctiva normal, anicteric sclera HEENT: mucous membranes moist, neck supple Pulmonary: clear to auscultation bilaterally Cardiac: normal rate and regular rhythm, no murmurs, gallops, or rubs, 2+DP pulses, 2+ radial pulses Abdomen: soft, diffuse non-focal TTP, nondistended, no rebound, not rigid, bowel sounds present, no CVAT 
MSK: B/L BKA - stumps healed, no open wounds, port cath R chest wall, TTP along lumbar paraspinals Neuro: Alert, answers questions, able to sit upright Skin: capillary refill brisk Diagnostic Study Results Labs - Recent Results (from the past 12 hour(s)) GLUCOSE, POC Collection Time: 02/08/19 11:36 AM  
Result Value Ref Range Glucose (POC) 71 65 - 100 mg/dL Performed by Smitha Westfall   
EKG, 12 LEAD, INITIAL Collection Time: 02/08/19 11:53 AM  
Result Value Ref Range Ventricular Rate 52 BPM  
 Atrial Rate 52 BPM  
 P-R Interval 184 ms QRS Duration 88 ms Q-T Interval 474 ms QTC Calculation (Bezet) 440 ms Calculated R Axis 18 degrees Calculated T Axis -141 degrees Diagnosis   Sinus bradycardia with premature atrial complexes Left ventricular hypertrophy with repolarization abnormality When compared with ECG of 15-MARTIN-2019 22:03, 
premature atrial complexes are now present DE interval has decreased QT has shortened Confirmed by Celeste Jama ([id number]) on 2/8/2019 1:36:59 PM 
  
GLUCOSE, POC Collection Time: 02/08/19 11:58 AM  
Result Value Ref Range Glucose (POC) 100 65 - 100 mg/dL Performed by Jignesh Pinto   
CBC WITH AUTOMATED DIFF Collection Time: 02/08/19  2:00 PM  
Result Value Ref Range WBC 6.4 4.1 - 11.1 K/uL  
 RBC 4.21 4.10 - 5.70 M/uL  
 HGB 11.4 (L) 12.1 - 17.0 g/dL HCT 35.5 (L) 36.6 - 50.3 % MCV 84.3 80.0 - 99.0 FL  
 MCH 27.1 26.0 - 34.0 PG  
 MCHC 32.1 30.0 - 36.5 g/dL  
 RDW 18.6 (H) 11.5 - 14.5 % PLATELET 567 480 - 950 K/uL NRBC 0.0 0  WBC ABSOLUTE NRBC 0.00 0.00 - 0.01 K/uL NEUTROPHILS 72 32 - 75 % LYMPHOCYTES 17 12 - 49 % MONOCYTES 6 5 - 13 % EOSINOPHILS 4 0 - 7 % BASOPHILS 0 0 - 1 % IMMATURE GRANULOCYTES 0 0.0 - 0.5 % ABS. NEUTROPHILS 4.6 1.8 - 8.0 K/UL  
 ABS. LYMPHOCYTES 1.1 0.8 - 3.5 K/UL  
 ABS. MONOCYTES 0.4 0.0 - 1.0 K/UL  
 ABS. EOSINOPHILS 0.3 0.0 - 0.4 K/UL  
 ABS. BASOPHILS 0.0 0.0 - 0.1 K/UL  
 ABS. IMM. GRANS. 0.0 0.00 - 0.04 K/UL  
 DF AUTOMATED    
LACTIC ACID Collection Time: 02/08/19  2:35 PM  
Result Value Ref Range Lactic acid 0.8 0.4 - 2.0 MMOL/L  
TROPONIN I Collection Time: 02/08/19  3:00 PM  
Result Value Ref Range Troponin-I, Qt. <0.05 <0.05 ng/mL PROTHROMBIN TIME + INR Collection Time: 02/08/19  3:00 PM  
Result Value Ref Range INR 1.1 0.9 - 1.1 Prothrombin time 11.4 (H) 9.0 - 11.1 sec METABOLIC PANEL, COMPREHENSIVE Collection Time: 02/08/19  3:00 PM  
Result Value Ref Range Sodium 138 136 - 145 mmol/L Potassium 4.2 3.5 - 5.1 mmol/L Chloride 105 97 - 108 mmol/L  
 CO2 25 21 - 32 mmol/L Anion gap 8 5 - 15 mmol/L Glucose 68 65 - 100 mg/dL  BUN 38 (H) 6 - 20 MG/DL Creatinine 3.51 (H) 0.70 - 1.30 MG/DL  
 BUN/Creatinine ratio 11 (L) 12 - 20 GFR est AA 21 (L) >60 ml/min/1.73m2 GFR est non-AA 17 (L) >60 ml/min/1.73m2 Calcium 8.8 8.5 - 10.1 MG/DL Bilirubin, total 0.5 0.2 - 1.0 MG/DL  
 ALT (SGPT) 12 12 - 78 U/L  
 AST (SGOT) 16 15 - 37 U/L Alk. phosphatase 75 45 - 117 U/L Protein, total 8.6 (H) 6.4 - 8.2 g/dL Albumin 3.1 (L) 3.5 - 5.0 g/dL Globulin 5.5 (H) 2.0 - 4.0 g/dL A-G Ratio 0.6 (L) 1.1 - 2.2 Radiologic Studies -  
XR CHEST PA LAT Final Result IMPRESSION:  
  
No acute process on chest x-ray. Right jugular dialysis catheter terminates in  
the distal superior vena cava. No pneumothorax. CT ABD PELV W CONT    (Results Pending) CT Results  (Last 48 hours) 02/08/19 1633  CT ABD PELV W CONT Final result Impression:  IMPRESSION:  
   
1. No acute process on CT. 2. Cholelithiasis. No cholecystitis. 3. Subcentimeter segment 2 liver lesions cannot be fully characterized on this  
examination. 4. Moderate colonic fecal burden may represent constipation. No bowel  
obstruction. Narrative:  EXAM: CT ABD PELV W CONT INDICATION: Abdominal pain and low back pain for several days. End-stage renal  
disease on dialysis, missed dialysis yesterday. Normal white blood cell count. Normal liver enzymes. COMPARISON: None. CONTRAST: 100 mL of Isovue-370. TECHNIQUE:   
Following the uneventful intravenous administration of contrast, thin axial  
images were obtained through the abdomen and pelvis. Coronal and sagittal  
reconstructions were generated. Oral contrast was administered. CT dose  
reduction was achieved through use of a standardized protocol tailored for this  
examination and automatic exposure control for dose modulation. FINDINGS:   
LUNG BASES: No pneumonia. Mild dependent atelectasis. INCIDENTALLY IMAGED HEART AND MEDIASTINUM: Borderline cardiac size.  No pericardial effusion. LIVER: Subcentimeter liver lesions measure up to 9 mm in segment 2 of the liver. Surface is smooth. Streak artifact from bilateral upper extremities. GALLBLADDER: Gallstones are in the body and neck. No evidence of cholecystitis. CBD is not dilated. SPLEEN: Normal size and enhancement. PANCREAS: No mass or ductal dilatation. ADRENALS: Unremarkable. KIDNEYS: No mass, calculus, or hydronephrosis. STOMACH: Partial distention. SMALL BOWEL: No dilatation or wall thickening. COLON: Moderate colonic fecal burden. No mural thickening. APPENDIX: Unremarkable. PERITONEUM: No ascites or pneumoperitoneum. RETROPERITONEUM: IVC filter is in good position. Aorta is atherosclerotic  
without aneurysm. Mild atherosclerotic stenosis of the celiac artery. No  
lymphadenopathy. REPRODUCTIVE ORGANS: Mild prostatomegaly measures 6 cm and extends into the base  
of the urinary bladder. URINARY BLADDER: No mass or calculus. BONES: Moderate bilateral hip osteoarthritis. ADDITIONAL COMMENTS: N/A  
   
  
  
 
CXR Results  (Last 48 hours) 02/08/19 1251  XR CHEST PA LAT Final result Impression:  IMPRESSION:  
   
No acute process on chest x-ray. Right jugular dialysis catheter terminates in  
the distal superior vena cava. No pneumothorax. Narrative:  EXAM: XR CHEST PA LAT INDICATION: Shortness of breath, Low back pain and abdominal pain for several  
days. End-stage renal disease on dialysis, missed dialysis one day ago. COMPARISON: Chest views on 5/20/2018 TECHNIQUE: 4 images of PA and lateral chest views FINDINGS: Right jugular dialysis catheter is new and terminates in the distal  
superior vena cava. Cardiac monitoring wires overlie the thorax. Borderline  
cardiac size is unchanged. Aortic arch is not enlarged. The pulmonary  
vasculature is within normal limits. The lungs and pleural spaces are clear.  The visualized bones and upper abdomen  
are age-appropriate. Medical Decision Making I am the first provider for this patient. I reviewed the vital signs, available nursing notes, past medical history, past surgical history, family history and social history. Vital Signs-Reviewed the patient's vital signs. Patient Vitals for the past 12 hrs: 
 Temp Pulse Resp BP SpO2  
02/08/19 2016 100.2 °F (37.9 °C) 60 18 160/65 99 % 02/08/19 1930    171/74   
02/08/19 1910  61  197/74 99 % 02/08/19 1800  68  185/75 99 % 02/08/19 1730  62  182/68 98 % 02/08/19 1600  (!) 58  193/75 100 % 02/08/19 1530  (!) 58  (!) 202/74 99 % 02/08/19 1500  (!) 59  (!) 204/79 100 % 02/08/19 1435  (!) 56  (!) 203/78 100 % 02/08/19 1330  (!) 53  184/73 100 % 02/08/19 1313  (!) 55  195/70 100 % 02/08/19 1215    194/75  Pulse Oximetry Analysis - 100% on room air Cardiac Monitor:  
Rate: 52 bpm 
Rhythm: Normal Sinus Rhythm EKG interpretation: 1153 Rhythm: Sinus Bradycardia with premature atrial complexes and Regular. Rate (approx.): 52 bpm; Axis: normal; ST/T wave: no ST elevation, no ST depression; Other: Left ventricular hypertrophy with repolarization abnormality, similar to 5/2018 NE interval 184 ms, QRS 88 ms, QTc 440 ms. Records Reviewed: Nursing Notes, Old Medical Records, Previous Radiology Studies and Previous Laboratory Studies Provider Notes (Medical Decision Making):  
Pt w/ diffuse body aches/pain and abdominal pain. No clear etiology, CT negative except gallstones. Pt on dialysis, likely HTN from lack of medication and missed HD session. No respiratory distress. Suspect MSK low back pain. Case management reviewed case by phone. Will assist w/ patient if placement/shelter is needed but none available at this point today. Can evaluate pt once in hospital.  
 
ED Course:  
Initial assessment performed.  The patients presenting problems have been discussed, and they are in agreement with the care plan formulated and outlined with them. I have encouraged them to ask questions as they arise throughout their visit. Procedure Note- Peripheral IV Access 2:00 PM 
Performed by: Danika Romero. Roderick Hopson MD 
I gained IV access using  20 gauge needle because the patient had no vascular access for blood draws. After cleaning the site with chloraprep, the Right median cubital vein was localized with ultrasound guidance in an anterior approach. Line confirmation was obtained by direct visualization and good blood return. No anaesthetic was used. The line was successfully flushed with normal saline and was secured with transparent tape. Estimated blood loss: 2ml The procedure took 1-15 minutes, and pt tolerated well. CONSULT NOTE:  
5:40 PM 
Danika Romero. Roderick Hopson MD spoke with Dr. Yoly Ferrer, Specialty: Nephrology Discussed pt's hx, disposition, and available diagnostic and imaging results. Reviewed care plans. Consultant agrees with plans as outlined. Dr. Yoly Ferrer states that they will see the pt tomorrow for dialysis. Written by Debby , ED Scribe, as dictated by Bita Hopson MD. 
 
CONSULT NOTE:  
5:41 PM 
Danika Romero. Roderick Hopson MD spoke with Dr. Angela Graf, Specialty: Hospitalist 
Discussed pt's hx, disposition, and available diagnostic and imaging results. Reviewed care plans. Consultant will evaluate pt for admission. Written by Debby Loaiza, ED Scribe, as dictated by Bita Hopson MD. Critical Care Time:  
0 minutes. Disposition: 
Admit 5:41 PM 
Patient is being admitted to the hospital by Dr. Angela Graf. The results of their tests and reasons for their admission have been discussed with them and/or available family. They convey agreement and understanding for the need to be admitted and for their admission diagnosis. Consultation has been made with the inpatient physician specialist for hospitalization.  
Written by Debby GONZALO Scribe, as dictated by Bita Peoples Catarina Sanchez MD. 
 
PLAN: 
1. Admit to hospitalist  
 
Diagnosis Clinical Impression: 1. Renovascular hypertension 2. Gallstones Attestations: This note is prepared by Hiwot Coughlin, acting as Scribe for Jaclyn Kiran MD. 
 
Jaclyn Kiran MD: The scribe's documentation has been prepared under my direction and personally reviewed by me in its entirety. I confirm that the note above accurately reflects all work, treatment, procedures, and medical decision making performed by me. This note is prepared by Agustín Cordova, acting as Scribe for [employer]. Catarina Sanchez MD. [employer]. Catarina Sanchez MD: The scribe's documentation has been prepared under my direction and personally reviewed by me in its entirety. I confirm that the note above accurately reflects all work, treatment, procedures, and medical decision making performed by me. This note will not be viewable in [address]

## 2019-02-08 NOTE — H&P
Hospitalist Admission Note NAME: Renan Simons :  1940 MRN:  [id number] Date/Time:  2019 5:57 PM 
 
Patient PCP: Escobar Avendaño MD 
______________________________________________________________________ Given the patient's current clinical presentation, I have a high level of concern for decompensation if discharged from the emergency department. Complex decision making was performed, which includes reviewing the patient's available past medical records, laboratory results, and x-ray films. My assessment of this patient's clinical condition and my plan of care is as follows. Assessment / Plan: Hypertensive Urgency POA Due to missed HD on Thursday ESRD on  - at [facility] unit per pt Diffuse Pain POA- non specific, ?related to Neuropathy H/o DM type 2 with Neuropathy PAD S/P Right leg bypass, now s/p amputation bilaterally Medical NonCompliance POA- pt not taking medications currently Admit on observation Renal tele bed Resume PO BP meds, IV hydralazine prn IP Nephrology consulted - arranging HD in AM as pt not an urgent HD cadidate IP case management consulted for safe DC planning- pt is homeless, but has medicaid & likely needs placement due to his amputation status Pharmacy consulted to get DC med list reconciliation from [facility] (pt was last DC from there after the amputation- pt claims he went to [facility] as he couldn't get any placement -- since then he is essentially homeless & [place]\" as per pt Restart ASA & statin for now DM - BS on lower side in ER Neuropathy POA Not taking any meds or insulin per pt 
FS Q AC & HS, Diabetic diet, SSI lispro if needed Observe for now Norco prn for now- had gotten Percocet prescribed in 2019 by some one-? ER Constipation- on CT imaging Aggressive bowel regimen Hyperlipidemia-not taking anything right now 
resume statin per previous medication list in [facility] for now 
 
H/o HepC Code Status: Full Surrogate Decision Maker: wife DVT Prophylaxis: Sq heparin GI Prophylaxis: not indicated Baseline: Pt is currently Homeless with wife- currently at [address] with her daughter as per pt, but gets HD at [facility] unit TTS , claims has Medicaid Subjective: CHIEF COMPLAINT: Generalized Pain x 2 days HISTORY OF PRESENT ILLNESS:    
Rivas Tucker is a 70 y.o.  male who presents with above complains via EMS. Pt presented with CC of generalized diffused pain all over x 2 days H/o missed HD on Thursday- is on TTS schedule H/o Medical non compliance due to being Homeless as per pt Currently not taking any meds as per pt Pt was found to have unremarkable workup including CT A/P showing only constipation & cholelithiasis. We were asked to admit for work up and evaluation of the above problems. Past Medical History:  
Diagnosis Date  Arthritis  CAD (coronary artery disease) 2007  CKD (chronic kidney disease), stage III   
 DM type 2 causing renal disease ([facility]  DVT (deep venous thrombosis) ([facility] Hx of DVT:  Xarelto stopped due to GI bleed. S/P IVC filter.  Gait abnormality  GI bleed  Heart murmur  Hepatitis C   
 History of blood transfusion  Hypercholesterolemia  Hypertension 11/7/2014  Neuropathy  Non compliance w medication regimen  Peripheral vascular disease ([facility] 11/7/2014  
 A. S/P Right SFA POBA and tibial atherectomy (2/4/13).  PUD (peptic ulcer disease) 2007  Unspecified sleep apnea   
 never used cpap Past Surgical History:  
Procedure Laterality Date [address] UNLISTED  2007  
 has approx 7-8\" midline incision on abdomen--\"had to open him up and clean him out after feeding tube clogged\"  HX GI  2007  
 feeding tube for approx 2 mo  VASCULAR SURGERY PROCEDURE UNLIST Right 1/22/2015  
 popliteal-tibial bypass Social History Tobacco Use  Smoking status: Current Every Day Smoker Packs/day: 0.50 Substance Use Topics  Alcohol use: No  
  
 
Family History Problem Relation Age of Onset  Hypertension Father Allergies Allergen Reactions  Tetracycline Hives Prior to Admission medications Medication Sig Start Date End Date Taking? Authorizing Provider  
amoxicillin (AMOXIL) 500 mg capsule Take 500 mg by mouth. Johny Warner MD  
NIFEdipine ER (ADALAT CC) 60 mg ER tablet Take 60 mg by mouth daily. Johny Warner MD  
rosuvastatin (CRESTOR) 10 mg tablet Take 10 mg by mouth nightly. Johny Warner MD  
acetaminophen (TYLENOL) 500 mg tablet Take 2 Tabs by mouth every six (6) hours as needed for Pain. 5/20/18   Suzette James PA-C  
doxazosin (CARDURA) 4 mg tablet Take 1 Tab by mouth nightly. Indications: hypertension 2/21/18   Linda Ivan MD  
mirtazapine (REMERON) 30 mg tablet Take 1 Tab by mouth nightly. Indications: major depressive disorder 2/21/18   Linda Ivan MD  
ferrous sulfate 325 mg (65 mg iron) tablet Take 1 Tab by mouth two (2) times daily (with meals). Indications: Iron Deficiency Anemia 2/21/18   Linda Ivan MD  
carvedilol (COREG) 3.125 mg tablet Take 1 Tab by mouth two (2) times daily (with meals). Indications: chronic heart failure 2/21/18   Linda Ivan MD  
aspirin delayed-release 81 mg tablet Take 1 Tab by mouth daily. 1/23/18   Soren Cazares MD  
 
 
REVIEW OF SYSTEMS:    
 
 
 
Total of 12 systems reviewed as follows:   
   POSITIVE= underlined text  Negative = text not underlined General:  fever, chills, sweats, generalized weakness, weight loss/gain,  
   loss of appetite Eyes:    blurred vision, eye pain, loss of vision, double vision ENT:    rhinorrhea, pharyngitis Respiratory:   cough, sputum production, SOB, TOLEDO, wheezing, pleuritic pain  
Cardiology:   chest pain, palpitations, orthopnea, PND, edema, syncope , 
Gastrointestinal:  abdominal pain , N/V, diarrhea, dysphagia, constipation, bleeding Genitourinary:  frequency, urgency, dysuria, hematuria, incontinence Muskuloskeletal :  arthralgia, myalgia, back pain, Hematology:  easy bruising, nose or gum bleeding, lymphadenopathy Dermatological: rash, ulceration, pruritis, color change / jaundice Endocrine:   hot flashes or polydipsia Neurological:  headache, dizziness, confusion, focal weakness, paresthesia, Speech difficulties, memory loss, gait difficulty Psychological: Feelings of anxiety, depression, agitation Objective: VITALS:   
Visit Vitals /75 Pulse (!) 58 Temp 98.1 °F (36.7 °C) Resp 18 SpO2 100% PHYSICAL EXAM: 
 
General:    Alert, cooperative, no distress, appears stated age. HEENT: Atraumatic, anicteric sclerae, pink conjunctivae No oral ulcers, mucosa moist, throat clear, dentition fair Neck:  Supple, symmetrical,  thyroid: non tender Lungs:   Clear to auscultation bilaterally. No Wheezing or Rhonchi. No rales. Chest wall:  No tenderness  No Accessory muscle use. R sided HD catheter noted + Heart:   Regular  rhythm,  No  murmur   No edema Abdomen:   Soft, non-tender. Not distended. Bowel sounds normal 
Extremities: No cyanosis. No clubbing, Amputation stumps appear clean & well healed Skin turgor normal, Capillary refill normal, Radial dial pulse 2+ Skin:     Not pale. Not Jaundiced  No rashes Psych:  Good insight. Not depressed. Not anxious or agitated. Neurologic: EOMs intact. No facial asymmetry. No aphasia or slurred speech. Symmetrical strength, Sensation grossly intact. Alert and oriented X 4.  
 
_______________________________________________________________________ Care Plan discussed with: 
  Comments Patient x Family RN x Care Manager Consultant:  saran Sanchez  
_______________________________________________________________________ Expected  Disposition:  
Home with Family HH/PT/OT/RN   
SNF/LTC x  
DONN   
________________________________________________________________________ TOTAL TIME:  55 Minutes Critical Care Provided     Minutes non procedure based Comments  
 x Reviewed previous records  
>50% of visit spent in counseling and coordination of care x Discussion with patient and questions answered 
  
 
________________________________________________________________________ Signed: Med Sierra MD 
 
Procedures: see electronic medical records for all procedures/Xrays and details which were not copied into this note but were reviewed prior to creation of Plan. LAB DATA REVIEWED:   
Recent Results (from the past 24 hour(s)) GLUCOSE, POC Collection Time: 02/08/19 11:36 AM  
Result Value Ref Range Glucose (POC) 71 65 - 100 mg/dL Performed by [facility]   
EKG, 12 LEAD, INITIAL Collection Time: 02/08/19 11:53 AM  
Result Value Ref Range Ventricular Rate 52 BPM  
 Atrial Rate 52 BPM  
 P-R Interval 184 ms QRS Duration 88 ms Q-T Interval 474 ms QTC Calculation (Bezet) 440 ms Calculated R Axis 18 degrees Calculated T Axis -141 degrees Diagnosis Sinus bradycardia with premature atrial complexes Left ventricular hypertrophy with repolarization abnormality When compared with ECG of 15-MARTIN-2019 22:03, 
premature atrial complexes are now present TN interval has decreased QT has shortened Confirmed by Ronaldo Newell ([id number]) on 2/8/2019 1:36:59 PM 
  
GLUCOSE, POC Collection Time: 02/08/19 11:58 AM  
Result Value Ref Range Glucose (POC) 100 65 - 100 mg/dL Performed by Bebe M[facility] WITH AUTOMATED DIFF Collection Time: 02/08/19  2:00 PM  
Result Value Ref Range WBC 6.4 4.1 - 11.1 K/uL  
 RBC 4.21 4.10 - 5.70 M/uL  
 HGB 11.4 (L) 12.1 - 17.0 g/dL HCT 35.5 (L) 36.6 - 50.3 % MCV 84.3 80.0 - 99.0 FL  
 MCH 27.1 26.0 - 34.0 PG  
 MCHC 32.1 30.0 - 36.5 g/dL  
 RDW 18.6 (H) 11.5 - 14.5 %  PLATELET 710 150 - 400 K/uL NRBC 0.0 0  WBC ABSOLUTE NRBC 0.00 0.00 - 0.01 K/uL NEUTROPHILS 72 32 - 75 % LYMPHOCYTES 17 12 - 49 % MONOCYTES 6 5 - 13 % EOSINOPHILS 4 0 - 7 % BASOPHILS 0 0 - 1 % IMMATURE GRANULOCYTES 0 0.0 - 0.5 % ABS. NEUTROPHILS 4.6 1.8 - 8.0 K/UL  
 ABS. LYMPHOCYTES 1.1 0.8 - 3.5 K/UL  
 ABS. MONOCYTES 0.4 0.0 - 1.0 K/UL  
 ABS. EOSINOPHILS 0.3 0.0 - 0.4 K/UL  
 ABS. BASOPHILS 0.0 0.0 - 0.1 K/UL  
 ABS. IMM. GRANS. 0.0 0.00 - 0.04 K/UL  
 DF AUTOMATED    
LACTIC ACID Collection Time: 02/08/19  2:35 PM  
Result Value Ref Range Lactic acid 0.8 0.4 - 2.0 MMOL/L  
TROPONIN I Collection Time: 02/08/19  3:00 PM  
Result Value Ref Range Troponin-I, Qt. <0.05 <0.05 ng/mL PROTHROMBIN TIME + INR Collection Time: 02/08/19  3:00 PM  
Result Value Ref Range INR 1.1 0.9 - 1.1 Prothrombin time 11.4 (H) 9.0 - 11.1 sec METABOLIC PANEL, COMPREHENSIVE Collection Time: 02/08/19  3:00 PM  
Result Value Ref Range Sodium 138 136 - 145 mmol/L Potassium 4.2 3.5 - 5.1 mmol/L Chloride 105 97 - 108 mmol/L  
 CO2 25 21 - 32 mmol/L Anion gap 8 5 - 15 mmol/L Glucose 68 65 - 100 mg/dL BUN 38 (H) 6 - 20 MG/DL Creatinine 3.51 (H) 0.70 - 1.30 MG/DL  
 BUN/Creatinine ratio 11 (L) 12 - 20 GFR est AA 21 (L) >60 ml/min/1.73m2 GFR est non-AA 17 (L) >60 ml/min/1.73m2 Calcium 8.8 8.5 - 10.1 MG/DL Bilirubin, total 0.5 0.2 - 1.0 MG/DL  
 ALT (SGPT) 12 12 - 78 U/L  
 AST (SGOT) 16 15 - 37 U/L Alk. phosphatase 75 45 - 117 U/L Protein, total 8.6 (H) 6.4 - 8.2 g/dL Albumin 3.1 (L) 3.5 - 5.0 g/dL Globulin 5.5 (H) 2.0 - 4.0 g/dL A-G Ratio 0.6 (L) 1.1 - 2.2

## 2019-02-08 NOTE — ED NOTES
Patient complains of generalized body aches and abdominal pain x several days Patient is Carlos Christopher, Sat dialysis patient, last dialysis was Tuesday Patient is homeless Labs and imaging ordered in [place] Nephrologist: Dr. Katya Cason. Felix Malik I have evaluated the patient as the Provider in Triage. I have reviewed His  vital signs and the triage nurse assessment. I have talked with the patient and any available family and advised that I am the provider in triage and have ordered the appropriate study to initiate their work up based on the clinical presentation during my assessment. I have advised that the patient will be accommodated in the Main ED as soon as possible. I have also requested to contact the triage nurse or myself immediately if the patient experiences any changes in their condition during this brief waiting period.  
Medardo Wu PA-C

## 2019-02-08 NOTE — ED NOTES
Bilateral AKA 8 months ago r/t DM. POC glucose obtained 5 minutes late due to obtaining EKG first. 
Pt alert and oriented and verbal since this nurse has been in contact with pt.

## 2019-02-09 LAB
ANION GAP SERPL CALC-SCNC: 4 MMOL/L (ref 5–15)
BASOPHILS # BLD: 0 K/UL (ref 0–0.1)
BASOPHILS NFR BLD: 0 % (ref 0–1)
BUN SERPL-MCNC: 44 MG/DL (ref 6–20)
BUN/CREAT SERPL: 11 (ref 12–20)
CALCIUM SERPL-MCNC: 7.9 MG/DL (ref 8.5–10.1)
CHLORIDE SERPL-SCNC: 105 MMOL/L (ref 97–108)
CO2 SERPL-SCNC: 29 MMOL/L (ref 21–32)
COMMENT, HOLDF: NORMAL
CREAT SERPL-MCNC: 4.05 MG/DL (ref 0.7–1.3)
DIFFERENTIAL METHOD BLD: ABNORMAL
EOSINOPHIL # BLD: 0.2 K/UL (ref 0–0.4)
EOSINOPHIL NFR BLD: 5 % (ref 0–7)
ERYTHROCYTE [DISTWIDTH] IN BLOOD BY AUTOMATED COUNT: 17.7 % (ref 11.5–14.5)
GLUCOSE BLD STRIP.AUTO-MCNC: 110 MG/DL (ref 65–100)
GLUCOSE BLD STRIP.AUTO-MCNC: 125 MG/DL (ref 65–100)
GLUCOSE BLD STRIP.AUTO-MCNC: 69 MG/DL (ref 65–100)
GLUCOSE BLD STRIP.AUTO-MCNC: 81 MG/DL (ref 65–100)
GLUCOSE BLD STRIP.AUTO-MCNC: 83 MG/DL (ref 65–100)
GLUCOSE SERPL-MCNC: 67 MG/DL (ref 65–100)
HBV SURFACE AG SER QL: <0.1 INDEX
HBV SURFACE AG SER QL: NEGATIVE
HCT VFR BLD AUTO: 29.3 % (ref 36.6–50.3)
HGB BLD-MCNC: 9.3 G/DL (ref 12.1–17)
IMM GRANULOCYTES # BLD AUTO: 0 K/UL (ref 0–0.04)
IMM GRANULOCYTES NFR BLD AUTO: 0 % (ref 0–0.5)
LYMPHOCYTES # BLD: 1.1 K/UL (ref 0.8–3.5)
LYMPHOCYTES NFR BLD: 27 % (ref 12–49)
MCH RBC QN AUTO: 27.1 PG (ref 26–34)
MCHC RBC AUTO-ENTMCNC: 31.7 G/DL (ref 30–36.5)
MCV RBC AUTO: 85.4 FL (ref 80–99)
MONOCYTES # BLD: 0.4 K/UL (ref 0–1)
MONOCYTES NFR BLD: 10 % (ref 5–13)
NEUTS SEG # BLD: 2.5 K/UL (ref 1.8–8)
NEUTS SEG NFR BLD: 59 % (ref 32–75)
NRBC # BLD: 0 K/UL (ref 0–0.01)
NRBC BLD-RTO: 0 PER 100 WBC
PLATELET # BLD AUTO: 143 K/UL (ref 150–400)
PMV BLD AUTO: 11 FL (ref 8.9–12.9)
POTASSIUM SERPL-SCNC: 5.1 MMOL/L (ref 3.5–5.1)
RBC # BLD AUTO: 3.43 M/UL (ref 4.1–5.7)
SAMPLES BEING HELD,HOLD: NORMAL
SERVICE CMNT-IMP: ABNORMAL
SERVICE CMNT-IMP: ABNORMAL
SERVICE CMNT-IMP: NORMAL
SODIUM SERPL-SCNC: 138 MMOL/L (ref 136–145)
WBC # BLD AUTO: 4.3 K/UL (ref 4.1–11.1)

## 2019-02-09 PROCEDURE — 5A1D70Z PERFORMANCE OF URINARY FILTRATION, INTERMITTENT, LESS THAN 6 HOURS PER DAY: ICD-10-PCS | Performed by: INTERNAL MEDICINE

## 2019-02-09 PROCEDURE — 74011250637 HC RX REV CODE- 250/637: Performed by: INTERNAL MEDICINE

## 2019-02-09 PROCEDURE — 85025 COMPLETE CBC W/AUTO DIFF WBC: CPT

## 2019-02-09 PROCEDURE — 74011250636 HC RX REV CODE- 250/636: Performed by: INTERNAL MEDICINE

## 2019-02-09 PROCEDURE — 99218 HC RM OBSERVATION: CPT

## 2019-02-09 PROCEDURE — 87340 HEPATITIS B SURFACE AG IA: CPT

## 2019-02-09 PROCEDURE — 36415 COLL VENOUS BLD VENIPUNCTURE: CPT

## 2019-02-09 PROCEDURE — 80048 BASIC METABOLIC PNL TOTAL CA: CPT

## 2019-02-09 PROCEDURE — 82962 GLUCOSE BLOOD TEST: CPT

## 2019-02-09 RX ORDER — HEPARIN SODIUM 1000 [USP'U]/ML
3600 INJECTION, SOLUTION INTRAVENOUS; SUBCUTANEOUS
Status: DISCONTINUED | OUTPATIENT
Start: 2019-02-09 | End: 2019-02-13 | Stop reason: HOSPADM

## 2019-02-09 RX ADMIN — HEPARIN SODIUM 5000 UNITS: 5000 INJECTION INTRAVENOUS; SUBCUTANEOUS at 17:41

## 2019-02-09 RX ADMIN — ATORVASTATIN CALCIUM 20 MG: 10 TABLET, FILM COATED ORAL at 22:46

## 2019-02-09 RX ADMIN — HYDROCODONE BITARTRATE AND ACETAMINOPHEN 1 TABLET: 5; 325 TABLET ORAL at 17:41

## 2019-02-09 RX ADMIN — ACETAMINOPHEN 650 MG: 325 TABLET ORAL at 20:13

## 2019-02-09 RX ADMIN — Medication 10 ML: at 05:15

## 2019-02-09 RX ADMIN — CARVEDILOL 3.12 MG: 3.12 TABLET, FILM COATED ORAL at 08:59

## 2019-02-09 RX ADMIN — Medication 10 ML: at 13:45

## 2019-02-09 RX ADMIN — HEPARIN SODIUM 5000 UNITS: 5000 INJECTION INTRAVENOUS; SUBCUTANEOUS at 03:46

## 2019-02-09 RX ADMIN — CARVEDILOL 3.12 MG: 3.12 TABLET, FILM COATED ORAL at 17:42

## 2019-02-09 RX ADMIN — HEPARIN SODIUM 3600 UNITS: 1000 INJECTION INTRAVENOUS; SUBCUTANEOUS at 16:48

## 2019-02-09 RX ADMIN — HYDROCODONE BITARTRATE AND ACETAMINOPHEN 1 TABLET: 5; 325 TABLET ORAL at 00:04

## 2019-02-09 RX ADMIN — HEPARIN SODIUM 5000 UNITS: 5000 INJECTION INTRAVENOUS; SUBCUTANEOUS at 11:35

## 2019-02-09 RX ADMIN — DOXAZOSIN 4 MG: 2 TABLET ORAL at 22:45

## 2019-02-09 RX ADMIN — HYDROCODONE BITARTRATE AND ACETAMINOPHEN 1 TABLET: 5; 325 TABLET ORAL at 09:08

## 2019-02-09 RX ADMIN — HYDROCODONE BITARTRATE AND ACETAMINOPHEN 1 TABLET: 5; 325 TABLET ORAL at 13:45

## 2019-02-09 RX ADMIN — NIFEDIPINE 60 MG: 60 TABLET, FILM COATED, EXTENDED RELEASE ORAL at 08:59

## 2019-02-09 RX ADMIN — ASPIRIN 81 MG: 81 TABLET, COATED ORAL at 08:59

## 2019-02-09 RX ADMIN — Medication 10 ML: at 22:47

## 2019-02-09 NOTE — PROCEDURES
[facility]  [phone]    Vitals   Pre   Post   Assessment   Pre   Post     Temp  Temp: 98.6 °F (37 °C) (02/09/19 1410)  98.0 LOC  AO3 AO3   HR   Pulse (Heart Rate): 76 (02/09/19 1410) 56 Lungs   Clear to dim  Unlabored   Room air  Clear to dim  Unlabored   Room air   B/P   BP: 128/61 (02/09/19 1410) 118/62 Cardiac   RR, no jvd,   RR, no jvd,    Resp   Resp Rate: 18 (02/09/19 1410) 16 Skin   Warm and dry  Warm and dry   Pain level  Pain Intensity 1: 0 (02/09/19 0357) 0 Edema    Generalized non-pitting    Generalized non-pitting    Orders:    Duration:   Start:    1410 End:    1640 Total:   2.30   Dialyzer:   Dialyzer/Set Up Inspection: Revaclear (02/09/19 1410)   K Bath:   Dialysate K (mEq/L): 2 (02/09/19 1410)   Ca Bath:   Dialysate CA (mEq/L): 3.0 (02/09/19 1410)   Na/Bicarb:   Dialysate NA (mEq/L): 140 (02/09/19 1410)   Target Fluid Removal:   2500   Access  permacatheter   Type & Location:   RIJ clean dry intact, dressing from outside facility   Labs     Obtained/Reviewed   Critical Results Called   Date when labs were drawn-  Hgb-    HGB   Date Value Ref Range Status   02/08/2019 11.4 (L) 12.1 - 17.0 g/dL Final     K-    Potassium   Date Value Ref Range Status   02/08/2019 4.2 3.5 - 5.1 mmol/L Final     Ca-   Calcium   Date Value Ref Range Status   02/08/2019 8.8 8.5 - 10.1 MG/DL Final     Bun-   BUN   Date Value Ref Range Status   02/08/2019 38 (H) 6 - 20 MG/DL Final     Creat-   Creatinine   Date Value Ref Range Status   02/08/2019 3.51 (H) 0.70 - 1.30 MG/DL Final        Medications/ Blood Products Given     Name   Dose   Route and Time     Heparin      Heparin          Blood Volume Processed (BVP):    51.8L Net Fluid   Removed:  2000   Comments   Time Out Done: 1400  Primary Nurse Rpt Pre: Leah Avalos  Primary Nurse Rpt Post:  Pt Education: access care  Care Plan: ongoing  Tx Summary: treatment today  1400 pt arrived to suite. Pt assessed.  RIJ clean/dry/intact, aspirates and flushes with no problems. 1410 trx started. Vss. Pt tolerating dialysis    1425 pt resting. Vss.   1440 pt resting. vss  1455 pt watching tv. Vss.  1510 pt resting. vss  1525 pt watching tv. Vss.   1540 pt watching tv. Vss.   1555 pt resting.vss.  1610 pt resting. vss  1625 pt resting vss  1640 dialyzer clotted. Unable to return blood to patient. Fluid removal 2000mL. Pt tolerated. sbar given to primary stewart hutchins. Pt in stable condition, upon leaving dialysis suite with transporter. Admiting Diagnosis:ESRD needing dialysis [facility]), Accelerated hypertension  [facility]'s previous [facility]   Consent signed - Informed Consent Verified: Yes (02/09/19 1410)  [facility] Consent -  Yes 2/9/19  Hepatitis Status- patient portal HBsAB 1/24/19 <3, HBsAg 1/10/19  Machine #- Machine Number: [id number] (02/09/19 1410)  Telemetry status- pt refusing tele  Pre-dialysis wt. -

## 2019-02-09 NOTE — PROGRESS NOTES
Nephrology Progress Note Pritesh Austin  
 
[facility]. [email or web address]                  Phone - [phone] Patient: Kaila Vance YOB: 1940     Admit Date: 2/8/2019 Date- 2/9/2019 CC: Follow up for esrd Subjective: Interval History:  
-  Mr. Shabnam Guadalupe is aa male with pmh of esrd on hd tts at [address] He is admitted with uncontrolled hypertension He missed his hd on Thursday His sbp was >200 on admission Most recent bp 158/72 He has h/o b/l BKA He denies sob. No c/o sob, No c/o chest pain, No c/o nausea or vomiting No c/o  fever. ROS:- as above Assessment:  
· esrd · Mlg. Hypertension · Anemia of ckd · pvd · H/o b/l bka · Sec. Beto Rocha Plan:  
· Hd today · Remove 2-3 kg · Check labs · Continue procardia, cardura, coreg. · Okay to add acei or arb if bp high post hd. ·  
 
Physical Exam:  
GEN: NAD NECK- Supple, no thyromegaly Right ij permacath+ RESP: Clear b/l, no wheezing, No accessory muscle use CVS: RRR,S1,S2 SKIN: No Rash, ABDO: soft , non tender, No hepatosplenomegaly EXT: No Edema B/l bka stump + NEURO: non focal, normal speech Care Plan discussed with: pt and nurse Objective:  
Patient Vitals for the past 24 hrs: 
 Temp Pulse Resp BP SpO2  
02/09/19 1120 98.6 °F (37 °C) (!) 50 18 158/72 99 % 02/09/19 0825 99.1 °F (37.3 °C) (!) 51 18 158/76 99 % 02/09/19 0357 98.2 °F (36.8 °C) (!) 54 18 153/63 100 % 02/08/19 2016 100.2 °F (37.9 °C) 60 18 160/65 99 % 02/08/19 1930    171/74   
02/08/19 1910  61  197/74 99 % 02/08/19 1800  68  185/75 99 % 02/08/19 1730  62  182/68 98 % 02/08/19 1600  (!) 58  193/75 100 % 02/08/19 1530  (!) 58  (!) 202/74 99 % 02/08/19 1500  (!) 59  (!) 204/79 100 % 02/08/19 1435  (!) 56  (!) 203/78 100 % 02/08/19 1330  (!) 53  184/73 100 % 02/08/19 1313  (!) 55  195/70 100 % Last 3 Recorded Weights in this Encounter 02/09/19 0034 Weight: 79 kg (174 lb 2.6 oz) 02/07 1901 - 02/09 0700 In: 240 [P.O.:240] Out: 500 [Urine:500] Chart reviewed. Pertinent Notes reviewed. Medication list  reviewed Current Facility-Administered Medications Medication  acetaminophen (TYLENOL) tablet 650 mg  
 aspirin delayed-release tablet 81 mg  
 carvedilol (COREG) tablet 3.125 mg  
 doxazosin (CARDURA) tablet 4 mg  
 NIFEdipine ER (PROCARDIA XL) tablet 60 mg  
 atorvastatin (LIPITOR) tablet 20 mg  
 hydrALAZINE (APRESOLINE) 20 mg/mL injection 20 mg  
 sodium chloride (NS) flush 5-40 mL  sodium chloride (NS) flush 5-40 mL  ondansetron (ZOFRAN) injection 4 mg  
 HYDROcodone-acetaminophen (NORCO) 5-325 mg per tablet 1 Tab  naloxone (NARCAN) injection 0.4 mg  
 bisacodyl (DULCOLAX) tablet 5 mg  polyethylene glycol (MIRALAX) packet 17 g  
 heparin (porcine) injection 5,000 Units  insulin lispro (HUMALOG) injection  glucose chewable tablet 16 g  
 dextrose (D50W) injection syrg 12.5-25 g  
 glucagon (GLUCAGEN) injection 1 mg Data Review : 
Recent Labs 02/08/19 
1500 02/08/19 
1400 WBC  --  6.4 HGB  --  11.4* PLT  --  171 ANEU  --  4.6 INR 1.1  --   
  --   
K 4.2  --   
GLU 68  --   
BUN 38*  --   
CREA 3.51*  --   
ALT 12  --   
SGOT 16  --   
TBILI 0.5  --   
AP 75  --   
CA 8.8  --   
 
Lab Results Component Value Date/Time Culture result: MRSA NOT PRESENT 01/24/2018 10:30 AM  
 Culture result:  01/24/2018 10:30 AM  
      Screening of patient nares for MRSA is for surveillance purposes and, if positive, to facilitate isolation considerations in high risk settings. It is not intended for automatic decolonization interventions per se as regimens are not sufficiently effective to warrant routine use.   
 Culture result: MRSA NOT PRESENT 01/19/2015 10:44 AM  
 Culture result:  01/19/2015 10:44 AM  
      Screening of patient nares for MRSA is for surveillance purposes and, if positive, to facilitate isolation considerations in high risk settings. It is not intended for automatic decolonization interventions per se as regimens are not sufficiently effective to warrant routine use. No results found for: SDES No results for input(s): FE, TIBC, PSAT, FERR in the last 72 hours. No results found for: Paulita Pulley Teresita Gaucher, MD 
[address] [facility] 
 www. [email or web address] [employer] [address] , [address] [place], [address] Phone - [phone] Fax - [phone]

## 2019-02-09 NOTE — PROCEDURES
HD TRANSFER - OUT REPORT:    Verbal report given to Gonsalozia Laura on Orville Beyer being transferred to [facility]    Report consisted of patient's Situation, Background, Assessment and   Recommendations(SBAR). Information from the following report(s)was reviewed with the receiving nurse. Method:   HD    Fluid Removed    2000mL    Patient response to treatment:  fair    End Time   1640  If not documented, dialysis nurse to update post-dialysis row in HD/Filtration flowsheet     Medications /Volume expansion agents or Fluid boluses administered during treatment? no    Post-dialysis medication administration due? None  Remind nurse to administer post-HD medication upon return to unit. Fistula hemostasis?n/a  Line heparinization? yes    Lines:RIJ Permacath  Opportunity for questions and clarification was provided.       Patient transported with:bed

## 2019-02-09 NOTE — PROGRESS NOTES
Hospitalist Progress Note NAME: Shun Nair :  1940 MRN:  [id number] Assessment / Plan: Hypertensive Urgency POA Due to missed HD on Thursday ESRD on TTS - at [facility] unit per pt Diffuse Pain POA- non specific, ?related to Neuropathy H/o DM type 2 with Neuropathy PAD S/P Right leg bypass, now s/p amputation bilaterally Medical NonCompliance POA- pt not taking medications currently 
  
bp improved with coreg, cardura and nifedipine. Nephrology consulted for hd. 
IP case management consulted for safe DC planning- pt is homeless, but has medicaid & likely needs placement due to his amputation status Pharmacy consulted to get DC med list reconciliation from [facility] (pt was last DC from there after the amputation- pt claims he went to [facility] as he couldn't get any placement -- since then he is essentially homeless & \"[place] woods\" as per pt Restart ASA & statin 
  
DM - BS on lower side in ER, cont hiss Neuropathy POA Not taking any meds or insulin per pt 
FS Q AC & HS, Diabetic diet, SSI lispro if needed Norco prn for now- had gotten Percocet prescribed in 2019 by some one-? ER 
  
Constipation- on CT imaging Cont miralax 
  
Hyperlipidemia-not taking anything right now 
resume lipitor per previous medication list in [facility] for now 
  
H/o HepC 
  
 
 
18.5 - 24.9 Normal weight / Body mass index is 19.13 kg/m². Code status: Full Prophylaxis: Hep SQ Recommended Disposition: SNF/LTC Subjective: Chief Complaint / Reason for Physician Visit \"no sob or cp,  Not very talkative\". Discussed with RN events overnight. Review of Systems: 
Symptom Y/N Comments  Symptom Y/N Comments Fever/Chills    Chest Pain Poor Appetite    Edema Cough    Abdominal Pain Sputum    Joint Pain SOB/TOLEDO    Pruritis/Rash Nausea/vomit    Tolerating PT/OT Diarrhea    Tolerating Diet Constipation    Other Could NOT obtain due to: Objective: VITALS:  
Last 24hrs VS reviewed since prior progress note. Most recent are: 
Patient Vitals for the past 24 hrs: 
 Temp Pulse Resp BP SpO2  
02/09/19 1120 98.6 °F (37 °C) (!) 50 18 158/72 99 % 02/09/19 0825 99.1 °F (37.3 °C) (!) 51 18 158/76 99 % 02/09/19 0357 98.2 °F (36.8 °C) (!) 54 18 153/63 100 % 02/08/19 2016 100.2 °F (37.9 °C) 60 18 160/65 99 % 02/08/19 1930    171/74   
02/08/19 1910  61  197/74 99 % 02/08/19 1800  68  185/75 99 % 02/08/19 1730  62  182/68 98 % 02/08/19 1600  (!) 58  193/75 100 % 02/08/19 1530  (!) 58  (!) 202/74 99 % 02/08/19 1500  (!) 59  (!) 204/79 100 % 02/08/19 1435  (!) 56  (!) 203/78 100 % 02/08/19 1330  (!) 53  184/73 100 % 02/08/19 1313  (!) 55  195/70 100 % 02/08/19 1215    194/75  Intake/Output Summary (Last 24 hours) at 2/9/2019 1214 Last data filed at 2/9/2019 0121 Gross per 24 hour Intake 240 ml Output 500 ml Net -260 ml PHYSICAL EXAM: 
General: WD, WN. Alert, cooperative, no acute distress   
EENT:  EOMI. Anicteric sclerae. MMM Resp:  CTA bilaterally, no wheezing or rales. No accessory muscle use CV:  Regular  rhythm,  No edema GI:  Soft, Non distended, Non tender.  +Bowel sounds Neurologic:  Alert and oriented X 3, normal speech, Psych:   Good insight. Not anxious nor agitated Skin:  No rashes. No jaundice Reviewed most current lab test results and cultures  YES Reviewed most current radiology test results   YES Review and summation of old records today    NO Reviewed patient's current orders and MAR    YES 
PMH/SH reviewed - no change compared to H&P 
________________________________________________________________________ Care Plan discussed with: 
  Comments Patient Family RN Care Manager Consultant Multidiciplinary team rounds were held today with , nursing, pharmacist and clinical coordinator.   Patient's plan of care was discussed; medications were reviewed and discharge planning was addressed. ________________________________________________________________________ Total NON critical care TIME:  20   Minutes Total CRITICAL CARE TIME Spent:   Minutes non procedure based Comments >50% of visit spent in counseling and coordination of care    
________________________________________________________________________ Madhu Askew DO  
 
Procedures: see electronic medical records for all procedures/Xrays and details which were not copied into this note but were reviewed prior to creation of Plan. LABS: 
I reviewed today's most current labs and imaging studies. Pertinent labs include: 
Recent Labs 02/08/19 
1400 WBC 6.4 HGB 11.4* HCT 35.5*  Recent Labs 02/08/19 
1500   
K 4.2  CO2 25 GLU 68 BUN 38* CREA 3.51* CA 8.8 ALB 3.1* TBILI 0.5 SGOT 16 ALT 12 INR 1.1 Signed: Madhu Askew DO

## 2019-02-09 NOTE — PROGRESS NOTES
Problem: Falls - Risk of 
Goal: *Absence of Falls Document [facility] Fall Risk and appropriate interventions in the flowsheet. Outcome: Progressing Towards Goal 
Fall Risk Interventions: 
  
 
  
 
  
 
Elimination Interventions: Call light in reach, Patient to call for help with toileting needs, Urinal in reach

## 2019-02-09 NOTE — PROGRESS NOTES
Consult for nephrology called to [facility]. Dr. Anuj Balbuena will see patient today.

## 2019-02-09 NOTE — PROGRESS NOTES
Pt  Refused blood draw this morning Bedside and Verbal shift change report given to [place] (oncoming nurse) by Micheal Patterson RN (offgoing nurse). Report included the following information SBAR, Kardex, Intake/Output, MAR, Accordion, Recent Results and Med Rec Status.

## 2019-02-09 NOTE — PROGRESS NOTES
7:00 PM 
Verbal report received from Chacho Rodriguez RN. Patient arrived via EMS w/ c/o lower back pain and abd pain x several days. Pt is Tues Thurs Sat dialysis patient missed treatment yesterday. Patient is being admitted for hypertensive urgency, ESRD and missed HD. Patient to be dialyzed tomorrow. Patient alert and oriented x4, NSR on monitor, no oxygenation issues. Of note patient with BKA. Patient using urinal.  
 
7:40 PM 
IP bed assigned. 7:49 PM 
Med/Tele called to give report. IP nurse to call me back shortly. 7:54 PM 
Verbal report given to Beto Moon RN. All questions answered.

## 2019-02-09 NOTE — PROGRESS NOTES
TRANSFER - IN REPORT: 
 
Verbal report received from [facility]) on Benja Yasmany  being received from ED(unit) for routine progression of care Report consisted of patients Situation, Background, Assessment and  
Recommendations(SBAR). Information from the following report(s) SBAR, Kardex, Intake/Output, MAR, Accordion, Recent Results and Med Rec Status was reviewed with the receiving nurse. Opportunity for questions and clarification was provided. Assessment completed upon patients arrival to unit and care assumed.

## 2019-02-10 LAB
GLUCOSE BLD STRIP.AUTO-MCNC: 102 MG/DL (ref 65–100)
GLUCOSE BLD STRIP.AUTO-MCNC: 75 MG/DL (ref 65–100)
GLUCOSE BLD STRIP.AUTO-MCNC: 80 MG/DL (ref 65–100)
GLUCOSE BLD STRIP.AUTO-MCNC: 83 MG/DL (ref 65–100)
GLUCOSE BLD STRIP.AUTO-MCNC: 85 MG/DL (ref 65–100)
SERVICE CMNT-IMP: ABNORMAL
SERVICE CMNT-IMP: NORMAL

## 2019-02-10 PROCEDURE — 74011250636 HC RX REV CODE- 250/636: Performed by: INTERNAL MEDICINE

## 2019-02-10 PROCEDURE — 99218 HC RM OBSERVATION: CPT

## 2019-02-10 PROCEDURE — 74011250637 HC RX REV CODE- 250/637: Performed by: INTERNAL MEDICINE

## 2019-02-10 PROCEDURE — 82962 GLUCOSE BLOOD TEST: CPT

## 2019-02-10 RX ADMIN — HEPARIN SODIUM 5000 UNITS: 5000 INJECTION INTRAVENOUS; SUBCUTANEOUS at 11:24

## 2019-02-10 RX ADMIN — HYDROCODONE BITARTRATE AND ACETAMINOPHEN 1 TABLET: 5; 325 TABLET ORAL at 13:16

## 2019-02-10 RX ADMIN — HYDROCODONE BITARTRATE AND ACETAMINOPHEN 1 TABLET: 5; 325 TABLET ORAL at 19:02

## 2019-02-10 RX ADMIN — ASPIRIN 81 MG: 81 TABLET, COATED ORAL at 08:44

## 2019-02-10 RX ADMIN — POLYETHYLENE GLYCOL 3350 17 G: 17 POWDER, FOR SOLUTION ORAL at 08:44

## 2019-02-10 RX ADMIN — Medication 5 ML: at 21:31

## 2019-02-10 RX ADMIN — HEPARIN SODIUM 5000 UNITS: 5000 INJECTION INTRAVENOUS; SUBCUTANEOUS at 18:07

## 2019-02-10 RX ADMIN — HYDROCODONE BITARTRATE AND ACETAMINOPHEN 1 TABLET: 5; 325 TABLET ORAL at 08:44

## 2019-02-10 RX ADMIN — NIFEDIPINE 60 MG: 60 TABLET, FILM COATED, EXTENDED RELEASE ORAL at 08:44

## 2019-02-10 RX ADMIN — Medication 10 ML: at 15:41

## 2019-02-10 RX ADMIN — Medication 10 ML: at 05:22

## 2019-02-10 RX ADMIN — CARVEDILOL 3.12 MG: 3.12 TABLET, FILM COATED ORAL at 08:44

## 2019-02-10 RX ADMIN — CARVEDILOL 3.12 MG: 3.12 TABLET, FILM COATED ORAL at 18:07

## 2019-02-10 NOTE — PROGRESS NOTES
Hospitalist Progress Note NAME: Jose Manuel Roman :  1940 MRN:  [id number] Assessment / Plan: Hypertensive Urgency POA Due to missed HD on Thursday ESRD on TTS - at [facility] unit per pt, go hd yesterday( got 2.5 hr and 2.5 liter removal)_ Diffuse Pain POA- non specific, ?related to Neuropathy H/o DM type 2 with Neuropathy PAD S/P Right leg bypass, now s/p amputation bilaterally Medical NonCompliance POA- pt not taking medications currently 
  
bp improved with coreg, cardura and nifedipine. Nephrology consulted for hd. 
IP case management consulted for safe DC planning- pt is homeless, but has medicaid & likely needs placement due to his amputation status Pharmacy consulted to get DC med list reconciliation from [facility] (pt was last DC from there after the amputation- pt claims he went to [facility] as he couldn't get any placement -- since then he is essentially homeless & \"lives in the woods\" as per pt Restart ASA & statin 
  
DM - BS on lower side in ER, cont hiss Neuropathy POA Not taking any meds or insulin per pt 
FS Q AC & HS, Diabetic diet, SSI lispro if needed 
  
Norco prn for now- had gotten Percocet prescribed in 2019 by some one-? ER 
  
Constipation- on CT imaging Cont miralax 
  
Hyperlipidemia-not taking anything right now 
resume lipitor per [facility] for now 
  
H/o HepC Appears at baseline. Await cm for placement. Pt with bl bka 18.5 - 24.9 Normal weight / Body mass index is 18.31 kg/m². Code status: Full Prophylaxis: Hep SQ Recommended Disposition: SNF/LTC Subjective: Chief Complaint / Reason for Physician Visit \"no sob no cp. Anxious to leave. \". Discussed with RN events overnight. Review of Systems: 
Symptom Y/N Comments  Symptom Y/N Comments Fever/Chills    Chest Pain Poor Appetite    Edema Cough    Abdominal Pain Sputum    Joint Pain SOB/TOLEDO    Pruritis/Rash Nausea/vomit    Tolerating PT/OT Diarrhea    Tolerating Diet Constipation    Other Could NOT obtain due to:   
 
Objective: VITALS:  
Last 24hrs VS reviewed since prior progress note. Most recent are: 
Patient Vitals for the past 24 hrs: 
 Temp Pulse Resp BP SpO2  
02/10/19 0843  (!) 56     
02/10/19 0812 99.1 °F (37.3 °C) (!) 50 16 143/67 99 % 02/09/19 2300 98.8 °F (37.1 °C) 60 18 134/71 99 % 02/09/19 1920 97.5 °F (36.4 °C) (!) 51 17 (!) 136/96 99 % 02/09/19 1640  (!) 56 16 118/62 98 % 02/09/19 1625  (!) 56 16 115/63 98 % 02/09/19 1610  (!) 54 16 112/60 98 % 02/09/19 1555  (!) 58 16 108/59 98 % 02/09/19 1540  (!) 56 16 119/63 98 % 02/09/19 1525  (!) 54 16 113/57 98 % 02/09/19 1510  (!) 54 16 123/56 98 % 02/09/19 1455  (!) 54 16 119/54 98 % 02/09/19 1440  (!) 55 16 118/54 98 % 02/09/19 1425  (!) 53 18 120/61 99 % 02/09/19 1410 98.6 °F (37 °C) 76 18 128/61 99 % 02/09/19 1120 98.6 °F (37 °C) (!) 50 18 158/72 99 % Intake/Output Summary (Last 24 hours) at 2/10/2019 1005 Last data filed at 2/9/2019 2088 Gross per 24 hour Intake 440 ml Output 2300 ml Net -1860 ml PHYSICAL EXAM: 
General: WD, WN. Alert, cooperative, no acute distress   
EENT:  EOMI. Anicteric sclerae. MMM Resp:  CTA bilaterally, no wheezing or rales. No accessory muscle use CV:  Regular  rhythm,  No edema GI:  Soft, Non distended, Non tender.  +Bowel sounds Neurologic:  Alert and oriented X 3, normal speech, Psych:   Good insight. Not anxious nor agitated Skin:  No rashes. No jaundice. Bilateral distal bka Reviewed most current lab test results and cultures  YES Reviewed most current radiology test results   YES Review and summation of old records today    NO Reviewed patient's current orders and MAR    YES 
PMH/SH reviewed - no change compared to H&P 
________________________________________________________________________ Care Plan discussed with: 
 Comments Patient Family RN Care Manager Consultant Multidiciplinary team rounds were held today with , nursing, pharmacist and clinical coordinator. Patient's plan of care was discussed; medications were reviewed and discharge planning was addressed. ________________________________________________________________________ Total NON critical care TIME:  20   Minutes Total CRITICAL CARE TIME Spent:   Minutes non procedure based Comments >50% of visit spent in counseling and coordination of care    
________________________________________________________________________ Miguel Pierre DO  
 
Procedures: see electronic medical records for all procedures/Xrays and details which were not copied into this note but were reviewed prior to creation of Plan. LABS: 
I reviewed today's most current labs and imaging studies. Pertinent labs include: 
Recent Labs 02/09/19 0478 79 92 20 02/08/19 
1400 WBC 4.3 6.4 HGB 9.3* 11.4* HCT 29.3* 35.5* * 171 Recent Labs 02/09/19 0478 79 92 20 02/08/19 
1500  138  
K 5.1 4.2  105 CO2 29 25 GLU 67 68 BUN 44* 38* CREA 4.05* 3.51* CA 7.9* 8.8 ALB  --  3.1* TBILI  --  0.5 SGOT  --  16 ALT  --  12 INR  --  1.1 Signed: Miguel Pierre DO

## 2019-02-10 NOTE — PROGRESS NOTES
Pt refusing  Tele monitoring . Explained the importance of it but still pulled out and said he does not want to keep it. Called the Tele staff to have him on standby. The on call MD notified as well Bedside and Verbal shift change report given to [place] (oncoming nurse) by Nikki Samaniego RN (offgoing nurse). Report included the following information SBAR, Kardex, Intake/Output, MAR, Accordion, Recent Results and Med Rec Status.

## 2019-02-11 LAB
GLUCOSE BLD STRIP.AUTO-MCNC: 102 MG/DL (ref 65–100)
GLUCOSE BLD STRIP.AUTO-MCNC: 142 MG/DL (ref 65–100)
GLUCOSE BLD STRIP.AUTO-MCNC: 64 MG/DL (ref 65–100)
GLUCOSE BLD STRIP.AUTO-MCNC: 68 MG/DL (ref 65–100)
GLUCOSE BLD STRIP.AUTO-MCNC: 82 MG/DL (ref 65–100)
GLUCOSE BLD STRIP.AUTO-MCNC: 93 MG/DL (ref 65–100)
SERVICE CMNT-IMP: ABNORMAL
SERVICE CMNT-IMP: NORMAL

## 2019-02-11 PROCEDURE — 74011250636 HC RX REV CODE- 250/636: Performed by: INTERNAL MEDICINE

## 2019-02-11 PROCEDURE — 82962 GLUCOSE BLOOD TEST: CPT

## 2019-02-11 PROCEDURE — 74011250637 HC RX REV CODE- 250/637: Performed by: INTERNAL MEDICINE

## 2019-02-11 PROCEDURE — 99218 HC RM OBSERVATION: CPT

## 2019-02-11 PROCEDURE — 77010033678 HC OXYGEN DAILY

## 2019-02-11 RX ORDER — OXYCODONE AND ACETAMINOPHEN 10; 325 MG/1; MG/1
1 TABLET ORAL
COMMUNITY
End: 2019-03-12 | Stop reason: SDUPTHER

## 2019-02-11 RX ORDER — TRAZODONE HYDROCHLORIDE 50 MG/1
50 TABLET ORAL
COMMUNITY
End: 2019-03-11

## 2019-02-11 RX ORDER — HYDRALAZINE HYDROCHLORIDE 20 MG/ML
10 INJECTION INTRAMUSCULAR; INTRAVENOUS
Status: DISCONTINUED | OUTPATIENT
Start: 2019-02-11 | End: 2019-02-13 | Stop reason: HOSPADM

## 2019-02-11 RX ORDER — CARVEDILOL 12.5 MG/1
12.5 TABLET ORAL 2 TIMES DAILY WITH MEALS
COMMUNITY
End: 2019-02-13

## 2019-02-11 RX ADMIN — HEPARIN SODIUM 5000 UNITS: 5000 INJECTION INTRAVENOUS; SUBCUTANEOUS at 18:38

## 2019-02-11 RX ADMIN — DOXAZOSIN 4 MG: 2 TABLET ORAL at 00:09

## 2019-02-11 RX ADMIN — Medication 5 ML: at 21:16

## 2019-02-11 RX ADMIN — HEPARIN SODIUM 5000 UNITS: 5000 INJECTION INTRAVENOUS; SUBCUTANEOUS at 11:07

## 2019-02-11 RX ADMIN — Medication 10 ML: at 07:57

## 2019-02-11 RX ADMIN — HYDROCODONE BITARTRATE AND ACETAMINOPHEN 1 TABLET: 5; 325 TABLET ORAL at 19:21

## 2019-02-11 RX ADMIN — ATORVASTATIN CALCIUM 20 MG: 10 TABLET, FILM COATED ORAL at 21:16

## 2019-02-11 RX ADMIN — POLYETHYLENE GLYCOL 3350 17 G: 17 POWDER, FOR SOLUTION ORAL at 09:45

## 2019-02-11 RX ADMIN — DOXAZOSIN 4 MG: 2 TABLET ORAL at 21:15

## 2019-02-11 RX ADMIN — ATORVASTATIN CALCIUM 20 MG: 10 TABLET, FILM COATED ORAL at 00:09

## 2019-02-11 RX ADMIN — Medication 5 ML: at 05:15

## 2019-02-11 RX ADMIN — HYDROCODONE BITARTRATE AND ACETAMINOPHEN 1 TABLET: 5; 325 TABLET ORAL at 07:57

## 2019-02-11 RX ADMIN — NIFEDIPINE 60 MG: 60 TABLET, FILM COATED, EXTENDED RELEASE ORAL at 09:45

## 2019-02-11 RX ADMIN — HEPARIN SODIUM 5000 UNITS: 5000 INJECTION INTRAVENOUS; SUBCUTANEOUS at 03:44

## 2019-02-11 RX ADMIN — ASPIRIN 81 MG: 81 TABLET, COATED ORAL at 09:45

## 2019-02-11 RX ADMIN — HYDROCODONE BITARTRATE AND ACETAMINOPHEN 1 TABLET: 5; 325 TABLET ORAL at 00:07

## 2019-02-11 RX ADMIN — CARVEDILOL 3.12 MG: 3.12 TABLET, FILM COATED ORAL at 07:57

## 2019-02-11 RX ADMIN — Medication 10 ML: at 15:17

## 2019-02-11 NOTE — PROGRESS NOTES
Nephrology Progress Note Pritesh Austin  
 
www. [facility]                  Phone - [phone] Patient: Zach Fajardo YOB: 1940     Admit Date: 2/8/2019 Date- 2/11/2019 CC: Follow up for esrd Subjective: Interval History: -  S/p hd on Saturday 
bp stable No c/o sob, No c/o chest pain, No c/o nausea or vomiting No c/o  fever. ROS:- as above Assessment:  
· ESRD- hd tts at [address] · hypertension · Anemia of ckd · pvd · H/o b/l bka · [facility] [place] [facility] Plan: · No dialysis today · Check labs in am 
· Continue procardia, cardura, coreg. Physical Exam:  
GEN: NAD NECK- Supple,no mass Right ij permacath+ RESP: clear b/l,, no wheezing, No accessory muscle use CVS: RRR,S1,S2 SKIN: No Rash, ABDO: soft , non tender, No hepatosplenomegaly EXT: No Edema B/l bka stump + NEURO:non focal, normal speech Care Plan discussed with: pt and nurse Objective:  
Patient Vitals for the past 24 hrs: 
 Temp Pulse Resp BP SpO2  
02/11/19 0730 98.2 °F (36.8 °C) (!) 56 15 132/46 99 % 02/11/19 0317 97.6 °F (36.4 °C) (!) 45 18 138/56 98 % 02/10/19 2359 97.9 °F (36.6 °C) (!) 48 18 131/74 100 % 02/1940 98.1 °F (36.7 °C) (!) 54 18 141/62 97 % 02/10/19 1542 98.5 °F (36.9 °C) (!) 49 18 137/62 97 % 02/10/19 1107 99.2 °F (37.3 °C) (!) 58 18 138/46 96 % Last 3 Recorded Weights in this Encounter 02/09/19 0034 02/10/19 4285 Weight: 79 kg (174 lb 2.6 oz) 75.6 kg (166 lb 10.7 oz) 02/09 1901 - 02/11 0700 In: 240 [P.O.:240] Out: 500 [Urine:500] Chart reviewed. Pertinent Notes reviewed. Medication list  reviewed Current Facility-Administered Medications Medication  heparin (porcine) 1,000 unit/mL injection 3,600 Units  acetaminophen (TYLENOL) tablet 650 mg  
 aspirin delayed-release tablet 81 mg  
 carvedilol (COREG) tablet 3.125 mg  
 doxazosin (CARDURA) tablet 4 mg  NIFEdipine ER (PROCARDIA XL) tablet 60 mg  
 atorvastatin (LIPITOR) tablet 20 mg  
 hydrALAZINE (APRESOLINE) 20 mg/mL injection 20 mg  
 sodium chloride (NS) flush 5-40 mL  sodium chloride (NS) flush 5-40 mL  ondansetron (ZOFRAN) injection 4 mg  
 HYDROcodone-acetaminophen (NORCO) 5-325 mg per tablet 1 Tab  naloxone (NARCAN) injection 0.4 mg  
 bisacodyl (DULCOLAX) tablet 5 mg  polyethylene glycol (MIRALAX) packet 17 g  
 heparin (porcine) injection 5,000 Units  insulin lispro (HUMALOG) injection  glucose chewable tablet 16 g  
 dextrose (D50W) injection syrg 12.5-25 g  
 glucagon (GLUCAGEN) injection 1 mg Data Review : 
Recent Labs 02/09/19 0478 79 92 20 02/08/19 
1500 02/08/19 
1400 WBC 4.3  --  6.4 HGB 9.3*  --  11.4*  
*  --  171 ANEU 2.5  --  4.6 INR  --  1.1  --   
 138  --   
K 5.1 4.2  --   
GLU 67 68  --   
BUN 44* 38*  --   
CREA 4.05* 3.51*  --   
ALT  --  12  --   
SGOT  --  16  --   
TBILI  --  0.5  --   
AP  --  75  --   
CA 7.9* 8.8  --   
 
Lab Results Component Value Date/Time Culture result: MRSA NOT PRESENT 01/24/2018 10:30 AM  
 Culture result:  01/24/2018 10:30 AM  
      Screening of patient nares for MRSA is for surveillance purposes and, if positive, to facilitate isolation considerations in high risk settings. It is not intended for automatic decolonization interventions per se as regimens are not sufficiently effective to warrant routine use. Culture result: MRSA NOT PRESENT 01/19/2015 10:44 AM  
 Culture result:  01/19/2015 10:44 AM  
      Screening of patient nares for MRSA is for surveillance purposes and, if positive, to facilitate isolation considerations in high risk settings. It is not intended for automatic decolonization interventions per se as regimens are not sufficiently effective to warrant routine use. No results found for: SDES No results for input(s): FE, TIBC, PSAT, FERR in the last 72 hours.  
No results found for: Luz Maria Gilbert MD 
[address] [facility] 
 [email or web address] [facility] / [address] [place], [address] Phone - [phone] Fax - [phone]

## 2019-02-11 NOTE — PROGRESS NOTES
Problem: Falls - Risk of 
Goal: *Absence of Falls Document Mauricio Becerra Fall Risk and appropriate interventions in the flowsheet. Outcome: Progressing Towards Goal 
Fall Risk Interventions: 
  
 
  
 
Medication Interventions: Evaluate medications/consider consulting pharmacy Elimination Interventions: Call light in reach

## 2019-02-11 NOTE — PROGRESS NOTES
Reason for Admission:   Hypertension Urgency due to missed HD on Thursday. RRAT Score:   40 Resources/supports as identified by patient/family:  Limited support system. Obtained most information from [address], [address] [facility], [facility] [phone] who has been trying to work with patient for placement. Patient on TTS schedule for HD. Top Challenges facing patient (as identified by patient/family and CM): Finances/Medication cost?    Has [employer] Medicare and Medicaid in place. Medicaid # [id number] provided to patient access. Transportation? Medicaid transportation is supposed to have been updated by Lyn Ramos, [address] ([phone]) for long term care with transportation. Support system or lack thereof? Patient has poor support system with some involvement from sister Yarelis Villanueva Page [phone]) or daughter Chantal Huang ([phone]). Girlfriend, Shazia [facility] [phone], has recently moved to a group home and can no longer assist with patient's care. Living arrangements? [facility]. Reports he cannot return there because he does not have any money to pay for the week. Lived in the [place]" in [place] for two years prior to recent hospitalizations. Self-care/ADLs/Cognition? Able to make decisions. Some limitations with ADL's due to bilateral lower extremity amputations. Personal wheelchair is present with the patient in his room. Current Advanced Directive/Advance Care Plan:  FULL CODE Plan for utilizing home health:   None anticipated Likelihood of readmission:   High 
              
Transition of Care Plan:   Patient has agreed to long term care placement as he does not have anyone to care for him or assist with ADL's and IADL's. Patient had recent stay at [facility]  in January, 2019 and was returned to [facility], residence prior to admission. Called Ruben to speak with Gayle Sorto, [address] [facility] regarding a UAI and Level II Screening, but she was not available. Left message for her to return my call. Search in [facility] website states UAI completed 2/2018 at [facility], but patient was discharged home at that time. Discussed with Inpatient CM who is obtaining psychiatric evaluation required for Level II screening process. He has had 2 admissions to [facility] in the past 2 years and Level II may be required. Will need UAI, if it is available or provide a new UAI with updates. Referrals sent to [facility] for possible long term care beds. 7 DANIEL Mcdonald, CCM, [facility] CM [place] [phone]

## 2019-02-11 NOTE — INTERDISCIPLINARY ROUNDS
Interdisciplinary team rounds were held 2/11/2019 with the following team members:Care Management, Nursing and Pharmacy and the patient. Plan of care discussed. See clinical pathway and/or care plan for interventions and desired outcomes. Psych, PT/OT consults. CM following closely for placement issues.

## 2019-02-11 NOTE — PROGRESS NOTES
Bedside and Verbal shift change report given to [facility] (oncoming nurse) by Rudy Jordan (offgoing nurse). Report included the following information Kardex, MAR and Recent Results.

## 2019-02-11 NOTE — PROGRESS NOTES
Pharmacy Medication Reconciliation The patient was interviewed regarding current PTA medication list, use and drug allergies;  patient present in room and obtained permission from patient to discuss drug regimen with visitor(s) present. The patient was questioned regarding use of any other inhalers, topical products, over the counter medications, herbal medications, vitamin products or ophthalmic/nasal/otic medication use. Allergy Update: None Recommendations/Findings: The following amendments were made to the patient's active medication list on file at [address]:  
 
Patient states he is taking no medications. Patient is homeless. The following medications were filled in the past several months. I have no way of judging compliance as the patient is not cooperative.  
 
-Clarified PTA med list with Refill history. PTA medication list was corrected to the following:  
 
Prior to Admission Medications Prescriptions Last Dose Informant Patient Reported? Taking? NIFEdipine ER (ADALAT CC) 60 mg ER tablet Not Taking at Unknown time  Yes No  
Sig: Take 60 mg by mouth daily. aspirin delayed-release 81 mg tablet Not Taking at Unknown time  Yes No  
Sig: Take 1 Tab by mouth daily. carvedilol (COREG) 12.5 mg tablet   Yes Yes Sig: Take 12.5 mg by mouth two (2) times daily (with meals). oxyCODONE-acetaminophen (PERCOCET 10)  mg per tablet   Yes Yes Sig: Take 1 Tab by mouth every six (6) hours as needed for Pain. rosuvastatin (CRESTOR) 10 mg tablet Not Taking at Unknown time  Yes No  
Sig: Take 10 mg by mouth nightly. traZODone (DESYREL) 50 mg tablet   Yes Yes Sig: Take 50 mg by mouth nightly. Facility-Administered Medications: None Thank you, Lin Gill, KIMMYD

## 2019-02-11 NOTE — CONSULTS
Pritesh Cashesa         NAME:Hernan Hernandez Ax  NIF:[id number]   NND:[birth year]                       Pt seen and examined  ESRD      Miranda Weber MD  [facility]  [facility], [address], [address]  Phone - [phone]         Fax - [phone] [facility] Office  [address] [place] [address]  Phone - [phone]        Fax - [phone]     www. [email or web address]

## 2019-02-11 NOTE — PROGRESS NOTES
Hospitalist Progress Note NAME: Lincoln Ewing :  1940 MRN:  [id number] Assessment / Plan: Hypertensive Urgency POA Due to missed HD on Thursday ESRD on TTS - at [facility] unit per pt, go hd yesterday( got 2.5 hr and 2.5 liter removal)_ Diffuse Pain POA- non specific, ?related to Neuropathy H/o DM type 2 with Neuropathy PAD S/P Right leg bypass, now s/p amputation bilaterally Medical NonCompliance POA- pt not taking medications currently 
-BP stable with coreg, cardura and nifedipine.  
-he has asymptomatic bradycardic in the low 40s. I've placed holding parameters for coreg to hold for HR<60.   
-con't ASA, statin 
-nephrology following for HD 
-awaiting placement, appreciate CM's help 
  
DM with neuropathy, POA 
-A1C 4.5 
- BS on lower side in ER, high sensitivity SSI 
    
Constipation- on CT imaging 
-cont miralax 
  
Hyperlipidemia-not taking anything right now 
resume lipitor per previous medication list in [facility] for now 
  
H/o HepC Appears at baseline. Await cm for placement. Pt with bl bka 18.5 - 24.9 Normal weight / Body mass index is 18.31 kg/m². Code status: Full Prophylaxis: Hep SQ Recommended Disposition: SNF/LTC Subjective: Chief Complaint / Reason for Physician Visit Pt seen at bedside. Agitated when being asked questions during round. He states he has family but does not wish to have them contact. Told me he uses a wheelchair at baseline and [place]. No acute complaints. Discussed with RN events overnight. Review of Systems: 
Symptom Y/N Comments  Symptom Y/N Comments Fever/Chills    Chest Pain Poor Appetite    Edema Cough    Abdominal Pain Sputum    Joint Pain SOB/TOLEDO    Pruritis/Rash Nausea/vomit    Tolerating PT/OT Diarrhea    Tolerating Diet Constipation    Other Could NOT obtain due to: agitated Objective: VITALS:  
Last 24hrs VS reviewed since prior progress note. Most recent are: 
Patient Vitals for the past 24 hrs: 
 Temp Pulse Resp BP SpO2  
02/11/19 1132 98.3 °F (36.8 °C) (!) 42 12 149/60 99 % 02/11/19 0730 98.2 °F (36.8 °C) (!) 56 15 132/46 99 % 02/11/19 0317 97.6 °F (36.4 °C) (!) 45 18 138/56 98 % 02/10/19 2359 97.9 °F (36.6 °C) (!) 48 18 131/74 100 % 02/1940 98.1 °F (36.7 °C) (!) 54 18 141/62 97 % 02/10/19 1542 98.5 °F (36.9 °C) (!) 49 18 137/62 97 % No intake or output data in the 24 hours ending 02/11/19 1413 PHYSICAL EXAM: 
General: WD, WN. Alert, NADs   
EENT:  EOMI. Anicteric sclerae. MMM Resp:  CTA bilaterally, no wheezing or rales. No accessory muscle use CV:  Regular  rhythm,  No edema GI:  Soft, Non distended, Non tender.  +Bowel sounds Neurologic:  Alert and oriented X 3, normal speech Psych:   Poor insight. agitated Skin:  No rashes. No jaundice. Bilateral distal bka Reviewed most current lab test results and cultures  YES Reviewed most current radiology test results   YES Review and summation of old records today    NO Reviewed patient's current orders and MAR    YES 
PMH/ reviewed - no change compared to H&P 
________________________________________________________________________ Care Plan discussed with: 
  Comments Patient x Family RN x Care Manager x Consultant Multidiciplinary team rounds were held today with , nursing, pharmacist and clinical coordinator. Patient's plan of care was discussed; medications were reviewed and discharge planning was addressed. ________________________________________________________________________ Total NON critical care TIME:  20   Minutes Total CRITICAL CARE TIME Spent:   Minutes non procedure based Comments >50% of visit spent in counseling and coordination of care    
________________________________________________________________________ Pavan Morales MD  
 
Procedures: see electronic medical records for all procedures/Xrays and details which were not copied into this note but were reviewed prior to creation of Plan. LABS: 
I reviewed today's most current labs and imaging studies. Pertinent labs include: 
Recent Labs 02/09/19 [facility] 24 WBC 4.3 HGB 9.3* HCT 29.3*  
* [facility] 02/08[facility]19 
1500  138  
K 5.1 4.2  105 CO2 29 25 GLU 67 68 BUN 44* 38* CREA 4.05* 3.51* CA 7.9* 8.8 ALB  --  3.1* TBILI  --  0.5 SGOT  --  16 ALT  --  12 INR  --  1.1 Signed: Joseph Hernandez MD

## 2019-02-11 NOTE — PROGRESS NOTES
Chart reviewed. Will need to clarify insurance, home address, therapy needs, which HD unit he attends, and what the disposition s/p [facility] was. Will follow. Discussed with Dr Eryn Cullen. PT, OT. And psych consults placed to assess for d/c needs and for Level 2. Pt will need [employer] auth for SNF at d/c if all in agreement

## 2019-02-12 PROBLEM — I16.0 HYPERTENSIVE URGENCY: Status: ACTIVE | Noted: 2019-02-12

## 2019-02-12 LAB
ALBUMIN SERPL-MCNC: 2.8 G/DL (ref 3.5–5)
ANION GAP SERPL CALC-SCNC: 5 MMOL/L (ref 5–15)
BUN SERPL-MCNC: 53 MG/DL (ref 6–20)
BUN/CREAT SERPL: 13 (ref 12–20)
CALCIUM SERPL-MCNC: 8.4 MG/DL (ref 8.5–10.1)
CHLORIDE SERPL-SCNC: 104 MMOL/L (ref 97–108)
CO2 SERPL-SCNC: 27 MMOL/L (ref 21–32)
CREAT SERPL-MCNC: 4.19 MG/DL (ref 0.7–1.3)
ERYTHROCYTE [DISTWIDTH] IN BLOOD BY AUTOMATED COUNT: 17 % (ref 11.5–14.5)
GLUCOSE BLD STRIP.AUTO-MCNC: 100 MG/DL (ref 65–100)
GLUCOSE BLD STRIP.AUTO-MCNC: 102 MG/DL (ref 65–100)
GLUCOSE BLD STRIP.AUTO-MCNC: 146 MG/DL (ref 65–100)
GLUCOSE BLD STRIP.AUTO-MCNC: 73 MG/DL (ref 65–100)
GLUCOSE SERPL-MCNC: 69 MG/DL (ref 65–100)
HCT VFR BLD AUTO: 27.8 % (ref 36.6–50.3)
HGB BLD-MCNC: 9 G/DL (ref 12.1–17)
MCH RBC QN AUTO: 27.1 PG (ref 26–34)
MCHC RBC AUTO-ENTMCNC: 32.4 G/DL (ref 30–36.5)
MCV RBC AUTO: 83.7 FL (ref 80–99)
NRBC # BLD: 0 K/UL (ref 0–0.01)
NRBC BLD-RTO: 0 PER 100 WBC
PHOSPHATE SERPL-MCNC: 5.3 MG/DL (ref 2.6–4.7)
PLATELET # BLD AUTO: 167 K/UL (ref 150–400)
PMV BLD AUTO: 11.6 FL (ref 8.9–12.9)
POTASSIUM SERPL-SCNC: 5 MMOL/L (ref 3.5–5.1)
RBC # BLD AUTO: 3.32 M/UL (ref 4.1–5.7)
SERVICE CMNT-IMP: ABNORMAL
SERVICE CMNT-IMP: ABNORMAL
SERVICE CMNT-IMP: NORMAL
SERVICE CMNT-IMP: NORMAL
SODIUM SERPL-SCNC: 136 MMOL/L (ref 136–145)
WBC # BLD AUTO: 3.2 K/UL (ref 4.1–11.1)

## 2019-02-12 PROCEDURE — 74011250637 HC RX REV CODE- 250/637: Performed by: INTERNAL MEDICINE

## 2019-02-12 PROCEDURE — 74011000250 HC RX REV CODE- 250: Performed by: INTERNAL MEDICINE

## 2019-02-12 PROCEDURE — 36415 COLL VENOUS BLD VENIPUNCTURE: CPT

## 2019-02-12 PROCEDURE — 90935 HEMODIALYSIS ONE EVALUATION: CPT

## 2019-02-12 PROCEDURE — 85027 COMPLETE CBC AUTOMATED: CPT

## 2019-02-12 PROCEDURE — 80069 RENAL FUNCTION PANEL: CPT

## 2019-02-12 PROCEDURE — 82962 GLUCOSE BLOOD TEST: CPT

## 2019-02-12 PROCEDURE — 94760 N-INVAS EAR/PLS OXIMETRY 1: CPT

## 2019-02-12 PROCEDURE — 65660000000 HC RM CCU STEPDOWN

## 2019-02-12 PROCEDURE — 74011250636 HC RX REV CODE- 250/636: Performed by: INTERNAL MEDICINE

## 2019-02-12 PROCEDURE — 99218 HC RM OBSERVATION: CPT

## 2019-02-12 PROCEDURE — 77010033678 HC OXYGEN DAILY

## 2019-02-12 RX ORDER — SERTRALINE HYDROCHLORIDE 50 MG/1
25 TABLET, FILM COATED ORAL DAILY
Status: DISCONTINUED | OUTPATIENT
Start: 2019-02-12 | End: 2019-02-13 | Stop reason: HOSPADM

## 2019-02-12 RX ADMIN — Medication 5 ML: at 05:05

## 2019-02-12 RX ADMIN — ALTEPLASE 2 MG: 2.2 INJECTION, POWDER, LYOPHILIZED, FOR SOLUTION INTRAVENOUS at 17:54

## 2019-02-12 RX ADMIN — ATORVASTATIN CALCIUM 20 MG: 10 TABLET, FILM COATED ORAL at 21:15

## 2019-02-12 RX ADMIN — HEPARIN SODIUM 5000 UNITS: 5000 INJECTION INTRAVENOUS; SUBCUTANEOUS at 03:29

## 2019-02-12 RX ADMIN — HYDROCODONE BITARTRATE AND ACETAMINOPHEN 1 TABLET: 5; 325 TABLET ORAL at 12:18

## 2019-02-12 RX ADMIN — BISACODYL 5 MG: 5 TABLET, COATED ORAL at 12:18

## 2019-02-12 RX ADMIN — ALTEPLASE 2 MG: 2.2 INJECTION, POWDER, LYOPHILIZED, FOR SOLUTION INTRAVENOUS at 17:53

## 2019-02-12 RX ADMIN — HEPARIN SODIUM 5000 UNITS: 5000 INJECTION INTRAVENOUS; SUBCUTANEOUS at 20:19

## 2019-02-12 RX ADMIN — Medication 5 ML: at 21:16

## 2019-02-12 RX ADMIN — HYDROCODONE BITARTRATE AND ACETAMINOPHEN 1 TABLET: 5; 325 TABLET ORAL at 20:19

## 2019-02-12 RX ADMIN — DOXAZOSIN 4 MG: 2 TABLET ORAL at 21:16

## 2019-02-12 RX ADMIN — POLYETHYLENE GLYCOL 3350 17 G: 17 POWDER, FOR SOLUTION ORAL at 11:30

## 2019-02-12 NOTE — DIABETES MGMT
DTC Progress Note Recommendations/ Comments: pt with low BG this am and pm. Please encourage po intake to help support BG > 80 mg/dL . Noted pt on HD and ESRD likely contributing to low BG. Current hospital DM medication: lispro correction - high sensitivity Chart reviewed on [facility]. Patient is a 70 y.o. male with hx of DM on no DM meds at home. Pt on HD. A1c:  
Lab Results Component Value Date/Time Hemoglobin A1c 4.5 01/21/2018 02:21 AM  
 Hemoglobin A1c 4.6 07/14/2017 04:42 AM  
 
 
Recent Glucose Results:  
Lab Results Component Value Date/Time GLU 69 02/12/2019 04:09 PM  
 GLUCPOC 100 02/12/2019 12:18 PM  
 GLUCPOC 102 (H) 02/12/2019 11:00 AM  
 GLUCPOC 73 02/12/2019 08:23 AM  
  
 
Lab Results Component Value Date/Time Creatinine 4.19 (H) 02/12/2019 04:09 PM  
 
Estimated Creatinine Clearance: 15.7 mL/min (A) (based on SCr of 4.19 mg/dL (H)). Active Orders Diet DIET DIABETIC CONSISTENT CARB Regular; 2 GM NA (House Low NA); AHA-LOW-CHOL FAT  
  
 
PO intake:  
Patient Vitals for the past 72 hrs: 
 % Diet Eaten 02/12/19 1220 100 % 02/12/19 0852 100 % 02/09/19 1920 100 % Will continue to follow as needed. Thank you, Tello Shukla RD [facility] Office: [phone] Time spent: 2 minutes

## 2019-02-12 NOTE — PHYSICIAN ADVISORY
Letter of Status Determination:  
Recommend hospitalization status upgraded from OBSERVATION  to INPATIENT  Status Pt Name:  Johanne Ortiz MR#  
JEREMY # [id number] / 
[id number] Payor: Sushant Beauchamp / Plan: [address] [place] [address] HMO / Product Type: Managed Care Medicare /   
CSN#  [id number] [facility]  3254/01  @ [facility] Hospitalization date  2/8/2019 11:36 AM  
Current Attending Physician  Marek Briseno MD  
Principal diagnosis  ESRD needing dialysis ([facility] [address] [N18.6, Z99.2] Accelerated hypertension [I10] Clinicals  70 y.o. y.o  male hospitalized with above diagnosis The pt is reportedly non-compliant with medical Rx. It is noted that he is b/l LE amputee; therefore, he is dependent on others for help. We see from the psychiatrist's note that the pt had been having suicidal thoughts for several weeks. He is now diagnosed with Severe depression and ongoing psychiatric evaluation are being done in order to determine if he will need INPT psychiatric care. [place] ([facility]) criteria Does   apply [id number]   
STATUS DETERMINATION  Based on documented presenting clinical data, comorbid conditions, high risk of adverse events and deterioration, it is our recommendation that the patient's status should be upgraded from OBSERVATION to INPATIENT status. The final decision of the patient's hospitalization status depends on the attending physician's judgment. Additional comments Payor: Sushant Beauchamp / [address] / Product Type: Managed Care Medicare /   
  
 
Nayla Escalante MD MPH FACP Cell: [phone] Physician Advisor [address]  
President Medical Staff, [facility]   
Cell  [phone] Ada Grady

## 2019-02-12 NOTE — PROGRESS NOTES
Hospitalist Progress Note NAME: Delilah Leon :  1940 MRN:  [id number] Assessment / Plan: Hypertensive Urgency POA Due to missed HD on Thursday ESRD on TTS - at [facility] unit per pt, go hd yesterday( got 2.5 hr and 2.5 liter removal)_ Diffuse Pain POA- non specific, ?related to Neuropathy H/o DM type 2 with Neuropathy PAD S/P Right leg bypass, now s/p amputation bilaterally Medical NonCompliance POA- pt not taking medications currently 
-BP stable with coreg, cardura and nifedipine.  
-he has asymptomatic bradycardic in the low 40s. Coreg is now discontinued. -IV hydralazine with holding parameters  
-con't ASA, statin 
-nephrology following for HD 
-will need to psych to re-evaluate as he is medically stable for discharge. He needs psych clearance prior to placement  
-awaiting placement, appreciate CM's help 
  
Depression 
-zoloft started as per psych's rec 
-psych to re-eval for inpt psych vs clearance for discharge to LTC 
 
DM with neuropathy, POA 
-A1C 4.5 
- BS on lower side in ER, high sensitivity SSI 
    
Constipation- on CT imaging 
-cont miralax 
  
Hyperlipidemia-not taking anything right now 
resume lipitor per previous medication list in [facility] for now 
  
H/o HepC 
 
 
18.5 - 24.9 Normal weight / Body mass index is 17.86 kg/m². Code status: Full Prophylaxis: Hep SQ Recommended Disposition: SNF/LTC Subjective: Chief Complaint / Reason for Physician Visit Pt seen at bedside. No acute issue overnight. Refused cardiac monitor. Discussed with RN events overnight. Review of Systems: 
Symptom Y/N Comments  Symptom Y/N Comments Fever/Chills n   Chest Pain n   
Poor Appetite    Edema Cough    Abdominal Pain n   
Sputum    Joint Pain SOB/TOLEDO n   Pruritis/Rash Nausea/vomit    Tolerating PT/OT Diarrhea    Tolerating Diet Constipation    Other Could NOT obtain due to:   
 
Objective: VITALS:  
Last 24hrs VS reviewed since prior progress note. Most recent are: 
Patient Vitals for the past 24 hrs: 
 Temp Pulse Resp BP SpO2  
02/12/19 1447 98.3 °F (36.8 °C) (!) 44 18 150/59 98 % 02/12/19 0820 97.8 °F (36.6 °C) (!) 46 17 158/65 100 % 02/12/19 0218 98 °F (36.7 °C) (!) 46 17 150/57 100 % 02/11/19 2322 98.4 °F (36.9 °C) (!) 48 17 140/60 100 % 02/11/19 2115  (!) 56     
02/11/19 1952 98.4 °F (36.9 °C) (!) 50 18 134/56 99 % 02/11/19 1532 98 °F (36.7 °C) (!) 47 22 126/52 96 % Intake/Output Summary (Last 24 hours) at 2/12/2019 1451 Last data filed at 2/12/2019 1220 Gross per 24 hour Intake 440 ml Output 800 ml Net -360 ml PHYSICAL EXAM: 
General: WD, WN. Alert, NADs   
EENT:  EOMI. Anicteric sclerae. MMM Resp:  CTA bilaterally, no wheezing or rales. No accessory muscle use CV:  Regular  rhythm,  No edema GI:  Soft, Non distended, Non tender.  +Bowel sounds Neurologic:  Alert and oriented X 3, normal speech Psych:   Poor insight. agitated Skin:  No rashes. No jaundice. Bilateral distal bka Reviewed most current lab test results and cultures  YES Reviewed most current radiology test results   YES Review and summation of old records today    NO Reviewed patient's current orders and MAR    YES 
PMH/SH reviewed - no change compared to H&P 
________________________________________________________________________ Care Plan discussed with: 
  Comments Patient x Family RN x Care Manager x Consultant Multidiciplinary team rounds were held today with , nursing, pharmacist and clinical coordinator. Patient's plan of care was discussed; medications were reviewed and discharge planning was addressed. ________________________________________________________________________ Total NON critical care TIME:  20   Minutes Total CRITICAL CARE TIME Spent:   Minutes non procedure based Comments >50% of visit spent in counseling and coordination of care    
________________________________________________________________________ Kavon Murcia MD  
 
Procedures: see electronic medical records for all procedures/Xrays and details which were not copied into this note but were reviewed prior to creation of Plan. LABS: 
I reviewed today's most current labs and imaging studies. Pertinent labs include: No results for input(s): WBC, HGB, HCT, PLT, HGBEXT, HCTEXT, PLTEXT, HGBEXT, HCTEXT, PLTEXT in the last 72 hours. No results for input(s): NA, K, CL, CO2, GLU, BUN, CREA, CA, MG, PHOS, ALB, TBIL, TBILI, SGOT, ALT, INR in the last 72 hours. No lab exists for component: INREXT, INREXT Signed: Kavon Murcia MD

## 2019-02-12 NOTE — INTERDISCIPLINARY ROUNDS
Interdisciplinary team rounds were held 2/12/2019 with the following team members:Care Management, Nursing, Pharmacy and Physician and the patient. Plan of care discussed. See clinical pathway and/or care plan for interventions and desired outcomes. CM following closely, complex case-needs LTC. Refused PT/OT today. For HD today.

## 2019-02-12 NOTE — PROGRESS NOTES
PT Note: 
 
Reviewed chart and spoke with nursing. Patient cleared for PT evaluation. Patient reports that he is homeless and wheelchair bound. Patient has bilateral prostheses for BKAs, but has not worn them recently as they are with his wife in the [facility] in [facility]. \" Patient reports that he receives assist from wife for ADLs and wheelchair transfers. Patient refusing to participate in mobility with therapy. Encouraged EOB mobility, but patient continued to refuse, stating that he did not want to do anything today. Will follow up tomorrow for evaluation as patient is to receive dialysis today. Fredrick Pascual, SPT Time Spent: 11 minutes

## 2019-02-12 NOTE — PROGRESS NOTES
Bedside and Verbal shift change report given to Latosha (oncoming nurse) by Hossein Guardado RN (offgoing nurse). Report included the following information Kardex, MAR and Recent Results.

## 2019-02-12 NOTE — PROGRESS NOTES
Problem: Falls - Risk of 
Goal: *Absence of Falls Document Aj Heard Fall Risk and appropriate interventions in the flowsheet. Outcome: Progressing Towards Goal 
Fall Risk Interventions: 
  
 
  
 
Medication Interventions: Evaluate medications/consider consulting pharmacy, Patient to call before getting OOB, Teach patient to arise slowly Elimination Interventions: Call light in reach, Patient to call for help with toileting needs

## 2019-02-12 NOTE — PROGRESS NOTES
Problem: Falls - Risk of 
Goal: *Absence of Falls Document Jose De Jesust President Fall Risk and appropriate interventions in the flowsheet. Outcome: Progressing Towards Goal 
Fall Risk Interventions: 
  
 
  
 
Medication Interventions: Evaluate medications/consider consulting pharmacy Elimination Interventions: Call light in reach

## 2019-02-12 NOTE — PROGRESS NOTES
Reviewed Psychiatric Evaluation completed 02/12 which is part of the evaluation process for patient's long term care placement. Under DMAS guidelines, patient had inpatient psychiatric care within the last two years and therefore requires a LEVEL II screening. The psychiatric evaluation states patient will need to be seen before discharge for psychiatric clearance to reassess if he will need inpatient psychiatry. Need clarification if patient requires Inpatient Psychiatric care at this time, or if outpatient (with NH placement) will be appropriate. Advised Inaptient CM/CM Coordinator of same and requested they discuss with Attending. 7 DANIEL Mcdonald, [facility], [facility] CM Lead/[place] [phone]

## 2019-02-12 NOTE — PROGRESS NOTES
Nephrology Progress Note Pritesh Austin  
 
[email or web address]. [email or web address]                  Phone - [phone] Patient: Zach Fajardo YOB: 1940     Admit Date: 2/8/2019 Date- 2/12/2019 CC: Follow up for esrd Subjective: Interval History: -  S/p hd on Saturday BP STABLE No c/o sob, No c/o chest pain, No c/o nausea or vomiting No c/o  fever. ROS:- as above Assessment:  
· ESRD- hd tts at [address] · hypertension · Anemia of ckd · pvd · H/o b/l bka · [facility]. [place] [facility] Plan: · Dialysis today · Check cbc,renal panel with hd today · Continue procardia, cardura, coreg. · epogen tts. Physical Exam:  
GEN: NAD NECK- Supple,no mass Right ij permacath+ RESP: clear bl/l, no wheezing CVS: RRR,S1,S2 SKIN: No Rash, ABDO: soft , non tender, No hepatosplenomegaly EXT: No Edema B/l bka stump + NEURO:non focal, normal speech Care Plan discussed with: patient Objective:  
Patient Vitals for the past 24 hrs: 
 Temp Pulse Resp BP SpO2  
02/12/19 1447 98.3 °F (36.8 °C) (!) 44 18 150/59 98 % 02/12/19 0820 97.8 °F (36.6 °C) (!) 46 17 158/65 100 % 02/12/19 0218 98 °F (36.7 °C) (!) 46 17 150/57 100 % 02/11/19 2322 98.4 °F (36.9 °C) (!) 48 17 140/60 100 % 02/11/19 2115  (!) 56     
02/11/19 1952 98.4 °F (36.9 °C) (!) 50 18 134/56 99 % 02/11/19 1532 98 °F (36.7 °C) (!) 47 22 126/52 96 % Last 3 Recorded Weights in this Encounter 02/09/19 0034 02/10/19 5921 02/12/19 9414 Weight: 79 kg (174 lb 2.6 oz) 75.6 kg (166 lb 10.7 oz) 73.8 kg (162 lb 9.6 oz) No intake/output data recorded. Chart reviewed. Pertinent Notes reviewed. Medication list  reviewed Current Facility-Administered Medications Medication  sertraline (ZOLOFT) tablet 25 mg  
 hydrALAZINE (APRESOLINE) 20 mg/mL injection 10 mg  
 heparin (porcine) 1,000 unit/mL injection 3,600 Units  acetaminophen (TYLENOL) tablet 650 mg  
 aspirin delayed-release tablet 81 mg  
 doxazosin (CARDURA) tablet 4 mg  
 NIFEdipine ER (PROCARDIA XL) tablet 60 mg  
 atorvastatin (LIPITOR) tablet 20 mg  
 sodium chloride (NS) flush 5-40 mL  sodium chloride (NS) flush 5-40 mL  ondansetron (ZOFRAN) injection 4 mg  
 HYDROcodone-acetaminophen (NORCO) 5-325 mg per tablet 1 Tab  naloxone (NARCAN) injection 0.4 mg  
 bisacodyl (DULCOLAX) tablet 5 mg  polyethylene glycol (MIRALAX) packet 17 g  
 heparin (porcine) injection 5,000 Units  insulin lispro (HUMALOG) injection  glucose chewable tablet 16 g  
 dextrose (D50W) injection syrg 12.5-25 g  
 glucagon (GLUCAGEN) injection 1 mg Data Review : 
No results for input(s): WBC, HGB, PLT, ANEU, INR, APTT, NA, K, GLU, BUN, CREA, GPT, ALT, SGOT, TBIL, TBILI, AP, CA, MG, PHOS, HGBEXT, PLTEXT, HGBEXT, PLTEXT in the last 72 hours. No lab exists for component: INREXT, INREXT Lab Results Component Value Date/Time Culture result: MRSA NOT PRESENT 01/24/2018 10:30 AM  
 Culture result:  01/24/2018 10:30 AM  
      Screening of patient nares for MRSA is for surveillance purposes and, if positive, to facilitate isolation considerations in high risk settings. It is not intended for automatic decolonization interventions per se as regimens are not sufficiently effective to warrant routine use. Culture result: MRSA NOT PRESENT 01/19/2015 10:44 AM  
 Culture result:  01/19/2015 10:44 AM  
      Screening of patient nares for MRSA is for surveillance purposes and, if positive, to facilitate isolation considerations in high risk settings. It is not intended for automatic decolonization interventions per se as regimens are not sufficiently effective to warrant routine use. No results found for: SDES No results for input(s): FE, TIBC, PSAT, FERR in the last 72 hours. No results found for: Demario Hall MD 
[address] [facility] 
 www. [facility] [address] [employer] [address] [place], [address] Phone - [phone] Fax - [phone]

## 2019-02-12 NOTE — PROCEDURES
[facility]  [phone]    Vitals   Pre   Post   Assessment   Pre   Post     Temp  Temp: 98 °F (36.7 °C) (02/12/19 1600)  99 LOC  A&Ox3    HR   44 47 Lungs   Clear     B/P   156/73 168/75 Cardiac   RRR     Resp   Resp Rate: 18 (02/12/19 1600) 18 Skin   Warm, dry     Pain level  0/10 0/10 Edema  generalized        Orders:    Duration:   Start:    1600 End:    1940 Total:   2 hours   Dialyzer:   Dialyzer/Set Up Inspection: Revaclear (02/12/19 1600)   K Bath:   Dialysate K (mEq/L): 2 (02/12/19 1600)   Ca Bath:   Dialysate CA (mEq/L): 3.0 (02/12/19 1600)   Na/Bicarb:   Dialysate NA (mEq/L): 140 (02/12/19 1600)   Target Fluid Removal:   Goal/Amount of Fluid to Remove (mL): 1500 mL (02/12/19 1600)   Access     Type & Location:   R CVC dressing changed. No s/s of infection . Ports disinfected with prevantics aspirated and flushed with NS. Tx initiated   Labs     Obtained/Reviewed   Critical Results Called   Date when labs were drawn-  Hgb-    HGB   Date Value Ref Range Status   02/12/2019 9.0 (L) 12.1 - 17.0 g/dL Final     K-    Potassium   Date Value Ref Range Status   02/12/2019 5.0 3.5 - 5.1 mmol/L Final     Ca-   Calcium   Date Value Ref Range Status   02/12/2019 8.4 (L) 8.5 - 10.1 MG/DL Final     Bun-   BUN   Date Value Ref Range Status   02/12/2019 53 (H) 6 - 20 MG/DL Final     Creat-   Creatinine   Date Value Ref Range Status   02/12/2019 4.19 (H) 0.70 - 1.30 MG/DL Final        Medications/ Blood Products Given     Name   Dose   Route and Time     Cath dada 2g/2mL-1.8mL Arterial 1800   Cath dada 2g/2mL-1. 8mL Venous 1800        Blood Volume Processed (BVP):    35.3 Liters Net Fluid   Removed:  744mL   Comments   Time Out Done: yes  Primary Nurse Rpt Pre: Marlene Ceballos RN  Primary Nurse Rpt Post: Marlene Ceballos RN  Pt Education: procedural, access care  Care Plan:continue dialysis as ordered. Tx Summary: Dr aware of patient pulse rate before treatment initiation.   At 1 hour and 15 mins into treatment continued to have difficulty with blood flow rate.  orderd Cath dada to dwell 45 mins. Patient refused to continue treatment with 1hr and 15 minutes remaining in treatment.  notified. At end, all possible blood rinsed back to patient lines flushed with NS and capped. Report given to primary RN. Admiting Diagnosis:ESRD  Consent signed - Informed Consent Verified: Yes (02/12/19 1600)  Mau Consent - yes  Hepatitis Status- Negative 2/9/19  Machine #- Machine Number: [id number] (02/12/19 1600)  Telemetry status-NA  Pre-dialysis wt. -  ERICA

## 2019-02-13 ENCOUNTER — HOSPITAL ENCOUNTER (INPATIENT)
Age: 79
LOS: 26 days | Discharge: NURSING, REHAB OR LONG-TERM CARE | DRG: 881 | End: 2019-03-11
Attending: PSYCHIATRY & NEUROLOGY | Admitting: PSYCHIATRY & NEUROLOGY
Payer: MEDICARE

## 2019-02-13 VITALS
SYSTOLIC BLOOD PRESSURE: 181 MMHG | HEART RATE: 48 BPM | OXYGEN SATURATION: 99 % | RESPIRATION RATE: 18 BRPM | BODY MASS INDEX: 17.77 KG/M2 | DIASTOLIC BLOOD PRESSURE: 82 MMHG | TEMPERATURE: 98.2 F | WEIGHT: 161.8 LBS

## 2019-02-13 DIAGNOSIS — I73.9 PERIPHERAL VASCULAR DISEASE (HCC): Primary | Chronic | ICD-10-CM

## 2019-02-13 LAB
GLUCOSE BLD STRIP.AUTO-MCNC: 116 MG/DL (ref 65–100)
GLUCOSE BLD STRIP.AUTO-MCNC: 66 MG/DL (ref 65–100)
GLUCOSE BLD STRIP.AUTO-MCNC: 70 MG/DL (ref 65–100)
GLUCOSE BLD STRIP.AUTO-MCNC: 74 MG/DL (ref 65–100)
GLUCOSE BLD STRIP.AUTO-MCNC: 74 MG/DL (ref 65–100)
GLUCOSE BLD STRIP.AUTO-MCNC: 83 MG/DL (ref 65–100)
GLUCOSE BLD STRIP.AUTO-MCNC: 85 MG/DL (ref 65–100)
GLUCOSE BLD STRIP.AUTO-MCNC: 93 MG/DL (ref 65–100)
SERVICE CMNT-IMP: ABNORMAL
SERVICE CMNT-IMP: NORMAL

## 2019-02-13 PROCEDURE — 74011250636 HC RX REV CODE- 250/636: Performed by: INTERNAL MEDICINE

## 2019-02-13 PROCEDURE — 82962 GLUCOSE BLOOD TEST: CPT

## 2019-02-13 PROCEDURE — 97162 PT EVAL MOD COMPLEX 30 MIN: CPT

## 2019-02-13 PROCEDURE — 74011250637 HC RX REV CODE- 250/637: Performed by: INTERNAL MEDICINE

## 2019-02-13 PROCEDURE — 97530 THERAPEUTIC ACTIVITIES: CPT

## 2019-02-13 PROCEDURE — 94760 N-INVAS EAR/PLS OXIMETRY 1: CPT

## 2019-02-13 PROCEDURE — 65220000003 HC RM SEMIPRIVATE PSYCH

## 2019-02-13 PROCEDURE — 77010033678 HC OXYGEN DAILY

## 2019-02-13 RX ORDER — INSULIN LISPRO 100 [IU]/ML
INJECTION, SOLUTION INTRAVENOUS; SUBCUTANEOUS
Status: DISCONTINUED | OUTPATIENT
Start: 2019-02-14 | End: 2019-03-01

## 2019-02-13 RX ORDER — DEXTROSE 50 % IN WATER (D50W) INTRAVENOUS SYRINGE
25-50 AS NEEDED
Status: DISCONTINUED | OUTPATIENT
Start: 2019-02-13 | End: 2019-03-11 | Stop reason: HOSPADM

## 2019-02-13 RX ORDER — HYDROCHLOROTHIAZIDE 25 MG/1
25 TABLET ORAL DAILY
Qty: 30 TAB | Refills: 0 | Status: SHIPPED
Start: 2019-02-14

## 2019-02-13 RX ORDER — HYDROCHLOROTHIAZIDE 25 MG/1
25 TABLET ORAL DAILY
Status: DISCONTINUED | OUTPATIENT
Start: 2019-02-14 | End: 2019-02-13 | Stop reason: HOSPADM

## 2019-02-13 RX ORDER — SERTRALINE HYDROCHLORIDE 25 MG/1
25 TABLET, FILM COATED ORAL DAILY
Qty: 30 TAB | Refills: 0 | Status: SHIPPED
Start: 2019-02-14

## 2019-02-13 RX ORDER — ZOLPIDEM TARTRATE 5 MG/1
5 TABLET ORAL
Status: DISCONTINUED | OUTPATIENT
Start: 2019-02-13 | End: 2019-03-08

## 2019-02-13 RX ORDER — MAGNESIUM SULFATE 100 %
4 CRYSTALS MISCELLANEOUS AS NEEDED
Status: DISCONTINUED | OUTPATIENT
Start: 2019-02-13 | End: 2019-03-11 | Stop reason: HOSPADM

## 2019-02-13 RX ORDER — IBUPROFEN 400 MG/1
400 TABLET ORAL
Status: DISCONTINUED | OUTPATIENT
Start: 2019-02-13 | End: 2019-02-18

## 2019-02-13 RX ORDER — ADHESIVE BANDAGE
30 BANDAGE TOPICAL DAILY PRN
Status: DISCONTINUED | OUTPATIENT
Start: 2019-02-13 | End: 2019-03-11 | Stop reason: HOSPADM

## 2019-02-13 RX ORDER — ACETAMINOPHEN 325 MG/1
650 TABLET ORAL
Status: DISCONTINUED | OUTPATIENT
Start: 2019-02-13 | End: 2019-03-11 | Stop reason: HOSPADM

## 2019-02-13 RX ORDER — BENZTROPINE MESYLATE 1 MG/1
1 TABLET ORAL
Status: DISCONTINUED | OUTPATIENT
Start: 2019-02-13 | End: 2019-03-11 | Stop reason: HOSPADM

## 2019-02-13 RX ORDER — IBUPROFEN 200 MG
1 TABLET ORAL
Status: DISCONTINUED | OUTPATIENT
Start: 2019-02-13 | End: 2019-03-11 | Stop reason: HOSPADM

## 2019-02-13 RX ORDER — BENZTROPINE MESYLATE 1 MG/ML
1 INJECTION INTRAMUSCULAR; INTRAVENOUS
Status: DISCONTINUED | OUTPATIENT
Start: 2019-02-13 | End: 2019-03-11 | Stop reason: HOSPADM

## 2019-02-13 RX ORDER — NIFEDIPINE 60 MG/1
60 TABLET, EXTENDED RELEASE ORAL DAILY
Qty: 30 TAB | Refills: 0 | Status: SHIPPED | OUTPATIENT
Start: 2019-02-13

## 2019-02-13 RX ORDER — OLANZAPINE 2.5 MG/1
2.5 TABLET ORAL
Status: DISCONTINUED | OUTPATIENT
Start: 2019-02-13 | End: 2019-03-11 | Stop reason: HOSPADM

## 2019-02-13 RX ADMIN — HEPARIN SODIUM 5000 UNITS: 5000 INJECTION INTRAVENOUS; SUBCUTANEOUS at 10:31

## 2019-02-13 RX ADMIN — HYDRALAZINE HYDROCHLORIDE 10 MG: 20 INJECTION INTRAMUSCULAR; INTRAVENOUS at 13:16

## 2019-02-13 RX ADMIN — HEPARIN SODIUM 5000 UNITS: 5000 INJECTION INTRAVENOUS; SUBCUTANEOUS at 02:36

## 2019-02-13 RX ADMIN — SERTRALINE HYDROCHLORIDE 25 MG: 50 TABLET ORAL at 10:28

## 2019-02-13 RX ADMIN — Medication 10 ML: at 13:19

## 2019-02-13 RX ADMIN — HEPARIN SODIUM 5000 UNITS: 5000 INJECTION INTRAVENOUS; SUBCUTANEOUS at 18:26

## 2019-02-13 RX ADMIN — HYDRALAZINE HYDROCHLORIDE 10 MG: 20 INJECTION INTRAMUSCULAR; INTRAVENOUS at 19:29

## 2019-02-13 RX ADMIN — ASPIRIN 81 MG: 81 TABLET, COATED ORAL at 10:28

## 2019-02-13 RX ADMIN — HYDROCODONE BITARTRATE AND ACETAMINOPHEN 1 TABLET: 5; 325 TABLET ORAL at 17:16

## 2019-02-13 RX ADMIN — Medication 5 ML: at 05:11

## 2019-02-13 RX ADMIN — HYDROCODONE BITARTRATE AND ACETAMINOPHEN 1 TABLET: 5; 325 TABLET ORAL at 10:29

## 2019-02-13 NOTE — CONSULTS
PSYCHIATRIC PROGRESS NOTE         Patient Name  Maribel Robison   Date of Birth 1940   [facility] [id number]   Medical Record Number  [id number]      Age  70 y.o. PCP Boris Peralta MD   Admit date:  2/8/2019    Room Number  5/56  @ [facility]   Date of Service  2/12/2019             E & M PROGRESS NOTE:         HISTORY       CC/HISTORY OF PRESENT ILLNESS/INTERVAL HISTORY:  (reviewed/updated 2/12/2019). per initial evaluation:       Maribel Robison presents/reports/evidences the following emotional symptoms today, 2/12/2019:  depression and suicidal thoughts/threats. The above symptoms have been present for few weeks . These symptoms are of moderate  severity. The symptoms are constant  in nature. Additional symptomatology include agitation and depression worse. Spoke with his wife who stated he has been feeling down and he was not in good health , she stated he lives in the [place] and he is unhappy about his living situation , he stated he had lots of Money before but now he is broke and no one ask about him , he got angry when I mentioned the need to go to psychiatry after medical clearance and he stated he is not suicidal and made that statement but he is not suicidal any more      2/12/19 he stated he is feeling depressed and he started to feels suicidal again and he could not contract for safety today       SIDE EFFECTS: (reviewed/updated 2/12/2019)  None reported or admitted to. No noted toxicity with use of Depakote/Tegretol/lithium/Clozaril/TCAs   ALLERGIES:(reviewed/updated 2/12/2019)  Allergies   Allergen Reactions    Tetracycline Hives      MEDICATIONS PRIOR TO ADMISSION:(reviewed/updated 2/12/2019)  Medications Prior to Admission   Medication Sig    carvedilol (COREG) 12.5 mg tablet Take 12.5 mg by mouth two (2) times daily (with meals).  oxyCODONE-acetaminophen (PERCOCET 10)  mg per tablet Take 1 Tab by mouth every six (6) hours as needed for Pain.     traZODone (DESYREL) 50 mg tablet Take 50 mg by mouth nightly.  NIFEdipine ER (ADALAT CC) 60 mg ER tablet Take 60 mg by mouth daily.  rosuvastatin (CRESTOR) 10 mg tablet Take 10 mg by mouth nightly.  aspirin delayed-release 81 mg tablet Take 1 Tab by mouth daily. PAST MEDICAL HISTORY: Past medical history from the initial psychiatric evaluation has been reviewed (reviewed/updated 2/12/2019) with no additional updates (I asked patient and no additional past medical history provided). Past Medical History:   Diagnosis Date    Arthritis     CAD (coronary artery disease) 2007    CKD (chronic kidney disease), stage III     DM type 2 causing renal disease (HCC)     DVT (deep venous thrombosis) (HCC)     Hx of DVT:  Xarelto stopped due to GI bleed. S/P IVC filter.  Gait abnormality     GI bleed     Heart murmur     Hepatitis C     History of blood transfusion     Hypercholesterolemia     Hypertension 11/7/2014    Neuropathy     Non compliance w medication regimen     Peripheral vascular disease ([facility] [address] 11/7/2014    A. S/P Right SFA POBA and tibial atherectomy (2/4/13).  PUD (peptic ulcer disease) 2007    Unspecified sleep apnea     never used cpap     Past Surgical History:   Procedure Laterality Date    ABDOMEN SURGERY PROC UNLISTED  2007    has approx 7-8\" midline incision on abdomen--\"had to open him up and clean him out after feeding tube clogged\"    HX GI  2007    feeding tube for approx 2 mo    VASCULAR SURGERY PROCEDURE UNLIST Right 1/22/2015    popliteal-tibial bypass      SOCIAL HISTORY: Social history from the initial psychiatric evaluation has been reviewed (reviewed/updated 2/12/2019) with no additional updates (I asked patient and no additional social history provided).    Social History     Socioeconomic History    Marital status: SINGLE     Spouse name: Not on file    Number of children: Not on file    Years of education: Not on file    Highest education level: Not on file   Social Needs    Financial resource strain: Not on file    Food insecurity - worry: Not on file    Food insecurity - inability: Not on file   [employer] needs - medical: Not on file   [employer] needs - non-medical: Not on file   Occupational History    Not on file   Tobacco Use    Smoking status: Current Every Day Smoker     Packs/day: 0.50   Substance and Sexual Activity    Alcohol use: No    Drug use: No     Comment: >20 years ago    Sexual activity: Not on file   Other Topics Concern    Not on file   Social History Narrative    78 year ols male admitted for depression and homelessness. Pt will be evicated from apartment due to non payment of bills. [facility] of 30 [facility] [place] [facility] others. \" Pt is a brittle diabetic, with treatment non compliance. He has bilarela lower extremity amputations. Patient has a long hx of substance abuse. FAMILY HISTORY: Family history from the initial psychiatric evaluation has been reviewed (reviewed/updated 2/12/2019) with no additional updates (I asked patient and no additional family history provided). Family History   Problem Relation Age of Onset    Hypertension Father        REVIEW OF SYSTEMS: (reviewed/updated 2/12/2019)  Appetite:improved   Sleep: improved   All other Review of Systems:      History obtained from the patient and wife          [facility] ([facility]):    [facility] FINDINGS ARE WITHIN NORMAL LIMITS (WNL) UNLESS OTHERWISE STATED BELOW. Orientation oriented to time, place and person   Vital Signs (BP,Pulse, Temp) See below (reviewed 2/12/2019); vital signs are WNL if not listed below.    Gait and Station Stable/steady, no ataxia   Abnormal Muscular Movements, Tone, and Behavior No EPS, no Tardive Dyskinesia, no abnormal muscular movements; wnl tone   Relations guarded   General Appearance:  age appropriate   Language No aphasia or dysarthria   Speech:  normal pitch and normal volume   Thought Processes logical, wnl rate of thoughts, good abstract reasoning and computation   Thought Associations logical   Thought Content free of delusions and free of hallucinations   Suicidal Ideations Suicidal ideation    Homicidal Ideations no plan  and no intention   Mood:  depressed   Affect:  anxious   Memory recent  adequate   Memory remote:  adequate   Concentration/Attention:  adequate   Fund of Knowledge average   Insight:  fair   Reliability fair   Judgment:  fair        VITALS:     Patient Vitals for the past 24 hrs:   Temp Pulse Resp BP SpO2   02/12/19 1955 99.2 °F (37.3 °C) (!) 50 18 160/52 97 %   02/12/19 1940  (!) 47 18 168/75 96 %   02/12/19 1930  (!) 47  168/75    02/12/19 1914  (!) 44  176/74    02/12/19 1900  (!) 49  179/71    02/12/19 1845  (!) 45  172/73    02/12/19 1715  (!) 43  159/75    02/12/19 1700  (!) 44  155/77    02/12/19 1645  (!) 45  150/74    02/12/19 1630  (!) 42  161/71    02/12/19 1615  (!) 44  161/74    02/12/19 1600 98 °F (36.7 °C) (!) 44 18 156/73 96 %   02/12/19 1447 98.3 °F (36.8 °C) (!) 44 18 150/59 98 %   02/12/19 0820 97.8 °F (36.6 °C) (!) 46 17 158/65 100 %   02/12/19 0218 98 °F (36.7 °C) (!) 46 17 150/57 100 %   02/11/19 2322 98.4 °F (36.9 °C) (!) 48 17 140/60 100 %   02/11/19 2115  (!) 56               DATA     LABORATORY DATA:(reviewed/updated 2/12/2019)  Recent Results (from the past 24 hour(s))   GLUCOSE, POC    Collection Time: 02/11/19  9:11 PM   Result Value Ref Range    Glucose (POC) 142 (H) 65 - 100 mg/dL    Performed by Vida Saravia (PCT)    GLUCOSE, POC    Collection Time: 02/12/19  8:23 AM   Result Value Ref Range    Glucose (POC) 73 65 - 100 mg/dL    Performed by Joey May (PCT)    GLUCOSE, POC    Collection Time: 02/12/19 11:00 AM   Result Value Ref Range    Glucose (POC) 102 (H) 65 - 100 mg/dL    Performed by Dwight Carverique    GLUCOSE, POC    Collection Time: 02/12/19 12:18 PM   Result Value Ref Range    Glucose (POC) 100 65 - 100 mg/dL    Performed by Sam Newberry (PCT)    CBC W/O DIFF    Collection Time: 02/12/19  4:09 PM   Result Value Ref Range    WBC 3.2 (L) 4.1 - 11.1 K/uL    RBC 3.32 (L) 4.10 - 5.70 M/uL    HGB 9.0 (L) 12.1 - 17.0 g/dL    HCT 27.8 (L) 36.6 - 50.3 %    MCV 83.7 80.0 - 99.0 FL    MCH 27.1 26.0 - 34.0 PG    MCHC 32.4 30.0 - 36.5 g/dL    RDW 17.0 (H) 11.5 - 14.5 %    PLATELET 216 808 - 287 K/uL    MPV 11.6 8.9 - 12.9 FL    NRBC 0.0 0  WBC    ABSOLUTE NRBC 0.00 0.00 - 0.01 K/uL   RENAL FUNCTION PANEL    Collection Time: 02/12/19  4:09 PM   Result Value Ref Range    Sodium 136 136 - 145 mmol/L    Potassium 5.0 3.5 - 5.1 mmol/L    Chloride 104 97 - 108 mmol/L    CO2 27 21 - 32 mmol/L    Anion gap 5 5 - 15 mmol/L    Glucose 69 65 - 100 mg/dL    BUN 53 (H) 6 - 20 MG/DL    Creatinine 4.19 (H) 0.70 - 1.30 MG/DL    BUN/Creatinine ratio 13 12 - 20      GFR est AA 17 (L) >60 ml/min/1.73m2    GFR est non-AA 14 (L) >60 ml/min/1.73m2    Calcium 8.4 (L) 8.5 - 10.1 MG/DL    Phosphorus 5.3 (H) 2.6 - 4.7 MG/DL    Albumin 2.8 (L) 3.5 - 5.0 g/dL   GLUCOSE, POC    Collection Time: 02/12/19  8:14 PM   Result Value Ref Range    Glucose (POC) 146 (H) 65 - 100 mg/dL    Performed by [employer]      No results found for: VALF2, VALAC, VALP, VALPR, DS6, CRBAM, CRBAMP, CARB2, XCRBAM  No results found for: LITHM       MEDICATIONS     ALL MEDICATIONS:   Current Facility-Administered Medications   Medication Dose Route Frequency    sertraline (ZOLOFT) tablet 25 mg  25 mg Oral DAILY    epoetin rosie (EPOGEN;PROCRIT) injection 10,000 Units  10,000 Units SubCUTAneous Q TUE, THU & SAT    hydrALAZINE (APRESOLINE) 20 mg/mL injection 10 mg  10 mg IntraVENous Q6H PRN    heparin (porcine) 1,000 unit/mL injection 3,600 Units  3,600 Units Hemodialysis DIALYSIS PRN    acetaminophen (TYLENOL) tablet 650 mg  650 mg Oral Q6H PRN    aspirin delayed-release tablet 81 mg  81 mg Oral DAILY    doxazosin (CARDURA) tablet 4 mg  4 mg Oral QHS    NIFEdipine ER (PROCARDIA XL) tablet 60 mg  60 mg Oral DAILY    atorvastatin (LIPITOR) tablet 20 mg  20 mg Oral QHS    sodium chloride (NS) flush 5-40 mL  5-40 mL IntraVENous Q8H    sodium chloride (NS) flush 5-40 mL  5-40 mL IntraVENous PRN    ondansetron (ZOFRAN) injection 4 mg  4 mg IntraVENous Q4H PRN    HYDROcodone-acetaminophen (NORCO) 5-325 mg per tablet 1 Tab  1 Tab Oral Q4H PRN    naloxone (NARCAN) injection 0.4 mg  0.4 mg IntraVENous PRN    bisacodyl (DULCOLAX) tablet 5 mg  5 mg Oral DAILY PRN    polyethylene glycol (MIRALAX) packet 17 g  17 g Oral DAILY    heparin (porcine) injection 5,000 Units  5,000 Units SubCUTAneous Q8H    insulin lispro (HUMALOG) injection   SubCUTAneous AC&HS    glucose chewable tablet 16 g  4 Tab Oral PRN    dextrose (D50W) injection syrg 12.5-25 g  12.5-25 g IntraVENous PRN    glucagon (GLUCAGEN) injection 1 mg  1 mg IntraMUSCular PRN      SCHEDULED MEDICATIONS:   Current Facility-Administered Medications   Medication Dose Route Frequency    sertraline (ZOLOFT) tablet 25 mg  25 mg Oral DAILY    epoetin rosie (EPOGEN;PROCRIT) injection 10,000 Units  10,000 Units SubCUTAneous Q TUE, THU & SAT    aspirin delayed-release tablet 81 mg  81 mg Oral DAILY    doxazosin (CARDURA) tablet 4 mg  4 mg Oral QHS    NIFEdipine ER (PROCARDIA XL) tablet 60 mg  60 mg Oral DAILY    atorvastatin (LIPITOR) tablet 20 mg  20 mg Oral QHS    sodium chloride (NS) flush 5-40 mL  5-40 mL IntraVENous Q8H    polyethylene glycol (MIRALAX) packet 17 g  17 g Oral DAILY    heparin (porcine) injection 5,000 Units  5,000 Units SubCUTAneous Q8H    insulin lispro (HUMALOG) injection   SubCUTAneous AC&HS            ASSESSMENT & PLAN     The patient, Elver Soriano, is a 70 y.o.  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 I will continue to monitor blood levels (Depakote, Tegretol, lithium, clozapine---a drug with a narrow therapeutic index= NTI) and associated labs for drug therapy implemented that require intense monitoring for toxicity as deemed appropriate base on current medication side effects and pharmacodynamically determined drug 1/2 lives. I will continue to order blood tests/labs and diagnostic tests as deemed appropriate and review results as they become available (see orders for details and above listed lab/test results). I will order psychiatric records from previous [facility] to further elucidate the nature of patient's psychopathology and review once available. I will gather additional collateral information from friends, family and o/p treatment team to further elucidate the nature of patient's psychopathology and baselline level of psychiatric functioning.     Complete current electronic health record for patient has been reviewed today including consultant notes, ancillary staff notes, nurses and psychiatric tech notes        Signed By:   Ion Hunt MD  2/12/2019

## 2019-02-13 NOTE — PROGRESS NOTES
Problem: Falls - Risk of 
Goal: *Absence of Falls Document Bill Lo Fall Risk and appropriate interventions in the flowsheet. Outcome: Progressing Towards Goal 
Fall Risk Interventions: 
  
 
  
 
Medication Interventions: Evaluate medications/consider consulting pharmacy, Patient to call before getting OOB Elimination Interventions: Call light in reach, Patient to call for help with toileting needs

## 2019-02-13 NOTE — BH NOTES
TRANSFER - IN REPORT:    Verbal report received from Shaun Barnett on Precious Gannon  being received from  for routine progression of care      Report consisted of patients Situation, Background, Assessment and   Recommendations(SBAR). Information from the following report(s) SBAR, MAR and Recent Results was reviewed with the receiving nurse. Opportunity for questions and clarification was provided. Assessment completed upon patients arrival to unit and care assumed.

## 2019-02-13 NOTE — PROGRESS NOTES
Bedside and Verbal shift change report given to Brcye Barnes (oncoming nurse) by Dana Prater (offgoing nurse). Report included the following information Kardex, MAR and Recent Results.

## 2019-02-13 NOTE — PROGRESS NOTES
5886 - Bedside and Verbal shift change report given to Patsy Tomlinson RN (oncoming nurse) by Inga Jacobo RN (offgoing nurse). Report included the following information SBAR and Kardex. Pt refusing telemetry monitoring at this time, MD aware per [address] with Basilio Alexander from [facility] inpatient psych regarding pt's willingness to be admitted there. She states the importance of the pt truly being voluntary and being willing to participate in his care. Will further assess today and call Basilio Alexander back with an update later today, call back number is [phone]. 
 
1030 - Pt states he definitely wants to go to [place] [address], and he is willing to participate and do whatever's needed to get better. Pt continues to have delayed responses and flat affect. Pt states he will try to participate with PT today. 1100 - Spoke with Basilio Alexander from [facility] psych admissions, provided further information regarding pt's interest in being admitted there and willingness to participate. Discussed that pt not transferring himself to/from his wheelchair independently at this time, Basilio Alexander states she will work on getting appropriate placement for pt. 
 
1330 - Pt c/o itching at HD cath site. Dressing has been torn and catheter entry site is exposed. Dressing removed and new dressing applied after cleaning site with CHG. Pt refused cleaning site with alcohol swab included in kit. Pt states the new dressing feels better. 1799 - Bedside and Verbal shift change report given to Thom Izaguirre (oncoming nurse) by Patsy Tomlinson RN (offgoing nurse). Report included the following information SBAR and Kardex. Pt continues to refuse telemetry despite HR 40s-50s. Tiffany ELIZONDO aware of plan for EMTALA transfer to [facility] Psych this afternoon. EMTALA already signed by MD and patient. Transport arranged for 1630.

## 2019-02-13 NOTE — PROGRESS NOTES
Hospitalist Progress Note NAME: Abigail Gayle :  1940 MRN:  [id number] Assessment / Plan: Hypertensive Urgency POA Due to missed HD on Thursday ESRD on TTS - at [facility] unit per pt, go hd yesterday( got 2.5 hr and 2.5 liter removal)_ Diffuse Pain POA- non specific, ?related to Neuropathy H/o DM type 2 with Neuropathy PAD S/P Right leg bypass, now s/p amputation bilaterally Medical NonCompliance POA- pt not taking medications PTA 
-he has asymptomatic bradycardic in the low 40s. Coreg is now discontinued. Nicardipine with holding parameters  
-start HCTZ for better BP control -IV hydralazine with holding parameters  
-con't ASA, statin 
-nephrology following for HD 
-stable for discharge, appreciate CM's help 
  
Depression 
-zoloft started as per psych's rec 
-psych recommended inpt psych, awaiting bed availability at [facility]. Otherwise pt is stable for discharge DM with neuropathy, POA 
-A1C 4.5 
- BS on lower side in ER, high sensitivity SSI 
    
Constipation- on CT imaging 
-cont miralax 
  
Hyperlipidemia-not taking anything right now 
resume lipitor per previous medication list in [facility] for now 
  
H/o HepC 
 
 
18.5 - 24.9 Normal weight / Body mass index is 17.77 kg/m². Code status: Full Prophylaxis: Hep SQ Recommended Disposition: SNF/LTC Subjective: Chief Complaint / Reason for Physician Visit Pt seen at bedside, agitated. No acute event overnight. Discussed with RN events overnight. Review of Systems: 
Symptom Y/N Comments  Symptom Y/N Comments Fever/Chills n   Chest Pain n   
Poor Appetite    Edema Cough    Abdominal Pain n   
Sputum    Joint Pain SOB/TOLEDO n   Pruritis/Rash Nausea/vomit    Tolerating PT/OT Diarrhea    Tolerating Diet Constipation    Other Could NOT obtain due to:   
 
Objective: VITALS:  
Last 24hrs VS reviewed since prior progress note. Most recent are: 
Patient Vitals for the past 24 hrs: Temp Pulse Resp BP SpO2  
02/13/19 0812 97.4 °F (36.3 °C) (!) 41 18 192/77 100 % 02/12/19 1955 99.2 °F (37.3 °C) (!) 50 18 160/52 97 % 02/12/19 1940  (!) 47 18 168/75 96 % 02/12/19 1930  (!) 47  168/75   
02/12/19 1914  (!) 44  176/74   
02/12/19 1900  (!) 49  179/71   
02/12/19 1845  (!) 45  172/73   
02/12/19 1715  (!) 43  159/75   
02/12/19 1700  (!) 44  155/77   
02/12/19 1645  (!) 45  150/74   
02/12/19 1630  (!) 42  161/71   
02/12/19 1615  (!) 44  161/74   
02/12/19 1600 98 °F (36.7 °C) (!) 44 18 156/73 96 % 02/12/19 1447 98.3 °F (36.8 °C) (!) 44 18 150/59 98 % Intake/Output Summary (Last 24 hours) at 2/13/2019 1058 Last data filed at 2/13/2019 9402 Gross per 24 hour Intake 200 ml Output 2544 ml Net -2344 ml PHYSICAL EXAM: 
General: WD, WN. Alert, NADs   
EENT:  EOMI. Anicteric sclerae. MMM Resp:  CTA bilaterally, no wheezing or rales. No accessory muscle use CV:  Regular  rhythm,  No edema GI:  Soft, Non distended, Non tender.  +Bowel sounds Neurologic:  Alert and oriented X 3, normal speech Psych:   Poor insight. agitated Skin:  No rashes. No jaundice. Bilateral distal bka Reviewed most current lab test results and cultures  YES Reviewed most current radiology test results   YES Review and summation of old records today    NO Reviewed patient's current orders and MAR    YES 
PMH/SH reviewed - no change compared to H&P 
________________________________________________________________________ Care Plan discussed with: 
  Comments Patient x Family RN x Care Manager x Consultant Multidiciplinary team rounds were held today with , nursing, pharmacist and clinical coordinator. Patient's plan of care was discussed; medications were reviewed and discharge planning was addressed. ________________________________________________________________________ Total NON critical care TIME:  20 Minutes Total CRITICAL CARE TIME Spent:   Minutes non procedure based Comments >50% of visit spent in counseling and coordination of care    
________________________________________________________________________ Smiat Rubin MD  
 
Procedures: see electronic medical records for all procedures/Xrays and details which were not copied into this note but were reviewed prior to creation of Plan. LABS: 
I reviewed today's most current labs and imaging studies. Pertinent labs include: 
Recent Labs  
  02/12/19 
1609 WBC 3.2* HGB 9.0*  
HCT 27.8*  
 Recent Labs  
  02/12/19 
1609   
K 5.0  
 CO2 27 GLU 69 BUN 53* CREA 4.19* CA 8.4* PHOS 5.3* ALB 2.8* Signed: Smita Rubin MD

## 2019-02-13 NOTE — PROGRESS NOTES
physical Therapy EVALUATION/DISCHARGE Patient: Nelson Gautam (87 y.o. male) Date: 2/13/2019 Primary Diagnosis: ESRD needing dialysis ([facility] [address] [N18.6, Z99.2] Accelerated hypertension [I10] Hypertensive urgency [I16.0] Precautions:    
ASSESSMENT : 
Based on the objective data described below, the patient presents with functional strength, decreased functional mobility, intact sitting balace, which patient reports is baseline function, following admission for ESRD and hypertensive urgency. PTA, patient was homeless and living \"in the woods\" and received help from his wife for ADLs and functional mobility. Patient utilized wheelchair for mobility and was transferred into wheelchair by his wife. Currently, patient resting in bed, cleared for physical therapy. Patient required maximal encouragement to participate in therapy. Performed bed mobility with SBA with exception of supine to sit, which required min-mod Ax1. Patient demonstrated good static and dynamic sitting balance for 5-7 minutes. No reports of dizziness or lightheadedness. Patient scooted to EOB with SBA. Patient left resting in bed with all needs met on RA. Patient is performing at baseline function and has demonstrated inconsistent motivation to participate in therapy, so patient does not require further physical therapy. Additionally, patient to be discharged to [facility] once medically stable. Further skilled acute physical therapy is not indicated at this time. PLAN : 
Discharge Recommendations: Patient to be discharged to IP [facility] per chart and nursing Further Equipment Recommendations for Discharge: unlikely as patient already owns wheelchair SUBJECTIVE:  
Patient stated You told me you wanted me to sit on the edge of the bed and I did it.  OBJECTIVE DATA SUMMARY:  
HISTORY:   
Past Medical History:  
Diagnosis Date  Arthritis  CAD (coronary artery disease) 2007  CKD (chronic kidney disease), stage III   
 DM type 2 causing renal disease ([facility] [address]  DVT (deep venous thrombosis) ([facility] [address] Hx of DVT:  Xarelto stopped due to GI bleed. S/P IVC filter.  Gait abnormality  GI bleed  Heart murmur  Hepatitis C   
 History of blood transfusion  Hypercholesterolemia  Hypertension 11/7/2014  Neuropathy  Non compliance w medication regimen  Peripheral vascular disease ([facility] [address] 11/7/2014  
 A. S/P Right SFA POBA and tibial atherectomy (2/4/13).  PUD (peptic ulcer disease) 2007  Unspecified sleep apnea   
 never used cpap Past Surgical History:  
Procedure Laterality Date [address] UNLISTED  2007  
 has approx 7-8\" midline incision on abdomen--\"had to open him up and clean him out after feeding tube clogged\"  HX GI  2007  
 feeding tube for approx 2 mo  VASCULAR SURGERY PROCEDURE UNLIST Right 1/22/2015  
 popliteal-tibial bypass Prior Level of Function/Home Situation: patient was homeless and living \"in the woods\" and received help from his wife for ADLs and functional mobility. Patient utilized wheelchair for mobility and was transferred into wheelchair by his wife. Personal factors and/or comorbidities impacting plan of care: decreased motivation, B BKA, PLOF and need for assistance for ADLs and mobility Home Situation Home Environment: Other (comment)(states that he is homeless) # Steps to Enter: 0 One/Two Story Residence: Other (Comment) # of Interior Steps: 0 Height of Each Step (in): 0 inches Interior Rails: None Lift Chair Available: No 
Living Alone: Yes Support Systems: Spouse/Significant Other/Partner Patient Expects to be Discharged to[de-identified] Unknown Current DME Used/Available at Home: Wheelchair(prostheses) EXAMINATION/PRESENTATION/DECISION MAKING:  
Critical Behavior: 
  
  
  
  
Hearing: Auditory Auditory Impairment: None Skin:  intact Edema: not observed Range Of Motion: 
AROM: Within functional limits PROM: Within functional limits Strength:   
Strength: Generally decreased, functional 
  
  
  
  
  
  
Tone & Sensation:  
Tone: Normal 
  
  
  
  
Sensation: Intact Coordination: 
Coordination: Within functional limits Vision:  
  
Functional Mobility: 
Bed Mobility: 
Rolling: Stand-by assistance Supine to Sit: Moderate assistance;Maximum assistance Sit to Supine: Stand-by assistance Scooting: Stand-by assistance(scooted to [facility]) Transfers: 
  
  
     
  
     
  
  
Balance:  
Sitting: Intact Ambulation/Gait Training: 
  
  
  
  
  
  
  
  
  
  
  
  
  
  
  
  
  
   
  
Functional Measure: 
Barthel Index: 
 
Bathin Bladder: 5 Bowels: 5 Groomin Dressin Feeding: 10 Mobility: 0 Stairs: 0 Toilet Use: 0 Transfer (Bed to Chair and Back): 0 Total: 25 The Barthel ADL Index: Guidelines 1. The index should be used as a record of what a patient does, not as a record of what a patient could do. 2. The main aim is to establish degree of independence from any help, physical or verbal, however minor and for whatever reason. 3. The need for supervision renders the patient not independent. 4. A patient's performance should be established using the best available evidence. Asking the patient, friends/relatives and nurses are the usual sources, but direct observation and common sense are also important. However direct testing is not needed. 5. Usually the patient's performance over the preceding 24-48 hours is important, but occasionally longer periods will be relevant. 6. Middle categories imply that the patient supplies over 50 per cent of the effort. 7. Use of aids to be independent is allowed. Joshua Camacho., Barthel, D.W. (2924). Functional evaluation: the Barthel Index. [facility] (14)2. Ronaldo avril DAREK Webster, Lopez Art., Josh Vernal., Pamela, [address] [place], [place] [address]e ().  Measuring the change indisability after inpatient rehabilitation; comparison of the responsiveness of the Barthel Index and Functional Dixie Measure. Journal of Neurology, Neurosurgery, and Psychiatry, 664), 656-421. LYNN Collins, RYAN Wilkerson, & Perez Ambrose M.A. (2004.) Assessment of post-stroke quality of life in cost-effectiveness studies: The usefulness of the Barthel Index and the EuroQoL-5D. Quality of Life Research, 13, 514-73  
] Physical Therapy Evaluation Charge Determination History Examination Presentation Decision-Making MEDIUM  Complexity : 1-2 comorbidities / personal factors will impact the outcome/ POC  MEDIUM Complexity : 3 Standardized tests and measures addressing body structure, function, activity limitation and / or participation in recreation  MEDIUM Complexity : Evolving with changing characteristics  Other outcome measures Barthel  MEDIUM Based on the above components, the patient evaluation is determined to be of the following complexity level: MEDIUM Pain: 
  
  
  
Activity Tolerance:  
Fair. Limited by motivation. Please refer to the flowsheet for vital signs taken during this treatment. After treatment:  
[]   Patient left in no apparent distress sitting up in chair 
[x]   Patient left in no apparent distress in bed 
[x]   Call bell left within reach [x]   Nursing notified 
[x]   Sitter present 
[]   Bed alarm activated COMMUNICATION/EDUCATION:  
Communication/Collaboration: 
[x]   Fall prevention education was provided and the patient/caregiver indicated understanding. [x]   Patient/family have participated as able and agree with findings and recommendations. []   Patient is unable to participate in plan of care at this time. Findings and recommendations were discussed with: Occupational Therapist, Registered Nurse and  Thank you for this referral. 
Jerel Durham 
 Time Calculation: 19 mins Regarding student involvement in patient care: A student participated in this treatment session.  Per CMS Medicare statements and APTA guidelines I certify that the following was true: 1. I was present and directly observed the entire session. 2. I made all skilled judgments and clinical decisions regarding care. 3. I am the practitioner responsible for assessment, treatment, and documentation.

## 2019-02-13 NOTE — PROGRESS NOTES
Psych note states pt will need IP psych when medically stable. He would need [facility] secondary to dialysis needs. This would be an EMTALA ambulance transfer. Please see previous notes indicative of the need for LTC at d/c which he agrees with. Level 2 and UAI in progress per MARTÍNEZ Schwab. Not presently participating with therapy. MARTÍNEZ Mendoza reached out to several SNF//NHP to assess pt for admission. As pt goes to [facility] is [facility] by. Pt has [facility] rep. Racquel Roman at [phone]. [phone]  All in agreement with EMTALA d/c to [facility] IP psych today by [facility] ambulance at 2pm.  Please call report to [phone], send emar, d/c instructions, facesheet, ambulance form, completed EMTALA, and pt's wheelchair. Accepting MD is Dr Michaela Buck; accepting liaison is Quan Murrieta; room #740-1, and BLS transport. 2:20  [facility] will transport at 4:30. Staff are aware.

## 2019-02-13 NOTE — PROGRESS NOTES
Contacted bed placement for [facility] ([phone]) to initiate transfer for continued psychiatric care per psychiatrist recommendation. Awaiting determination if bed is available and if patient is medically stable. Contacted Hospitalist and informed of this update. Hospitalist states patient has been medically stable for several days and is ready for discharge to appropriate placement for patient's continued care. Met with patient who is willing to go to [facility] at [facility]. Patient will need EMTALA transportation if bed available. Patient has a WC in his room which needs to be taken to [facility]. Discussed with Inpatient CM to update on these events. Continue to follow for transfer to [facility]. 7 DANIEL Mcdonald, CCM, [facility] CM [place] [phone]

## 2019-02-13 NOTE — PROGRESS NOTES
Occupational Therapy Medical record reviewed. Discussed  the latest plan for pt to discharge to Inpatient Psych. ,  and OT Evaluation is not needed at this time. Will complete the OT orders and discharge from OT. Thank you for the consult.

## 2019-02-13 NOTE — PROGRESS NOTES
Bedside shift change report given to (oncoming nurse) by Latoya Montero RN (offgoing nurse). Report included the following information SBAR, Kardex, Intake/Output and MAR.

## 2019-02-13 NOTE — DISCHARGE SUMMARY
Discharge Summary      Name: Juanjose Vinson  [id number]  YOB: 1940 (Age: 70 y.o.)   Date of Admission: 2/8/2019  Date of Discharge: 2/13/2019  Attending Physician: Ed Barahona MD    Discharge Diagnosis:   Hypertensive Urgency POA due to missed HD   ESRD on TTS  Diffuse Pain POA  H/o DM type 2 with Neuropathy   PAD  Medical NonCompliance    Consultations:  IP CONSULT TO NEPHROLOGY  IP CONSULT TO PSYCHIATRY  IP CONSULT TO PSYCHIATRY    Brief Admission History/Reason for Admission Per Isaac Humphreys MD:   Keri Florez is a 70 y.o.  male who presents with above complains via EMS. Pt presented with CC of generalized diffused pain all over x 2 days  H/o missed HD on Thursday- is on TTS schedule  H/o Medical non compliance due to being Homeless as per pt  Currently not taking any meds as per pt     Pt was found to have unremarkable workup including CT A/P showing only constipation & cholelithiasis. Brief Hospital Course by Main Problems:   Hypertensive Urgency POA  Due to missed HD on Thursday  ESRD on TTS - at [facility] unit per pt, go hd yesterday( got 2.5 hr and 2.5 liter removal)  Diffuse Pain POA- non specific, ?related to Neuropathy  H/o DM type 2 with Neuropathy   PAD S/P Right leg bypass, now s/p amputation bilaterally, has wheelchair. Medical NonCompliance POA- pt not taking medications PTA  Pt has asymptomatic bradycardic in the low 40s. Coreg is discontinued. Nicardipine with holding parameters placed. HCTZ added for better BP control. Pt has been on HD as per nephrology. He will need to continue HD on TTS .    Depression  Zoloft started. Psych ecommended inpt psych.   Pt is medically stable to transfer to inpt psych at [facility].     DM with neuropathy, POA  A1c 4.5, on SSI.       Constipation, resolved     Hyperlipidemia, co nt' lipitor    H/o HepC      18.5 - 24.9 Normal weight / Body mass index is 17.77 kg/m².       Discharge Exam:  Patient seen and examined by me on discharge day. Pertinent Findings:  Gen:    Not in distress  Chest: Clear lungs  CVS:   Regular rhythm. No edema  Abd:  Soft, not distended, not tender    Discharge/Recent Laboratory Results:  Recent Labs     02/12/19  1609      K 5.0      CO2 27   BUN 53*   GLU 69   CA 8.4*   PHOS 5.3*     Recent Labs     02/12/19  1609   HGB 9.0*   HCT 27.8*   WBC 3.2*          Discharge Medications:  Current Discharge Medication List      START taking these medications    Details   hydroCHLOROthiazide (HYDRODIURIL) 25 mg tablet Take 1 Tab by mouth daily. Qty: 30 Tab, Refills: 0      sertraline (ZOLOFT) 25 mg tablet Take 1 Tab by mouth daily. Qty: 30 Tab, Refills: 0         CONTINUE these medications which have CHANGED    Details   NIFEdipine ER (ADALAT CC) 60 mg ER tablet Take 1 Tab by mouth daily. HOLD for HR<60  Qty: 30 Tab, Refills: 0         CONTINUE these medications which have NOT CHANGED    Details   oxyCODONE-acetaminophen (PERCOCET 10)  mg per tablet Take 1 Tab by mouth every six (6) hours as needed for Pain. traZODone (DESYREL) 50 mg tablet Take 50 mg by mouth nightly. rosuvastatin (CRESTOR) 10 mg tablet Take 10 mg by mouth nightly. aspirin delayed-release 81 mg tablet Take 1 Tab by mouth daily.          STOP taking these medications       carvedilol (COREG) 12.5 mg tablet Comments:   Reason for Stopping:               DISPOSITION:    Home with Family:    Home with HH/PT/OT/RN:    SNF/LTC:    DONN:    OTHER: [facility] for inpt psych         Follow up with:   PCP : Marisol Vargas MD  Follow-up Information     Follow up With Specialties Details Why Contact Info    Marisol Vargas, [address] [facility]   [address]  [phone]            Code: full  Diet: renal/diabetic, low salt    Total time in minutes spent coordinating this discharge (includes going over instructions, follow-up, prescriptions, and preparing report for sign off to her PCP) :  35 minutes

## 2019-02-14 LAB
CHOLEST SERPL-MCNC: 139 MG/DL
EST. AVERAGE GLUCOSE BLD GHB EST-MCNC: 97 MG/DL
GLUCOSE BLD STRIP.AUTO-MCNC: 113 MG/DL (ref 65–100)
GLUCOSE BLD STRIP.AUTO-MCNC: 130 MG/DL (ref 65–100)
GLUCOSE BLD STRIP.AUTO-MCNC: 72 MG/DL (ref 65–100)
GLUCOSE BLD STRIP.AUTO-MCNC: 76 MG/DL (ref 65–100)
GLUCOSE BLD STRIP.AUTO-MCNC: 89 MG/DL (ref 65–100)
GLUCOSE P FAST SERPL-MCNC: 73 MG/DL (ref 65–100)
HBA1C MFR BLD: 5 % (ref 4.2–6.3)
HDLC SERPL-MCNC: 67 MG/DL
HDLC SERPL: 2.1 {RATIO} (ref 0–5)
LDLC SERPL CALC-MCNC: 58.4 MG/DL (ref 0–100)
LIPID PROFILE,FLP: NORMAL
SERVICE CMNT-IMP: ABNORMAL
SERVICE CMNT-IMP: ABNORMAL
SERVICE CMNT-IMP: NORMAL
TRIGL SERPL-MCNC: 68 MG/DL (ref ?–150)
TSH SERPL DL<=0.05 MIU/L-ACNC: 4.09 UIU/ML (ref 0.36–3.74)
VLDLC SERPL CALC-MCNC: 13.6 MG/DL

## 2019-02-14 PROCEDURE — 82962 GLUCOSE BLOOD TEST: CPT

## 2019-02-14 PROCEDURE — 84443 ASSAY THYROID STIM HORMONE: CPT

## 2019-02-14 PROCEDURE — 65220000003 HC RM SEMIPRIVATE PSYCH

## 2019-02-14 PROCEDURE — 90935 HEMODIALYSIS ONE EVALUATION: CPT

## 2019-02-14 PROCEDURE — 74011250637 HC RX REV CODE- 250/637: Performed by: HOSPITALIST

## 2019-02-14 PROCEDURE — 5A1D70Z PERFORMANCE OF URINARY FILTRATION, INTERMITTENT, LESS THAN 6 HOURS PER DAY: ICD-10-PCS | Performed by: PSYCHIATRY & NEUROLOGY

## 2019-02-14 PROCEDURE — 74011250636 HC RX REV CODE- 250/636: Performed by: INTERNAL MEDICINE

## 2019-02-14 PROCEDURE — 74011250637 HC RX REV CODE- 250/637: Performed by: FAMILY MEDICINE

## 2019-02-14 PROCEDURE — 74011250637 HC RX REV CODE- 250/637: Performed by: INTERNAL MEDICINE

## 2019-02-14 PROCEDURE — 36415 COLL VENOUS BLD VENIPUNCTURE: CPT

## 2019-02-14 PROCEDURE — 83036 HEMOGLOBIN GLYCOSYLATED A1C: CPT

## 2019-02-14 PROCEDURE — 74011250637 HC RX REV CODE- 250/637: Performed by: PSYCHIATRY & NEUROLOGY

## 2019-02-14 PROCEDURE — 80061 LIPID PANEL: CPT

## 2019-02-14 PROCEDURE — 82947 ASSAY GLUCOSE BLOOD QUANT: CPT

## 2019-02-14 RX ORDER — DOXAZOSIN 2 MG/1
4 TABLET ORAL
Status: DISCONTINUED | OUTPATIENT
Start: 2019-02-14 | End: 2019-03-11 | Stop reason: HOSPADM

## 2019-02-14 RX ORDER — SERTRALINE HYDROCHLORIDE 50 MG/1
25 TABLET, FILM COATED ORAL DAILY
Status: DISCONTINUED | OUTPATIENT
Start: 2019-02-14 | End: 2019-02-17

## 2019-02-14 RX ORDER — ASPIRIN 81 MG/1
81 TABLET ORAL DAILY
Status: DISCONTINUED | OUTPATIENT
Start: 2019-02-14 | End: 2019-03-11 | Stop reason: HOSPADM

## 2019-02-14 RX ORDER — NIFEDIPINE 60 MG/1
60 TABLET, EXTENDED RELEASE ORAL EVERY 24 HOURS
Status: DISCONTINUED | OUTPATIENT
Start: 2019-02-14 | End: 2019-03-11 | Stop reason: HOSPADM

## 2019-02-14 RX ORDER — ROSUVASTATIN CALCIUM 10 MG/1
10 TABLET, COATED ORAL
Status: DISCONTINUED | OUTPATIENT
Start: 2019-02-14 | End: 2019-03-11 | Stop reason: HOSPADM

## 2019-02-14 RX ORDER — SODIUM CHLORIDE 0.9 % (FLUSH) 0.9 %
SYRINGE (ML) INJECTION
Status: COMPLETED
Start: 2019-02-14 | End: 2019-02-14

## 2019-02-14 RX ADMIN — SERTRALINE 25 MG: 50 TABLET, FILM COATED ORAL at 10:14

## 2019-02-14 RX ADMIN — ZOLPIDEM TARTRATE 5 MG: 5 TABLET ORAL at 22:47

## 2019-02-14 RX ADMIN — FERRIC CITRATE 420 MG: 210 TABLET, COATED ORAL at 17:01

## 2019-02-14 RX ADMIN — FERRIC CITRATE 420 MG: 210 TABLET, COATED ORAL at 12:31

## 2019-02-14 RX ADMIN — DOXAZOSIN 4 MG: 2 TABLET ORAL at 22:29

## 2019-02-14 RX ADMIN — EPOETIN ALFA-EPBX 10000 UNITS: 10000 INJECTION, SOLUTION INTRAVENOUS; SUBCUTANEOUS at 18:44

## 2019-02-14 RX ADMIN — IBUPROFEN 400 MG: 400 TABLET ORAL at 15:20

## 2019-02-14 RX ADMIN — Medication 10 ML: at 22:30

## 2019-02-14 RX ADMIN — NIFEDIPINE 60 MG: 60 TABLET, FILM COATED, EXTENDED RELEASE ORAL at 17:01

## 2019-02-14 RX ADMIN — ASPIRIN 81 MG: 81 TABLET, COATED ORAL at 10:14

## 2019-02-14 RX ADMIN — ROSUVASTATIN CALCIUM 10 MG: 10 TABLET, FILM COATED ORAL at 22:30

## 2019-02-14 NOTE — BH NOTES
MARTÍNEZ spoke to [place], [place] at [facility] ([phone]). She stated she had reached out to SW at [facility] regarding obtaining UAI from previous admission. Stated Pt is now agreeable to SNF placement, since his girlfriend has been placed in a group home and can no longer care for him. MARTÍNEZ contacted Venita Valdivia CM at [facility] who has previously managed Pt in December 2018 ([phone]). Ms. Juan Barbosa is working to locate the UAI she completed for Pt at that time and will fax it to [address] [place] [address]: MARTÍNEZ spoke to Tiny Leroy, care manager at [facility] ([phone]). She stated that Pt has had 6 hospitalizations in the past 6 months. Pt has been placed at SNFs, but he refuses to engage in PT/OT, and thus doesn't meet skilled criteria. MARTÍNEZ stated she thought Pt would be eligible for LTC now that Pt is on dialysis and has failed at attempts to care for himself independently. CM stated she would contact Pt's insurance for authorization once an acceptance is made. MARTÍNEZ will continue to speak to Pt about willingness to voluntarily admit to SNF.

## 2019-02-14 NOTE — BH NOTES
GROUP THERAPY PROGRESS NOTE    Jyoti Mcgowan is participating in Orientation Group    Group time: 30 minutes    Personal goal for participation: Increased Awareness/ Environment    Goal orientation: Mindfulness: Staying In Present    Group therapy participation: active    Therapeutic interventions reviewed and discussed:     Impression of participation: Pt was very engaged, oriented x3, shared past struggles and currently homeless. Pt seemed very prod that he is an educated man but he fell on hard times. He is opened to receiving help. Pt was very sociable and even was laughing at the end of the group.

## 2019-02-14 NOTE — PROGRESS NOTES
HYPOGLYCEMIC EPISODE DOCUMENTATION    Patient with hypoglycemic episode(s) at -1146(time) on 02/14/19(date). BG value(s) pre-treatment 68    Was patient symptomatic?  [] yes, [x] no  Patient was treated with the following rescue medications/treatments: [] D50                [] Glucose tablets                [] Glucagon                [x] 4oz juice                [] 6oz reg soda                [] 8oz low fat milk  BG value post-treatment: *130  Once BG treated and value greater than 80mg/dl, pt was provided with the following:  [] snack  [x] meal  Name of MD notified: Dr. Chun Ruiz  The following orders were received: continue protocol

## 2019-02-14 NOTE — PROGRESS NOTES
Problem: Falls - Risk of  Goal: *Absence of Falls  Document Bello Fall Risk and appropriate interventions in the flowsheet. Outcome: Progressing Towards Goal  Fall Risk Interventions:  Mobility Interventions: Bed/chair exit alarm         Medication Interventions: Teach patient to arise slowly    Elimination Interventions: Bed/chair exit alarm         Resting in bed, orientated *4, no complaints, cooperative. Dr Patricia Ramsey aware of H+P.  VSS and ACCU check taken. Safety measures in place, bed alarm on, will continue to monitor.     0435-Dr Clinton up to see pt for H+P

## 2019-02-14 NOTE — BH NOTES
PSYCHOSOCIAL ASSESSMENT  :Patient identifying info: Gustavo Castro is a 70 y.o., male admitted 2/13/2019  9:03 PM     Presenting problem and precipitating factors: Patient was transferred to [place] [id number] after being medically stabilized at [facility]. Pt reports depression due to failing health and lack of supports. Pt reports his girlfriend who formerly took care of him has been placed in a group home and he no longer has the money to stay in a hotel. Pt is reluctantly agreeable to SNF placement, due to health issues and inability to care for self. Mental status assessment: Alert, oriented, irritable    Collateral information: None    Current psychiatric /substance abuse providers and contact info: To be linked to B    Previous psychiatric/substance abuse providers and response to treatment: Multiple/frequent inpatient psychiatric hospitalizations, including 6 in the past 6 months    Family history of mental illness or substance abuse: Unknown    Substance abuse history:  Heroin, ETOH (previous)  Social History     Tobacco Use    Smoking status: Current Every Day Smoker     Packs/day: 0.50    Smokeless tobacco: Never Used   Substance Use Topics    Alcohol use: No       History of biomedical complications associated with substance abuse: Liver and kidney failure; exacerbation of diabetes    Patient's current acceptance of treatment or motivation for change: Fair    Family constellation: 15 adult children    Is significant other involved? No, girlfriend is no longer involved      Describe support system: None    Describe living arrangements and home environment: Patient is homeless and has been staying in hotels when he has the money. Patient has a history of living in the [place] when his house was foreclosed on.     Health issues:   Hospital Problems  Date Reviewed: 2/14/2019          Codes Class Noted POA    Depression ICD-10-CM: F32.9  ICD-9-CM: 885  1/24/2018 Unknown        * (Principal) Type 2 diabetes mellitus [facility] (Chronic) ICD-10-CM: E11.9  ICD-9-CM: 250.00  2015 Unknown              Trauma history: Failing health: Bilateral BKA (10/2017); recent break up    Legal issues: None indicated    History of  service: No    Financial status: SSDI    Synagogue/cultural factors: Hindu    Education/work history: Graduated college;  Pt formerly worked as a contractor; retired/unemployed on disability    Have you been licensed as a health care professional (current or ): No    Leisure and recreation preferences: None    Describe coping skills: Limited, ineffectual and poor judgement    Ravi Du  2019

## 2019-02-14 NOTE — BH NOTES
HYPOGLYCEMIC EPISODE DOCUMENTATION    Patient with hypoglycemic episode(s) at 1648 on 02/14/2019. BG value(s) pre-treatment 67  Was patient symptomatic? [] yes, [x] no  Patient was treated with the following rescue medications/treatments: [] D50                [] Glucose tablets                  [] Glucagon                [] 4oz juice                [x] 6oz reg soda                [] 8oz low fat milk  BG value post-treatment: 89  Once BG treated and value greater than 80mg/dl, pt was provided with the following:  [] snack  [x] meal  Name of MD notified:   The following orders were received:

## 2019-02-14 NOTE — PROGRESS NOTES
[facility] 60  Master Treatment Plan for Carlos Manuel King    Date Treatment Plan Initiated: 2/14/19    Treatment Plan Modalities:  Type of Modality Amount  (x minutes) Frequency (x/week) Duration (x days) Name of Responsible Staff   [address] [facility] meetings to encourage peer interactions [phone] [facility] psychotherapy to assist in building coping skills and internal controls 60 7 1 Jose Arreguin   Therapeutic activity groups to build coping skills 60 7 1 Jose Arreguin   Psychoeducation in group setting to address:   Medication education   [phone] [facility] skills         Relaxation techniques         Symptom management         Discharge planning   61 2 255 [facility]    60 2 1 [address] [place] [id number]   60 1 1 volunteer   Recovery/AA/NA      volunteer   Physician medication management   13 7 1 DR Gracy Mcgill meeting/discharge planning   15 2 1 [address] and Kel Orozco                                          Problem: Depressed Mood (Adult/Pediatric) these goals will be met by 2/18/19  Goal: *STG: Participates in treatment plan  Outcome: Progressing Towards Goal  Out on unit appropriate behaviors. Denies SI. States he will comply with tx team plan and is ready to d/c home. States he has an appointment tomorrow to discuss his port and possible options for long term access for dialysis. States he does not need to be here and is not suicidal.   Goal: *STG: Verbalizes anger, guilt, and other feelings in a constructive manor  Outcome: Progressing Towards Goal  Sad related to dialysis and chronic medical conditions. Goal: *STG: Attends activities and groups  Outcome: Progressing Towards Goal  attending  Goal: *STG: Demonstrates reduction in symptoms and increase in insight into coping skills/future focused  Outcome: Progressing Towards Goal  Denies SI, no self harming behaviors.  Symptoms such as hopelessness resolved and states he will take the antidepressant but does not want therapy. Goal: Interventions  Outcome: Progressing Towards Goal  Staff focus is on limit setting, offering support and education regarding resources in community for support groups and assistance with transportation    Problem: Falls - Risk of  Goal: *Absence of Falls  Document Bello Fall Risk and appropriate interventions in the flowsheet. Outcome: Progressing Towards Goal  Fall Risk Interventions:  Mobility Interventions: Utilize walker, cane, or other assistive device, Bed/chair exit alarm, Strengthening exercises (ROM-active/passive)         Medication Interventions: Teach patient to arise slowly    Elimination Interventions: Urinal in reach, Bed/chair exit alarm, Toileting schedule/hourly rounds, Elevated toilet seat, Patient to call for help with toileting needs, Toilet paper/wipes in reach, Call light in reach       0826: called [facility] awaiting call back. Called Eliu regarding dialysis awaiting call back    0910: called [facility] again. Message left. Awaiting call back    0930: Dr. Fernandez Sinks on call and was notified. 1100: nephrology assessed pt orders placed and mau will be made aware for the schedule today    1154: Reza De Santiago 1154 confirmed pt will be received dialysis this afternoon.

## 2019-02-14 NOTE — H&P
INITIAL PSYCHIATRIC EVALUATION            IDENTIFICATION:    Patient Name  Aj Brock   Date of Birth 1940   [facility] [id number]   Medical Record Number  [id number]      Age  70 y.o. PCP James Carvajal MD   Admit date:  2/13/2019    Room Number  740/01  @ [facility]   Date of Service  2/14/2019            HISTORY         REASON FOR HOSPITALIZATION:  CC: \"Pt admitted under a voluntary basis for severe depression with suicidal ideations and  an inability to care for self. HISTORY OF PRESENT ILLNESS:    The patient, Aj Brock, is a 70 y.o. BLACK OR  male with 72-year-old -American male with past medical  history of end-stage renal disease; on hemodialysis, sleep apnea, peripheral vascular disease, bilateral BKA, hypertension, hyperlipidemia, peripheral neuropathy, anemia,hepatitis C, arthritis, peptic ulcer disease, coronary artery disease, DVT, prior GIbleed, chronic diastolic CHF, prior suicidal ideation, depression, and noncompliance   transfered to [facility] from [place] [facility] after pt reported feeling depressed nad suicidal Pt stated he lives in the [place] and he is unhappy about his living situation , he stated he had lots of Money before [place] HAS 13 CHILDREN but now he is broke and no one ask about him. Pt reports feeling hopeless, helpless, angry. These symptoms are of high severity. These symptoms are constant . Pt ha sh/o using marijuana occasionally. UDS: negative; BAL=0.   . ALLERGIES:   Allergies   Allergen Reactions    Tetracycline Hives      MEDICATIONS PRIOR TO ADMISSION:   Medications Prior to Admission   Medication Sig    NIFEdipine ER (ADALAT CC) 60 mg ER tablet Take 1 Tab by mouth daily. HOLD for HR<60    hydroCHLOROthiazide (HYDRODIURIL) 25 mg tablet Take 1 Tab by mouth daily.  sertraline (ZOLOFT) 25 mg tablet Take 1 Tab by mouth daily.     oxyCODONE-acetaminophen (PERCOCET 10)  mg per tablet Take 1 Tab by mouth every six (6) hours as needed for Pain.  traZODone (DESYREL) 50 mg tablet Take 50 mg by mouth nightly.  rosuvastatin (CRESTOR) 10 mg tablet Take 10 mg by mouth nightly.  aspirin delayed-release 81 mg tablet Take 1 Tab by mouth daily. PAST MEDICAL HISTORY:   Past Medical History:   Diagnosis Date    Arthritis     CAD (coronary artery disease) 2007    CKD (chronic kidney disease), stage III [facility]     DM type 2 causing renal disease ([facility]     DVT (deep venous thrombosis) ([facility])     Hx of DVT:  Xarelto stopped due to GI bleed. S/P IVC filter.  Gait abnormality     GI bleed     Heart murmur     Hepatitis C     History of blood transfusion     Hypercholesterolemia     Hypertension 11/7/2014    Neuropathy     Non compliance w medication regimen     Peripheral vascular disease ([facility] 11/7/2014    A. S/P Right SFA POBA and tibial atherectomy (2/4/13).     PUD (peptic ulcer disease) 2007    Unspecified sleep apnea     never used cpap     Past Surgical History:   Procedure Laterality Date    ABDOMEN SURGERY PROC UNLISTED  2007    has approx 7-8\" midline incision on abdomen--\"had to open him up and clean him out after feeding tube clogged\"    HX GI  2007    feeding tube for approx 2 mo    VASCULAR SURGERY PROCEDURE UNLIST Right 1/22/2015    popliteal-tibial bypass      SOCIAL HISTORY:    Social History     Socioeconomic History    Marital status: SINGLE     Spouse name: Not on file    Number of children: Not on file    Years of education: Not on file    Highest education level: Not on file   Social Needs    Financial resource strain: Not on file    Food insecurity - worry: Not on file    Food insecurity - inability: Not on file   [place] [facility] needs - medical: Not on file   [place] [facility] needs - non-medical: Not on file   Occupational History    Not on file   Tobacco Use    Smoking status: Current Every Day Smoker     Packs/day: 0.50    Smokeless tobacco: Never Used   Substance and Sexual Activity    Alcohol use: No    Drug use: No     Comment: >20 years ago    Sexual activity: Not on file   Other Topics Concern     Service Not Asked    Blood Transfusions Not Asked    Caffeine Concern Not Asked    Occupational Exposure Not Asked    Hobby Hazards Not Asked    Sleep Concern Not Asked    Stress Concern Not Asked    Weight Concern Not Asked    Special Diet Not Asked    Back Care Not Asked    Exercise Not Asked    Bike Helmet Not Asked    Seat Belt Not Asked    Self-Exams Not Asked   Social History Narrative    78 year ols male admitted for depression and homelessness. Pt will be evicated from apartment due to non payment of bills. Girlfriend of 30 years left him to [facility] live in the [place] with others. \" Pt is a brittle diabetic, with treatment non compliance. He has bilarela lower extremity amputations. Patient has a long hx of substance abuse. FAMILY HISTORY: History reviewed. No pertinent family history. Family History   Problem Relation Age of Onset    Hypertension Father        REVIEW OF SYSTEMS:   Negative except   Pertinent items are noted in the History of Present Illness. All other Systems reviewed and are considered negative. MENTAL STATUS EXAM & VITALS     MENTAL STATUS EXAM (MSE):    MSE FINDINGS ARE WITHIN NORMAL LIMITS (WNL) UNLESS OTHERWISE STATED BELOW. ( ALL OF THE BELOW CATEGORIES OF THE MSE HAVE BEEN REVIEWED (reviewed 2/14/2019) AND UPDATED AS DEEMED APPROPRIATE )  General Presentation age appropriate and casually dressed, cooperative   Orientation oriented to time, place and person   Vital Signs  See below (reviewed 2/14/2019); Vital Signs (BP, Pulse, & Temp) are within normal limits if not listed below.    Gait and Station Stable/steady, no ataxia   Musculoskeletal System No extrapyramidal symptoms (EPS); no abnormal muscular movements or Tardive Dyskinesia (TD); muscle strength and tone are within normal limits Language No aphasia or dysarthria   Speech:  soft   Thought Processes logical; slow rate of thoughts; fair abstract reasoning/computation   Thought Associations goal directed   Thought Content free of delusions   Suicidal Ideations intention   Homicidal Ideations no plan  and no intention   Mood:  anxious  and depressed   Affect:  constricted   Memory recent  intact   Memory remote:  intact   Concentration/Attention:  distractable   Fund of Knowledge average   Insight:  fair   Reliability fair   Judgment:  fair          VITALS:     Patient Vitals for the past 24 hrs:   Temp Pulse Resp BP SpO2   02/14/19 0734 97.7 °F (36.5 °C) (!) 50 18 189/78 100 %   02/13/19 2341 98.4 °F (36.9 °C) -- 18 196/54 100 %   02/13/19 2207 98.2 °F (36.8 °C) (!) 52 16 -- 99 %   02/13/19 2023 98.6 °F (37 °C) (!) 48 16 182/82 98 %     Wt Readings from Last 3 Encounters:   02/14/19 73 kg (160 lb 15 oz)   02/13/19 73.4 kg (161 lb 12.8 oz)   01/15/19 81.6 kg (180 lb)     Temp Readings from Last 3 Encounters:   02/14/19 97.7 °F (36.5 °C)   02/13/19 98.2 °F (36.8 °C)   01/15/19 97 °F (36.1 °C)     BP Readings from Last 3 Encounters:   02/14/19 189/78   02/13/19 181/82   01/15/19 (!) 192/116     Pulse Readings from Last 3 Encounters:   02/14/19 (!) 50   02/13/19 (!) 48   01/15/19 75            DATA     LABORATORY DATA:  Labs Reviewed   TSH 3RD GENERATION - Abnormal; Notable for the following components:       Result Value    TSH 4.09 (*)     All other components within normal limits   GLUCOSE, POC - Abnormal; Notable for the following components:    Glucose (POC) 116 (*)     All other components within normal limits   GLUCOSE, FASTING   LIPID PANEL   HEMOGLOBIN A1C WITH EAG   GLUCOSE, POC     Admission on 02/13/2019   Component Date Value Ref Range Status    Glucose 02/14/2019 73  65 - 100 MG/DL Final    TSH 02/14/2019 4.09* 0.36 - 3.74 uIU/mL Final    LIPID PROFILE 02/14/2019        Final    Cholesterol, total 02/14/2019 139  <200 MG/DL Final    Triglyceride 02/14/2019 68  <150 MG/DL Final    HDL Cholesterol 02/14/2019 67  MG/DL Final    LDL, calculated 02/14/2019 58.4  0 - 100 MG/DL Final    VLDL, calculated 02/14/2019 13.6  MG/DL Final    CHOL/HDL Ratio 02/14/2019 2.1  0 - 5.0   Final    Hemoglobin A1c 02/14/2019 5.0  4.2 - 6.3 % Final    Est. average glucose 02/14/2019 97  mg/dL Final    Glucose (POC) 02/13/2019 116* 65 - 100 mg/dL Final    Performed by 02/13/2019 ROBBI WHIPPLE   Final    Glucose (POC) 02/14/2019 76  65 - 100 mg/dL Final    Performed by 44/81/0630 Terri Christopher   Final   Admission on 02/08/2019, Discharged on 02/13/2019   Component Date Value Ref Range Status    WBC 02/08/2019 6.4  4.1 - 11.1 K/uL Final    RBC 02/08/2019 4.21  4.10 - 5.70 M/uL Final    HGB 02/08/2019 11.4* 12.1 - 17.0 g/dL Final    HCT 02/08/2019 35.5* 36.6 - 50.3 % Final    MCV 02/08/2019 84.3  80.0 - 99.0 FL Final    MCH 02/08/2019 27.1  26.0 - 34.0 PG Final    MCHC 02/08/2019 32.1  30.0 - 36.5 g/dL Final    RDW 02/08/2019 18.6* 11.5 - 14.5 % Final    PLATELET 77/52/8735 321  150 - 400 K/uL Final    NRBC 02/08/2019 0.0  0  WBC Final    ABSOLUTE NRBC 02/08/2019 0.00  0.00 - 0.01 K/uL Final    NEUTROPHILS 02/08/2019 72  32 - 75 % Final    LYMPHOCYTES 02/08/2019 17  12 - 49 % Final    MONOCYTES 02/08/2019 6  5 - 13 % Final    EOSINOPHILS 02/08/2019 4  0 - 7 % Final    BASOPHILS 02/08/2019 0  0 - 1 % Final    IMMATURE GRANULOCYTES 02/08/2019 0  0.0 - 0.5 % Final    ABS. NEUTROPHILS 02/08/2019 4.6  1.8 - 8.0 K/UL Final    ABS. LYMPHOCYTES 02/08/2019 1.1  0.8 - 3.5 K/UL Final    ABS. MONOCYTES 02/08/2019 0.4  0.0 - 1.0 K/UL Final    ABS. EOSINOPHILS 02/08/2019 0.3  0.0 - 0.4 K/UL Final    ABS. BASOPHILS 02/08/2019 0.0  0.0 - 0.1 K/UL Final    ABS. IMM.  GRANS. 02/08/2019 0.0  0.00 - 0.04 K/UL Final    DF 02/08/2019 AUTOMATED    Final    Lactic acid 02/08/2019 0.8  0.4 - 2.0 MMOL/L Final    Glucose (POC) 02/08/2019 71  65 - 100 mg/dL Final    Performed by 02/08/2019 [facility] LIA   Final    Ventricular Rate 02/08/2019 52  BPM Final    Atrial Rate 02/08/2019 52  BPM Final    P-R Interval 02/08/2019 184  ms Final    QRS Duration 02/08/2019 88  ms Final    Q-T Interval 02/08/2019 474  ms Final    QTC Calculation (Bezet) 02/08/2019 440  ms Final    Calculated R Axis 02/08/2019 18  degrees Final    Calculated T Axis 02/08/2019 -141  degrees Final    Diagnosis 02/08/2019    Final                    Value:Sinus bradycardia with premature atrial complexes  Left ventricular hypertrophy with repolarization abnormality  When compared with ECG of 15-MARTIN-2019 22:03,  premature atrial complexes are now present  OR interval has decreased  QT has shortened  Confirmed by Joao Bender ([id number]) on 2/8/2019 1:36:59 PM      Glucose (POC) 02/08/2019 100  65 - 100 mg/dL Final    Performed by 02/08/2019 [facility]   Final    Troponin-I, Qt. 02/08/2019 <0.05  <0.05 ng/mL Final    INR 02/08/2019 1.1  0.9 - 1.1   Final    Prothrombin time 02/08/2019 11.4* 9.0 - 11.1 sec Final    Sodium 02/08/2019 138  136 - 145 mmol/L Final    Potassium 02/08/2019 4.2  3.5 - 5.1 mmol/L Final    Chloride 02/08/2019 105  97 - 108 mmol/L Final    CO2 02/08/2019 25  21 - 32 mmol/L Final    Anion gap 02/08/2019 8  5 - 15 mmol/L Final    Glucose 02/08/2019 68  65 - 100 mg/dL Final    BUN 02/08/2019 38* 6 - 20 MG/DL Final    Creatinine 02/08/2019 3.51* 0.70 - 1.30 MG/DL Final    BUN/Creatinine ratio 02/08/2019 11* 12 - 20   Final    GFR est AA 02/08/2019 21* >60 ml/min/1.73m2 Final    GFR est non-AA 02/08/2019 17* >60 ml/min/1.73m2 Final    Calcium 02/08/2019 8.8  8.5 - 10.1 MG/DL Final    Bilirubin, total 02/08/2019 0.5  0.2 - 1.0 MG/DL Final    ALT (SGPT) 02/08/2019 12  12 - 78 U/L Final    AST (SGOT) 02/08/2019 16  15 - 37 U/L Final    Alk.  phosphatase 02/08/2019 75  45 - 117 U/L Final    Protein, total 02/08/2019 8.6* 6.4 - 8.2 g/dL Final    Albumin 02/08/2019 3.1* 3.5 - 5.0 g/dL Final    Globulin 02/08/2019 5.5* 2.0 - 4.0 g/dL Final    A-G Ratio 02/08/2019 0.6* 1.1 - 2.2   Final    Glucose (POC) 02/08/2019 136* 65 - 100 mg/dL Final    Performed by 02/08/2019 Saray Davislaume (PCT)   Final    Glucose (POC) 02/09/2019 81  65 - 100 mg/dL Final    Performed by 02/09/2019 Carole Sport (PCT)   Final    WBC 02/09/2019 4.3  4.1 - 11.1 K/uL Final    RBC 02/09/2019 3.43* 4.10 - 5.70 M/uL Final    HGB 02/09/2019 9.3* 12.1 - 17.0 g/dL Final    HCT 02/09/2019 29.3* 36.6 - 50.3 % Final    MCV 02/09/2019 85.4  80.0 - 99.0 FL Final    MCH 02/09/2019 27.1  26.0 - 34.0 PG Final    MCHC 02/09/2019 31.7  30.0 - 36.5 g/dL Final    RDW 02/09/2019 17.7* 11.5 - 14.5 % Final    PLATELET 93/26/7488 665* 150 - 400 K/uL Final    MPV 02/09/2019 11.0  8.9 - 12.9 FL Final    NRBC 02/09/2019 0.0  0  WBC Final    ABSOLUTE NRBC 02/09/2019 0.00  0.00 - 0.01 K/uL Final    NEUTROPHILS 02/09/2019 59  32 - 75 % Final    LYMPHOCYTES 02/09/2019 27  12 - 49 % Final    MONOCYTES 02/09/2019 10  5 - 13 % Final    EOSINOPHILS 02/09/2019 5  0 - 7 % Final    BASOPHILS 02/09/2019 0  0 - 1 % Final    IMMATURE GRANULOCYTES 02/09/2019 0  0.0 - 0.5 % Final    ABS. NEUTROPHILS 02/09/2019 2.5  1.8 - 8.0 K/UL Final    ABS. LYMPHOCYTES 02/09/2019 1.1  0.8 - 3.5 K/UL Final    ABS. MONOCYTES 02/09/2019 0.4  0.0 - 1.0 K/UL Final    ABS. EOSINOPHILS 02/09/2019 0.2  0.0 - 0.4 K/UL Final    ABS. BASOPHILS 02/09/2019 0.0  0.0 - 0.1 K/UL Final    ABS. IMM.  GRANS. 02/09/2019 0.0  0.00 - 0.04 K/UL Final    DF 02/09/2019 AUTOMATED    Final    Sodium 02/09/2019 138  136 - 145 mmol/L Final    Potassium 02/09/2019 5.1  3.5 - 5.1 mmol/L Final    Chloride 02/09/2019 105  97 - 108 mmol/L Final    CO2 02/09/2019 29  21 - 32 mmol/L Final    Anion gap 02/09/2019 4* 5 - 15 mmol/L Final    Glucose 02/09/2019 67  65 - 100 mg/dL Final    BUN 02/09/2019 44* 6 - 20 MG/DL Final    Creatinine 02/09/2019 4.05* 0.70 - 1.30 MG/DL Final    BUN/Creatinine ratio 02/09/2019 11* 12 - 20   Final    GFR est AA 02/09/2019 18* >60 ml/min/1.73m2 Final    GFR est non-AA 02/09/2019 14* >60 ml/min/1.73m2 Final    Calcium 02/09/2019 7.9* 8.5 - 10.1 MG/DL Final    Glucose (POC) 02/09/2019 125* 65 - 100 mg/dL Final    Performed by 02/09/2019 Josh Castellanos (PCT)   Final    SAMPLES BEING HELD 02/09/2019 1sst   Final    COMMENT 02/09/2019 Add-on orders for these samples will be processed based on acceptable specimen integrity and analyte stability, which may vary by analyte. Final    Hepatitis B surface Ag 02/09/2019 <0.10  Index Final    Hep B surface Ag Interp.  02/09/2019 NEGATIVE   NEG   Final    Glucose (POC) 02/09/2019 69  65 - 100 mg/dL Final    Performed by 02/09/2019 Gia Dys (CON) PCT   Final    Glucose (POC) 02/09/2019 110* 65 - 100 mg/dL Final    Performed by 02/09/2019 Gia Dys (CON) PCT   Final    Glucose (POC) 02/09/2019 83  65 - 100 mg/dL Final    Performed by 02/09/2019 Durga Gip (PCT)   Final    Glucose (POC) 02/10/2019 75  65 - 100 mg/dL Final    Performed by 02/10/2019 Omid  (traveler)   Final    Glucose (POC) 02/10/2019 80  65 - 100 mg/dL Final    Performed by 02/10/2019 Omid  (traveler)   Final    Glucose (POC) 02/10/2019 83  65 - 100 mg/dL Final    Performed by 02/10/2019 Joaquim Bi   Final    Glucose (POC) 02/10/2019 85  65 - 100 mg/dL Final    Performed by 02/10/2019 Joaquim Bi   Final    Glucose (POC) 02/10/2019 102* 65 - 100 mg/dL Final    Performed by 02/10/2019 Shi Yeh   Final    Glucose (POC) 02/11/2019 64* 65 - 100 mg/dL Final    Performed by 02/11/2019 Nicol Davila (PCT)   Final    Glucose (POC) 02/11/2019 68  65 - 100 mg/dL Final    Performed by 02/11/2019 Nicol Davila (PCT)   Final    Glucose (POC) 02/11/2019 102* 65 - 100 mg/dL Final    Performed by 02/11/2019 Joaquim Lee   Final    Glucose (POC) 02/11/2019 82  65 - 100 mg/dL Final    Performed by 02/11/2019 Mary Frank (PCT)   Final    Glucose (POC) 02/11/2019 93  65 - 100 mg/dL Final    Performed by 02/11/2019 Juan Frank (PCT)   Final    Glucose (POC) 02/11/2019 142* 65 - 100 mg/dL Final    Performed by 02/11/2019 Adarsh Pisano (PCT)   Final    Glucose (POC) 02/12/2019 73  65 - 100 mg/dL Final    Performed by 02/12/2019 Farzad Lockwood (PCT)   Final    Glucose (POC) 02/12/2019 102* 65 - 100 mg/dL Final    Performed by 02/12/2019 Nay Vilchis \"Shawna\" Laila   Final    Glucose (POC) 02/12/2019 100  65 - 100 mg/dL Final    Performed by 02/12/2019 Juan Frank (PCT)   Final    WBC 02/12/2019 3.2* 4.1 - 11.1 K/uL Final    RBC 02/12/2019 3.32* 4.10 - 5.70 M/uL Final    HGB 02/12/2019 9.0* 12.1 - 17.0 g/dL Final    HCT 02/12/2019 27.8* 36.6 - 50.3 % Final    MCV 02/12/2019 83.7  80.0 - 99.0 FL Final    MCH 02/12/2019 27.1  26.0 - 34.0 PG Final    MCHC 02/12/2019 32.4  30.0 - 36.5 g/dL Final    RDW 02/12/2019 17.0* 11.5 - 14.5 % Final    PLATELET 20/44/4179 210  150 - 400 K/uL Final    MPV 02/12/2019 11.6  8.9 - 12.9 FL Final    NRBC 02/12/2019 0.0  0  WBC Final    ABSOLUTE NRBC 02/12/2019 0.00  0.00 - 0.01 K/uL Final    Sodium 02/12/2019 136  136 - 145 mmol/L Final    Potassium 02/12/2019 5.0  3.5 - 5.1 mmol/L Final    Chloride 02/12/2019 104  97 - 108 mmol/L Final    CO2 02/12/2019 27  21 - 32 mmol/L Final    Anion gap 02/12/2019 5  5 - 15 mmol/L Final    Glucose 02/12/2019 69  65 - 100 mg/dL Final    BUN 02/12/2019 53* 6 - 20 MG/DL Final    Creatinine 02/12/2019 4.19* 0.70 - 1.30 MG/DL Final    BUN/Creatinine ratio 02/12/2019 13  12 - 20   Final    GFR est AA 02/12/2019 17* >60 ml/min/1.73m2 Final    GFR est non-AA 02/12/2019 14* >60 ml/min/1.73m2 Final    Calcium 02/12/2019 8.4* 8.5 - 10.1 MG/DL Final    Phosphorus 02/12/2019 5.3* 2.6 - 4.7 MG/DL Final    Albumin 02/12/2019 2.8* 3.5 - 5.0 g/dL Final    Glucose (POC) 02/12/2019 146* 65 - 100 mg/dL Final    Performed by 02/12/2019 Aj Byrne   Final    Glucose (POC) 02/13/2019 66  65 - 100 mg/dL Final    Performed by 02/13/2019 Marek Lapping (PCT)   Final    Glucose (POC) 02/13/2019 74  65 - 100 mg/dL Final    Performed by 02/13/2019 Marek Lapping (PCT)   Final    Glucose (POC) 02/13/2019 74  65 - 100 mg/dL Final    Performed by 02/13/2019 Marek Lapping (PCT)   Final    Glucose (POC) 02/13/2019 83  65 - 100 mg/dL Final    Performed by 02/13/2019 Dewain Copas   Final    Glucose (POC) 02/13/2019 70  65 - 100 mg/dL Final    Performed by 02/13/2019 Dewain Copas   Final    Glucose (POC) 02/13/2019 85  65 - 100 mg/dL Final    Performed by 02/13/2019 Dewain Copas   Final    Glucose (POC) 02/13/2019 93  65 - 100 mg/dL Final    Performed by 02/13/2019 Dewain Copas   Final        RADIOLOGY REPORTS:  Results from Hospital Encounter encounter on 02/08/19   XR CHEST PA LAT    Narrative EXAM: XR CHEST PA LAT    INDICATION: Shortness of breath, Low back pain and abdominal pain for several  days. End-stage renal disease on dialysis, missed dialysis one day ago. COMPARISON: Chest views on 5/20/2018    TECHNIQUE: 4 images of PA and lateral chest views    FINDINGS: Right jugular dialysis catheter is new and terminates in the distal  superior vena cava. Cardiac monitoring wires overlie the thorax. Borderline  cardiac size is unchanged. Aortic arch is not enlarged. The pulmonary  vasculature is within normal limits. The lungs and pleural spaces are clear. The visualized bones and upper abdomen  are age-appropriate. Impression IMPRESSION:    No acute process on chest x-ray. Right jugular dialysis catheter terminates in  the distal superior vena cava. No pneumothorax. Xr Chest Pa Lat    Result Date: 2/8/2019  EXAM: XR CHEST PA LAT INDICATION: Shortness of breath, Low back pain and abdominal pain for several days.  End-stage renal disease on dialysis, missed dialysis one day ago. COMPARISON: Chest views on 5/20/2018 TECHNIQUE: 4 images of PA and lateral chest views FINDINGS: Right jugular dialysis catheter is new and terminates in the distal superior vena cava. Cardiac monitoring wires overlie the thorax. Borderline cardiac size is unchanged. Aortic arch is not enlarged. The pulmonary vasculature is within normal limits. The lungs and pleural spaces are clear. The visualized bones and upper abdomen are age-appropriate. IMPRESSION: No acute process on chest x-ray. Right jugular dialysis catheter terminates in the distal superior vena cava. No pneumothorax. Ct Abd Pelv W Cont    Result Date: 2/8/2019  EXAM: CT ABD PELV W CONT INDICATION: Abdominal pain and low back pain for several days. End-stage renal disease on dialysis, missed dialysis yesterday. Normal white blood cell count. Normal liver enzymes. COMPARISON: None. CONTRAST: 100 mL of Isovue-370. TECHNIQUE: Following the uneventful intravenous administration of contrast, thin axial images were obtained through the abdomen and pelvis. Coronal and sagittal reconstructions were generated. Oral contrast was administered. CT dose reduction was achieved through use of a standardized protocol tailored for this examination and automatic exposure control for dose modulation. FINDINGS: LUNG BASES: No pneumonia. Mild dependent atelectasis. INCIDENTALLY IMAGED HEART AND MEDIASTINUM: Borderline cardiac size. No pericardial effusion. LIVER: Subcentimeter liver lesions measure up to 9 mm in segment 2 of the liver. Surface is smooth. Streak artifact from bilateral upper extremities. GALLBLADDER: Gallstones are in the body and neck. No evidence of cholecystitis. CBD is not dilated. SPLEEN: Normal size and enhancement. PANCREAS: No mass or ductal dilatation. ADRENALS: Unremarkable. KIDNEYS: No mass, calculus, or hydronephrosis. STOMACH: Partial distention. SMALL BOWEL: No dilatation or wall thickening.  COLON: Moderate colonic fecal burden. No mural thickening. APPENDIX: Unremarkable. PERITONEUM: No ascites or pneumoperitoneum. RETROPERITONEUM: IVC filter is in good position. Aorta is atherosclerotic without aneurysm. Mild atherosclerotic stenosis of the celiac artery. No lymphadenopathy. REPRODUCTIVE ORGANS: Mild prostatomegaly measures 6 cm and extends into the base of the urinary bladder. URINARY BLADDER: No mass or calculus. BONES: Moderate bilateral hip osteoarthritis. ADDITIONAL COMMENTS: N/A     IMPRESSION: 1. No acute process on CT. 2. Cholelithiasis. No cholecystitis. 3. Subcentimeter segment 2 liver lesions cannot be fully characterized on this examination. 4. Moderate colonic fecal burden may represent constipation. No bowel obstruction.              MEDICATIONS       ALL MEDICATIONS  Current Facility-Administered Medications   Medication Dose Route Frequency    aspirin delayed-release tablet 81 mg  81 mg Oral DAILY    sertraline (ZOLOFT) tablet 25 mg  25 mg Oral DAILY    rosuvastatin (CRESTOR) tablet 10 mg  10 mg Oral QHS    epoetin rosie-epbx (RETACRIT) injection 10,000 Units  10,000 Units SubCUTAneous DIALYSIS TUE, THU & SAT    ferric citrate (AURYXIA) tablet 420 mg  420 mg Oral TID WITH MEALS    OLANZapine (ZyPREXA) tablet 2.5 mg  2.5 mg Oral Q6H PRN    ziprasidone (GEODON) 10 mg in sterile water (preservative free) 0.5 mL injection  10 mg IntraMUSCular BID PRN    benztropine (COGENTIN) tablet 1 mg  1 mg Oral BID PRN    benztropine (COGENTIN) injection 1 mg  1 mg IntraMUSCular BID PRN    zolpidem (AMBIEN) tablet 5 mg  5 mg Oral QHS PRN    acetaminophen (TYLENOL) tablet 650 mg  650 mg Oral Q4H PRN    ibuprofen (MOTRIN) tablet 400 mg  400 mg Oral Q8H PRN    magnesium hydroxide (MILK OF MAGNESIA) 400 mg/5 mL oral suspension 30 mL  30 mL Oral DAILY PRN    nicotine (NICODERM CQ) 21 mg/24 hr patch 1 Patch  1 Patch TransDERmal DAILY PRN    glucose chewable tablet 16 g  4 Tab Oral PRN    dextrose (D50W) injection syrg 12.5-25 g  25-50 mL IntraVENous PRN    glucagon (GLUCAGEN) injection 1 mg  1 mg IntraMUSCular PRN    insulin lispro (HUMALOG) injection   SubCUTAneous AC&HS      SCHEDULED MEDICATIONS  Current Facility-Administered Medications   Medication Dose Route Frequency    aspirin delayed-release tablet 81 mg  81 mg Oral DAILY    sertraline (ZOLOFT) tablet 25 mg  25 mg Oral DAILY    rosuvastatin (CRESTOR) tablet 10 mg  10 mg Oral QHS    epoetin rosie-epbx (RETACRIT) injection 10,000 Units  10,000 Units SubCUTAneous DIALYSIS TUE, THU & SAT    ferric citrate (AURYXIA) tablet 420 mg  420 mg Oral TID WITH MEALS    insulin lispro (HUMALOG) injection   SubCUTAneous AC&HS                ASSESSMENT & PLAN        The patient, Dominick Epstein, is a 70 y.o.  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 Plan for hemodialysis 3 days a week. If it is not controlled  with the same, provide additional antihypertensive meds. Monitor blood pressure. End-stage renal disease, on hemodialysis. Schedule treatment for hemodialysis on  Tuesdays, Thursdays, and Saturdays. .   Type 2 diabetes mellitus. Place on Humalog insulin correction coverage schedule,  Accu-Chek, and check hemoglobin A1c level. The patient will be on a diabetic/renal  Diet. Nephrology consult            I will continue to monitor blood levels (Depakote, Tegretol, lithium, clozapine---a drug with a narrow therapeutic index= NTI) and associated labs for drug therapy implemented that require intense monitoring for toxicity as deemed appropriate based on current medication side effects and pharmacodynamically determined drug 1/2 lives.          A coordinated, multidisplinary treatment team (includes the nurse, unit pharmcist,  and writer) round was conducted for this initial evaluation with the patient present. The following regarding medications was addressed during rounds with patient:   the risks and benefits of the proposed medication. The patient was given the opportunity to ask questions. Informed consent given to the use of the above medications. I will continue to adjust psychiatric and non-psychiatric medications (see above \"medication\" section and orders section for details) as deemed appropriate & based upon diagnoses and response to treatment. I have reviewed admission (and previous/old) labs and medical tests in the EHR and or transferring hospital documents. I will continue to order blood tests/labs and diagnostic tests as deemed appropriate and review results as they become available (see orders for details).     have reviewed old psychiatric and medical records available in the EHR. I Will order additional psychiatric records from other institutions to further elucidate the nature of patient's psychopathology and review once available. I will gather additional collateral information from friends, family and o/p treatment team to further elucidate the nature of patient's psychopathology and baselline level of psychiatric functioning.       ESTIMATED LENGTH OF STAY:    3-7 days       STRENGTHS:  Seeking help                                        SIGNED:    Topher Davis MD  2/14/2019

## 2019-02-14 NOTE — PROGRESS NOTES
Patient admitted on 2/14/2019 see H and P from Dr Zach Roth  BP not at goal  Add home Nifedipine XL 60 mg and Cardura 4 mg q hs  Dr Liliana Méndez

## 2019-02-14 NOTE — PROGRESS NOTES
Problem: Discharge Planning  Goal: *Discharge to safe environment  Outcome: Not Progressing Towards Goal  Patient is homeless and was staying in a hotel. Patient has run out of money. Patient meets criteria for SNF placement. Goal: *Knowledge of medication management  Outcome: Progressing Towards Goal  Patient verbalizes understanding of medication regimen. Patient agrees to take medications as prescribed. Goal: *Knowledge of discharge instructions  Outcome: Not Progressing Towards Goal  Patient does not verbalize understanding of goals for treatment or safe discharge.

## 2019-02-14 NOTE — BH NOTES
Attempt to take pt b/p,pt refused and pulled b/p cuff of arm while inflating. note pt yelling at staff  aware

## 2019-02-14 NOTE — INTERDISCIPLINARY ROUNDS
Behavioral Health Interdisciplinary Rounds     Patient Name: Lew Low  Age: 70 y.o. Room/Bed:  740/01  Primary Diagnosis: <principal problem not specified>   Admission Status: Voluntary     Readmission within 30 days: no  Power of  in place: no  Patient requires a blocked bed: no          Reason for blocked bed:     VTE Prophylaxis: No    Mobility needs/Fall risk: yes  Flu Vaccine : no, refused   Nutritional Plan: no  Consults: H+P         Labs/Testing due today?: yes-collected    Sleep hours:  4.5      Participation in Care/Groups: New admit  Medication Compliant?: New admit  PRNS (last 24 hours): None    Restraints (last 24 hours):  no     CIWA (range last 24 hours): CIWA-Ar Total: 0   COWS (range last 24 hours):      Alcohol screening (AUDIT) completed -   AUDIT Score: 0     If applicable, date SBIRT discussed in treatment team AND documented: N/A    Tobacco - patient is a smoker: Have You Used Tobacco in the Past 30 Days: Yes  Illegal Drugs use: Have You Used Any Illegal Substances Over the Past 12 Months: Yes    24 hour chart check complete: yes     Patient goal(s) for today: meet with treatment team  Treatment team focus/goals: psychosocial; call [facility]; call CM from [facility]  Progress note: Pt transferred to [facility] for placement     LOS:  1  Expected LOS: TBD    Financial concerns/prescription coverage: Medicaid  Date of last family contact: None     Family requesting physician contact today: No  Discharge plan: Placement  Guns in the home: No      Outpatient provider(s): Refer to [facility]    Participating treatment team members:  Luke Millman, Taylor Cockayne, MSW; Dr. Arianne Cardona MD; Angelita Patel RN; Shelley Carter, DongD

## 2019-02-14 NOTE — BH NOTES
PRN Medication Documentation    Specific patient behavior that led to need for PRN medication: chronic generalized pain   Staff interventions attempted prior to PRN being given: rest  PRN medication given: Motrin  Patient response/effectiveness of PRN medication: well, pt wanted percocet. Dr would not order.

## 2019-02-14 NOTE — H&P
[address] [place] [address] HISTORY AND PHYSICAL Name:  Vikki Martinez 
MR#:  [id number] :  1940 ACCOUNT #:  [de-identified] ADMIT DATE:  2019 CHIEF COMPLAINT:  The patient does not provide. HISTORY OF PRESENT ILLNESS:  A 62-year-old -American male with past medical 
history of end-stage renal disease; on hemodialysis, sleep apnea, peripheral vascular 
disease, bilateral BKA, hypertension, hyperlipidemia, peripheral neuropathy, anemia, 
hepatitis C, arthritis, peptic ulcer disease, coronary artery disease, DVT, prior GI 
bleed, chronic diastolic CHF, prior suicidal ideation, depression, and noncompliance 
presented as a transfer to [facility] from [facility] 
with diagnosis of depression. The patient recently has been hospitalized in [facility] from 2019 to 2019 with initial chief complaints 
of generalized diffuse pain. Reportedly, had missed prior hemodialysis treatment and 
there were concerns for medical noncompliance as the patient is homeless. He 
reported also he has not been taking his medications as prescribed. The patient was 
admitted with hypertensive urgency secondary to missed hemodialysis treatments. During this hospital stay, he was reportedly asymptomatic, however, bradycardic with 
heart rates in the 40s and as such Coreg was discontinued. He was prescribed HCTZ 
and prescribed nicardipine IV with hold parameters. He was started on Zoloft for 
depression and after his medical clearance, request was for the patient to be 
transferred to [address] [place] [address].   On current bedside 
evaluation, the patient has no complaints of dizziness, lightheadedness, focal 
weakness, numbness, paresthesias, slurred speech, facial droop, headache, neck pain, 
back pain, chest pain, shortness of breath, cough, congestion, palpitations, 
abdominal pain, nausea, vomiting, diarrhea, melena, dysuria, hematuria, fever, 
chills, or rash. Of note, however, the patient's blood pressure is elevated. At 
current, he is not reporting any pain symptoms. PAST MEDICAL HISTORY: 
1. Hypertension. 2.  Sleep apnea. 3.  Peripheral vascular disease. 4.  Peripheral neuropathy. 5.  Hyperlipidemia. 6.  End-stage renal disease. 7.  Hemodialysis. 8.  Anemia. 9.  Hepatitis C. 
10.  Arthritis. 11.  Peptic ulcer disease. 12.  GI bleed. 13.  Coronary artery disease. 14. DVT. 15.  Chronic diastolic CHF. 16.  Suicidal ideation. 17.  Accelerated hypertension. 18.  Medical noncompliance. PAST SURGICAL HISTORY: 
1. Right IJ tunneled hemodialysis catheter insertion. 2.  Bilateral below-knee amputations. 3.  Right popliteal to tibial bypass. 4.  Feeding tube in 2007. CURRENT MEDICATIONS:  Medication list reviewed and noted on chart records. Taking Last Dose Start Date End Date Provider   
 aspirin delayed-release 81 mg tablet    01/23/18  -- Peyman Montaño MD  
 Take 1 Tab by mouth daily. hydroCHLOROthiazide (HYDRODIURIL) 25 mg tablet    02/14/19  -- Sarah Nieto MD  
 Take 1 Tab by mouth daily. NIFEdipine ER (ADALAT CC) 60 mg ER tablet    02/13/19  -- Sarah Nieto MD  
 Take 1 Tab by mouth daily. HOLD for HR<60  
 oxyCODONE-acetaminophen (PERCOCET 10)  mg per tablet    --  --  Provider, Historical  
 Take 1 Tab by mouth every six (6) hours as needed for Pain. rosuvastatin (CRESTOR) 10 mg tablet    --  --  Johny Warner MD  
 Take 10 mg by mouth nightly. sertraline (ZOLOFT) 25 mg tablet    02/14/19  -- Sarah Nieto MD  
 Take 1 Tab by mouth daily. traZODone (DESYREL) 50 mg tablet    --  --  Provider, Historical  
 Take 50 mg by mouth nightly. ALLERGIES:  TETRACYCLINE. SOCIAL HISTORY:  Positive for smoking cigarettes, a half pack per day. Denies 
alcohol. Admits to marijuana use. FAMILY HISTORY:  Hypertension in father.  
 
REVIEW OF SYSTEMS:  Pertinent positives were as noted in the HPI. All other systems 
were reviewed and negative. PHYSICAL EXAMINATION: 
VITAL SIGNS:  Temperature 98.4 degrees Fahrenheit, blood pressure 196/54, heart rate 52, respiratory rate of 18, and O2 saturation 100% on room air. Recorded weight of 
161 pounds (73.4 kg). GENERAL:  The patient in no acute respiratory distress. PSYCHIATRIC:  The patient is awake, alert, and oriented x3. NEUROLOGIC:  GCS is 15. Moves bilateral upper extremities. Sensation is grossly 
intact without paresthesia or facial droop. HEENT:  Normocephalic, atraumatic. PERRLA, EOMs intact. Sclerae anicteric. Conjunctivae clear. Nares are patent. Pharynx:  Tongue is midline, not edematous. NECK:  Supple without lymphadenopathy, JVD, or carotid bruits. No thyromegaly. LYMPHS:  Negative for cervical or supraclavicular adenopathy. LUNGS:  Lungs are clear to auscultation bilaterally. HEART/CVS:  Regular rate and rhythm. Normal S1, S2 without murmurs, rubs, or 
gallops. ABDOMEN:  Soft, nontender, nondistended. Normoactive bowel sounds. No rebound, 
guarding, or rigidity. No auscultated abdominal bruits or palpable mass. BACK:  No CVA tenderness. No step-off. MUSCULOSKELETAL:  Bilateral below-the-knee amputation stump sites are intact. VASCULAR:  2+ radial pulses. Right internal jugular hemodialysis catheter in place. SKIN:  Warm and dry. LABORATORY DATA:  Labs reviewed from 02/12/2019 from [facility]. No current labs available for review except point of care blood glucose = 115 mg/dL. 12-lead EKG on 02/08/2019 showed sinus bradycardia with premature atrial complexes, 
left ventricular hypertrophy, repolarization at 52 beats per minute. IMPRESSION AND PLAN: 
1. Depression. The patient has been admitted to behavioral health unit for further 
evaluation and treatment. 2.  Medical noncompliance.   The patient will need counseling in regards to same in 
order to comply with medication treatment regimen. 3.  Uncontrolled hypertension. Plan for hemodialysis today. If it is not controlled 
with the same, provide additional antihypertensive meds. Monitor blood pressure. 4.  End-stage renal disease, on hemodialysis. Schedule treatment for hemodialysis on Tuesdays, Thursdays, and Saturdays. Recommend nephrology consultation this a.m. 
5.  Type 2 diabetes mellitus. Place on Humalog insulin correction coverage schedule, Accu-Chek, and check hemoglobin A1c level. The patient will be on a diabetic/renal 
diet. 6.  Marijuana abuse. Encourage drug cessation. 7.  Tobacco abuse. Encouraged smoking cessation. Order nicotine patch. 8.  Bradycardia. Continue with vital signs check with monitoring. We will set up a 
nephrology consultation to consider for sick sinus syndrome. 9.  Deep venous thrombosis prophylaxis. Marquis Yves MD [id number] 
D:  02/14/2019 5:13 
T:  02/14/2019 8:29 
JOB #:  [id number]

## 2019-02-14 NOTE — BH NOTES
Patient admitted from [address] to [facility] Psychiatry, under the services of Sherly Grant. Patient currently denies suicidal ideation. Patient currently denies homicidal ideation. Patient verbally contracts for safety. Patient does not display psychotic symptoms. Pt denies ETOH use. Pt admits to using THC drug use. Voluntary Admission. Oriented to [facility]. A & O x 4 , however, inconsistent with giving information and not compliant. Patient reported \"I don't eat a Diabetic Diet, will not eat that food\". Primary Nurse Zenon Hammonds RN and Sarahi Nam LPN  performed a dual skin assessment on this patient No impairment noted except he has a Bilateral BKA and a Jaleel Cath. Rambo score is 23    2307 Hospitalist answering service paged for patient to be seen for medical management.  paged to call back. Pressure Injury Documentation  (COMPLETE ONE LABEL PER PRESSURE INJURY)  For further information, please review corresponding Wound Care flowsheet. Cami Mattson has:    No pressure injury noted and pressure ulcer prevention initiated.       Zenon Hammonds RN

## 2019-02-14 NOTE — DIALYSIS
[facility]  [phone]    Vitals   Pre   Post   Assessment   Pre   Post     Temp  Temp: 98.2 °F (36.8 °C) (02/14/19 1552)   LOC  A&Ox3 A&0x3   HR   Pulse (Heart Rate): (!) 49 (02/14/19 1834)  Lungs   clear  clear   B/P   BP: 191/79 (02/14/19 1834)  Cardiac   wnl  wnl   Resp   Resp Rate: 18 (02/14/19 1834) 18 Skin   Dry  And warm  dry and warm   Pain level  Pain Intensity 1: 0 (02/14/19 1610) 0/10 Edema  none     none   Orders:  3 hrs,  2k 2.5ca, remove  3kg   hr   Duration:   Start:    1828 End:   2158 Total:   3.5   Dialyzer:   Dialyzer/Set Up Inspection: Van Jorge (02/14/19 1834)   K Bath:   Dialysate K (mEq/L): 2 (02/14/19 1834)   Ca Bath:   Dialysate CA (mEq/L): 2.5 (02/14/19 1834)   Na/Bicarb:   Dialysate NA (mEq/L): 140 (02/14/19 1834)   Target Fluid Removal:   Goal/Amount of Fluid to Remove (mL): 3000 mL (02/14/19 1834)   Access  Pt in bed,  Rt IJ, no s/s infection, cleaned  Per policy, aspirated and flushed, good flow   Type & Location:   Rt IJ   Labs  Reviwed   Obtained/Reviewed   Critical Results Called   Date when labs were drawn-  Hgb-    HGB   Date Value Ref Range Status   02/12/2019 9.0 (L) 12.1 - 17.0 g/dL Final     K-    Potassium   Date Value Ref Range Status   02/12/2019 5.0 3.5 - 5.1 mmol/L Final     Ca-   Calcium   Date Value Ref Range Status   02/12/2019 8.4 (L) 8.5 - 10.1 MG/DL Final     Bun-   BUN   Date Value Ref Range Status   02/12/2019 53 (H) 6 - 20 MG/DL Final     Creat-   Creatinine   Date Value Ref Range Status   02/12/2019 4.19 (H) 0.70 - 1.30 MG/DL Final        Medications/ Blood Products Given     Name   Dose   Route and Time     Retacrit 10,000 units  IV @ 0630             Blood Volume Processed (BVP):    64.6 Net Fluid   Removed:  2000ml   Comments   Time Out Done: yes  Primary Nurse Rpt Pre: A Alfonso@[employer]  Primary Nurse Rpt Post: A Neurohr, RN @9318  Pt Education yes  Care Plan:Continue Dialysis trx   Tx Summary:  Tolerated trx fair, one hypotensive episode where uf had to be turned off and 100cc saline given. IJ flushed, dressing changed, secured with hank. Pt in bed at lowest level and locked. Admiting Diagnosis: General pain  Pt's previous clinic-  Consent signed - Informed Consent Verified: Yes (02/14/19 4762)  [place] Consent - yes  Hepatitis Status- neg 2/6/2019  Machine #- Machine Number: [id number] (02/14/19 1834)  Telemetry status- n/a  Pre-dialysis wt. -  n/a

## 2019-02-14 NOTE — CONSULTS
Nephrology Progress Note  Joseph Thompson  Date of Admission : 2/13/2019    CC:  Follow up for ESRD       Assessment and Plan     ESRD on HD:  - HD TTS at [address] [place] [address] today   - Héctor Kung notified    HTN:  - would resume outpt BP meds    Anemia of CKD:  - FANNY with dialysis    Secondary HPT:  - will start binders    Diastolic HF    PAD s/p b/l BKAs    DM2:  - on insulin    Depression:  - per psychiatry       Interval History: This is a 69 yo AAM with a hx of ESRD on HD TTS at [facility], hx of DM2, HTN, PAD s/p  B/l BKAs who was admitted initially to [facility] for hypertensive emergency. He was dialyzed at [facility] last on Tuesday and was transferred to [facility] yesterday for depression. He is feeling ok now. No cp, sob, n/v/d reported. Current Medications: all current  Medications have been eviewed in EPIC  Review of Systems: Pertinent items are noted in HPI. Objective:  Vitals:    Vitals:    02/13/19 2207 02/13/19 2341 02/14/19 0734 02/14/19 0929   BP:  196/54 189/78    Pulse: (!) 52  (!) 50    Resp: 16 18 18    Temp: 98.2 °F (36.8 °C) 98.4 °F (36.9 °C) 97.7 °F (36.5 °C)    SpO2: 99% 100% 100%    Weight:    73 kg (160 lb 15 oz)     Intake and Output:  No intake/output data recorded. No intake/output data recorded. Physical Examination:  General: NAD,Conversant   Neck:  Supple, no mass  Resp:  Lungs CTA B/L, no wheezing , normal respiratory effort  CV:  RRR,  no murmur or rub, no thigh edema, b/l BKAs  GI:  Soft, NT, + Bowel sounds, no hepatosplenomegaly  Neurologic:  Non focal  Psych:             Depressed, flat affect  Skin:  No Rash  Access:           [facility] c/d/i    []    High complexity decision making was performed  []    Patient is at high-risk of decompensation with multiple organ involvement    Lab Data Personally Reviewed: I have reviewed all the pertinent labs, microbiology data and radiology studies during assessment.     Recent Labs     02/14/19  0423 02/12/19  1609   NA  --  136   K  -- 5.0   CL  --  104   CO2  --  27   GLU 73 69   BUN  --  53*   CREA  --  4.19*   CA  --  8.4*   PHOS  --  5.3*   ALB  --  2.8*     Recent Labs     02/12/19  1609   WBC 3.2*   HGB 9.0*   HCT 27.8*        No results found for: SDES  Lab Results   Component Value Date/Time    Culture result: MRSA NOT PRESENT 01/24/2018 10:30 AM    Culture result:  01/24/2018 10:30 AM         Screening of patient nares for MRSA is for surveillance purposes and, if positive, to facilitate isolation considerations in high risk settings. It is not intended for automatic decolonization interventions per se as regimens are not sufficiently effective to warrant routine use. Culture result: MRSA NOT PRESENT 01/19/2015 10:44 AM    Culture result:  01/19/2015 10:44 AM         Screening of patient nares for MRSA is for surveillance purposes and, if positive, to facilitate isolation considerations in high risk settings. It is not intended for automatic decolonization interventions per se as regimens are not sufficiently effective to warrant routine use.      Recent Results (from the past 24 hour(s))   GLUCOSE, POC    Collection Time: 02/13/19 11:00 AM   Result Value Ref Range    Glucose (POC) 70 65 - 100 mg/dL    Performed by Venkat, POC    Collection Time: 02/13/19 11:15 AM   Result Value Ref Range    Glucose (POC) 85 65 - 100 mg/dL    [facility], POC    Collection Time: 02/13/19  4:36 PM   Result Value Ref Range    Glucose (POC) 93 65 - 100 mg/dL    [facility] by [facility], POC    Collection Time: 02/13/19 11:45 PM   Result Value Ref Range    Glucose (POC) 116 (H) 65 - 100 mg/dL    Performed by Elvira, FASTING    Collection Time: 02/14/19  4:23 AM   Result Value Ref Range    Glucose 73 65 - 100 MG/DL   TSH 3RD GENERATION    Collection Time: 02/14/19  4:23 AM   Result Value Ref Range    TSH 4.09 (H) 0.36 - 3.74 uIU/mL   LIPID PANEL    Collection Time: 02/14/19  4:23 AM Result Value Ref Range    LIPID PROFILE          Cholesterol, total 139 <200 MG/DL    Triglyceride 68 <150 MG/DL    HDL Cholesterol 67 MG/DL    LDL, calculated 58.4 0 - 100 MG/DL    VLDL, calculated 13.6 MG/DL    CHOL/HDL Ratio 2.1 0 - 5.0     HEMOGLOBIN A1C WITH EAG    Collection Time: 02/14/19  4:23 AM   Result Value Ref Range    Hemoglobin A1c 5.0 4.2 - 6.3 %    Est. average glucose 97 mg/dL                   Laura Clements MD  [address]  Phone - [phone]   Fax - [phone]  [email or web address]

## 2019-02-14 NOTE — PROGRESS NOTES
Problem: Depressed Mood (Adult/Pediatric)  Goal: *STG: Participates in treatment plan  Outcome: Progressing Towards Goal  Pt was talking on the phone at start of shift. Pt asked staff for pain med for generalized pain. Pt demanded percocet which he had been taking and had refused NSAIDs. Staff called Dr to request order. Dr declined. Pt was willing to take Motrin when he was told. Pt has been compliant with meal and med. Pt /75 and HR 52.  Dr Alvino Bloom said it was okay to give BP med

## 2019-02-15 LAB
GLUCOSE BLD STRIP.AUTO-MCNC: 111 MG/DL (ref 65–100)
GLUCOSE BLD STRIP.AUTO-MCNC: 77 MG/DL (ref 65–100)
GLUCOSE BLD STRIP.AUTO-MCNC: 81 MG/DL (ref 65–100)
GLUCOSE BLD STRIP.AUTO-MCNC: 85 MG/DL (ref 65–100)
GLUCOSE BLD STRIP.AUTO-MCNC: 90 MG/DL (ref 65–100)
SERVICE CMNT-IMP: ABNORMAL
SERVICE CMNT-IMP: NORMAL

## 2019-02-15 PROCEDURE — 74011250637 HC RX REV CODE- 250/637: Performed by: PSYCHIATRY & NEUROLOGY

## 2019-02-15 PROCEDURE — 82962 GLUCOSE BLOOD TEST: CPT

## 2019-02-15 PROCEDURE — 74011250637 HC RX REV CODE- 250/637: Performed by: INTERNAL MEDICINE

## 2019-02-15 PROCEDURE — 74011250637 HC RX REV CODE- 250/637: Performed by: HOSPITALIST

## 2019-02-15 PROCEDURE — 74011250637 HC RX REV CODE- 250/637: Performed by: FAMILY MEDICINE

## 2019-02-15 PROCEDURE — 65220000003 HC RM SEMIPRIVATE PSYCH

## 2019-02-15 RX ADMIN — ZOLPIDEM TARTRATE 5 MG: 5 TABLET ORAL at 21:13

## 2019-02-15 RX ADMIN — DOXAZOSIN 4 MG: 2 TABLET ORAL at 21:13

## 2019-02-15 RX ADMIN — NIFEDIPINE 60 MG: 60 TABLET, FILM COATED, EXTENDED RELEASE ORAL at 17:07

## 2019-02-15 RX ADMIN — IBUPROFEN 400 MG: 400 TABLET ORAL at 20:15

## 2019-02-15 RX ADMIN — ROSUVASTATIN CALCIUM 10 MG: 10 TABLET, FILM COATED ORAL at 21:13

## 2019-02-15 RX ADMIN — FERRIC CITRATE 420 MG: 210 TABLET, COATED ORAL at 17:07

## 2019-02-15 NOTE — PROGRESS NOTES
Problem: Depressed Mood (Adult/Pediatric)  Goal: *STG: Participates in treatment plan  Outcome: Not Progressing Towards Goal  Patient does not participate in activities or treatment plan today  Goal: *STG: Verbalizes anger, guilt, and other feelings in a constructive manor  Outcome: Not Progressing Towards Goal  Isolates to room, refuses medication and vital signs. Asked to be left alone because he does not feel good    Problem: Falls - Risk of  Goal: *Absence of Falls  Document Bello Fall Risk and appropriate interventions in the flowsheet. Outcome: Progressing Towards Goal  Fall Risk Interventions:  Patient remains free from falls.  Document Bello fall risk scale, transfer from bed to wheel chair with one person assist.  Mobility Interventions: (P) Bed/chair exit alarm         Medication Interventions: Patient to call before getting OOB    Elimination Interventions: (P) Patient to call for help with toileting needs

## 2019-02-15 NOTE — PROGRESS NOTES
Nephrology Progress Note  Corby Tamayo  Date of Admission : 2/13/2019    CC:  Follow up for ESRD       Assessment and Plan     ESRD on HD:  - HD TTS at [facility]  - HD tomorrow as scheduled    HTN:  - cont current meds    Anemia of CKD:  - FANNY with dialysis    Secondary HPT:  - cont auryxia    Diastolic HF    PAD s/p b/l BKAs    DM2:  - on insulin    Depression:  - per psychiatry       Interval History:  Seen and examined. Resting, in NAD. Dialysis yesterday w/o issues. No cp, sob, n/v/d reported. Current Medications: all current  Medications have been eviewed in EPIC  Review of Systems: Pertinent items are noted in HPI. Objective:  Vitals:    Vitals:    02/14/19 2115 02/14/19 2128 02/14/19 2145 02/14/19 2149   BP: 151/77 156/80 173/80 173/80   Pulse: (!) 53 (!) 54 (!) 55 (!) 55   Resp:       Temp:       SpO2:       Weight:         Intake and Output:  No intake/output data recorded. 02/13 1901 - 02/15 0700  In: -   Out: 2000     Physical Examination:  General: NAD,Conversant   Neck:  Supple, no mass  Resp:  Lungs CTA B/L, no wheezing , normal respiratory effort  CV:  RRR,  no murmur or rub, no thigh edema, b/l BKAs  GI:  Soft, NT, + Bowel sounds, no hepatosplenomegaly  Neurologic:  Non focal  Psych:             Depressed, flat affect  Skin:  No Rash  Access:           [facility] c/d/i    []    High complexity decision making was performed  []    Patient is at high-risk of decompensation with multiple organ involvement    Lab Data Personally Reviewed: I have reviewed all the pertinent labs, microbiology data and radiology studies during assessment.     Recent Labs     02/14/19  0423 02/12/19  1609   NA  --  136   K  --  5.0   CL  --  104   CO2  --  27   GLU 73 69   BUN  --  53*   CREA  --  4.19*   CA  --  8.4*   PHOS  --  5.3*   ALB  --  2.8*     Recent Labs     02/12/19  1609   WBC 3.2*   HGB 9.0*   HCT 27.8*        No results found for: SDES  Lab Results   Component Value Date/Time Culture result: MRSA NOT PRESENT 01/24/2018 10:30 AM    Culture result:  01/24/2018 10:30 AM         Screening of patient nares for MRSA is for surveillance purposes and, if positive, to facilitate isolation considerations in high risk settings. It is not intended for automatic decolonization interventions per se as regimens are not sufficiently effective to warrant routine use. Culture result: MRSA NOT PRESENT 01/19/2015 10:44 AM    Culture result:  01/19/2015 10:44 AM         Screening of patient nares for MRSA is for surveillance purposes and, if positive, to facilitate isolation considerations in high risk settings. It is not intended for automatic decolonization interventions per se as regimens are not sufficiently effective to warrant routine use.      Recent Results (from the past 24 hour(s))   GLUCOSE, POC    Collection Time: 02/14/19 11:46 AM   Result Value Ref Range    Glucose (POC) 76 65 - 100 mg/dL    Performed by [address], POC    Collection Time: 02/14/19 12:16 PM   Result Value Ref Range    Glucose (POC) 130 (H) 65 - 100 mg/dL    Performed by [address], POC    Collection Time: 02/14/19  4:48 PM   Result Value Ref Range    Glucose (POC) 72 65 - 100 mg/dL    Performed by [id number], POC    Collection Time: 02/14/19  5:04 PM   Result Value Ref Range    Glucose (POC) 89 65 - 100 mg/dL    Performed by [facility] [id number], POC    Collection Time: 02/14/19 10:28 PM   Result Value Ref Range    Glucose (POC) 113 (H) 65 - 100 mg/dL    Performed by [address], POC    Collection Time: 02/15/19  7:31 AM   Result Value Ref Range    Glucose (POC) 77 65 - 100 mg/dL    Performed by Hawk Moise    GLUCOSE, POC    Collection Time: 02/15/19  7:48 AM   Result Value Ref Range    Glucose (POC) 85 65 - 100 mg/dL    Performed by Ko Lai MD  [address] [place] [address] Nephrology [address] [facility]  Phone - [phone]   Fax - [phone]  [email or web address]

## 2019-02-15 NOTE — PROGRESS NOTES
Problem: Falls - Risk of  Goal: *Absence of Falls  Document Bello Fall Risk and appropriate interventions in the flowsheet. Outcome: Progressing Towards Goal  Fall Risk Interventions:  Mobility Interventions: Bed/chair exit alarm  Medication Interventions: Patient to call before getting OOB  Elimination Interventions: Patient to call for help with toileting needs    Received pt lying in bed with eyes closed, appears to be asleep. No pain or distress noted. Respirations even and unlabored. Will cont to monitor q15min for safety.

## 2019-02-15 NOTE — INTERDISCIPLINARY ROUNDS
Behavioral Health Interdisciplinary Rounds     Patient Name: Carlos Manuel King  Age: 70 y.o. Room/Bed:  740/01  Primary Diagnosis: Depression   Admission Status: Voluntary     Readmission within 30 days: no  Power of  in place: no  Patient requires a blocked bed: no          Reason for blocked bed:     VTE Prophylaxis: No    Mobility needs/Fall risk: yes  Flu Vaccine : no   Nutritional Plan: no  Consults:          Labs/Testing due today?: no    Sleep hours:  5      Participation in Care/Groups:  yes  Medication Compliant?: Yes  PRNS (last 24 hours): Sleep Aid and Pain    Restraints (last 24 hours):  no     CIWA (range last 24 hours): CIWA-Ar Total: 0   COWS (range last 24 hours):      Alcohol screening (AUDIT) completed -   AUDIT Score: 0     If applicable, date SBIRT discussed in treatment team AND documented: N/A    Tobacco - patient is a smoker: Have You Used Tobacco in the Past 30 Days: Yes  Illegal Drugs use: Have You Used Any Illegal Substances Over the Past 12 Months: Yes    24 hour chart check complete: yes     Patient goal(s) for today: participate in groups  Treatment team focus/goals: Complete [facility]AI  Progress note: Pt refusing medications and vitals; irritable    LOS:  2  Expected LOS: TBD    Financial concerns/prescription coverage: [employer]  Date of last family contact: None      Family requesting physician contact today: No  Discharge plan: SNF Placement  Guns in the home: No        Outpatient provider(s): [facility]    Participating treatment team members:  Carlos Manuel FernandoKel MSW; Dr. Alyse Goyal MD; Jordana Mercado RN; Trena Bowen, DongD

## 2019-02-15 NOTE — BH NOTES
GROUP THERAPY PROGRESS NOTE    Aj Brock is participating in [employer]. Group time: 1 hour    Personal goal for participation: Relax     Goal orientation: relaxation    Group therapy participation: active    Therapeutic interventions reviewed and discussed: Provided relaxation activities    Impression of participation: Patient read the [employer] newspaper and watched TV with peers to relax.

## 2019-02-15 NOTE — PROGRESS NOTES
Hospitalist Progress Note  Marcus Brito MD  Answering service: [phone] OR 36 from in house phone  Cell: [phone]      Date of Service:  2/15/2019  NAME:  Micheal Cadet  :  1940  MRN:  [id number]      Admission Summary:   A 79-year-old -American male with past medical history of end-stage renal disease; on hemodialysis, sleep apnea, peripheral vascular disease, bilateral BKA, hypertension, hyperlipidemia, peripheral neuropathy, anemia,hepatitis C, arthritis, peptic ulcer disease, coronary artery disease, DVT, prior GI bleed, chronic diastolic CHF, prior suicidal ideation, depression, and noncompliance presented as a transfer to [facility] from [facility] with diagnosis of depression    Interval history / Subjective:   He said he feels the same, denied depression or suicide thought.  He said he wants to go home      Assessment & Plan:     HTN   -BP improving,  /68continue nifedipine and cardura, monitor BP    ESRD DD  -HD per nephrologist     Hx of CAD  -no chest pain   -continue aspirin and lipitor    Anemia of chronic disease   -H/H stable  -on ferric citrate, epoetin    Hx of hepatitis c  -liver function normal  -need outpatient follow up    T2DM  -A1c 5.0 normal  -continue sliding scale and monitor finger stick glucose    Hx of PVD s/p bilateral BKA  -stable, on wheelchair     Non compliance   -patient is advised to take his medication    Depression   -continue zoloft  -management per psychiatrist         Code status: Full Code  DVT prophylaxis: per primary team    Medically stable, claudia off, call us to see the patient       Hospital Problems  Date Reviewed: 2019          Codes Class Noted POA    * (Principal) Depression ICD-10-CM: F32.9  ICD-9-CM: 193  2018 Unknown        Type 2 diabetes mellitus ([facility] [address] (Chronic) ICD-10-CM: E11.9  ICD-9-CM: 250.00  2015 Unknown Vital Signs:    Last 24hrs VS reviewed since prior progress note. Most recent are:  Visit Vitals  /68 (BP Patient Position: Sitting)   Pulse 61   Temp 98.3 °F (36.8 °C)   Resp 18   Wt 73 kg (160 lb 15 oz)   SpO2 100%   BMI 17.68 kg/m²         Intake/Output Summary (Last 24 hours) at 2/15/2019 1603  Last data filed at 2/14/2019 2149  Gross per 24 hour   Intake --   Output 2000 ml   Net -2000 ml        Physical Examination:             Constitutional:  No acute distress, cooperative, pleasant    ENT:  Oral mucous moist, oropharynx benign. Neck supple,    Resp:  CTA bilaterally. No wheezing/rhonchi/rales. No accessory muscle use   CV:  Regular rhythm, normal rate, no murmurs, gallops, rubs    GI:  Soft, non distended, non tender. normoactive bowel sounds, no hepatosplenomegaly     Musculoskeletal: S/P Bilateral BKA    Neurologic:  Moves all extremities. AAOx3, CN II-XII reviewed           Data Review:    Review and/or order of clinical lab test      Labs:     Recent Labs     02/12/19  1609   WBC 3.2*   HGB 9.0*   HCT 27.8*        Recent Labs     02/14/19  0423 02/12/19  1609   NA  --  136   K  --  5.0   CL  --  104   CO2  --  27   BUN  --  53*   CREA  --  4.19*   GLU 73 69   CA  --  8.4*   PHOS  --  5.3*     Recent Labs     02/12/19  1609   ALB 2.8*     No results for input(s): INR, PTP, APTT in the last 72 hours. No lab exists for component: INREXT   No results for input(s): FE, TIBC, PSAT, FERR in the last 72 hours. Lab Results   Component Value Date/Time    Folate 8.2 07/14/2017 04:41 AM      No results for input(s): PH, PCO2, PO2 in the last 72 hours. No results for input(s): CPK, CKNDX, TROIQ in the last 72 hours.     No lab exists for component: CPKMB  Lab Results   Component Value Date/Time    Cholesterol, total 139 02/14/2019 04:23 AM    HDL Cholesterol 67 02/14/2019 04:23 AM    LDL, calculated 58.4 02/14/2019 04:23 AM    Triglyceride 68 02/14/2019 04:23 AM    CHOL/HDL Ratio 2.1 02/14/2019 04:23 AM     Lab Results   Component Value Date/Time    Glucose (POC) 81 02/15/2019 11:31 AM    Glucose (POC) 85 02/15/2019 07:48 AM    Glucose (POC) 77 02/15/2019 07:31 AM    Glucose (POC) 113 (H) 02/14/2019 10:28 PM    Glucose (POC) 89 02/14/2019 05:04 PM     Lab Results   Component Value Date/Time    Color YELLOW/STRAW 01/20/2018 09:12 PM    Appearance CLEAR 01/20/2018 09:12 PM    Specific gravity 1.020 01/20/2018 09:12 PM    pH (UA) 6.0 01/20/2018 09:12 PM    Protein 300 (A) 01/20/2018 09:12 PM    Glucose NEGATIVE  01/20/2018 09:12 PM    Ketone NEGATIVE  01/20/2018 09:12 PM    Bilirubin NEGATIVE  01/20/2018 09:12 PM    Urobilinogen 1.0 01/20/2018 09:12 PM    Nitrites NEGATIVE  01/20/2018 09:12 PM    Leukocyte Esterase NEGATIVE  01/20/2018 09:12 PM    Epithelial cells FEW 01/20/2018 09:12 PM    Bacteria NEGATIVE  01/20/2018 09:12 PM    WBC 0-4 01/20/2018 09:12 PM    RBC 5-10 01/20/2018 09:12 PM         Medications Reviewed:     Current Facility-Administered Medications   Medication Dose Route Frequency    aspirin delayed-release tablet 81 mg  81 mg Oral DAILY    sertraline (ZOLOFT) tablet 25 mg  25 mg Oral DAILY    rosuvastatin (CRESTOR) tablet 10 mg  10 mg Oral QHS    epoetin rosie-epbx (RETACRIT) injection 10,000 Units  10,000 Units SubCUTAneous DIALYSIS TUE, THU & SAT    ferric citrate (AURYXIA) tablet 420 mg  420 mg Oral TID WITH MEALS    NIFEdipine ER (PROCARDIA XL) tablet 60 mg  60 mg Oral Q24H    doxazosin (CARDURA) tablet 4 mg  4 mg Oral QHS    OLANZapine (ZyPREXA) tablet 2.5 mg  2.5 mg Oral Q6H PRN    ziprasidone (GEODON) 10 mg in sterile water (preservative free) 0.5 mL injection  10 mg IntraMUSCular BID PRN    benztropine (COGENTIN) tablet 1 mg  1 mg Oral BID PRN    benztropine (COGENTIN) injection 1 mg  1 mg IntraMUSCular BID PRN    zolpidem (AMBIEN) tablet 5 mg  5 mg Oral QHS PRN    acetaminophen (TYLENOL) tablet 650 mg  650 mg Oral Q4H PRN    ibuprofen (MOTRIN) tablet 400 mg 400 mg Oral Q8H PRN    magnesium hydroxide (MILK OF MAGNESIA) 400 mg/5 mL oral suspension 30 mL  30 mL Oral DAILY PRN    nicotine (NICODERM CQ) 21 mg/24 hr patch 1 Patch  1 Patch TransDERmal DAILY PRN    glucose chewable tablet 16 g  4 Tab Oral PRN    dextrose (D50W) injection syrg 12.5-25 g  25-50 mL IntraVENous PRN    glucagon (GLUCAGEN) injection 1 mg  1 mg IntraMUSCular PRN    insulin lispro (HUMALOG) injection   SubCUTAneous AC&HS     ______________________________________________________________________  EXPECTED LENGTH OF STAY: - - -  ACTUAL LENGTH OF STAY:          2                 Blair Amaya MD

## 2019-02-15 NOTE — BH NOTES
PRN Medication Documentation    Specific patient behavior that led to need for PRN medication: insomnia  Staff interventions attempted prior to PRN being given: quiet dark room  PRN medication given: ambien  Patient response/effectiveness of PRN medication: Well, pt gone to bed.

## 2019-02-15 NOTE — PROGRESS NOTES
Problem: Depressed Mood (Adult/Pediatric)  Goal: *STG: Participates in treatment plan  Outcome: Not Progressing Towards Goal  Patient did not participate in treatment plan or any activities today  Goal: *STG: Attends activities and groups  Outcome: Not Progressing Towards Goal  Patient states that he does not feel well, just wants to rest and be left alone    Problem: Falls - Risk of  Goal: *Absence of Falls  Document Bello Fall Risk and appropriate interventions in the flowsheet. Outcome: Progressing Towards Goal  Fall Risk Interventions:  Patient remains free from fall.  Document Bello fall risk scale and other necessary documentation  Mobility Interventions: (P) Bed/chair exit alarm         Medication Interventions: Patient to call before getting OOB    Elimination Interventions: (P) Patient to call for help with toileting needs

## 2019-02-15 NOTE — BH NOTES
PSYCHIATRIC PROGRESS NOTE         Patient Name  Corby Tamayo   Date of Birth 1940   [facility] [id number]   Medical Record Number  [id number]      Age  70 y.o. PCP Nancy Silveira MD   Admit date:  2/13/2019    Room Number  740/01  @ [address]   Date of Service  2/15/2019           E & M PROGRESS NOTE: 15 mins         HISTORY       REASON FOR HOSPITALIZATION:  CC: \"Pt admitted under a voluntary basis for severe depression with suicidal ideations and  an inability to care for self. HISTORY OF PRESENT ILLNESS:    The patient, Corby Tamayo, is a 70 y.o. BLACK OR  male with 68-year-old -American male with past medical  history of end-stage renal disease; on hemodialysis, sleep apnea, peripheral vascular disease, bilateral BKA, hypertension, hyperlipidemia, peripheral neuropathy, anemia,hepatitis C, arthritis, peptic ulcer disease, coronary artery disease, DVT, prior GIbleed, chronic diastolic CHF, prior suicidal ideation, depression, and noncompliance   transfered to [address] from [facility] after pt reported feeling depressed nad suicidal Pt stated he lives in the [place] and he is unhappy about his living situation , he stated he had lots of Money before [place] HAS 13 CHILDREN but now he is broke and no one ask about him. Pt reports feeling hopeless, helpless, angry. These symptoms are of high severity. These symptoms are constant . Pt ha sh/o using marijuana occasionally. UDS: negative; BAL=0. .  2/15/19: Pt is irritable and refused zoloft and vitals today. Pt has dialysis yesterday. SIDE EFFECTS: (reviewed/updated 2/15/2019)  None reported or admitted to.   No noted toxicity with use of Depakote/Tegretol/lithium/Clozaril/TCAs   ALLERGIES:(reviewed/updated 2/15/2019)  Allergies   Allergen Reactions    Tetracycline Hives      MEDICATIONS PRIOR TO ADMISSION:(reviewed/updated 2/15/2019)  Medications Prior to Admission   Medication Sig  NIFEdipine ER (ADALAT CC) 60 mg ER tablet Take 1 Tab by mouth daily. HOLD for HR<60    hydroCHLOROthiazide (HYDRODIURIL) 25 mg tablet Take 1 Tab by mouth daily.  sertraline (ZOLOFT) 25 mg tablet Take 1 Tab by mouth daily.  oxyCODONE-acetaminophen (PERCOCET 10)  mg per tablet Take 1 Tab by mouth every six (6) hours as needed for Pain.  traZODone (DESYREL) 50 mg tablet Take 50 mg by mouth nightly.  rosuvastatin (CRESTOR) 10 mg tablet Take 10 mg by mouth nightly.  aspirin delayed-release 81 mg tablet Take 1 Tab by mouth daily. PAST MEDICAL HISTORY: Past medical history from the initial psychiatric evaluation has been reviewed (reviewed/updated 2/15/2019) with no additional updates (I asked patient and no additional past medical history provided). Past Medical History:   Diagnosis Date    Arthritis     CAD (coronary artery disease) 2007    CKD [facility]ronic kidney disease), [facility] III [facility]     DM type 2 causing renal disease ([facility]     DVT (deep venous thrombosis) (HCC)     Hx of DVT:  Xarelto stopped due to GI bleed. S/P IVC filter.  Gait abnormality     GI bleed     Heart murmur     Hepatitis C     History of blood transfusion     Hypercholesterolemia     Hypertension 11/7/2014    Neuropathy     Non compliance w medication regimen     Peripheral vascular disease ([facility] 11/7/2014    A. S/P Right SFA POBA and tibial atherectomy (2/4/13).     PUD (peptic ulcer disease) 2007    Unspecified sleep apnea     never used cpap     Past Surgical History:   Procedure Laterality Date    ABDOMEN SURGERY PROC UNLISTED  2007    has approx 7-8\" midline incision on abdomen--\"had to open him up and clean him out after feeding tube clogged\"    HX GI  2007    feeding tube for approx 2 mo    VASCULAR SURGERY PROCEDURE UNLIST Right 1/22/2015    popliteal-tibial bypass      SOCIAL HISTORY: Social history from the initial psychiatric evaluation has been reviewed (reviewed/updated 2/15/2019) with no additional updates (I asked patient and no additional social history provided). Social History     Socioeconomic History    Marital status: SINGLE     Spouse name: Not on file    Number of children: Not on file    Years of education: Not on file    Highest education level: Not on file   Social Needs    Financial resource strain: Not on file    Food insecurity - worry: Not on file    Food insecurity - inability: Not on file    Transportation needs - medical: Not on file   [employer] needs - non-medical: Not on file   Occupational History    Not on file   Tobacco Use    Smoking status: Current Every Day Smoker     Packs/day: 0.50    Smokeless tobacco: Never Used   Substance and Sexual Activity    Alcohol use: No    Drug use: No     Comment: >20 years ago    Sexual activity: Not on file   Other Topics Concern     Service Not Asked    Blood Transfusions Not Asked    Caffeine Concern Not Asked    Occupational Exposure Not Asked    Hobby Hazards Not Asked    Sleep Concern Not Asked    Stress Concern Not Asked    Weight Concern Not Asked    Special Diet Not Asked    Back Care Not Asked    Exercise Not Asked    Bike Helmet Not Asked    Seat Belt Not Asked    Self-Exams Not Asked   Social History Narrative    78 year ols male admitted for depression and homelessness. Pt will be evicated from apartment due to non payment of bills. Girlfriend of 30 years left him to [facility] live in the [place] with others. \" Pt is a brittle diabetic, with treatment non compliance. He has bilarela lower extremity amputations. Patient has a long hx of substance abuse. FAMILY HISTORY: Family history from the initial psychiatric evaluation has been reviewed (reviewed/updated 2/15/2019) with no additional updates (I asked patient and no additional family history provided).    Family History   Problem Relation Age of Onset    Hypertension Father        REVIEW OF SYSTEMS: (reviewed/updated 2/15/2019)  Appetite:   Sleep: All other Review of Systems: Negative except          MENTAL STATUS EXAM & [address] (MSE):    MSE FINDINGS ARE WITHIN NORMAL LIMITS (WNL) UNLESS OTHERWISE STATED BELOW. ( ALL OF THE BELOW CATEGORIES OF THE MSE HAVE BEEN REVIEWED (reviewed 2/14/2019) AND UPDATED AS DEEMED APPROPRIATE )  General Presentation age appropriate and casually dressed, cooperative   Orientation oriented to time, place and person   Vital Signs  See below (reviewed 2/14/2019); Vital Signs (BP, Pulse, & Temp) are within normal limits if not listed below.    Gait and Station Stable/steady, no ataxia   Musculoskeletal System No extrapyramidal symptoms (EPS); no abnormal muscular movements or Tardive Dyskinesia (TD); muscle strength and tone are within normal limits   Language No aphasia or dysarthria   Speech:  soft   Thought Processes logical; slow rate of thoughts; fair abstract reasoning/computation   Thought Associations goal directed   Thought Content free of delusions   Suicidal Ideations intention   Homicidal Ideations no plan  and no intention   Mood:  anxious  and depressed   Affect:  constricted   Memory recent  intact   Memory remote:  intact   Concentration/Attention:  distractable   Fund of Knowledge average   Insight:  fair   Reliability fair   Judgment:  fair                                                                                                    VITALS:     Patient Vitals for the past 24 hrs:   Temp Pulse Resp BP SpO2   02/15/19 1058 -- (!) 57 -- 144/68 100 %   02/14/19 2149 -- (!) 55 -- 173/80 --   02/14/19 2145 -- (!) 55 -- 173/80 --   02/14/19 2128 -- (!) 54 -- 156/80 --   02/14/19 2115 -- (!) 53 -- 151/77 --   02/14/19 2100 -- (!) 52 -- 156/77 --   02/14/19 2045 -- (!) 52 -- 149/77 --   02/14/19 2030 -- (!) 53 -- 149/73 --   02/14/19 2015 -- (!) 55 -- 124/62 --   02/14/19 2000 -- (!) 53 -- (!) 76/49 --   02/14/19 1945 -- (!) 56 -- 114/67 --   02/14/19 1930 -- Minesh Tarango 56 -- 122/72 --   02/14/19 1915 -- (!) 55 -- 143/76 --   02/14/19 1900 -- (!) 52 -- 157/76 --   02/14/19 1845 -- (!) 49 -- 174/81 --   02/14/19 1834 -- (!) 49 18 191/79 --   02/14/19 1552 98.2 °F (36.8 °C) (!) 52 16 181/75 96 %     Wt Readings from Last 3 Encounters:   02/14/19 73 kg (160 lb 15 oz)   02/13/19 73.4 kg (161 lb 12.8 oz)   01/15/19 81.6 kg (180 lb)     Temp Readings from Last 3 Encounters:   02/13/19 98.2 °F (36.8 °C)   01/15/19 97 °F (36.1 °C)   05/20/18 97.7 °F (36.5 °C)     BP Readings from Last 3 Encounters:   02/15/19 144/68   02/13/19 181/82   01/15/19 (!) 192/116     Pulse Readings from Last 3 Encounters:   02/15/19 (!) 57   02/13/19 (!) 48   01/15/19 75            DATA     LABORATORY DATA:(reviewed/updated 2/15/2019)  Recent Results (from the past 24 hour(s))   GLUCOSE, POC    Collection Time: 02/14/19 11:46 AM   Result Value Ref Range    Glucose (POC) 76 65 - 100 mg/dL    Performed by [address], POC    Collection Time: 02/14/19 12:16 PM   Result Value Ref Range    Glucose (POC) 130 (H) 65 - 100 mg/dL    Performed by [address], POC    Collection Time: 02/14/19  4:48 PM   Result Value Ref Range    Glucose (POC) 72 65 - 100 mg/dL    Performed by Mylene Marino    GLUCOSE, POC    Collection Time: 02/14/19  5:04 PM   Result Value Ref Range    Glucose (POC) 89 65 - 100 mg/dL    Performed by [id number], POC    Collection Time: 02/14/19 10:28 PM   Result Value Ref Range    Glucose (POC) 113 (H) 65 - 100 mg/dL    Performed by [address], POC    Collection Time: 02/15/19  7:31 AM   Result Value Ref Range    Glucose (POC) 77 65 - 100 mg/dL    Performed by Rhiannon Telles    GLUCOSE, POC    Collection Time: 02/15/19  7:48 AM   Result Value Ref Range    Glucose (POC) 85 65 - 100 mg/dL    Performed by Rhiannon Telles      No results found for: VALF2, VALAC, VALP, VALPR, DS6, CRBAM, CRBAMP, CARB2, XCRBAM  No results found for: [facility] [place] [facility] RADIOLOGY REPORTS:(reviewed/updated 2/15/2019)  Xr Chest Pa Lat    Result Date: 2/8/2019  EXAM: XR CHEST PA LAT INDICATION: Shortness of breath, Low back pain and abdominal pain for several days. End-stage renal disease on dialysis, missed dialysis one day ago. COMPARISON: Chest views on 5/20/2018 TECHNIQUE: 4 images of PA and lateral chest views FINDINGS: Right jugular dialysis catheter is new and terminates in the distal superior vena cava. Cardiac monitoring wires overlie the thorax. Borderline cardiac size is unchanged. Aortic arch is not enlarged. The pulmonary vasculature is within normal limits. The lungs and pleural spaces are clear. The visualized bones and upper abdomen are age-appropriate. IMPRESSION: No acute process on chest x-ray. Right jugular dialysis catheter terminates in the distal superior vena cava. No pneumothorax. Ct Abd Pelv W Cont    Result Date: 2/8/2019  EXAM: CT ABD PELV W CONT INDICATION: Abdominal pain and low back pain for several days. End-stage renal disease on dialysis, missed dialysis yesterday. Normal white blood cell count. Normal liver enzymes. COMPARISON: None. CONTRAST: 100 mL of Isovue-370. TECHNIQUE: Following the uneventful intravenous administration of contrast, thin axial images were obtained through the abdomen and pelvis. Coronal and sagittal reconstructions were generated. Oral contrast was administered. CT dose reduction was achieved through use of a standardized protocol tailored for this examination and automatic exposure control for dose modulation. FINDINGS: LUNG BASES: No pneumonia. Mild dependent atelectasis. INCIDENTALLY IMAGED HEART AND MEDIASTINUM: Borderline cardiac size. No pericardial effusion. LIVER: Subcentimeter liver lesions measure up to 9 mm in segment 2 of the liver. Surface is smooth. Streak artifact from bilateral upper extremities. GALLBLADDER: Gallstones are in the body and neck. No evidence of cholecystitis. CBD is not dilated. SPLEEN: Normal size and enhancement. PANCREAS: No mass or ductal dilatation. ADRENALS: Unremarkable. KIDNEYS: No mass, calculus, or hydronephrosis. STOMACH: Partial distention. SMALL BOWEL: No dilatation or wall thickening. COLON: Moderate colonic fecal burden. No mural thickening. APPENDIX: Unremarkable. PERITONEUM: No ascites or pneumoperitoneum. RETROPERITONEUM: IVC filter is in good position. Aorta is atherosclerotic without aneurysm. Mild atherosclerotic stenosis of the celiac artery. No lymphadenopathy. REPRODUCTIVE ORGANS: Mild prostatomegaly measures 6 cm and extends into the base of the urinary bladder. URINARY BLADDER: No mass or calculus. BONES: Moderate bilateral hip osteoarthritis. ADDITIONAL COMMENTS: N/A     IMPRESSION: 1. No acute process on CT. 2. Cholelithiasis. No cholecystitis. 3. Subcentimeter segment 2 liver lesions cannot be fully characterized on this examination. 4. Moderate colonic fecal burden may represent constipation. No bowel obstruction.          MEDICATIONS     ALL MEDICATIONS:   Current Facility-Administered Medications   Medication Dose Route Frequency    aspirin delayed-release tablet 81 mg  81 mg Oral DAILY    sertraline (ZOLOFT) tablet 25 mg  25 mg Oral DAILY    rosuvastatin (CRESTOR) tablet 10 mg  10 mg Oral QHS    epoetin rosie-epbx (RETACRIT) injection 10,000 Units  10,000 Units SubCUTAneous DIALYSIS TUE, THU & SAT    ferric citrate (AURYXIA) tablet 420 mg  420 mg Oral TID WITH MEALS    NIFEdipine ER (PROCARDIA XL) tablet 60 mg  60 mg Oral Q24H    doxazosin (CARDURA) tablet 4 mg  4 mg Oral QHS    OLANZapine (ZyPREXA) tablet 2.5 mg  2.5 mg Oral Q6H PRN    ziprasidone (GEODON) 10 mg in sterile water (preservative free) 0.5 mL injection  10 mg IntraMUSCular BID PRN    benztropine (COGENTIN) tablet 1 mg  1 mg Oral BID PRN    benztropine (COGENTIN) injection 1 mg  1 mg IntraMUSCular BID PRN    zolpidem (AMBIEN) tablet 5 mg  5 mg Oral QHS PRN    acetaminophen (TYLENOL) tablet 650 mg  650 mg Oral Q4H PRN    ibuprofen (MOTRIN) tablet 400 mg  400 mg Oral Q8H PRN    magnesium hydroxide (MILK OF MAGNESIA) 400 mg/5 mL oral suspension 30 mL  30 mL Oral DAILY PRN    nicotine (NICODERM CQ) 21 mg/24 hr patch 1 Patch  1 Patch TransDERmal DAILY PRN    glucose chewable tablet 16 g  4 Tab Oral PRN    dextrose (D50W) injection syrg 12.5-25 g  25-50 mL IntraVENous PRN    glucagon (GLUCAGEN) injection 1 mg  1 mg IntraMUSCular PRN    insulin lispro (HUMALOG) injection   SubCUTAneous AC&HS      SCHEDULED MEDICATIONS:   Current Facility-Administered Medications   Medication Dose Route Frequency    aspirin delayed-release tablet 81 mg  81 mg Oral DAILY    sertraline (ZOLOFT) tablet 25 mg  25 mg Oral DAILY    rosuvastatin (CRESTOR) tablet 10 mg  10 mg Oral QHS    epoetin rosie-epbx (RETACRIT) injection 10,000 Units  10,000 Units SubCUTAneous DIALYSIS TUE, THU & SAT    ferric citrate (AURYXIA) tablet 420 mg  420 mg Oral TID WITH MEALS    NIFEdipine ER (PROCARDIA XL) tablet 60 mg  60 mg Oral Q24H    doxazosin (CARDURA) tablet 4 mg  4 mg Oral QHS    insulin lispro (HUMALOG) injection   SubCUTAneous AC&HS          ASSESSMENT & PLAN     The patient, Ramone Tapia, is a 70 y.o.  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  Plan for hemodialysis 3 days a week.  If it is not controlled  with the same, provide additional antihypertensive meds.  Monitor blood pressure.    End-stage renal disease, on hemodialysis.  Schedule treatment for hemodialysis on  Tuesdays, Thursdays, and Saturdays. .   Type 2 diabetes mellitus.  Place on Humalog insulin correction coverage schedule,  Accu-Chek, and check hemoglobin A1c level.  The patient will be on a diabetic/renal  Diet.   Nephrology consult   2/15/19: continue meds/milue, encourage compliance. I will continue to monitor blood levels (Depakote, Tegretol, lithium, clozapine---a drug with a narrow therapeutic index= NTI) and associated labs for drug therapy implemented that require intense monitoring for toxicity as deemed appropriate based on current medication side effects and pharmacodynamically determined drug 1/2 lives. In summary, Corby Tamayo, is a 70 y.o.  male who presents with a severe exacerbation of the principal diagnosis of Depression  Patient's condition is worsening/not improving/not stable improving. Patient requires continued inpatient hospitalization for further stabilization, safety monitoring and medication management. I will continue to coordinate the provision of individual, milieu, occupational, group, and substance abuse therapies to address target symptoms/diagnoses as deemed appropriate for the individual patient. A coordinated, multidisplinary treatment team round was conducted with the patient (this team consists of the nurse, psychiatric unit pharmcist,  and writer). Complete current electronic health record for patient has been reviewed today including consultant notes, ancillary staff notes, nurses and psychiatric tech notes. uicide risk assessment completed and patient deemed to be of low risk for suicide at this time. The following regarding medications was addressed during rounds with patient:   the risks and benefits of the proposed medication. The patient was given the opportunity to ask questions. Informed consent given to the use of the above medications. Will continue to adjust psychiatric and non-psychiatric medications (see above \"medication\" section and orders section for details) as deemed appropriate & based upon diagnoses and response to treatment.      I will continue to order blood tests/labs and diagnostic tests as deemed appropriate and review results as they become available (see orders for details and above listed lab/test results). I will order psychiatric records from previous [facility] hospitals to further elucidate the nature of patient's psychopathology and review once available. I will gather additional collateral information from friends, family and o/p treatment team to further elucidate the nature of patient's psychopathology and baselline level of psychiatric functioning. I certify that this patient's inpatient psychiatric hospital services furnished since the previous certification were, and continue to be, required for treatment that could reasonably be expected to improve the patient's condition, or for diagnostic study, and that the patient continues to need, on a daily basis, active treatment furnished directly by or requiring the supervision of inpatient psychiatric facility personnel. In addition, the hospital records show that services furnished were intensive treatment services, admission or related services, or equivalent services.     EXPECTED DISCHARGE DATE/DAY: TBD     DISPOSITION: Home       Signed By:   Faustino Nelson MD  2/15/2019

## 2019-02-15 NOTE — BH NOTES
HYPOGLYCEMIC EPISODE DOCUMENTATION    Patient with hypoglycemic episode(s) at 073 on 02/15/2015. BG value(s) pre-treatment 68    Was patient symptomatic?  [x] yes, [x] no  Patient was treated with the following rescue medications/treatments: [] D50                [] Glucose tablets                [] Glucagon                [x] 4oz juice                [] 6oz reg soda                [] 8oz low fat milk  BG value post-treatment: 85  Once BG treated and value greater than 80mg/dl, pt was provided with the following:  [x] snack  [] meal  Name of MD notified:Dr. Ramirez Espinosa  The following orders were received: follow protocol

## 2019-02-15 NOTE — PROGRESS NOTES
Problem: Depressed Mood (Adult/Pediatric)  Goal: *STG: Participates in treatment plan  Outcome: Progressing Towards Goal  Pt in bed at the start of shift. Pt wanted to get in wheelchair and get out of the bed. It was reported that pt could assist self into the chair from bed. Staff assisted pt to chair. Pt asked staff to wheel him to the dining area. Staff said they would when finished changing his linens. Pt wheeled himself to the dining area. Pt flat and irritable. Pt compliant with lunch and dinner. Pt compliant with meds. Problem: Falls - Risk of  Goal: *Absence of Falls  Document Bello Fall Risk and appropriate interventions in the flowsheet.   Outcome: Progressing Towards Goal  Fall Risk Interventions:  Mobility Interventions: Bed/chair exit alarm, Communicate number of staff needed for ambulation/transfer, Utilize walker, cane, or other assistive device         Medication Interventions: Patient to call before getting OOB    Elimination Interventions: Bed/chair exit alarm, Patient to call for help with toileting needs

## 2019-02-16 LAB
ALBUMIN SERPL-MCNC: 3 G/DL (ref 3.5–5)
ANION GAP SERPL CALC-SCNC: 10 MMOL/L (ref 5–15)
BUN SERPL-MCNC: 31 MG/DL (ref 6–20)
BUN/CREAT SERPL: 9 (ref 12–20)
CALCIUM SERPL-MCNC: 8.9 MG/DL (ref 8.5–10.1)
CHLORIDE SERPL-SCNC: 102 MMOL/L (ref 97–108)
CO2 SERPL-SCNC: 24 MMOL/L (ref 21–32)
CREAT SERPL-MCNC: 3.62 MG/DL (ref 0.7–1.3)
ERYTHROCYTE [DISTWIDTH] IN BLOOD BY AUTOMATED COUNT: 17.2 % (ref 11.5–14.5)
GLUCOSE BLD STRIP.AUTO-MCNC: 104 MG/DL (ref 65–100)
GLUCOSE BLD STRIP.AUTO-MCNC: 128 MG/DL (ref 65–100)
GLUCOSE BLD STRIP.AUTO-MCNC: 76 MG/DL (ref 65–100)
GLUCOSE BLD STRIP.AUTO-MCNC: 93 MG/DL (ref 65–100)
GLUCOSE BLD STRIP.AUTO-MCNC: 94 MG/DL (ref 65–100)
GLUCOSE SERPL-MCNC: 74 MG/DL (ref 65–100)
HCT VFR BLD AUTO: 32.7 % (ref 36.6–50.3)
HGB BLD-MCNC: 10.2 G/DL (ref 12.1–17)
MCH RBC QN AUTO: 26.5 PG (ref 26–34)
MCHC RBC AUTO-ENTMCNC: 31.2 G/DL (ref 30–36.5)
MCV RBC AUTO: 84.9 FL (ref 80–99)
NRBC # BLD: 0 K/UL (ref 0–0.01)
NRBC BLD-RTO: 0 PER 100 WBC
PHOSPHATE SERPL-MCNC: 3.7 MG/DL (ref 2.6–4.7)
PLATELET # BLD AUTO: 186 K/UL (ref 150–400)
PMV BLD AUTO: 10.8 FL (ref 8.9–12.9)
POTASSIUM SERPL-SCNC: 4.7 MMOL/L (ref 3.5–5.1)
RBC # BLD AUTO: 3.85 M/UL (ref 4.1–5.7)
SERVICE CMNT-IMP: ABNORMAL
SERVICE CMNT-IMP: ABNORMAL
SERVICE CMNT-IMP: NORMAL
SODIUM SERPL-SCNC: 136 MMOL/L (ref 136–145)
WBC # BLD AUTO: 4.1 K/UL (ref 4.1–11.1)

## 2019-02-16 PROCEDURE — 74011250636 HC RX REV CODE- 250/636: Performed by: INTERNAL MEDICINE

## 2019-02-16 PROCEDURE — 74011250637 HC RX REV CODE- 250/637: Performed by: HOSPITALIST

## 2019-02-16 PROCEDURE — 85027 COMPLETE CBC AUTOMATED: CPT

## 2019-02-16 PROCEDURE — 80069 RENAL FUNCTION PANEL: CPT

## 2019-02-16 PROCEDURE — 74011250637 HC RX REV CODE- 250/637: Performed by: PSYCHIATRY & NEUROLOGY

## 2019-02-16 PROCEDURE — 74011250637 HC RX REV CODE- 250/637: Performed by: FAMILY MEDICINE

## 2019-02-16 PROCEDURE — 36415 COLL VENOUS BLD VENIPUNCTURE: CPT

## 2019-02-16 PROCEDURE — 82962 GLUCOSE BLOOD TEST: CPT

## 2019-02-16 PROCEDURE — 90935 HEMODIALYSIS ONE EVALUATION: CPT

## 2019-02-16 PROCEDURE — 74011250637 HC RX REV CODE- 250/637: Performed by: INTERNAL MEDICINE

## 2019-02-16 PROCEDURE — 65220000003 HC RM SEMIPRIVATE PSYCH

## 2019-02-16 RX ADMIN — EPOETIN ALFA-EPBX 10000 UNITS: 10000 INJECTION, SOLUTION INTRAVENOUS; SUBCUTANEOUS at 20:55

## 2019-02-16 RX ADMIN — ASPIRIN 81 MG: 81 TABLET, COATED ORAL at 08:31

## 2019-02-16 RX ADMIN — FERRIC CITRATE 420 MG: 210 TABLET, COATED ORAL at 12:29

## 2019-02-16 RX ADMIN — SERTRALINE 25 MG: 50 TABLET, FILM COATED ORAL at 08:31

## 2019-02-16 RX ADMIN — ROSUVASTATIN CALCIUM 10 MG: 10 TABLET, FILM COATED ORAL at 21:05

## 2019-02-16 RX ADMIN — ZOLPIDEM TARTRATE 5 MG: 5 TABLET ORAL at 21:55

## 2019-02-16 RX ADMIN — DOXAZOSIN 4 MG: 2 TABLET ORAL at 21:05

## 2019-02-16 NOTE — PROGRESS NOTES
Problem: Falls - Risk of  Goal: *Absence of Falls  Document Bello Fall Risk and appropriate interventions in the flowsheet. Outcome: Progressing Towards Goal  Fall Risk Interventions:  Patient is absent of falls. Patient is BKA, needs 2- person assistance transferring from bed to wheelchair. Patient is out on the unit, quiet, non-interactive, med and meal compliant. Mobility Interventions: Bed/chair exit alarm         Medication Interventions: Teach patient to arise slowly    Elimination Interventions: Bed/chair exit alarm         1345:  Michell Jordan dialysis nurse, advised he expects to be here to dialyze patient around 1600 today.

## 2019-02-16 NOTE — BH NOTES
PRN Medication Documentation    Specific patient behavior that led to need for PRN medication: mouth pain  Staff interventions attempted prior to PRN being given: offer other snack than an apple, pt wanted an apple   PRN medication given: motrin  Patient response/effectiveness of PRN medication: well    PRN Medication Documentation    Specific patient behavior that led to need for PRN medication: insomnia  Staff interventions attempted prior to PRN being given: dark quiet room  PRN medication given: Ambien  Patient response/effectiveness of PRN medication: Well

## 2019-02-16 NOTE — PROGRESS NOTES
HYPOGLYCEMIC EPISODE DOCUMENTATION    Patient with hypoglycemic episode(s) at 0828(time) on 02/16/19(date). BG value(s) pre-treatment 68    Was patient symptomatic?  [] yes, [x] no  Patient was treated with the following rescue medications/treatments: [] D50                [] Glucose tablets                [] Glucagon                [x] 4oz juice                [] 6oz reg soda                [] 8oz low fat milk  BG value post-treatment: 104  Once BG treated and value greater than 80mg/dl, pt was provided with the following:  [] snack  [x] meal  Name of MD notified: Dr. Sun Page  The following orders were received: none

## 2019-02-16 NOTE — BH NOTES
PSYCHIATRIC PROGRESS NOTE         Patient Name  Dominick Epstein   Date of Birth 1940   [facility] [id number]   Medical Record Number  [id number]      Age  70 y.o. PCP Ana Xie MD   Admit date:  2/13/2019    Room Number  740/01  @ [facility]   Date of Service  2/16/2019           E & M PROGRESS NOTE: 15 mins         HISTORY       REASON FOR HOSPITALIZATION:  CC: \"Pt admitted under a voluntary basis for severe depression with suicidal ideations and  an inability to care for self. HISTORY OF PRESENT ILLNESS:    The patient, Dominick Epstein, is a 70 y.o. BLACK OR  male with 70-year-old -American male with past medical  history of end-stage renal disease; on hemodialysis, sleep apnea, peripheral vascular disease, bilateral BKA, hypertension, hyperlipidemia, peripheral neuropathy, anemia,hepatitis C, arthritis, peptic ulcer disease, coronary artery disease, DVT, prior GIbleed, chronic diastolic CHF, prior suicidal ideation, depression, and noncompliance   transfered to [facility] from [facility] after pt reported feeling depressed nad suicidal Pt stated he lives in the [place] and he is unhappy about his living situation , he stated he had lots of Money before [place] HAS 13 CHILDREN but now he is broke and no one ask about him. Pt reports feeling hopeless, helpless, angry. These symptoms are of high severity. These symptoms are constant . Pt ha sh/o using marijuana occasionally. UDS: negative; BAL=0. .  2/15/19: Pt is irritable and refused zoloft and vitals today. Pt has dialysis yesterday. 2/16/19: Pt took zoloft but refuses some medical meds. Pt had dialysis on Saturday. Pt is demanding in the evenings. SIDE EFFECTS: (reviewed/updated 2/16/2019)  None reported or admitted to.   No noted toxicity with use of Depakote/Tegretol/lithium/Clozaril/TCAs   ALLERGIES:(reviewed/updated 2/16/2019)  Allergies   Allergen Reactions    Tetracycline Hives      MEDICATIONS PRIOR TO ADMISSION:(reviewed/updated 2/16/2019)  Medications Prior to Admission   Medication Sig    NIFEdipine ER (ADALAT CC) 60 mg ER tablet Take 1 Tab by mouth daily. HOLD for HR<60    hydroCHLOROthiazide (HYDRODIURIL) 25 mg tablet Take 1 Tab by mouth daily.  sertraline (ZOLOFT) 25 mg tablet Take 1 Tab by mouth daily.  oxyCODONE-acetaminophen (PERCOCET 10)  mg per tablet Take 1 Tab by mouth every six (6) hours as needed for Pain.  traZODone (DESYREL) 50 mg tablet Take 50 mg by mouth nightly.  rosuvastatin (CRESTOR) 10 mg tablet Take 10 mg by mouth nightly.  aspirin delayed-release 81 mg tablet Take 1 Tab by mouth daily. PAST MEDICAL HISTORY: Past medical history from the initial psychiatric evaluation has been reviewed (reviewed/updated 2/16/2019) with no additional updates (I asked patient and no additional past medical history provided). Past Medical History:   Diagnosis Date    Arthritis     CAD (coronary artery disease) 2007    CKD (chronic kidney disease), stage III ([address]     DM type 2 causing renal disease [facility]     DVT [facility]deep venous thrombosis) ([facility]     Hx of DVT:  Xarelto stopped due to GI bleed. S/P IVC filter.  Gait abnormality     GI bleed     Heart murmur     Hepatitis C     History of blood transfusion     Hypercholesterolemia     Hypertension 11/7/2014    Neuropathy     Non compliance w medication regimen     Peripheral vascular disease ([facility] 11/7/2014    A. S/P Right SFA POBA and tibial atherectomy (2/4/13).     PUD (peptic ulcer disease) 2007    Unspecified sleep apnea     never used cpap     Past Surgical History:   Procedure Laterality Date    ABDOMEN SURGERY PROC UNLISTED  2007    has approx 7-8\" midline incision on abdomen--\"had to open him up and clean him out after feeding tube clogged\"    HX GI  2007    feeding tube for approx 2 mo    VASCULAR SURGERY PROCEDURE UNLIST Right 1/22/2015    popliteal-tibial bypass SOCIAL HISTORY: Social history from the initial psychiatric evaluation has been reviewed (reviewed/updated 2/16/2019) with no additional updates (I asked patient and no additional social history provided). Social History     Socioeconomic History    Marital status: SINGLE     Spouse name: Not on file    Number of children: Not on file    Years of education: Not on file    Highest education level: Not on file   Social Needs    Financial resource strain: Not on file    Food insecurity - worry: Not on file    Food insecurity - inability: Not on file    Transportation needs - medical: Not on file   [employer] needs - non-medical: Not on file   Occupational History    Not on file   Tobacco Use    Smoking status: Current Every Day Smoker     Packs/day: 0.50    Smokeless tobacco: Never Used   Substance and Sexual Activity    Alcohol use: No    Drug use: No     Comment: >20 years ago    Sexual activity: Not on file   Other Topics Concern     Service Not Asked    Blood Transfusions Not Asked    Caffeine Concern Not Asked    Occupational Exposure Not Asked    Hobby Hazards Not Asked    Sleep Concern Not Asked    Stress Concern Not Asked    Weight Concern Not Asked    Special Diet Not Asked    Back Care Not Asked    Exercise Not Asked    Bike Helmet Not Asked    Seat Belt Not Asked    Self-Exams Not Asked   Social History Narrative    78 year ols male admitted for depression and homelessness. Pt will be evicated from apartment due to non payment of bills. Girlfriend of 30 years left him to [facility] live in the [place] with others. \" Pt is a brittle diabetic, with treatment non compliance. He has bilarela lower extremity amputations. Patient has a long hx of substance abuse. FAMILY HISTORY: Family history from the initial psychiatric evaluation has been reviewed (reviewed/updated 2/16/2019) with no additional updates (I asked patient and no additional family history provided).    Family History   Problem Relation Age of Onset    Hypertension Father        REVIEW OF SYSTEMS: (reviewed/updated 2/16/2019)  Appetite:   Sleep: All other Review of Systems: Negative except          MENTAL STATUS EXAM & [address] (MSE):    MSE FINDINGS ARE WITHIN NORMAL LIMITS (WNL) UNLESS OTHERWISE STATED BELOW. ( ALL OF THE BELOW CATEGORIES OF THE MSE HAVE BEEN REVIEWED (reviewed 2/14/2019) AND UPDATED AS DEEMED APPROPRIATE )  General Presentation age appropriate and casually dressed, cooperative   Orientation oriented to time, place and person   Vital Signs  See below (reviewed 2/14/2019); Vital Signs (BP, Pulse, & Temp) are within normal limits if not listed below.    Gait and Station Stable/steady, no ataxia   Musculoskeletal System No extrapyramidal symptoms (EPS); no abnormal muscular movements or Tardive Dyskinesia (TD); muscle strength and tone are within normal limits   Language No aphasia or dysarthria   Speech:  soft   Thought Processes logical; slow rate of thoughts; fair abstract reasoning/computation   Thought Associations goal directed   Thought Content free of delusions   Suicidal Ideations intention   Homicidal Ideations no plan  and no intention   Mood:  anxious  and depressed   Affect:  constricted   Memory recent  intact   Memory remote:  intact   Concentration/Attention:  distractable   Fund of Knowledge average   Insight:  fair   Reliability fair   Judgment:  fair                                                                                                    VITALS:     Patient Vitals for the past 24 hrs:   Temp Pulse Resp BP SpO2   02/16/19 0907 98 °F (36.7 °C) (!) 58 16 129/67 100 %   02/15/19 1950 98.4 °F (36.9 °C) 64 18 164/74 99 %   02/15/19 1537 98.3 °F (36.8 °C) 61 18 146/68 100 %   02/15/19 1058 -- (!) 57 -- 144/68 100 %     Wt Readings from Last 3 Encounters:   02/14/19 73 kg (160 lb 15 oz)   02/13/19 73.4 kg (161 lb 12.8 oz)   01/15/19 81.6 kg (180 lb)     Temp Readings from Last 3 Encounters:   02/16/19 98 °F (36.7 °C)   02/13/19 98.2 °F (36.8 °C)   01/15/19 97 °F (36.1 °C)     BP Readings from Last 3 Encounters:   02/16/19 129/67   02/13/19 181/82   01/15/19 (!) 192/116     Pulse Readings from Last 3 Encounters:   02/16/19 (!) 58   02/13/19 (!) 48   01/15/19 75            DATA     LABORATORY DATA:(reviewed/updated 2/16/2019)  Recent Results (from the past 24 hour(s))   GLUCOSE, POC    Collection Time: 02/15/19 11:31 AM   Result Value Ref Range    Glucose (POC) 81 65 - 100 mg/dL    Performed by Porsha Yeboah    GLUCOSE, POC    Collection Time: 02/15/19  4:02 PM   Result Value Ref Range    Glucose (POC) 90 65 - 100 mg/dL    Performed by [address] [place] [address], POC    Collection Time: 02/15/19  7:53 PM   Result Value Ref Range    Glucose (POC) 111 (H) 65 - 100 mg/dL    Performed by Jose Wolf    RENAL FUNCTION PANEL    Collection Time: 02/16/19  6:37 AM   Result Value Ref Range    Sodium 136 136 - 145 mmol/L    Potassium 4.7 3.5 - 5.1 mmol/L    Chloride 102 97 - 108 mmol/L    CO2 24 21 - 32 mmol/L    Anion gap 10 5 - 15 mmol/L    Glucose 74 65 - 100 mg/dL    BUN 31 (H) 6 - 20 MG/DL    Creatinine 3.62 (H) 0.70 - 1.30 MG/DL    BUN/Creatinine ratio 9 (L) 12 - 20      GFR est AA 20 (L) >60 ml/min/1.73m2    GFR est non-AA 16 (L) >60 ml/min/1.73m2    Calcium 8.9 8.5 - 10.1 MG/DL    Phosphorus 3.7 2.6 - 4.7 MG/DL    Albumin 3.0 (L) 3.5 - 5.0 g/dL   CBC W/O DIFF    Collection Time: 02/16/19  6:37 AM   Result Value Ref Range    WBC 4.1 4.1 - 11.1 K/uL    RBC 3.85 (L) 4.10 - 5.70 M/uL    HGB 10.2 (L) 12.1 - 17.0 g/dL    HCT 32.7 (L) 36.6 - 50.3 %    MCV 84.9 80.0 - 99.0 FL    MCH 26.5 26.0 - 34.0 PG    MCHC 31.2 30.0 - 36.5 g/dL    RDW 17.2 (H) 11.5 - 14.5 %    PLATELET 498 408 - 334 K/uL    MPV 10.8 8.9 - 12.9 FL    NRBC 0.0 0  WBC    ABSOLUTE NRBC 0.00 0.00 - 0.01 K/uL   GLUCOSE, POC    Collection Time: 02/16/19  8:28 AM   Result Value Ref Range    Glucose (POC) 76 65 - 100 mg/dL    Performed by Shin Triplett, POC    Collection Time: 02/16/19  8:43 AM   Result Value Ref Range    Glucose (POC) 104 (H) 65 - 100 mg/dL    Performed by Latia Sheppard      No results found for: VALF2, VALAC, VALP, VALPR, DS6, CRBAM, CRBAMP, CARB2, XCRBAM  No results found for: LITHM   RADIOLOGY REPORTS:(reviewed/updated 2/16/2019)  Xr Chest Pa Lat    Result Date: 2/8/2019  EXAM: XR CHEST PA LAT INDICATION: Shortness of breath, Low back pain and abdominal pain for several days. End-stage renal disease on dialysis, missed dialysis one day ago. COMPARISON: Chest views on 5/20/2018 TECHNIQUE: 4 images of PA and lateral chest views FINDINGS: Right jugular dialysis catheter is new and terminates in the distal superior vena cava. Cardiac monitoring wires overlie the thorax. Borderline cardiac size is unchanged. Aortic arch is not enlarged. The pulmonary vasculature is within normal limits. The lungs and pleural spaces are clear. The visualized bones and upper abdomen are age-appropriate. IMPRESSION: No acute process on chest x-ray. Right jugular dialysis catheter terminates in the distal superior vena cava. No pneumothorax. Ct Abd Pelv W Cont    Result Date: 2/8/2019  EXAM: CT ABD PELV W CONT INDICATION: Abdominal pain and low back pain for several days. End-stage renal disease on dialysis, missed dialysis yesterday. Normal white blood cell count. Normal liver enzymes. COMPARISON: None. CONTRAST: 100 mL of Isovue-370. TECHNIQUE: Following the uneventful intravenous administration of contrast, thin axial images were obtained through the abdomen and pelvis. Coronal and sagittal reconstructions were generated. Oral contrast was administered. CT dose reduction was achieved through use of a standardized protocol tailored for this examination and automatic exposure control for dose modulation. FINDINGS: LUNG BASES: No pneumonia. Mild dependent atelectasis.  INCIDENTALLY IMAGED HEART AND MEDIASTINUM: Borderline cardiac size. No pericardial effusion. LIVER: Subcentimeter liver lesions measure up to 9 mm in segment 2 of the liver. Surface is smooth. Streak artifact from bilateral upper extremities. GALLBLADDER: Gallstones are in the body and neck. No evidence of cholecystitis. CBD is not dilated. SPLEEN: Normal size and enhancement. PANCREAS: No mass or ductal dilatation. ADRENALS: Unremarkable. KIDNEYS: No mass, calculus, or hydronephrosis. STOMACH: Partial distention. SMALL BOWEL: No dilatation or wall thickening. COLON: Moderate colonic fecal burden. No mural thickening. APPENDIX: Unremarkable. PERITONEUM: No ascites or pneumoperitoneum. RETROPERITONEUM: IVC filter is in good position. Aorta is atherosclerotic without aneurysm. Mild atherosclerotic stenosis of the celiac artery. No lymphadenopathy. REPRODUCTIVE ORGANS: Mild prostatomegaly measures 6 cm and extends into the base of the urinary bladder. URINARY BLADDER: No mass or calculus. BONES: Moderate bilateral hip osteoarthritis. ADDITIONAL COMMENTS: N/A     IMPRESSION: 1. No acute process on CT. 2. Cholelithiasis. No cholecystitis. 3. Subcentimeter segment 2 liver lesions cannot be fully characterized on this examination. 4. Moderate colonic fecal burden may represent constipation. No bowel obstruction.          MEDICATIONS     ALL MEDICATIONS:   Current Facility-Administered Medications   Medication Dose Route Frequency    aspirin delayed-release tablet 81 mg  81 mg Oral DAILY    sertraline (ZOLOFT) tablet 25 mg  25 mg Oral DAILY    rosuvastatin (CRESTOR) tablet 10 mg  10 mg Oral QHS    epoetin rosie-epbx (RETACRIT) injection 10,000 Units  10,000 Units SubCUTAneous DIALYSIS TUE, THU & SAT    ferric citrate (AURYXIA) tablet 420 mg  420 mg Oral TID WITH MEALS    NIFEdipine ER (PROCARDIA XL) tablet 60 mg  60 mg Oral Q24H    doxazosin (CARDURA) tablet 4 mg  4 mg Oral QHS    OLANZapine (ZyPREXA) tablet 2.5 mg  2.5 mg Oral Q6H PRN    ziprasidone (GEODON) 10 mg in sterile water (preservative free) 0.5 mL injection  10 mg IntraMUSCular BID PRN    benztropine (COGENTIN) tablet 1 mg  1 mg Oral BID PRN    benztropine (COGENTIN) injection 1 mg  1 mg IntraMUSCular BID PRN    zolpidem (AMBIEN) tablet 5 mg  5 mg Oral QHS PRN    acetaminophen (TYLENOL) tablet 650 mg  650 mg Oral Q4H PRN    ibuprofen (MOTRIN) tablet 400 mg  400 mg Oral Q8H PRN    magnesium hydroxide (MILK OF MAGNESIA) 400 mg/5 mL oral suspension 30 mL  30 mL Oral DAILY PRN    nicotine (NICODERM CQ) 21 mg/24 hr patch 1 Patch  1 Patch TransDERmal DAILY PRN    glucose chewable tablet 16 g  4 Tab Oral PRN    dextrose (D50W) injection syrg 12.5-25 g  25-50 mL IntraVENous PRN    glucagon (GLUCAGEN) injection 1 mg  1 mg IntraMUSCular PRN    insulin lispro (HUMALOG) injection   SubCUTAneous AC&HS      SCHEDULED MEDICATIONS:   Current Facility-Administered Medications   Medication Dose Route Frequency    aspirin delayed-release tablet 81 mg  81 mg Oral DAILY    sertraline (ZOLOFT) tablet 25 mg  25 mg Oral DAILY    rosuvastatin (CRESTOR) tablet 10 mg  10 mg Oral QHS    epoetin rosie-epbx (RETACRIT) injection 10,000 Units  10,000 Units SubCUTAneous DIALYSIS TUE, THU & SAT    ferric citrate (AURYXIA) tablet 420 mg  420 mg Oral TID WITH MEALS    NIFEdipine ER (PROCARDIA XL) tablet 60 mg  60 mg Oral Q24H    doxazosin (CARDURA) tablet 4 mg  4 mg Oral QHS    insulin lispro (HUMALOG) injection   SubCUTAneous AC&HS          ASSESSMENT & PLAN     The patient, Lew Low, is a 70 y.o.  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  Plan for hemodialysis 3 days a week.  If it is not controlled  with the same, provide additional antihypertensive meds.  Monitor blood pressure.    End-stage renal disease, on hemodialysis.  Schedule treatment for hemodialysis on  Tuesdays, Thursdays, and Saturdays. .   Type 2 diabetes mellitus.  Place on Humalog insulin correction coverage schedule,  Accu-Chek, and check hemoglobin A1c level.  The patient will be on a diabetic/renal  Diet. Nephrology consult   2/15/19: continue meds/milue, encourage compliance. 2/16/19: continue meds/milue      I will continue to monitor blood levels (Depakote, Tegretol, lithium, clozapine---a drug with a narrow therapeutic index= NTI) and associated labs for drug therapy implemented that require intense monitoring for toxicity as deemed appropriate based on current medication side effects and pharmacodynamically determined drug 1/2 lives. In summary, Wilmer Lopez, is a 70 y.o.  male who presents with a severe exacerbation of the principal diagnosis of Depression  Patient's condition is worsening/not improving/not stable improving. Patient requires continued inpatient hospitalization for further stabilization, safety monitoring and medication management. I will continue to coordinate the provision of individual, milieu, occupational, group, and substance abuse therapies to address target symptoms/diagnoses as deemed appropriate for the individual patient. A coordinated, multidisplinary treatment team round was conducted with the patient (this team consists of the nurse, psychiatric unit pharmcist,  and writer). Complete current electronic health record for patient has been reviewed today including consultant notes, ancillary staff notes, nurses and psychiatric tech notes. uicide risk assessment completed and patient deemed to be of low risk for suicide at this time. The following regarding medications was addressed during rounds with patient:   the risks and benefits of the proposed medication. The patient was given the opportunity to ask questions. Informed consent given to the use of the above medications.  Will continue to adjust psychiatric and non-psychiatric medications (see above \"medication\" section and orders section for details) as deemed appropriate & based upon diagnoses and response to treatment. I will continue to order blood tests/labs and diagnostic tests as deemed appropriate and review results as they become available (see orders for details and above listed lab/test results). I will order psychiatric records from previous [facility] hospitals to further elucidate the nature of patient's psychopathology and review once available. I will gather additional collateral information from friends, family and o/p treatment team to further elucidate the nature of patient's psychopathology and baselline level of psychiatric functioning. I certify that this patient's inpatient psychiatric hospital services furnished since the previous certification were, and continue to be, required for treatment that could reasonably be expected to improve the patient's condition, or for diagnostic study, and that the patient continues to need, on a daily basis, active treatment furnished directly by or requiring the supervision of inpatient psychiatric facility personnel. In addition, the hospital records show that services furnished were intensive treatment services, admission or related services, or equivalent services.     EXPECTED DISCHARGE DATE/DAY: TBD     DISPOSITION: Home       Signed By:   Harsha Paredes MD  2/16/2019

## 2019-02-16 NOTE — PROGRESS NOTES
1559 Problem: Depressed Mood (Adult/Pediatric)  Goal: *STG: Verbalizes anger, guilt, and other feelings in a constructive manor  Outcome: Progressing Towards Goal  Patient is received seated in WC near nurse's station. He uses phone to make several phone calls. Worried, anxious, sad facial expression. \"Did you say I'm having dialysis at 4 or 4:30? \" Staff give emotional support and reassure him. Continue q15 checks, fall precautions, prepare for dialysis today. 1823 pt is in room 733 having dialysis. Dialysis tech at his side. Patient resting. Resp even and unlabored. Continue q15 checks. 1949 pt directed dialysis nurse to stop dialysis early. Patient was max assist transfer from bed in room 733 to [facility]. Patient returned to kristel unit and given dinner and phone to make calls to family. Patient is angry, irritable, demanding and difficult to redirect. Continue q15 checks.

## 2019-02-16 NOTE — PROGRESS NOTES
Problem: Falls - Risk of  Goal: *Absence of Falls  Document Bello Fall Risk and appropriate interventions in the flowsheet. Outcome: Progressing Towards Goal  Fall Risk Interventions:  Mobility Interventions: Bed/chair exit alarm     Received patient in bed sleeping. Respirations even and unlabored. Q-15 checks for safety.          Medication Interventions: Teach patient to arise slowly    Elimination Interventions: Bed/chair exit alarm

## 2019-02-16 NOTE — INTERDISCIPLINARY ROUNDS
Behavioral Health Interdisciplinary Rounds     Patient Name: Carlos Barnett  Age: 70 y.o. Room/Bed:  740/01  Primary Diagnosis: Depression   Admission Status: Voluntary     Readmission within 30 days: no  Power of  in place: no  Patient requires a blocked bed: no          Reason for blocked bed:     VTE Prophylaxis: No    Mobility needs/Fall risk: yes  Flu Vaccine : no   Nutritional Plan: no  Consults:          Labs/Testing due today?: yes    Sleep hours: 7hrs       Participation in Care/Groups:  yes  Medication Compliant?: Yes  PRNS (last 24 hours): Sleep Aid and Pain    Restraints (last 24 hours):  no     CIWA (range last 24 hours): CIWA-Ar Total: 0   COWS (range last 24 hours):      Alcohol screening (AUDIT) completed -   AUDIT Score: 0     If applicable, date SBIRT discussed in treatment team AND documented:   AUDIT Screen Score: AUDIT Score: 0      Document Brief Intervention (corresponds directly with the 5 A's, Ask, Advise, Assess, Assist, and Arrange): At- Risk Patients (Score 7-15 for women; 8-15 for men)  Discuss concern patient is drinking at unhealthy levels known to increase risk of alcohol-related health problems. Is Patient ready to commit to change? If No:   Encourage reflection   Discuss short term and long term health risks of consuming alcohol   Barriers to change   Reaffirm willingness to help / Educational materials provided  If Yes:   Set goal  Specle provided    Harmful use or Dependence (Score 16 or greater)   Discuss short term and long term health risks of consuming alcohol   Recommendations   Negotiate drinking goal   Recommend addiction specialist/center   Arrange follow-up appointments.     Tobacco - patient is a smoker: Have You Used Tobacco in the Past 30 Days: Yes  Illegal Drugs use: Have You Used Any Illegal Substances Over the Past 12 Months: Yes    24 hour chart check complete: yes     Patient goal(s) for today:  Comply with tx   Treatment team focus/goals: Eval meds and review tx goals   Progress note : Angry, [place] and not any nursing home    LOS:  3  Expected LOS:     Financial concerns/prescription coverage:  Date of last family contact:      Family requesting physician contact today:    Discharge plan: TBD-  Placement  Guns in the home: n/a     Outpatient provider(s): TBD     Participating treatment team members:  Apryl Cabello RN, Dr Lynette Lamas and Edmundo SOLIZ

## 2019-02-17 LAB
GLUCOSE BLD STRIP.AUTO-MCNC: 100 MG/DL (ref 65–100)
GLUCOSE BLD STRIP.AUTO-MCNC: 153 MG/DL (ref 65–100)
GLUCOSE BLD STRIP.AUTO-MCNC: 80 MG/DL (ref 65–100)
GLUCOSE BLD STRIP.AUTO-MCNC: 85 MG/DL (ref 65–100)
SERVICE CMNT-IMP: ABNORMAL
SERVICE CMNT-IMP: NORMAL

## 2019-02-17 PROCEDURE — 65220000003 HC RM SEMIPRIVATE PSYCH

## 2019-02-17 PROCEDURE — 74011250637 HC RX REV CODE- 250/637: Performed by: HOSPITALIST

## 2019-02-17 PROCEDURE — 74011250637 HC RX REV CODE- 250/637: Performed by: PSYCHIATRY & NEUROLOGY

## 2019-02-17 PROCEDURE — 74011250637 HC RX REV CODE- 250/637: Performed by: INTERNAL MEDICINE

## 2019-02-17 PROCEDURE — 82962 GLUCOSE BLOOD TEST: CPT

## 2019-02-17 PROCEDURE — 74011250637 HC RX REV CODE- 250/637: Performed by: FAMILY MEDICINE

## 2019-02-17 RX ORDER — SERTRALINE HYDROCHLORIDE 50 MG/1
50 TABLET, FILM COATED ORAL DAILY
Status: DISCONTINUED | OUTPATIENT
Start: 2019-02-18 | End: 2019-02-23

## 2019-02-17 RX ADMIN — FERRIC CITRATE 420 MG: 210 TABLET, COATED ORAL at 12:29

## 2019-02-17 RX ADMIN — FERRIC CITRATE 420 MG: 210 TABLET, COATED ORAL at 10:02

## 2019-02-17 RX ADMIN — SERTRALINE 25 MG: 50 TABLET, FILM COATED ORAL at 10:00

## 2019-02-17 RX ADMIN — NIFEDIPINE 60 MG: 60 TABLET, FILM COATED, EXTENDED RELEASE ORAL at 17:28

## 2019-02-17 RX ADMIN — FERRIC CITRATE 420 MG: 210 TABLET, COATED ORAL at 17:28

## 2019-02-17 RX ADMIN — ASPIRIN 81 MG: 81 TABLET, COATED ORAL at 10:00

## 2019-02-17 RX ADMIN — ACETAMINOPHEN 650 MG: 325 TABLET ORAL at 19:52

## 2019-02-17 RX ADMIN — IBUPROFEN 400 MG: 400 TABLET ORAL at 22:43

## 2019-02-17 RX ADMIN — ROSUVASTATIN CALCIUM 10 MG: 10 TABLET, FILM COATED ORAL at 21:01

## 2019-02-17 RX ADMIN — IBUPROFEN 400 MG: 400 TABLET ORAL at 02:56

## 2019-02-17 RX ADMIN — DOXAZOSIN 4 MG: 2 TABLET ORAL at 21:00

## 2019-02-17 RX ADMIN — OLANZAPINE 2.5 MG: 2.5 TABLET, FILM COATED ORAL at 19:57

## 2019-02-17 RX ADMIN — ZOLPIDEM TARTRATE 5 MG: 5 TABLET ORAL at 22:00

## 2019-02-17 NOTE — PROGRESS NOTES
Problem: Depressed Mood (Adult/Pediatric)  Goal: *STG: Participates in treatment plan  Outcome: Progressing Towards Goal  Patient participates in treatment plan. Problem: Falls - Risk of  Goal: *Absence of Falls  Document Bello Fall Risk and appropriate interventions in the flowsheet. Outcome: Progressing Towards Goal  Fall Risk Interventions:  Patient is absent of falls. Patient transfers with walt lift. Patient is irritable at times, affect congruent, out on the unit, med and meal compliant.   Mobility Interventions: Assess mobility with egress test, Communicate number of staff needed for ambulation/transfer, Patient to call before getting OOB, Bed/chair exit alarm, Utilize walker, cane, or other assistive device    Mentation Interventions: Adequate sleep, hydration, pain control, Room close to nurse's station, More frequent rounding, Door open when patient unattended    Medication Interventions: Teach patient to arise slowly    Elimination Interventions: Patient to call for help with toileting needs, Urinal in reach    History of Falls Interventions: Door open when patient unattended, Room close to nurse's station

## 2019-02-17 NOTE — PROGRESS NOTES
1547 Problem: Depressed Mood (Adult/Pediatric)  Goal: *STG: Verbalizes anger, guilt, and other feelings in a constructive manor  Outcome: Progressing Towards Goal  Patient is received resting. Staff continue q15 checks and encourage pt to share feelings. 1600 Patient rings call bell for female staff to lift him OOB. Nurse is calling lift team to help. \"well the other girl, she lifted me by myself! \"  Nurse asked how pt transfers when not in hospital. \"someone always lifts me\"  Staff await lift team for safety. Pt angry, irritable, making angry comments to staff and difficult to redirect for safety. 1630 lift team on unit to help with transfer. Patient told staff to bring over the phone and continued making demands in loud statements to nurse. 1730 pt allowed some of his vitals to be taken. He ate 100% dinner. 200 \"I've left my wife alone in the woods. I've got to get out of this mother fucker\"  Patient makes a fist with his hands. Patient tells nurse that he is very worried. He is not aggressive thereafter. He voices frustration at his situation. Emotional support given. 1915 \"where's my ID and my 2 insurance cards because I'll need that when I leave tomorrow\"  Nurse told him those items were itemized with his admission belongings.

## 2019-02-17 NOTE — DIALYSIS
[facility]  [phone]    Vitals   Pre   Post   Assessment   Pre   Post     Temp  Temp: 98.1 °F (36.7 °C) (02/16/19 1913)  98.1 LOC  [id number] No change   HR   Pulse (Heart Rate): 63 (02/16/19 1913) 63 Lungs   cta  no change   B/P   BP: 130/68 (02/16/19 1913) 130/68 Cardiac   carlos  no change   Resp   Resp Rate: 18 (02/16/19 1913) 18 Skin   Dry intact  no change   Pain level  Pain Intensity 1: 0 (02/16/19 1557) 0 Edema  none     No change   Orders:    Duration:   Start:    3654 End:    1913 Total:   2.25   Dialyzer:   Dialyzer/Set Up Inspection: Revaclear (02/16/19 1658)   K Bath:   Dialysate K (mEq/L): 2 (02/16/19 1658)   Ca Bath:   Dialysate CA (mEq/L): 2.5 (02/16/19 1658)   Na/Bicarb:   Dialysate NA (mEq/L): 140 (02/16/19 1658)   Target Fluid Removal:   Goal/Amount of Fluid to Remove (mL): 3000 mL (02/16/19 1658)   Access     Type & Location:   Right chest tunneled hd cvc. Dressing cdi, dated for 2/13/19. No s&s of infection. +br and flushes with ease   Labs     Obtained/Reviewed   Critical Results Called   Date when labs were drawn-  Hgb-    HGB   Date Value Ref Range Status   02/16/2019 10.2 (L) 12.1 - 17.0 g/dL Final     K-    Potassium   Date Value Ref Range Status   02/16/2019 4.7 3.5 - 5.1 mmol/L Final     Ca-   Calcium   Date Value Ref Range Status   02/16/2019 8.9 8.5 - 10.1 MG/DL Final     Bun-   BUN   Date Value Ref Range Status   02/16/2019 31 (H) 6 - 20 MG/DL Final     Creat-   Creatinine   Date Value Ref Range Status   02/16/2019 3.62 (H) 0.70 - 1.30 MG/DL Final        Medications/ Blood Products Given     Name   Dose   Route and Time     none                Blood Volume Processed (BVP):    38 Net Fluid   Removed:  2000   Comments   Time Out Done: 1565  Primary Nurse Rpt Pre:mishel rn   Primary Nurse Rpt Post:mishel rn   Pt Education:procedural  Care Plan:cont current hd poc  Tx Summary:after timeout treatment was started.  I encountered issues with blood pump speed and art pressures, so bps was reduced to 350 which allowed for safe art pressures. The patient tolerated dialysis well, but at 1910 the patient expressed wishes to come off dialysis early. Primary nurse notified. Patient verbalized understanding of potential complications. At 1913 hemodialysis was stopped. Blood was rinsed back and line maint was performed per policy. [address]  Pt's [facility] clinic-[facility]  Consent signed - Informed Consent Verified: Yes (02/16/19 0093)  [facility] Consent - yes  Hepatitis Status- 2/9/19 ag neg con care  Machine #- Machine Number: [id number] (02/16/19 1658)  Telemetry status-none  Pre-dialysis wt. -  na

## 2019-02-17 NOTE — BH NOTES
PRN Medication Documentation    Specific patient behavior that led to need for PRN medication: c/o  Difficulty sleeping  Staff interventions attempted prior to PRN being given:adjusted room temp, promote sleep  PRN medication givenPO Ambien  Patient response/effectiveness of PRN medication: pt quietly lying in bed.

## 2019-02-17 NOTE — PROGRESS NOTES
Problem: Falls - Risk of  Goal: *Absence of Falls  Document Bello Fall Risk and appropriate interventions in the flowsheet. Outcome: Progressing Towards Goal  Fall Risk Interventions:  Mobility Interventions: Assess mobility with egress test, Communicate number of staff needed for ambulation/transfer, Patient to call before getting OOB, Bed/chair exit alarm, Utilize walker, cane, or other assistive device  In bed asleep with respirations noted as even and unlabored as chest was rising and falling. Will continue to monitor for safety and 15 minute checks throughout shift. Mentation Interventions: Adequate sleep, hydration, pain control, Room close to nurse's station, More frequent rounding, Door open when patient unattended    Medication Interventions: Teach patient to arise slowly, Bed/chair exit alarm    Elimination Interventions:  Toilet paper/wipes in reach, Patient to call for help with toileting needs    History of Falls Interventions: Door open when patient unattended, Room close to nurse's station

## 2019-02-17 NOTE — INTERDISCIPLINARY ROUNDS
Behavioral Health Interdisciplinary Rounds     Patient Name: Franky Clay  Age: 70 y.o. Room/Bed:  740/01  Primary Diagnosis: Depression   Admission Status: Voluntary     Readmission within 30 days: no  Power of  in place: no  Patient requires a blocked bed: no          Reason for blocked bed:     VTE Prophylaxis: No    Mobility needs/Fall risk: yes  Flu Vaccine : no   Nutritional Plan: no  Consults:          Labs/Testing due today?: no    Sleep hours: 5.0       Participation in Care/Groups:  yes  Medication Compliant?: Yes  PRNS (last 24 hours): Sleep Aid and Pain    Restraints (last 24 hours):  no     CIWA (range last 24 hours): CIWA-Ar Total: 0   COWS (range last 24 hours):      Alcohol screening (AUDIT) completed -   AUDIT Score: 0     If applicable, date SBIRT discussed in treatment team AND documented:   AUDIT Screen Score: AUDIT Score: 0      Document Brief Intervention (corresponds directly with the 5 A's, Ask, Advise, Assess, Assist, and Arrange): At- Risk Patients (Score 7-15 for women; 8-15 for men)  Discuss concern patient is drinking at unhealthy levels known to increase risk of alcohol-related health problems. Is Patient ready to commit to change? If No:   Encourage reflection   Discuss short term and long term health risks of consuming alcohol   Barriers to change   Reaffirm willingness to help / Educational materials provided  If Yes:   Set goal  [facility] provided    Harmful use or Dependence (Score 16 or greater)   Discuss short term and long term health risks of consuming alcohol   Recommendations   Negotiate drinking goal   Recommend addiction specialist/center   Arrange follow-up appointments.     Tobacco - patient is a smoker: Have You Used Tobacco in the Past 30 Days: Yes  Illegal Drugs use: Have You Used Any Illegal Substances Over the Past 12 Months: Yes    24 hour chart check complete: yes     Patient goal(s) for today:   Treatment team focus/goals:   Progress note     LOS:  4  Expected LOS:    Financial concerns/prescription coverage:    Date of last family contact:      Family requesting physician contact today:    Discharge plan:   Guns in the home:         Outpatient provider(s):     Participating treatment team members:  Jaylan Jackson, * (assigned SW),

## 2019-02-17 NOTE — BH NOTES
GROUP THERAPY PROGRESS NOTE    Bam Padron is participating in Relaxation time/ [employer] movie.      Group time: 30 minutes    Personal goal for participation: participate in movie group    Goal orientation: relaxation    Group therapy participation: active    Therapeutic interventions reviewed and discussed: relaxation/movie group    Impression of participation: present

## 2019-02-18 LAB
GLUCOSE BLD STRIP.AUTO-MCNC: 107 MG/DL (ref 65–100)
GLUCOSE BLD STRIP.AUTO-MCNC: 112 MG/DL (ref 65–100)
GLUCOSE BLD STRIP.AUTO-MCNC: 162 MG/DL (ref 65–100)
GLUCOSE BLD STRIP.AUTO-MCNC: 77 MG/DL (ref 65–100)
GLUCOSE BLD STRIP.AUTO-MCNC: 88 MG/DL (ref 65–100)
SERVICE CMNT-IMP: ABNORMAL
SERVICE CMNT-IMP: NORMAL
SERVICE CMNT-IMP: NORMAL

## 2019-02-18 PROCEDURE — 74011000250 HC RX REV CODE- 250: Performed by: PSYCHIATRY & NEUROLOGY

## 2019-02-18 PROCEDURE — 74011250637 HC RX REV CODE- 250/637: Performed by: PSYCHIATRY & NEUROLOGY

## 2019-02-18 PROCEDURE — 65220000003 HC RM SEMIPRIVATE PSYCH

## 2019-02-18 PROCEDURE — 82962 GLUCOSE BLOOD TEST: CPT

## 2019-02-18 PROCEDURE — 74011250637 HC RX REV CODE- 250/637: Performed by: HOSPITALIST

## 2019-02-18 PROCEDURE — 74011250637 HC RX REV CODE- 250/637: Performed by: FAMILY MEDICINE

## 2019-02-18 PROCEDURE — 74011250636 HC RX REV CODE- 250/636: Performed by: PSYCHIATRY & NEUROLOGY

## 2019-02-18 PROCEDURE — 74011250637 HC RX REV CODE- 250/637: Performed by: INTERNAL MEDICINE

## 2019-02-18 RX ORDER — IBUPROFEN 400 MG/1
800 TABLET ORAL
Status: DISCONTINUED | OUTPATIENT
Start: 2019-02-18 | End: 2019-03-11 | Stop reason: HOSPADM

## 2019-02-18 RX ADMIN — WATER 10 MG: 1 INJECTION INTRAMUSCULAR; INTRAVENOUS; SUBCUTANEOUS at 21:01

## 2019-02-18 RX ADMIN — ROSUVASTATIN CALCIUM 10 MG: 10 TABLET, FILM COATED ORAL at 21:31

## 2019-02-18 RX ADMIN — SERTRALINE 50 MG: 50 TABLET, FILM COATED ORAL at 09:24

## 2019-02-18 RX ADMIN — DOXAZOSIN 4 MG: 2 TABLET ORAL at 21:30

## 2019-02-18 RX ADMIN — NIFEDIPINE 60 MG: 60 TABLET, FILM COATED, EXTENDED RELEASE ORAL at 17:04

## 2019-02-18 RX ADMIN — IBUPROFEN 800 MG: 400 TABLET ORAL at 18:17

## 2019-02-18 RX ADMIN — ZOLPIDEM TARTRATE 5 MG: 5 TABLET ORAL at 21:31

## 2019-02-18 RX ADMIN — FERRIC CITRATE 420 MG: 210 TABLET, COATED ORAL at 17:40

## 2019-02-18 RX ADMIN — ACETAMINOPHEN 650 MG: 325 TABLET ORAL at 14:11

## 2019-02-18 RX ADMIN — OLANZAPINE 2.5 MG: 2.5 TABLET, FILM COATED ORAL at 17:39

## 2019-02-18 RX ADMIN — FERRIC CITRATE 420 MG: 210 TABLET, COATED ORAL at 12:23

## 2019-02-18 RX ADMIN — FERRIC CITRATE 420 MG: 210 TABLET, COATED ORAL at 09:24

## 2019-02-18 RX ADMIN — IBUPROFEN 400 MG: 400 TABLET ORAL at 09:48

## 2019-02-18 RX ADMIN — ASPIRIN 81 MG: 81 TABLET, COATED ORAL at 09:24

## 2019-02-18 NOTE — PROGRESS NOTES
Problem: Depressed Mood (Adult/Pediatric)  Goal: *STG: Participates in treatment plan  Outcome: Progressing Towards Goal  Received this morning resting in bed. Is alert and oriented. Thoughts are clear and organized. Able to verbalize his needs without difficulties and can be demanding,needing limits to be set. At times can get easily irritated and verbally inappropriate with staff when limits are set. Declined shower this am,but assisted with am cares. Has been out on the unit in the W/C going between his room and the day room. Voiced he felt,\"better\" and he was less depressed. Attended group this morning and has been medication and meal compliant. Staff to continue to provide a safe environment on the unit during hospitalization.

## 2019-02-18 NOTE — PROGRESS NOTES
Problem: Falls - Risk of  Goal: *Absence of Falls  Document Bello Fall Risk and appropriate interventions in the flowsheet. Outcome: Progressing Towards Goal  Fall Risk Interventions:  Mobility Interventions: Assess mobility with egress test    Mentation Interventions: Adequate sleep, hydration, pain control, More frequent rounding, Door open when patient unattended, Room close to nurse's station    Medication Interventions: Teach patient to arise slowly, Patient to call before getting OOB    Elimination Interventions:  Toilet paper/wipes in reach, Patient to call for help with toileting needs, Toileting schedule/hourly rounds    History of Falls Interventions: Door open when patient unattended

## 2019-02-18 NOTE — BH NOTES
PSYCHIATRIC PROGRESS NOTE         Patient Name  Catrachita Griggs   Date of Birth 1940   [facility] [id number]   Medical Record Number  [id number]      Age  70 y.o. PCP Polo Mendoza MD   Admit date:  2/13/2019    Room Number  740/01  @ Counts include 234 beds at the [facility]   Date of Service  2/17/2019           E & M PROGRESS NOTE: 15 mins         HISTORY       REASON FOR HOSPITALIZATION:  CC: \"Pt admitted under a voluntary basis for severe depression with suicidal ideations and  an inability to care for self. HISTORY OF PRESENT ILLNESS:    The patient, Catrachita Griggs, is a 70 y.o. BLACK OR  male with 62-year-old -American male with past medical  history of end-stage renal disease; on hemodialysis, sleep apnea, peripheral vascular disease, bilateral BKA, hypertension, hyperlipidemia, peripheral neuropathy, anemia,hepatitis C, arthritis, peptic ulcer disease, coronary artery disease, DVT, prior GIbleed, chronic diastolic CHF, prior suicidal ideation, depression, and noncompliance   transfered to [facility] from [facility] after pt reported feeling depressed nad suicidal Pt stated he lives in the [place] and he is unhappy about his living situation , he stated he had lots of Money before [place] HAS 13 CHILDREN but now he is broke and no one ask about him. Pt reports feeling hopeless, helpless, angry. These symptoms are of high severity. These symptoms are constant . Pt ha sh/o using marijuana occasionally. UDS: negative; BAL=0. .  2/15/19: Pt is irritable and refused zoloft and vitals today. Pt has dialysis yesterday. 2/16/19: Pt took zoloft but refuses some medical meds. Pt had dialysis on Saturday. Pt is demanding in the evenings. 2/17/19: Pt is demanding, irritable in the evenings, compliant with zoloft. Eating well. SIDE EFFECTS: (reviewed/updated 2/17/2019)  None reported or admitted to.   No noted toxicity with use of Depakote/Tegretol/lithium/Clozaril/TCAs ALLERGIES:(reviewed/updated 2/17/2019)  Allergies   Allergen Reactions    Tetracycline Hives      MEDICATIONS PRIOR TO ADMISSION:(reviewed/updated 2/17/2019)  Medications Prior to Admission   Medication Sig    NIFEdipine ER (ADALAT CC) 60 mg ER tablet Take 1 Tab by mouth daily. HOLD for HR<60    hydroCHLOROthiazide (HYDRODIURIL) 25 mg tablet Take 1 Tab by mouth daily.  sertraline (ZOLOFT) 25 mg tablet Take 1 Tab by mouth daily.  oxyCODONE-acetaminophen (PERCOCET 10)  mg per tablet Take 1 Tab by mouth every six (6) hours as needed for Pain.  traZODone (DESYREL) 50 mg tablet Take 50 mg by mouth nightly.  rosuvastatin (CRESTOR) 10 mg tablet Take 10 mg by mouth nightly.  aspirin delayed-release 81 mg tablet Take 1 Tab by mouth daily. PAST MEDICAL HISTORY: Past medical history from the initial psychiatric evaluation has been reviewed (reviewed/updated 2/17/2019) with no additional updates (I asked patient and no additional past medical history provided). Past Medical History:   Diagnosis Date    Arthritis     CAD (coronary artery disease) 2007    CKD (chronic kidney disease), stage III ([address]     DM type 2 causing renal disease ([address]     DVT (deep venous thrombosis) (HCC)     Hx of DVT:  Xarelto stopped due to GI bleed. S/P IVC filter.  Gait abnormality     GI bleed     Heart murmur     Hepatitis C     History of blood transfusion     Hypercholesterolemia     Hypertension 11/7/2014    Neuropathy     Non compliance w medication regimen     Peripheral vascular disease ([facility] 11/7/2014    A. S/P Right SFA POBA and tibial atherectomy (2/4/13).     PUD (peptic ulcer disease) 2007    Unspecified sleep apnea     never used cpap     Past Surgical History:   Procedure Laterality Date    ABDOMEN SURGERY PROC UNLISTED  2007    has approx 7-8\" midline incision on abdomen--\"had to open him up and clean him out after feeding tube clogged\"    HX GI  2007    feeding tube for approx 2 mo    VASCULAR SURGERY PROCEDURE UNLIST Right 1/22/2015    popliteal-tibial bypass      SOCIAL HISTORY: Social history from the initial psychiatric evaluation has been reviewed (reviewed/updated 2/17/2019) with no additional updates (I asked patient and no additional social history provided). Social History     Socioeconomic History    Marital status: SINGLE     Spouse name: Not on file    Number of children: Not on file    Years of education: Not on file    Highest education level: Not on file   Social Needs    Financial resource strain: Not on file    Food insecurity - worry: Not on file    Food insecurity - inability: Not on file    Transportation needs - medical: Not on file   Jo[place] needs - non-medical: Not on file   Occupational History    Not on file   Tobacco Use    Smoking status: Current Every Day Smoker     Packs/day: 0.50    Smokeless tobacco: Never Used   Substance and Sexual Activity    Alcohol use: No    Drug use: No     Comment: >20 years ago    Sexual activity: Not on file   Other Topics Concern     Service Not Asked    Blood Transfusions Not Asked    Caffeine Concern Not Asked    Occupational Exposure Not Asked    Hobby Hazards Not Asked    Sleep Concern Not Asked    Stress Concern Not Asked    Weight Concern Not Asked    Special Diet Not Asked    Back Care Not Asked    Exercise Not Asked    Bike Helmet Not Asked    Seat Belt Not Asked    Self-Exams Not Asked   Social History Narrative    78 year ols male admitted for depression and homelessness. Pt will be evicated from apartment due to non payment of bills. [facility] of 30 years [facility] [place] [facility]. \" Pt is a brittle diabetic, with treatment non compliance. He has bilarela lower extremity amputations. Patient has a long hx of substance abuse.       FAMILY HISTORY: Family history from the initial psychiatric evaluation has been reviewed (reviewed/updated 2/17/2019) with no additional updates (I asked patient and no additional family history provided). Family History   Problem Relation Age of Onset    Hypertension Father        REVIEW OF SYSTEMS: (reviewed/updated 2/17/2019)  Appetite:   Sleep: All other Review of Systems: Negative except          MENTAL STATUS EXAM & [address] (MSE):    MSE FINDINGS ARE WITHIN NORMAL LIMITS (WNL) UNLESS OTHERWISE STATED BELOW. ( ALL OF THE BELOW CATEGORIES OF THE MSE HAVE BEEN REVIEWED (reviewed 2/14/2019) AND UPDATED AS DEEMED APPROPRIATE )  General Presentation age appropriate and casually dressed, cooperative   Orientation oriented to time, place and person   Vital Signs  See below (reviewed 2/14/2019); Vital Signs (BP, Pulse, & Temp) are within normal limits if not listed below.    Gait and Station Stable/steady, no ataxia   Musculoskeletal System No extrapyramidal symptoms (EPS); no abnormal muscular movements or Tardive Dyskinesia (TD); muscle strength and tone are within normal limits   Language No aphasia or dysarthria   Speech:  soft   Thought Processes logical; slow rate of thoughts; fair abstract reasoning/computation   Thought Associations goal directed   Thought Content free of delusions   Suicidal Ideations intention   Homicidal Ideations no plan  and no intention   Mood:  anxious  and depressed   Affect:  constricted   Memory recent  intact   Memory remote:  intact   Concentration/Attention:  distractable   Fund of Knowledge average   Insight:  fair   Reliability fair   Judgment:  fair                                                                                                    VITALS:     Patient Vitals for the past 24 hrs:   Temp Pulse Resp BP SpO2   02/17/19 1749 -- 62 20 152/75 99 %   02/17/19 1728 -- 62 -- 152/75 --   02/17/19 1632 -- -- 16 -- --   02/17/19 1426 98.1 °F (36.7 °C) 69 16 126/68 100 %   02/16/19 2036 98.9 °F (37.2 °C) 63 16 163/80 --   02/16/19 1913 98.1 °F (36.7 °C) 63 18 130/68 -- Wt Readings from Last 3 Encounters:   02/14/19 73 kg (160 lb 15 oz)   02/13/19 73.4 kg (161 lb 12.8 oz)   01/15/19 81.6 kg (180 lb)     Temp Readings from Last 3 Encounters:   02/17/19 98.1 °F (36.7 °C)   02/13/19 98.2 °F (36.8 °C)   01/15/19 97 °F (36.1 °C)     BP Readings from Last 3 Encounters:   02/17/19 152/75   02/13/19 181/82   01/15/19 (!) 192/116     Pulse Readings from Last 3 Encounters:   02/17/19 62   02/13/19 (!) 48   01/15/19 75            DATA     LABORATORY DATA:(reviewed/updated 2/17/2019)  Recent Results (from the past 24 hour(s))   GLUCOSE, POC    Collection Time: 02/16/19  8:11 PM   Result Value Ref Range    Glucose (POC) 128 (H) 65 - 100 mg/dL    Performed by Nelly Caraballo, POC    Collection Time: 02/17/19  8:51 AM   Result Value Ref Range    Glucose (POC) 80 65 - 100 mg/dL    Performed by Santosh Moore, POC    Collection Time: 02/17/19 12:09 PM   Result Value Ref Range    Glucose (POC) 153 (H) 65 - 100 mg/dL    Performed by He Falk, POC    Collection Time: 02/17/19  4:32 PM   Result Value Ref Range    Glucose (POC) 100 65 - 100 mg/dL    Performed by Eusebio Cheng      No results found for: VALF2, VALAC, VALP, VALPR, DS6, CRBAM, CRBAMP, CARB2, XCRBAM  No results found for: LITHM   RADIOLOGY REPORTS:(reviewed/updated 2/17/2019)  Xr Chest Pa Lat    Result Date: 2/8/2019  EXAM: XR CHEST PA LAT INDICATION: Shortness of breath, Low back pain and abdominal pain for several days. End-stage renal disease on dialysis, missed dialysis one day ago. COMPARISON: Chest views on 5/20/2018 TECHNIQUE: 4 images of PA and lateral chest views FINDINGS: Right jugular dialysis catheter is new and terminates in the distal superior vena cava. Cardiac monitoring wires overlie the thorax. Borderline cardiac size is unchanged. Aortic arch is not enlarged. The pulmonary vasculature is within normal limits. The lungs and pleural spaces are clear.  The visualized bones and upper abdomen are age-appropriate. IMPRESSION: No acute process on chest x-ray. Right jugular dialysis catheter terminates in the distal superior vena cava. No pneumothorax. Ct Abd Pelv W Cont    Result Date: 2/8/2019  EXAM: CT ABD PELV W CONT INDICATION: Abdominal pain and low back pain for several days. End-stage renal disease on dialysis, missed dialysis yesterday. Normal white blood cell count. Normal liver enzymes. COMPARISON: None. CONTRAST: 100 mL of Isovue-370. TECHNIQUE: Following the uneventful intravenous administration of contrast, thin axial images were obtained through the abdomen and pelvis. Coronal and sagittal reconstructions were generated. Oral contrast was administered. CT dose reduction was achieved through use of a standardized protocol tailored for this examination and automatic exposure control for dose modulation. FINDINGS: LUNG BASES: No pneumonia. Mild dependent atelectasis. INCIDENTALLY IMAGED HEART AND MEDIASTINUM: Borderline cardiac size. No pericardial effusion. LIVER: Subcentimeter liver lesions measure up to 9 mm in segment 2 of the liver. Surface is smooth. Streak artifact from bilateral upper extremities. GALLBLADDER: Gallstones are in the body and neck. No evidence of cholecystitis. CBD is not dilated. SPLEEN: Normal size and enhancement. PANCREAS: No mass or ductal dilatation. ADRENALS: Unremarkable. KIDNEYS: No mass, calculus, or hydronephrosis. STOMACH: Partial distention. SMALL BOWEL: No dilatation or wall thickening. COLON: Moderate colonic fecal burden. No mural thickening. APPENDIX: Unremarkable. PERITONEUM: No ascites or pneumoperitoneum. RETROPERITONEUM: IVC filter is in good position. Aorta is atherosclerotic without aneurysm. Mild atherosclerotic stenosis of the celiac artery. No lymphadenopathy. REPRODUCTIVE ORGANS: Mild prostatomegaly measures 6 cm and extends into the base of the urinary bladder. URINARY BLADDER: No mass or calculus.  BONES: Moderate bilateral hip osteoarthritis. ADDITIONAL COMMENTS: N/A     IMPRESSION: 1. No acute process on CT. 2. Cholelithiasis. No cholecystitis. 3. Subcentimeter segment 2 liver lesions cannot be fully characterized on this examination. 4. Moderate colonic fecal burden may represent constipation. No bowel obstruction.          MEDICATIONS     ALL MEDICATIONS:   Current Facility-Administered Medications   Medication Dose Route Frequency    aspirin delayed-release tablet 81 mg  81 mg Oral DAILY    sertraline (ZOLOFT) tablet 25 mg  25 mg Oral DAILY    rosuvastatin (CRESTOR) tablet 10 mg  10 mg Oral QHS    epoetin rosie-epbx (RETACRIT) injection 10,000 Units  10,000 Units SubCUTAneous DIALYSIS TUE, THU & SAT    ferric citrate (AURYXIA) tablet 420 mg  420 mg Oral TID WITH MEALS    NIFEdipine ER (PROCARDIA XL) tablet 60 mg  60 mg Oral Q24H    doxazosin (CARDURA) tablet 4 mg  4 mg Oral QHS    OLANZapine (ZyPREXA) tablet 2.5 mg  2.5 mg Oral Q6H PRN    ziprasidone (GEODON) 10 mg in sterile water (preservative free) 0.5 mL injection  10 mg IntraMUSCular BID PRN    benztropine (COGENTIN) tablet 1 mg  1 mg Oral BID PRN    benztropine (COGENTIN) injection 1 mg  1 mg IntraMUSCular BID PRN    zolpidem (AMBIEN) tablet 5 mg  5 mg Oral QHS PRN    acetaminophen (TYLENOL) tablet 650 mg  650 mg Oral Q4H PRN    ibuprofen (MOTRIN) tablet 400 mg  400 mg Oral Q8H PRN    magnesium hydroxide (MILK OF MAGNESIA) 400 mg/5 mL oral suspension 30 mL  30 mL Oral DAILY PRN    nicotine (NICODERM CQ) 21 mg/24 hr patch 1 Patch  1 Patch TransDERmal DAILY PRN    glucose chewable tablet 16 g  4 Tab Oral PRN    dextrose (D50W) injection syrg 12.5-25 g  25-50 mL IntraVENous PRN    glucagon (GLUCAGEN) injection 1 mg  1 mg IntraMUSCular PRN    insulin lispro (HUMALOG) injection   SubCUTAneous AC&HS      SCHEDULED MEDICATIONS:   Current Facility-Administered Medications   Medication Dose Route Frequency    aspirin delayed-release tablet 81 mg  81 mg Oral DAILY    sertraline (ZOLOFT) tablet 25 mg  25 mg Oral DAILY    rosuvastatin (CRESTOR) tablet 10 mg  10 mg Oral QHS    epoetin rosie-epbx (RETACRIT) injection 10,000 Units  10,000 Units SubCUTAneous DIALYSIS TUE, THU & SAT    ferric citrate (AURYXIA) tablet 420 mg  420 mg Oral TID WITH MEALS    NIFEdipine ER (PROCARDIA XL) tablet 60 mg  60 mg Oral Q24H    doxazosin (CARDURA) tablet 4 mg  4 mg Oral QHS    insulin lispro (HUMALOG) injection   SubCUTAneous AC&HS          ASSESSMENT & PLAN     The patient, Carlos Manuel King, is a 70 y.o.  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  Plan for hemodialysis 3 days a week.  If it is not controlled  with the same, provide additional antihypertensive meds.  Monitor blood pressure.    End-stage renal disease, on hemodialysis.  Schedule treatment for hemodialysis on  Tuesdays, Thursdays, and Saturdays. .   Type 2 diabetes mellitus.  Place on Humalog insulin correction coverage schedule,  Accu-Chek, and check hemoglobin A1c level.  The patient will be on a diabetic/renal  Diet. Nephrology consult   2/15/19: continue meds/milue, encourage compliance. 2/16/19: continue meds/milue  2/17/19: continue meds/milue increaisng zoloft 50 mg po daily    I will continue to monitor blood levels (Depakote, Tegretol, lithium, clozapine---a drug with a narrow therapeutic index= NTI) and associated labs for drug therapy implemented that require intense monitoring for toxicity as deemed appropriate based on current medication side effects and pharmacodynamically determined drug 1/2 lives. In summary, Carlos Manuel King, is a 70 y.o.  male who presents with a severe exacerbation of the principal diagnosis of Depression  Patient's condition is worsening/not improving/not stable improving.   Patient requires continued inpatient hospitalization for further stabilization, safety monitoring and medication management. I will continue to coordinate the provision of individual, milieu, occupational, group, and substance abuse therapies to address target symptoms/diagnoses as deemed appropriate for the individual patient. A coordinated, multidisplinary treatment team round was conducted with the patient (this team consists of the nurse, psychiatric unit pharmcist,  and writer). Complete current electronic health record for patient has been reviewed today including consultant notes, ancillary staff notes, nurses and psychiatric tech notes. uicide risk assessment completed and patient deemed to be of low risk for suicide at this time. The following regarding medications was addressed during rounds with patient:   the risks and benefits of the proposed medication. The patient was given the opportunity to ask questions. Informed consent given to the use of the above medications. Will continue to adjust psychiatric and non-psychiatric medications (see above \"medication\" section and orders section for details) as deemed appropriate & based upon diagnoses and response to treatment. I will continue to order blood tests/labs and diagnostic tests as deemed appropriate and review results as they become available (see orders for details and above listed lab/test results). I will order psychiatric records from previous [facility] to further elucidate the nature of patient's psychopathology and review once available. I will gather additional collateral information from friends, family and o/p treatment team to further elucidate the nature of patient's psychopathology and baselline level of psychiatric functioning.          I certify that this patient's inpatient psychiatric hospital services furnished since the previous certification were, and continue to be, required for treatment that could reasonably be expected to improve the patient's condition, or for diagnostic study, and that the patient continues to need, on a daily basis, active treatment furnished directly by or requiring the supervision of inpatient psychiatric facility personnel. In addition, the hospital records show that services furnished were intensive treatment services, admission or related services, or equivalent services.     EXPECTED DISCHARGE DATE/DAY: TBD     DISPOSITION: Home       Signed By:   Faustino Nelson MD  2/17/2019

## 2019-02-18 NOTE — PROGRESS NOTES
Nephrology Progress Note  Wilmer Lopez  Date of Admission : 2/13/2019    CC:  Follow up for ESRD       Assessment and Plan     ESRD on HD:  - HD TTS at [facility]  - HD tomorrow as scheduled    HTN:  - cont current meds    Anemia of CKD:  - FANNY with dialysis    Secondary HPT:  - cont auryxia    Diastolic HF    PAD s/p b/l BKAs    DM2:  - on insulin    Depression:  - per psychiatry       Interval History:  Seen and examined. In group therapy. Feeling ok. Cut his treatment time short on Saturday per pt request.  He is stable today. Current Medications: all current  Medications have been eviewed in EPIC  Review of Systems: Pertinent items are noted in HPI. Objective:  Vitals:    Vitals:    02/17/19 1749 02/17/19 1943 02/17/19 2100 02/18/19 0748   BP: 152/75 166/76  152/79   Pulse: 62 (!) 53 62 (!) 49   Resp: 20 18 18   Temp:  98 °F (36.7 °C)  98.1 °F (36.7 °C)   SpO2: 99% 100%  100%   Weight:         Intake and Output:  No intake/output data recorded. 02/16 1901 - 02/18 0700  In: -   Out: 2000     Physical Examination:  General: NAD,Conversant   Neck:  Supple, no mass  Resp:  Lungs CTA B/L, no wheezing , normal respiratory effort  CV:  RRR,  no murmur or rub, no thigh edema, b/l BKAs  GI:  Soft, NT, + Bowel sounds, no hepatosplenomegaly  Neurologic:  Non focal  Psych:             Depressed, flat affect  Skin:  No Rash  Access:           [facility] c/d/i    []    High complexity decision making was performed  []    Patient is at high-risk of decompensation with multiple organ involvement    Lab Data Personally Reviewed: I have reviewed all the pertinent labs, microbiology data and radiology studies during assessment.     Recent Labs     02/16/19  0637      K 4.7      CO2 24   GLU 74   BUN 31*   CREA 3.62*   CA 8.9   PHOS 3.7   ALB 3.0*     Recent Labs     02/16/19  0637   WBC 4.1   HGB 10.2*   HCT 32.7*        No results found for: SDES  Lab Results   Component Value Date/Time Culture result: MRSA NOT PRESENT 01/24/2018 10:30 AM    Culture result:  01/24/2018 10:30 AM         Screening of patient nares for MRSA is for surveillance purposes and, if positive, to facilitate isolation considerations in high risk settings. It is not intended for automatic decolonization interventions per se as regimens are not sufficiently effective to warrant routine use. Culture result: MRSA NOT PRESENT 01/19/2015 10:44 AM    Culture result:  01/19/2015 10:44 AM         Screening of patient nares for MRSA is for surveillance purposes and, if positive, to facilitate isolation considerations in high risk settings. It is not intended for automatic decolonization interventions per se as regimens are not sufficiently effective to warrant routine use. Recent Results (from the past 24 hour(s))   GLUCOSE, POC    Collection Time: 02/17/19 12:09 PM   Result Value Ref Range    Glucose (POC) 153 (H) 65 - 100 mg/dL    Performed by Joce Johnson    GLUCOSE, POC    Collection Time: 02/17/19  4:32 PM   Result Value Ref Range    Glucose (POC) 100 65 - 100 mg/dL    Performed by Thania Castro    GLUCOSE, POC    Collection Time: 02/17/19  8:09 PM   Result Value Ref Range    Glucose (POC) 85 65 - 100 mg/dL    Performed by Tarun Domínguez [place], POC    Collection Time: 02/18/19  9:08 AM   Result Value Ref Range    Glucose (POC) 88 65 - 100 mg/dL    Performed by Froylan Eller MD  [address]  Phone - [phone]   Fax - [phone]  www. [facility]

## 2019-02-18 NOTE — BH NOTES
GROUP THERAPY PROGRESS NOTE    Bradley De La Paz participated in a morning Process Group on the Geriatric Unit, with a focus identifying feelings, planning for the day, and singing. Group time: 45 minutes. Personal goal for participation: To increase the capacity to shift ones mood, prepare for the day, and share in group singing. Goal orientation: The patient will be able to prepare for the day through group singing. Group therapy participation: When prompted, this patient partially participated in the group. Therapeutic interventions reviewed and discussed: The group members were asked to introduce themselves by first names and participate in group singing as a way to increase their oxygen and blood flow and begin their day on a positive note. They were also asked to join in singing several songs. Impression of participation: The patient said he is known as either Jose or Indra Lanier and that he has been in this hospital before. He was alert, generally oriented, and focused on completing his breakfast. He expressed no current SI/HI and displayed no overt psychosis. He smiled and sang along with He's Got the Whole Word in His Hands. His affect was non-dysphoric and his mood was mildly euphoric. He displayed several smiles, especially during the song mentioned above. This was the patient's first process group with the undersigned in this hospitalization.

## 2019-02-18 NOTE — BH NOTES
PRN Medication Documentation  @ 5812  Specific patient behavior that led to need for PRN medication: knee's aching, 10 on numeric scale of 1-10  Staff interventions attempted prior to PRN being given: distraction  PRN medication given: 400 mg Motrin PO  Patient response/effectiveness of PRN medication: pending    @ [phone] patient states that his pain is a six. Wants to know why we can't give anything stronger. Explained that he would need to speak with the doctor regarding that. Patient had just had treatment team fifteen minutes prior, when asked by doctor if he needed anything else he stated he just wanted to leave. Did not request stronger medication at that time. Patient became belligerent and swearing at writer when after being asked three times to change channel to a more appropriate television show bh staff attempted to change channel. HYPOGLYCEMIC EPISODE DOCUMENTATION    Patient with hypoglycemic episode(s) at 1208(time) on 02/18/2019(date). BG value(s) pre-treatment 68    Was patient symptomatic?  [] yes, [x] no  Patient was treated with the following rescue medications/treatments: [] D50                [] Glucose tablets                [] Glucagon                [] 4oz juice                [x] 6oz reg soda                [] 8oz low fat milk  BG value post-treatment: 112  Once BG treated and value greater than 80mg/dl, pt was provided with the following:  [] snack  [x] meal  Name of MD notified:Beatriz  The following orders were received: continue to follow protocol

## 2019-02-18 NOTE — BH NOTES
PRN Medication Documentation    1197 Specific patient behavior that led to need for PRN medication: pt complains of severe pain of BKA/phantom pain. He is demanding percocet which nurse has contacted MD about. Patient irritable and angry with staff  Staff interventions attempted prior to PRN being given: decrease stimuli, give emotional support, dinner given, fluids given, encourage pt to share feelings  PRN medication given: motrin  Patient response/effectiveness of PRN medication: to be reassessed    1910 pt states the stronger motrin was helpful for his BKA phantom pain.

## 2019-02-18 NOTE — BH NOTES
PSYCHIATRIC PROGRESS NOTE         Patient Name  Gustavo Castro   Date of Birth 1940   [facility] [id number]   Medical Record Number  [id number]      Age  70 y.o. PCP Samaria Flores MD   Admit date:  2/13/2019    Room Number  740/01  @ [facility]   Date of Service  2/18/2019           E & M PROGRESS NOTE: 15 mins         HISTORY       REASON FOR HOSPITALIZATION:  CC: \"Pt admitted under a voluntary basis for severe depression with suicidal ideations and  an inability to care for self. HISTORY OF PRESENT ILLNESS:    The patient, Gustavo Castro, is a 70 y.o. BLACK OR  male with 62-year-old -American male with past medical  history of end-stage renal disease; on hemodialysis, sleep apnea, peripheral vascular disease, bilateral BKA, hypertension, hyperlipidemia, peripheral neuropathy, anemia,hepatitis C, arthritis, peptic ulcer disease, coronary artery disease, DVT, prior GIbleed, chronic diastolic CHF, prior suicidal ideation, depression, and noncompliance   transfered to [facility] from [facility] after pt reported feeling depressed nad suicidal Pt stated he lives in the [place] and he is unhappy about his living situation , he stated he had lots of Money before [place] HAS 13 CHILDREN but now he is broke and no one ask about him. Pt reports feeling hopeless, helpless, angry. These symptoms are of high severity. These symptoms are constant . Pt ha sh/o using marijuana occasionally. UDS: negative; BAL=0. .  2/15/19: Pt is irritable and refused zoloft and vitals today. Pt has dialysis yesterday. 2/16/19: Pt took zoloft but refuses some medical meds. Pt had dialysis on Saturday. Pt is demanding in the evenings. 2/17/19: Pt is demanding, irritable in the evenings, compliant with zoloft. Eating well. 2/18/19: Pt is demanding and manipulative ta times but taking zoloft. Pt is eating well.       SIDE EFFECTS: (reviewed/updated 2/18/2019)  None reported or admitted to. No noted toxicity with use of Depakote/Tegretol/lithium/Clozaril/TCAs   ALLERGIES:(reviewed/updated 2/18/2019)  Allergies   Allergen Reactions    Tetracycline Hives      MEDICATIONS PRIOR TO ADMISSION:(reviewed/updated 2/18/2019)  Medications Prior to Admission   Medication Sig    NIFEdipine ER (ADALAT CC) 60 mg ER tablet Take 1 Tab by mouth daily. HOLD for HR<60    hydroCHLOROthiazide (HYDRODIURIL) 25 mg tablet Take 1 Tab by mouth daily.  sertraline (ZOLOFT) 25 mg tablet Take 1 Tab by mouth daily.  oxyCODONE-acetaminophen (PERCOCET 10)  mg per tablet Take 1 Tab by mouth every six (6) hours as needed for Pain.  traZODone (DESYREL) 50 mg tablet Take 50 mg by mouth nightly.  rosuvastatin (CRESTOR) 10 mg tablet Take 10 mg by mouth nightly.  aspirin delayed-release 81 mg tablet Take 1 Tab by mouth daily. PAST MEDICAL HISTORY: Past medical history from the initial psychiatric evaluation has been reviewed (reviewed/updated 2/18/2019) with no additional updates (I asked patient and no additional past medical history provided). Past Medical History:   Diagnosis Date    Arthritis     CAD (coronary artery disease) 2007    CKD (chronic kidney disease), stage III ([address]     DM type 2 causing renal disease ([address]     DVT (deep venous thrombosis) ([facility]     Hx of DVT:  Xarelto stopped due to GI bleed. S/P IVC filter.  Gait abnormality     GI bleed     Heart murmur     Hepatitis C     History of blood transfusion     Hypercholesterolemia     Hypertension 11/7/2014    Neuropathy     Non compliance w medication regimen     Peripheral vascular disease [facility] 11/7/2014    A. S/P Right SFA POBA and tibial atherectomy (2/4/13).     PUD (peptic ulcer disease) 2007    Unspecified sleep apnea     never used cpap     Past Surgical History:   Procedure Laterality Date    ABDOMEN SURGERY PROC UNLISTED  2007    has approx 7-8\" midline incision on abdomen--\"had to open him up and clean him out after feeding tube clogged\"    HX GI  2007    feeding tube for approx 2 mo    VASCULAR SURGERY PROCEDURE UNLIST Right 1/22/2015    popliteal-tibial bypass      SOCIAL HISTORY: Social history from the initial psychiatric evaluation has been reviewed (reviewed/updated 2/18/2019) with no additional updates (I asked patient and no additional social history provided). Social History     Socioeconomic History    Marital status: SINGLE     Spouse name: Not on file    Number of children: Not on file    Years of education: Not on file    Highest education level: Not on file   Social Needs    Financial resource strain: Not on file    Food insecurity - worry: Not on file    Food insecurity - inability: Not on file    Transportation needs - medical: Not on file   Flocasts needs - non-medical: Not on file   Occupational History    Not on file   Tobacco Use    Smoking status: Current Every Day Smoker     Packs/day: 0.50    Smokeless tobacco: Never Used   Substance and Sexual Activity    Alcohol use: No    Drug use: No     Comment: >20 years ago    Sexual activity: Not on file   Other Topics Concern     Service Not Asked    Blood Transfusions Not Asked    Caffeine Concern Not Asked    Occupational Exposure Not Asked    Hobby Hazards Not Asked    Sleep Concern Not Asked    Stress Concern Not Asked    Weight Concern Not Asked    Special Diet Not Asked    Back Care Not Asked    Exercise Not Asked    Bike Helmet Not Asked    Seat Belt Not Asked    Self-Exams Not Asked   Social History Narrative    78 year ols male admitted for depression and homelessness. Pt will be evicated from apartment due to non payment of bills. [facility] of 30 years [facility] [place] [facility]. \" Pt is a brittle diabetic, with treatment non compliance. He has bilarela lower extremity amputations. Patient has a long hx of substance abuse.       FAMILY HISTORY: Family history from the initial psychiatric evaluation has been reviewed (reviewed/updated 2/18/2019) with no additional updates (I asked patient and no additional family history provided). Family History   Problem Relation Age of Onset    Hypertension Father        REVIEW OF SYSTEMS: (reviewed/updated 2/18/2019)  Appetite:   Sleep: All other Review of Systems: Negative except          MENTAL STATUS EXAM & [address] (MSE):    MSE FINDINGS ARE WITHIN NORMAL LIMITS (WNL) UNLESS OTHERWISE STATED BELOW. ( ALL OF THE BELOW CATEGORIES OF THE MSE HAVE BEEN REVIEWED (reviewed 2/14/2019) AND UPDATED AS DEEMED APPROPRIATE )  General Presentation age appropriate and casually dressed, cooperative   Orientation oriented to time, place and person   Vital Signs  See below (reviewed 2/14/2019); Vital Signs (BP, Pulse, & Temp) are within normal limits if not listed below.    Gait and Station Stable/steady, no ataxia   Musculoskeletal System No extrapyramidal symptoms (EPS); no abnormal muscular movements or Tardive Dyskinesia (TD); muscle strength and tone are within normal limits   Language No aphasia or dysarthria   Speech:  soft   Thought Processes logical; slow rate of thoughts; fair abstract reasoning/computation   Thought Associations goal directed   Thought Content free of delusions   Suicidal Ideations intention   Homicidal Ideations no plan  and no intention   Mood:  anxious  and depressed   Affect:  constricted   Memory recent  intact   Memory remote:  intact   Concentration/Attention:  distractable   Fund of Knowledge average   Insight:  fair   Reliability fair   Judgment:  fair                                                                                                    VITALS:     Patient Vitals for the past 24 hrs:   Temp Pulse Resp BP SpO2   02/18/19 0748 98.1 °F (36.7 °C) (!) 49 18 152/79 100 %   02/17/19 2100 -- 62 -- -- --   02/17/19 1943 98 °F (36.7 °C) (!) 53 18 166/76 100 %   02/17/19 1749 -- 62 20 152/75 99 %   02/17/19 1728 -- 62 -- 152/75 --   02/17/19 1632 -- -- 16 -- --   02/17/19 1426 98.1 °F (36.7 °C) 69 16 126/68 100 %     Wt Readings from Last 3 Encounters:   02/14/19 73 kg (160 lb 15 oz)   02/13/19 73.4 kg (161 lb 12.8 oz)   01/15/19 81.6 kg (180 lb)     Temp Readings from Last 3 Encounters:   02/18/19 98.1 °F (36.7 °C)   02/13/19 98.2 °F (36.8 °C)   01/15/19 97 °F (36.1 °C)     BP Readings from Last 3 Encounters:   02/18/19 152/79   02/13/19 181/82   01/15/19 (!) 192/116     Pulse Readings from Last 3 Encounters:   02/18/19 (!) 49   02/13/19 (!) 48   01/15/19 75            DATA     LABORATORY DATA:(reviewed/updated 2/18/2019)  Recent Results (from the past 24 hour(s))   GLUCOSE, POC    Collection Time: 02/17/19 12:09 PM   Result Value Ref Range    Glucose (POC) 153 (H) 65 - 100 mg/dL    Performed by Angela Khan    GLUCOSE, POC    Collection Time: 02/17/19  4:32 PM   Result Value Ref Range    Glucose (POC) 100 65 - 100 mg/dL    Performed by [address] [place] [address], POC    Collection Time: 02/17/19  8:09 PM   Result Value Ref Range    Glucose (POC) 85 65 - 100 mg/dL    Performed by Dayo Franks (Clarinda Sella) [facility], POC    Collection Time: 02/18/19  9:08 AM   Result Value Ref Range    Glucose (POC) 88 65 - 100 mg/dL    Performed by [employer]      No results found for: VALF2, VALAC, VALP, VALPR, DS6, CRBAM, CRBAMP, CARB2, XCRBAM  No results found for: LITHM   RADIOLOGY REPORTS:(reviewed/updated 2/18/2019)  Xr Chest Pa Lat    Result Date: 2/8/2019  EXAM: XR CHEST PA LAT INDICATION: Shortness of breath, Low back pain and abdominal pain for several days. End-stage renal disease on dialysis, missed dialysis one day ago. COMPARISON: Chest views on 5/20/2018 TECHNIQUE: 4 images of PA and lateral chest views FINDINGS: Right jugular dialysis catheter is new and terminates in the distal superior vena cava. Cardiac monitoring wires overlie the thorax. Borderline cardiac size is unchanged.  Aortic arch is not enlarged. The pulmonary vasculature is within normal limits. The lungs and pleural spaces are clear. The visualized bones and upper abdomen are age-appropriate. IMPRESSION: No acute process on chest x-ray. Right jugular dialysis catheter terminates in the distal superior vena cava. No pneumothorax. Ct Abd Pelv W Cont    Result Date: 2/8/2019  EXAM: CT ABD PELV W CONT INDICATION: Abdominal pain and low back pain for several days. End-stage renal disease on dialysis, missed dialysis yesterday. Normal white blood cell count. Normal liver enzymes. COMPARISON: None. CONTRAST: 100 mL of Isovue-370. TECHNIQUE: Following the uneventful intravenous administration of contrast, thin axial images were obtained through the abdomen and pelvis. Coronal and sagittal reconstructions were generated. Oral contrast was administered. CT dose reduction was achieved through use of a standardized protocol tailored for this examination and automatic exposure control for dose modulation. FINDINGS: LUNG BASES: No pneumonia. Mild dependent atelectasis. INCIDENTALLY IMAGED HEART AND MEDIASTINUM: Borderline cardiac size. No pericardial effusion. LIVER: Subcentimeter liver lesions measure up to 9 mm in segment 2 of the liver. Surface is smooth. Streak artifact from bilateral upper extremities. GALLBLADDER: Gallstones are in the body and neck. No evidence of cholecystitis. CBD is not dilated. SPLEEN: Normal size and enhancement. PANCREAS: No mass or ductal dilatation. ADRENALS: Unremarkable. KIDNEYS: No mass, calculus, or hydronephrosis. STOMACH: Partial distention. SMALL BOWEL: No dilatation or wall thickening. COLON: Moderate colonic fecal burden. No mural thickening. APPENDIX: Unremarkable. PERITONEUM: No ascites or pneumoperitoneum. RETROPERITONEUM: IVC filter is in good position. Aorta is atherosclerotic without aneurysm. Mild atherosclerotic stenosis of the celiac artery. No lymphadenopathy.  REPRODUCTIVE ORGANS: Mild prostatomegaly measures 6 cm and extends into the base of the urinary bladder. URINARY BLADDER: No mass or calculus. BONES: Moderate bilateral hip osteoarthritis. ADDITIONAL COMMENTS: N/A     IMPRESSION: 1. No acute process on CT. 2. Cholelithiasis. No cholecystitis. 3. Subcentimeter segment 2 liver lesions cannot be fully characterized on this examination. 4. Moderate colonic fecal burden may represent constipation. No bowel obstruction.          MEDICATIONS     ALL MEDICATIONS:   Current Facility-Administered Medications   Medication Dose Route Frequency    sertraline (ZOLOFT) tablet 50 mg  50 mg Oral DAILY    aspirin delayed-release tablet 81 mg  81 mg Oral DAILY    rosuvastatin (CRESTOR) tablet 10 mg  10 mg Oral QHS    epoetin rosie-epbx (RETACRIT) injection 10,000 Units  10,000 Units SubCUTAneous DIALYSIS TUE, THU & SAT    ferric citrate (AURYXIA) tablet 420 mg  420 mg Oral TID WITH MEALS    NIFEdipine ER (PROCARDIA XL) tablet 60 mg  60 mg Oral Q24H    doxazosin (CARDURA) tablet 4 mg  4 mg Oral QHS    OLANZapine (ZyPREXA) tablet 2.5 mg  2.5 mg Oral Q6H PRN    ziprasidone (GEODON) 10 mg in sterile water (preservative free) 0.5 mL injection  10 mg IntraMUSCular BID PRN    benztropine (COGENTIN) tablet 1 mg  1 mg Oral BID PRN    benztropine (COGENTIN) injection 1 mg  1 mg IntraMUSCular BID PRN    zolpidem (AMBIEN) tablet 5 mg  5 mg Oral QHS PRN    acetaminophen (TYLENOL) tablet 650 mg  650 mg Oral Q4H PRN    ibuprofen (MOTRIN) tablet 400 mg  400 mg Oral Q8H PRN    magnesium hydroxide (MILK OF MAGNESIA) 400 mg/5 mL oral suspension 30 mL  30 mL Oral DAILY PRN    nicotine (NICODERM CQ) 21 mg/24 hr patch 1 Patch  1 Patch TransDERmal DAILY PRN    glucose chewable tablet 16 g  4 Tab Oral PRN    dextrose (D50W) injection syrg 12.5-25 g  25-50 mL IntraVENous PRN    glucagon (GLUCAGEN) injection 1 mg  1 mg IntraMUSCular PRN    insulin lispro (HUMALOG) injection   SubCUTAneous AC&HS      SCHEDULED MEDICATIONS:   Current Facility-Administered Medications   Medication Dose Route Frequency    sertraline (ZOLOFT) tablet 50 mg  50 mg Oral DAILY    aspirin delayed-release tablet 81 mg  81 mg Oral DAILY    rosuvastatin (CRESTOR) tablet 10 mg  10 mg Oral QHS    epoetin rosie-epbx (RETACRIT) injection 10,000 Units  10,000 Units SubCUTAneous DIALYSIS TUE, THU & SAT    ferric citrate (AURYXIA) tablet 420 mg  420 mg Oral TID WITH MEALS    NIFEdipine ER (PROCARDIA XL) tablet 60 mg  60 mg Oral Q24H    doxazosin (CARDURA) tablet 4 mg  4 mg Oral QHS    insulin lispro (HUMALOG) injection   SubCUTAneous AC&HS          ASSESSMENT & PLAN     The patient, Micheal Cadet, is a 70 y.o.  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  Plan for hemodialysis 3 days a week.  If it is not controlled  with the same, provide additional antihypertensive meds.  Monitor blood pressure.    End-stage renal disease, on hemodialysis.  Schedule treatment for hemodialysis on  Tuesdays, Thursdays, and Saturdays. .   Type 2 diabetes mellitus.  Place on Humalog insulin correction coverage schedule,  Accu-Chek, and check hemoglobin A1c level.  The patient will be on a diabetic/renal  Diet. Nephrology consult   2/15/19: continue meds/milue, encourage compliance. 2/16/19: continue meds/milue  2/17/19: continue meds/milue increaisng zoloft 50 mg po daily  2/18/19: continue meds/milue  I will continue to monitor blood levels (Depakote, Tegretol, lithium, clozapine---a drug with a narrow therapeutic index= NTI) and associated labs for drug therapy implemented that require intense monitoring for toxicity as deemed appropriate based on current medication side effects and pharmacodynamically determined drug 1/2 lives.     In summary, Micheal Cadet, is a 70 y.o.  male who presents with a severe exacerbation of the principal diagnosis of Depression  Patient's condition is worsening/not improving/not stable improving. Patient requires continued inpatient hospitalization for further stabilization, safety monitoring and medication management. I will continue to coordinate the provision of individual, milieu, occupational, group, and substance abuse therapies to address target symptoms/diagnoses as deemed appropriate for the individual patient. A coordinated, multidisplinary treatment team round was conducted with the patient (this team consists of the nurse, psychiatric unit pharmcist,  and writer). Complete current electronic health record for patient has been reviewed today including consultant notes, ancillary staff notes, nurses and psychiatric tech notes. uicide risk assessment completed and patient deemed to be of low risk for suicide at this time. The following regarding medications was addressed during rounds with patient:   the risks and benefits of the proposed medication. The patient was given the opportunity to ask questions. Informed consent given to the use of the above medications. Will continue to adjust psychiatric and non-psychiatric medications (see above \"medication\" section and orders section for details) as deemed appropriate & based upon diagnoses and response to treatment. I will continue to order blood tests/labs and diagnostic tests as deemed appropriate and review results as they become available (see orders for details and above listed lab/test results). I will order psychiatric records from previous [facility] hospitals to further elucidate the nature of patient's psychopathology and review once available. I will gather additional collateral information from friends, family and o/p treatment team to further elucidate the nature of patient's psychopathology and baselline level of psychiatric functioning.          I certify that this patient's inpatient psychiatric hospital services furnished since the previous certification were, and continue to be, required for treatment that could reasonably be expected to improve the patient's condition, or for diagnostic study, and that the patient continues to need, on a daily basis, active treatment furnished directly by or requiring the supervision of inpatient psychiatric facility personnel. In addition, the hospital records show that services furnished were intensive treatment services, admission or related services, or equivalent services.     EXPECTED DISCHARGE DATE/DAY: TBD     DISPOSITION: Home       Signed By:   Salome Carrasco MD  2/18/2019

## 2019-02-18 NOTE — INTERDISCIPLINARY ROUNDS
Behavioral Health Interdisciplinary Rounds     Patient Name: Corby Tamayo  Age: 70 y.o. Room/Bed:  740/01  Primary Diagnosis: Depression   Admission Status: Voluntary     Readmission within 30 days: no  Power of  in place: no  Patient requires a blocked bed: no          Reason for blocked bed:     VTE Prophylaxis: No    Mobility needs/Fall risk: yes  Flu Vaccine : no   Nutritional Plan: no  Consults:          Labs/Testing due today?: no    Sleep hours: 6.25       Participation in Care/Groups:  yes  Medication Compliant?: Yes  PRNS (last 24 hours): Sleep Aid and Pain    Restraints (last 24 hours):  no     CIWA (range last 24 hours): CIWA-Ar Total: 0   COWS (range last 24 hours):      Alcohol screening (AUDIT) completed -   AUDIT Score: 0     If applicable, date SBIRT discussed in treatment team AND documented: N/A    Tobacco - patient is a smoker: Have You Used Tobacco in the Past 30 Days: Yes  Illegal Drugs use: Have You Used Any Illegal Substances Over the Past 12 Months: Yes    24 hour chart check complete: yes     Patient goal(s) for today: Continue taking medications as prescribed; follow staff direction  Treatment team focus/goals: Continue working towards SNF placement  Progress note: Pt continues to be irritable and easily agitated, but is taking medications as prescribed and reports he feels less depressed    LOS:  5  Expected LOS: TBD    Financial concerns/prescription coverage:  [employer] Medicare; Medicaid  Date of last family contact: None    Family requesting physician contact today:  No  Discharge plan: SNF Placement  Guns in the home:  No       Outpatient provider(s): TBD    Participating treatment team members:  Corby Tamayo, DARRICK Mcdonald; Dr. Chun Ruiz MD; Mary Patel RN; Leann Reed, PharmD

## 2019-02-18 NOTE — PROGRESS NOTES
1611 Problem: Depressed Mood (Adult/Pediatric)  Goal: *STG: Verbalizes anger, guilt, and other feelings in a constructive manor  Outcome: Progressing Towards Goal  Patient sits near staff, wheels himself intentionally in way of staff who may need to walk through as caring for patients. \"What did they say? !\"  He yells. Patient directed staff that he needs stronger pain medicine for phantom pain of BKA. He is aggressive to staff and intimidating as he makes demands to staff. He has threatened to strike female bht.  Staff will redirect, set limits, give emotional support, decrease stimuli, encourage pt to share feelings appropriately. 1800 Dr Ramirez Espinosa contacted and has ordered motrin 800mg q8 prn. Pt states he wanted percocet.

## 2019-02-18 NOTE — BH NOTES
1957 PRN Medication Documentation    Specific patient behavior that led to need for PRN medication: pt agitated. Holding fists up in frustration. \"I've left my wife alone in the woods. I've got to get out of this mother fucker\"  Staff interventions attempted prior to PRN being given: decrease stimuli, give emotional support and encouragement, redirect as needed, provide safe environment, encourage pt to share feelings  PRN medication given: zyprexa  Patient response/effectiveness of PRN medication: to be reassessed    2105 pt more calm. He verbalizes worries about his wife being in the woods without him. Pt med compliant and ate a bedtime snack. Staff give emotional support.

## 2019-02-18 NOTE — BH NOTES
1745 PRN Medication Documentation    Specific patient behavior that led to need for PRN medication: severe agitation. Patient threatens to strike female bht working with this rn who provided care and set limits with his aggressive behavior. \"I don't want to hit her and go to detention. . But I might have to\"  Staff interventions attempted prior to PRN being given: decrease stimuli, give emotional support, encourage pt to share feelings, dinner given, set limits, redirect, discuss w pt what behavior is not acceptable on unit: threats to hurt others. PRN medication given: zyprexa  Patient response/effectiveness of PRN medication: to be reassessed. 1845 pt slightly more calm. Not threatening anyone at this time. Continue q15 checks.

## 2019-02-18 NOTE — BH NOTES
Pt is received seated in dining room and asking the day shift nurses who are leaving for pain medicine somewhat aggressively. Nurse lets him know we will contact physican. He does report he received percocet at the medical floor prior to coming to [facility] and he reports phantom pain and asks for percocet. Nurse has contacted Dr Tosha Mendoza. Looking at patient's discharge instructions from [facility], it did include percocet 10/325mg po q6 hours prn pain.

## 2019-02-18 NOTE — PROGRESS NOTES
Problem: Falls - Risk of  Goal: *Absence of Falls  Document Bello Fall Risk and appropriate interventions in the flowsheet. Outcome: Progressing Towards Goal  Fall Risk Interventions:  Mobility Interventions: Assess mobility with egress test, Bed/chair exit alarm, Communicate number of staff needed for ambulation/transfer, Utilize walker, cane, or other assistive device    Mentation Interventions: Adequate sleep, hydration, pain control, Bed/chair exit alarm, Door open when patient unattended, More frequent rounding    Medication Interventions: Patient to call before getting OOB    Elimination Interventions: Urinal in reach    History of Falls Interventions: Door open when patient unattended, Room close to nurse's station    Free of falls. Falls tool completed and accurate. Bed alarm on the bed. Patient transferred with 2 person assistance. Gets around on the unit via w/c. Staff to continue to provide a safe environment on the unit during hospitalization.

## 2019-02-19 LAB
ALBUMIN SERPL-MCNC: 2.9 G/DL (ref 3.5–5)
ANION GAP SERPL CALC-SCNC: 8 MMOL/L (ref 5–15)
BUN SERPL-MCNC: 53 MG/DL (ref 6–20)
BUN/CREAT SERPL: 11 (ref 12–20)
CALCIUM SERPL-MCNC: 8.6 MG/DL (ref 8.5–10.1)
CHLORIDE SERPL-SCNC: 107 MMOL/L (ref 97–108)
CO2 SERPL-SCNC: 24 MMOL/L (ref 21–32)
CREAT SERPL-MCNC: 4.96 MG/DL (ref 0.7–1.3)
ERYTHROCYTE [DISTWIDTH] IN BLOOD BY AUTOMATED COUNT: 18 % (ref 11.5–14.5)
GLUCOSE BLD STRIP.AUTO-MCNC: 109 MG/DL (ref 65–100)
GLUCOSE BLD STRIP.AUTO-MCNC: 135 MG/DL (ref 65–100)
GLUCOSE BLD STRIP.AUTO-MCNC: 159 MG/DL (ref 65–100)
GLUCOSE BLD STRIP.AUTO-MCNC: 79 MG/DL (ref 65–100)
GLUCOSE BLD STRIP.AUTO-MCNC: 80 MG/DL (ref 65–100)
GLUCOSE SERPL-MCNC: 87 MG/DL (ref 65–100)
HCT VFR BLD AUTO: 30 % (ref 36.6–50.3)
HGB BLD-MCNC: 9.1 G/DL (ref 12.1–17)
MCH RBC QN AUTO: 26.5 PG (ref 26–34)
MCHC RBC AUTO-ENTMCNC: 30.3 G/DL (ref 30–36.5)
MCV RBC AUTO: 87.5 FL (ref 80–99)
NRBC # BLD: 0 K/UL (ref 0–0.01)
NRBC BLD-RTO: 0 PER 100 WBC
PHOSPHATE SERPL-MCNC: 3.8 MG/DL (ref 2.6–4.7)
PLATELET # BLD AUTO: 205 K/UL (ref 150–400)
PMV BLD AUTO: 10.3 FL (ref 8.9–12.9)
POTASSIUM SERPL-SCNC: 4.8 MMOL/L (ref 3.5–5.1)
RBC # BLD AUTO: 3.43 M/UL (ref 4.1–5.7)
SERVICE CMNT-IMP: ABNORMAL
SERVICE CMNT-IMP: NORMAL
SERVICE CMNT-IMP: NORMAL
SODIUM SERPL-SCNC: 139 MMOL/L (ref 136–145)
WBC # BLD AUTO: 4.3 K/UL (ref 4.1–11.1)

## 2019-02-19 PROCEDURE — 80069 RENAL FUNCTION PANEL: CPT

## 2019-02-19 PROCEDURE — 82962 GLUCOSE BLOOD TEST: CPT

## 2019-02-19 PROCEDURE — 74011250637 HC RX REV CODE- 250/637: Performed by: INTERNAL MEDICINE

## 2019-02-19 PROCEDURE — 74011250637 HC RX REV CODE- 250/637: Performed by: HOSPITALIST

## 2019-02-19 PROCEDURE — 74011250636 HC RX REV CODE- 250/636: Performed by: INTERNAL MEDICINE

## 2019-02-19 PROCEDURE — 90935 HEMODIALYSIS ONE EVALUATION: CPT

## 2019-02-19 PROCEDURE — 85027 COMPLETE CBC AUTOMATED: CPT

## 2019-02-19 PROCEDURE — 74011250637 HC RX REV CODE- 250/637: Performed by: PSYCHIATRY & NEUROLOGY

## 2019-02-19 PROCEDURE — 74011250637 HC RX REV CODE- 250/637: Performed by: FAMILY MEDICINE

## 2019-02-19 PROCEDURE — 65220000003 HC RM SEMIPRIVATE PSYCH

## 2019-02-19 PROCEDURE — 36415 COLL VENOUS BLD VENIPUNCTURE: CPT

## 2019-02-19 PROCEDURE — 74011000250 HC RX REV CODE- 250: Performed by: INTERNAL MEDICINE

## 2019-02-19 RX ORDER — OXYCODONE AND ACETAMINOPHEN 5; 325 MG/1; MG/1
1 TABLET ORAL
Status: DISCONTINUED | OUTPATIENT
Start: 2019-02-19 | End: 2019-03-11 | Stop reason: HOSPADM

## 2019-02-19 RX ORDER — SODIUM CHLORIDE 0.9 % (FLUSH) 0.9 %
SYRINGE (ML) INJECTION
Status: COMPLETED
Start: 2019-02-19 | End: 2019-02-19

## 2019-02-19 RX ADMIN — FERRIC CITRATE 420 MG: 210 TABLET, COATED ORAL at 14:34

## 2019-02-19 RX ADMIN — ZOLPIDEM TARTRATE 5 MG: 5 TABLET ORAL at 21:40

## 2019-02-19 RX ADMIN — ALTEPLASE 2 MG: 2.2 INJECTION, POWDER, LYOPHILIZED, FOR SOLUTION INTRAVENOUS at 10:17

## 2019-02-19 RX ADMIN — FERRIC CITRATE 420 MG: 210 TABLET, COATED ORAL at 17:38

## 2019-02-19 RX ADMIN — OXYCODONE AND ACETAMINOPHEN 1 TABLET: 5; 325 TABLET ORAL at 22:40

## 2019-02-19 RX ADMIN — ASPIRIN 81 MG: 81 TABLET, COATED ORAL at 14:33

## 2019-02-19 RX ADMIN — SERTRALINE 50 MG: 50 TABLET, FILM COATED ORAL at 14:33

## 2019-02-19 RX ADMIN — ROSUVASTATIN CALCIUM 10 MG: 10 TABLET, FILM COATED ORAL at 20:38

## 2019-02-19 RX ADMIN — OXYCODONE AND ACETAMINOPHEN 1 TABLET: 5; 325 TABLET ORAL at 15:44

## 2019-02-19 RX ADMIN — Medication: at 14:00

## 2019-02-19 RX ADMIN — NIFEDIPINE 60 MG: 60 TABLET, FILM COATED, EXTENDED RELEASE ORAL at 17:36

## 2019-02-19 RX ADMIN — DOXAZOSIN 4 MG: 2 TABLET ORAL at 21:00

## 2019-02-19 RX ADMIN — ALTEPLASE 2 MG: 2.2 INJECTION, POWDER, LYOPHILIZED, FOR SOLUTION INTRAVENOUS at 10:18

## 2019-02-19 RX ADMIN — EPOETIN ALFA-EPBX 10000 UNITS: 10000 INJECTION, SOLUTION INTRAVENOUS; SUBCUTANEOUS at 18:21

## 2019-02-19 NOTE — PROGRESS NOTES
Problem: Falls - Risk of  Goal: *Absence of Falls  Document Bello Fall Risk and appropriate interventions in the flowsheet. Outcome: Progressing Towards Goal  Fall Risk Interventions:  Mobility Interventions: Bed/chair exit alarm, Communicate number of staff needed for ambulation/transfer, Patient to call before getting OOB    Mentation Interventions: Adequate sleep, hydration, pain control, Bed/chair exit alarm, Door open when patient unattended, Eyeglasses and hearing aids    Medication Interventions: Teach patient to arise slowly    Elimination Interventions: Bed/chair exit alarm, Call light in reach, Elevated toilet seat, Urinal in reach    History of Falls Interventions: Bed/chair exit alarm, Door open when patient unattended    Free of falls. Falls tool completed and accurate. Bed alarm on the bed. Patient transferred  with lift team help. Staff to maintain safety during hospitalization.

## 2019-02-19 NOTE — BH NOTES
GROUP THERAPY PROGRESS NOTE    Dell Werner is participating in Leisure-Creative Group.      Group time: 30 minutes    Personal goal for participation: decrease anxiety    Goal orientation: relaxation    Group therapy participation: active    Therapeutic interventions reviewed and discussed:     Impression of participation: good

## 2019-02-19 NOTE — PROGRESS NOTES
Problem: Depressed Mood (Adult/Pediatric)  Goal: *STG: Attends activities and groups  Outcome: Progressing Towards Goal    Received patient reading the newspaper in DR with peers. NAD. Voiced no complaints. On q 15 min. Safety checks. Visible in DR watching TV, reading, ate 100% dinner, fluids and snacks and talking on the phone with \"wife\". Assisted to BR to have large BM. Called lift team to assist to use BSC. Continues to be very demanding, intrusive with peers treatment, will split staff when he does not get personal needs met immediately. Reported \"When I get discharged I am going back to work as a  and drive my truck,going to help my two sons\". He also reported \"that dialysis nurse told me I was going to get that thing removed tomorrow\". He was pointing to his Fonemesh.

## 2019-02-19 NOTE — DIALYSIS
[facility] Dialysis Team [facility]  [phone]    Vitals   Pre   Post   Assessment   Pre   Post     Temp  Temp: 98.1 °F (36.7 °C) (02/19/19 0932)  98.1 LOC  aox4 No change   HR   Pulse (Heart Rate): (!) 51 (02/19/19 0932) 54 Lungs   cta  no change   B/P   BP: 146/65 (02/19/19 0932) 128/58 Cardiac   Reji, regular  no change   Resp   Resp Rate: 18 (02/19/19 0932) 18 Skin   Dry, intact  no chanage   Pain level  Pain Intensity 1: 0 (02/18/19 2326) 0 Edema  none No change   Orders:    Duration:   Start:    0932 End:    1408 Total:   3.5   Dialyzer:   Dialyzer/Set Up Inspection: Revaclear (02/19/19 0932)   K Bath:   Dialysate K (mEq/L): 2 (02/19/19 0932)   Ca Bath:   Dialysate CA (mEq/L): 2.5 (02/19/19 0932)   Na/Bicarb:   Dialysate NA (mEq/L): 140 (02/19/19 0932)   Target Fluid Removal:   Goal/Amount of Fluid to Remove (mL): 3000 mL (02/19/19 0932)   Access     Type & Location:   r chest tunneled hd cvc. Dressing dated for 2/13. Dressing changed,. No obvious signs and symptoms of infection.     Labs     Obtained/Reviewed   Critical Results Called   Date when labs were drawn-  Hgb-    HGB   Date Value Ref Range Status   02/16/2019 10.2 (L) 12.1 - 17.0 g/dL Final     K-    Potassium   Date Value Ref Range Status   02/16/2019 4.7 3.5 - 5.1 mmol/L Final     Ca-   Calcium   Date Value Ref Range Status   02/16/2019 8.9 8.5 - 10.1 MG/DL Final     Bun-   BUN   Date Value Ref Range Status   02/16/2019 31 (H) 6 - 20 MG/DL Final     Creat-   Creatinine   Date Value Ref Range Status   02/16/2019 3.62 (H) 0.70 - 1.30 MG/DL Final        Medications/ Blood Products Given     Name   Dose   Route and Time     Cath dada 2mg/per port  Hd cvc dwell             Blood Volume Processed (BVP):    65 Net Fluid   Removed:  2500   Comments   Time Out Done: 0900  Primary Nurse Rpt Pre:kayleigh knowles rn  Primary Nurse Rpt Post:kayleigh knowles rn  Pt Education:procedural  Care Plan:cont current hd poc   Tx Summary:  0945:art pressures are to high at 300bps. DR. Epifanio Woodall CONTACTED. CATH DADA ORDERED   1024:cath dada dwell started. 1054:5cc aspirated from both ports and flushed with ns flush. treatement restarted. 1408: treatment concluded after 3.5 hrs per md order. Blood rinsed back. Lines flushed and capped per policy  Admiting Diagnosis:depression, esrd   [facility]  Consent signed - Informed Consent Verified: Yes (02/19/19 0932)  [facility] Consent - yes  Hepatitis Status- ag neg 2/10919 [facility] care    Machine #- Machine Number: [id number] (02/19/19 0932)  Telemetry status-off  Pre-dialysis wt. -  NA

## 2019-02-19 NOTE — BH NOTES
PRN Medication Documentation    2102 Specific patient behavior that led to need for PRN medication: pt is aggressive and threatening to staff. \"well you ain't gonna give me my percocet and I'm gonna have to go off\"  Pt has threatened to harm female bht.  Staff interventions attempted prior to PRN being given: give emotional support, decrease stimuli, set limits, redirect, bedtime snack given  PRN medication given: geodon IM  Patient response/effectiveness of PRN medication: to be reassessed. 2200 pt resting. Continue q15 checks.

## 2019-02-19 NOTE — INTERDISCIPLINARY ROUNDS
Behavioral Health Interdisciplinary Rounds     Patient Name: Lizeth Khanna  Age: 70 y.o. Room/Bed:  740/  Primary Diagnosis: Depression   Admission Status: Voluntary     Readmission within 30 days: no  Power of  in place: no  Patient requires a blocked bed: no          Reason for blocked bed:     VTE Prophylaxis: No    Mobility needs/Fall risk: yes  Flu Vaccine : no   Nutritional Plan: no  Consults:         Labs/Testing due today?: no    Sleep hours: 8.5      Participation in Care/Groups:  yes  Medication Compliant?: Yes  PRNS (last 24 hours): Antipsychotic (PO), Antipsychotic (IM), Sleep Aid and Pain    Restraints (last 24 hours):  no     CIWA (range last 24 hours): CIWA-Ar Total: 0   COWS (range last 24 hours):      Alcohol screening (AUDIT) completed -   AUDIT Score: 0     If applicable, date SBIRT discussed in treatment team AND documented: N/A    Tobacco - patient is a smoker: Have You Used Tobacco in the Past 30 Days: Yes  Illegal Drugs use: Have You Used Any Illegal Substances Over the Past 12 Months: Yes    24 hour chart check complete: yes     Patient goal(s) for today: Take medications as prescribed  Treatment team focus/goals: Work on placement  Progress note: Pt had dialysis; difficult overnight, but pleasant and cooperative during the day    LOS:  6  Expected LOS: TBD    Financial concerns/prescription coverage: Medicaid; Medicare  Date of last family contact: None     Family requesting physician contact today: No  Discharge plan: SNF Placement  Guns in the home: No      Outpatient provider(s): Noemi Bean    Participating treatment team members:  Lizeth KhannaLio MSW; Dr. Geo Oakley MD; Shelbie Kasper RN; Anabel Castaneda, DongD

## 2019-02-19 NOTE — BH NOTES
GROUP THERAPY PROGRESS NOTE    Jose Marcelo is participating in Walking group. Group time: 15 minutes    Personal goal for participation: walk in espinoza with staff for exercise    Goal orientation: exercise    Group therapy participation: did not attend    Therapeutic interventions reviewed and discussed: exercise    Impression of participation: pt was offered to wheel with staff during this time.   He is instead intimidating and demanding meds from nurse (percocet)

## 2019-02-19 NOTE — PROGRESS NOTES
Problem: Discharge Planning  Goal: *Discharge to safe environment  Outcome: Progressing Towards Goal  Patient will discharge to SNF. Patient is in agreement with this discharge plan. Goal: *Knowledge of medication management  Outcome: Progressing Towards Goal  Patient verbalizes understanding of medication regimen. Patient is taking all medications as prescribed. Goal: *Knowledge of discharge instructions  Outcome: Progressing Towards Goal  Patient verbalizes understanding of goals for treatment and safe discharge.

## 2019-02-19 NOTE — BH NOTES
HYPOGLYCEMIC EPISODE DOCUMENTATION    Patient with hypoglycemic episode(s) at 8:56 (time) on 2/19/19(date). BG value(s) pre-treatment 78  Was patient symptomatic?  [] yes, [x] no  Patient was treated with the following rescue medications/treatments: [] D50                [] Glucose tablets                [] Glucagon                [] 4oz juice                [x] 6oz reg soda                [] 8oz low fat milk  BG value post-treatment: 109  Once BG treated and value greater than 80mg/dl, pt was provided with the following:  [x] snack  [] meal  Name of MD notified:Dr. Edgar Mojica  The following orders were received: none

## 2019-02-19 NOTE — BH NOTES
PSYCHIATRIC PROGRESS NOTE         Patient Name  Jaylan Jackson   Date of Birth 1940   [facility] [id number]   Medical Record Number  [id number]      Age  70 y.o. PCP Inocencia Peters MD   Admit date:  2/13/2019    Room Number  740/01  @ [address]   Date of Service  2/19/2019           E & M PROGRESS NOTE: 15 mins         HISTORY       REASON FOR HOSPITALIZATION:  CC: \"Pt admitted under a voluntary basis for severe depression with suicidal ideations and  an inability to care for self. HISTORY OF PRESENT ILLNESS:    The patient, Jaylan Jackson, is a 70 y.o. BLACK OR  male with 72-year-old -American male with past medical  history of end-stage renal disease; on hemodialysis, sleep apnea, peripheral vascular disease, bilateral BKA, hypertension, hyperlipidemia, peripheral neuropathy, anemia,hepatitis C, arthritis, peptic ulcer disease, coronary artery disease, DVT, prior GIbleed, chronic diastolic CHF, prior suicidal ideation, depression, and noncompliance   transfered to [facility] from [facility] after pt reported feeling depressed nad suicidal Pt stated he lives in the [place] and he is unhappy about his living situation , he stated he had lots of Money before [place] HAS 13 CHILDREN but now he is broke and no one ask about him. Pt reports feeling hopeless, helpless, angry. These symptoms are of high severity. These symptoms are constant . Pt ha sh/o using marijuana occasionally. UDS: negative; BAL=0. .  2/15/19: Pt is irritable and refused zoloft and vitals today. Pt has dialysis yesterday. 2/16/19: Pt took zoloft but refuses some medical meds. Pt had dialysis on Saturday. Pt is demanding in the evenings. 2/17/19: Pt is demanding, irritable in the evenings, compliant with zoloft. Eating well. 2/18/19: Pt is demanding and manipulative ta times but taking zoloft. Pt is eating well. 2/19/19: Pt taking zoloft.  Pt got agitated yesterday evening and received geodon I/M PRN. Pt reported he need percocet. AS per reports, he was on percocets for pain. SIDE EFFECTS: (reviewed/updated 2/19/2019)  None reported or admitted to. No noted toxicity with use of Depakote/Tegretol/lithium/Clozaril/TCAs   ALLERGIES:(reviewed/updated 2/19/2019)  Allergies   Allergen Reactions    Tetracycline Hives      MEDICATIONS PRIOR TO ADMISSION:(reviewed/updated 2/19/2019)  Medications Prior to Admission   Medication Sig    NIFEdipine ER (ADALAT CC) 60 mg ER tablet Take 1 Tab by mouth daily. HOLD for HR<60    hydroCHLOROthiazide (HYDRODIURIL) 25 mg tablet Take 1 Tab by mouth daily.  sertraline (ZOLOFT) 25 mg tablet Take 1 Tab by mouth daily.  oxyCODONE-acetaminophen (PERCOCET 10)  mg per tablet Take 1 Tab by mouth every six (6) hours as needed for Pain.  traZODone (DESYREL) 50 mg tablet Take 50 mg by mouth nightly.  rosuvastatin (CRESTOR) 10 mg tablet Take 10 mg by mouth nightly.  aspirin delayed-release 81 mg tablet Take 1 Tab by mouth daily. PAST MEDICAL HISTORY: Past medical history from the initial psychiatric evaluation has been reviewed (reviewed/updated 2/19/2019) with no additional updates (I asked patient and no additional past medical history provided). Past Medical History:   Diagnosis Date    Arthritis     CAD (coronary artery disease) 2007    CKD (chronic kidney disease), stage III ([address]     DM type 2 causing renal disease ([address]     DVT (deep venous thrombosis) (HCC)     Hx of DVT:  Xarelto stopped due to GI bleed. S/P IVC filter.  Gait abnormality     GI bleed     Heart murmur     Hepatitis C     History of blood transfusion     Hypercholesterolemia     Hypertension 11/7/2014    Neuropathy     Non compliance w medication regimen     Peripheral vascular disease ([facility] 11/7/2014    A. S/P Right SFA POBA and tibial atherectomy (2/4/13).     PUD (peptic ulcer disease) 2007    Unspecified sleep apnea     never used cpap Past Surgical History:   Procedure Laterality Date    ABDOMEN SURGERY PROC UNLISTED  2007    has approx 7-8\" midline incision on abdomen--\"had to open him up and clean him out after feeding tube clogged\"    HX GI  2007    feeding tube for approx 2 mo    VASCULAR SURGERY PROCEDURE UNLIST Right 1/22/2015    popliteal-tibial bypass      SOCIAL HISTORY: Social history from the initial psychiatric evaluation has been reviewed (reviewed/updated 2/19/2019) with no additional updates (I asked patient and no additional social history provided). Social History     Socioeconomic History    Marital status: SINGLE     Spouse name: Not on file    Number of children: Not on file    Years of education: Not on file    Highest education level: Not on file   Social Needs    Financial resource strain: Not on file    Food insecurity - worry: Not on file    Food insecurity - inability: Not on file    Transportation needs - medical: Not on file   [employer] needs - non-medical: Not on file   Occupational History    Not on file   Tobacco Use    Smoking status: Current Every Day Smoker     Packs/day: 0.50    Smokeless tobacco: Never Used   Substance and Sexual Activity    Alcohol use: No    Drug use: No     Comment: >20 years ago    Sexual activity: Not on file   Other Topics Concern     Service Not Asked    Blood Transfusions Not Asked    Caffeine Concern Not Asked    Occupational Exposure Not Asked    Hobby Hazards Not Asked    Sleep Concern Not Asked    Stress Concern Not Asked    Weight Concern Not Asked    Special Diet Not Asked    Back Care Not Asked    Exercise Not Asked    Bike Helmet Not Asked    Seat Belt Not Asked    Self-Exams Not Asked   Social History Narrative    78 year ols male admitted for depression and homelessness. Pt will be evicated from apartment due to non payment of bills. Girlfriend of 30 years left him to [facility] live in the [place] with others. \" Pt is a brittle diabetic, with treatment non compliance. He has bilarela lower extremity amputations. Patient has a long hx of substance abuse. FAMILY HISTORY: Family history from the initial psychiatric evaluation has been reviewed (reviewed/updated 2/19/2019) with no additional updates (I asked patient and no additional family history provided). Family History   Problem Relation Age of Onset    Hypertension Father        REVIEW OF SYSTEMS: (reviewed/updated 2/19/2019)  Appetite:   Sleep: All other Review of Systems: Negative except          MENTAL STATUS EXAM & [address] (MSE):    MSE FINDINGS ARE WITHIN NORMAL LIMITS (WNL) UNLESS OTHERWISE STATED BELOW. ( ALL OF THE BELOW CATEGORIES OF THE MSE HAVE BEEN REVIEWED (reviewed 2/14/2019) AND UPDATED AS DEEMED APPROPRIATE )  General Presentation age appropriate and casually dressed, cooperative   Orientation oriented to time, place and person   Vital Signs  See below (reviewed 2/14/2019); Vital Signs (BP, Pulse, & Temp) are within normal limits if not listed below.    Gait and Station Stable/steady, no ataxia   Musculoskeletal System No extrapyramidal symptoms (EPS); no abnormal muscular movements or Tardive Dyskinesia (TD); muscle strength and tone are within normal limits   Language No aphasia or dysarthria   Speech:  soft   Thought Processes logical; slow rate of thoughts; fair abstract reasoning/computation   Thought Associations goal directed   Thought Content free of delusions   Suicidal Ideations intention   Homicidal Ideations no plan  and no intention   Mood:  anxious  and depressed   Affect:  constricted   Memory recent  intact   Memory remote:  intact   Concentration/Attention:  distractable   Fund of Knowledge average   Insight:  fair   Reliability fair   Judgment:  fair                                                                                                    VITALS:     Patient Vitals for the past 24 hrs:   Temp Pulse Resp BP SpO2 02/19/19 1200 -- (!) 54 18 132/65 --   02/19/19 1149 -- -- -- 110/57 --   02/19/19 1145 -- (!) 47 18 96/53 --   02/19/19 1130 -- (!) 55 18 124/67 --   02/19/19 1115 -- (!) 52 18 140/63 --   02/19/19 1100 -- (!) 49 18 131/59 --   02/19/19 1054 -- (!) 49 18 135/63 --   02/19/19 0932 98.1 °F (36.7 °C) (!) 51 18 146/65 --   02/18/19 1928 98.1 °F (36.7 °C) (!) 55 16 163/73 100 %   02/18/19 1544 98.2 °F (36.8 °C) (!) 54 16 134/69 99 %     Wt Readings from Last 3 Encounters:   02/14/19 73 kg (160 lb 15 oz)   02/13/19 73.4 kg (161 lb 12.8 oz)   01/15/19 81.6 kg (180 lb)     Temp Readings from Last 3 Encounters:   02/19/19 98.1 °F (36.7 °C)   02/13/19 98.2 °F (36.8 °C)   01/15/19 97 °F (36.1 °C)     BP Readings from Last 3 Encounters:   02/19/19 132/65   02/13/19 181/82   01/15/19 (!) 192/116     Pulse Readings from Last 3 Encounters:   02/19/19 (!) 54   02/13/19 (!) 48   01/15/19 75            DATA     LABORATORY DATA:(reviewed/updated 2/19/2019)  Recent Results (from the past 24 hour(s))   GLUCOSE, POC    Collection Time: 02/18/19 12:28 PM   Result Value Ref Range    Glucose (POC) 112 (H) 65 - 100 mg/dL    Performed by [address], POC    Collection Time: 02/18/19  3:58 PM   Result Value Ref Range    Glucose (POC) 107 (H) 65 - 100 mg/dL    Performed by Montrell Breath, POC    Collection Time: 02/18/19  8:06 PM   Result Value Ref Range    Glucose (POC) 162 (H) 65 - 100 mg/dL    Performed by Flaco De León    GLUCOSE, POC    Collection Time: 02/19/19  8:56 AM   Result Value Ref Range    Glucose (POC) 79 65 - 100 mg/dL    Performed by Kate Jenkins    RENAL FUNCTION PANEL    Collection Time: 02/19/19  9:17 AM   Result Value Ref Range    Sodium 139 136 - 145 mmol/L    Potassium 4.8 3.5 - 5.1 mmol/L    Chloride 107 97 - 108 mmol/L    CO2 24 21 - 32 mmol/L    Anion gap 8 5 - 15 mmol/L    Glucose 87 65 - 100 mg/dL    BUN 53 (H) 6 - 20 MG/DL    Creatinine 4.96 (H) 0.70 - 1.30 MG/DL    BUN/Creatinine ratio 11 (L) 12 - 20      GFR est AA 14 (L) >60 ml/min/1.73m2    GFR est non-AA 11 (L) >60 ml/min/1.73m2    Calcium 8.6 8.5 - 10.1 MG/DL    Phosphorus 3.8 2.6 - 4.7 MG/DL    Albumin 2.9 (L) 3.5 - 5.0 g/dL   CBC W/O DIFF    Collection Time: 02/19/19  9:17 AM   Result Value Ref Range    WBC 4.3 4.1 - 11.1 K/uL    RBC 3.43 (L) 4.10 - 5.70 M/uL    HGB 9.1 (L) 12.1 - 17.0 g/dL    HCT 30.0 (L) 36.6 - 50.3 %    MCV 87.5 80.0 - 99.0 FL    MCH 26.5 26.0 - 34.0 PG    MCHC 30.3 30.0 - 36.5 g/dL    RDW 18.0 (H) 11.5 - 14.5 %    PLATELET 109 255 - 792 K/uL    MPV 10.3 8.9 - 12.9 FL    NRBC 0.0 0  WBC    ABSOLUTE NRBC 0.00 0.00 - 0.01 K/uL   GLUCOSE, POC    Collection Time: 02/19/19  9:22 AM   Result Value Ref Range    Glucose (POC) 109 (H) 65 - 100 mg/dL    Performed by Daphne Hernandez      No results found for: VALF2, VALAC, VALP, VALPR, DS6, CRBAM, CRBAMP, CARB2, XCRBAM  No results found for: LITHM   RADIOLOGY REPORTS:(reviewed/updated 2/19/2019)  Xr Chest Pa Lat    Result Date: 2/8/2019  EXAM: XR CHEST PA LAT INDICATION: Shortness of breath, Low back pain and abdominal pain for several days. End-stage renal disease on dialysis, missed dialysis one day ago. COMPARISON: Chest views on 5/20/2018 TECHNIQUE: 4 images of PA and lateral chest views FINDINGS: Right jugular dialysis catheter is new and terminates in the distal superior vena cava. Cardiac monitoring wires overlie the thorax. Borderline cardiac size is unchanged. Aortic arch is not enlarged. The pulmonary vasculature is within normal limits. The lungs and pleural spaces are clear. The visualized bones and upper abdomen are age-appropriate. IMPRESSION: No acute process on chest x-ray. Right jugular dialysis catheter terminates in the distal superior vena cava. No pneumothorax. Ct Abd Pelv W Cont    Result Date: 2/8/2019  EXAM: CT ABD PELV W CONT INDICATION: Abdominal pain and low back pain for several days.  End-stage renal disease on dialysis, missed dialysis yesterday. Normal white blood cell count. Normal liver enzymes. COMPARISON: None. CONTRAST: 100 mL of Isovue-370. TECHNIQUE: Following the uneventful intravenous administration of contrast, thin axial images were obtained through the abdomen and pelvis. Coronal and sagittal reconstructions were generated. Oral contrast was administered. CT dose reduction was achieved through use of a standardized protocol tailored for this examination and automatic exposure control for dose modulation. FINDINGS: LUNG BASES: No pneumonia. Mild dependent atelectasis. INCIDENTALLY IMAGED HEART AND MEDIASTINUM: Borderline cardiac size. No pericardial effusion. LIVER: Subcentimeter liver lesions measure up to 9 mm in segment 2 of the liver. Surface is smooth. Streak artifact from bilateral upper extremities. GALLBLADDER: Gallstones are in the body and neck. No evidence of cholecystitis. CBD is not dilated. SPLEEN: Normal size and enhancement. PANCREAS: No mass or ductal dilatation. ADRENALS: Unremarkable. KIDNEYS: No mass, calculus, or hydronephrosis. STOMACH: Partial distention. SMALL BOWEL: No dilatation or wall thickening. COLON: Moderate colonic fecal burden. No mural thickening. APPENDIX: Unremarkable. PERITONEUM: No ascites or pneumoperitoneum. RETROPERITONEUM: IVC filter is in good position. Aorta is atherosclerotic without aneurysm. Mild atherosclerotic stenosis of the celiac artery. No lymphadenopathy. REPRODUCTIVE ORGANS: Mild prostatomegaly measures 6 cm and extends into the base of the urinary bladder. URINARY BLADDER: No mass or calculus. BONES: Moderate bilateral hip osteoarthritis. ADDITIONAL COMMENTS: N/A     IMPRESSION: 1. No acute process on CT. 2. Cholelithiasis. No cholecystitis. 3. Subcentimeter segment 2 liver lesions cannot be fully characterized on this examination. 4. Moderate colonic fecal burden may represent constipation. No bowel obstruction.          MEDICATIONS     ALL MEDICATIONS:   Current Facility-Administered Medications   Medication Dose Route Frequency    ibuprofen (MOTRIN) tablet 800 mg  800 mg Oral Q8H PRN    sertraline (ZOLOFT) tablet 50 mg  50 mg Oral DAILY    aspirin delayed-release tablet 81 mg  81 mg Oral DAILY    rosuvastatin (CRESTOR) tablet 10 mg  10 mg Oral QHS    epoetin rosie-epbx (RETACRIT) injection 10,000 Units  10,000 Units SubCUTAneous DIALYSIS TUE, THU & SAT    ferric citrate (AURYXIA) tablet 420 mg  420 mg Oral TID WITH MEALS    NIFEdipine ER (PROCARDIA XL) tablet 60 mg  60 mg Oral Q24H    doxazosin (CARDURA) tablet 4 mg  4 mg Oral QHS    OLANZapine (ZyPREXA) tablet 2.5 mg  2.5 mg Oral Q6H PRN    ziprasidone (GEODON) 10 mg in sterile water (preservative free) 0.5 mL injection  10 mg IntraMUSCular BID PRN    benztropine (COGENTIN) tablet 1 mg  1 mg Oral BID PRN    benztropine (COGENTIN) injection 1 mg  1 mg IntraMUSCular BID PRN    zolpidem (AMBIEN) tablet 5 mg  5 mg Oral QHS PRN    acetaminophen (TYLENOL) tablet 650 mg  650 mg Oral Q4H PRN    magnesium hydroxide (MILK OF MAGNESIA) 400 mg/5 mL oral suspension 30 mL  30 mL Oral DAILY PRN    nicotine (NICODERM CQ) 21 mg/24 hr patch 1 Patch  1 Patch TransDERmal DAILY PRN    glucose chewable tablet 16 g  4 Tab Oral PRN    dextrose (D50W) injection syrg 12.5-25 g  25-50 mL IntraVENous PRN    glucagon (GLUCAGEN) injection 1 mg  1 mg IntraMUSCular PRN    insulin lispro (HUMALOG) injection   SubCUTAneous AC&HS      SCHEDULED MEDICATIONS:   Current Facility-Administered Medications   Medication Dose Route Frequency    sertraline (ZOLOFT) tablet 50 mg  50 mg Oral DAILY    aspirin delayed-release tablet 81 mg  81 mg Oral DAILY    rosuvastatin (CRESTOR) tablet 10 mg  10 mg Oral QHS    epoetin rosie-epbx (RETACRIT) injection 10,000 Units  10,000 Units SubCUTAneous DIALYSIS TUE, THU & SAT    ferric citrate (AURYXIA) tablet 420 mg  420 mg Oral TID WITH MEALS    NIFEdipine ER (PROCARDIA XL) tablet 60 mg  60 mg Oral Q24H  doxazosin (CARDURA) tablet 4 mg  4 mg Oral QHS    insulin lispro (HUMALOG) injection   SubCUTAneous AC&HS          ASSESSMENT & PLAN     The patient, Anige Ross, is a 70 y.o.  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  Plan for hemodialysis 3 days a week.  If it is not controlled  with the same, provide additional antihypertensive meds.  Monitor blood pressure.    End-stage renal disease, on hemodialysis.  Schedule treatment for hemodialysis on  Tuesdays, Thursdays, and Saturdays. .   Type 2 diabetes mellitus.  Place on Humalog insulin correction coverage schedule,  Accu-Chek, and check hemoglobin A1c level.  The patient will be on a diabetic/renal  Diet. Nephrology consult   2/15/19: continue meds/milue, encourage compliance. 2/16/19: continue meds/milue  2/17/19: continue meds/milue increaisng zoloft 50 mg po daily  2/18/19: continue meds/milue  2/19/19: readding percocet twice daily as needed for pain. I will continue to monitor blood levels (Depakote, Tegretol, lithium, clozapine---a drug with a narrow therapeutic index= NTI) and associated labs for drug therapy implemented that require intense monitoring for toxicity as deemed appropriate based on current medication side effects and pharmacodynamically determined drug 1/2 lives. In summary, Angie Ross, is a 70 y.o.  male who presents with a severe exacerbation of the principal diagnosis of Depression  Patient's condition is worsening/not improving/not stable improving. Patient requires continued inpatient hospitalization for further stabilization, safety monitoring and medication management.   I will continue to coordinate the provision of individual, milieu, occupational, group, and substance abuse therapies to address target symptoms/diagnoses as deemed appropriate for the individual patient. A coordinated, multidisplinary treatment team round was conducted with the patient (this team consists of the nurse, psychiatric unit pharmcist,  and writer). Complete current electronic health record for patient has been reviewed today including consultant notes, ancillary staff notes, nurses and psychiatric tech notes. uicide risk assessment completed and patient deemed to be of low risk for suicide at this time. The following regarding medications was addressed during rounds with patient:   the risks and benefits of the proposed medication. The patient was given the opportunity to ask questions. Informed consent given to the use of the above medications. Will continue to adjust psychiatric and non-psychiatric medications (see above \"medication\" section and orders section for details) as deemed appropriate & based upon diagnoses and response to treatment. I will continue to order blood tests/labs and diagnostic tests as deemed appropriate and review results as they become available (see orders for details and above listed lab/test results). I will order psychiatric records from previous [facility] hospitals to further elucidate the nature of patient's psychopathology and review once available. I will gather additional collateral information from friends, family and o/p treatment team to further elucidate the nature of patient's psychopathology and baselline level of psychiatric functioning. I certify that this patient's inpatient psychiatric hospital services furnished since the previous certification were, and continue to be, required for treatment that could reasonably be expected to improve the patient's condition, or for diagnostic study, and that the patient continues to need, on a daily basis, active treatment furnished directly by or requiring the supervision of inpatient psychiatric facility personnel.  In addition, the hospital records show that services furnished were intensive treatment services, admission or related services, or equivalent services.     EXPECTED DISCHARGE DATE/DAY: TBD     DISPOSITION: Home       Signed By:   Melba Philippe MD  2/19/2019

## 2019-02-19 NOTE — PROGRESS NOTES
Problem: Depressed Mood (Adult/Pediatric)  Goal: *STG: Demonstrates reduction in symptoms and increase in insight into coping skills/future focused  Outcome: Progressing Towards Goal  Received pt in bed resting. Pt appears to be in no distress. Respirations even and unlabored. Continuing to monitor pt with q15 min safety rounds.

## 2019-02-19 NOTE — BH NOTES
GROUP THERAPY PROGRESS NOTE    Carlos Manuelfermín King did not participate in a 45 minute Process Group on the Geriatric Unit with a focus on shifting ones feelings to a positive note and preparing for the day through group singing.

## 2019-02-19 NOTE — PROGRESS NOTES
Nephrology Progress Note  Bradley De La Paz  Date of Admission : 2/13/2019    CC:  Follow up for ESRD       Assessment and Plan     ESRD on HD:  - HD TTS at [facility]  - HD for later today    HTN:  - cont current meds    Anemia of CKD:  - FANNY with dialysis    Secondary HPT:  - cont auryxia    Diastolic HF    PAD s/p b/l BKAs    DM2:  - on insulin    Depression:  - per psychiatry       Interval History:  Seen and examined. Resting in bed. Quiet night per RN. No cp, sob, n/v/d. BP stable. For HD today. Current Medications: all current  Medications have been eviewed in EPIC  Review of Systems: Pertinent items are noted in HPI. Objective:  Vitals:    Vitals:    02/17/19 2100 02/18/19 0748 02/18/19 1544 02/18/19 1928   BP:  152/79 134/69 163/73   Pulse: 62 (!) 49 (!) 54 (!) 55   Resp:  18 16 16   Temp:  98.1 °F (36.7 °C) 98.2 °F (36.8 °C) 98.1 °F (36.7 °C)   SpO2:  100% 99% 100%   Weight:         Intake and Output:  No intake/output data recorded. No intake/output data recorded. Physical Examination:  General: NAD,Conversant   Neck:  Supple, no mass  Resp:  Lungs CTA B/L, no wheezing , normal respiratory effort  CV:  RRR,  no murmur or rub, no thigh edema, b/l BKAs  GI:  Soft, NT, + Bowel sounds, no hepatosplenomegaly  Neurologic:  Non focal  Psych:             Depressed, flat affect  Skin:  No Rash  Access:           [facility] c/d/i    []    High complexity decision making was performed  []    Patient is at high-risk of decompensation with multiple organ involvement    Lab Data Personally Reviewed: I have reviewed all the pertinent labs, microbiology data and radiology studies during assessment. No results for input(s): NA, K, CL, CO2, GLU, BUN, CREA, CA, MG, PHOS, ALB, TBIL, SGOT, ALT, INR in the last 72 hours. No lab exists for component: INREXT, INREXT  No results for input(s): WBC, HGB, HCT, PLT, HGBEXT, HCTEXT, PLTEXT, HGBEXT, HCTEXT, PLTEXT in the last 72 hours.   No results found for: SDES  Lab Results   Component Value Date/Time    Culture result: MRSA NOT PRESENT 01/24/2018 10:30 AM    Culture result:  01/24/2018 10:30 AM         Screening of patient nares for MRSA is for surveillance purposes and, if positive, to facilitate isolation considerations in high risk settings. It is not intended for automatic decolonization interventions per se as regimens are not sufficiently effective to warrant routine use. Culture result: MRSA NOT PRESENT 01/19/2015 10:44 AM    Culture result:  01/19/2015 10:44 AM         Screening of patient nares for MRSA is for surveillance purposes and, if positive, to facilitate isolation considerations in high risk settings. It is not intended for automatic decolonization interventions per se as regimens are not sufficiently effective to warrant routine use. Recent Results (from the past 24 hour(s))   GLUCOSE, POC    Collection Time: 02/18/19  9:08 AM   Result Value Ref Range    Glucose (POC) 88 65 - 100 mg/dL    Performed by [address], POC    Collection Time: 02/18/19 12:08 PM   Result Value Ref Range    Glucose (POC) 77 65 - 100 mg/dL    Performed by [address], POC    Collection Time: 02/18/19 12:28 PM   Result Value Ref Range    Glucose (POC) 112 (H) 65 - 100 mg/dL    Performed by [address], POC    Collection Time: 02/18/19  3:58 PM   Result Value Ref Range    Glucose (POC) 107 (H) 65 - 100 mg/dL    Performed by Raymond Araujo, POC    Collection Time: 02/18/19  8:06 PM   Result Value Ref Range    Glucose (POC) 162 (H) 65 - 100 mg/dL    Performed by Seven Dawson MD  [address] [place] [address]  Phone - [phone]   Fax - [phone]  [email or web address]

## 2019-02-19 NOTE — PROGRESS NOTES
Problem: Depressed Mood (Adult/Pediatric)  Goal: *STG: Participates in treatment plan  Outcome: Progressing Towards Goal  Received this morning resting in bed. Is alert and oriented. Thoughts are clear and organized. Has an angry edge this morning, ignoring staff when they are speaking to him and refusing getting out of bed to the w/c. Dialysis  nurse came to unit at about 0900. Taken down to  room 733 for dialysis. Dialysis nurse at bedside throughout the day. Patient had no requests. Finished dialysis at about 1415,returned to geriatric unit and remains sitting up in the w/c eating lunch. Staff to continue to monitor patient for safety during hospitalization.

## 2019-02-20 LAB
GLUCOSE BLD STRIP.AUTO-MCNC: 105 MG/DL (ref 65–100)
GLUCOSE BLD STRIP.AUTO-MCNC: 107 MG/DL (ref 65–100)
GLUCOSE BLD STRIP.AUTO-MCNC: 82 MG/DL (ref 65–100)
GLUCOSE BLD STRIP.AUTO-MCNC: 85 MG/DL (ref 65–100)
SERVICE CMNT-IMP: ABNORMAL
SERVICE CMNT-IMP: ABNORMAL
SERVICE CMNT-IMP: NORMAL
SERVICE CMNT-IMP: NORMAL

## 2019-02-20 PROCEDURE — 74011250637 HC RX REV CODE- 250/637: Performed by: INTERNAL MEDICINE

## 2019-02-20 PROCEDURE — 65220000003 HC RM SEMIPRIVATE PSYCH

## 2019-02-20 PROCEDURE — 74011250637 HC RX REV CODE- 250/637: Performed by: PSYCHIATRY & NEUROLOGY

## 2019-02-20 PROCEDURE — 74011250637 HC RX REV CODE- 250/637: Performed by: HOSPITALIST

## 2019-02-20 PROCEDURE — 74011250637 HC RX REV CODE- 250/637: Performed by: FAMILY MEDICINE

## 2019-02-20 PROCEDURE — 82962 GLUCOSE BLOOD TEST: CPT

## 2019-02-20 RX ADMIN — DOXAZOSIN 4 MG: 2 TABLET ORAL at 21:00

## 2019-02-20 RX ADMIN — FERRIC CITRATE 420 MG: 210 TABLET, COATED ORAL at 09:15

## 2019-02-20 RX ADMIN — NIFEDIPINE 60 MG: 60 TABLET, FILM COATED, EXTENDED RELEASE ORAL at 17:26

## 2019-02-20 RX ADMIN — OXYCODONE AND ACETAMINOPHEN 1 TABLET: 5; 325 TABLET ORAL at 23:00

## 2019-02-20 RX ADMIN — ZOLPIDEM TARTRATE 5 MG: 5 TABLET ORAL at 21:00

## 2019-02-20 RX ADMIN — SERTRALINE 50 MG: 50 TABLET, FILM COATED ORAL at 09:14

## 2019-02-20 RX ADMIN — FERRIC CITRATE 420 MG: 210 TABLET, COATED ORAL at 12:26

## 2019-02-20 RX ADMIN — FERRIC CITRATE 420 MG: 210 TABLET, COATED ORAL at 17:28

## 2019-02-20 RX ADMIN — OXYCODONE AND ACETAMINOPHEN 1 TABLET: 5; 325 TABLET ORAL at 05:55

## 2019-02-20 RX ADMIN — ASPIRIN 81 MG: 81 TABLET, COATED ORAL at 09:14

## 2019-02-20 RX ADMIN — ROSUVASTATIN CALCIUM 10 MG: 10 TABLET, FILM COATED ORAL at 20:24

## 2019-02-20 NOTE — PROGRESS NOTES
Problem: Falls - Risk of  Goal: *Absence of Falls  Document Bello Fall Risk and appropriate interventions in the flowsheet.   Outcome: Progressing Towards Goal  Fall Risk Interventions:  Mobility Interventions: Assess mobility with egress test, Bed/chair exit alarm, Communicate number of staff needed for ambulation/transfer, Mechanical lift, Patient to call before getting OOB    Mentation Interventions: Adequate sleep, hydration, pain control    Medication Interventions: Teach patient to arise slowly    Elimination Interventions: Bed/chair exit alarm, Patient to call for help with toileting needs, Toilet paper/wipes in reach, Urinal in reach    History of Falls Interventions: Bed/chair exit alarm, Door open when patient unattended

## 2019-02-20 NOTE — BH NOTES
GROUP THERAPY PROGRESS NOTE    Jose Marcelo participated in a late morning Process Group on the Geriatric Unit, with a focus identifying feelings, planning for the day, and singing. Group time: 45 minutes. Personal goal for participation: To increase the capacity to shift ones mood, prepare for the day, and share in group singing. Goal orientation: The patient will be able to prepare for the day through group singing. Group therapy participation: When prompted, this patient actively participated in the group. Therapeutic interventions reviewed and discussed: The group members were asked to introduce themselves by first names and participate in group singing as a way to increase their oxygen and blood flow and begin their day on a positive note. They were also asked to join in singing several songs. Impression of participation: The patient was reading the local newspaper with interest prior to the start of the group. He said he was looking for a job. He was fully alert, generally oriented, cooperative, and engaged in the music. He tracked the lyrics in the songbook and sang along with almost all of the tunes. He expressed no current SI/HI and displayed no overt psychosis. His affect was non-dysphoric and mildly euphoric. He commented on some of the songs as being \"old. Lakesha Never Lakesha Never I haven't heard in a while\" and he smiled at the end of the session. His mood reflected his affect. He appeared slightly more relaxed and engaged in the process group than he was earlier in the week.

## 2019-02-20 NOTE — BH NOTES
PRN Medication Documentation    Specific patient behavior that led to need for PRN medication: generalized pain   Staff interventions attempted prior to PRN being given: repositioned, rest  PRN medication given: percocet  Patient response/effectiveness of PRN medication: well will continue to monitor

## 2019-02-20 NOTE — PROGRESS NOTES
Nephrology Progress Note  Lizeth Khanna  Date of Admission : 2/13/2019    CC:  Follow up for ESRD       Assessment and Plan     ESRD on HD:  - HD TTS at [facility]  - HD for tomorrow    HTN:  - cont current meds    Anemia of CKD:  - FANNY with dialysis - increase dose    Secondary HPT:  - cont auryxia    Diastolic HF    PAD s/p b/l BKAs    DM2:  - on insulin    Depression:  - per psychiatry       Interval History:  Seen and examined. HD yesterday, UF 2.5kg. Feeling ok. No cp or sob reported. Labs stable from 2/19. Current Medications: all current  Medications have been eviewed in EPIC  Review of Systems: Pertinent items are noted in HPI. Objective:  Vitals:    Vitals:    02/19/19 1657 02/19/19 1736 02/19/19 1944 02/20/19 0752   BP: 156/67  148/67 147/67   Pulse: (!) 54 64 61 (!) 55   Resp: 16  16 16   Temp: 97.9 °F (36.6 °C)  98.8 °F (37.1 °C) 98.3 °F (36.8 °C)   SpO2: 99%  99% 100%   Weight:         Intake and Output:  No intake/output data recorded. 02/18 1901 - 02/20 0700  In: -   Out: 2800 [Urine:300]    Physical Examination:  General: NAD,Conversant   Neck:  Supple, no mass  Resp:  Lungs CTA B/L, no wheezing , normal respiratory effort  CV:  RRR,  no murmur or rub, no thigh edema, b/l BKAs  GI:  Soft, NT, + Bowel sounds, no hepatosplenomegaly  Neurologic:  Non focal  Psych:             Depressed, flat affect  Skin:  No Rash  Access:           [facility] c/d/i    []    High complexity decision making was performed  []    Patient is at high-risk of decompensation with multiple organ involvement    Lab Data Personally Reviewed: I have reviewed all the pertinent labs, microbiology data and radiology studies during assessment.     Recent Labs     02/19/19  0917      K 4.8      CO2 24   GLU 87   BUN 53*   CREA 4.96*   CA 8.6   PHOS 3.8   ALB 2.9*     Recent Labs     02/19/19  0917   WBC 4.3   HGB 9.1*   HCT 30.0*        No results found for: SDES  Lab Results   Component Value Date/Time    Culture result: MRSA NOT PRESENT 01/24/2018 10:30 AM    Culture result:  01/24/2018 10:30 AM         Screening of patient nares for MRSA is for surveillance purposes and, if positive, to facilitate isolation considerations in high risk settings. It is not intended for automatic decolonization interventions per se as regimens are not sufficiently effective to warrant routine use. Culture result: MRSA NOT PRESENT 01/19/2015 10:44 AM    Culture result:  01/19/2015 10:44 AM         Screening of patient nares for MRSA is for surveillance purposes and, if positive, to facilitate isolation considerations in high risk settings. It is not intended for automatic decolonization interventions per se as regimens are not sufficiently effective to warrant routine use. Recent Results (from the past 24 hour(s))   GLUCOSE, POC    Collection Time: 02/19/19 12:13 PM   Result Value Ref Range    Glucose (POC) 80 65 - 100 mg/dL    Performed by [address] [place] [address], POC    Collection Time: 02/19/19  4:37 PM   Result Value Ref Range    Glucose (POC) 159 (H) 65 - 100 mg/dL    Performed by [address] [place] [address], POC    Collection Time: 02/19/19  8:18 PM   Result Value Ref Range    Glucose (POC) 135 (H) 65 - 100 mg/dL    Performed by Lyubov Barber MD  [address]  Phone - [phone]   Fax - [phone]  [email or web address]

## 2019-02-20 NOTE — PROGRESS NOTES
Problem: Falls - Risk of  Goal: *Absence of Falls  Document Bello Fall Risk and appropriate interventions in the flowsheet. Outcome: Progressing Towards Goal  Fall Risk Interventions:  Mobility Interventions: Bed/chair exit alarm, Communicate number of staff needed for ambulation/transfer, Mechanical lift, Patient to call before getting OOB    Mentation Interventions: Adequate sleep, hydration, pain control    Medication Interventions: Bed/chair exit alarm, Patient to call before getting OOB, Teach patient to arise slowly    Elimination Interventions: Bed/chair exit alarm, Call light in reach, Elevated toilet seat, Toileting schedule/hourly rounds    History of Falls Interventions: Bed/chair exit alarm, Door open when patient unattended    Free of falls. Falls tool completed and accurate. Bed alarm on the bed. Staff using lift team  to transfer patient to w/c. Able to get around via w/c. Staff to continue to provide a safe environment on the unit during hospitalization.

## 2019-02-20 NOTE — BH NOTES
GROUP THERAPY PROGRESS NOTE    Floydtimothy Brock did not participate in a morning Process Group on the Geriatric Unit with a focus on shifting ones feelings to a positive note and preparing for the day through group singing. There were not enough patients available to hold a group, at the regular meeting time. I plan to check back again with the unit later today to see if this group might be held.

## 2019-02-20 NOTE — BH NOTES
PSYCHIATRIC PROGRESS NOTE         Patient Name  Blessing Park   Date of Birth 1940   [facility] [id number]   Medical Record Number  [id number]      Age  70 y.o. PCP Surjit Krishnamurthy MD   Admit date:  2/13/2019    Room Number  740/01  @ [facility]   Date of Service  2/20/2019           E & M PROGRESS NOTE: 15 mins         HISTORY       REASON FOR HOSPITALIZATION:  CC: \"Pt admitted under a voluntary basis for severe depression with suicidal ideations and  an inability to care for self. HISTORY OF PRESENT ILLNESS:    The patient, Blessing Park, is a 70 y.o. BLACK OR  male with a67-year-old -American male with past medical  history of end-stage renal disease; on hemodialysis, sleep apnea, peripheral vascular disease, bilateral BKA, hypertension, hyperlipidemia, peripheral neuropathy, anemia,hepatitis C, arthritis, peptic ulcer disease, coronary artery disease, DVT, prior GIbleed, chronic diastolic CHF, prior suicidal ideation, depression, and noncompliance   transfered to [facility] from [facility] after pt reported feeling depressed nad suicidal Pt stated he lives in the [place] and he is unhappy about his living situation , he stated he had lots of Money before [place] HAS 13 CHILDREN but now he is broke and no one ask about him. Pt reports feeling hopeless, helpless, angry. These symptoms are of high severity. These symptoms are constant . Pt ha sh/o using marijuana occasionally. UDS: negative; BAL=0. .  2/15/19: Pt is irritable and refused zoloft and vitals today. Pt has dialysis yesterday. 2/16/19: Pt took zoloft but refuses some medical meds. Pt had dialysis on Saturday. Pt is demanding in the evenings. 2/17/19: Pt is demanding, irritable in the evenings, compliant with zoloft. Eating well. 2/18/19: Pt is demanding and manipulative ta times but taking zoloft. Pt is eating well. 2/19/19: Pt taking zoloft.  Pt got agitated yesterday evening and received geodon I/M PRN. Pt reported he need percocet. AS per reports, he was on percocets for pain. 2/20/19: Pt is doing better, is no longer suicidal and is accepting of nursing home placement, is psychiatrically stable for discharge. SIDE EFFECTS: (reviewed/updated 2/20/2019)  None reported or admitted to. No noted toxicity with use of Depakote/Tegretol/lithium/Clozaril/TCAs   ALLERGIES:(reviewed/updated 2/20/2019)  Allergies   Allergen Reactions    Tetracycline Hives      MEDICATIONS PRIOR TO ADMISSION:(reviewed/updated 2/20/2019)  Medications Prior to Admission   Medication Sig    NIFEdipine ER (ADALAT CC) 60 mg ER tablet Take 1 Tab by mouth daily. HOLD for HR<60    hydroCHLOROthiazide (HYDRODIURIL) 25 mg tablet Take 1 Tab by mouth daily.  sertraline (ZOLOFT) 25 mg tablet Take 1 Tab by mouth daily.  oxyCODONE-acetaminophen (PERCOCET 10)  mg per tablet Take 1 Tab by mouth every six (6) hours as needed for Pain.  traZODone (DESYREL) 50 mg tablet Take 50 mg by mouth nightly.  rosuvastatin (CRESTOR) 10 mg tablet Take 10 mg by mouth nightly.  aspirin delayed-release 81 mg tablet Take 1 Tab by mouth daily. PAST MEDICAL HISTORY: Past medical history from the initial psychiatric evaluation has been reviewed (reviewed/updated 2/20/2019) with no additional updates (I asked patient and no additional past medical history provided). Past Medical History:   Diagnosis Date    Arthritis     CAD (coronary artery disease) 2007    CKD (chronic kidney disease), stage III ([address]     DM type 2 causing renal disease ([address]     DVT (deep venous thrombosis) (HCC)     Hx of DVT:  Xarelto stopped due to GI bleed. S/P IVC filter.  Gait abnormality     GI bleed     Heart murmur     Hepatitis C     History of blood transfusion     Hypercholesterolemia     Hypertension 11/7/2014    Neuropathy     Non compliance w medication regimen     Peripheral vascular disease ([address] 11/7/2014    A. S/P Right SFA POBA and tibial atherectomy (2/4/13).  PUD (peptic ulcer disease) 2007    Unspecified sleep apnea     never used cpap     Past Surgical History:   Procedure Laterality Date    ABDOMEN SURGERY PROC UNLISTED  2007    has approx 7-8\" midline incision on abdomen--\"had to open him up and clean him out after feeding tube clogged\"    HX GI  2007    feeding tube for approx 2 mo    VASCULAR SURGERY PROCEDURE UNLIST Right 1/22/2015    popliteal-tibial bypass      SOCIAL HISTORY: Social history from the initial psychiatric evaluation has been reviewed (reviewed/updated 2/20/2019) with no additional updates (I asked patient and no additional social history provided). Social History     Socioeconomic History    Marital status: SINGLE     Spouse name: Not on file    Number of children: Not on file    Years of education: Not on file    Highest education level: Not on file   Social Needs    Financial resource strain: Not on file    Food insecurity - worry: Not on file    Food insecurity - inability: Not on file    Transportation needs - medical: Not on file   Roomer Travel needs - non-medical: Not on file   Occupational History    Not on file   Tobacco Use    Smoking status: Current Every Day Smoker     Packs/day: 0.50    Smokeless tobacco: Never Used   Substance and Sexual Activity    Alcohol use: No    Drug use: No     Comment: >20 years ago    Sexual activity: Not on file   Other Topics Concern     Service Not Asked    Blood Transfusions Not Asked    Caffeine Concern Not Asked    Occupational Exposure Not Asked    Hobby Hazards Not Asked    Sleep Concern Not Asked    Stress Concern Not Asked    Weight Concern Not Asked    Special Diet Not Asked    Back Care Not Asked    Exercise Not Asked    Bike Helmet Not Asked    Seat Belt Not Asked    Self-Exams Not Asked   Social History Narrative    78 year ols male admitted for depression and homelessness.  Pt will be evicated from apartment due to non payment of bills. [facility] of 30 years [facility] [place] [facility]. \" Pt is a brittle diabetic, with treatment non compliance. He has bilarela lower extremity amputations. Patient has a long hx of substance abuse. FAMILY HISTORY: Family history from the initial psychiatric evaluation has been reviewed (reviewed/updated 2/20/2019) with no additional updates (I asked patient and no additional family history provided). Family History   Problem Relation Age of Onset    Hypertension Father        REVIEW OF SYSTEMS: (reviewed/updated 2/20/2019)  Appetite:   Sleep: All other Review of Systems: Negative except          MENTAL STATUS EXAM & [address] (MSE):    MSE FINDINGS ARE WITHIN NORMAL LIMITS (WNL) UNLESS OTHERWISE STATED BELOW. ( ALL OF THE BELOW CATEGORIES OF THE MSE HAVE BEEN REVIEWED (reviewed 2/14/2019) AND UPDATED AS DEEMED APPROPRIATE )  General Presentation age appropriate and casually dressed, cooperative   Orientation oriented to time, place and person   Vital Signs  See below (reviewed 2/14/2019); Vital Signs (BP, Pulse, & Temp) are within normal limits if not listed below.    Gait and Station Stable/steady, no ataxia   Musculoskeletal System No extrapyramidal symptoms (EPS); no abnormal muscular movements or Tardive Dyskinesia (TD); muscle strength and tone are within normal limits   Language No aphasia or dysarthria   Speech:  soft   Thought Processes logical; slow rate of thoughts; fair abstract reasoning/computation   Thought Associations goal directed   Thought Content free of delusions   Suicidal Ideations intention   Homicidal Ideations no plan  and no intention   Mood:  anxious  and depressed   Affect:  constricted   Memory recent  intact   Memory remote:  intact   Concentration/Attention:  distractable   Fund of Knowledge average   Insight:  fair   Reliability fair   Judgment:  fair VITALS:     Patient Vitals for the past 24 hrs:   Temp Pulse Resp BP SpO2   02/20/19 0752 98.3 °F (36.8 °C) (!) 55 16 147/67 100 %   02/19/19 1944 98.8 °F (37.1 °C) 61 16 148/67 99 %   02/19/19 1736 -- 64 -- -- --   02/19/19 1657 97.9 °F (36.6 °C) (!) 54 16 156/67 99 %   02/19/19 1405 98.1 °F (36.7 °C) (!) 54 18 128/58 --   02/19/19 1345 -- (!) 55 18 130/60 --   02/19/19 1330 -- (!) 53 18 133/66 --   02/19/19 1315 -- (!) 53 18 138/66 --   02/19/19 1300 -- (!) 54 18 125/60 --     Wt Readings from Last 3 Encounters:   02/14/19 73 kg (160 lb 15 oz)   02/13/19 73.4 kg (161 lb 12.8 oz)   01/15/19 81.6 kg (180 lb)     Temp Readings from Last 3 Encounters:   02/20/19 98.3 °F (36.8 °C)   02/13/19 98.2 °F (36.8 °C)   01/15/19 97 °F (36.1 °C)     BP Readings from Last 3 Encounters:   02/20/19 147/67   02/13/19 181/82   01/15/19 (!) 192/116     Pulse Readings from Last 3 Encounters:   02/20/19 (!) 55   02/13/19 (!) 48   01/15/19 75            DATA     LABORATORY DATA:(reviewed/updated 2/20/2019)  Recent Results (from the past 24 hour(s))   GLUCOSE, POC    Collection Time: 02/19/19  4:37 PM   Result Value Ref Range    Glucose (POC) 159 (H) 65 - 100 mg/dL    Performed by FERNY [address] [place] [address], POC    Collection Time: 02/19/19  8:18 PM   Result Value Ref Range    Glucose (POC) 135 (H) 65 - 100 mg/dL    Performed by [place], POC    Collection Time: 02/20/19  8:24 AM   Result Value Ref Range    Glucose (POC) 82 65 - 100 mg/dL    Performed by [address], POC    Collection Time: 02/20/19 11:56 AM   Result Value Ref Range    Glucose (POC) 105 (H) 65 - 100 mg/dL    Performed by Andrei Leon      No results found for: VALF2, VALAC, VALP, VALPR, DS6, CRBAM, CRBAMP, CARB2, XCRBAM  No results found for: LITHM   RADIOLOGY REPORTS:(reviewed/updated 2/20/2019)  Xr Chest Pa Lat    Result Date: 2/8/2019  EXAM: XR CHEST PA LAT INDICATION: Shortness of breath, Low back pain and abdominal pain for several days. End-stage renal disease on dialysis, missed dialysis one day ago. COMPARISON: Chest views on 5/20/2018 TECHNIQUE: 4 images of PA and lateral chest views FINDINGS: Right jugular dialysis catheter is new and terminates in the distal superior vena cava. Cardiac monitoring wires overlie the thorax. Borderline cardiac size is unchanged. Aortic arch is not enlarged. The pulmonary vasculature is within normal limits. The lungs and pleural spaces are clear. The visualized bones and upper abdomen are age-appropriate. IMPRESSION: No acute process on chest x-ray. Right jugular dialysis catheter terminates in the distal superior vena cava. No pneumothorax. Ct Abd Pelv W Cont    Result Date: 2/8/2019  EXAM: CT ABD PELV W CONT INDICATION: Abdominal pain and low back pain for several days. End-stage renal disease on dialysis, missed dialysis yesterday. Normal white blood cell count. Normal liver enzymes. COMPARISON: None. CONTRAST: 100 mL of Isovue-370. TECHNIQUE: Following the uneventful intravenous administration of contrast, thin axial images were obtained through the abdomen and pelvis. Coronal and sagittal reconstructions were generated. Oral contrast was administered. CT dose reduction was achieved through use of a standardized protocol tailored for this examination and automatic exposure control for dose modulation. FINDINGS: LUNG BASES: No pneumonia. Mild dependent atelectasis. INCIDENTALLY IMAGED HEART AND MEDIASTINUM: Borderline cardiac size. No pericardial effusion. LIVER: Subcentimeter liver lesions measure up to 9 mm in segment 2 of the liver. Surface is smooth. Streak artifact from bilateral upper extremities. GALLBLADDER: Gallstones are in the body and neck. No evidence of cholecystitis. CBD is not dilated. SPLEEN: Normal size and enhancement. PANCREAS: No mass or ductal dilatation. ADRENALS: Unremarkable. KIDNEYS: No mass, calculus, or hydronephrosis. STOMACH: Partial distention. SMALL BOWEL: No dilatation or wall thickening. COLON: Moderate colonic fecal burden. No mural thickening. APPENDIX: Unremarkable. PERITONEUM: No ascites or pneumoperitoneum. RETROPERITONEUM: IVC filter is in good position. Aorta is atherosclerotic without aneurysm. Mild atherosclerotic stenosis of the celiac artery. No lymphadenopathy. REPRODUCTIVE ORGANS: Mild prostatomegaly measures 6 cm and extends into the base of the urinary bladder. URINARY BLADDER: No mass or calculus. BONES: Moderate bilateral hip osteoarthritis. ADDITIONAL COMMENTS: N/A     IMPRESSION: 1. No acute process on CT. 2. Cholelithiasis. No cholecystitis. 3. Subcentimeter segment 2 liver lesions cannot be fully characterized on this examination. 4. Moderate colonic fecal burden may represent constipation. No bowel obstruction.          MEDICATIONS     ALL MEDICATIONS:   Current Facility-Administered Medications   Medication Dose Route Frequency    [START ON 2/21/2019] epoetin rosie-epbx (RETACRIT) injection 20,000 Units  20,000 Units SubCUTAneous DIALYSIS TUE, THU & SAT    oxyCODONE-acetaminophen (PERCOCET) 5-325 mg per tablet 1 Tab  1 Tab Oral BID PRN    ibuprofen (MOTRIN) tablet 800 mg  800 mg Oral Q8H PRN    sertraline (ZOLOFT) tablet 50 mg  50 mg Oral DAILY    aspirin delayed-release tablet 81 mg  81 mg Oral DAILY    rosuvastatin (CRESTOR) tablet 10 mg  10 mg Oral QHS    ferric citrate (AURYXIA) tablet 420 mg  420 mg Oral TID WITH MEALS    NIFEdipine ER (PROCARDIA XL) tablet 60 mg  60 mg Oral Q24H    doxazosin (CARDURA) tablet 4 mg  4 mg Oral QHS    OLANZapine (ZyPREXA) tablet 2.5 mg  2.5 mg Oral Q6H PRN    ziprasidone (GEODON) 10 mg in sterile water (preservative free) 0.5 mL injection  10 mg IntraMUSCular BID PRN    benztropine (COGENTIN) tablet 1 mg  1 mg Oral BID PRN    benztropine (COGENTIN) injection 1 mg  1 mg IntraMUSCular BID PRN    zolpidem (AMBIEN) tablet 5 mg  5 mg Oral QHS PRN    acetaminophen (TYLENOL) tablet 650 mg  650 mg Oral Q4H PRN    magnesium hydroxide (MILK OF MAGNESIA) 400 mg/5 mL oral suspension 30 mL  30 mL Oral DAILY PRN    nicotine (NICODERM CQ) 21 mg/24 hr patch 1 Patch  1 Patch TransDERmal DAILY PRN    glucose chewable tablet 16 g  4 Tab Oral PRN    dextrose (D50W) injection syrg 12.5-25 g  25-50 mL IntraVENous PRN    glucagon (GLUCAGEN) injection 1 mg  1 mg IntraMUSCular PRN    insulin lispro (HUMALOG) injection   SubCUTAneous AC&HS      SCHEDULED MEDICATIONS:   Current Facility-Administered Medications   Medication Dose Route Frequency    [START ON 2/21/2019] epoetin rosie-epbx (RETACRIT) injection 20,000 Units  20,000 Units SubCUTAneous DIALYSIS TUE, THU & SAT    sertraline (ZOLOFT) tablet 50 mg  50 mg Oral DAILY    aspirin delayed-release tablet 81 mg  81 mg Oral DAILY    rosuvastatin (CRESTOR) tablet 10 mg  10 mg Oral QHS    ferric citrate (AURYXIA) tablet 420 mg  420 mg Oral TID WITH MEALS    NIFEdipine ER (PROCARDIA XL) tablet 60 mg  60 mg Oral Q24H    doxazosin (CARDURA) tablet 4 mg  4 mg Oral QHS    insulin lispro (HUMALOG) injection   SubCUTAneous AC&HS          ASSESSMENT & PLAN     The patient, Jenny Pierre, is a 70 y.o.  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  Plan for hemodialysis 3 days a week.  If it is not controlled  with the same, provide additional antihypertensive meds.  Monitor blood pressure.    End-stage renal disease, on hemodialysis.  Schedule treatment for hemodialysis on  Tuesdays, Thursdays, and Saturdays. .   Type 2 diabetes mellitus.  Place on Humalog insulin correction coverage schedule,  Accu-Chek, and check hemoglobin A1c level.  The patient will be on a diabetic/renal  Diet.   Nephrology consult   2/15/19: continue meds/milue, encourage compliance. 2/16/19: continue meds/milue  2/17/19: continue meds/milue increaisng zoloft 50 mg po daily  2/18/19: continue meds/milue  2/19/19: readding percocet twice daily as needed for pain. 2/20/19: awaiting placemnet, PT/OT for placement  I will continue to monitor blood levels (Depakote, Tegretol, lithium, clozapine---a drug with a narrow therapeutic index= NTI) and associated labs for drug therapy implemented that require intense monitoring for toxicity as deemed appropriate based on current medication side effects and pharmacodynamically determined drug 1/2 lives. In summary, Pop Araya, is a 70 y.o.  male who presents with a severe exacerbation of the principal diagnosis of Depression  Patient's condition is worsening/not improving/not stable improving. Patient requires continued inpatient hospitalization for further stabilization, safety monitoring and medication management. I will continue to coordinate the provision of individual, milieu, occupational, group, and substance abuse therapies to address target symptoms/diagnoses as deemed appropriate for the individual patient. A coordinated, multidisplinary treatment team round was conducted with the patient (this team consists of the nurse, psychiatric unit pharmcist,  and writer). Complete current electronic health record for patient has been reviewed today including consultant notes, ancillary staff notes, nurses and psychiatric tech notes. uicide risk assessment completed and patient deemed to be of low risk for suicide at this time. The following regarding medications was addressed during rounds with patient:   the risks and benefits of the proposed medication. The patient was given the opportunity to ask questions. Informed consent given to the use of the above medications.  Will continue to adjust psychiatric and non-psychiatric medications (see above \"medication\" section and orders section for details) as deemed appropriate & based upon diagnoses and response to treatment. I will continue to order blood tests/labs and diagnostic tests as deemed appropriate and review results as they become available (see orders for details and above listed lab/test results). I will order psychiatric records from previous [facility] hospitals to further elucidate the nature of patient's psychopathology and review once available. I will gather additional collateral information from friends, family and o/p treatment team to further elucidate the nature of patient's psychopathology and baselline level of psychiatric functioning. I certify that this patient's inpatient psychiatric hospital services furnished since the previous certification were, and continue to be, required for treatment that could reasonably be expected to improve the patient's condition, or for diagnostic study, and that the patient continues to need, on a daily basis, active treatment furnished directly by or requiring the supervision of inpatient psychiatric facility personnel. In addition, the hospital records show that services furnished were intensive treatment services, admission or related services, or equivalent services.     EXPECTED DISCHARGE DATE/DAY: TBD     DISPOSITION: Home       Signed By:   Faustino Nelson MD  2/20/2019

## 2019-02-20 NOTE — INTERDISCIPLINARY ROUNDS
Behavioral Health Interdisciplinary Rounds     Patient Name: Carlos Manuel King  Age: 70 y.o. Room/Bed:  740/  Primary Diagnosis: Depression   Admission Status: Voluntary     Readmission within 30 days: no  Power of  in place: no  Patient requires a blocked bed: no          Reason for blocked bed:   Sleep hours: 1.5       Participation in Care/Groups:  yes  Medication Compliant?: Yes  PRNS (last 24 hours): Sleep Aid and Pain    Restraints (last 24 hours):  no     Alcohol screening (AUDIT) completed -  AUDIT Score: 0  If applicable, date SBIRT discussed in treatment team AND documented:    Tobacco - patient is a smoker: Have You Used Tobacco in the Past 30 Days: Yes  Illegal Drugs use: Have You Used Any Illegal Substances Over the Past 12 Months: Yes    24 hour chart check complete: yes     Patient goal(s) for today: Follow staff direction; take medications as prescribed  Treatment team focus/goals: Send Level 2; PT/OT eval; MMSE; refer for SNF placement  Progress note: Pt is cooperative today and participating in ADL's    LOS:  7  Expected LOS: TBD    Participating treatment team members:  Kel Kramer MSW; Dr. Alyse Goyal MD; Trista Salazar, MIKHAIL; Trena Bowen, DongD

## 2019-02-20 NOTE — BH NOTES
SW sent UAI, H&P, and notes to [facility] for Level 2 screening. SW referred Pt for LTC to the following facilities: [facility], [facility], [address] [facility], Desiree Winter, [address], [facility], [address] [facility], [facility], [address] and [address] [facility], [address] , [address] [facility]. Patient was declined from [facility], [facility], [address] and Nursing, Desiree Winter, [address], [address], [address] [facility]. MARTÍNEZ spoke to Ayaan Carlson, care manager at [facility] ([phone]) who recommended [facility] and [address].

## 2019-02-20 NOTE — PROGRESS NOTES
Problem: Depressed Mood (Adult/Pediatric)  Goal: *STG: Participates in treatment plan  Outcome: Progressing Towards Goal  Received this morning resting in bed. Is alert and oriented. Thoughts appear clear and organized. Is pleasant and cooperative with staff upon first rounds and during vitals. Mood appears brighter,smiling,patient voiced he is feeling good. Continues to have a good appetite. Remains sitting up in bed resting  with eyes closed off and on.     [address] [facility]  Master Treatment Plan for Gustavo Castro    Date Treatment Plan Initiated:2/20/19    Treatment Plan Modalities:  Type of Modality Amount  (x minutes) Frequency (x/week) Duration (x days) Name of Responsible Staff   Community & wrap-up meetings to encourage peer interactions 15 7 1   [facility]   Group psychotherapy to assist in building coping skills and internal controls 60 7 1 Jose Arreguin   Therapeutic activity groups to build coping skills 60 7 1 Jose Arreguin   Psychoeducation in group setting to address:   Medication education   15 7 1 [facility]   Coping skills         Relaxation techniques         Symptom management         Discharge planning   60 [address]   60 1 1 volunteer   Recovery/AA/NA   61 1 1 volunteer   Physician medication management   15 7 1 Dr Shae Penaloza   15 2 [address] [facility] and Sofiya Mcduffie

## 2019-02-21 ENCOUNTER — HOSPITAL ENCOUNTER (OUTPATIENT)
Dept: INTERVENTIONAL RADIOLOGY/VASCULAR | Age: 79
Discharge: HOME | End: 2019-02-21
Attending: INTERNAL MEDICINE
Payer: MEDICARE

## 2019-02-21 LAB
ALBUMIN SERPL-MCNC: 3.1 G/DL (ref 3.5–5)
ANION GAP SERPL CALC-SCNC: 8 MMOL/L (ref 5–15)
BUN SERPL-MCNC: 46 MG/DL (ref 6–20)
BUN/CREAT SERPL: 11 (ref 12–20)
CALCIUM SERPL-MCNC: 9 MG/DL (ref 8.5–10.1)
CHLORIDE SERPL-SCNC: 104 MMOL/L (ref 97–108)
CO2 SERPL-SCNC: 27 MMOL/L (ref 21–32)
CREAT SERPL-MCNC: 4.14 MG/DL (ref 0.7–1.3)
ERYTHROCYTE [DISTWIDTH] IN BLOOD BY AUTOMATED COUNT: 18.6 % (ref 11.5–14.5)
GLUCOSE BLD STRIP.AUTO-MCNC: 155 MG/DL (ref 65–100)
GLUCOSE BLD STRIP.AUTO-MCNC: 81 MG/DL (ref 65–100)
GLUCOSE SERPL-MCNC: 127 MG/DL (ref 65–100)
HCT VFR BLD AUTO: 32.2 % (ref 36.6–50.3)
HGB BLD-MCNC: 10 G/DL (ref 12.1–17)
MCH RBC QN AUTO: 27.1 PG (ref 26–34)
MCHC RBC AUTO-ENTMCNC: 31.1 G/DL (ref 30–36.5)
MCV RBC AUTO: 87.3 FL (ref 80–99)
NRBC # BLD: 0 K/UL (ref 0–0.01)
NRBC BLD-RTO: 0 PER 100 WBC
PHOSPHATE SERPL-MCNC: 4.3 MG/DL (ref 2.6–4.7)
PLATELET # BLD AUTO: 199 K/UL (ref 150–400)
PMV BLD AUTO: 10.5 FL (ref 8.9–12.9)
POTASSIUM SERPL-SCNC: 5.1 MMOL/L (ref 3.5–5.1)
RBC # BLD AUTO: 3.69 M/UL (ref 4.1–5.7)
SERVICE CMNT-IMP: ABNORMAL
SERVICE CMNT-IMP: NORMAL
SODIUM SERPL-SCNC: 139 MMOL/L (ref 136–145)
WBC # BLD AUTO: 3.8 K/UL (ref 4.1–11.1)

## 2019-02-21 PROCEDURE — 65220000003 HC RM SEMIPRIVATE PSYCH

## 2019-02-21 PROCEDURE — 74011250637 HC RX REV CODE- 250/637: Performed by: HOSPITALIST

## 2019-02-21 PROCEDURE — 74011000250 HC RX REV CODE- 250: Performed by: INTERNAL MEDICINE

## 2019-02-21 PROCEDURE — 74011250637 HC RX REV CODE- 250/637: Performed by: INTERNAL MEDICINE

## 2019-02-21 PROCEDURE — 74011250637 HC RX REV CODE- 250/637: Performed by: PSYCHIATRY & NEUROLOGY

## 2019-02-21 PROCEDURE — 74011250636 HC RX REV CODE- 250/636: Performed by: INTERNAL MEDICINE

## 2019-02-21 PROCEDURE — 74011250637 HC RX REV CODE- 250/637: Performed by: FAMILY MEDICINE

## 2019-02-21 PROCEDURE — C1769 GUIDE WIRE: HCPCS

## 2019-02-21 PROCEDURE — 85027 COMPLETE CBC AUTOMATED: CPT

## 2019-02-21 PROCEDURE — 77030002996 HC SUT SLK J&J -A

## 2019-02-21 PROCEDURE — 82962 GLUCOSE BLOOD TEST: CPT

## 2019-02-21 PROCEDURE — 36415 COLL VENOUS BLD VENIPUNCTURE: CPT

## 2019-02-21 PROCEDURE — 80069 RENAL FUNCTION PANEL: CPT

## 2019-02-21 RX ORDER — FENTANYL CITRATE 50 UG/ML
200 INJECTION, SOLUTION INTRAMUSCULAR; INTRAVENOUS
Status: DISCONTINUED | OUTPATIENT
Start: 2019-02-21 | End: 2019-02-25 | Stop reason: HOSPADM

## 2019-02-21 RX ORDER — CEFAZOLIN SODIUM/WATER 2 G/20 ML
2 SYRINGE (ML) INTRAVENOUS ONCE
Status: DISPENSED | OUTPATIENT
Start: 2019-02-21 | End: 2019-02-22

## 2019-02-21 RX ORDER — LIDOCAINE HYDROCHLORIDE 20 MG/ML
20 INJECTION, SOLUTION INFILTRATION; PERINEURAL ONCE
Status: ACTIVE | OUTPATIENT
Start: 2019-02-21 | End: 2019-02-22

## 2019-02-21 RX ORDER — HEPARIN SODIUM 1000 [USP'U]/ML
10000 INJECTION, SOLUTION INTRAVENOUS; SUBCUTANEOUS ONCE
Status: COMPLETED | OUTPATIENT
Start: 2019-02-21 | End: 2019-02-22

## 2019-02-21 RX ORDER — SODIUM CHLORIDE 9 MG/ML
500 INJECTION, SOLUTION INTRAVENOUS CONTINUOUS
Status: DISCONTINUED | OUTPATIENT
Start: 2019-02-21 | End: 2019-02-25 | Stop reason: HOSPADM

## 2019-02-21 RX ORDER — LIDOCAINE HYDROCHLORIDE 10 MG/ML
INJECTION, SOLUTION EPIDURAL; INFILTRATION; INTRACAUDAL; PERINEURAL
Status: DISPENSED
Start: 2019-02-21 | End: 2019-02-22

## 2019-02-21 RX ADMIN — ALTEPLASE 2 MG: 2.2 INJECTION, POWDER, LYOPHILIZED, FOR SOLUTION INTRAVENOUS at 09:44

## 2019-02-21 RX ADMIN — FERRIC CITRATE 420 MG: 210 TABLET, COATED ORAL at 16:00

## 2019-02-21 RX ADMIN — NIFEDIPINE 60 MG: 60 TABLET, FILM COATED, EXTENDED RELEASE ORAL at 18:12

## 2019-02-21 RX ADMIN — DOXAZOSIN 4 MG: 2 TABLET ORAL at 21:50

## 2019-02-21 RX ADMIN — ROSUVASTATIN CALCIUM 10 MG: 10 TABLET, FILM COATED ORAL at 21:30

## 2019-02-21 RX ADMIN — ACETAMINOPHEN 650 MG: 325 TABLET ORAL at 08:30

## 2019-02-21 NOTE — BH NOTES
GROUP THERAPY PROGRESS NOTE    Gustavo Henryi did not participate in a 45 minute Process Group on the Geriatric Unit with a focus on shifting ones feelings to a positive note and preparing for the day through group singing.

## 2019-02-21 NOTE — PROGRESS NOTES
Physical Therapy Note  2/21/19    Order acknowledged and chart reviewed. Spoke with RN who reports that pt is currently sleeping, awaiting transfer off unit for HD and requested to allow pt to keep resting. Will defer and follow up tomorrow.     Na Boyd, PT, DPT

## 2019-02-21 NOTE — PROGRESS NOTES
Problem: Depressed Mood (Adult/Pediatric)  Goal: *STG: Participates in treatment plan  Outcome: Progressing Towards Goal  Pt in bed resting at the start of shift. Pt awake upon greeting but would not respond to staff. Pt is waiting to get at [facility] for dialysis. Pt has been compliant with meal or medications. Problem: Falls - Risk of  Goal: *Absence of Falls  Document Bello Fall Risk and appropriate interventions in the flowsheet.   Outcome: Progressing Towards Goal  Fall Risk Interventions:  Mobility Interventions: Bed/chair exit alarm, Communicate number of staff needed for ambulation/transfer, Utilize walker, cane, or other assistive device    Mentation Interventions: Adequate sleep, hydration, pain control, Bed/chair exit alarm, Door open when patient unattended, More frequent rounding    Medication Interventions: Patient to call before getting OOB, Bed/chair exit alarm    Elimination Interventions: Patient to call for help with toileting needs    History of Falls Interventions: Bed/chair exit alarm

## 2019-02-21 NOTE — PROGRESS NOTES
Problem: Falls - Risk of  Goal: *Absence of Falls  Document Bello Fall Risk and appropriate interventions in the flowsheet. Outcome: Progressing Towards Goal  Fall Risk Interventions:  Mobility Interventions: Bed/chair exit alarm  Mentation Interventions: Adequate sleep, hydration, pain control  Medication Interventions: Bed/chair exit alarm  Elimination Interventions: Patient to call for help with toileting needs  History of Falls Interventions: Bed/chair exit alarm    Received pt lying in bed, talking. No distress noted. Respirations even and unlabored. Will cont to monitor q15min for safety. 0030: Pt refusing to stay in bed. Being rude to staff, demanding snacks. Repeatedly reminded of unit rules. Fluids offered, pt refused.

## 2019-02-21 NOTE — PROGRESS NOTES
Nephrology Progress Note  Minor Saris  Date of Admission : 2/13/2019    CC:  Follow up for ESRD       Assessment and Plan     ESRD on HD:  - HD TTS at [facility]  - HD today after cathflo     HTN:  - cont current meds    Anemia of CKD:  - FANNY with dialysis     Secondary HPT:  - cont auryxia    Diastolic HF    PAD s/p b/l BKAs    DM2:  - on insulin    Depression:  - per psychiatry       Interval History:  Seen and examined. About to start HD, RN having issues with PC. Cathflo instilling now. Pt w/o complaints this AM.    Current Medications: all current  Medications have been eviewed in EPIC  Review of Systems: Pertinent items are noted in HPI. Objective:  Vitals:    Vitals:    02/19/19 1944 02/20/19 0752 02/20/19 1605 02/20/19 1924   BP: 148/67 147/67 173/76 160/87   Pulse: 61 (!) 55 (!) 50 (!) 54   Resp: 16 16 18 18   Temp: 98.8 °F (37.1 °C) 98.3 °F (36.8 °C) 98.1 °F (36.7 °C) 98.3 °F (36.8 °C)   SpO2: 99% 100% 99% 100%   Weight:         Intake and Output:  No intake/output data recorded. 02/19 1901 - 02/21 0700  In: -   Out: 700 [Urine:700]    Physical Examination:  General: NAD  Neck:  Supple, no mass  Resp:  Lungs CTA B/L, no wheezing , normal respiratory effort  CV:  RRR,  no murmur or rub, no thigh edema, b/l BKAs  GI:  Soft, NT, + Bowel sounds, no hepatosplenomegaly  Neurologic:  Non focal  Psych:             Depressed, flat affect  Skin:  No Rash  Access:           [facility] c/d/i    []    High complexity decision making was performed  []    Patient is at high-risk of decompensation with multiple organ involvement    Lab Data Personally Reviewed: I have reviewed all the pertinent labs, microbiology data and radiology studies during assessment.     Recent Labs     02/19/19 0917      K 4.8      CO2 24   GLU 87   BUN 53*   CREA 4.96*   CA 8.6   PHOS 3.8   ALB 2.9*     Recent Labs     02/19/19 0917   WBC 4.3   HGB 9.1*   HCT 30.0*        No results found for: SDES  Lab Results   Component Value Date/Time    Culture result: MRSA NOT PRESENT 01/24/2018 10:30 AM    Culture result:  01/24/2018 10:30 AM         Screening of patient nares for MRSA is for surveillance purposes and, if positive, to facilitate isolation considerations in high risk settings. It is not intended for automatic decolonization interventions per se as regimens are not sufficiently effective to warrant routine use. Culture result: MRSA NOT PRESENT 01/19/2015 10:44 AM    Culture result:  01/19/2015 10:44 AM         Screening of patient nares for MRSA is for surveillance purposes and, if positive, to facilitate isolation considerations in high risk settings. It is not intended for automatic decolonization interventions per se as regimens are not sufficiently effective to warrant routine use. Recent Results (from the past 24 hour(s))   GLUCOSE, POC    Collection Time: 02/20/19 11:56 AM   Result Value Ref Range    Glucose (POC) 105 (H) 65 - 100 mg/dL    Performed by Via Tye Corona, POC    Collection Time: 02/20/19  4:37 PM   Result Value Ref Range    Glucose (POC) 107 (H) 65 - 100 mg/dL    Performed by Tia Guadalupe, POC    Collection Time: 02/20/19  8:14 PM   Result Value Ref Range    Glucose (POC) 85 65 - 100 mg/dL    Performed by Rahel Bravo MD  [address] [place] [address]  Phone - [phone]   Fax - [phone]  [email or web address]

## 2019-02-21 NOTE — DIALYSIS
Pt ws unable to run due to nurse being unable to aspirate arterial side of port. Cathflow was used at [phone] and rechecked at 1040 but to no avail. Dr Alissa Barrera notified and recommended IR to take a look. Report given to primary nurse.

## 2019-02-21 NOTE — INTERDISCIPLINARY ROUNDS
Behavioral Health Interdisciplinary Rounds     Patient Name: Jyoti Mcgowan  Age: 70 y.o. Room/Bed:  740/  Primary Diagnosis: Depression   Admission Status: Voluntary     Readmission within 30 days: no  Power of  in place: no  Patient requires a blocked bed: no          Reason for blocked bed:   Sleep hours:  3+      Participation in Care/Groups:  yes  Medication Compliant?: Yes  PRNS (last 24 hours): Sleep Aid and Pain    Restraints (last 24 hours):  no     Alcohol screening (AUDIT) completed -  AUDIT Score: 0  If applicable, date SBIRT discussed in treatment team AND documented:    Tobacco - patient is a smoker: Have You Used Tobacco in the Past 30 Days: Yes  Illegal Drugs use: Have You Used Any Illegal Substances Over the Past 12 Months: Yes    24 hour chart check complete: yes     Patient goal(s) for today: Continue to follow staff direction  Treatment team focus/goals: Continue working towards placement  Progress note: Pt continues to be compliant with medications and treatment     LOS:  8  Expected LOS: TBD    Participating treatment team members:  Jyoti Mcgowan, Yusuf Brennan MSW; Dr. David Joe MD; Priscilla Wong RN; Hanna Rai, PharmD

## 2019-02-21 NOTE — BH NOTES
SW spoke to Remi Hidalgo (R) ([phone]), admission coordinator for [facility]. She stated that there is availability at their [place] [id number] and Pt would be eligible for placement there once Level 2 is authorized. Ms. Nabilwendy Cristobal to call Whitney Obrien, care manager at [facility] ([phone]) for additional information. SW to follow up with Ms. Chicas next week.

## 2019-02-21 NOTE — PROGRESS NOTES
Problem: Depressed Mood (Adult/Pediatric)  Goal: *STG: Participates in treatment plan  Outcome: Progressing Towards Goal  Patient participates in treatment plan. 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 Goal: *STG: Attends activities and groups  Outcome: Progressing Towards Goal  Patient attends group or activities and sings    Problem: Falls - Risk of  Goal: *Absence of Falls  Document Bello Fall Risk and appropriate interventions in the flowsheet. Outcome: Progressing Towards Goal  Fall Risk Interventions:  Patient remains free from fall. Document Bello fall risk scale.  Calls before requesting lift team.  Mobility Interventions: Bed/chair exit alarm    Mentation Interventions: Adequate sleep, hydration, pain control    Medication Interventions: Bed/chair exit alarm    Elimination Interventions: Patient to call for help with toileting needs    History of Falls Interventions: Bed/chair exit alarm

## 2019-02-21 NOTE — PROGRESS NOTES
Problem: Discharge Planning  Goal: *Discharge to safe environment  Outcome: Progressing Towards Goal  Patient will be placed in SNF for LTC. Goal: *Knowledge of medication management  Outcome: Progressing Towards Goal  Patient verbalizes understanding of medication regimen. Patient is currently taking medications as prescribed. Goal: *Knowledge of discharge instructions  Outcome: Progressing Towards Goal  Patient verbalizes understanding of goals for treatment and safe discharge.

## 2019-02-21 NOTE — BH NOTES
1830 Just called down to [facility] to find out when they where coming for pt. Spoke to Kamar Thomas and they are closing for the night and will come get him first thing in the morning. Kamar Thomas said they spoke with someone and they said the pt would be okay to wait until morning. [address] Talked to Dr Tom Le (on call physician) to let them know the procedure will be done in the morning.

## 2019-02-21 NOTE — BH NOTES
GROUP THERAPY PROGRESS NOTE    Franky Clay is not participating in [facility].      Group time: 30 minutes,music channel    Personal goal for participation: decreased anxiety    Goal orientation: relaxation    Group therapy participation: active    Therapeutic interventions reviewed and discussed:     Impression of participation: did not attend

## 2019-02-21 NOTE — BH NOTES
PSYCHIATRIC PROGRESS NOTE         Patient Name  Angie Ross   Date of Birth 1940   [facility] [id number]   Medical Record Number  [id number]      Age  70 y.o. PCP Yamileth Mota MD   Admit date:  2/13/2019    Room Number  740/01  @ [facility]   Date of Service  2/21/2019           E & M PROGRESS NOTE: 15 mins         HISTORY       REASON FOR HOSPITALIZATION:  CC: \"Pt admitted under a voluntary basis for severe depression with suicidal ideations and  an inability to care for self. HISTORY OF PRESENT ILLNESS:    The patient, Angie Ross, is a 70 y.o. BLACK OR  male with 61-year-old -American male with past medical  history of end-stage renal disease; on hemodialysis, sleep apnea, peripheral vascular disease, bilateral BKA, hypertension, hyperlipidemia, peripheral neuropathy, anemia,hepatitis C, arthritis, peptic ulcer disease, coronary artery disease, DVT, prior GIbleed, chronic diastolic CHF, prior suicidal ideation, depression, and noncompliance   transfered to [facility] from [facility] after pt reported feeling depressed nad suicidal Pt stated he lives in the [place] and he is unhappy about his living situation , he stated he had lots of Money before [place] HAS 13 CHILDREN but now he is broke and no one ask about him. Pt reports feeling hopeless, helpless, angry. These symptoms are of high severity. These symptoms are constant . Pt ha sh/o using marijuana occasionally. UDS: negative; BAL=0. .  2/15/19: Pt is irritable and refused zoloft and vitals today. Pt has dialysis yesterday. 2/16/19: Pt took zoloft but refuses some medical meds. Pt had dialysis on Saturday. Pt is demanding in the evenings. 2/17/19: Pt is demanding, irritable in the evenings, compliant with zoloft. Eating well. 2/18/19: Pt is demanding and manipulative ta times but taking zoloft. Pt is eating well. 2/19/19: Pt taking zoloft.  Pt got agitated yesterday evening and received geodon I/M PRN. Pt reported he need percocet. AS per reports, he was on percocets for pain. 2/20/19: Pt is doing better, is no longer suicidal and is accepting of nursing home placement, is psychiatrically stable for discharge. 2/21/19: Pt did not receive dialysis , was sleeping in his bed. Pt has clot in the catheter nad need new port. Nephrology is contacted. Pt report not feeling depressed, awaiting placement. SIDE EFFECTS: (reviewed/updated 2/21/2019)  None reported or admitted to. No noted toxicity with use of Depakote/Tegretol/lithium/Clozaril/TCAs   ALLERGIES:(reviewed/updated 2/21/2019)  Allergies   Allergen Reactions    Tetracycline Hives      MEDICATIONS PRIOR TO ADMISSION:(reviewed/updated 2/21/2019)  Medications Prior to Admission   Medication Sig    NIFEdipine ER (ADALAT CC) 60 mg ER tablet Take 1 Tab by mouth daily. HOLD for HR<60    hydroCHLOROthiazide (HYDRODIURIL) 25 mg tablet Take 1 Tab by mouth daily.  sertraline (ZOLOFT) 25 mg tablet Take 1 Tab by mouth daily.  oxyCODONE-acetaminophen (PERCOCET 10)  mg per tablet Take 1 Tab by mouth every six (6) hours as needed for Pain.  traZODone (DESYREL) 50 mg tablet Take 50 mg by mouth nightly.  rosuvastatin (CRESTOR) 10 mg tablet Take 10 mg by mouth nightly.  aspirin delayed-release 81 mg tablet Take 1 Tab by mouth daily. PAST MEDICAL HISTORY: Past medical history from the initial psychiatric evaluation has been reviewed (reviewed/updated 2/21/2019) with no additional updates (I asked patient and no additional past medical history provided). Past Medical History:   Diagnosis Date    Arthritis     CAD (coronary artery disease) 2007    CKD (chronic kidney disease), stage III ([address]     DM type 2 causing renal disease ([address]     DVT (deep venous thrombosis) (HCC)     Hx of DVT:  Xarelto stopped due to GI bleed. S/P IVC filter.     Gait abnormality     GI bleed     Heart murmur     Hepatitis C     History of blood transfusion     Hypercholesterolemia     Hypertension 11/7/2014    Neuropathy     Non compliance w medication regimen     Peripheral vascular disease ([facility] 11/7/2014    A. S/P Right SFA POBA and tibial atherectomy (2/4/13).  PUD (peptic ulcer disease) 2007    Unspecified sleep apnea     never used cpap     Past Surgical History:   Procedure Laterality Date    ABDOMEN SURGERY PROC UNLISTED  2007    has approx 7-8\" midline incision on abdomen--\"had to open him up and clean him out after feeding tube clogged\"    HX GI  2007    feeding tube for approx 2 mo    VASCULAR SURGERY PROCEDURE UNLIST Right 1/22/2015    popliteal-tibial bypass      SOCIAL HISTORY: Social history from the initial psychiatric evaluation has been reviewed (reviewed/updated 2/21/2019) with no additional updates (I asked patient and no additional social history provided).    Social History     Socioeconomic History    Marital status: SINGLE     Spouse name: Not on file    Number of children: Not on file    Years of education: Not on file    Highest education level: Not on file   Social Needs    Financial resource strain: Not on file    Food insecurity - worry: Not on file    Food insecurity - inability: Not on file    Transportation needs - medical: Not on file   [employer] needs - non-medical: Not on file   Occupational History    Not on file   Tobacco Use    Smoking status: Current Every Day Smoker     Packs/day: 0.50    Smokeless tobacco: Never Used   Substance and Sexual Activity    Alcohol use: No    Drug use: No     Comment: >20 years ago    Sexual activity: Not on file   Other Topics Concern     Service Not Asked    Blood Transfusions Not Asked    Caffeine Concern Not Asked    Occupational Exposure Not Asked    Hobby Hazards Not Asked    Sleep Concern Not Asked    Stress Concern Not Asked    Weight Concern Not Asked    Special Diet Not Asked    Back Care Not Asked    Exercise Not Asked    Bike Helmet Not Asked    Seat Belt Not Asked    Self-Exams Not Asked   Social History Narrative    78 year ols male admitted for depression and homelessness. Pt will be evicated from apartment due to non payment of bills. Girlfriend of 30 years left him to [place]. \" Pt is a brittle diabetic, with treatment non compliance. He has bilarela lower extremity amputations. Patient has a long hx of substance abuse. FAMILY HISTORY: Family history from the initial psychiatric evaluation has been reviewed (reviewed/updated 2/21/2019) with no additional updates (I asked patient and no additional family history provided). Family History   Problem Relation Age of Onset    Hypertension Father        REVIEW OF SYSTEMS: (reviewed/updated 2/21/2019)  Appetite:   Sleep: All other Review of Systems: Negative except          MENTAL STATUS EXAM & [address] (MSE):    MSE FINDINGS ARE WITHIN NORMAL LIMITS (WNL) UNLESS OTHERWISE STATED BELOW. ( ALL OF THE BELOW CATEGORIES OF THE MSE HAVE BEEN REVIEWED (reviewed 2/14/2019) AND UPDATED AS DEEMED APPROPRIATE )  General Presentation age appropriate and casually dressed, cooperative   Orientation oriented to time, place and person   Vital Signs  See below (reviewed 2/14/2019); Vital Signs (BP, Pulse, & Temp) are within normal limits if not listed below.    Gait and Station Stable/steady, no ataxia   Musculoskeletal System No extrapyramidal symptoms (EPS); no abnormal muscular movements or Tardive Dyskinesia (TD); muscle strength and tone are within normal limits   Language No aphasia or dysarthria   Speech:  soft   Thought Processes logical; slow rate of thoughts; fair abstract reasoning/computation   Thought Associations goal directed   Thought Content free of delusions   Suicidal Ideations intention   Homicidal Ideations no plan  and no intention   Mood:  anxious  and depressed   Affect:  constricted   Memory recent  intact Memory remote:  intact   Concentration/Attention:  distractable   Fund of Knowledge average   Insight:  fair   Reliability fair   Judgment:  fair                                                                                                    VITALS:     Patient Vitals for the past 24 hrs:   Temp Pulse Resp BP SpO2   02/21/19 0750 97.5 °F (36.4 °C) (!) 56 16 157/65 100 %   02/20/19 1924 98.3 °F (36.8 °C) (!) 54 18 160/87 100 %   02/20/19 1605 98.1 °F (36.7 °C) (!) 50 18 173/76 99 %     Wt Readings from Last 3 Encounters:   02/14/19 73 kg (160 lb 15 oz)   02/13/19 73.4 kg (161 lb 12.8 oz)   01/15/19 81.6 kg (180 lb)     Temp Readings from Last 3 Encounters:   02/21/19 97.5 °F (36.4 °C)   02/13/19 98.2 °F (36.8 °C)   01/15/19 97 °F (36.1 °C)     BP Readings from Last 3 Encounters:   02/21/19 157/65   02/13/19 181/82   01/15/19 (!) 192/116     Pulse Readings from Last 3 Encounters:   02/21/19 (!) 56   02/13/19 (!) 48   01/15/19 75            DATA     LABORATORY DATA:(reviewed/updated 2/21/2019)  Recent Results (from the past 24 hour(s))   GLUCOSE, POC    Collection Time: 02/20/19 11:56 AM   Result Value Ref Range    Glucose (POC) 105 (H) 65 - 100 mg/dL    Performed by Monica Alvarado 87, POC    Collection Time: 02/20/19  4:37 PM   Result Value Ref Range    Glucose (POC) 107 (H) 65 - 100 mg/dL    Performed by Debi Mcnair    GLUCOSE, POC    Collection Time: 02/20/19  8:14 PM   Result Value Ref Range    Glucose (POC) 85 65 - 100 mg/dL    Performed by Debi Adas      No results found for: VALF2, VALAC, VALP, VALPR, DS6, CRBAM, CRBAMP, CARB2, XCRBAM  No results found for: LITHM   RADIOLOGY REPORTS:(reviewed/updated 2/21/2019)  Xr Chest Pa Lat    Result Date: 2/8/2019  EXAM: XR CHEST PA LAT INDICATION: Shortness of breath, Low back pain and abdominal pain for several days. End-stage renal disease on dialysis, missed dialysis one day ago.  COMPARISON: Chest views on 5/20/2018 TECHNIQUE: 4 images of PA and lateral chest views FINDINGS: Right jugular dialysis catheter is new and terminates in the distal superior vena cava. Cardiac monitoring wires overlie the thorax. Borderline cardiac size is unchanged. Aortic arch is not enlarged. The pulmonary vasculature is within normal limits. The lungs and pleural spaces are clear. The visualized bones and upper abdomen are age-appropriate. IMPRESSION: No acute process on chest x-ray. Right jugular dialysis catheter terminates in the distal superior vena cava. No pneumothorax. Ct Abd Pelv W Cont    Result Date: 2/8/2019  EXAM: CT ABD PELV W CONT INDICATION: Abdominal pain and low back pain for several days. End-stage renal disease on dialysis, missed dialysis yesterday. Normal white blood cell count. Normal liver enzymes. COMPARISON: None. CONTRAST: 100 mL of Isovue-370. TECHNIQUE: Following the uneventful intravenous administration of contrast, thin axial images were obtained through the abdomen and pelvis. Coronal and sagittal reconstructions were generated. Oral contrast was administered. CT dose reduction was achieved through use of a standardized protocol tailored for this examination and automatic exposure control for dose modulation. FINDINGS: LUNG BASES: No pneumonia. Mild dependent atelectasis. INCIDENTALLY IMAGED HEART AND MEDIASTINUM: Borderline cardiac size. No pericardial effusion. LIVER: Subcentimeter liver lesions measure up to 9 mm in segment 2 of the liver. Surface is smooth. Streak artifact from bilateral upper extremities. GALLBLADDER: Gallstones are in the body and neck. No evidence of cholecystitis. CBD is not dilated. SPLEEN: Normal size and enhancement. PANCREAS: No mass or ductal dilatation. ADRENALS: Unremarkable. KIDNEYS: No mass, calculus, or hydronephrosis. STOMACH: Partial distention. SMALL BOWEL: No dilatation or wall thickening. COLON: Moderate colonic fecal burden. No mural thickening. APPENDIX: Unremarkable.  PERITONEUM: No ascites or pneumoperitoneum. RETROPERITONEUM: IVC filter is in good position. Aorta is atherosclerotic without aneurysm. Mild atherosclerotic stenosis of the celiac artery. No lymphadenopathy. REPRODUCTIVE ORGANS: Mild prostatomegaly measures 6 cm and extends into the base of the urinary bladder. URINARY BLADDER: No mass or calculus. BONES: Moderate bilateral hip osteoarthritis. ADDITIONAL COMMENTS: N/A     IMPRESSION: 1. No acute process on CT. 2. Cholelithiasis. No cholecystitis. 3. Subcentimeter segment 2 liver lesions cannot be fully characterized on this examination. 4. Moderate colonic fecal burden may represent constipation. No bowel obstruction.          MEDICATIONS     ALL MEDICATIONS:   Current Facility-Administered Medications   Medication Dose Route Frequency    epoetin rosie-epbx (RETACRIT) injection 20,000 Units  20,000 Units SubCUTAneous DIALYSIS TUE, THU & SAT    oxyCODONE-acetaminophen (PERCOCET) 5-325 mg per tablet 1 Tab  1 Tab Oral BID PRN    ibuprofen (MOTRIN) tablet 800 mg  800 mg Oral Q8H PRN    sertraline (ZOLOFT) tablet 50 mg  50 mg Oral DAILY    aspirin delayed-release tablet 81 mg  81 mg Oral DAILY    rosuvastatin (CRESTOR) tablet 10 mg  10 mg Oral QHS    ferric citrate (AURYXIA) tablet 420 mg  420 mg Oral TID WITH MEALS    NIFEdipine ER (PROCARDIA XL) tablet 60 mg  60 mg Oral Q24H    doxazosin (CARDURA) tablet 4 mg  4 mg Oral QHS    OLANZapine (ZyPREXA) tablet 2.5 mg  2.5 mg Oral Q6H PRN    ziprasidone (GEODON) 10 mg in sterile water (preservative free) 0.5 mL injection  10 mg IntraMUSCular BID PRN    benztropine (COGENTIN) tablet 1 mg  1 mg Oral BID PRN    benztropine (COGENTIN) injection 1 mg  1 mg IntraMUSCular BID PRN    zolpidem (AMBIEN) tablet 5 mg  5 mg Oral QHS PRN    acetaminophen (TYLENOL) tablet 650 mg  650 mg Oral Q4H PRN    magnesium hydroxide (MILK OF MAGNESIA) 400 mg/5 mL oral suspension 30 mL  30 mL Oral DAILY PRN    nicotine (NICODERM CQ) 21 mg/24 hr patch 1 Patch  1 Patch TransDERmal DAILY PRN    glucose chewable tablet 16 g  4 Tab Oral PRN    dextrose (D50W) injection syrg 12.5-25 g  25-50 mL IntraVENous PRN    glucagon (GLUCAGEN) injection 1 mg  1 mg IntraMUSCular PRN    insulin lispro (HUMALOG) injection   SubCUTAneous AC&HS      SCHEDULED MEDICATIONS:   Current Facility-Administered Medications   Medication Dose Route Frequency    epoetin rosie-epbx (RETACRIT) injection 20,000 Units  20,000 Units SubCUTAneous DIALYSIS TUE, THU & SAT    sertraline (ZOLOFT) tablet 50 mg  50 mg Oral DAILY    aspirin delayed-release tablet 81 mg  81 mg Oral DAILY    rosuvastatin (CRESTOR) tablet 10 mg  10 mg Oral QHS    ferric citrate (AURYXIA) tablet 420 mg  420 mg Oral TID WITH MEALS    NIFEdipine ER (PROCARDIA XL) tablet 60 mg  60 mg Oral Q24H    doxazosin (CARDURA) tablet 4 mg  4 mg Oral QHS    insulin lispro (HUMALOG) injection   SubCUTAneous AC&HS          ASSESSMENT & PLAN     The patient, Stepan Giron, is a 70 y.o.  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  Plan for hemodialysis 3 days a week.  If it is not controlled  with the same, provide additional antihypertensive meds.  Monitor blood pressure.    End-stage renal disease, on hemodialysis.  Schedule treatment for hemodialysis on  Tuesdays, Thursdays, and Saturdays. .   Type 2 diabetes mellitus.  Place on Humalog insulin correction coverage schedule,  Accu-Chek, and check hemoglobin A1c level.  The patient will be on a diabetic/renal  Diet. Nephrology consult   2/15/19: continue meds/milue, encourage compliance. 2/16/19: continue meds/milue  2/17/19: continue meds/milue increaisng zoloft 50 mg po daily  2/18/19: continue meds/milue  2/19/19: readding percocet twice daily as needed for pain.   2/20/19: awaiting placemnet, PT/OT for placement  2/21/19: nephrology consult, recommendation regrading dialysis,  I will continue to monitor blood levels (Depakote, Tegretol, lithium, clozapine---a drug with a narrow therapeutic index= NTI) and associated labs for drug therapy implemented that require intense monitoring for toxicity as deemed appropriate based on current medication side effects and pharmacodynamically determined drug 1/2 lives. In summary, Cami Mattson, is a 70 y.o.  male who presents with a severe exacerbation of the principal diagnosis of Depression  Patient's condition is worsening/not improving/not stable improving. Patient requires continued inpatient hospitalization for further stabilization, safety monitoring and medication management. I will continue to coordinate the provision of individual, milieu, occupational, group, and substance abuse therapies to address target symptoms/diagnoses as deemed appropriate for the individual patient. A coordinated, multidisplinary treatment team round was conducted with the patient (this team consists of the nurse, psychiatric unit pharmcist,  and writer). Complete current electronic health record for patient has been reviewed today including consultant notes, ancillary staff notes, nurses and psychiatric tech notes. uicide risk assessment completed and patient deemed to be of low risk for suicide at this time. The following regarding medications was addressed during rounds with patient:   the risks and benefits of the proposed medication. The patient was given the opportunity to ask questions. Informed consent given to the use of the above medications. Will continue to adjust psychiatric and non-psychiatric medications (see above \"medication\" section and orders section for details) as deemed appropriate & based upon diagnoses and response to treatment.      I will continue to order blood tests/labs and diagnostic tests as deemed appropriate and review results as they become available (see orders for details and above listed lab/test results). I will order psychiatric records from previous [facility] hospitals to further elucidate the nature of patient's psychopathology and review once available. I will gather additional collateral information from friends, family and o/p treatment team to further elucidate the nature of patient's psychopathology and baselline level of psychiatric functioning. I certify that this patient's inpatient psychiatric hospital services furnished since the previous certification were, and continue to be, required for treatment that could reasonably be expected to improve the patient's condition, or for diagnostic study, and that the patient continues to need, on a daily basis, active treatment furnished directly by or requiring the supervision of inpatient psychiatric facility personnel. In addition, the hospital records show that services furnished were intensive treatment services, admission or related services, or equivalent services.     EXPECTED DISCHARGE DATE/DAY: TBD     DISPOSITION: Home       Signed By:   Ce He MD  2/21/2019

## 2019-02-21 NOTE — PROGRESS NOTES
Problem: Depressed Mood (Adult/Pediatric)  Goal: *STG: Attends activities and groups  Outcome: Progressing Towards Goal    Received patient sitting in wheelchair in DR talking on phone. NAD. On q 15 min safety checks. Visible in DR watching TV, and eating 100% dinner, fluids and snacks    Continues to be demanding, intrusive and waking up his roommate at bedtime    Med compliant. Requires firm limit setting and redirections. Patient reported \"I want to go to Rehab to get prosthetic legs so I can go back to work as a  and drive my truck\".

## 2019-02-22 ENCOUNTER — HOSPITAL ENCOUNTER (OUTPATIENT)
Dept: INTERVENTIONAL RADIOLOGY/VASCULAR | Age: 79
Discharge: HOME | DRG: 881 | End: 2019-02-22
Attending: INTERNAL MEDICINE
Payer: MEDICARE

## 2019-02-22 VITALS
TEMPERATURE: 98.2 F | SYSTOLIC BLOOD PRESSURE: 146 MMHG | HEART RATE: 53 BPM | RESPIRATION RATE: 10 BRPM | OXYGEN SATURATION: 100 % | DIASTOLIC BLOOD PRESSURE: 68 MMHG

## 2019-02-22 LAB
GLUCOSE BLD STRIP.AUTO-MCNC: 101 MG/DL (ref 65–100)
GLUCOSE BLD STRIP.AUTO-MCNC: 112 MG/DL (ref 65–100)
GLUCOSE BLD STRIP.AUTO-MCNC: 125 MG/DL (ref 65–100)
GLUCOSE BLD STRIP.AUTO-MCNC: 82 MG/DL (ref 65–100)
SERVICE CMNT-IMP: ABNORMAL
SERVICE CMNT-IMP: NORMAL

## 2019-02-22 PROCEDURE — 0J2TXYZ CHANGE OTHER DEVICE IN TRUNK SUBCUTANEOUS TISSUE AND FASCIA, EXTERNAL APPROACH: ICD-10-PCS | Performed by: STUDENT IN AN ORGANIZED HEALTH CARE EDUCATION/TRAINING PROGRAM

## 2019-02-22 PROCEDURE — 74011250637 HC RX REV CODE- 250/637: Performed by: HOSPITALIST

## 2019-02-22 PROCEDURE — 74011250636 HC RX REV CODE- 250/636: Performed by: INTERNAL MEDICINE

## 2019-02-22 PROCEDURE — C1751 CATH, INF, PER/CENT/MIDLINE: HCPCS

## 2019-02-22 PROCEDURE — 65220000003 HC RM SEMIPRIVATE PSYCH

## 2019-02-22 PROCEDURE — 36581 REPLACE TUNNELED CV CATH: CPT

## 2019-02-22 PROCEDURE — 74011250637 HC RX REV CODE- 250/637: Performed by: FAMILY MEDICINE

## 2019-02-22 PROCEDURE — 77030002996 HC SUT SLK J&J -A

## 2019-02-22 PROCEDURE — 90935 HEMODIALYSIS ONE EVALUATION: CPT

## 2019-02-22 PROCEDURE — 74011250637 HC RX REV CODE- 250/637: Performed by: PSYCHIATRY & NEUROLOGY

## 2019-02-22 PROCEDURE — 74011250636 HC RX REV CODE- 250/636: Performed by: RADIOLOGY

## 2019-02-22 PROCEDURE — C1752 CATH,HEMODIALYSIS,SHORT-TERM: HCPCS

## 2019-02-22 PROCEDURE — 74011250637 HC RX REV CODE- 250/637: Performed by: INTERNAL MEDICINE

## 2019-02-22 PROCEDURE — 82962 GLUCOSE BLOOD TEST: CPT

## 2019-02-22 PROCEDURE — 74011250636 HC RX REV CODE- 250/636: Performed by: STUDENT IN AN ORGANIZED HEALTH CARE EDUCATION/TRAINING PROGRAM

## 2019-02-22 RX ORDER — MIDAZOLAM HYDROCHLORIDE 1 MG/ML
5 INJECTION, SOLUTION INTRAMUSCULAR; INTRAVENOUS
Status: DISCONTINUED | OUTPATIENT
Start: 2019-02-22 | End: 2019-02-22 | Stop reason: ALTCHOICE

## 2019-02-22 RX ORDER — HEPARIN SODIUM 1000 [USP'U]/ML
4300 INJECTION, SOLUTION INTRAVENOUS; SUBCUTANEOUS
Status: DISCONTINUED | OUTPATIENT
Start: 2019-02-22 | End: 2019-03-11 | Stop reason: HOSPADM

## 2019-02-22 RX ORDER — CEFAZOLIN SODIUM/WATER 2 G/20 ML
2 SYRINGE (ML) INTRAVENOUS ONCE
Status: COMPLETED | OUTPATIENT
Start: 2019-02-22 | End: 2019-02-22

## 2019-02-22 RX ORDER — SODIUM CHLORIDE 9 MG/ML
50 INJECTION, SOLUTION INTRAVENOUS CONTINUOUS
Status: DISCONTINUED | OUTPATIENT
Start: 2019-02-22 | End: 2019-02-22 | Stop reason: ALTCHOICE

## 2019-02-22 RX ORDER — FENTANYL CITRATE 50 UG/ML
100 INJECTION, SOLUTION INTRAMUSCULAR; INTRAVENOUS
Status: DISCONTINUED | OUTPATIENT
Start: 2019-02-22 | End: 2019-02-22 | Stop reason: ALTCHOICE

## 2019-02-22 RX ADMIN — SERTRALINE 50 MG: 50 TABLET, FILM COATED ORAL at 09:58

## 2019-02-22 RX ADMIN — FERRIC CITRATE 420 MG: 210 TABLET, COATED ORAL at 09:58

## 2019-02-22 RX ADMIN — Medication 2 G: at 08:40

## 2019-02-22 RX ADMIN — FERRIC CITRATE 420 MG: 210 TABLET, COATED ORAL at 17:56

## 2019-02-22 RX ADMIN — OXYCODONE AND ACETAMINOPHEN 1 TABLET: 5; 325 TABLET ORAL at 14:16

## 2019-02-22 RX ADMIN — HEPARIN SODIUM 3000 UNITS: 1000 INJECTION, SOLUTION INTRAVENOUS; SUBCUTANEOUS at 09:25

## 2019-02-22 RX ADMIN — ASPIRIN 81 MG: 81 TABLET, COATED ORAL at 09:58

## 2019-02-22 RX ADMIN — NIFEDIPINE 60 MG: 60 TABLET, FILM COATED, EXTENDED RELEASE ORAL at 17:57

## 2019-02-22 RX ADMIN — IBUPROFEN 800 MG: 400 TABLET ORAL at 22:22

## 2019-02-22 RX ADMIN — ROSUVASTATIN CALCIUM 10 MG: 10 TABLET, FILM COATED ORAL at 21:26

## 2019-02-22 RX ADMIN — FENTANYL CITRATE 50 MCG: 50 INJECTION, SOLUTION INTRAMUSCULAR; INTRAVENOUS at 08:57

## 2019-02-22 RX ADMIN — DOXAZOSIN 4 MG: 2 TABLET ORAL at 21:25

## 2019-02-22 RX ADMIN — FERRIC CITRATE 420 MG: 210 TABLET, COATED ORAL at 12:05

## 2019-02-22 RX ADMIN — HEPARIN SODIUM 4300 UNITS: 1000 INJECTION INTRAVENOUS; SUBCUTANEOUS at 17:17

## 2019-02-22 NOTE — BH NOTES
PSYCHIATRIC PROGRESS NOTE         Patient Name  Jenny Pierre   Date of Birth 1940   [facility] [id number]   Medical Record Number  [id number]      Age  70 y.o. PCP Elissa Cabrera MD   Admit date:  2/13/2019    Room Number  740/01  @ [facility]   Date of Service  2/22/2019           E & M PROGRESS NOTE: 15 mins         HISTORY       REASON FOR HOSPITALIZATION:  CC: \"Pt admitted under a voluntary basis for severe depression with suicidal ideations and  an inability to care for self. HISTORY OF PRESENT ILLNESS:    The patient, Jenny Pierre, is a 70 y.o. BLACK OR  male with a67-year-old -American male with past medical  history of end-stage renal disease; on hemodialysis, sleep apnea, peripheral vascular disease, bilateral BKA, hypertension, hyperlipidemia, peripheral neuropathy, anemia,hepatitis C, arthritis, peptic ulcer disease, coronary artery disease, DVT, prior GIbleed, chronic diastolic CHF, prior suicidal ideation, depression, and noncompliance   transfered to [address] from [facility] after pt reported feeling depressed nad suicidal Pt stated he lives in the [place] and he is unhappy about his living situation , he stated he had lots of Money before [place] HAS 13 CHILDREN but now he is broke and no one ask about him. Pt reports feeling hopeless, helpless, angry. These symptoms are of high severity. These symptoms are constant . Pt ha sh/o using marijuana occasionally. UDS: negative; BAL=0. .  2/15/19: Pt is irritable and refused zoloft and vitals today. Pt has dialysis yesterday. 2/16/19: Pt took zoloft but refuses some medical meds. Pt had dialysis on Saturday. Pt is demanding in the evenings. 2/17/19: Pt is demanding, irritable in the evenings, compliant with zoloft. Eating well. 2/18/19: Pt is demanding and manipulative ta times but taking zoloft. Pt is eating well. 2/19/19: Pt taking zoloft.  Pt got agitated yesterday evening and received geodon I/M PRN. Pt reported he need percocet. AS per reports, he was on percocets for pain. 2/20/19: Pt is doing better, is no longer suicidal and is accepting of nursing home placement, is psychiatrically stable for discharge. 2/21/19: Pt did not receive dialysis , was sleeping in his bed. Pt has clot in the catheter nad need new port. Nephrology is contacted. Pt report not feeling depressed, awaiting placement.i  2/22/19: Pt  Is irritable at times, did not get dialusis yetserday, New port is placed, nephrology is on board. Pt is eating well nad sleeping well. SIDE EFFECTS: (reviewed/updated 2/22/2019)  None reported or admitted to. No noted toxicity with use of Depakote/Tegretol/lithium/Clozaril/TCAs   ALLERGIES:(reviewed/updated 2/22/2019)  Allergies   Allergen Reactions    Tetracycline Hives      MEDICATIONS PRIOR TO ADMISSION:(reviewed/updated 2/22/2019)  Medications Prior to Admission   Medication Sig    NIFEdipine ER (ADALAT CC) 60 mg ER tablet Take 1 Tab by mouth daily. HOLD for HR<60    hydroCHLOROthiazide (HYDRODIURIL) 25 mg tablet Take 1 Tab by mouth daily.  sertraline (ZOLOFT) 25 mg tablet Take 1 Tab by mouth daily.  oxyCODONE-acetaminophen (PERCOCET 10)  mg per tablet Take 1 Tab by mouth every six (6) hours as needed for Pain.  traZODone (DESYREL) 50 mg tablet Take 50 mg by mouth nightly.  rosuvastatin (CRESTOR) 10 mg tablet Take 10 mg by mouth nightly.  aspirin delayed-release 81 mg tablet Take 1 Tab by mouth daily. PAST MEDICAL HISTORY: Past medical history from the initial psychiatric evaluation has been reviewed (reviewed/updated 2/22/2019) with no additional updates (I asked patient and no additional past medical history provided).    Past Medical History:   Diagnosis Date    Arthritis     CAD (coronary artery disease) 2007    CKD (chronic kidney disease), stage III [facility]     DM type 2 causing renal disease (HCC)     DVT (deep venous thrombosis) ([facility]     Hx of DVT:  Xarelto stopped due to GI bleed. S/P IVC filter.  Gait abnormality     GI bleed     Heart murmur     Hepatitis C     History of blood transfusion     Hypercholesterolemia     Hypertension 11/7/2014    Neuropathy     Non compliance w medication regimen     Peripheral vascular disease ([facility] [address] [facility] 11/7/2014    A. S/P Right SFA POBA and tibial atherectomy (2/4/13).  PUD (peptic ulcer disease) 2007    Unspecified sleep apnea     never used cpap     Past Surgical History:   Procedure Laterality Date    ABDOMEN SURGERY PROC UNLISTED  2007    has approx 7-8\" midline incision on abdomen--\"had to open him up and clean him out after feeding tube clogged\"    HX GI  2007    feeding tube for approx 2 mo    VASCULAR SURGERY PROCEDURE UNLIST Right 1/22/2015    popliteal-tibial bypass      SOCIAL HISTORY: Social history from the initial psychiatric evaluation has been reviewed (reviewed/updated 2/22/2019) with no additional updates (I asked patient and no additional social history provided).    Social History     Socioeconomic History    Marital status: SINGLE     Spouse name: Not on file    Number of children: Not on file    Years of education: Not on file    Highest education level: Not on file   Social Needs    Financial resource strain: Not on file    Food insecurity - worry: Not on file    Food insecurity - inability: Not on file    Transportation needs - medical: Not on file   [place] needs - non-medical: Not on file   Occupational History    Not on file   Tobacco Use    Smoking status: Current Every Day Smoker     Packs/day: 0.50    Smokeless tobacco: Never Used   Substance and Sexual Activity    Alcohol use: No    Drug use: No     Comment: >20 years ago    Sexual activity: Not on file   Other Topics Concern     Service Not Asked    Blood Transfusions Not Asked    Caffeine Concern Not Asked    Occupational Exposure Not Asked    Hobby Hazards Not Asked    Sleep Concern Not Asked    Stress Concern Not Asked    Weight Concern Not Asked    Special Diet Not Asked    Back Care Not Asked    Exercise Not Asked    Bike Helmet Not Asked    Seat Belt Not Asked    Self-Exams Not Asked   Social History Narrative    78 year ols male admitted for depression and homelessness. Pt will be evicated from apartment due to non payment of bills. Girlfriend of 30 years left him to [place] in the [place] with others. \" Pt is a brittle diabetic, with treatment non compliance. He has bilarela lower extremity amputations. Patient has a long hx of substance abuse. FAMILY HISTORY: Family history from the initial psychiatric evaluation has been reviewed (reviewed/updated 2/22/2019) with no additional updates (I asked patient and no additional family history provided). Family History   Problem Relation Age of Onset    Hypertension Father        REVIEW OF SYSTEMS: (reviewed/updated 2/22/2019)  Appetite:   Sleep: All other Review of Systems: Negative except          MENTAL STATUS EXAM & [address] (MSE):    MSE FINDINGS ARE WITHIN NORMAL LIMITS (WNL) UNLESS OTHERWISE STATED BELOW. ( ALL OF THE BELOW CATEGORIES OF THE MSE HAVE BEEN REVIEWED (reviewed 2/14/2019) AND UPDATED AS DEEMED APPROPRIATE )  General Presentation age appropriate and casually dressed, cooperative   Orientation oriented to time, place and person   Vital Signs  See below (reviewed 2/14/2019); Vital Signs (BP, Pulse, & Temp) are within normal limits if not listed below.    Gait and Station Stable/steady, no ataxia   Musculoskeletal System No extrapyramidal symptoms (EPS); no abnormal muscular movements or Tardive Dyskinesia (TD); muscle strength and tone are within normal limits   Language No aphasia or dysarthria   Speech:  soft   Thought Processes logical; slow rate of thoughts; fair abstract reasoning/computation   Thought Associations goal directed   Thought Content free of delusions   Suicidal Ideations intention   Homicidal Ideations no plan  and no intention   Mood:  anxious  and depressed   Affect:  constricted   Memory recent  intact   Memory remote:  intact   Concentration/Attention:  distractable   Fund of Knowledge average   Insight:  fair   Reliability fair   Judgment:  fair                                                                                                    VITALS:     Patient Vitals for the past 24 hrs:   Temp Pulse Resp BP SpO2   02/22/19 0745 98.4 °F (36.9 °C) 63 16 147/65 99 %   02/21/19 2000 97.3 °F (36.3 °C) (!) 54 18 160/85 94 %   02/21/19 1816 -- 62 -- -- --   02/21/19 1600 98 °F (36.7 °C) (!) 48 16 179/80 100 %     Wt Readings from Last 3 Encounters:   02/14/19 73 kg (160 lb 15 oz)   02/13/19 73.4 kg (161 lb 12.8 oz)   01/15/19 81.6 kg (180 lb)     Temp Readings from Last 3 Encounters:   02/22/19 98.4 °F (36.9 °C)   02/22/19 98.2 °F (36.8 °C)   02/13/19 98.2 °F (36.8 °C)     BP Readings from Last 3 Encounters:   02/22/19 147/65   02/22/19 146/68   02/13/19 181/82     Pulse Readings from Last 3 Encounters:   02/22/19 63   02/22/19 (!) 53   02/13/19 (!) 48            DATA     LABORATORY DATA:(reviewed/updated 2/22/2019)  Recent Results (from the past 24 hour(s))   GLUCOSE, POC    Collection Time: 02/21/19  4:00 PM   Result Value Ref Range    Glucose (POC) 81 65 - 100 mg/dL    Performed by Catherine Cloud    GLUCOSE, POC    Collection Time: 02/21/19  8:10 PM   Result Value Ref Range    Glucose (POC) 155 (H) 65 - 100 mg/dL    Performed by Catherine Cloud    CBC W/O DIFF    Collection Time: 02/21/19  8:29 PM   Result Value Ref Range    WBC 3.8 (L) 4.1 - 11.1 K/uL    RBC 3.69 (L) 4.10 - 5.70 M/uL    HGB 10.0 (L) 12.1 - 17.0 g/dL    HCT 32.2 (L) 36.6 - 50.3 %    MCV 87.3 80.0 - 99.0 FL    MCH 27.1 26.0 - 34.0 PG    MCHC 31.1 30.0 - 36.5 g/dL    RDW 18.6 (H) 11.5 - 14.5 %    PLATELET 795 542 - 650 K/uL    MPV 10.5 8.9 - 12.9 FL    NRBC 0.0 0  WBC    ABSOLUTE NRBC 0.00 0.00 - 0.01 K/uL   RENAL FUNCTION PANEL    Collection Time: 02/21/19  8:29 PM   Result Value Ref Range    Sodium 139 136 - 145 mmol/L    Potassium 5.1 3.5 - 5.1 mmol/L    Chloride 104 97 - 108 mmol/L    CO2 27 21 - 32 mmol/L    Anion gap 8 5 - 15 mmol/L    Glucose 127 (H) 65 - 100 mg/dL    BUN 46 (H) 6 - 20 MG/DL    Creatinine 4.14 (H) 0.70 - 1.30 MG/DL    BUN/Creatinine ratio 11 (L) 12 - 20      GFR est AA 17 (L) >60 ml/min/1.73m2    GFR est non-AA 14 (L) >60 ml/min/1.73m2    Calcium 9.0 8.5 - 10.1 MG/DL    Phosphorus 4.3 2.6 - 4.7 MG/DL    Albumin 3.1 (L) 3.5 - 5.0 g/dL   GLUCOSE, POC    Collection Time: 02/22/19  9:56 AM   Result Value Ref Range    Glucose (POC) 82 65 - 100 mg/dL    Performed by Monica Alvarado 87, POC    Collection Time: 02/22/19 11:59 AM   Result Value Ref Range    Glucose (POC) 125 (H) 65 - 100 mg/dL    Performed by Peter Sanchez      No results found for: VALF2, VALAC, VALP, VALPR, DS6, CRBAM, CRBAMP, CARB2, XCRBAM  No results found for: LITHM   RADIOLOGY REPORTS:(reviewed/updated 2/22/2019)  Xr Chest Pa Lat    Result Date: 2/8/2019  EXAM: XR CHEST PA LAT INDICATION: Shortness of breath, Low back pain and abdominal pain for several days. End-stage renal disease on dialysis, missed dialysis one day ago. COMPARISON: Chest views on 5/20/2018 TECHNIQUE: 4 images of PA and lateral chest views FINDINGS: Right jugular dialysis catheter is new and terminates in the distal superior vena cava. Cardiac monitoring wires overlie the thorax. Borderline cardiac size is unchanged. Aortic arch is not enlarged. The pulmonary vasculature is within normal limits. The lungs and pleural spaces are clear. The visualized bones and upper abdomen are age-appropriate. IMPRESSION: No acute process on chest x-ray. Right jugular dialysis catheter terminates in the distal superior vena cava. No pneumothorax.      Ct Abd Pelv W Cont    Result Date: 2/8/2019  EXAM: CT ABD PELV W CONT INDICATION: Abdominal pain and low back pain for several days. End-stage renal disease on dialysis, missed dialysis yesterday. Normal white blood cell count. Normal liver enzymes. COMPARISON: None. CONTRAST: 100 mL of Isovue-370. TECHNIQUE: Following the uneventful intravenous administration of contrast, thin axial images were obtained through the abdomen and pelvis. Coronal and sagittal reconstructions were generated. Oral contrast was administered. CT dose reduction was achieved through use of a standardized protocol tailored for this examination and automatic exposure control for dose modulation. FINDINGS: LUNG BASES: No pneumonia. Mild dependent atelectasis. INCIDENTALLY IMAGED HEART AND MEDIASTINUM: Borderline cardiac size. No pericardial effusion. LIVER: Subcentimeter liver lesions measure up to 9 mm in segment 2 of the liver. Surface is smooth. Streak artifact from bilateral upper extremities. GALLBLADDER: Gallstones are in the body and neck. No evidence of cholecystitis. CBD is not dilated. SPLEEN: Normal size and enhancement. PANCREAS: No mass or ductal dilatation. ADRENALS: Unremarkable. KIDNEYS: No mass, calculus, or hydronephrosis. STOMACH: Partial distention. SMALL BOWEL: No dilatation or wall thickening. COLON: Moderate colonic fecal burden. No mural thickening. APPENDIX: Unremarkable. PERITONEUM: No ascites or pneumoperitoneum. RETROPERITONEUM: IVC filter is in good position. Aorta is atherosclerotic without aneurysm. Mild atherosclerotic stenosis of the celiac artery. No lymphadenopathy. REPRODUCTIVE ORGANS: Mild prostatomegaly measures 6 cm and extends into the base of the urinary bladder. URINARY BLADDER: No mass or calculus. BONES: Moderate bilateral hip osteoarthritis. ADDITIONAL COMMENTS: N/A     IMPRESSION: 1. No acute process on CT. 2. Cholelithiasis. No cholecystitis. 3. Subcentimeter segment 2 liver lesions cannot be fully characterized on this examination. 4. Moderate colonic fecal burden may represent constipation.  No bowel obstruction.          MEDICATIONS     ALL MEDICATIONS:   Current Facility-Administered Medications   Medication Dose Route Frequency    epoetin rosie-epbx (RETACRIT) injection 20,000 Units  20,000 Units SubCUTAneous DIALYSIS TUE, THU & SAT    oxyCODONE-acetaminophen (PERCOCET) 5-325 mg per tablet 1 Tab  1 Tab Oral BID PRN    ibuprofen (MOTRIN) tablet 800 mg  800 mg Oral Q8H PRN    sertraline (ZOLOFT) tablet 50 mg  50 mg Oral DAILY    aspirin delayed-release tablet 81 mg  81 mg Oral DAILY    rosuvastatin (CRESTOR) tablet 10 mg  10 mg Oral QHS    ferric citrate (AURYXIA) tablet 420 mg  420 mg Oral TID WITH MEALS    NIFEdipine ER (PROCARDIA XL) tablet 60 mg  60 mg Oral Q24H    doxazosin (CARDURA) tablet 4 mg  4 mg Oral QHS    OLANZapine (ZyPREXA) tablet 2.5 mg  2.5 mg Oral Q6H PRN    ziprasidone (GEODON) 10 mg in sterile water (preservative free) 0.5 mL injection  10 mg IntraMUSCular BID PRN    benztropine (COGENTIN) tablet 1 mg  1 mg Oral BID PRN    benztropine (COGENTIN) injection 1 mg  1 mg IntraMUSCular BID PRN    zolpidem (AMBIEN) tablet 5 mg  5 mg Oral QHS PRN    acetaminophen (TYLENOL) tablet 650 mg  650 mg Oral Q4H PRN    magnesium hydroxide (MILK OF MAGNESIA) 400 mg/5 mL oral suspension 30 mL  30 mL Oral DAILY PRN    nicotine (NICODERM CQ) 21 mg/24 hr patch 1 Patch  1 Patch TransDERmal DAILY PRN    glucose chewable tablet 16 g  4 Tab Oral PRN    dextrose (D50W) injection syrg 12.5-25 g  25-50 mL IntraVENous PRN    glucagon (GLUCAGEN) injection 1 mg  1 mg IntraMUSCular PRN    insulin lispro (HUMALOG) injection   SubCUTAneous AC&HS     Facility-Administered Medications Ordered in Other Encounters   Medication Dose Route Frequency    fentaNYL citrate (PF) injection 200 mcg  200 mcg IntraVENous Multiple    0.9% sodium chloride infusion 500 mL  500 mL IntraVENous CONTINUOUS      SCHEDULED MEDICATIONS:   Current Facility-Administered Medications   Medication Dose Route Frequency    epoetin rosie-epbx (RETACRIT) injection 20,000 Units  20,000 Units SubCUTAneous DIALYSIS TUE, THU & SAT    sertraline (ZOLOFT) tablet 50 mg  50 mg Oral DAILY    aspirin delayed-release tablet 81 mg  81 mg Oral DAILY    rosuvastatin (CRESTOR) tablet 10 mg  10 mg Oral QHS    ferric citrate (AURYXIA) tablet 420 mg  420 mg Oral TID WITH MEALS    NIFEdipine ER (PROCARDIA XL) tablet 60 mg  60 mg Oral Q24H    doxazosin (CARDURA) tablet 4 mg  4 mg Oral QHS    insulin lispro (HUMALOG) injection   SubCUTAneous AC&HS          ASSESSMENT & PLAN     The patient, [place], is a 70 y.o.  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  Plan for hemodialysis 3 days a week.  If it is not controlled  with the same, provide additional antihypertensive meds.  Monitor blood pressure.    End-stage renal disease, on hemodialysis.  Schedule treatment for hemodialysis on  Tuesdays, Thursdays, and Saturdays. .   Type 2 diabetes mellitus.  Place on Humalog insulin correction coverage schedule,  Accu-Chek, and check hemoglobin A1c level.  The patient will be on a diabetic/renal  Diet. Nephrology consult   2/15/19: continue meds/milue, encourage compliance. 2/16/19: continue meds/milue  2/17/19: continue meds/milue increaisng zoloft 50 mg po daily  2/18/19: continue meds/milue  2/19/19: readding percocet twice daily as needed for pain.   2/20/19: awaiting [facility], PT/OT for placement  2/21/19: nephrology consult, recommendation regrading dialysis,  2/22/19: increasing zoloft 75 mg daily, continue milue,  I will continue to monitor blood levels (Depakote, Tegretol, lithium, clozapine---a drug with a narrow therapeutic index= NTI) and associated labs for drug therapy implemented that require intense monitoring for toxicity as deemed appropriate based on current medication side effects and pharmacodynamically determined drug 1/2 lives. In summary, Cami Mattson, is a 70 y.o.  male who presents with a severe exacerbation of the principal diagnosis of Depression  Patient's condition is worsening/not improving/not stable improving. Patient requires continued inpatient hospitalization for further stabilization, safety monitoring and medication management. I will continue to coordinate the provision of individual, milieu, occupational, group, and substance abuse therapies to address target symptoms/diagnoses as deemed appropriate for the individual patient. A coordinated, multidisplinary treatment team round was conducted with the patient (this team consists of the nurse, psychiatric unit pharmcist,  and writer). Complete current electronic health record for patient has been reviewed today including consultant notes, ancillary staff notes, nurses and psychiatric tech notes. uicide risk assessment completed and patient deemed to be of low risk for suicide at this time. The following regarding medications was addressed during rounds with patient:   the risks and benefits of the proposed medication. The patient was given the opportunity to ask questions. Informed consent given to the use of the above medications. Will continue to adjust psychiatric and non-psychiatric medications (see above \"medication\" section and orders section for details) as deemed appropriate & based upon diagnoses and response to treatment. I will continue to order blood tests/labs and diagnostic tests as deemed appropriate and review results as they become available (see orders for details and above listed lab/test results). I will order psychiatric records from previous [facility] hospitals to further elucidate the nature of patient's psychopathology and review once available.     I will gather additional collateral information from friends, family and o/p treatment team to further elucidate the nature of patient's psychopathology and baselline level of psychiatric functioning. I certify that this patient's inpatient psychiatric hospital services furnished since the previous certification were, and continue to be, required for treatment that could reasonably be expected to improve the patient's condition, or for diagnostic study, and that the patient continues to need, on a daily basis, active treatment furnished directly by or requiring the supervision of inpatient psychiatric facility personnel. In addition, the hospital records show that services furnished were intensive treatment services, admission or related services, or equivalent services.     EXPECTED DISCHARGE DATE/DAY: TBD     DISPOSITION: Home       Signed By:   Tiffanie Hernández MD  2/22/2019

## 2019-02-22 NOTE — PROGRESS NOTES
Problem: Depressed Mood (Adult/Pediatric)  Goal: *STG: Participates in treatment plan  Outcome: Progressing Towards Goal  Pt has been calm and cooperative. Med an meal compliant. Pt was off the unit this morning, so did not attend group. Staff will continue to encourage positive thoughts and coping skills.

## 2019-02-22 NOTE — BH NOTES
GROUP THERAPY PROGRESS NOTE    Omega Héctor did not participate in a 45 minute Process Group on the Geriatric Unit with a focus on shifting ones feelings to a positive note and preparing for the day through music.

## 2019-02-22 NOTE — BH NOTES
PRN Medication Documentation    Specific patient behavior that led to need for PRN medication: leg pain  Staff interventions attempted prior to PRN being given: emotional support  PRN medication given: Percocet PO @ 1416  Patient response/effectiveness of PRN medication: relief of symptoms

## 2019-02-22 NOTE — DIALYSIS
[facility] D[facility] Team [facility]  [phone]    Vitals   Pre   Post   Assessment   Pre   Post     Temp  Temp: 98.3 °F (36.8 °C) (02/22/19 1445)  98.6 LOC  A&Ox3 A&Ox3   HR   Pulse (Heart Rate): (!) 52 (02/22/19 1445) 53 Lungs   Clear  Clear   B/P   BP: 164/70 (02/22/19 1445) 132/59 Cardiac   RRR  RRR   Resp   Resp Rate: 15 (02/22/19 1445) 16 Skin   Warm, dry  warm, dry   Pain level  0/10 0/10 Edema  none     none   Orders:    Duration:   Start:    1282 End:    1715 Total:   2.5 hours   Dialyzer:   Dialyzer/Set Up Inspection: Junious Orchard (02/22/19 1445)   K Bath:   Dialysate K (mEq/L): 2 (02/22/19 1445)   Ca Bath:   Dialysate CA (mEq/L): 2.5 (02/22/19 1445)   Na/Bicarb:   Dialysate NA (mEq/L): 140 (02/22/19 1445)   Target Fluid Removal:   Goal/Amount of Fluid to Remove (mL): 3000 mL (02/22/19 1445)   Access     Type & Location:   R Subclavian CVC-(2/22/19) No s/s of infection. Dressing clean dry and intact. Was bleeding some earlier today. New placement this morning. MD aware and IR aware and cleared for use. Ports disinfected with prevantics aspirated and flushed well with NS. Tx initiated.     Labs     Obtained/Reviewed   Critical Results Called   Date when labs were drawn-  Hgb-    HGB   Date Value Ref Range Status   02/21/2019 10.0 (L) 12.1 - 17.0 g/dL Final     K-    Potassium   Date Value Ref Range Status   02/21/2019 5.1 3.5 - 5.1 mmol/L Final     Ca-   Calcium   Date Value Ref Range Status   02/21/2019 9.0 8.5 - 10.1 MG/DL Final     Bun-   BUN   Date Value Ref Range Status   02/21/2019 46 (H) 6 - 20 MG/DL Final     Creat-   Creatinine   Date Value Ref Range Status   02/21/2019 4.14 (H) 0.70 - 1.30 MG/DL Final        Medications/ Blood Products Given     Name   Dose   Route and Time     Heparin  4300 units 2.1mL Arterial  2.2 mL Venous              Blood Volume Processed (BVP):    49 Liters Net Fluid   Removed:  1300mL   Comments   Time Out Done: yes  Primary Nurse Rpt Pre:Bernadette Warner RN  Primary Nurse Rpt Post: Donato Monae RN  Pt Education: Procedural, access care. Care Plan: Continue dialysis as ordered. Tx Summary:Tx cpmplete, At end, all possible blood rinsed back to patient. VSS. Ports locked with heparin and capped per protocol. Report given to primary RN. Patient returned to room. Admiting Diagnosis: ESRD, depression   Pt's previous clinic-[facility]  Consent signed - Informed Consent Verified: Yes (02/22/19 1445)  [facility] Consent - yes  Hepatitis Status- neg 2/10/19  Machine #- Machine Number: [id number] (02/22/19 1445)  Telemetry status-NA  Pre-dialysis wt. -  ERICA

## 2019-02-22 NOTE — BH NOTES
Pt transport here to  patient for port replacement. No signs of distress noted   Pt is compliant. 1000: Pt returned back on the unit with transport. Out in the DR eating breakfast   NAD noted. 1315: Nephrology came to see patient. Orders for Dialysis put in for today. 1340: Pt is sitting up in the DR on a wheelchair. Port site bleeding. Called IR called instructed staff to hold pressure on neck above the site till for 10 mins. 1350:  RN came up to see patient and redressed site. Educated Staff on unit to hold pressure on site for 10 more mins.

## 2019-02-22 NOTE — PROGRESS NOTES
Nephrology Progress Note  [facility]  Date of Admission : 2/13/2019    CC:  Follow up for ESRD       Assessment and Plan     ESRD on HD:  - HD TTS at [facility]  - HD today then again in AM    HTN:  - cont current meds    Anemia of CKD:  - FANNY with dialysis     Secondary HPT:  - cont auryxia    Diastolic HF    PAD s/p b/l BKAs    DM2:  - on insulin    Depression:  - per psychiatry       Interval History:  Seen and examined. Had PC changed this AM w/ IR. For dialysis later today. No cp, sob, n/v/d reported. Current Medications: all current  Medications have been eviewed in EPIC  Review of Systems: Pertinent items are noted in HPI. Objective:  Vitals:    Vitals:    02/21/19 1600 02/21/19 1816 02/21/19 2000 02/22/19 0745   BP: 179/80  160/85 147/65   Pulse: (!) 48 62 (!) 54 63   Resp: 16  18 16   Temp: 98 °F (36.7 °C)  97.3 °F (36.3 °C) 98.4 °F (36.9 °C)   SpO2: 100%  94% 99%   Weight:         Intake and Output:  No intake/output data recorded. 02/20 1901 - 02/22 0700  In: -   Out: 700 [Urine:700]    Physical Examination:  General: NAD  Neck:  Supple, no mass  Resp:  Lungs CTA B/L, no wheezing , normal respiratory effort  CV:  RRR,  no murmur or rub, no thigh edema, b/l BKAs  GI:  Soft, NT, + Bowel sounds, no hepatosplenomegaly  Neurologic:  Non focal  Psych:             Depressed, flat affect  Skin:  No Rash  Access:           [facility] PC c/d/i    []    High complexity decision making was performed  []    Patient is at high-risk of decompensation with multiple organ involvement    Lab Data Personally Reviewed: I have reviewed all the pertinent labs, microbiology data and radiology studies during assessment.     Recent Labs     02/21/19 2029      K 5.1      CO2 27   *   BUN 46*   CREA 4.14*   CA 9.0   PHOS 4.3   ALB 3.1*     Recent Labs     02/21/19 2029   WBC 3.8*   HGB 10.0*   HCT 32.2*        No results found for: SDES  Lab Results   Component Value Date/Time    Culture result: MRSA NOT PRESENT 01/24/2018 10:30 AM    Culture result:  01/24/2018 10:30 AM         Screening of patient nares for MRSA is for surveillance purposes and, if positive, to facilitate isolation considerations in high risk settings. It is not intended for automatic decolonization interventions per se as regimens are not sufficiently effective to warrant routine use. Culture result: MRSA NOT PRESENT 01/19/2015 10:44 AM    Culture result:  01/19/2015 10:44 AM         Screening of patient nares for MRSA is for surveillance purposes and, if positive, to facilitate isolation considerations in high risk settings. It is not intended for automatic decolonization interventions per se as regimens are not sufficiently effective to warrant routine use.      Recent Results (from the past 24 hour(s))   GLUCOSE, POC    Collection Time: 02/21/19  4:00 PM   Result Value Ref Range    Glucose (POC) 81 65 - 100 mg/dL    Performed by King Isamar    GLUCOSE, POC    Collection Time: 02/21/19  8:10 PM   Result Value Ref Range    Glucose (POC) 155 (H) 65 - 100 mg/dL    Performed by King Isamar    CBC W/O DIFF    Collection Time: 02/21/19  8:29 PM   Result Value Ref Range    WBC 3.8 (L) 4.1 - 11.1 K/uL    RBC 3.69 (L) 4.10 - 5.70 M/uL    HGB 10.0 (L) 12.1 - 17.0 g/dL    HCT 32.2 (L) 36.6 - 50.3 %    MCV 87.3 80.0 - 99.0 FL    MCH 27.1 26.0 - 34.0 PG    MCHC 31.1 30.0 - 36.5 g/dL    RDW 18.6 (H) 11.5 - 14.5 %    PLATELET 987 244 - 856 K/uL    MPV 10.5 8.9 - 12.9 FL    NRBC 0.0 0  WBC    ABSOLUTE NRBC 0.00 0.00 - 0.01 K/uL   RENAL FUNCTION PANEL    Collection Time: 02/21/19  8:29 PM   Result Value Ref Range    Sodium 139 136 - 145 mmol/L    Potassium 5.1 3.5 - 5.1 mmol/L    Chloride 104 97 - 108 mmol/L    CO2 27 21 - 32 mmol/L    Anion gap 8 5 - 15 mmol/L    Glucose 127 (H) 65 - 100 mg/dL    BUN 46 (H) 6 - 20 MG/DL    Creatinine 4.14 (H) 0.70 - 1.30 MG/DL    BUN/Creatinine ratio 11 (L) 12 - 20      GFR est AA 17 (L) >60 ml/min/1.73m2    GFR est non-AA 14 (L) >60 ml/min/1.73m2    Calcium 9.0 8.5 - 10.1 MG/DL    Phosphorus 4.3 2.6 - 4.7 MG/DL    Albumin 3.1 (L) 3.5 - 5.0 g/dL   GLUCOSE, POC    Collection Time: 02/22/19  9:56 AM   Result Value Ref Range    Glucose (POC) 82 65 - 100 mg/dL    Performed by [address], POC    Collection Time: 02/22/19 11:59 AM   Result Value Ref Range    Glucose (POC) 125 (H) 65 - 100 mg/dL    Performed by Evangelist Jewell MD  [address] [place] [address]  Phone - [phone]   Fax - [phone]  [email or web address]

## 2019-02-22 NOTE — PROGRESS NOTES
TRANSFER - OUT REPORT:    Verbal report given to RN on Juan Yang  being transferred to psyche unit for routine progression of care       Report consisted of patients Situation, Background, Assessment and   Recommendations(SBAR). Information from the following report(s) SBAR and Procedure Summary was reviewed with the receiving nurse. Lines:       Opportunity for questions and clarification was provided.       Patient transported with:   [employer]

## 2019-02-22 NOTE — PROGRESS NOTES
Physical Therapy Note  2/22/19    Chart reviewed. Attempted to see pt for PT eval, however currently has copious bleeding from HD port site, 2 RNs attempting to control. Not appropriate for mobilization at this time, will defer and continue to follow up for formal assessment.      Priscilla Cardona, PT, DPT

## 2019-02-22 NOTE — INTERDISCIPLINARY ROUNDS
Behavioral Health Interdisciplinary Rounds     Patient Name: Pop Araya  Age: 70 y.o. Room/Bed:  740/  Primary Diagnosis: Depression   Admission Status: Voluntary     Readmission within 30 days: no  Power of  in place: no  Patient requires a blocked bed: no          Reason for blocked bed:   Sleep hours:  6+      Participation in Care/Groups:  no  Medication Compliant?: Yes  PRNS (last 24 hours): Pain    Restraints (last 24 hours):  no     Alcohol screening (AUDIT) completed -  AUDIT Score: 0  If applicable, date SBIRT discussed in treatment team AND documented:    Tobacco - patient is a smoker: Have You Used Tobacco in the Past 30 Days: Yes  Illegal Drugs use: Have You Used Any Illegal Substances Over the Past 12 Months: Yes    24 hour chart check complete: yes     Patient goal(s) for today: Follow staff direction  Treatment team focus/goals: Follow-up on referrals; refer to [address]  Progress note: Pt confused, stating [place]. SW explained that SNF is the only safe discharge plan available to Pt. Pt not receptive to discharge plan    LOS:  9  Expected LOS: TBD    Participating treatment team members:  Norberto Parrish MSW; Dr. Lynette Lamas MD; Brittney Davila RN; Meera Rollins, DongD

## 2019-02-22 NOTE — PROGRESS NOTES
Problem: Falls - Risk of  Goal: *Absence of Falls  Document Bello Fall Risk and appropriate interventions in the flowsheet. Outcome: Progressing Towards Goal  Fall Risk Interventions:  Mobility Interventions: Bed/chair exit alarm  Mentation Interventions: Adequate sleep, hydration, pain control, Bed/chair exit alarm, Door open when patient unattended, More frequent rounding  Medication Interventions: Bed/chair exit alarm  Elimination Interventions: Patient to call for help with toileting needs  History of Falls Interventions: Bed/chair exit alarm    Received pt reading in hallway, irritable. No distress noted. Respirations even/unlabored. Will continue to monitor q15min for safety.

## 2019-02-23 LAB
GLUCOSE BLD STRIP.AUTO-MCNC: 111 MG/DL (ref 65–100)
GLUCOSE BLD STRIP.AUTO-MCNC: 120 MG/DL (ref 65–100)
GLUCOSE BLD STRIP.AUTO-MCNC: 64 MG/DL (ref 65–100)
GLUCOSE BLD STRIP.AUTO-MCNC: 74 MG/DL (ref 65–100)
GLUCOSE BLD STRIP.AUTO-MCNC: 78 MG/DL (ref 65–100)
GLUCOSE BLD STRIP.AUTO-MCNC: 98 MG/DL (ref 65–100)
SERVICE CMNT-IMP: ABNORMAL
SERVICE CMNT-IMP: NORMAL

## 2019-02-23 PROCEDURE — 74011250636 HC RX REV CODE- 250/636: Performed by: INTERNAL MEDICINE

## 2019-02-23 PROCEDURE — 74011250637 HC RX REV CODE- 250/637: Performed by: INTERNAL MEDICINE

## 2019-02-23 PROCEDURE — 82962 GLUCOSE BLOOD TEST: CPT

## 2019-02-23 PROCEDURE — 74011250637 HC RX REV CODE- 250/637: Performed by: PSYCHIATRY & NEUROLOGY

## 2019-02-23 PROCEDURE — 74011250637 HC RX REV CODE- 250/637: Performed by: FAMILY MEDICINE

## 2019-02-23 PROCEDURE — 74011250637 HC RX REV CODE- 250/637: Performed by: HOSPITALIST

## 2019-02-23 PROCEDURE — 90935 HEMODIALYSIS ONE EVALUATION: CPT

## 2019-02-23 PROCEDURE — 65220000003 HC RM SEMIPRIVATE PSYCH

## 2019-02-23 RX ORDER — SERTRALINE HYDROCHLORIDE 50 MG/1
75 TABLET, FILM COATED ORAL DAILY
Status: DISCONTINUED | OUTPATIENT
Start: 2019-02-23 | End: 2019-02-26

## 2019-02-23 RX ORDER — SODIUM CHLORIDE 0.9 % (FLUSH) 0.9 %
SYRINGE (ML) INJECTION
Status: COMPLETED
Start: 2019-02-23 | End: 2019-02-23

## 2019-02-23 RX ADMIN — IBUPROFEN 800 MG: 400 TABLET ORAL at 22:28

## 2019-02-23 RX ADMIN — ASPIRIN 81 MG: 81 TABLET, COATED ORAL at 13:37

## 2019-02-23 RX ADMIN — DOXAZOSIN 4 MG: 2 TABLET ORAL at 21:46

## 2019-02-23 RX ADMIN — OXYCODONE AND ACETAMINOPHEN 1 TABLET: 5; 325 TABLET ORAL at 15:18

## 2019-02-23 RX ADMIN — Medication: at 09:00

## 2019-02-23 RX ADMIN — FERRIC CITRATE 420 MG: 210 TABLET, COATED ORAL at 17:19

## 2019-02-23 RX ADMIN — NIFEDIPINE 60 MG: 60 TABLET, FILM COATED, EXTENDED RELEASE ORAL at 17:17

## 2019-02-23 RX ADMIN — EPOETIN ALFA-EPBX 20000 UNITS: 10000 INJECTION, SOLUTION INTRAVENOUS; SUBCUTANEOUS at 17:34

## 2019-02-23 RX ADMIN — ROSUVASTATIN CALCIUM 10 MG: 10 TABLET, FILM COATED ORAL at 20:51

## 2019-02-23 RX ADMIN — SERTRALINE HYDROCHLORIDE 75 MG: 50 TABLET ORAL at 13:32

## 2019-02-23 RX ADMIN — FERRIC CITRATE 420 MG: 210 TABLET, COATED ORAL at 12:00

## 2019-02-23 NOTE — INTERDISCIPLINARY ROUNDS
Behavioral Health Interdisciplinary Rounds     Patient Name: Messi Garcia  Age: 70 y.o. Room/Bed:  740/  Primary Diagnosis: Depression   Admission Status: Voluntary     Readmission within 30 days: no  Power of  in place: no  Patient requires a blocked bed: no          Reason for blocked bed:   Sleep hours:  6      Participation in Care/Groups:  no  Medication Compliant?: Yes  PRNS (last 24 hours): Pain    Restraints (last 24 hours):  no     Alcohol screening (AUDIT) completed -  AUDIT Score: 0  If applicable, date SBIRT discussed in treatment team AND documented:    Tobacco - patient is a smoker: Have You Used Tobacco in the Past 30 Days: Yes  Illegal Drugs use: Have You Used Any Illegal Substances Over the Past 12 Months: Yes    24 hour chart check complete: yes     Patient goal(s) for today:   Treatment team focus/goals:   Progress note     LOS:  10  Expected LOS:     Participating treatment team members:  Kodyblayne Jose, * (assigned SW),

## 2019-02-23 NOTE — PROGRESS NOTES
Problem: Depressed Mood (Adult/Pediatric)  Goal: *STG: Verbalizes anger, guilt, and other feelings in a constructive manor  Outcome: Progressing Towards Goal  Patient encouraged to make requests and concerns without being demanding. Goal: *STG: Attends activities and groups  Outcome: Not Progressing Towards Goal  Unable to attend group or activities today due to dialysis occurring for several hours during day. Problem: Falls - Risk of  Goal: *Absence of Falls  Document Bello Fall Risk and appropriate interventions in the flowsheet. Outcome: Progressing Towards Goal  Fall Risk Interventions:  Patient remains free from fall. Document Bello fall risk scale. Utilize lift team when transferring. Mobility Interventions: Communicate number of staff needed for ambulation/transfer, Mechanical lift, Patient to call before getting OOB    Mentation Interventions: Adequate sleep, hydration, pain control    Medication Interventions: Patient to call before getting OOB, Teach patient to arise slowly    Elimination Interventions: Patient to call for help with toileting needs, Toileting schedule/hourly rounds, Urinal in reach    History of Falls Interventions: Bed/chair exit alarm     At lunch time patient asked \"what do I need to do to sign myself out of here, I know it can be done\". Explained to patient that I did not know the answer to that at this time. That I know the staff would not release him without a place to go on a day that it is cold and raining like it is today. Patient asked to use the phone.     PRN Medication Documentation  @ 0738  Specific patient behavior that led to need for PRN medication: pain #10 numeric scale  Staff interventions attempted prior to PRN being given: distraction  PRN medication given: 5 mg/325mg Percocet PO  Patient response/effectiveness of PRN medication: pending

## 2019-02-23 NOTE — PROGRESS NOTES
For HD today and TTS schedule for now   I will check back on Monday       [facility]  Office :[phone]  Fax: [phone]

## 2019-02-23 NOTE — PROGRESS NOTES
Problem: Falls - Risk of  Goal: *Absence of Falls  Document Bello Fall Risk and appropriate interventions in the flowsheet. Outcome: Progressing Towards Goal  Fall Risk Interventions:  Mobility Interventions: Bed/chair exit alarm  Mentation Interventions: Adequate sleep, hydration, pain control  Medication Interventions: Bed/chair exit alarm  Elimination Interventions: Patient to call for help with toileting needs  History of Falls Interventions: Bed/chair exit alarm    Received pt awake in bed. NAD. Respirations even/unlabored.  Will cont to monitor q15min for safety

## 2019-02-23 NOTE — PROGRESS NOTES
Problem: Depressed Mood (Adult/Pediatric)  Goal: *STG: Attends activities and groups  Outcome: Progressing Towards Goal    Received patient  in DR reading newspaper. NAD. Voiced no complaints. On q 15 min safety checks. Visible in DR watching TV, talking on phone with wife, and eating 100% dinner, fluids and snacks. Patient requested to be discharged,informed he had to talk with treatment team tomorrow. He also reported \"my    Wife told me she was going to rent a house in [facility]". Med compliant. Went to bed early. HYPOGLYCEMIC EPISODE DOCUMENTATION    Patient with hypoglycemic episode(s) at 1551 on 02/23/2019    BG value(s) pre-treatment 66  Was patient symptomatic?  [] yes, [x] no  Patient was treated with the following rescue medications/treatments: [] D50                [] Glucose tablets                [] Glucagon                [] 4oz juice                [x] 6oz reg soda                [] 8oz low fat milk  BG value post-treatment: 120  Once BG treated and value greater than 80mg/dl, pt was provided with the following:  [x] snack  [] meal

## 2019-02-23 NOTE — BH NOTES
PSYCHIATRIC PROGRESS NOTE         Patient Name  Omega Anaya   Date of Birth 1940   [facility] [id number]   Medical Record Number  [id number]      Age  70 y.o. PCP Amadeo Faulkner MD   Admit date:  2/13/2019    Room Number  740/01  @ [facility]   Date of Service  2/23/2019           E & M PROGRESS NOTE: 15 mins         HISTORY       REASON FOR HOSPITALIZATION:  CC: \"Pt admitted under a voluntary basis for severe depression with suicidal ideations and  an inability to care for self. HISTORY OF PRESENT ILLNESS:    The patient, Omega Anaya, is a 70 y.o. BLACK OR  male with 33-year-old -American male with past medical  history of end-stage renal disease; on hemodialysis, sleep apnea, peripheral vascular disease, bilateral BKA, hypertension, hyperlipidemia, peripheral neuropathy, anemia,hepatitis C, arthritis, peptic ulcer disease, coronary artery disease, DVT, prior GIbleed, chronic diastolic CHF, prior suicidal ideation, depression, and noncompliance   transfered to [facility] from [facility] after pt reported feeling depressed nad suicidal Pt stated he lives in the [place] and he is unhappy about his living situation , he stated he had lots of Money before [place] HAS 13 CHILDREN but now he is broke and no one ask about him. Pt reports feeling hopeless, helpless, angry. These symptoms are of high severity. These symptoms are constant . Pt ha sh/o using marijuana occasionally. UDS: negative; BAL=0. .  2/15/19: Pt is irritable and refused zoloft and vitals today. Pt has dialysis yesterday. 2/16/19: Pt took zoloft but refuses some medical meds. Pt had dialysis on Saturday. Pt is demanding in the evenings. 2/17/19: Pt is demanding, irritable in the evenings, compliant with zoloft. Eating well. 2/18/19: Pt is demanding and manipulative ta times but taking zoloft. Pt is eating well. 2/19/19: Pt taking zoloft.  Pt got agitated yesterday evening and received geodon I/M PRN. Pt reported he need percocet. AS per reports, he was on percocets for pain. 2/20/19: Pt is doing better, is no longer suicidal and is accepting of nursing home placement, is psychiatrically stable for discharge. 2/21/19: Pt did not receive dialysis , was sleeping in his bed. Pt has clot in the catheter nad need new port. Nephrology is contacted. Pt report not feeling depressed, awaiting placement.i  2/22/19: Pt  Is irritable at times, did not get dialusis yetserday, New port is placed, nephrology is on board. Pt is eating   well nad sleeping well. 2/23/19-Presented verbal and cooperative. He is compliant with meds. Repotrs he is homeless and has nowhere to go to. SW is trying to find appropriate placement. No AVH or SI/HI. SIDE EFFECTS: (reviewed/updated 2/23/2019)  None reported or admitted to. No noted toxicity with use of Depakote/Tegretol/lithium/Clozaril/TCAs   ALLERGIES:(reviewed/updated 2/23/2019)  Allergies   Allergen Reactions    Tetracycline Hives      MEDICATIONS PRIOR TO ADMISSION:(reviewed/updated 2/23/2019)  Medications Prior to Admission   Medication Sig    NIFEdipine ER (ADALAT CC) 60 mg ER tablet Take 1 Tab by mouth daily. HOLD for HR<60    hydroCHLOROthiazide (HYDRODIURIL) 25 mg tablet Take 1 Tab by mouth daily.  sertraline (ZOLOFT) 25 mg tablet Take 1 Tab by mouth daily.  oxyCODONE-acetaminophen (PERCOCET 10)  mg per tablet Take 1 Tab by mouth every six (6) hours as needed for Pain.  traZODone (DESYREL) 50 mg tablet Take 50 mg by mouth nightly.  rosuvastatin (CRESTOR) 10 mg tablet Take 10 mg by mouth nightly.  aspirin delayed-release 81 mg tablet Take 1 Tab by mouth daily. PAST MEDICAL HISTORY: Past medical history from the initial psychiatric evaluation has been reviewed (reviewed/updated 2/23/2019) with no additional updates (I asked patient and no additional past medical history provided).    Past Medical History:   Diagnosis Date    Arthritis     CAD (coronary artery disease) 2007    CKD [facility]ronic kidney [facility]), [facility]     DM type 2 causing renal disease ([facility]     DVT (deep venous thrombosis) (HCC)     Hx of DVT:  Xarelto stopped due to GI bleed. S/P IVC filter.  Gait abnormality     GI bleed     Heart murmur     Hepatitis C     History of blood transfusion     Hypercholesterolemia     Hypertension 11/7/2014    Neuropathy     Non compliance w medication regimen     Peripheral vascular disease ([facility] 11/7/2014    A. S/P Right SFA POBA and tibial atherectomy (2/4/13).  PUD (peptic ulcer disease) 2007    Unspecified sleep apnea     never used cpap     Past Surgical History:   Procedure Laterality Date    ABDOMEN SURGERY PROC UNLISTED  2007    has approx 7-8\" midline incision on abdomen--\"had to open him up and clean him out after feeding tube clogged\"    HX GI  2007    feeding tube for approx 2 mo    VASCULAR SURGERY PROCEDURE UNLIST Right 1/22/2015    popliteal-tibial bypass      SOCIAL HISTORY: Social history from the initial psychiatric evaluation has been reviewed (reviewed/updated 2/23/2019) with no additional updates (I asked patient and no additional social history provided).    Social History     Socioeconomic History    Marital status: SINGLE     Spouse name: Not on file    Number of children: Not on file    Years of education: Not on file    Highest education level: Not on file   Social Needs    Financial resource strain: Not on file    Food insecurity - worry: Not on file    Food insecurity - inability: Not on file    Transportation needs - medical: Not on file   [employer] needs - non-medical: Not on file   Occupational History    Not on file   Tobacco Use    Smoking status: Current Every Day Smoker     Packs/day: 0.50    Smokeless tobacco: Never Used   Substance and Sexual Activity    Alcohol use: No    Drug use: No     Comment: >20 years ago    Sexual activity: Not on file Other Topics Concern     Service Not Asked    Blood Transfusions Not Asked    Caffeine Concern Not Asked    Occupational Exposure Not Asked    Hobby Hazards Not Asked    Sleep Concern Not Asked    Stress Concern Not Asked    Weight Concern Not Asked    Special Diet Not Asked    Back Care Not Asked    Exercise Not Asked    Bike Helmet Not Asked    Seat Belt Not Asked    Self-Exams Not Asked   Social History Narrative    78 year ols male admitted for depression and homelessness. Pt will be evicated from apartment due to non payment of bills. Girlfriend of 30 years [facility] [place] with others. \" Pt is a brittle diabetic, with treatment non compliance. He has bilarela lower extremity amputations. Patient has a long hx of substance abuse. FAMILY HISTORY: Family history from the initial psychiatric evaluation has been reviewed (reviewed/updated 2/23/2019) with no additional updates (I asked patient and no additional family history provided). Family History   Problem Relation Age of Onset    Hypertension Father        REVIEW OF SYSTEMS: (reviewed/updated 2/23/2019)  Appetite:   Sleep: All other Review of Systems: Negative except          MENTAL STATUS EXAM & [address] (MSE):    MSE FINDINGS ARE WITHIN NORMAL LIMITS (WNL) UNLESS OTHERWISE STATED BELOW. ( ALL OF THE BELOW CATEGORIES OF THE MSE HAVE BEEN REVIEWED (reviewed 2/14/2019) AND UPDATED AS DEEMED APPROPRIATE )  General Presentation age appropriate and casually dressed, cooperative   Orientation oriented to time, place and person   Vital Signs  See below (reviewed 2/14/2019); Vital Signs (BP, Pulse, & Temp) are within normal limits if not listed below.    Gait and Station Stable/steady, no ataxia   Musculoskeletal System No extrapyramidal symptoms (EPS); no abnormal muscular movements or Tardive Dyskinesia (TD); muscle strength and tone are within normal limits   Language No aphasia or dysarthria Speech:  soft   Thought Processes logical; slow rate of thoughts; fair abstract reasoning/computation   Thought Associations goal directed   Thought Content free of delusions   Suicidal Ideations intention   Homicidal Ideations no plan  and no intention   Mood:  anxious  and depressed   Affect:  constricted   Memory recent  intact   Memory remote:  intact   Concentration/Attention:  distractable   Fund of Knowledge average   Insight:  fair   Reliability fair   Judgment:  fair                                                                                                    VITALS:     Patient Vitals for the past 24 hrs:   Temp Pulse Resp BP SpO2   02/23/19 1543 97.9 °F (36.6 °C) (!) 56 16 168/70 100 %   02/23/19 1217 98.1 °F (36.7 °C) (!) 51 16 142/65 --   02/23/19 1215 -- (!) 51 16 103/51 --   02/23/19 1200 -- (!) 49 16 138/62 --   02/23/19 1145 -- (!) 48 16 147/68 --   02/23/19 1130 -- (!) 49 16 147/68 --   02/23/19 1115 -- (!) 50 16 137/65 --   02/23/19 1100 -- (!) 47 16 107/54 --   02/23/19 1045 -- (!) 43 16 108/53 --   02/23/19 1030 -- (!) 46 16 121/57 --   02/23/19 1027 -- -- -- 115/56 --   02/23/19 1015 -- (!) 47 16 109/46 --   02/23/19 1000 -- (!) 49 16 113/59 --   02/23/19 0945 -- (!) 49 16 134/57 --   02/23/19 0930 -- (!) 50 16 141/65 --   02/23/19 0915 -- (!) 50 16 144/67 --   02/23/19 0858 98.6 °F (37 °C) (!) 49 16 151/72 --   02/23/19 0820 98.6 °F (37 °C) (!) 56 16 99/64 98 %   02/22/19 1950 98 °F (36.7 °C) (!) 56 16 147/70 100 %     Wt Readings from Last 3 Encounters:   02/14/19 73 kg (160 lb 15 oz)   02/13/19 73.4 kg (161 lb 12.8 oz)   01/15/19 81.6 kg (180 lb)     Temp Readings from Last 3 Encounters:   02/23/19 97.9 °F (36.6 °C)   02/22/19 98.2 °F (36.8 °C)   02/13/19 98.2 °F (36.8 °C)     BP Readings from Last 3 Encounters:   02/23/19 168/70   02/22/19 146/68   02/13/19 181/82     Pulse Readings from Last 3 Encounters:   02/23/19 (!) 56   02/22/19 (!) 53   02/13/19 (!) 48            DATA LABORATORY DATA:(reviewed/updated 2/23/2019)  Recent Results (from the past 24 hour(s))   GLUCOSE, POC    Collection Time: 02/22/19  7:59 PM   Result Value Ref Range    Glucose (POC) 112 (H) 65 - 100 mg/dL    Performed by Miranda Lal, POC    Collection Time: 02/23/19  7:48 AM   Result Value Ref Range    Glucose (POC) 64 (L) 65 - 100 mg/dL    Performed by [address], POC    Collection Time: 02/23/19  8:20 AM   Result Value Ref Range    Glucose (POC) 98 65 - 100 mg/dL    Performed by [address], POC    Collection Time: 02/23/19 11:57 AM   Result Value Ref Range    Glucose (POC) 74 65 - 100 mg/dL    Performed by Jun España    GLUCOSE, POC    Collection Time: 02/23/19  3:51 PM   Result Value Ref Range    Glucose (POC) 78 65 - 100 mg/dL    Performed by Colby, POC    Collection Time: 02/23/19  4:19 PM   Result Value Ref Range    Glucose (POC) 120 (H) 65 - 100 mg/dL    Performed by Claudette Lopez      No results found for: VALF2, VALAC, VALP, VALPR, DS6, CRBAM, CRBAMP, CARB2, XCRBAM  No results found for: LITHM   RADIOLOGY REPORTS:(reviewed/updated 2/23/2019)  Xr Chest Pa Lat    Result Date: 2/8/2019  EXAM: XR CHEST PA LAT INDICATION: Shortness of breath, Low back pain and abdominal pain for several days. End-stage renal disease on dialysis, missed dialysis one day ago. COMPARISON: Chest views on 5/20/2018 TECHNIQUE: 4 images of PA and lateral chest views FINDINGS: Right jugular dialysis catheter is new and terminates in the distal superior vena cava. Cardiac monitoring wires overlie the thorax. Borderline cardiac size is unchanged. Aortic arch is not enlarged. The pulmonary vasculature is within normal limits. The lungs and pleural spaces are clear. The visualized bones and upper abdomen are age-appropriate. IMPRESSION: No acute process on chest x-ray. Right jugular dialysis catheter terminates in the distal superior vena cava.  No pneumothorax. Ct Abd Pelv W Cont    Result Date: 2/8/2019  EXAM: CT ABD PELV W CONT INDICATION: Abdominal pain and low back pain for several days. End-stage renal disease on dialysis, missed dialysis yesterday. Normal white blood cell count. Normal liver enzymes. COMPARISON: None. CONTRAST: 100 mL of Isovue-370. TECHNIQUE: Following the uneventful intravenous administration of contrast, thin axial images were obtained through the abdomen and pelvis. Coronal and sagittal reconstructions were generated. Oral contrast was administered. CT dose reduction was achieved through use of a standardized protocol tailored for this examination and automatic exposure control for dose modulation. FINDINGS: LUNG BASES: No pneumonia. Mild dependent atelectasis. INCIDENTALLY IMAGED HEART AND MEDIASTINUM: Borderline cardiac size. No pericardial effusion. LIVER: Subcentimeter liver lesions measure up to 9 mm in segment 2 of the liver. Surface is smooth. Streak artifact from bilateral upper extremities. GALLBLADDER: Gallstones are in the body and neck. No evidence of cholecystitis. CBD is not dilated. SPLEEN: Normal size and enhancement. PANCREAS: No mass or ductal dilatation. ADRENALS: Unremarkable. KIDNEYS: No mass, calculus, or hydronephrosis. STOMACH: Partial distention. SMALL BOWEL: No dilatation or wall thickening. COLON: Moderate colonic fecal burden. No mural thickening. APPENDIX: Unremarkable. PERITONEUM: No ascites or pneumoperitoneum. RETROPERITONEUM: IVC filter is in good position. Aorta is atherosclerotic without aneurysm. Mild atherosclerotic stenosis of the celiac artery. No lymphadenopathy. REPRODUCTIVE ORGANS: Mild prostatomegaly measures 6 cm and extends into the base of the urinary bladder. URINARY BLADDER: No mass or calculus. BONES: Moderate bilateral hip osteoarthritis. ADDITIONAL COMMENTS: N/A     IMPRESSION: 1. No acute process on CT. 2. Cholelithiasis. No cholecystitis.  3. Subcentimeter segment 2 liver lesions cannot be fully characterized on this examination. 4. Moderate colonic fecal burden may represent constipation. No bowel obstruction.          MEDICATIONS     ALL MEDICATIONS:   Current Facility-Administered Medications   Medication Dose Route Frequency    sertraline (ZOLOFT) tablet 75 mg  75 mg Oral DAILY    heparin (porcine) 1,000 unit/mL injection 4,300 Units  4,300 Units Hemodialysis DIALYSIS PRN    epoetin orsie-epbx (RETACRIT) injection 20,000 Units  20,000 Units SubCUTAneous DIALYSIS TUE, THU & SAT    oxyCODONE-acetaminophen (PERCOCET) 5-325 mg per tablet 1 Tab  1 Tab Oral BID PRN    ibuprofen (MOTRIN) tablet 800 mg  800 mg Oral Q8H PRN    aspirin delayed-release tablet 81 mg  81 mg Oral DAILY    rosuvastatin (CRESTOR) tablet 10 mg  10 mg Oral QHS    ferric citrate (AURYXIA) tablet 420 mg  420 mg Oral TID WITH MEALS    NIFEdipine ER (PROCARDIA XL) tablet 60 mg  60 mg Oral Q24H    doxazosin (CARDURA) tablet 4 mg  4 mg Oral QHS    OLANZapine (ZyPREXA) tablet 2.5 mg  2.5 mg Oral Q6H PRN    ziprasidone (GEODON) 10 mg in sterile water (preservative free) 0.5 mL injection  10 mg IntraMUSCular BID PRN    benztropine (COGENTIN) tablet 1 mg  1 mg Oral BID PRN    benztropine (COGENTIN) injection 1 mg  1 mg IntraMUSCular BID PRN    zolpidem (AMBIEN) tablet 5 mg  5 mg Oral QHS PRN    acetaminophen (TYLENOL) tablet 650 mg  650 mg Oral Q4H PRN    magnesium hydroxide (MILK OF MAGNESIA) 400 mg/5 mL oral suspension 30 mL  30 mL Oral DAILY PRN    nicotine (NICODERM CQ) 21 mg/24 hr patch 1 Patch  1 Patch TransDERmal DAILY PRN    glucose chewable tablet 16 g  4 Tab Oral PRN    dextrose (D50W) injection syrg 12.5-25 g  25-50 mL IntraVENous PRN    glucagon (GLUCAGEN) injection 1 mg  1 mg IntraMUSCular PRN    insulin lispro (HUMALOG) injection   SubCUTAneous AC&HS     Facility-Administered Medications Ordered in Other Encounters   Medication Dose Route Frequency    fentaNYL citrate (PF) injection 200 mcg  200 mcg IntraVENous Multiple    0.9% sodium chloride infusion 500 mL  500 mL IntraVENous CONTINUOUS      SCHEDULED MEDICATIONS:   Current Facility-Administered Medications   Medication Dose Route Frequency    sertraline (ZOLOFT) tablet 75 mg  75 mg Oral DAILY    epoetin rosie-epbx (RETACRIT) injection 20,000 Units  20,000 Units SubCUTAneous DIALYSIS TUE, THU & SAT    aspirin delayed-release tablet 81 mg  81 mg Oral DAILY    rosuvastatin (CRESTOR) tablet 10 mg  10 mg Oral QHS    ferric citrate (AURYXIA) tablet 420 mg  420 mg Oral TID WITH MEALS    NIFEdipine ER (PROCARDIA XL) tablet 60 mg  60 mg Oral Q24H    doxazosin (CARDURA) tablet 4 mg  4 mg Oral QHS    insulin lispro (HUMALOG) injection   SubCUTAneous AC&HS          ASSESSMENT & PLAN     The patient, Cami Mattson, is a 70 y.o.  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  Plan for hemodialysis 3 days a week.  If it is not controlled  with the same, provide additional antihypertensive meds.  Monitor blood pressure.    End-stage renal disease, on hemodialysis.  Schedule treatment for hemodialysis on  Tuesdays, Thursdays, and Saturdays. .   Type 2 diabetes mellitus.  Place on Humalog insulin correction coverage schedule,  Accu-Chek, and check hemoglobin A1c level.  The patient will be on a diabetic/renal  Diet. Nephrology consult   2/15/19: continue meds/milue, encourage compliance. 2/16/19: continue meds/milue  2/17/19: continue meds/milue increaisng zoloft 50 mg po daily  2/18/19: continue meds/milue  2/19/19: readding percocet twice daily as needed for pain.   2/20/19: awaiting [facility]emnet, PT/OT for placement  2/21/19: nephrology consult, recommendation regrading dialysis,  2/22/19: increasing zoloft 75 mg daily, continue milue,  I will continue to monitor blood levels (Depakote, Tegretol, lithium, clozapine---a drug with a narrow therapeutic index= NTI) and associated labs for drug therapy implemented that require intense monitoring for toxicity as deemed appropriate based on current medication side effects and pharmacodynamically determined drug 1/2 lives. In summary, Lew Low, is a 70 y.o.  male who presents with a severe exacerbation of the principal diagnosis of Depression  Patient's condition is worsening/not improving/not stable improving. Patient requires continued inpatient hospitalization for further stabilization, safety monitoring and medication management. I will continue to coordinate the provision of individual, milieu, occupational, group, and substance abuse therapies to address target symptoms/diagnoses as deemed appropriate for the individual patient. A coordinated, multidisplinary treatment team round was conducted with the patient (this team consists of the nurse, psychiatric unit pharmcist,  and writer). Complete current electronic health record for patient has been reviewed today including consultant notes, ancillary staff notes, nurses and psychiatric tech notes. uicide risk assessment completed and patient deemed to be of low risk for suicide at this time. The following regarding medications was addressed during rounds with patient:   the risks and benefits of the proposed medication. The patient was given the opportunity to ask questions. Informed consent given to the use of the above medications. Will continue to adjust psychiatric and non-psychiatric medications (see above \"medication\" section and orders section for details) as deemed appropriate & based upon diagnoses and response to treatment. I will continue to order blood tests/labs and diagnostic tests as deemed appropriate and review results as they become available (see orders for details and above listed lab/test results).     I will order psychiatric records from previous [facility] hospitals to further elucidate the nature of patient's psychopathology and review once available. I will gather additional collateral information from friends, family and o/p treatment team to further elucidate the nature of patient's psychopathology and baselline level of psychiatric functioning. I certify that this patient's inpatient psychiatric hospital services furnished since the previous certification were, and continue to be, required for treatment that could reasonably be expected to improve the patient's condition, or for diagnostic study, and that the patient continues to need, on a daily basis, active treatment furnished directly by or requiring the supervision of inpatient psychiatric facility personnel. In addition, the hospital records show that services furnished were intensive treatment services, admission or related services, or equivalent services.     EXPECTED DISCHARGE DATE/DAY: TBD     DISPOSITION: Home       Signed By:   Kenn Lau MD  2/23/2019

## 2019-02-23 NOTE — PROGRESS NOTES
PT Note:    Chart reviewed and attempted to see patient for PT evaluation. Patient currently on HD, unable to be seen for evaluation. Will make one more attempt next week.

## 2019-02-23 NOTE — PROGRESS NOTES
Problem: Depressed Mood (Adult/Pediatric)  Goal: *STG: Attends activities and groups  Outcome: Progressing Towards Goal    Received patient in room 733 receiving Kidney Dialysis with KD nurse. NAD. On q 15 min safety checks. Visible in DR after KD, watching TV, talking on phone and eating 100% dinner, fluids and snacks. Preoccupied with \"going to borrow money from my friend to get a place to rent when discharged, don't want to go    To a Rehab to live,just want to live with my wife and children\". Med compliant. PRN Medication Documentation    Specific patient behavior that led to need for PRN medication: Patient requested med for generalized body  Staff interventions attempted prior to PRN being given: repositioned, ate dinner and  snacks  PRN medication given: 2222 Motrin 800 mg tab  Patient response/effectiveness of PRN medication: Med effective.

## 2019-02-24 LAB
GLUCOSE BLD STRIP.AUTO-MCNC: 117 MG/DL (ref 65–100)
GLUCOSE BLD STRIP.AUTO-MCNC: 83 MG/DL (ref 65–100)
SERVICE CMNT-IMP: ABNORMAL
SERVICE CMNT-IMP: NORMAL

## 2019-02-24 PROCEDURE — 74011250637 HC RX REV CODE- 250/637: Performed by: HOSPITALIST

## 2019-02-24 PROCEDURE — 74011250637 HC RX REV CODE- 250/637: Performed by: INTERNAL MEDICINE

## 2019-02-24 PROCEDURE — 74011250637 HC RX REV CODE- 250/637: Performed by: PSYCHIATRY & NEUROLOGY

## 2019-02-24 PROCEDURE — 82962 GLUCOSE BLOOD TEST: CPT

## 2019-02-24 PROCEDURE — 74011250637 HC RX REV CODE- 250/637: Performed by: FAMILY MEDICINE

## 2019-02-24 PROCEDURE — 65220000003 HC RM SEMIPRIVATE PSYCH

## 2019-02-24 RX ADMIN — ASPIRIN 81 MG: 81 TABLET, COATED ORAL at 13:43

## 2019-02-24 RX ADMIN — FERRIC CITRATE 420 MG: 210 TABLET, COATED ORAL at 13:43

## 2019-02-24 RX ADMIN — FERRIC CITRATE 420 MG: 210 TABLET, COATED ORAL at 16:38

## 2019-02-24 RX ADMIN — ZOLPIDEM TARTRATE 5 MG: 5 TABLET ORAL at 20:36

## 2019-02-24 RX ADMIN — DOXAZOSIN 4 MG: 2 TABLET ORAL at 21:35

## 2019-02-24 RX ADMIN — ROSUVASTATIN CALCIUM 10 MG: 10 TABLET, FILM COATED ORAL at 21:35

## 2019-02-24 RX ADMIN — SERTRALINE HYDROCHLORIDE 75 MG: 50 TABLET ORAL at 13:42

## 2019-02-24 NOTE — BH NOTES
PRN Medication Documentation    Specific patient behavior that led to need for PRN medication: pain of extremetes  Staff interventions attempted prior to PRN being given: Redirection  PRN medication given: Motrin 800 mg  Patient response/effectiveness of PRN medication: Tolerarated

## 2019-02-24 NOTE — PROGRESS NOTES
Problem: Falls - Risk of  Goal: *Absence of Falls  Document Bello Fall Risk and appropriate interventions in the flowsheet. Outcome: Progressing Towards Goal  Fall Risk Interventions:  Mobility Interventions: Bed/chair exit alarm  Mentation Interventions: Adequate sleep, hydration, pain control  Medication Interventions: Bed/chair exit alarm  Elimination Interventions: Toileting schedule/hourly rounds  History of Falls Interventions: Bed/chair exit alarm    Received pt lying in bed with eyes closed, appears to be asleep. NAD. Respirations even and unlabored. Will cont to monitor q15min for safety.

## 2019-02-24 NOTE — PROGRESS NOTES
Problem: Depressed Mood (Adult/Pediatric)  Goal: *STG: Participates in treatment plan  Outcome: Progressing Towards Goal  1530: Greeted patient on unit in day room watching tv with peer. Appeared in no acute distress. No voiced complaints at present. Will continue to monitor on Q 15 minute safety checks. 1635:   Alert, Vitals stable, wnl. Appetite excellent. Ate 100% of dinner tray. Will continue to monitor. 2036:PRN Medication Documentation    Specific patient behavior that led to need for PRN medication: insomnia, restlessness  Staff interventions attempted prior to PRN being given:education and support  PRN medication given:ambien 5 mg po  Patient response/effectiveness of PRN medication: patient appears to be sleeping.

## 2019-02-24 NOTE — PROGRESS NOTES
Problem: Depressed Mood (Adult/Pediatric)  Goal: *STG: Participates in treatment plan  Outcome: Progressing Towards Goal  Patient is looking for ways to achieve discharge as soon as possible. On telephone looking for resources available to him through family members. Has requested to leave three times in past twenty four hours but has also recognized after being informed that he needs to wait until treatment team on Monday to discuss again. Goal: *STG: Verbalizes anger, guilt, and other feelings in a constructive manor  Outcome: Progressing Towards Goal  Patient expresses the frustration of being told he will not be discharged today in a constructive manner. Goal: Interventions  Outcome: Progressing Towards Goal  Assess and document effectiveness of medications, reassure and offer emotional support for goals achieved. Problem: Falls - Risk of  Goal: *Absence of Falls  Document Bello Fall Risk and appropriate interventions in the flowsheet. Outcome: Progressing Towards Goal  Fall Risk Interventions:  Patient remains free from fall. Document Bello fall risk scale. Lift team requests for transfering.   Mobility Interventions: Bed/chair exit alarm    Mentation Interventions: Adequate sleep, hydration, pain control    Medication Interventions: Bed/chair exit alarm    Elimination Interventions: Patient to call for help with toileting needs    History of Falls Interventions: Bed/chair exit alarm

## 2019-02-24 NOTE — INTERDISCIPLINARY ROUNDS
Behavioral Health Interdisciplinary Rounds     Patient Name: Jon Pena  Age: 70 y.o. Room/Bed:  740/  Primary Diagnosis: Depression   Admission Status: Voluntary     Readmission within 30 days: no  Power of  in place: no  Patient requires a blocked bed: no          Reason for blocked bed:   Sleep hours:  6      Participation in Care/Groups:  N/A  Medication Compliant?: Yes  PRNS (last 24 hours): Pain    Restraints (last 24 hours):  no     Alcohol screening (AUDIT) completed -  AUDIT Score: 0  If applicable, date SBIRT discussed in treatment team AND documented:    Tobacco - patient is a smoker: Have You Used Tobacco in the Past 30 Days: Yes  Illegal Drugs use: Have You Used Any Illegal Substances Over the Past 12 Months: Yes    24 hour chart check complete: yes     Patient goal(s) for today:   Treatment team focus/goals:   Progress note   LOS:  10  Expected LOS:     Participating treatment team members:  Jon Pena, * (assigned SW),

## 2019-02-24 NOTE — BH NOTES
PSYCHIATRIC PROGRESS NOTE         Patient Name  Franky Clay   Date of Birth 1940   [facility] [id number]   Medical Record Number  [id number]      Age  70 y.o. PCP Quique Mcgovern MD   Admit date:  2/13/2019    Room Number  740/01  @ [facility]   Date of Service  2/24/2019           E & M PROGRESS NOTE: 15 mins         HISTORY       REASON FOR HOSPITALIZATION:  CC: \"Pt admitted under a voluntary basis for severe depression with suicidal ideations and  an inability to care for self. HISTORY OF PRESENT ILLNESS:    The patient, Franky Clay, is a 70 y.o. BLACK OR  male with a67-year-old -American male with past medical  history of end-stage renal disease; on hemodialysis, sleep apnea, peripheral vascular disease, bilateral BKA, hypertension, hyperlipidemia, peripheral neuropathy, anemia,hepatitis C, arthritis, peptic ulcer disease, coronary artery disease, DVT, prior GIbleed, chronic diastolic CHF, prior suicidal ideation, depression, and noncompliance   transfered to [facility] from [facility] after pt reported feeling depressed nad suicidal Pt stated he lives in the [place] and he is unhappy about his living situation , he stated he had lots of Money before [place] HAS 13 CHILDREN but now he is broke and no one ask about him. Pt reports feeling hopeless, helpless, angry. These symptoms are of high severity. These symptoms are constant . Pt ha sh/o using marijuana occasionally. UDS: negative; BAL=0. .  2/15/19: Pt is irritable and refused zoloft and vitals today. Pt has dialysis yesterday. 2/16/19: Pt took zoloft but refuses some medical meds. Pt had dialysis on Saturday. Pt is demanding in the evenings. 2/17/19: Pt is demanding, irritable in the evenings, compliant with zoloft. Eating well. 2/18/19: Pt is demanding and manipulative ta times but taking zoloft. Pt is eating well. 2/19/19: Pt taking zoloft.  Pt got agitated yesterday evening and received geodon I/M PRN. Pt reported he need percocet. AS per reports, he was on percocets for pain. 2/20/19: Pt is doing better, is no longer suicidal and is accepting of nursing home placement, is psychiatrically stable for discharge. 2/21/19: Pt did not receive dialysis , was sleeping in his bed. Pt has clot in the catheter nad need new port. Nephrology is contacted. Pt report not feeling depressed, awaiting placement.i  2/22/19: Pt  Is irritable at times, did not get dialusis yetserday, New port is placed, nephrology is on board. Pt is eating   well nad sleeping well. 2/23/19-Presented verbal and cooperative. He is compliant with meds. Repotrs he is homeless and has nowhere to go to. SW is trying to find appropriate placement. No AVH or SI/HI.  2/24/19- pt stayed in bed most of the day. He had been asking for discharge earlier but has not recognized a place. He was seen in his room and encouraged get up and come to the day-room. No prn's used. SIDE EFFECTS: (reviewed/updated 2/24/2019)  None reported or admitted to. No noted toxicity with use of Depakote/Tegretol/lithium/Clozaril/TCAs   ALLERGIES:(reviewed/updated 2/24/2019)  Allergies   Allergen Reactions    Tetracycline Hives      MEDICATIONS PRIOR TO ADMISSION:(reviewed/updated 2/24/2019)  Medications Prior to Admission   Medication Sig    NIFEdipine ER (ADALAT CC) 60 mg ER tablet Take 1 Tab by mouth daily. HOLD for HR<60    hydroCHLOROthiazide (HYDRODIURIL) 25 mg tablet Take 1 Tab by mouth daily.  sertraline (ZOLOFT) 25 mg tablet Take 1 Tab by mouth daily.  oxyCODONE-acetaminophen (PERCOCET 10)  mg per tablet Take 1 Tab by mouth every six (6) hours as needed for Pain.  traZODone (DESYREL) 50 mg tablet Take 50 mg by mouth nightly.  rosuvastatin (CRESTOR) 10 mg tablet Take 10 mg by mouth nightly.  aspirin delayed-release 81 mg tablet Take 1 Tab by mouth daily.       PAST MEDICAL HISTORY: Past medical history from the initial psychiatric evaluation has been reviewed (reviewed/updated 2/24/2019) with no additional updates (I asked patient and no additional past medical history provided). Past Medical History:   Diagnosis Date    Arthritis     CAD (coronary artery disease) 2007    CKD [facility]chronic kidney disease), [facility] III ([facility]     [facility]     DVT (deep venous thrombosis) (HCC)     Hx of DVT:  Xarelto stopped due to GI bleed. S/P IVC filter.  Gait abnormality     GI bleed     Heart murmur     Hepatitis C     History of blood transfusion     Hypercholesterolemia     Hypertension 11/7/2014    Neuropathy     Non compliance w medication regimen     Peripheral vascular disease [facility] 11/7/2014    A. S/P Right SFA POBA and tibial atherectomy (2/4/13).  PUD (peptic ulcer disease) 2007    Unspecified sleep apnea     never used cpap     Past Surgical History:   Procedure Laterality Date    ABDOMEN SURGERY PROC UNLISTED  2007    has approx 7-8\" midline incision on abdomen--\"had to open him up and clean him out after feeding tube clogged\"    HX GI  2007    feeding tube for approx 2 mo    VASCULAR SURGERY PROCEDURE UNLIST Right 1/22/2015    popliteal-tibial bypass      SOCIAL HISTORY: Social history from the initial psychiatric evaluation has been reviewed (reviewed/updated 2/24/2019) with no additional updates (I asked patient and no additional social history provided).    Social History     Socioeconomic History    Marital status: SINGLE     Spouse name: Not on file    Number of children: Not on file    Years of education: Not on file    Highest education level: Not on file   Social Needs    Financial resource strain: Not on file    Food insecurity - worry: Not on file    Food insecurity - inability: Not on file    Transportation needs - medical: Not on file   [employer] needs - non-medical: Not on file   Occupational History    Not on file   Tobacco Use    Smoking status: Current Every Day Smoker     Packs/day: 0.50    Smokeless tobacco: Never Used   Substance and Sexual Activity    Alcohol use: No    Drug use: No     Comment: >20 years ago    Sexual activity: Not on file   Other Topics Concern     Service Not Asked    Blood Transfusions Not Asked    Caffeine Concern Not Asked    Occupational Exposure Not Asked    Hobby Hazards Not Asked    Sleep Concern Not Asked    Stress Concern Not Asked    Weight Concern Not Asked    Special Diet Not Asked    Back Care Not Asked    Exercise Not Asked    Bike Helmet Not Asked    Seat Belt Not Asked    Self-Exams Not Asked   Social History Narrative    78 year ols male admitted for depression and homelessness. Pt will be evicated from apartment due to non payment of bills. [facility] of 30 years [facility] [place] [facility]. \" Pt is a brittle diabetic, with treatment non compliance. He has bilarela lower extremity amputations. Patient has a long hx of substance abuse. FAMILY HISTORY: Family history from the initial psychiatric evaluation has been reviewed (reviewed/updated 2/24/2019) with no additional updates (I asked patient and no additional family history provided). Family History   Problem Relation Age of Onset    Hypertension Father        REVIEW OF SYSTEMS: (reviewed/updated 2/24/2019)  Appetite:   Sleep: All other Review of Systems: Negative except          MENTAL STATUS EXAM & [address] (MSE):    MSE FINDINGS ARE WITHIN NORMAL LIMITS (WNL) UNLESS OTHERWISE STATED BELOW. ( ALL OF THE BELOW CATEGORIES OF THE MSE HAVE BEEN REVIEWED (reviewed 2/14/2019) AND UPDATED AS DEEMED APPROPRIATE )  General Presentation age appropriate and casually dressed, cooperative   Orientation oriented to time, place and person   Vital Signs  See below (reviewed 2/14/2019); Vital Signs (BP, Pulse, & Temp) are within normal limits if not listed below.    Gait and Station Stable/steady, no ataxia   Musculoskeletal System No extrapyramidal symptoms (EPS); no abnormal muscular movements or Tardive Dyskinesia (TD); muscle strength and tone are within normal limits   Language No aphasia or dysarthria   Speech:  soft   Thought Processes logical; slow rate of thoughts; fair abstract reasoning/computation   Thought Associations goal directed   Thought Content free of delusions   Suicidal Ideations intention   Homicidal Ideations no plan  and no intention   Mood:  anxious  and depressed   Affect:  constricted   Memory recent  intact   Memory remote:  intact   Concentration/Attention:  distractable   Fund of Knowledge average   Insight:  fair   Reliability fair   Judgment:  fair                                                                                                    VITALS:     Patient Vitals for the past 24 hrs:   Temp Pulse Resp BP SpO2   02/24/19 0739 97.5 °F (36.4 °C) (!) 58 18 178/82 100 %   02/23/19 1928 98.2 °F (36.8 °C) (!) 57 18 154/76 99 %   02/23/19 1543 97.9 °F (36.6 °C) (!) 56 16 168/70 100 %     Wt Readings from Last 3 Encounters:   02/14/19 73 kg (160 lb 15 oz)   02/13/19 73.4 kg (161 lb 12.8 oz)   01/15/19 81.6 kg (180 lb)     Temp Readings from Last 3 Encounters:   02/24/19 97.5 °F (36.4 °C)   02/22/19 98.2 °F (36.8 °C)   02/13/19 98.2 °F (36.8 °C)     BP Readings from Last 3 Encounters:   02/24/19 178/82   02/22/19 146/68   02/13/19 181/82     Pulse Readings from Last 3 Encounters:   02/24/19 (!) 58   02/22/19 (!) 53   02/13/19 (!) 48            DATA     LABORATORY DATA:(reviewed/updated 2/24/2019)  Recent Results (from the past 24 hour(s))   GLUCOSE, POC    Collection Time: 02/23/19  3:51 PM   Result Value Ref Range    Glucose (POC) 78 65 - 100 mg/dL    Performed by Colby, POC    Collection Time: 02/23/19  4:19 PM   Result Value Ref Range    Glucose (POC) 120 (H) 65 - 100 mg/dL    Performed by Colby, POC    Collection Time: 02/23/19 7:47 PM   Result Value Ref Range    Glucose (POC) 111 (H) 65 - 100 mg/dL    Performed by Gerry Cee      No results found for: VALF2, VALAC, VALP, VALPR, DS6, CRBAM, CRBAMP, CARB2, XCRBAM  No results found for: LITHM   RADIOLOGY REPORTS:(reviewed/updated 2/24/2019)  Xr Chest Pa Lat    Result Date: 2/8/2019  EXAM: XR CHEST PA LAT INDICATION: Shortness of breath, Low back pain and abdominal pain for several days. End-stage renal disease on dialysis, missed dialysis one day ago. COMPARISON: Chest views on 5/20/2018 TECHNIQUE: 4 images of PA and lateral chest views FINDINGS: Right jugular dialysis catheter is new and terminates in the distal superior vena cava. Cardiac monitoring wires overlie the thorax. Borderline cardiac size is unchanged. Aortic arch is not enlarged. The pulmonary vasculature is within normal limits. The lungs and pleural spaces are clear. The visualized bones and upper abdomen are age-appropriate. IMPRESSION: No acute process on chest x-ray. Right jugular dialysis catheter terminates in the distal superior vena cava. No pneumothorax. Ct Abd Pelv W Cont    Result Date: 2/8/2019  EXAM: CT ABD PELV W CONT INDICATION: Abdominal pain and low back pain for several days. End-stage renal disease on dialysis, missed dialysis yesterday. Normal white blood cell count. Normal liver enzymes. COMPARISON: None. CONTRAST: 100 mL of Isovue-370. TECHNIQUE: Following the uneventful intravenous administration of contrast, thin axial images were obtained through the abdomen and pelvis. Coronal and sagittal reconstructions were generated. Oral contrast was administered. CT dose reduction was achieved through use of a standardized protocol tailored for this examination and automatic exposure control for dose modulation. FINDINGS: LUNG BASES: No pneumonia. Mild dependent atelectasis. INCIDENTALLY IMAGED HEART AND MEDIASTINUM: Borderline cardiac size. No pericardial effusion.  LIVER: Subcentimeter liver lesions measure up to 9 mm in segment 2 of the liver. Surface is smooth. Streak artifact from bilateral upper extremities. GALLBLADDER: Gallstones are in the body and neck. No evidence of cholecystitis. CBD is not dilated. SPLEEN: Normal size and enhancement. PANCREAS: No mass or ductal dilatation. ADRENALS: Unremarkable. KIDNEYS: No mass, calculus, or hydronephrosis. STOMACH: Partial distention. SMALL BOWEL: No dilatation or wall thickening. COLON: Moderate colonic fecal burden. No mural thickening. APPENDIX: Unremarkable. PERITONEUM: No ascites or pneumoperitoneum. RETROPERITONEUM: IVC filter is in good position. Aorta is atherosclerotic without aneurysm. Mild atherosclerotic stenosis of the celiac artery. No lymphadenopathy. REPRODUCTIVE ORGANS: Mild prostatomegaly measures 6 cm and extends into the base of the urinary bladder. URINARY BLADDER: No mass or calculus. BONES: Moderate bilateral hip osteoarthritis. ADDITIONAL COMMENTS: N/A     IMPRESSION: 1. No acute process on CT. 2. Cholelithiasis. No cholecystitis. 3. Subcentimeter segment 2 liver lesions cannot be fully characterized on this examination. 4. Moderate colonic fecal burden may represent constipation. No bowel obstruction.          MEDICATIONS     ALL MEDICATIONS:   Current Facility-Administered Medications   Medication Dose Route Frequency    sertraline (ZOLOFT) tablet 75 mg  75 mg Oral DAILY    heparin (porcine) 1,000 unit/mL injection 4,300 Units  4,300 Units Hemodialysis DIALYSIS PRN    epoetin rosie-epbx (RETACRIT) injection 20,000 Units  20,000 Units SubCUTAneous DIALYSIS TUE, THU & SAT    oxyCODONE-acetaminophen (PERCOCET) 5-325 mg per tablet 1 Tab  1 Tab Oral BID PRN    ibuprofen (MOTRIN) tablet 800 mg  800 mg Oral Q8H PRN    aspirin delayed-release tablet 81 mg  81 mg Oral DAILY    rosuvastatin (CRESTOR) tablet 10 mg  10 mg Oral QHS    ferric citrate (AURYXIA) tablet 420 mg  420 mg Oral TID WITH MEALS    NIFEdipine ER (PROCARDIA XL) tablet 60 mg  60 mg Oral Q24H    doxazosin (CARDURA) tablet 4 mg  4 mg Oral QHS    OLANZapine (ZyPREXA) tablet 2.5 mg  2.5 mg Oral Q6H PRN    ziprasidone (GEODON) 10 mg in sterile water (preservative free) 0.5 mL injection  10 mg IntraMUSCular BID PRN    benztropine (COGENTIN) tablet 1 mg  1 mg Oral BID PRN    benztropine (COGENTIN) injection 1 mg  1 mg IntraMUSCular BID PRN    zolpidem (AMBIEN) tablet 5 mg  5 mg Oral QHS PRN    acetaminophen (TYLENOL) tablet 650 mg  650 mg Oral Q4H PRN    magnesium hydroxide (MILK OF MAGNESIA) 400 mg/5 mL oral suspension 30 mL  30 mL Oral DAILY PRN    nicotine (NICODERM CQ) 21 mg/24 hr patch 1 Patch  1 Patch TransDERmal DAILY PRN    glucose chewable tablet 16 g  4 Tab Oral PRN    dextrose (D50W) injection syrg 12.5-25 g  25-50 mL IntraVENous PRN    glucagon (GLUCAGEN) injection 1 mg  1 mg IntraMUSCular PRN    insulin lispro (HUMALOG) injection   SubCUTAneous AC&HS     Facility-Administered Medications Ordered in Other Encounters   Medication Dose Route Frequency    fentaNYL citrate (PF) injection 200 mcg  200 mcg IntraVENous Multiple    0.9% sodium chloride infusion 500 mL  500 mL IntraVENous CONTINUOUS      SCHEDULED MEDICATIONS:   Current Facility-Administered Medications   Medication Dose Route Frequency    sertraline (ZOLOFT) tablet 75 mg  75 mg Oral DAILY    epoetin rosie-epbx (RETACRIT) injection 20,000 Units  20,000 Units SubCUTAneous DIALYSIS TUE, THU & SAT    aspirin delayed-release tablet 81 mg  81 mg Oral DAILY    rosuvastatin (CRESTOR) tablet 10 mg  10 mg Oral QHS    ferric citrate (AURYXIA) tablet 420 mg  420 mg Oral TID WITH MEALS    NIFEdipine ER (PROCARDIA XL) tablet 60 mg  60 mg Oral Q24H    doxazosin (CARDURA) tablet 4 mg  4 mg Oral QHS    insulin lispro (HUMALOG) injection   SubCUTAneous AC&HS          ASSESSMENT & PLAN     The patient, Franky Clay, is a 70 y.o.  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  Plan for hemodialysis 3 days a week.  If it is not controlled  with the same, provide additional antihypertensive meds.  Monitor blood pressure.    End-stage renal disease, on hemodialysis.  Schedule treatment for hemodialysis on  Tuesdays, Thursdays, and Saturdays. .   Type 2 diabetes mellitus.  Place on Humalog insulin correction coverage schedule,  Accu-Chek, and check hemoglobin A1c level.  The patient will be on a diabetic/renal  Diet. Nephrology consult   2/15/19: continue meds/milue, encourage compliance. 2/16/19: continue meds/milue  2/17/19: continue meds/milue increaisng zoloft 50 mg po daily  2/18/19: continue meds/milue  2/19/19: readding percocet twice daily as needed for pain. 2/20/19: awaiting [facility]emnet, PT/OT for placement  2/21/19: nephrology consult, recommendation regrading dialysis,  2/22/19: increasing zoloft 75 mg daily, continue milue,  I will continue to monitor blood levels (Depakote, Tegretol, lithium, clozapine---a drug with a narrow therapeutic index= NTI) and associated labs for drug therapy implemented that require intense monitoring for toxicity as deemed appropriate based on current medication side effects and pharmacodynamically determined drug 1/2 lives. In summary, Precious Gannon, is a 70 y.o.  male who presents with a severe exacerbation of the principal diagnosis of Depression  Patient's condition is worsening/not improving/not stable improving. Patient requires continued inpatient hospitalization for further stabilization, safety monitoring and medication management. I will continue to coordinate the provision of individual, milieu, occupational, group, and substance abuse therapies to address target symptoms/diagnoses as deemed appropriate for the individual patient.   A coordinated, multidisplinary treatment team round was conducted with the patient (this team consists of the nurse, psychiatric unit pharmcist,  and writer). Complete current electronic health record for patient has been reviewed today including consultant notes, ancillary staff notes, nurses and psychiatric tech notes. uicide risk assessment completed and patient deemed to be of low risk for suicide at this time. The following regarding medications was addressed during rounds with patient:   the risks and benefits of the proposed medication. The patient was given the opportunity to ask questions. Informed consent given to the use of the above medications. Will continue to adjust psychiatric and non-psychiatric medications (see above \"medication\" section and orders section for details) as deemed appropriate & based upon diagnoses and response to treatment. I will continue to order blood tests/labs and diagnostic tests as deemed appropriate and review results as they become available (see orders for details and above listed lab/test results). I will order psychiatric records from previous [facility] hospitals to further elucidate the nature of patient's psychopathology and review once available. I will gather additional collateral information from friends, family and o/p treatment team to further elucidate the nature of patient's psychopathology and baselline level of psychiatric functioning. I certify that this patient's inpatient psychiatric hospital services furnished since the previous certification were, and continue to be, required for treatment that could reasonably be expected to improve the patient's condition, or for diagnostic study, and that the patient continues to need, on a daily basis, active treatment furnished directly by or requiring the supervision of inpatient psychiatric facility personnel.  In addition, the hospital records show that services furnished were intensive treatment services, admission or related services, or equivalent services.     EXPECTED DISCHARGE DATE/DAY: TBD     DISPOSITION: Home       Signed By:   Ramón White MD  2/24/2019

## 2019-02-25 LAB
GLUCOSE BLD STRIP.AUTO-MCNC: 108 MG/DL (ref 65–100)
GLUCOSE BLD STRIP.AUTO-MCNC: 70 MG/DL (ref 65–100)
GLUCOSE BLD STRIP.AUTO-MCNC: 82 MG/DL (ref 65–100)
GLUCOSE BLD STRIP.AUTO-MCNC: 85 MG/DL (ref 65–100)
SERVICE CMNT-IMP: ABNORMAL
SERVICE CMNT-IMP: NORMAL

## 2019-02-25 PROCEDURE — 82962 GLUCOSE BLOOD TEST: CPT

## 2019-02-25 PROCEDURE — 97162 PT EVAL MOD COMPLEX 30 MIN: CPT

## 2019-02-25 PROCEDURE — 74011250637 HC RX REV CODE- 250/637: Performed by: INTERNAL MEDICINE

## 2019-02-25 PROCEDURE — 74011250637 HC RX REV CODE- 250/637: Performed by: PSYCHIATRY & NEUROLOGY

## 2019-02-25 PROCEDURE — 97530 THERAPEUTIC ACTIVITIES: CPT

## 2019-02-25 PROCEDURE — 74011250637 HC RX REV CODE- 250/637: Performed by: HOSPITALIST

## 2019-02-25 PROCEDURE — 65220000003 HC RM SEMIPRIVATE PSYCH

## 2019-02-25 PROCEDURE — 74011250637 HC RX REV CODE- 250/637: Performed by: FAMILY MEDICINE

## 2019-02-25 RX ADMIN — OXYCODONE AND ACETAMINOPHEN 1 TABLET: 5; 325 TABLET ORAL at 23:10

## 2019-02-25 RX ADMIN — SERTRALINE HYDROCHLORIDE 75 MG: 50 TABLET ORAL at 11:05

## 2019-02-25 RX ADMIN — FERRIC CITRATE 420 MG: 210 TABLET, COATED ORAL at 13:02

## 2019-02-25 RX ADMIN — ASPIRIN 81 MG: 81 TABLET, COATED ORAL at 11:05

## 2019-02-25 RX ADMIN — ZOLPIDEM TARTRATE 5 MG: 5 TABLET ORAL at 21:00

## 2019-02-25 RX ADMIN — NIFEDIPINE 60 MG: 60 TABLET, FILM COATED, EXTENDED RELEASE ORAL at 17:12

## 2019-02-25 RX ADMIN — DOXAZOSIN 4 MG: 2 TABLET ORAL at 21:23

## 2019-02-25 RX ADMIN — FERRIC CITRATE 420 MG: 210 TABLET, COATED ORAL at 17:15

## 2019-02-25 RX ADMIN — FERRIC CITRATE 420 MG: 210 TABLET, COATED ORAL at 11:15

## 2019-02-25 RX ADMIN — ROSUVASTATIN CALCIUM 10 MG: 10 TABLET, FILM COATED ORAL at 21:23

## 2019-02-25 NOTE — BH NOTES
GROUP THERAPY PROGRESS NOTE    Nino Anhs did not participate in a 50 minute Process Group on the Geriatric Unit with a focus on shifting ones feelings to a positive note and preparing for the day through group singing.

## 2019-02-25 NOTE — PROGRESS NOTES
HYPOGLYCEMIC EPISODE DOCUMENTATION    Patient with hypoglycemic episode(s) at 0917(time) on 02/25/19(date). BG value(s) pre-treatment 79    Was patient symptomatic?  [] yes, [x] no  Patient was treated with the following rescue medications/treatments: [] D50                [] Glucose tablets                [] Glucagon                [x] 4oz juice                [] 6oz reg soda                [] 8oz low fat milk  BG value post-treatment: 82  Once BG treated and value greater than 80mg/dl, pt was provided with the following:  [] snack  [x] meal  Name of MD notified:  Will notify MD  The following orders were received:

## 2019-02-25 NOTE — PROGRESS NOTES
Nephrology Progress Note  Angie Ross  Date of Admission : 2/13/2019    CC:  Follow up for ESRD       Assessment and Plan     ESRD on HD:  - HD TTS at [facility]  - HD tomorrow and labs to be drawn before HD    HTN:  - cont current meds    Anemia of CKD:  - FANNY with dialysis     Secondary HPT:  - cont auryxia    Diastolic HF    PAD s/p b/l BKAs    DM2:  - on insulin    Depression:  - per psychiatry       Interval History:  Seen and examined. No cp, sob, n/v/d reported. Current Medications: all current  Medications have been eviewed in EPIC  Review of Systems: Pertinent items are noted in HPI. Objective:  Vitals:    Vitals:    02/24/19 1513 02/24/19 1636 02/24/19 1910 02/25/19 0943   BP: 157/70  157/75 189/68   Pulse: (!) 52 (!) 52 70 (!) 48   Resp: 18  16 18   Temp: 98 °F (36.7 °C)  98.5 °F (36.9 °C) 98.1 °F (36.7 °C)   TempSrc:       SpO2: 99%   100%   Weight:         Intake and Output:  No intake/output data recorded. No intake/output data recorded. Physical Examination:  General: NAD  Neck:  Supple, no mass  Resp:  Lungs CTA B/L, no wheezing , normal respiratory effort  CV:  RRR,  no murmur or rub, no thigh edema, b/l BKAs  GI:  Soft, NT, + Bowel sounds, no hepatosplenomegaly  Neurologic:  Non focal  Psych:             Depressed, flat affect  Skin:  No Rash  Access:         TIJ PC c/d/i    []    High complexity decision making was performed  []    Patient is at high-risk of decompensation with multiple organ involvement    Lab Data Personally Reviewed: I have reviewed all the pertinent labs, microbiology data and radiology studies during assessment. No results for input(s): NA, K, CL, CO2, GLU, BUN, CREA, CA, MG, PHOS, ALB, TBIL, SGOT, ALT, INR in the last 72 hours. No lab exists for component: INREXT, INREXT  No results for input(s): WBC, HGB, HCT, PLT, HGBEXT, HCTEXT, PLTEXT, HGBEXT, HCTEXT, PLTEXT in the last 72 hours.   No results found for: SDES  Lab Results   Component Value Date/Time    Culture result: MRSA NOT PRESENT 01/24/2018 10:30 AM    Culture result:  01/24/2018 10:30 AM         Screening of patient nares for MRSA is for surveillance purposes and, if positive, to facilitate isolation considerations in high risk settings. It is not intended for automatic decolonization interventions per se as regimens are not sufficiently effective to warrant routine use. Culture result: MRSA NOT PRESENT 01/19/2015 10:44 AM    Culture result:  01/19/2015 10:44 AM         Screening of patient nares for MRSA is for surveillance purposes and, if positive, to facilitate isolation considerations in high risk settings. It is not intended for automatic decolonization interventions per se as regimens are not sufficiently effective to warrant routine use. Recent Results (from the past 24 hour(s))   GLUCOSE, POC    Collection Time: 02/24/19  4:15 PM   Result Value Ref Range    Glucose (POC) 117 (H) 65 - 100 mg/dL    Performed by [address] [place] [address], POC    Collection Time: 02/24/19  8:12 PM   Result Value Ref Range    Glucose (POC) 83 65 - 100 mg/dL    Performed by Nelly [address], POC    Collection Time: 02/25/19  9:17 AM   Result Value Ref Range    Glucose (POC) 70 65 - 100 mg/dL    Performed by [facility]    GLUCOSE, POC    Collection Time: 02/25/19  9:36 AM   Result Value Ref Range    Glucose (POC) 82 65 - 100 mg/dL    Performed by Tal Renee MD  [address]  Phone - [phone]   Fax - [phone]  [email or web address]

## 2019-02-25 NOTE — INTERDISCIPLINARY ROUNDS
Behavioral Health Interdisciplinary Rounds     Patient Name: Elmo Sanchez  Age: 70 y.o. Room/Bed:  740/  Primary Diagnosis: Depression   Admission Status: Voluntary     Readmission within 30 days: no  Power of  in place: no  Patient requires a blocked bed: no          Reason for blocked bed:   Sleep hours: 6.5       Participation in Care/Groups:    Medication Compliant?: Selective  PRNS (last 24 hours): Sleep Aid    Restraints (last 24 hours):  no     Alcohol screening (AUDIT) completed -  AUDIT Score: 0  If applicable, date SBIRT discussed in treatment team AND documented:    Tobacco - patient is a smoker: Have You Used Tobacco in the Past 30 Days: Yes  Illegal Drugs use: Have You Used Any Illegal Substances Over the Past 12 Months: Yes    24 hour chart check complete: yes     Patient goal(s) for today: Call friends/family for placement options  Treatment team focus/goals: Follow up on SNF referrals  Progress note: Pt adamant that he will not voluntarily go to SNF and is calling friends to see about a place to stay     LOS:  12  Expected LOS: TBD    Participating treatment team members:  Hailey Ramirez MSW; Dr. Fernando Davis MD; Kev Mendoza, MIKHAIL; Wendy Cuenca, DongD

## 2019-02-25 NOTE — PROGRESS NOTES
Problem: Falls - Risk of  Goal: *Absence of Falls  Document Bello Fall Risk and appropriate interventions in the flowsheet.   Outcome: Progressing Towards Goal  Fall Risk Interventions:  Mobility Interventions: Bed/chair exit alarm    Mentation Interventions: Adequate sleep, hydration, pain control    Medication Interventions: Bed/chair exit alarm    Elimination Interventions: Patient to call for help with toileting needs    History of Falls Interventions: Bed/chair exit alarm

## 2019-02-25 NOTE — PROGRESS NOTES
Problem: Falls - Risk of  Goal: *Absence of Falls  Document Bello Fall Risk and appropriate interventions in the flowsheet. Outcome: Progressing Towards Goal  Fall Risk Interventions:  Mobility Interventions: Bed/chair exit alarm, Communicate number of staff needed for ambulation/transfer, Mechanical lift    Mentation Interventions: Adequate sleep, hydration, pain control, Bed/chair exit alarm, Door open when patient unattended    Medication Interventions: Bed/chair exit alarm, Patient to call before getting OOB, Teach patient to arise slowly    Elimination Interventions: Bed/chair exit alarm, Call light in reach, Patient to call for help with toileting needs, Toileting schedule/hourly rounds    History of Falls Interventions: Door open when patient unattended, Room close to nurse's station    Free of falls. Falls tool completed and accurate. Bed alarm on the bed. Patient transfers with lift team assistance. Gets around on the unit via w/c. Staff to continue to provide a safe environment during hospitalization.

## 2019-02-25 NOTE — BH NOTES
GROUP THERAPY PROGRESS NOTE    Joseph Thompson participated in an Afternoon Chair Exercise Group on the Geriatric Unit, with a focus on building positive experiences through chair exercises as a coping skill. .     Group time: 20 minutes. Personal goal for participation: To increase the capacity to shift ones mood, prepare for the rest of the day, and share in group chair exercise. Goal orientation: The patient will participate in group chair exercise. Group therapy participation: When prompted, this patient partially participated in the group. Therapeutic interventions reviewed and discussed: The group members were asked to participate in chair exercises to improve lung capacity, freedom of movement, and improvement of emotions. Impression of participation: The patient participated in about half the exercises, before he had a visitor. He was alert and generally oriented. The patient expressed no current SI/HI and displayed no overt psychosis. He indicated that he hoped to be discharged by the end of the week, depending upon his continued progress and the treatment team's arrangement for his disposition. He indicated that he would be in his own individual one-bedroom apartment.

## 2019-02-25 NOTE — BH NOTES
PSYCHIATRIC PROGRESS NOTE         Patient Name  Nino Munoz   Date of Birth 1940   [facility] [id number]   Medical Record Number  [id number]      Age  70 y.o. PCP Royer Tong MD   Admit date:  2/13/2019    Room Number  740/01  @ [facility]   Date of Service  2/25/2019           E & M PROGRESS NOTE: 15 mins         HISTORY       REASON FOR HOSPITALIZATION:  CC: \"Pt admitted under a voluntary basis for severe depression with suicidal ideations and  an inability to care for self. HISTORY OF PRESENT ILLNESS:    The patient, Nino Munoz, is a 70 y.o. BLACK OR  male with a67-year-old -American male with past medical  history of end-stage renal disease; on hemodialysis, sleep apnea, peripheral vascular disease, bilateral BKA, hypertension, hyperlipidemia, peripheral neuropathy, anemia,hepatitis C, arthritis, peptic ulcer disease, coronary artery disease, DVT, prior GIbleed, chronic diastolic CHF, prior suicidal ideation, depression, and noncompliance   transfered to [address] from [facility] after pt reported feeling depressed nad suicidal Pt stated he lives in the [place] and he is unhappy about his living situation , he stated he had lots of Money before [place] HAS 13 CHILDREN but now he is broke and no one ask about him. Pt reports feeling hopeless, helpless, angry. These symptoms are of high severity. These symptoms are constant . Pt ha sh/o using marijuana occasionally. UDS: negative; BAL=0. .  2/15/19: Pt is irritable and refused zoloft and vitals today. Pt has dialysis yesterday. 2/16/19: Pt took zoloft but refuses some medical meds. Pt had dialysis on Saturday. Pt is demanding in the evenings. 2/17/19: Pt is demanding, irritable in the evenings, compliant with zoloft. Eating well. 2/18/19: Pt is demanding and manipulative ta times but taking zoloft. Pt is eating well. 2/19/19: Pt taking zoloft.  Pt got agitated yesterday evening and received geodon I/M PRN. Pt reported he need percocet. AS per reports, he was on percocets for pain. 2/20/19: Pt is doing better, is no longer suicidal and is accepting of nursing home placement, is psychiatrically stable for discharge. 2/21/19: Pt did not receive dialysis , was sleeping in his bed. Pt has clot in the catheter nad need new port. Nephrology is contacted. Pt report not feeling depressed, awaiting placement.i  2/22/19: Pt  Is irritable at times, did not get dialusis yetserday, New port is placed, nephrology is on board. Pt is eating   well nad sleeping well. 2/23/19-Presented verbal and cooperative. He is compliant with meds. Repotrs he is homeless and has nowhere to go to. SW is trying to find appropriate placement. No AVH or SI/HI.  2/24/19- pt stayed in bed most of the day. He had been asking for discharge earlier but has not recognized a place. He was seen in his room and encouraged get up and come to the day-room. No prn's used. 2/25/19: Pt will go for dialysis on Tuesday. Pt  Is cooperative, refusing to go to nursing home. Pt is eating well, slept good,      SIDE EFFECTS: (reviewed/updated 2/25/2019)  None reported or admitted to. No noted toxicity with use of Depakote/Tegretol/lithium/Clozaril/TCAs   ALLERGIES:(reviewed/updated 2/25/2019)  Allergies   Allergen Reactions    Tetracycline Hives      MEDICATIONS PRIOR TO ADMISSION:(reviewed/updated 2/25/2019)  Medications Prior to Admission   Medication Sig    NIFEdipine ER (ADALAT CC) 60 mg ER tablet Take 1 Tab by mouth daily. HOLD for HR<60    hydroCHLOROthiazide (HYDRODIURIL) 25 mg tablet Take 1 Tab by mouth daily.  sertraline (ZOLOFT) 25 mg tablet Take 1 Tab by mouth daily.  oxyCODONE-acetaminophen (PERCOCET 10)  mg per tablet Take 1 Tab by mouth every six (6) hours as needed for Pain.  traZODone (DESYREL) 50 mg tablet Take 50 mg by mouth nightly.  rosuvastatin (CRESTOR) 10 mg tablet Take 10 mg by mouth nightly.     aspirin delayed-release 81 mg tablet Take 1 Tab by mouth daily. PAST MEDICAL HISTORY: Past medical history from the initial psychiatric evaluation has been reviewed (reviewed/updated 2/25/2019) with no additional updates (I asked patient and no additional past medical history provided). Past Medical History:   Diagnosis Date    Arthritis     CAD (coronary artery disease) 2007    CKD [facility]ronic kidney disease), [facility] III [facility]     DM type 2 causing renal disease ([facility]     DVT (deep venous thrombosis) (HCC)     Hx of DVT:  Xarelto stopped due to GI bleed. S/P IVC filter.  Gait abnormality     GI bleed     Heart murmur     Hepatitis C     History of blood transfusion     Hypercholesterolemia     Hypertension 11/7/2014    Neuropathy     Non compliance w medication regimen     Peripheral vascular disease ([facility] 11/7/2014    A. S/P Right SFA POBA and tibial atherectomy (2/4/13).  PUD (peptic ulcer disease) 2007    Unspecified sleep apnea     never used cpap     Past Surgical History:   Procedure Laterality Date    ABDOMEN SURGERY PROC UNLISTED  2007    has approx 7-8\" midline incision on abdomen--\"had to open him up and clean him out after feeding tube clogged\"    HX GI  2007    feeding tube for approx 2 mo    VASCULAR SURGERY PROCEDURE UNLIST Right 1/22/2015    popliteal-tibial bypass      SOCIAL HISTORY: Social history from the initial psychiatric evaluation has been reviewed (reviewed/updated 2/25/2019) with no additional updates (I asked patient and no additional social history provided).    Social History     Socioeconomic History    Marital status: SINGLE     Spouse name: Not on file    Number of children: Not on file    Years of education: Not on file    Highest education level: Not on file   Social Needs    Financial resource strain: Not on file    Food insecurity - worry: Not on file    Food insecurity - inability: Not on file    Transportation needs - medical: Not on file   [facility] Transportation needs - non-medical: Not on file   Occupational History    Not on file   Tobacco Use    Smoking status: Current Every Day Smoker     Packs/day: 0.50    Smokeless tobacco: Never Used   Substance and Sexual Activity    Alcohol use: No    Drug use: No     Comment: >20 years ago    Sexual activity: Not on file   Other Topics Concern     Service Not Asked    Blood Transfusions Not Asked    Caffeine Concern Not Asked    Occupational Exposure Not Asked    Hobby Hazards Not Asked    Sleep Concern Not Asked    Stress Concern Not Asked    Weight Concern Not Asked    Special Diet Not Asked    Back Care Not Asked    Exercise Not Asked    Bike Helmet Not Asked    Seat Belt Not Asked    Self-Exams Not Asked   Social History Narrative    78 year ols male admitted for depression and homelessness. Pt will be evicated from apartment due to non payment of bills. Girlfriend of 30 years left him to [facility] live in the [place] with others. \" Pt is a brittle diabetic, with treatment non compliance. He has bilarela lower extremity amputations. Patient has a long hx of substance abuse. FAMILY HISTORY: Family history from the initial psychiatric evaluation has been reviewed (reviewed/updated 2/25/2019) with no additional updates (I asked patient and no additional family history provided). Family History   Problem Relation Age of Onset    Hypertension Father        REVIEW OF SYSTEMS: (reviewed/updated 2/25/2019)  Appetite:   Sleep:     All other Review of Systems: Negative except          MENTAL STATUS EXAM & [address] (MSE):    MSE FINDINGS ARE WITHIN NORMAL LIMITS (WNL) UNLESS OTHERWISE STATED BELOW. ( ALL OF THE BELOW CATEGORIES OF THE MSE HAVE BEEN REVIEWED (reviewed 2/14/2019) AND UPDATED AS DEEMED APPROPRIATE )  General Presentation age appropriate and casually dressed, cooperative   Orientation oriented to time, place and person   Vital Signs  See below (reviewed 2/14/2019); Vital Signs (BP, Pulse, & Temp) are within normal limits if not listed below.    Gait and Station Stable/steady, no ataxia   Musculoskeletal System No extrapyramidal symptoms (EPS); no abnormal muscular movements or Tardive Dyskinesia (TD); muscle strength and tone are within normal limits   Language No aphasia or dysarthria   Speech:  soft   Thought Processes logical; slow rate of thoughts; fair abstract reasoning/computation   Thought Associations goal directed   Thought Content free of delusions   Suicidal Ideations intention   Homicidal Ideations no plan  and no intention   Mood:  anxious  and depressed   Affect:  constricted   Memory recent  intact   Memory remote:  intact   Concentration/Attention:  distractable   Fund of Knowledge average   Insight:  fair   Reliability fair   Judgment:  fair                                                                                                    VITALS:     Patient Vitals for the past 24 hrs:   Temp Pulse Resp BP SpO2   02/25/19 0943 98.1 °F (36.7 °C) (!) 48 18 189/68 100 %   02/24/19 1910 98.5 °F (36.9 °C) 70 16 157/75 --   02/24/19 1636 -- (!) 52 -- -- --   02/24/19 1513 98 °F (36.7 °C) (!) 52 18 157/70 99 %     Wt Readings from Last 3 Encounters:   02/14/19 73 kg (160 lb 15 oz)   02/13/19 73.4 kg (161 lb 12.8 oz)   01/15/19 81.6 kg (180 lb)     Temp Readings from Last 3 Encounters:   02/25/19 98.1 °F (36.7 °C)   02/22/19 98.2 °F (36.8 °C)   02/13/19 98.2 °F (36.8 °C)     BP Readings from Last 3 Encounters:   02/25/19 189/68   02/22/19 146/68   02/13/19 181/82     Pulse Readings from Last 3 Encounters:   02/25/19 (!) 48   02/22/19 (!) 53   02/13/19 (!) 48            DATA     LABORATORY DATA:(reviewed/updated 2/25/2019)  Recent Results (from the past 24 hour(s))   GLUCOSE, POC    Collection Time: 02/24/19  4:15 PM   Result Value Ref Range    Glucose (POC) 117 (H) 65 - 100 mg/dL    Performed by [address] [place] [address], POC    Collection Time: 02/24/19  8:12 PM   Result Value Ref Range    Glucose (POC) 83 65 - 100 mg/dL    Performed by Nelly [address], POC    Collection Time: 02/25/19  9:17 AM   Result Value Ref Range    Glucose (POC) 70 65 - 100 mg/dL    Performed by Slade Balbuena    GLUCOSE, POC    Collection Time: 02/25/19  9:36 AM   Result Value Ref Range    Glucose (POC) 82 65 - 100 mg/dL    Performed by Slade Balbuena      No results found for: VALF2, VALAC, VALP, VALPR, DS6, CRBAM, CRBAMP, CARB2, XCRBAM  No results found for: LITHM   RADIOLOGY REPORTS:(reviewed/updated 2/25/2019)  Xr Chest Pa Lat    Result Date: 2/8/2019  EXAM: XR CHEST PA LAT INDICATION: Shortness of breath, Low back pain and abdominal pain for several days. End-stage renal disease on dialysis, missed dialysis one day ago. COMPARISON: Chest views on 5/20/2018 TECHNIQUE: 4 images of PA and lateral chest views FINDINGS: Right jugular dialysis catheter is new and terminates in the distal superior vena cava. Cardiac monitoring wires overlie the thorax. Borderline cardiac size is unchanged. Aortic arch is not enlarged. The pulmonary vasculature is within normal limits. The lungs and pleural spaces are clear. The visualized bones and upper abdomen are age-appropriate. IMPRESSION: No acute process on chest x-ray. Right jugular dialysis catheter terminates in the distal superior vena cava. No pneumothorax. Ct Abd Pelv W Cont    Result Date: 2/8/2019  EXAM: CT ABD PELV W CONT INDICATION: Abdominal pain and low back pain for several days. End-stage renal disease on dialysis, missed dialysis yesterday. Normal white blood cell count. Normal liver enzymes. COMPARISON: None. CONTRAST: 100 mL of Isovue-370. TECHNIQUE: Following the uneventful intravenous administration of contrast, thin axial images were obtained through the abdomen and pelvis. Coronal and sagittal reconstructions were generated. Oral contrast was administered.  CT dose reduction was achieved through use of a standardized protocol tailored for this examination and automatic exposure control for dose modulation. FINDINGS: LUNG BASES: No pneumonia. Mild dependent atelectasis. INCIDENTALLY IMAGED HEART AND MEDIASTINUM: Borderline cardiac size. No pericardial effusion. LIVER: Subcentimeter liver lesions measure up to 9 mm in segment 2 of the liver. Surface is smooth. Streak artifact from bilateral upper extremities. GALLBLADDER: Gallstones are in the body and neck. No evidence of cholecystitis. CBD is not dilated. SPLEEN: Normal size and enhancement. PANCREAS: No mass or ductal dilatation. ADRENALS: Unremarkable. KIDNEYS: No mass, calculus, or hydronephrosis. STOMACH: Partial distention. SMALL BOWEL: No dilatation or wall thickening. COLON: Moderate colonic fecal burden. No mural thickening. APPENDIX: Unremarkable. PERITONEUM: No ascites or pneumoperitoneum. RETROPERITONEUM: IVC filter is in good position. Aorta is atherosclerotic without aneurysm. Mild atherosclerotic stenosis of the celiac artery. No lymphadenopathy. REPRODUCTIVE ORGANS: Mild prostatomegaly measures 6 cm and extends into the base of the urinary bladder. URINARY BLADDER: No mass or calculus. BONES: Moderate bilateral hip osteoarthritis. ADDITIONAL COMMENTS: N/A     IMPRESSION: 1. No acute process on CT. 2. Cholelithiasis. No cholecystitis. 3. Subcentimeter segment 2 liver lesions cannot be fully characterized on this examination. 4. Moderate colonic fecal burden may represent constipation. No bowel obstruction.          MEDICATIONS     ALL MEDICATIONS:   Current Facility-Administered Medications   Medication Dose Route Frequency    sertraline (ZOLOFT) tablet 75 mg  75 mg Oral DAILY    heparin (porcine) 1,000 unit/mL injection 4,300 Units  4,300 Units Hemodialysis DIALYSIS PRN    epoetin rosie-epbx (RETACRIT) injection 20,000 Units  20,000 Units SubCUTAneous DIALYSIS TUE, THU & SAT    oxyCODONE-acetaminophen (PERCOCET) 5-325 mg per tablet 1 Tab  1 Tab Oral BID PRN    ibuprofen (MOTRIN) tablet 800 mg  800 mg Oral Q8H PRN    aspirin delayed-release tablet 81 mg  81 mg Oral DAILY    rosuvastatin (CRESTOR) tablet 10 mg  10 mg Oral QHS    ferric citrate (AURYXIA) tablet 420 mg  420 mg Oral TID WITH MEALS    NIFEdipine ER (PROCARDIA XL) tablet 60 mg  60 mg Oral Q24H    doxazosin (CARDURA) tablet 4 mg  4 mg Oral QHS    OLANZapine (ZyPREXA) tablet 2.5 mg  2.5 mg Oral Q6H PRN    ziprasidone (GEODON) 10 mg in sterile water (preservative free) 0.5 mL injection  10 mg IntraMUSCular BID PRN    benztropine (COGENTIN) tablet 1 mg  1 mg Oral BID PRN    benztropine (COGENTIN) injection 1 mg  1 mg IntraMUSCular BID PRN    zolpidem (AMBIEN) tablet 5 mg  5 mg Oral QHS PRN    acetaminophen (TYLENOL) tablet 650 mg  650 mg Oral Q4H PRN    magnesium hydroxide (MILK OF MAGNESIA) 400 mg/5 mL oral suspension 30 mL  30 mL Oral DAILY PRN    nicotine (NICODERM CQ) 21 mg/24 hr patch 1 Patch  1 Patch TransDERmal DAILY PRN    glucose chewable tablet 16 g  4 Tab Oral PRN    dextrose (D50W) injection syrg 12.5-25 g  25-50 mL IntraVENous PRN    glucagon (GLUCAGEN) injection 1 mg  1 mg IntraMUSCular PRN    insulin lispro (HUMALOG) injection   SubCUTAneous AC&HS      SCHEDULED MEDICATIONS:   Current Facility-Administered Medications   Medication Dose Route Frequency    sertraline (ZOLOFT) tablet 75 mg  75 mg Oral DAILY    epoetin rosie-epbx (RETACRIT) injection 20,000 Units  20,000 Units SubCUTAneous DIALYSIS TUE, THU & SAT    aspirin delayed-release tablet 81 mg  81 mg Oral DAILY    rosuvastatin (CRESTOR) tablet 10 mg  10 mg Oral QHS    ferric citrate (AURYXIA) tablet 420 mg  420 mg Oral TID WITH MEALS    NIFEdipine ER (PROCARDIA XL) tablet 60 mg  60 mg Oral Q24H    doxazosin (CARDURA) tablet 4 mg  4 mg Oral QHS    insulin lispro (HUMALOG) injection   SubCUTAneous AC&HS          ASSESSMENT & PLAN     The patient, [place], is a 70 y.o.  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  Plan for hemodialysis 3 days a week.  If it is not controlled  with the same, provide additional antihypertensive meds.  Monitor blood pressure.    End-stage renal disease, on hemodialysis.  Schedule treatment for hemodialysis on  Tuesdays, Thursdays, and Saturdays. .   Type 2 diabetes mellitus.  Place on Humalog insulin correction coverage schedule,  Accu-Chek, and check hemoglobin A1c level.  The patient will be on a diabetic/renal  Diet. Nephrology consult   2/15/19: continue meds/milue, encourage compliance. 2/16/19: continue meds/milue  2/17/19: continue meds/milue increaisng zoloft 50 mg po daily  2/18/19: continue meds/milue  2/19/19: readding percocet twice daily as needed for pain. 2/20/19: awaiting [facility]emnet, PT/OT for placement  2/21/19: nephrology consult, recommendation regrading dialysis,  2/22/19: increasing zoloft 75 mg daily, continue milue,  2/25/19: continue meds/milue, HD tomorrow  I will continue to monitor blood levels (Depakote, Tegretol, lithium, clozapine---a drug with a narrow therapeutic index= NTI) and associated labs for drug therapy implemented that require intense monitoring for toxicity as deemed appropriate based on current medication side effects and pharmacodynamically determined drug 1/2 lives. In summary, Micheal Cadet, is a 70 y.o.  male who presents with a severe exacerbation of the principal diagnosis of Depression  Patient's condition is worsening/not improving/not stable improving. Patient requires continued inpatient hospitalization for further stabilization, safety monitoring and medication management.   I will continue to coordinate the provision of individual, milieu, occupational, group, and substance abuse therapies to address target symptoms/diagnoses as deemed appropriate for the individual patient. A coordinated, multidisplinary treatment team round was conducted with the patient (this team consists of the nurse, psychiatric unit pharmcist,  and writer). Complete current electronic health record for patient has been reviewed today including consultant notes, ancillary staff notes, nurses and psychiatric tech notes. uicide risk assessment completed and patient deemed to be of low risk for suicide at this time. The following regarding medications was addressed during rounds with patient:   the risks and benefits of the proposed medication. The patient was given the opportunity to ask questions. Informed consent given to the use of the above medications. Will continue to adjust psychiatric and non-psychiatric medications (see above \"medication\" section and orders section for details) as deemed appropriate & based upon diagnoses and response to treatment. I will continue to order blood tests/labs and diagnostic tests as deemed appropriate and review results as they become available (see orders for details and above listed lab/test results). I will order psychiatric records from previous [facility] to further elucidate the nature of patient's psychopathology and review once available. I will gather additional collateral information from friends, family and o/p treatment team to further elucidate the nature of patient's psychopathology and baselline level of psychiatric functioning. I certify that this patient's inpatient psychiatric hospital services furnished since the previous certification were, and continue to be, required for treatment that could reasonably be expected to improve the patient's condition, or for diagnostic study, and that the patient continues to need, on a daily basis, active treatment furnished directly by or requiring the supervision of inpatient psychiatric facility personnel.  In addition, the hospital records show that services furnished were intensive treatment services, admission or related services, or equivalent services.     EXPECTED DISCHARGE DATE/DAY: TBD     DISPOSITION: Home       Signed By:   Emerald Gao MD  2/25/2019

## 2019-02-25 NOTE — PROGRESS NOTES
physical Therapy EVALUATION/DISCHARGE  Patient: Patt Devlin (60 y.o. male)  Date: 2/25/2019  Primary Diagnosis: Depression [F32.9]       Precautions:      ASSESSMENT :  Based on the objective data described below, the patient presents with overall mobility at or near baseline function s/p admission with depression. Patient unable to give detailed history, however per chart review, patient is homeless. Of note, patient with BLE BKAs and w/c bound at baseline. Patient agreeable to PT evaluation with encouragement. Able to navigate w/c independently. Performed lateral transfer using slide board with CGA and assist placing board. Patient reports this is his baseline function. During transfer, patient incontinent of bowels. Transferred sit<>supine with SBA. Nursing changed and cleaned patient. Patient performed slide board transfer back to w/c. Recommending nursing to use slide board transfer to assist patient to/from w/c (have been using mobility team). Patient left sitting in w/c with all needs met. Currently at baseline function, no further need for skilled therapy. Further skilled acute physical therapy is not indicated at this time. PLAN :  Discharge Recommendations: None  Further Equipment Recommendations for Discharge: Slide board     SUBJECTIVE:   Patient stated Why do I have to do this with you? I am going home tomorrow.     OBJECTIVE DATA SUMMARY:   HISTORY:    Past Medical History:   Diagnosis Date    Arthritis     CAD (coronary artery disease) 2007    CKD [facility]ronic [facility] [address] [facility]     DM type 2 causing renal disease [facility] [address] [facility]     DVT (deep venous thrombosis) (HCC)     Hx of DVT:  Xarelto stopped due to GI bleed. S/P IVC filter.     Gait abnormality     GI bleed     Heart murmur     Hepatitis C     History of blood transfusion     Hypercholesterolemia     Hypertension 11/7/2014    Neuropathy     Non compliance w medication regimen     Peripheral vascular disease ([facility] [address] 11/7/2014    A. S/P Right SFA POBA and tibial atherectomy (2/4/13).  PUD (peptic ulcer disease) 2007    Unspecified sleep apnea     never used cpap     Past Surgical History:   Procedure Laterality Date    ABDOMEN SURGERY PROC UNLISTED  2007    has approx 7-8\" midline incision on abdomen--\"had to open him up and clean him out after feeding tube clogged\"    HX GI  2007    feeding tube for approx 2 mo    VASCULAR SURGERY PROCEDURE UNLIST Right 1/22/2015    popliteal-tibial bypass     Prior Level of Function/Home Situation: Homeless, w/c bound. Required assist in/out of w/c at baseline. Personal factors and/or comorbidities impacting plan of care: Cognition, socioeconomic status, homeless    Home Situation  Home Environment: Other (comment)(homeless)  # Steps to Enter: 0  One/Two Story Residence: One story  # of Interior Steps: 0  Interior Rails: None  Lift Chair Available: No  Living Alone: Yes  Support Systems: Spouse/Significant Other/Partner  Patient Expects to be Discharged to[de-identified] Unknown  Current DME Used/Available at Home: Wheelchair    EXAMINATION/PRESENTATION/DECISION MAKING:   Critical Behavior:              Hearing: Auditory  Auditory Impairment: None  Skin:    Edema:   Range Of Motion:  AROM: Within functional limits           PROM: Within functional limits           Strength:    Strength: Generally decreased, functional                    Tone & Sensation:   Tone: Normal              Sensation: Intact               Coordination:  Coordination: Generally decreased, functional  Vision:      Functional Mobility:  Bed Mobility:     Supine to Sit: Stand-by assistance  Sit to Supine: Stand-by assistance  Scooting: Stand-by assistance  Transfers:                 Lateral Transfers: Contact guard assistance(using slide board)           Balance:   Sitting: Intact  Ambulation/Gait Training:                                                             Stairs:                Therapeutic Exercises: Functional Measure:  Barthel Index:    Bathin  Bladder: 5  Bowels: 5  Groomin  Dressin  Feeding: 10  Mobility: 5  Stairs: 0  Toilet Use: 0  Transfer (Bed to Chair and Back): 10  Total: 40         The Barthel ADL Index: Guidelines  1. The index should be used as a record of what a patient does, not as a record of what a patient could do. 2. The main aim is to establish degree of independence from any help, physical or verbal, however minor and for whatever reason. 3. The need for supervision renders the patient not independent. 4. A patient's performance should be established using the best available evidence. Asking the patient, friends/relatives and nurses are the usual sources, but direct observation and common sense are also important. However direct testing is not needed. 5. Usually the patient's performance over the preceding 24-48 hours is important, but occasionally longer periods will be relevant. 6. Middle categories imply that the patient supplies over 50 per cent of the effort. 7. Use of aids to be independent is allowed. Ninfa Parr., Barthel, D.W. (7719). Functional evaluation: the Barthel Index. [address] [facility] (14)2. Saima Booth, MIKEF, Erika Domínguez., [facility], [address] [place] [address] (). Measuring the change indisability after inpatient rehabilitation; comparison of the responsiveness of the Barthel Index and Functional [place] Measure. Journal of Neurology, Neurosurgery, and Psychiatry, 66(4), 619-824. Bev Templeton, MICHAEL.J.MARY, RYAN Wilkerson, & Kate Merlos MPavanA. (2004.) Assessment of post-stroke quality of life in cost-effectiveness studies: The usefulness of the Barthel Index and the EuroQoL-5D.  Quality of Life Research, 15, 176-77            Physical Therapy Evaluation Charge Determination   History Examination Presentation Decision-Making   MEDIUM  Complexity : 1-2 comorbidities / personal factors will impact the outcome/ POC  MEDIUM Complexity : 3 Standardized tests and measures addressing body structure, function, activity limitation and / or participation in recreation  MEDIUM Complexity : Evolving with changing characteristics  MEDIUM Complexity : FOTO score of 26-74      Based on the above components, the patient evaluation is determined to be of the following complexity level: MEDIUM    Pain:  Pain Scale 1: Numeric (0 - 10)  Pain Intensity 1: 0     Activity Tolerance:   NAD  Please refer to the flowsheet for vital signs taken during this treatment. After treatment:   [x]   Patient left in no apparent distress sitting up in chair  []   Patient left in no apparent distress in bed  []   Call bell left within reach  [x]   Nursing notified  []   Caregiver present  []   Bed alarm activated    COMMUNICATION/EDUCATION:   Communication/Collaboration:  [x]   Fall prevention education was provided and the patient/caregiver indicated understanding. [x]   Patient/family have participated as able and agree with findings and recommendations. []   Patient is unable to participate in plan of care at this time.   Findings and recommendations were discussed with: Registered Nurse    Thank you for this referral.  Jeffrey Barone, PT, DPT   Time Calculation: 27 mins

## 2019-02-26 LAB
ALBUMIN SERPL-MCNC: 3 G/DL (ref 3.5–5)
ANION GAP SERPL CALC-SCNC: 5 MMOL/L (ref 5–15)
BASOPHILS # BLD: 0 K/UL (ref 0–0.1)
BASOPHILS NFR BLD: 1 % (ref 0–1)
BUN SERPL-MCNC: 36 MG/DL (ref 6–20)
BUN/CREAT SERPL: 11 (ref 12–20)
CALCIUM SERPL-MCNC: 8.5 MG/DL (ref 8.5–10.1)
CHLORIDE SERPL-SCNC: 108 MMOL/L (ref 97–108)
CO2 SERPL-SCNC: 27 MMOL/L (ref 21–32)
CREAT SERPL-MCNC: 3.34 MG/DL (ref 0.7–1.3)
DIFFERENTIAL METHOD BLD: ABNORMAL
EOSINOPHIL # BLD: 0.4 K/UL (ref 0–0.4)
EOSINOPHIL NFR BLD: 9 % (ref 0–7)
ERYTHROCYTE [DISTWIDTH] IN BLOOD BY AUTOMATED COUNT: 20.3 % (ref 11.5–14.5)
GLUCOSE BLD STRIP.AUTO-MCNC: 84 MG/DL (ref 65–100)
GLUCOSE BLD STRIP.AUTO-MCNC: 86 MG/DL (ref 65–100)
GLUCOSE BLD STRIP.AUTO-MCNC: 91 MG/DL (ref 65–100)
GLUCOSE SERPL-MCNC: 82 MG/DL (ref 65–100)
HCT VFR BLD AUTO: 30.3 % (ref 36.6–50.3)
HGB BLD-MCNC: 9.6 G/DL (ref 12.1–17)
IMM GRANULOCYTES # BLD AUTO: 0 K/UL (ref 0–0.04)
IMM GRANULOCYTES NFR BLD AUTO: 0 % (ref 0–0.5)
LYMPHOCYTES # BLD: 1.1 K/UL (ref 0.8–3.5)
LYMPHOCYTES NFR BLD: 27 % (ref 12–49)
MCH RBC QN AUTO: 28.4 PG (ref 26–34)
MCHC RBC AUTO-ENTMCNC: 31.7 G/DL (ref 30–36.5)
MCV RBC AUTO: 89.6 FL (ref 80–99)
MONOCYTES # BLD: 0.4 K/UL (ref 0–1)
MONOCYTES NFR BLD: 10 % (ref 5–13)
NEUTS SEG # BLD: 2.3 K/UL (ref 1.8–8)
NEUTS SEG NFR BLD: 53 % (ref 32–75)
NRBC # BLD: 0 K/UL (ref 0–0.01)
NRBC BLD-RTO: 0 PER 100 WBC
PHOSPHATE SERPL-MCNC: 3.4 MG/DL (ref 2.6–4.7)
PLATELET # BLD AUTO: 159 K/UL (ref 150–400)
PMV BLD AUTO: 10.2 FL (ref 8.9–12.9)
POTASSIUM SERPL-SCNC: 4.7 MMOL/L (ref 3.5–5.1)
RBC # BLD AUTO: 3.38 M/UL (ref 4.1–5.7)
RBC MORPH BLD: ABNORMAL
SERVICE CMNT-IMP: NORMAL
SODIUM SERPL-SCNC: 140 MMOL/L (ref 136–145)
WBC # BLD AUTO: 4.2 K/UL (ref 4.1–11.1)

## 2019-02-26 PROCEDURE — 36415 COLL VENOUS BLD VENIPUNCTURE: CPT

## 2019-02-26 PROCEDURE — 74011250637 HC RX REV CODE- 250/637: Performed by: FAMILY MEDICINE

## 2019-02-26 PROCEDURE — 65220000003 HC RM SEMIPRIVATE PSYCH

## 2019-02-26 PROCEDURE — 80069 RENAL FUNCTION PANEL: CPT

## 2019-02-26 PROCEDURE — 74011250636 HC RX REV CODE- 250/636: Performed by: INTERNAL MEDICINE

## 2019-02-26 PROCEDURE — 90935 HEMODIALYSIS ONE EVALUATION: CPT

## 2019-02-26 PROCEDURE — 82962 GLUCOSE BLOOD TEST: CPT

## 2019-02-26 PROCEDURE — 85025 COMPLETE CBC W/AUTO DIFF WBC: CPT

## 2019-02-26 PROCEDURE — 74011250637 HC RX REV CODE- 250/637: Performed by: INTERNAL MEDICINE

## 2019-02-26 PROCEDURE — 74011250637 HC RX REV CODE- 250/637: Performed by: PSYCHIATRY & NEUROLOGY

## 2019-02-26 RX ORDER — SODIUM CHLORIDE 0.9 % (FLUSH) 0.9 %
SYRINGE (ML) INJECTION
Status: DISPENSED
Start: 2019-02-26 | End: 2019-02-27

## 2019-02-26 RX ORDER — TRAZODONE HYDROCHLORIDE 50 MG/1
25 TABLET ORAL
Status: DISCONTINUED | OUTPATIENT
Start: 2019-02-26 | End: 2019-03-11 | Stop reason: HOSPADM

## 2019-02-26 RX ORDER — SERTRALINE HYDROCHLORIDE 50 MG/1
100 TABLET, FILM COATED ORAL DAILY
Status: DISCONTINUED | OUTPATIENT
Start: 2019-02-27 | End: 2019-03-11 | Stop reason: HOSPADM

## 2019-02-26 RX ADMIN — HEPARIN SODIUM 4300 UNITS: 1000 INJECTION INTRAVENOUS; SUBCUTANEOUS at 17:23

## 2019-02-26 RX ADMIN — ROSUVASTATIN CALCIUM 10 MG: 10 TABLET, FILM COATED ORAL at 21:36

## 2019-02-26 RX ADMIN — FERRIC CITRATE 420 MG: 210 TABLET, COATED ORAL at 09:52

## 2019-02-26 RX ADMIN — SERTRALINE HYDROCHLORIDE 75 MG: 50 TABLET ORAL at 09:52

## 2019-02-26 RX ADMIN — ASPIRIN 81 MG: 81 TABLET, COATED ORAL at 09:53

## 2019-02-26 RX ADMIN — EPOETIN ALFA-EPBX 20000 UNITS: 10000 INJECTION, SOLUTION INTRAVENOUS; SUBCUTANEOUS at 16:19

## 2019-02-26 RX ADMIN — TRAZODONE HYDROCHLORIDE 25 MG: 50 TABLET ORAL at 21:36

## 2019-02-26 NOTE — BH NOTES
GROUP THERAPY PROGRESS NOTE    Corby Tamayo is participating in Leisure-Creative Group.      Group time: 15 minutes    Personal goal for participation: decrease anxiety    Goal orientation: relaxation    Group therapy participation: active    Therapeutic interventions reviewed and discussed:     Impression of participation: good

## 2019-02-26 NOTE — BH NOTES
GROUP THERAPY PROGRESS NOTE    Angie Ross did not participate in a 20 minute Afternoon Chair Exercise Group on the Geriatric Unit, with a focus on building positive experiences through chair exercises as a coping skill.

## 2019-02-26 NOTE — BH NOTES
GROUP THERAPY PROGRESS NOTE    Stephen Modi did not participate in a 45 minute Process Group on the Geriatric Unit with a focus on shifting ones feelings to a positive note and preparing for the day through group singing.

## 2019-02-26 NOTE — PROGRESS NOTES
Problem: Depressed Mood (Adult/Pediatric)  Goal: *STG: Attends activities and groups  Outcome: Progressing Towards Goal  Received patient talking on the phone in DR. MAXWELL. Voiced no complaints. On q 15 min safety checks. He participated in PT. Assisted with toileting, had a BM . Visible in DR watching TV, visiting with friend and eating 100% dinner, fluids and snacks. Med compliant.

## 2019-02-26 NOTE — INTERDISCIPLINARY ROUNDS
Behavioral Health Interdisciplinary Rounds     Patient Name: Angie Ross  Age: 70 y.o. Room/Bed:  740/  Primary Diagnosis: Depression   Admission Status: Voluntary     Readmission within 30 days: no  Power of  in place: no  Patient requires a blocked bed: no          Reason for blocked bed:   Sleep hours:  4      Participation in Care/Groups:  yes  Medication Compliant?: Yes  PRNS (last 24 hours): Sleep Aid and Pain    Restraints (last 24 hours):  no     Alcohol screening (AUDIT) completed -  AUDIT Score: 0  If applicable, date SBIRT discussed in treatment team AND documented:    Tobacco - patient is a smoker: Have You Used Tobacco in the Past 30 Days: Yes  Illegal Drugs use: Have You Used Any Illegal Substances Over the Past 12 Months: Yes    24 hour chart check complete: yes     Patient goal(s) for today: Be agreeable with staff  Treatment team focus/goals: Call friend for placement option; set up home health  Progress note: Pt reports he can stay with a friend     LOS:  13  Expected LOS: TBD    Participating treatment team members:  Angie Ross, DARRICK Alicia; Dr. Susan Mortensen MD; Arsen Jimenez, MIKHAIL; Thu Grande, PharmD

## 2019-02-26 NOTE — BH NOTES
MARTÍNEZ spoke to Elieser Wheeler, admissions coordinator at [facility] ([phone]). Mr. Medina stated that Pt's referral was received through [facility] and he was running Pt's insurance to see if Pt would qualify for skilled care. Mr. Medina to follow-up with MARTÍNEZ tomorrow. MARTÍNEZ called Andrew Hill at [facility] [place] ([phone]) and provided her with update regarding Pt's referral to [facility].

## 2019-02-26 NOTE — PROGRESS NOTES
Problem: Falls - Risk of  Goal: *Absence of Falls  Document Bello Fall Risk and appropriate interventions in the flowsheet. Outcome: Progressing Towards Goal  Fall Risk Interventions:  Mobility Interventions: Bed/chair exit alarm  Mentation Interventions: Adequate sleep, hydration, pain control  Medication Interventions: Patient to call before getting OOB  Elimination Interventions: Bed/chair exit alarm  History of Falls Interventions: Bed/chair exit alarm, Door open when patient unattended, Room close to nurse's station    Received pt awake in bed, quiet. NAD. Respirations even/unlabored. Will cont to monitor q15min for safety.

## 2019-02-26 NOTE — PROGRESS NOTES
Problem: Depressed Mood (Adult/Pediatric)  Goal: *STG: Participates in treatment plan  Outcome: Progressing Towards Goal  Patient is participatory in treatment plan. Mood is calm and cooperative. Denies SI/HI.   Goal: *STG: Attends activities and groups  Outcome: Progressing Towards Goal  Patient attended morning activity

## 2019-02-26 NOTE — BH NOTES
PSYCHIATRIC PROGRESS NOTE         Patient Name  Brian Paulino   Date of Birth 1940   [facility] [id number]   Medical Record Number  [id number]      Age  70 y.o. PCP Erin Lombardo MD   Admit date:  2/13/2019    Room Number  740/01  @ [facility]   Date of Service  2/26/2019           E & M PROGRESS NOTE: 15 mins         HISTORY       REASON FOR HOSPITALIZATION:  CC: \"Pt admitted under a voluntary basis for severe depression with suicidal ideations and  an inability to care for self. HISTORY OF PRESENT ILLNESS:    The patient, Brian Paulino, is a 70 y.o. BLACK OR  male with 61-year-old -American male with past medical  history of end-stage renal disease; on hemodialysis, sleep apnea, peripheral vascular disease, bilateral BKA, hypertension, hyperlipidemia, peripheral neuropathy, anemia,hepatitis C, arthritis, peptic ulcer disease, coronary artery disease, DVT, prior GIbleed, chronic diastolic CHF, prior suicidal ideation, depression, and noncompliance   transfered to [facility] from [facility] after pt reported feeling depressed nad suicidal Pt stated he lives in the [place] and he is unhappy about his living situation , he stated he had lots of Money before [place] HAS 13 CHILDREN but now he is broke and no one ask about him. Pt reports feeling hopeless, helpless, angry. These symptoms are of high severity. These symptoms are constant . Pt ha sh/o using marijuana occasionally. UDS: negative; BAL=0. .  2/15/19: Pt is irritable and refused zoloft and vitals today. Pt has dialysis yesterday. 2/16/19: Pt took zoloft but refuses some medical meds. Pt had dialysis on Saturday. Pt is demanding in the evenings. 2/17/19: Pt is demanding, irritable in the evenings, compliant with zoloft. Eating well. 2/18/19: Pt is demanding and manipulative ta times but taking zoloft. Pt is eating well. 2/19/19: Pt taking zoloft.  Pt got agitated yesterday evening and received geodon I/M PRN. Pt reported he need percocet. AS per reports, he was on percocets for pain. 2/20/19: Pt is doing better, is no longer suicidal and is accepting of nursing home placement, is psychiatrically stable for discharge. 2/21/19: Pt did not receive dialysis , was sleeping in his bed. Pt has clot in the catheter nad need new port. Nephrology is contacted. Pt report not feeling depressed, awaiting placement.i  2/22/19: Pt  Is irritable at times, did not get dialusis yetserday, New port is placed, nephrology is on board. Pt is eating   well nad sleeping well. 2/23/19-Presented verbal and cooperative. He is compliant with meds. Repotrs he is homeless and has nowhere to go to. SW is trying to find appropriate placement. No AVH or SI/HI.  2/24/19- pt stayed in bed most of the day. He had been asking for discharge earlier but has not recognized a place. He was seen in his room and encouraged get up and come to the day-room. No prn's used. 2/25/19: Pt will go for dialysis on Tuesday. Pt  Is cooperative, refusing to go to [facility]. Pt is eating well, slept good,  2/26/19: Pt is still depressed at times. Pt contacted a friend yesterday and pt says  He wants to go live with her. Pt will need HHA and dialysis transportation after discharge. SIDE EFFECTS: (reviewed/updated 2/26/2019)  None reported or admitted to. No noted toxicity with use of Depakote/Tegretol/lithium/Clozaril/TCAs   ALLERGIES:(reviewed/updated 2/26/2019)  Allergies   Allergen Reactions    Tetracycline Hives      MEDICATIONS PRIOR TO ADMISSION:(reviewed/updated 2/26/2019)  Medications Prior to Admission   Medication Sig    NIFEdipine ER (ADALAT CC) 60 mg ER tablet Take 1 Tab by mouth daily. HOLD for HR<60    hydroCHLOROthiazide (HYDRODIURIL) 25 mg tablet Take 1 Tab by mouth daily.  sertraline (ZOLOFT) 25 mg tablet Take 1 Tab by mouth daily.     oxyCODONE-acetaminophen (PERCOCET 10)  mg per tablet Take 1 Tab by mouth every six (6) hours as needed for Pain.  traZODone (DESYREL) 50 mg tablet Take 50 mg by mouth nightly.  rosuvastatin (CRESTOR) 10 mg tablet Take 10 mg by mouth nightly.  aspirin delayed-release 81 mg tablet Take 1 Tab by mouth daily. PAST MEDICAL HISTORY: Past medical history from the initial psychiatric evaluation has been reviewed (reviewed/updated 2/26/2019) with no additional updates (I asked patient and no additional past medical history provided). Past Medical History:   Diagnosis Date    Arthritis     CAD (coronary artery disease) 2007    CKD [facility]chronic kidney disease), stage III [facility]     DM type 2 causing renal disease ([facility]     DVT (deep venous thrombosis) (HCC)     Hx of DVT:  Xarelto stopped due to GI bleed. S/P IVC filter.  Gait abnormality     GI bleed     Heart murmur     Hepatitis C     History of blood transfusion     Hypercholesterolemia     Hypertension 11/7/2014    Neuropathy     Non compliance w medication regimen     [facility] 11/7/2014    A. S/P Right SFA POBA and tibial atherectomy (2/4/13).  PUD (peptic ulcer disease) 2007    Unspecified sleep apnea     never used cpap     Past Surgical History:   Procedure Laterality Date    ABDOMEN SURGERY PROC UNLISTED  2007    has approx 7-8\" midline incision on abdomen--\"had to open him up and clean him out after feeding tube clogged\"    HX GI  2007    feeding tube for approx 2 mo    VASCULAR SURGERY PROCEDURE UNLIST Right 1/22/2015    popliteal-tibial bypass      SOCIAL HISTORY: Social history from the initial psychiatric evaluation has been reviewed (reviewed/updated 2/26/2019) with no additional updates (I asked patient and no additional social history provided).    Social History     Socioeconomic History    Marital status: SINGLE     Spouse name: Not on file    Number of children: Not on file    Years of education: Not on file    Highest education level: Not on file   Social Needs    Financial resource strain: Not on file    Food insecurity - worry: Not on file    Food insecurity - inability: Not on file   [employer] needs - medical: Not on file   [place] needs - non-medical: Not on file   Occupational History    Not on file   Tobacco Use    Smoking status: Current Every Day Smoker     Packs/day: 0.50    Smokeless tobacco: Never Used   Substance and Sexual Activity    Alcohol use: No    Drug use: No     Comment: >20 years ago    Sexual activity: Not on file   Other Topics Concern     Service Not Asked    Blood Transfusions Not Asked    Caffeine Concern Not Asked    Occupational Exposure Not Asked    Hobby Hazards Not Asked    Sleep Concern Not Asked    Stress Concern Not Asked    Weight Concern Not Asked    Special Diet Not Asked    Back Care Not Asked    Exercise Not Asked    Bike Helmet Not Asked    Seat Belt Not Asked    Self-Exams Not Asked   Social History Narrative    78 year ols male admitted for depression and homelessness. Pt will be evicated from apartment due to non payment of bills. [place]. \" Pt is a brittle diabetic, with treatment non compliance. He has bilarela lower extremity amputations. Patient has a long hx of substance abuse. FAMILY HISTORY: Family history from the initial psychiatric evaluation has been reviewed (reviewed/updated 2/26/2019) with no additional updates (I asked patient and no additional family history provided). Family History   Problem Relation Age of Onset    Hypertension Father        REVIEW OF SYSTEMS: (reviewed/updated 2/26/2019)  Appetite:   Sleep:     All other Review of Systems: Negative except          MENTAL STATUS EXAM & VITALS     MMENTAL STATUS EXAM (MSE):    MSE FINDINGS ARE WITHIN NORMAL LIMITS (WNL) UNLESS OTHERWISE STATED BELOW. ( ALL OF THE BELOW CATEGORIES OF THE MSE HAVE BEEN REVIEWED (reviewed 2/14/2019) AND UPDATED AS DEEMED APPROPRIATE )  General Presentation age appropriate and casually dressed, cooperative   Orientation oriented to time, place and person   Vital Signs  See below (reviewed 2/14/2019); Vital Signs (BP, Pulse, & Temp) are within normal limits if not listed below.    Gait and Station Stable/steady, no ataxia   Musculoskeletal System No extrapyramidal symptoms (EPS); no abnormal muscular movements or Tardive Dyskinesia (TD); muscle strength and tone are within normal limits   Language No aphasia or dysarthria   Speech:  soft   Thought Processes logical; slow rate of thoughts; fair abstract reasoning/computation   Thought Associations goal directed   Thought Content free of delusions   Suicidal Ideations intention   Homicidal Ideations no plan  and no intention   Mood:  anxious  and depressed   Affect:  constricted   Memory recent  intact   Memory remote:  intact   Concentration/Attention:  distractable   Fund of Knowledge average   Insight:  fair   Reliability fair   Judgment:  fair                                                                                                    VITALS:     Patient Vitals for the past 24 hrs:   Temp Pulse Resp BP SpO2   02/26/19 0913 98.1 °F (36.7 °C) (!) 56 16 163/77 100 %   02/25/19 2123 -- 68 -- -- --   02/25/19 1920 98.2 °F (36.8 °C) 61 18 185/77 99 %   02/25/19 1759 98.3 °F (36.8 °C) 100 16 189/80 95 %   02/25/19 1712 -- 100 -- -- --     Wt Readings from Last 3 Encounters:   02/14/19 73 kg (160 lb 15 oz)   02/13/19 73.4 kg (161 lb 12.8 oz)   01/15/19 81.6 kg (180 lb)     Temp Readings from Last 3 Encounters:   02/26/19 98.1 °F (36.7 °C)   02/22/19 98.2 °F (36.8 °C)   02/13/19 98.2 °F (36.8 °C)     BP Readings from Last 3 Encounters:   02/26/19 163/77   02/22/19 146/68   02/13/19 181/82     Pulse Readings from Last 3 Encounters:   02/26/19 (!) 56   02/22/19 (!) 53   02/13/19 (!) 48            DATA     LABORATORY DATA:(reviewed/updated 2/26/2019)  Recent Results (from the past 24 hour(s))   GLUCOSE, POC    Collection Time: 02/25/19  4:32 PM   Result Value Ref Range    Glucose (POC) 85 65 - 100 mg/dL    Performed by [address] [place] [address], POC    Collection Time: 02/25/19  8:07 PM   Result Value Ref Range    Glucose (POC) 108 (H) 65 - 100 mg/dL    Performed by Royal Boswell) Paras, POC    Collection Time: 02/26/19  9:39 AM   Result Value Ref Range    Glucose (POC) 84 65 - 100 mg/dL    Performed by Bashir Padilla      No results found for: VALF2, VALAC, VALP, VALPR, DS6, CRBAM, CRBAMP, CARB2, XCRBAM  No results found for: LITHM   RADIOLOGY REPORTS:(reviewed/updated 2/26/2019)  Xr Chest Pa Lat    Result Date: 2/8/2019  EXAM: XR CHEST PA LAT INDICATION: Shortness of breath, Low back pain and abdominal pain for several days. End-stage renal disease on dialysis, missed dialysis one day ago. COMPARISON: Chest views on 5/20/2018 TECHNIQUE: 4 images of PA and lateral chest views FINDINGS: Right jugular dialysis catheter is new and terminates in the distal superior vena cava. Cardiac monitoring wires overlie the thorax. Borderline cardiac size is unchanged. Aortic arch is not enlarged. The pulmonary vasculature is within normal limits. The lungs and pleural spaces are clear. The visualized bones and upper abdomen are age-appropriate. IMPRESSION: No acute process on chest x-ray. Right jugular dialysis catheter terminates in the distal superior vena cava. No pneumothorax. Ct Abd Pelv W Cont    Result Date: 2/8/2019  EXAM: CT ABD PELV W CONT INDICATION: Abdominal pain and low back pain for several days. End-stage renal disease on dialysis, missed dialysis yesterday. Normal white blood cell count. Normal liver enzymes. COMPARISON: None. CONTRAST: 100 mL of Isovue-370. TECHNIQUE: Following the uneventful intravenous administration of contrast, thin axial images were obtained through the abdomen and pelvis. Coronal and sagittal reconstructions were generated.  Oral contrast was administered. CT dose reduction was achieved through use of a standardized protocol tailored for this examination and automatic exposure control for dose modulation. FINDINGS: LUNG BASES: No pneumonia. Mild dependent atelectasis. INCIDENTALLY IMAGED HEART AND MEDIASTINUM: Borderline cardiac size. No pericardial effusion. LIVER: Subcentimeter liver lesions measure up to 9 mm in segment 2 of the liver. Surface is smooth. Streak artifact from bilateral upper extremities. GALLBLADDER: Gallstones are in the body and neck. No evidence of cholecystitis. CBD is not dilated. SPLEEN: Normal size and enhancement. PANCREAS: No mass or ductal dilatation. ADRENALS: Unremarkable. KIDNEYS: No mass, calculus, or hydronephrosis. STOMACH: Partial distention. SMALL BOWEL: No dilatation or wall thickening. COLON: Moderate colonic fecal burden. No mural thickening. APPENDIX: Unremarkable. PERITONEUM: No ascites or pneumoperitoneum. RETROPERITONEUM: IVC filter is in good position. Aorta is atherosclerotic without aneurysm. Mild atherosclerotic stenosis of the celiac artery. No lymphadenopathy. REPRODUCTIVE ORGANS: Mild prostatomegaly measures 6 cm and extends into the base of the urinary bladder. URINARY BLADDER: No mass or calculus. BONES: Moderate bilateral hip osteoarthritis. ADDITIONAL COMMENTS: N/A     IMPRESSION: 1. No acute process on CT. 2. Cholelithiasis. No cholecystitis. 3. Subcentimeter segment 2 liver lesions cannot be fully characterized on this examination. 4. Moderate colonic fecal burden may represent constipation. No bowel obstruction.          MEDICATIONS     ALL MEDICATIONS:   Current Facility-Administered Medications   Medication Dose Route Frequency    sodium chloride (NS) flush        sertraline (ZOLOFT) tablet 75 mg  75 mg Oral DAILY    heparin (porcine) 1,000 unit/mL injection 4,300 Units  4,300 Units Hemodialysis DIALYSIS PRN    epoetin rosie-epbx (RETACRIT) injection 20,000 Units  20,000 Units SubCUTAneous DIALYSIS TUE, THU & SAT    oxyCODONE-acetaminophen (PERCOCET) 5-325 mg per tablet 1 Tab  1 Tab Oral BID PRN    ibuprofen (MOTRIN) tablet 800 mg  800 mg Oral Q8H PRN    aspirin delayed-release tablet 81 mg  81 mg Oral DAILY    rosuvastatin (CRESTOR) tablet 10 mg  10 mg Oral QHS    ferric citrate (AURYXIA) tablet 420 mg  420 mg Oral TID WITH MEALS    NIFEdipine ER (PROCARDIA XL) tablet 60 mg  60 mg Oral Q24H    doxazosin (CARDURA) tablet 4 mg  4 mg Oral QHS    OLANZapine (ZyPREXA) tablet 2.5 mg  2.5 mg Oral Q6H PRN    ziprasidone (GEODON) 10 mg in sterile water (preservative free) 0.5 mL injection  10 mg IntraMUSCular BID PRN    benztropine (COGENTIN) tablet 1 mg  1 mg Oral BID PRN    benztropine (COGENTIN) injection 1 mg  1 mg IntraMUSCular BID PRN    zolpidem (AMBIEN) tablet 5 mg  5 mg Oral QHS PRN    acetaminophen (TYLENOL) tablet 650 mg  650 mg Oral Q4H PRN    magnesium hydroxide (MILK OF MAGNESIA) 400 mg/5 mL oral suspension 30 mL  30 mL Oral DAILY PRN    nicotine (NICODERM CQ) 21 mg/24 hr patch 1 Patch  1 Patch TransDERmal DAILY PRN    glucose chewable tablet 16 g  4 Tab Oral PRN    dextrose (D50W) injection syrg 12.5-25 g  25-50 mL IntraVENous PRN    glucagon (GLUCAGEN) injection 1 mg  1 mg IntraMUSCular PRN    insulin lispro (HUMALOG) injection   SubCUTAneous AC&HS      SCHEDULED MEDICATIONS:   Current Facility-Administered Medications   Medication Dose Route Frequency    sodium chloride (NS) flush        sertraline (ZOLOFT) tablet 75 mg  75 mg Oral DAILY    epoetin rosie-epbx (RETACRIT) injection 20,000 Units  20,000 Units SubCUTAneous DIALYSIS TUE, THU & SAT    aspirin delayed-release tablet 81 mg  81 mg Oral DAILY    rosuvastatin (CRESTOR) tablet 10 mg  10 mg Oral QHS    ferric citrate (AURYXIA) tablet 420 mg  420 mg Oral TID WITH MEALS    NIFEdipine ER (PROCARDIA XL) tablet 60 mg  60 mg Oral Q24H    doxazosin (CARDURA) tablet 4 mg  4 mg Oral QHS    insulin lispro (HUMALOG) injection   SubCUTAneous AC&HS          ASSESSMENT & PLAN     The patient, Corby Tamayo, is a 70 y.o.  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  Plan for hemodialysis 3 days a week.  If it is not controlled  with the same, provide additional antihypertensive meds.  Monitor blood pressure.    End-stage renal disease, on hemodialysis.  Schedule treatment for hemodialysis on  Tuesdays, Thursdays, and Saturdays. .   Type 2 diabetes mellitus.  Place on Humalog insulin correction coverage schedule,  Accu-Chek, and check hemoglobin A1c level.  The patient will be on a diabetic/renal  Diet. Nephrology consult   2/15/19: continue meds/milue, encourage compliance. 2/16/19: continue meds/milue  2/17/19: continue meds/milue increaisng zoloft 50 mg po daily  2/18/19: continue meds/milue  2/19/19: readding percocet twice daily as needed for pain. 2/20/19: awaiting [facility]emnet, PT/OT for placement  2/21/19: nephrology consult, recommendation regrading dialysis,  2/22/19: increasing zoloft 75 mg daily, continue milue,  2/25/19: continue meds/milue, HD tomorrow  2/26/19: increasing zoloft 100mg po daily, HHA referral  I will continue to monitor blood levels (Depakote, Tegretol, lithium, clozapine---a drug with a narrow therapeutic index= NTI) and associated labs for drug therapy implemented that require intense monitoring for toxicity as deemed appropriate based on current medication side effects and pharmacodynamically determined drug 1/2 lives. In summary, Corby Tamayo, is a 70 y.o.  male who presents with a severe exacerbation of the principal diagnosis of Depression  Patient's condition is worsening/not improving/not stable improving.   Patient requires continued inpatient hospitalization for further stabilization, safety monitoring and medication management. I will continue to coordinate the provision of individual, milieu, occupational, group, and substance abuse therapies to address target symptoms/diagnoses as deemed appropriate for the individual patient. A coordinated, multidisplinary treatment team round was conducted with the patient (this team consists of the nurse, psychiatric unit pharmcist,  and writer). Complete current electronic health record for patient has been reviewed today including consultant notes, ancillary staff notes, nurses and psychiatric tech notes. uicide risk assessment completed and patient deemed to be of low risk for suicide at this time. The following regarding medications was addressed during rounds with patient:   the risks and benefits of the proposed medication. The patient was given the opportunity to ask questions. Informed consent given to the use of the above medications. Will continue to adjust psychiatric and non-psychiatric medications (see above \"medication\" section and orders section for details) as deemed appropriate & based upon diagnoses and response to treatment. I will continue to order blood tests/labs and diagnostic tests as deemed appropriate and review results as they become available (see orders for details and above listed lab/test results). I will order psychiatric records from previous [facility] to further elucidate the nature of patient's psychopathology and review once available. I will gather additional collateral information from friends, family and o/p treatment team to further elucidate the nature of patient's psychopathology and baselline level of psychiatric functioning.          I certify that this patient's inpatient psychiatric hospital services furnished since the previous certification were, and continue to be, required for treatment that could reasonably be expected to improve the patient's condition, or for diagnostic study, and that the patient continues to need, on a daily basis, active treatment furnished directly by or requiring the supervision of inpatient psychiatric facility personnel. In addition, the hospital records show that services furnished were intensive treatment services, admission or related services, or equivalent services.     EXPECTED DISCHARGE DATE/DAY: TBD     DISPOSITION: Home       Signed By:   Deonte Ram MD  2/26/2019

## 2019-02-26 NOTE — PROGRESS NOTES
Nephrology Progress Note  Ramone Tapia  Date of Admission : 2/13/2019    CC:  Follow up for ESRD       Assessment and Plan     ESRD on HD:  - HD TTS at [facility]  - HD today and labs before HD     HTN:  - cont current meds    Anemia of CKD:  - FANNY with dialysis     Secondary HPT:  - cont auryxia    Diastolic HF    PAD s/p b/l BKAs    DM2:  - on insulin    Depression:  - per psychiatry       Interval History:  Seen and examined. No cp, sob, n/v/d reported. Current Medications: all current  Medications have been eviewed in EPIC  Review of Systems: Pertinent items are noted in HPI. Objective:  Vitals:    Vitals:    02/25/19 1712 02/25/19 1759 02/25/19 1920 02/25/19 2123   BP:  189/80 185/77    Pulse: 100 100 61 68   Resp:  16 18    Temp:  98.3 °F (36.8 °C) 98.2 °F (36.8 °C)    TempSrc:       SpO2:  95% 99%    Weight:         Intake and Output:  No intake/output data recorded. No intake/output data recorded. Physical Examination:  General: NAD  Neck:  Supple, no mass  Resp:  Lungs CTA B/L, no wheezing , normal respiratory effort  CV:  RRR,  no murmur or rub, no thigh edema, b/l BKAs  GI:  Soft, NT, + Bowel sounds, no hepatosplenomegaly  Neurologic:  Non focal  Psych:             Depressed, flat affect  Skin:  No Rash  Access:         TIJ PC c/d/i    []    High complexity decision making was performed  []    Patient is at high-risk of decompensation with multiple organ involvement    Lab Data Personally Reviewed: I have reviewed all the pertinent labs, microbiology data and radiology studies during assessment. No results for input(s): NA, K, CL, CO2, GLU, BUN, CREA, CA, MG, PHOS, ALB, TBIL, SGOT, ALT, INR in the last 72 hours. No lab exists for component: INREXT, INREXT  No results for input(s): WBC, HGB, HCT, PLT, HGBEXT, HCTEXT, PLTEXT, HGBEXT, HCTEXT, PLTEXT in the last 72 hours.   No results found for: SDES  Lab Results   Component Value Date/Time    Culture result: MRSA NOT PRESENT 01/24/2018 10:30 AM    Culture result:  01/24/2018 10:30 AM         Screening of patient nares for MRSA is for surveillance purposes and, if positive, to facilitate isolation considerations in high risk settings. It is not intended for automatic decolonization interventions per se as regimens are not sufficiently effective to warrant routine use. Culture result: MRSA NOT PRESENT 01/19/2015 10:44 AM    Culture result:  01/19/2015 10:44 AM         Screening of patient nares for MRSA is for surveillance purposes and, if positive, to facilitate isolation considerations in high risk settings. It is not intended for automatic decolonization interventions per se as regimens are not sufficiently effective to warrant routine use. Recent Results (from the past 24 hour(s))   GLUCOSE, POC    Collection Time: 02/25/19  9:17 AM   Result Value Ref Range    Glucose (POC) 70 65 - 100 mg/dL    Performed by Kimmy Mejia, POC    Collection Time: 02/25/19  9:36 AM   Result Value Ref Range    Glucose (POC) 82 65 - 100 mg/dL    Performed by David Klein    GLUCOSE, POC    Collection Time: 02/25/19  4:32 PM   Result Value Ref Range    Glucose (POC) 85 65 - 100 mg/dL    Performed by [address] [place] [address], POC    Collection Time: 02/25/19  8:07 PM   Result Value Ref Range    Glucose (POC) 108 (H) 65 - 100 mg/dL    Performed by Patricia Pak Neat) [address] Liliane Blank MD  [address] [place] [address]  Phone - [phone]   Fax - [phone]  [email or web address]. [email or web address]

## 2019-02-26 NOTE — PROGRESS NOTES
Problem: Discharge Planning  Goal: *Discharge to safe environment  Outcome: Progressing Towards Goal  Patient is homeless and will require SNF placement. SW following up with [facility] regarding possible placement. Goal: *Knowledge of medication management  Outcome: Progressing Towards Goal  Patient verbalizes understanding of medication regimen. Patient is taking medications as prescribed. Goal: *Knowledge of discharge instructions  Outcome: Progressing Towards Goal  Patient verbalizes understanding of goals for treatment and safe discharge.

## 2019-02-26 NOTE — DIALYSIS
[facility]  [phone]    Vitals   Pre   Post   Assessment   Pre   Post     Temp  Temp: 98.1 °F (36.7 °C) (02/26/19 1343)  98.2 LOC  A&0x3 A&0x3   HR   Pulse (Heart Rate): (!) 51 (02/26/19 1343) 49 Lungs   clear  clear   B/P   BP: 173/74 (02/26/19 1343) 126/66 Cardiac   normal  normal   Resp   Resp Rate: 18 (02/26/19 1343) 18 Skin   Dry and intact  dry and intact   Pain level  Pain Intensity 1: 0 (02/26/19 0913) 0/10 Edema  none     none   Orders:    Duration:   Start:    1405 End:    1970 Total:   3.5   Dialyzer:   Dialyzer/Set Up Inspection: Revaclear (02/26/19 1343)   K Bath:   Dialysate K (mEq/L): 2 (02/26/19 1343)   Ca Bath:   Dialysate CA (mEq/L): 2.5 (02/26/19 1343)   Na/Bicarb:   Dialysate NA (mEq/L): 140 (02/26/19 1343)   Target Fluid Removal:   Goal/Amount of Fluid to Remove (mL): 3000 mL (02/26/19 1343)   Access  RT IJ, no s/s of infection, aspirated well with good flow   Type & Location:      Labs  reviewed   Obtained/Reviewed   Critical Results Called   Date when labs were drawn-  Hgb-    HGB   Date Value Ref Range Status   02/21/2019 10.0 (L) 12.1 - 17.0 g/dL Final     K-    Potassium   Date Value Ref Range Status   02/21/2019 5.1 3.5 - 5.1 mmol/L Final     Ca-   Calcium   Date Value Ref Range Status   02/21/2019 9.0 8.5 - 10.1 MG/DL Final     Bun-   BUN   Date Value Ref Range Status   02/21/2019 46 (H) 6 - 20 MG/DL Final     Creat-   Creatinine   Date Value Ref Range Status   02/21/2019 4.14 (H) 0.70 - 1.30 MG/DL Final        Medications/ Blood Products Given     Name   Dose   Route and Time     retacrit 20,000 units Simon@1[employer]   heparin 22,000units Arterial port   heparin 21,000 units Venous port   Blood Volume Processed (BVP):    76.6 Net Fluid   Removed:  2569 ml   Comments   Time Out Done: yes  Primary Nurse Rpt Pre: Mirtha Cisneros  Primary Nurse Rpt Post: Tanmay Daniel, RN   Pt Education: yes  Care Plan: continue trx  Tx Summary: Dressing changed, trx was tolerated trx fairly well, last 15 min  he had a  hypotensive episodes and c/o sweating, gave  100 cc  of fluid and uf turned off, rcvd all rinse back, both ports locked with heparin and recapped. Pt in bed at lowest level, wheels locked and report given to primary nurse. Admiting Diagnosis:  Pt's previous clinic-  Consent signed - Informed Consent Verified: Yes (02/26/19 6033)  [place] Consent -yes   Hepatitis Status- neg 4/24/18  Machine #- Machine Number: [id number] (02/26/19 7413)  Telemetry status-  Pre-dialysis wt. -   n/a

## 2019-02-27 LAB
GLUCOSE BLD STRIP.AUTO-MCNC: 102 MG/DL (ref 65–100)
GLUCOSE BLD STRIP.AUTO-MCNC: 115 MG/DL (ref 65–100)
GLUCOSE BLD STRIP.AUTO-MCNC: 78 MG/DL (ref 65–100)
GLUCOSE BLD STRIP.AUTO-MCNC: 86 MG/DL (ref 65–100)
GLUCOSE BLD STRIP.AUTO-MCNC: 99 MG/DL (ref 65–100)
SERVICE CMNT-IMP: ABNORMAL
SERVICE CMNT-IMP: ABNORMAL
SERVICE CMNT-IMP: NORMAL

## 2019-02-27 PROCEDURE — 65220000003 HC RM SEMIPRIVATE PSYCH

## 2019-02-27 PROCEDURE — 82962 GLUCOSE BLOOD TEST: CPT

## 2019-02-27 PROCEDURE — 74011250637 HC RX REV CODE- 250/637: Performed by: PSYCHIATRY & NEUROLOGY

## 2019-02-27 PROCEDURE — 74011250637 HC RX REV CODE- 250/637: Performed by: FAMILY MEDICINE

## 2019-02-27 PROCEDURE — 74011250637 HC RX REV CODE- 250/637: Performed by: INTERNAL MEDICINE

## 2019-02-27 PROCEDURE — 74011250637 HC RX REV CODE- 250/637: Performed by: HOSPITALIST

## 2019-02-27 RX ORDER — SERTRALINE HYDROCHLORIDE 50 MG/1
100 TABLET, FILM COATED ORAL ONCE
Status: COMPLETED | OUTPATIENT
Start: 2019-02-27 | End: 2019-02-27

## 2019-02-27 RX ADMIN — SERTRALINE HYDROCHLORIDE 100 MG: 50 TABLET ORAL at 13:03

## 2019-02-27 RX ADMIN — TRAZODONE HYDROCHLORIDE 25 MG: 50 TABLET ORAL at 20:28

## 2019-02-27 RX ADMIN — FERRIC CITRATE 420 MG: 210 TABLET, COATED ORAL at 13:00

## 2019-02-27 RX ADMIN — OXYCODONE AND ACETAMINOPHEN 1 TABLET: 5; 325 TABLET ORAL at 20:44

## 2019-02-27 RX ADMIN — OXYCODONE AND ACETAMINOPHEN 1 TABLET: 5; 325 TABLET ORAL at 00:17

## 2019-02-27 RX ADMIN — NIFEDIPINE 60 MG: 60 TABLET, FILM COATED, EXTENDED RELEASE ORAL at 10:14

## 2019-02-27 RX ADMIN — FERRIC CITRATE 420 MG: 210 TABLET, COATED ORAL at 17:05

## 2019-02-27 RX ADMIN — ROSUVASTATIN CALCIUM 10 MG: 10 TABLET, FILM COATED ORAL at 20:27

## 2019-02-27 RX ADMIN — DOXAZOSIN 4 MG: 2 TABLET ORAL at 20:25

## 2019-02-27 NOTE — INTERDISCIPLINARY ROUNDS
Behavioral Health Interdisciplinary Rounds     Patient Name: Omega Anaya  Age: 70 y.o. Room/Bed:  740/  Primary Diagnosis: Depression   Admission Status: Voluntary     Readmission within 30 days: no  Power of  in place: no  Patient requires a blocked bed: no          Reason for blocked bed:   Sleep hours:  6+    Participation in Care/Groups:  yes  Medication Compliant?: Yes  PRNS (last 24 hours): Sleep Aid and Pain    Restraints (last 24 hours):  no     Alcohol screening (AUDIT) completed -  AUDIT Score: 0  If applicable, date SBIRT discussed in treatment team AND documented:    Tobacco - patient is a smoker: Have You Used Tobacco in the Past 30 Days: Yes  Illegal Drugs use: Have You Used Any Illegal Substances Over the Past 12 Months: Yes    24 hour chart check complete: yes     Patient goal(s) for today: Take medications as prescribed  Treatment team focus/goals: Follow-up with [facility] re placement  Progress note: SW to follow-up on placement; Pt irritable and refusing antidepressant    LOS:  14  Expected LOS: TBD    Participating treatment team members:  Jordana Arnold, MSW; Dr. Bernadette Silveira MD; Declan Barrett, MIKHAIL; Courtney Cuellar, PharmD

## 2019-02-27 NOTE — BH NOTES
Spoke with Dr. Cecilia Savage on 02/27 concerning Sandoval Snow BP of 204/86. Cecilia Savage approved giving 4pm scheduled Procardia medication now, [id number].

## 2019-02-27 NOTE — BH NOTES
GROUP THERAPY PROGRESS NOTE    Aj Brock is participating in [facility] [facility].      Group time: 30 minutes    Personal goal for participation: decreases anxiety    Goal orientation: relaxation    Group therapy participation: active    Therapeutic interventions reviewed and discussed:     Impression of participation: fair

## 2019-02-27 NOTE — PROGRESS NOTES
Problem: Falls - Risk of  Goal: *Absence of Falls  Document Bello Fall Risk and appropriate interventions in the flowsheet. Outcome: Progressing Towards Goal  Fall Risk Interventions:  Mobility Interventions: Bed/chair exit alarm, Patient to call before getting OOB    Mentation Interventions: Adequate sleep, hydration, pain control    Medication Interventions: Teach patient to arise slowly, Patient to call before getting OOB    Elimination Interventions: Bed/chair exit alarm, Urinal in reach    History of Falls Interventions: Bed/chair exit alarm    Free of falls. Falls tool completed and accurate. Bed alarm on the bed. Patient able to transfer with assistance from lift team tot he w/c. Using w/c to get around on the unit. Staff to continue to monitor for safety on the unit during hospitalization.

## 2019-02-27 NOTE — PROGRESS NOTES
Problem: Depressed Mood (Adult/Pediatric)  Goal: *STG: Participates in treatment plan  Outcome: Progressing Towards Goal  Patient completed dialysis this evening. Cooperative.

## 2019-02-27 NOTE — PROGRESS NOTES
Problem: Depressed Mood (Adult/Pediatric)  Goal: *STG: Participates in treatment plan  Outcome: Progressing Towards Goal  Received this morning resting in bed. Is alert and oriented x3. Thoughts are clear and organized. Able to verbalize his needs clearly to staff . BP was elevated this morning,204/81,204/86, hospitalist called at about 0900 and 0945(Dr. Raygoza) and made aware of pressure. Patients Procardia was given per orders. Assisted up to the w/c after am cares. . Right side Jaleel dressing dry and intact and site without redness/irritation. Presently sitting up in the w/c. Has been medication and meal compliant. Patients goal to discuss discharge planing with treatment team today. Staff to continue to provide a safe environment during hospitalization.     [address] [facility] [place] [facility]  Master Treatment Plan for Carlos Manuel King    Date Treatment Plan Initiated:2/27/19    Treatment Plan Modalities:  Type of Modality Amount  (x minutes) Frequency (x/week) Duration (x days) Name of Responsible Staff   Community & wrap-up meetings to encourage peer interactions 15 7 1 [facility] [place]     Group psychotherapy to assist in building coping skills and internal controls 60 7 1 Jose Arreguin   Therapeutic activity groups to build coping skills 60 7 1 Jose Arreguin   Psychoeducation in group setting to address:   Medication education   15 7 1 JCRN   Coping skills         Relaxation techniques         Symptom management         Discharge planning   60 2 [address]ia 115   60 1 1 volunteer   Recovery/AA/NA   61 1 1 volunteer   Physician medication management   15 7 1 Dr Bunny Tanner   15 2 [facility]

## 2019-02-27 NOTE — BH NOTES
PSYCHIATRIC PROGRESS NOTE         Patient Name  Lew Low   Date of Birth 1940   [facility] [id number]   Medical Record Number  [id number]      Age  70 y.o. PCP Nancy Bliss MD   Admit date:  2/13/2019    Room Number  740/01  @ [address]   Date of Service  2/27/2019           E & M PROGRESS NOTE: 15 mins         HISTORY       REASON FOR HOSPITALIZATION:  CC: \"Pt admitted under a voluntary basis for severe depression with suicidal ideations and  an inability to care for self. HISTORY OF PRESENT ILLNESS:    The patient, Lew Low, is a 70 y.o. BLACK OR  male with a67-year-old -American male with past medical  history of end-stage renal disease; on hemodialysis, sleep apnea, peripheral vascular disease, bilateral BKA, hypertension, hyperlipidemia, peripheral neuropathy, anemia,hepatitis C, arthritis, peptic ulcer disease, coronary artery disease, DVT, prior GIbleed, chronic diastolic CHF, prior suicidal ideation, depression, and noncompliance   transfered to [facility] from [facility] after pt reported feeling depressed nad suicidal Pt stated he lives in the [place] and he is unhappy about his living situation , he stated he had lots of Money before [place] HAS 13 CHILDREN but now he is broke and no one ask about him. Pt reports feeling hopeless, helpless, angry. These symptoms are of high severity. These symptoms are constant . Pt ha sh/o using marijuana occasionally. UDS: negative; BAL=0. .  2/15/19: Pt is irritable and refused zoloft and vitals today. Pt has dialysis yesterday. 2/16/19: Pt took zoloft but refuses some medical meds. Pt had dialysis on Saturday. Pt is demanding in the evenings. 2/17/19: Pt is demanding, irritable in the evenings, compliant with zoloft. Eating well. 2/18/19: Pt is demanding and manipulative ta times but taking zoloft. Pt is eating well. 2/19/19: Pt taking zoloft.  Pt got agitated yesterday evening and received geodon I/M PRN. Pt reported he need percocet. AS per reports, he was on percocets for pain. 2/20/19: Pt is doing better, is no longer suicidal and is accepting of nursing home placement, is psychiatrically stable for discharge. 2/21/19: Pt did not receive dialysis , was sleeping in his bed. Pt has clot in the catheter nad need new port. Nephrology is contacted. Pt report not feeling depressed, awaiting placement.i  2/22/19: Pt  Is irritable at times, did not get dialusis yetserday, New port is placed, nephrology is on board. Pt is eating   well nad sleeping well. 2/23/19-Presented verbal and cooperative. He is compliant with meds. Repotrs he is homeless and has nowhere to go to. SW is trying to find appropriate placement. No AVH or SI/HI.  2/24/19- pt stayed in bed most of the day. He had been asking for discharge earlier but has not recognized a place. He was seen in his room and encouraged get up and come to the day-room. No prn's used. 2/25/19: Pt will go for dialysis on Tuesday. Pt  Is cooperative, refusing to go to [facility]. Pt is eating well, slept good,  2/26/19: Pt is still depressed at times. Pt contacted a friend yesterday and pt says  He wants to go live with her. Pt will need HHA and dialysis transportation after discharge. 2/27/19: slept 6 hours, BP was low , Bp meds were held yesterday. Pt is on phone. Pt refused zoloft in am, gets irritated at times. SIDE EFFECTS: (reviewed/updated 2/27/2019)  None reported or admitted to. No noted toxicity with use of Depakote/Tegretol/lithium/Clozaril/TCAs   ALLERGIES:(reviewed/updated 2/27/2019)  Allergies   Allergen Reactions    Tetracycline Hives      MEDICATIONS PRIOR TO ADMISSION:(reviewed/updated 2/27/2019)  Medications Prior to Admission   Medication Sig    NIFEdipine ER (ADALAT CC) 60 mg ER tablet Take 1 Tab by mouth daily. HOLD for HR<60    hydroCHLOROthiazide (HYDRODIURIL) 25 mg tablet Take 1 Tab by mouth daily.     sertraline (ZOLOFT) 25 mg tablet Take 1 Tab by mouth daily.  oxyCODONE-acetaminophen (PERCOCET 10)  mg per tablet Take 1 Tab by mouth every six (6) hours as needed for Pain.  traZODone (DESYREL) 50 mg tablet Take 50 mg by mouth nightly.  rosuvastatin (CRESTOR) 10 mg tablet Take 10 mg by mouth nightly.  aspirin delayed-release 81 mg tablet Take 1 Tab by mouth daily. PAST MEDICAL HISTORY: Past medical history from the initial psychiatric evaluation has been reviewed (reviewed/updated 2/27/2019) with no additional updates (I asked patient and no additional past medical history provided). Past Medical History:   Diagnosis Date    Arthritis     CAD (coronary artery disease) 2007    CKD [facility]     DM type 2 causing renal disease ([facility]     DVT (deep venous thrombosis) (HCC)     Hx of DVT:  Xarelto stopped due to GI bleed. S/P IVC filter.  Gait abnormality     GI bleed     Heart murmur     Hepatitis C     History of blood transfusion     Hypercholesterolemia     Hypertension 11/7/2014    Neuropathy     Non compliance w medication regimen     Peripheral vascular disease ([facility] 11/7/2014    A. S/P Right SFA POBA and tibial atherectomy (2/4/13).  PUD (peptic ulcer disease) 2007    Unspecified sleep apnea     never used cpap     Past Surgical History:   Procedure Laterality Date    ABDOMEN SURGERY PROC UNLISTED  2007    has approx 7-8\" midline incision on abdomen--\"had to open him up and clean him out after feeding tube clogged\"    HX GI  2007    feeding tube for approx 2 mo    VASCULAR SURGERY PROCEDURE UNLIST Right 1/22/2015    popliteal-tibial bypass      SOCIAL HISTORY: Social history from the initial psychiatric evaluation has been reviewed (reviewed/updated 2/27/2019) with no additional updates (I asked patient and no additional social history provided).    Social History     Socioeconomic History    Marital status: SINGLE     Spouse name: Not on file    Number of children: Not on file    Years of education: Not on file    Highest education level: Not on file   Social Needs    Financial resource strain: Not on file    Food insecurity - worry: Not on file    Food insecurity - inability: Not on file    Transportation needs - medical: Not on file   [email or web address] needs - non-medical: Not on file   Occupational History    Not on file   Tobacco Use    Smoking status: Current Every Day Smoker     Packs/day: 0.50    Smokeless tobacco: Never Used   Substance and Sexual Activity    Alcohol use: No    Drug use: No     Comment: >20 years ago    Sexual activity: Not on file   Other Topics Concern     Service Not Asked    Blood Transfusions Not Asked    Caffeine Concern Not Asked    Occupational Exposure Not Asked    Hobby Hazards Not Asked    Sleep Concern Not Asked    Stress Concern Not Asked    Weight Concern Not Asked    Special Diet Not Asked    Back Care Not Asked    Exercise Not Asked    Bike Helmet Not Asked    Seat Belt Not Asked    Self-Exams Not Asked   Social History Narrative    78 year ols male admitted for depression and homelessness. Pt will be evicated from apartment due to non payment of bills. Girlfriend of 30 years left him to [facility] [place] the [place] with others. \" Pt is a brittle diabetic, with treatment non compliance. He has bilarela lower extremity amputations. Patient has a long hx of substance abuse. FAMILY HISTORY: Family history from the initial psychiatric evaluation has been reviewed (reviewed/updated 2/27/2019) with no additional updates (I asked patient and no additional family history provided). Family History   Problem Relation Age of Onset    Hypertension Father        REVIEW OF SYSTEMS: (reviewed/updated 2/27/2019)  Appetite:   Sleep:     All other Review of Systems: Negative except          MENTAL STATUS EXAM & VITALS     MMENTAL STATUS EXAM (MSE):    MSE FINDINGS ARE WITHIN NORMAL LIMITS (WNL) UNLESS OTHERWISE STATED BELOW. ( ALL OF THE BELOW CATEGORIES OF THE MSE HAVE BEEN REVIEWED (reviewed 2/14/2019) AND UPDATED AS DEEMED APPROPRIATE )  General Presentation age appropriate and casually dressed, cooperative   Orientation oriented to time, place and person   Vital Signs  See below (reviewed 2/14/2019); Vital Signs (BP, Pulse, & Temp) are within normal limits if not listed below.    Gait and Station Stable/steady, no ataxia   Musculoskeletal System No extrapyramidal symptoms (EPS); no abnormal muscular movements or Tardive Dyskinesia (TD); muscle strength and tone are within normal limits   Language No aphasia or dysarthria   Speech:  soft   Thought Processes logical; slow rate of thoughts; fair abstract reasoning/computation   Thought Associations goal directed   Thought Content free of delusions   Suicidal Ideations intention   Homicidal Ideations no plan  and no intention   Mood:  anxious  and depressed   Affect:  constricted   Memory recent  intact   Memory remote:  intact   Concentration/Attention:  distractable   Fund of Knowledge average   Insight:  fair   Reliability fair   Judgment:  fair                                                                                                    VITALS:     Patient Vitals for the past 24 hrs:   Temp Pulse Resp BP SpO2   02/27/19 0956 -- (!) 48 -- 185/84 --   02/27/19 0847 97.6 °F (36.4 °C) (!) 46 14 (!) 204/86 100 %   02/26/19 2102 97.7 °F (36.5 °C) -- 16 -- --   02/26/19 1713 -- (!) 47 -- 124/67 --   02/26/19 1700 -- (!) 50 -- 96/52 --   02/26/19 1645 -- (!) 51 -- 105/60 --   02/26/19 1630 -- -- -- 118/64 --   02/26/19 1615 -- (!) 51 -- 130/67 --   02/26/19 1600 -- (!) 54 -- 123/64 --   02/26/19 1546 98.2 °F (36.8 °C) (!) 53 18 142/68 100 %   02/26/19 1545 -- (!) 53 -- 142/68 --   02/26/19 1530 -- (!) 54 -- 132/67 --   02/26/19 1515 -- (!) 52 -- 128/67 --   02/26/19 1500 -- (!) 52 -- 149/67 --   02/26/19 1445 -- (!) 49 -- 144/61 --   02/26/19 1430 -- Rosendo Dang 49 18 126/63 --   02/26/19 1415 -- (!) 49 18 150/72 --   02/26/19 1400 -- (!) 49 18 163/74 --   02/26/19 1343 98.1 °F (36.7 °C) (!) 51 18 173/74 --     Wt Readings from Last 3 Encounters:   02/14/19 73 kg (160 lb 15 oz)   02/13/19 73.4 kg (161 lb 12.8 oz)   01/15/19 81.6 kg (180 lb)     Temp Readings from Last 3 Encounters:   02/27/19 97.6 °F (36.4 °C)   02/22/19 98.2 °F (36.8 °C)   02/13/19 98.2 °F (36.8 °C)     BP Readings from Last 3 Encounters:   02/27/19 185/84   02/22/19 146/68   02/13/19 181/82     Pulse Readings from Last 3 Encounters:   02/27/19 (!) 48   02/22/19 (!) 53   02/13/19 (!) 48            DATA     LABORATORY DATA:(reviewed/updated 2/27/2019)  Recent Results (from the past 24 hour(s))   GLUCOSE, POC    Collection Time: 02/26/19 12:28 PM   Result Value Ref Range    Glucose (POC) 91 65 - 100 mg/dL    Performed by Blair Qui    CBC WITH AUTOMATED DIFF    Collection Time: 02/26/19  1:50 PM   Result Value Ref Range    WBC 4.2 4.1 - 11.1 K/uL    RBC 3.38 (L) 4.10 - 5.70 M/uL    HGB 9.6 (L) 12.1 - 17.0 g/dL    HCT 30.3 (L) 36.6 - 50.3 %    MCV 89.6 80.0 - 99.0 FL    MCH 28.4 26.0 - 34.0 PG    MCHC 31.7 30.0 - 36.5 g/dL    RDW 20.3 (H) 11.5 - 14.5 %    PLATELET 246 224 - 146 K/uL    MPV 10.2 8.9 - 12.9 FL    NRBC 0.0 0  WBC    ABSOLUTE NRBC 0.00 0.00 - 0.01 K/uL    NEUTROPHILS 53 32 - 75 %    LYMPHOCYTES 27 12 - 49 %    MONOCYTES 10 5 - 13 %    EOSINOPHILS 9 (H) 0 - 7 %    BASOPHILS 1 0 - 1 %    IMMATURE GRANULOCYTES 0 0.0 - 0.5 %    ABS. NEUTROPHILS 2.3 1.8 - 8.0 K/UL    ABS. LYMPHOCYTES 1.1 0.8 - 3.5 K/UL    ABS. MONOCYTES 0.4 0.0 - 1.0 K/UL    ABS. EOSINOPHILS 0.4 0.0 - 0.4 K/UL    ABS. BASOPHILS 0.0 0.0 - 0.1 K/UL    ABS. IMM.  GRANS. 0.0 0.00 - 0.04 K/UL    DF SMEAR SCANNED      RBC COMMENTS ANISOCYTOSIS  2+       RENAL FUNCTION PANEL    Collection Time: 02/26/19  1:50 PM   Result Value Ref Range    Sodium 140 136 - 145 mmol/L    Potassium 4.7 3.5 - 5.1 mmol/L    Chloride 108 97 - 108 mmol/L CO2 27 21 - 32 mmol/L    Anion gap 5 5 - 15 mmol/L    Glucose 82 65 - 100 mg/dL    BUN 36 (H) 6 - 20 MG/DL    Creatinine 3.34 (H) 0.70 - 1.30 MG/DL    BUN/Creatinine ratio 11 (L) 12 - 20      GFR est AA 22 (L) >60 ml/min/1.73m2    GFR est non-AA 18 (L) >60 ml/min/1.73m2    Calcium 8.5 8.5 - 10.1 MG/DL    Phosphorus 3.4 2.6 - 4.7 MG/DL    Albumin 3.0 (L) 3.5 - 5.0 g/dL   GLUCOSE, POC    Collection Time: 02/26/19  5:50 PM   Result Value Ref Range    Glucose (POC) 86 65 - 100 mg/dL    Performed by He Falk, POC    Collection Time: 02/27/19  8:22 AM   Result Value Ref Range    Glucose (POC) 78 65 - 100 mg/dL    Performed by [address], POC    Collection Time: 02/27/19  8:45 AM   Result Value Ref Range    Glucose (POC) 86 65 - 100 mg/dL    Performed by Lucas Moreno      No results found for: VALF2, VALAC, VALP, VALPR, DS6, CRBAM, CRBAMP, CARB2, XCRBAM  No results found for: LITHM   RADIOLOGY REPORTS:(reviewed/updated 2/27/2019)  Xr Chest Pa Lat    Result Date: 2/8/2019  EXAM: XR CHEST PA LAT INDICATION: Shortness of breath, Low back pain and abdominal pain for several days. End-stage renal disease on dialysis, missed dialysis one day ago. COMPARISON: Chest views on 5/20/2018 TECHNIQUE: 4 images of PA and lateral chest views FINDINGS: Right jugular dialysis catheter is new and terminates in the distal superior vena cava. Cardiac monitoring wires overlie the thorax. Borderline cardiac size is unchanged. Aortic arch is not enlarged. The pulmonary vasculature is within normal limits. The lungs and pleural spaces are clear. The visualized bones and upper abdomen are age-appropriate. IMPRESSION: No acute process on chest x-ray. Right jugular dialysis catheter terminates in the distal superior vena cava. No pneumothorax. Ct Abd Pelv W Cont    Result Date: 2/8/2019  EXAM: CT ABD PELV W CONT INDICATION: Abdominal pain and low back pain for several days. End-stage renal disease on dialysis, missed dialysis yesterday. Normal white blood cell count. Normal liver enzymes. COMPARISON: None. CONTRAST: 100 mL of Isovue-370. TECHNIQUE: Following the uneventful intravenous administration of contrast, thin axial images were obtained through the abdomen and pelvis. Coronal and sagittal reconstructions were generated. Oral contrast was administered. CT dose reduction was achieved through use of a standardized protocol tailored for this examination and automatic exposure control for dose modulation. FINDINGS: LUNG BASES: No pneumonia. Mild dependent atelectasis. INCIDENTALLY IMAGED HEART AND MEDIASTINUM: Borderline cardiac size. No pericardial effusion. LIVER: Subcentimeter liver lesions measure up to 9 mm in segment 2 of the liver. Surface is smooth. Streak artifact from bilateral upper extremities. GALLBLADDER: Gallstones are in the body and neck. No evidence of cholecystitis. CBD is not dilated. SPLEEN: Normal size and enhancement. PANCREAS: No mass or ductal dilatation. ADRENALS: Unremarkable. KIDNEYS: No mass, calculus, or hydronephrosis. STOMACH: Partial distention. SMALL BOWEL: No dilatation or wall thickening. COLON: Moderate colonic fecal burden. No mural thickening. APPENDIX: Unremarkable. PERITONEUM: No ascites or pneumoperitoneum. RETROPERITONEUM: IVC filter is in good position. Aorta is atherosclerotic without aneurysm. Mild atherosclerotic stenosis of the celiac artery. No lymphadenopathy. REPRODUCTIVE ORGANS: Mild prostatomegaly measures 6 cm and extends into the base of the urinary bladder. URINARY BLADDER: No mass or calculus. BONES: Moderate bilateral hip osteoarthritis. ADDITIONAL COMMENTS: N/A     IMPRESSION: 1. No acute process on CT. 2. Cholelithiasis. No cholecystitis. 3. Subcentimeter segment 2 liver lesions cannot be fully characterized on this examination. 4. Moderate colonic fecal burden may represent constipation. No bowel obstruction. MEDICATIONS     ALL MEDICATIONS:   Current Facility-Administered Medications   Medication Dose Route Frequency    sertraline (ZOLOFT) tablet 100 mg  100 mg Oral DAILY    traZODone (DESYREL) tablet 25 mg  25 mg Oral QHS    heparin (porcine) 1,000 unit/mL injection 4,300 Units  4,300 Units Hemodialysis DIALYSIS PRN    epoetin rosie-epbx (RETACRIT) injection 20,000 Units  20,000 Units SubCUTAneous DIALYSIS TUE, THU & SAT    oxyCODONE-acetaminophen (PERCOCET) 5-325 mg per tablet 1 Tab  1 Tab Oral BID PRN    ibuprofen (MOTRIN) tablet 800 mg  800 mg Oral Q8H PRN    aspirin delayed-release tablet 81 mg  81 mg Oral DAILY    rosuvastatin (CRESTOR) tablet 10 mg  10 mg Oral QHS    ferric citrate (AURYXIA) tablet 420 mg  420 mg Oral TID WITH MEALS    NIFEdipine ER (PROCARDIA XL) tablet 60 mg  60 mg Oral Q24H    doxazosin (CARDURA) tablet 4 mg  4 mg Oral QHS    OLANZapine (ZyPREXA) tablet 2.5 mg  2.5 mg Oral Q6H PRN    ziprasidone (GEODON) 10 mg in sterile water (preservative free) 0.5 mL injection  10 mg IntraMUSCular BID PRN    benztropine (COGENTIN) tablet 1 mg  1 mg Oral BID PRN    benztropine (COGENTIN) injection 1 mg  1 mg IntraMUSCular BID PRN    zolpidem (AMBIEN) tablet 5 mg  5 mg Oral QHS PRN    acetaminophen (TYLENOL) tablet 650 mg  650 mg Oral Q4H PRN    magnesium hydroxide (MILK OF MAGNESIA) 400 mg/5 mL oral suspension 30 mL  30 mL Oral DAILY PRN    nicotine (NICODERM CQ) 21 mg/24 hr patch 1 Patch  1 Patch TransDERmal DAILY PRN    glucose chewable tablet 16 g  4 Tab Oral PRN    dextrose (D50W) injection syrg 12.5-25 g  25-50 mL IntraVENous PRN    glucagon (GLUCAGEN) injection 1 mg  1 mg IntraMUSCular PRN    insulin lispro (HUMALOG) injection   SubCUTAneous AC&HS      SCHEDULED MEDICATIONS:   Current Facility-Administered Medications   Medication Dose Route Frequency    sertraline (ZOLOFT) tablet 100 mg  100 mg Oral DAILY    traZODone (DESYREL) tablet 25 mg  25 mg Oral QHS    epoetin rosie-epbx (RETACRIT) injection 20,000 Units  20,000 Units SubCUTAneous DIALYSIS TUE, THU & SAT    aspirin delayed-release tablet 81 mg  81 mg Oral DAILY    rosuvastatin (CRESTOR) tablet 10 mg  10 mg Oral QHS    ferric citrate (AURYXIA) tablet 420 mg  420 mg Oral TID WITH MEALS    NIFEdipine ER (PROCARDIA XL) tablet 60 mg  60 mg Oral Q24H    doxazosin (CARDURA) tablet 4 mg  4 mg Oral QHS    insulin lispro (HUMALOG) injection   SubCUTAneous AC&HS          ASSESSMENT & PLAN     The patient, Wimler Lopez, is a 70 y.o.  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  Plan for hemodialysis 3 days a week.  If it is not controlled  with the same, provide additional antihypertensive meds.  Monitor blood pressure.    End-stage renal disease, on hemodialysis.  Schedule treatment for hemodialysis on  Tuesdays, Thursdays, and Saturdays. .   Type 2 diabetes mellitus.  Place on Humalog insulin correction coverage schedule,  Accu-Chek, and check hemoglobin A1c level.  The patient will be on a diabetic/renal  Diet. Nephrology consult   2/15/19: continue meds/milue, encourage compliance. 2/16/19: continue meds/milue  2/17/19: continue meds/milue increaisng zoloft 50 mg po daily  2/18/19: continue meds/milue  2/19/19: readding percocet twice daily as needed for pain.   2/20/19: awaiting [facility], PT/OT for placement  2/21/19: nephrology consult, recommendation regrading dialysis,  2/22/19: increasing zoloft 75 mg daily, continue milue,  2/25/19: continue meds/milue, HD tomorrow  2/26/19: increasing zoloft 100mg po daily, HHA referral  2/27/19: received BP meds today, received percocet last night/ continue meds/milue    I will continue to monitor blood levels (Depakote, Tegretol, lithium, clozapine---a drug with a narrow therapeutic index= NTI) and associated labs for drug therapy implemented that require intense monitoring for toxicity as deemed appropriate based on current medication side effects and pharmacodynamically determined drug 1/2 lives. In summary, Gustavo Castro, is a 70 y.o.  male who presents with a severe exacerbation of the principal diagnosis of Depression  Patient's condition is worsening/not improving/not stable improving. Patient requires continued inpatient hospitalization for further stabilization, safety monitoring and medication management. I will continue to coordinate the provision of individual, milieu, occupational, group, and substance abuse therapies to address target symptoms/diagnoses as deemed appropriate for the individual patient. A coordinated, multidisplinary treatment team round was conducted with the patient (this team consists of the nurse, psychiatric unit pharmcist,  and writer). Complete current electronic health record for patient has been reviewed today including consultant notes, ancillary staff notes, nurses and psychiatric tech notes. uicide risk assessment completed and patient deemed to be of low risk for suicide at this time. The following regarding medications was addressed during rounds with patient:   the risks and benefits of the proposed medication. The patient was given the opportunity to ask questions. Informed consent given to the use of the above medications. Will continue to adjust psychiatric and non-psychiatric medications (see above \"medication\" section and orders section for details) as deemed appropriate & based upon diagnoses and response to treatment. I will continue to order blood tests/labs and diagnostic tests as deemed appropriate and review results as they become available (see orders for details and above listed lab/test results).     I will order psychiatric records from previous [facility] hospitals to further elucidate the nature of patient's psychopathology and review once available. I will gather additional collateral information from friends, family and o/p treatment team to further elucidate the nature of patient's psychopathology and baselline level of psychiatric functioning. I certify that this patient's inpatient psychiatric hospital services furnished since the previous certification were, and continue to be, required for treatment that could reasonably be expected to improve the patient's condition, or for diagnostic study, and that the patient continues to need, on a daily basis, active treatment furnished directly by or requiring the supervision of inpatient psychiatric facility personnel. In addition, the hospital records show that services furnished were intensive treatment services, admission or related services, or equivalent services.     EXPECTED DISCHARGE DATE/DAY: TBD     DISPOSITION: Home       Signed By:   Elena Vail MD  2/27/2019

## 2019-02-27 NOTE — PROGRESS NOTES
Problem: Falls - Risk of  Goal: *Absence of Falls  Document Bello Fall Risk and appropriate interventions in the flowsheet.   Outcome: Progressing Towards Goal  Fall Risk Interventions:  Mobility Interventions: Bed/chair exit alarm, Patient to call before getting OOB    Mentation Interventions: Adequate sleep, hydration, pain control    Medication Interventions: Teach patient to arise slowly, Patient to call before getting OOB    Elimination Interventions: Bed/chair exit alarm, Urinal in reach    History of Falls Interventions: Bed/chair exit alarm

## 2019-02-27 NOTE — BH NOTES
GROUP THERAPY PROGRESS NOTE    Jenny Pierre did not participate in a 50 minute Process Group on the Geriatric Unit with a focus on shifting ones feelings to a positive note and preparing for the day through group singing.

## 2019-02-28 LAB
ALBUMIN SERPL-MCNC: 3 G/DL (ref 3.5–5)
ANION GAP SERPL CALC-SCNC: 9 MMOL/L (ref 5–15)
BUN SERPL-MCNC: 32 MG/DL (ref 6–20)
BUN/CREAT SERPL: 10 (ref 12–20)
CALCIUM SERPL-MCNC: 8.5 MG/DL (ref 8.5–10.1)
CHLORIDE SERPL-SCNC: 104 MMOL/L (ref 97–108)
CO2 SERPL-SCNC: 26 MMOL/L (ref 21–32)
CREAT SERPL-MCNC: 3.34 MG/DL (ref 0.7–1.3)
ERYTHROCYTE [DISTWIDTH] IN BLOOD BY AUTOMATED COUNT: 21.1 % (ref 11.5–14.5)
GLUCOSE BLD STRIP.AUTO-MCNC: 102 MG/DL (ref 65–100)
GLUCOSE BLD STRIP.AUTO-MCNC: 112 MG/DL (ref 65–100)
GLUCOSE BLD STRIP.AUTO-MCNC: 150 MG/DL (ref 65–100)
GLUCOSE BLD STRIP.AUTO-MCNC: 71 MG/DL (ref 65–100)
GLUCOSE BLD STRIP.AUTO-MCNC: 74 MG/DL (ref 65–100)
GLUCOSE BLD STRIP.AUTO-MCNC: 93 MG/DL (ref 65–100)
GLUCOSE SERPL-MCNC: 130 MG/DL (ref 65–100)
HCT VFR BLD AUTO: 30.7 % (ref 36.6–50.3)
HGB BLD-MCNC: 9.3 G/DL (ref 12.1–17)
MCH RBC QN AUTO: 27.3 PG (ref 26–34)
MCHC RBC AUTO-ENTMCNC: 30.3 G/DL (ref 30–36.5)
MCV RBC AUTO: 90 FL (ref 80–99)
NRBC # BLD: 0 K/UL (ref 0–0.01)
NRBC BLD-RTO: 0 PER 100 WBC
PHOSPHATE SERPL-MCNC: 3.3 MG/DL (ref 2.6–4.7)
PLATELET # BLD AUTO: 154 K/UL (ref 150–400)
PMV BLD AUTO: 10.5 FL (ref 8.9–12.9)
POTASSIUM SERPL-SCNC: 4.6 MMOL/L (ref 3.5–5.1)
RBC # BLD AUTO: 3.41 M/UL (ref 4.1–5.7)
SERVICE CMNT-IMP: ABNORMAL
SERVICE CMNT-IMP: NORMAL
SODIUM SERPL-SCNC: 139 MMOL/L (ref 136–145)
WBC # BLD AUTO: 4.3 K/UL (ref 4.1–11.1)

## 2019-02-28 PROCEDURE — 74011250637 HC RX REV CODE- 250/637: Performed by: INTERNAL MEDICINE

## 2019-02-28 PROCEDURE — 74011250636 HC RX REV CODE- 250/636: Performed by: INTERNAL MEDICINE

## 2019-02-28 PROCEDURE — 36415 COLL VENOUS BLD VENIPUNCTURE: CPT

## 2019-02-28 PROCEDURE — 80069 RENAL FUNCTION PANEL: CPT

## 2019-02-28 PROCEDURE — 82962 GLUCOSE BLOOD TEST: CPT

## 2019-02-28 PROCEDURE — 65220000003 HC RM SEMIPRIVATE PSYCH

## 2019-02-28 PROCEDURE — 90935 HEMODIALYSIS ONE EVALUATION: CPT

## 2019-02-28 PROCEDURE — 74011250637 HC RX REV CODE- 250/637: Performed by: HOSPITALIST

## 2019-02-28 PROCEDURE — 85027 COMPLETE CBC AUTOMATED: CPT

## 2019-02-28 PROCEDURE — 74011250637 HC RX REV CODE- 250/637: Performed by: PSYCHIATRY & NEUROLOGY

## 2019-02-28 PROCEDURE — 74011250637 HC RX REV CODE- 250/637: Performed by: FAMILY MEDICINE

## 2019-02-28 RX ORDER — SODIUM CHLORIDE 0.9 % (FLUSH) 0.9 %
10 SYRINGE (ML) INJECTION AS NEEDED
Status: DISCONTINUED | OUTPATIENT
Start: 2019-02-28 | End: 2019-03-11 | Stop reason: HOSPADM

## 2019-02-28 RX ORDER — SODIUM CHLORIDE 0.9 % (FLUSH) 0.9 %
SYRINGE (ML) INJECTION
Status: COMPLETED
Start: 2019-02-28 | End: 2019-02-28

## 2019-02-28 RX ADMIN — NIFEDIPINE 60 MG: 60 TABLET, FILM COATED, EXTENDED RELEASE ORAL at 17:36

## 2019-02-28 RX ADMIN — FERRIC CITRATE 420 MG: 210 TABLET, COATED ORAL at 13:46

## 2019-02-28 RX ADMIN — ASPIRIN 81 MG: 81 TABLET, COATED ORAL at 13:46

## 2019-02-28 RX ADMIN — HEPARIN SODIUM 4300 UNITS: 1000 INJECTION INTRAVENOUS; SUBCUTANEOUS at 12:23

## 2019-02-28 RX ADMIN — FERRIC CITRATE 420 MG: 210 TABLET, COATED ORAL at 17:38

## 2019-02-28 RX ADMIN — SERTRALINE HYDROCHLORIDE 100 MG: 50 TABLET ORAL at 13:46

## 2019-02-28 RX ADMIN — Medication: at 09:00

## 2019-02-28 NOTE — DIABETES MGMT
DTC Progress Note    Recommendations/ Comments: Chart reviewed due to hypoglycemia. Please encourage po intake to help support BG > 80 mg/dL . Noted pt on HD and ESRD likely contributing to low BG. Current hospital DM medication: lispro correction - high sensitivity     Chart reviewed on [facility]. Patient is a 70 y.o. male with hx of DM on no DM meds at home. Pt on HD. A1c:   Lab Results   Component Value Date/Time    Hemoglobin A1c 5.0 02/14/2019 04:23 AM    Hemoglobin A1c 4.5 01/21/2018 02:21 AM       Recent Glucose Results:   Lab Results   Component Value Date/Time     (H) 02/28/2019 09:15 AM    GLUCPOC 93 02/28/2019 01:10 PM    GLUCPOC 102 (H) 02/28/2019 07:39 AM    GLUCPOC 74 02/28/2019 07:29 AM        Lab Results   Component Value Date/Time    Creatinine 3.34 (H) 02/28/2019 09:15 AM     CrCl cannot be calculated (Unknown ideal weight. ). Active Orders   Diet    DIET REGULAR Double Portions        PO intake:   No data found. Will continue to follow as needed.     Thank you,  Sivakumar Schaefer RD, CDE    Time spent: 2 minutes

## 2019-02-28 NOTE — PROGRESS NOTES
Occupational Therapy  Chart reviewed. Attempted to see pt for OT assessment. Pt received in wheelchair in dining room. Pt reported not feeling well and did not want to work with therapy today. He was agreeable to have OT/PT check on him tomorrow. RN notified.  Sharmila Palomares, OT

## 2019-02-28 NOTE — BH NOTES
GROUP THERAPY PROGRESS NOTE    Basilio Pearce did not participate in a 40 minute Process Group on the Geriatric Unit with a focus on shifting ones feelings to a positive note and preparing for the day through group singing.

## 2019-02-28 NOTE — PROGRESS NOTES
Occupational Therapy  Chart reviewed. Referral for OT received. Attempted to see pt for OT assessment. Pt is currently undergoing dialysis. Will continue to follow.  Paula Luciano OT

## 2019-02-28 NOTE — PROGRESS NOTES
Physical therapy:    Attempted PT re-assessment. Pt received seated in his wheelchair in the dining room. Pt reported not feeling well and does not feel up to participating in therapy at this time. Pt agreeable to therapy tomorrow. Will continue to follow.  Thank you    Kirk Mercado, PT, DPT

## 2019-02-28 NOTE — PROGRESS NOTES
Problem: Depressed Mood (Adult/Pediatric)  Goal: *STG: Attends activities and groups  Outcome: Progressing Towards Goal    Received patient sitting in  reading. NAD. Voiced no concerns. On q 15 min. Visible in DR watching TV, reading newspaper, talking on phone and eating 100% dinner, fluids and snacks. Phone called wife, \"Lucero\" many times. Patient refused to take scheduled meds this evening. Said \" I don't need those meds\". He requested and went  to bed early.

## 2019-02-28 NOTE — INTERDISCIPLINARY ROUNDS
Behavioral Health Interdisciplinary Rounds     Patient Name: Dominick Epstein  Age: 70 y.o. Room/Bed:  740/01  Primary Diagnosis: Depression   Admission Status: Voluntary     Readmission within 30 days: no  Power of  in place: no  Patient requires a blocked bed: no          Reason for blocked bed:  Sleep hours: 7      Participation in Care/Groups:  yes  Medication Compliant?: Yes  PRNS (last 24 hours): Pain    Restraints (last 24 hours):  no     Alcohol screening (AUDIT) completed -  AUDIT Score: 0  If applicable, date SBIRT discussed in treatment team AND documented:    Tobacco - patient is a smoker: Have You Used Tobacco in the Past 30 Days: Yes  Illegal Drugs use: Have You Used Any Illegal Substances Over the Past 12 Months: Yes    24 hour chart check complete: yes     Patient goal(s) for today: Receive Dialysis  Treatment team focus/goals: Follow-up with [address]  Progress note: Pt still depressed and irritable; receiving dialysis    LOS:  15  Expected LOS: TBD    Participating treatment team members:  Dominick Barriosyojana, Lula Simmonds, MSW; Dr. Scar Dave MD; Viola Vega RN; Yonathan Doss, DongD

## 2019-02-28 NOTE — PROGRESS NOTES
Problem: Depressed Mood (Adult/Pediatric)  Goal: *STG: Attends activities and groups  Outcome: Progressing Towards Goal    Received patient reading the newspaper in DR with peers. NAD. Voiced no complaints. On q 15 min safety checks. Visible in DR watching [facility] TV, reading newspaper, talking on the phone and eating 100% dinner, fluids and snacks. Socializing appropriately with peers. Pleasant, polite and cooperative. Med compliant.

## 2019-02-28 NOTE — PROGRESS NOTES
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 Outcome: Progressing Towards Goal  Fall Risk Interventions:  Patient remains free from fall. Document Bello fall risk scale. Utilize lift team for transfor  Mobility Interventions: Bed/chair exit alarm, Communicate number of staff needed for ambulation/transfer, Patient to call before getting OOB    Mentation Interventions: Adequate sleep, hydration, pain control    Medication Interventions: Bed/chair exit alarm, Patient to call before getting OOB, Teach patient to arise slowly    Elimination Interventions: Bed/chair exit alarm    History of Falls Interventions: Bed/chair exit alarm        Problem: Pressure Injury - Risk of  Goal: *Prevention of pressure injury  Document Rambo Scale and appropriate interventions in the flowsheet.   Outcome: Progressing Towards Goal  Pressure Injury Interventions:  Document Rambo Scale, patient self turns,   Sensory Interventions: Assess changes in LOC, Chair cushion, Keep linens dry and wrinkle-free, Minimize linen layers    Moisture Interventions: Absorbent underpads    Activity Interventions: Pressure redistribution bed/mattress(bed type)    Mobility Interventions: Pressure redistribution bed/mattress (bed type)    Nutrition Interventions: Document food/fluid/supplement intake, Offer support with meals,snacks and hydration    Friction and Shear Interventions: Minimize layers

## 2019-02-28 NOTE — PROCEDURES
[facility] Dialysis Team [facility]  [phone]    Vitals   Pre   Post   Assessment   Pre   Post     Temp  98.4  98.3   LOC  A/O x 4 No change   HR   55   54   Lungs   Clear  No change   B/P  168/66   148/66 Cardiac   Bradycardia  No change   Resp   18   20   Skin   Warm/dry  No change    Pain level  0   0   Edema  None   No change   Orders:    Duration:   Start:    0830 End:   1205 Total:   3.5 hrs   Dialyzer:   Dialyzer/Set Up Inspection: Barnie Areas (02/28/19 0830)   K Bath:   Dialysate K (mEq/L): 2 (02/28/19 0830)   Ca Bath:   Dialysate CA (mEq/L): 2.5 (02/28/19 0830)   Na/Bicarb:   Dialysate NA (mEq/L): 140 (02/28/19 0830)   Target Fluid Removal:   Goal/Amount of Fluid to Remove (mL): 3000 mL (02/28/19 0830)   Access     Type & Location:   RIJ Tunneled catheter aspirated and flushed  With NS with no problems. Prepped with [employer]ics as  Per policy Site dry and intact. Dressing changed today   Labs     Obtained/Reviewed   Critical Results Called   Date when labs were drawn-  Hgb-    HGB   Date Value Ref Range Status   02/28/2019 9.3 (L) 12.1 - 17.0 g/dL Final     K-    Potassium   Date Value Ref Range Status   02/26/2019 4.7 3.5 - 5.1 mmol/L Final     Ca-   Calcium   Date Value Ref Range Status   02/26/2019 8.5 8.5 - 10.1 MG/DL Final     Bun-   BUN   Date Value Ref Range Status   02/26/2019 36 (H) 6 - 20 MG/DL Final     Creat-   Creatinine   Date Value Ref Range Status   02/26/2019 3.34 (H) 0.70 - 1.30 MG/DL Final        Medications/ Blood Products Given     Name   Dose   Route and Time     Heparin 1000 units  A 2.1 ml  IV at 1205   Heparin 1000 units V 2.2 ml IV at 1205        Blood Volume Processed (BVP):    68.3 Net Fluid   Removed:  3000 ml   Comments   Time Out Done: 0030  Primary Nurse Rpt 120 [facility] RN  Primary Nurse Rpt Post:Nelda Bruno RN  Pt Education:HD procedure Access care  Care Plan:Continue HD Plan of Care  Tx Summary:Arrived at pt's room.  Report obtained  From Primary RN,HD set up  After timeout done HD started,  Drawn labs  0830: HD started with no problems, Access with good flow  0900:Pt resting with no distress  1000:Access secured lines visible pt stable  1200:HD completed All possible blood returned with NS rinsed back Tolerated  Full treatment. Catheter flushed with NS and blocked with Heparin A 2.1 ml  V 2.2 ml Pt stable and Primary RN returned pt to his room       HD Related Medications Reviewed       Admiting Diagnosis:Depression  Pt's previous clinic-[facility]  Consent signed - Informed Consent Verified: Yes (02/28/19 0830)  [facility] Consent - Yes on File  Hepatitis Status- Hep B Antigen Negative 2/9/19  Machine #- Machine Number: [id number]/ [id number] (02/28/19 0830)  Telemetry status-None  Pre-dialysis wt. -

## 2019-02-28 NOTE — PROGRESS NOTES
Physical therapy:    Attempted PT re-evaluation. Pt currently undergoing dialysis. Will defer and continue to follow.  Thank you    June Roque, PT, DPT

## 2019-02-28 NOTE — BH NOTES
2044:PRN Medication Documentation    Specific patient behavior that led to need for PRN medication: Patient requesting pain medication  Staff interventions attempted prior to PRN being given: education, support  PRN medication given: percoset 5-325  Patient response/effectiveness of PRN medication: patient reports pain level has decreased, and appears to be sleeping at the present time.

## 2019-02-28 NOTE — PROGRESS NOTES
Nephrology Progress Note  Brian Paulino  Date of Admission : 2/13/2019    CC:  Follow up for ESRD       Assessment and Plan     ESRD on HD:  - HD TTS at [facility]  - HD today   - noted plans to transfer to [facility]    HTN:  - cont current meds    Anemia of CKD:  - FANNY with dialysis     Secondary HPT:  - cont auryxia    Diastolic HF    PAD s/p b/l BKAs    DM2:  - on insulin    Depression:  - per psychiatry       Interval History:  Seen and examined after HD. Tolerated it well . No cp, sob, n/v/d reported. Current Medications: all current  Medications have been eviewed in EPIC  Review of Systems: Pertinent items are noted in HPI. Objective:  Vitals:    Vitals:    02/28/19 1130 02/28/19 1145 02/28/19 1205 02/28/19 1402   BP: 130/62 124/60 140/65 149/72   Pulse: (!) 55 (!) 55 (!) 55    Resp: 18 18 18    Temp:   98.3 °F (36.8 °C)    TempSrc:   Oral    SpO2:       Weight:         Intake and Output:  02/28 0701 - 02/28 1900  In: -   Out: 3000   No intake/output data recorded. Physical Examination:  General: NAD  Neck:  Supple, no mass  Resp:  Lungs CTA B/L, no wheezing , normal respiratory effort  CV:  RRR,  no murmur or rub, no thigh edema, b/l BKAs  GI:  Soft, NT, + Bowel sounds, no hepatosplenomegaly  Neurologic:  Non focal  Psych:             Depressed, flat affect  Skin:  No Rash  Access:         TIJ PC c/d/i    []    High complexity decision making was performed  []    Patient is at high-risk of decompensation with multiple organ involvement    Lab Data Personally Reviewed: I have reviewed all the pertinent labs, microbiology data and radiology studies during assessment.     Recent Labs     02/28/19  0915 02/26/19  1350    140   K 4.6 4.7    108   CO2 26 27   * 82   BUN 32* 36*   CREA 3.34* 3.34*   CA 8.5 8.5   PHOS 3.3 3.4   ALB 3.0* 3.0*     Recent Labs     02/28/19  0915 02/26/19  1350   WBC 4.3 4.2   HGB 9.3* 9.6*   HCT 30.7* 30.3*    159     No results found for: SDES  Lab Results   Component Value Date/Time    Culture result: MRSA NOT PRESENT 01/24/2018 10:30 AM    Culture result:  01/24/2018 10:30 AM         Screening of patient nares for MRSA is for surveillance purposes and, if positive, to facilitate isolation considerations in high risk settings. It is not intended for automatic decolonization interventions per se as regimens are not sufficiently effective to warrant routine use. Culture result: MRSA NOT PRESENT 01/19/2015 10:44 AM    Culture result:  01/19/2015 10:44 AM         Screening of patient nares for MRSA is for surveillance purposes and, if positive, to facilitate isolation considerations in high risk settings. It is not intended for automatic decolonization interventions per se as regimens are not sufficiently effective to warrant routine use.      Recent Results (from the past 24 hour(s))   GLUCOSE, POC    Collection Time: 02/27/19  3:56 PM   Result Value Ref Range    Glucose (POC) 102 (H) 65 - 100 mg/dL    Performed by Madelin Magaña    GLUCOSE, POC    Collection Time: 02/27/19  8:17 PM   Result Value Ref Range    Glucose (POC) 99 65 - 100 mg/dL    Performed by Les Garcia    GLUCOSE, POC    Collection Time: 02/28/19  7:17 AM   Result Value Ref Range    Glucose (POC) 71 65 - 100 mg/dL    Performed by [address] [place] [address] Drive, POC    Collection Time: 02/28/19  7:29 AM   Result Value Ref Range    Glucose (POC) 74 65 - 100 mg/dL    Performed by [email or web address]    GLUCOSE, POC    Collection Time: 02/28/19  7:39 AM   Result Value Ref Range    Glucose (POC) 102 (H) 65 - 100 mg/dL    Performed by [employer]    RENAL FUNCTION PANEL    Collection Time: 02/28/19  9:15 AM   Result Value Ref Range    Sodium 139 136 - 145 mmol/L    Potassium 4.6 3.5 - 5.1 mmol/L    Chloride 104 97 - 108 mmol/L    CO2 26 21 - 32 mmol/L    Anion gap 9 5 - 15 mmol/L    Glucose 130 (H) 65 - 100 mg/dL    BUN 32 (H) 6 - 20 MG/DL    Creatinine 3.34 (H) 0.70 - 1.30 MG/DL BUN/Creatinine ratio 10 (L) 12 - 20      GFR est AA 22 (L) >60 ml/min/1.73m2    GFR est non-AA 18 (L) >60 ml/min/1.73m2    Calcium 8.5 8.5 - 10.1 MG/DL    Phosphorus 3.3 2.6 - 4.7 MG/DL    Albumin 3.0 (L) 3.5 - 5.0 g/dL   CBC W/O DIFF    Collection Time: 02/28/19  9:15 AM   Result Value Ref Range    WBC 4.3 4.1 - 11.1 K/uL    RBC 3.41 (L) 4.10 - 5.70 M/uL    HGB 9.3 (L) 12.1 - 17.0 g/dL    HCT 30.7 (L) 36.6 - 50.3 %    MCV 90.0 80.0 - 99.0 FL    MCH 27.3 26.0 - 34.0 PG    MCHC 30.3 30.0 - 36.5 g/dL    RDW 21.1 (H) 11.5 - 14.5 %    PLATELET 778 283 - 155 K/uL    MPV 10.5 8.9 - 12.9 FL    NRBC 0.0 0  WBC    ABSOLUTE NRBC 0.00 0.00 - 0.01 K/uL   GLUCOSE, POC    Collection Time: 02/28/19  1:10 PM   Result Value Ref Range    Glucose (POC) 93 65 - 100 mg/dL    Performed by Maya Park MD  [address]  Phone - [phone]   Fax - [phone]  [email or web address]

## 2019-02-28 NOTE — BH NOTES
PSYCHIATRIC PROGRESS NOTE         Patient Name  Gustavo Castro   Date of Birth 1940   [facility] [id number]   Medical Record Number  [id number]      Age  70 y.o. PCP Samaria Flores MD   Admit date:  2/13/2019    Room Number  740/01  @ [facility]   Date of Service  2/28/2019           E & M PROGRESS NOTE: 15 mins         HISTORY       REASON FOR HOSPITALIZATION:  CC: \"Pt admitted under a voluntary basis for severe depression with suicidal ideations and  an inability to care for self. HISTORY OF PRESENT ILLNESS:    The patient, Gustavo Castro, is a 70 y.o. BLACK OR  male with 56-year-old -American male with past medical  history of end-stage renal disease; on hemodialysis, sleep apnea, peripheral vascular disease, bilateral BKA, hypertension, hyperlipidemia, peripheral neuropathy, anemia,hepatitis C, arthritis, peptic ulcer disease, coronary artery disease, DVT, prior GIbleed, chronic diastolic CHF, prior suicidal ideation, depression, and noncompliance   transfered to [facility] from [facility] after pt reported feeling depressed nad suicidal Pt stated he lives in the [place] and he is unhappy about his living situation , he stated he had lots of Money before [place] HAS 13 CHILDREN but now he is broke and no one ask about him. Pt reports feeling hopeless, helpless, angry. These symptoms are of high severity. These symptoms are constant . Pt ha sh/o using marijuana occasionally. UDS: negative; BAL=0. .  2/15/19: Pt is irritable and refused zoloft and vitals today. Pt has dialysis yesterday. 2/16/19: Pt took zoloft but refuses some medical meds. Pt had dialysis on Saturday. Pt is demanding in the evenings. 2/17/19: Pt is demanding, irritable in the evenings, compliant with zoloft. Eating well. 2/18/19: Pt is demanding and manipulative ta times but taking zoloft. Pt is eating well. 2/19/19: Pt taking zoloft.  Pt got agitated yesterday evening and received geodon I/M PRN. Pt reported he need percocet. AS per reports, he was on percocets for pain. 2/20/19: Pt is doing better, is no longer suicidal and is accepting of nursing home placement, is psychiatrically stable for discharge. 2/21/19: Pt did not receive dialysis , was sleeping in his bed. Pt has clot in the catheter nad need new port. Nephrology is contacted. Pt report not feeling depressed, awaiting placement.i  2/22/19: Pt  Is irritable at times, did not get dialusis yetserday, New port is placed, nephrology is on board. Pt is eating   well nad sleeping well. 2/23/19-Presented verbal and cooperative. He is compliant with meds. Repotrs he is homeless and has nowhere to go to. SW is trying to find appropriate placement. No AVH or SI/HI.  2/24/19- pt stayed in bed most of the day. He had been asking for discharge earlier but has not recognized a place. He was seen in his room and encouraged get up and come to the day-room. No prn's used. 2/25/19: Pt will go for dialysis on Tuesday. Pt  Is cooperative, refusing to go to [facility]. Pt is eating well, slept good,  2/26/19: Pt is still depressed at times. Pt contacted a friend yesterday and pt says  He wants to go live with her. Pt will need HHA and dialysis transportation after discharge. 2/27/19: slept 6 hours, BP was low , Bp meds were held yesterday. Pt is on phone. Pt refused zoloft in am, gets irritated at times. 2/28/19: Pt is in dialyiss, is in betetr mood, more courteous. Pt is eating well. Pt's finger stick was 71, no symptoms. SIDE EFFECTS: (reviewed/updated 2/28/2019)  None reported or admitted to.   No noted toxicity with use of Depakote/Tegretol/lithium/Clozaril/TCAs   ALLERGIES:(reviewed/updated 2/28/2019)  Allergies   Allergen Reactions    Tetracycline Hives      MEDICATIONS PRIOR TO ADMISSION:(reviewed/updated 2/28/2019)  Medications Prior to Admission   Medication Sig    NIFEdipine ER (ADALAT CC) 60 mg ER tablet Take 1 Tab by mouth daily. HOLD for HR<60    hydroCHLOROthiazide (HYDRODIURIL) 25 mg tablet Take 1 Tab by mouth daily.  sertraline (ZOLOFT) 25 mg tablet Take 1 Tab by mouth daily.  oxyCODONE-acetaminophen (PERCOCET 10)  mg per tablet Take 1 Tab by mouth every six (6) hours as needed for Pain.  traZODone (DESYREL) 50 mg tablet Take 50 mg by mouth nightly.  rosuvastatin (CRESTOR) 10 mg tablet Take 10 mg by mouth nightly.  aspirin delayed-release 81 mg tablet Take 1 Tab by mouth daily. PAST MEDICAL HISTORY: Past medical history from the initial psychiatric evaluation has been reviewed (reviewed/updated 2/28/2019) with no additional updates (I asked patient and no additional past medical history provided). Past Medical History:   Diagnosis Date    Arthritis     CAD (coronary artery disease) 2007    CKD [facility]     DM type 2 causing renal disease ([facility]     [facility] thrombosis) ([facility]     Hx of DVT:  Xarelto stopped due to GI bleed. S/P IVC filter.  Gait abnormality     GI bleed     Heart murmur     Hepatitis C     History of blood transfusion     Hypercholesterolemia     Hypertension 11/7/2014    Neuropathy     Non compliance w medication regimen     Peripheral vascular disease ([facility] 11/7/2014    A. S/P Right SFA POBA and tibial atherectomy (2/4/13).     PUD (peptic ulcer disease) 2007    Unspecified sleep apnea     never used cpap     Past Surgical History:   Procedure Laterality Date    ABDOMEN SURGERY PROC UNLISTED  2007    has approx 7-8\" midline incision on abdomen--\"had to open him up and clean him out after feeding tube clogged\"    HX GI  2007    feeding tube for approx 2 mo    VASCULAR SURGERY PROCEDURE UNLIST Right 1/22/2015    popliteal-tibial bypass      SOCIAL HISTORY: Social history from the initial psychiatric evaluation has been reviewed (reviewed/updated 2/28/2019) with no additional updates (I asked patient and no additional social history provided). Social History     Socioeconomic History    Marital status: SINGLE     Spouse name: Not on file    Number of children: Not on file    Years of education: Not on file    Highest education level: Not on file   Social Needs    Financial resource strain: Not on file    Food insecurity - worry: Not on file    Food insecurity - inability: Not on file    Transportation needs - medical: Not on file   twiDAQ needs - non-medical: Not on file   Occupational History    Not on file   Tobacco Use    Smoking status: Current Every Day Smoker     Packs/day: 0.50    Smokeless tobacco: Never Used   Substance and Sexual Activity    Alcohol use: No    Drug use: No     Comment: >20 years ago    Sexual activity: Not on file   Other Topics Concern     Service Not Asked    Blood Transfusions Not Asked    Caffeine Concern Not Asked    Occupational Exposure Not Asked    Hobby Hazards Not Asked    Sleep Concern Not Asked    Stress Concern Not Asked    Weight Concern Not Asked    Special Diet Not Asked    Back Care Not Asked    Exercise Not Asked    Bike Helmet Not Asked    Seat Belt Not Asked    Self-Exams Not Asked   Social History Narrative    78 year ols male admitted for depression and homelessness. Pt will be evicated from apartment due to non payment of bills. Girlfriend of 30 years left him to [facility] live [place] the [place] with others. \" Pt is a brittle diabetic, with treatment non compliance. He has bilarela lower extremity amputations. Patient has a long hx of substance abuse. FAMILY HISTORY: Family history from the initial psychiatric evaluation has been reviewed (reviewed/updated 2/28/2019) with no additional updates (I asked patient and no additional family history provided). Family History   Problem Relation Age of Onset    Hypertension Father        REVIEW OF SYSTEMS: (reviewed/updated 2/28/2019)  Appetite:   Sleep:     All other Review of Systems: Negative except MENTAL STATUS EXAM & VITALS     MMENTAL STATUS EXAM (MSE):    MSE FINDINGS ARE WITHIN NORMAL LIMITS (WNL) UNLESS OTHERWISE STATED BELOW. ( ALL OF THE BELOW CATEGORIES OF THE MSE HAVE BEEN REVIEWED (reviewed 2/14/2019) AND UPDATED AS DEEMED APPROPRIATE )  General Presentation age appropriate and casually dressed, cooperative   Orientation oriented to time, place and person   Vital Signs  See below (reviewed 2/14/2019); Vital Signs (BP, Pulse, & Temp) are within normal limits if not listed below.    Gait and Station Stable/steady, no ataxia   Musculoskeletal System No extrapyramidal symptoms (EPS); no abnormal muscular movements or Tardive Dyskinesia (TD); muscle strength and tone are within normal limits   Language No aphasia or dysarthria   Speech:  soft   Thought Processes logical; slow rate of thoughts; fair abstract reasoning/computation   Thought Associations goal directed   Thought Content free of delusions   Suicidal Ideations intention   Homicidal Ideations no plan  and no intention   Mood:  anxious  and depressed   Affect:  constricted   Memory recent  intact   Memory remote:  intact   Concentration/Attention:  distractable   Fund of Knowledge average   Insight:  fair   Reliability fair   Judgment:  fair                                                                                                    VITALS:     Patient Vitals for the past 24 hrs:   Temp Pulse Resp BP SpO2   02/28/19 1145 -- (!) 55 18 124/60 --   02/28/19 1130 -- (!) 55 18 130/62 --   02/28/19 1115 -- 63 20 120/60 --   02/28/19 1100 -- (!) 54 18 130/65 --   02/28/19 1045 -- (!) 54 20 105/52 --   02/28/19 1030 -- (!) 54 18 133/68 --   02/28/19 1015 -- (!) 53 18 150/64 --   02/28/19 1000 -- (!) 55 26 151/73 --   02/28/19 0945 -- (!) 55 18 150/65 --   02/28/19 0930 -- (!) 54 16 158/66 --   02/28/19 0915 -- (!) 54 18 155/71 --   02/28/19 0900 -- (!) 55 16 150/70 --   02/28/19 0845 -- (!) 55 18 153/50 --   02/28/19 0830 98.5 °F (36.9 °C) (!) 56 18 168/66 --   02/28/19 0744 98.8 °F (37.1 °C) 98 15 165/72 99 %   02/27/19 1910 98 °F (36.7 °C) (!) 54 16 153/67 99 %   02/27/19 1600 98.8 °F (37.1 °C) 60 16 158/78 99 %     Wt Readings from Last 3 Encounters:   02/14/19 73 kg (160 lb 15 oz)   02/13/19 73.4 kg (161 lb 12.8 oz)   01/15/19 81.6 kg (180 lb)     Temp Readings from Last 3 Encounters:   02/28/19 98.5 °F (36.9 °C) (Oral)   02/22/19 98.2 °F (36.8 °C)   02/13/19 98.2 °F (36.8 °C)     BP Readings from Last 3 Encounters:   02/28/19 124/60   02/22/19 146/68   02/13/19 181/82     Pulse Readings from Last 3 Encounters:   02/28/19 (!) 55   02/22/19 (!) 53   02/13/19 (!) 48            DATA     LABORATORY DATA:(reviewed/updated 2/28/2019)  Recent Results (from the past 24 hour(s))   GLUCOSE, POC    Collection Time: 02/27/19  3:56 PM   Result Value Ref Range    Glucose (POC) 102 (H) 65 - 100 mg/dL    Performed by [address], POC    Collection Time: 02/27/19  8:17 PM   Result Value Ref Range    Glucose (POC) 99 65 - 100 mg/dL    Performed by Leticia Holt    GLUCOSE, POC    Collection Time: 02/28/19  7:17 AM   Result Value Ref Range    Glucose (POC) 71 65 - 100 mg/dL    Performed by Merced Ying    GLUCOSE, POC    Collection Time: 02/28/19  7:29 AM   Result Value Ref Range    Glucose (POC) 74 65 - 100 mg/dL    Performed by Merced Ying    GLUCOSE, POC    Collection Time: 02/28/19  7:39 AM   Result Value Ref Range    Glucose (POC) 102 (H) 65 - 100 mg/dL    Performed by Merced Ying    RENAL FUNCTION PANEL    Collection Time: 02/28/19  9:15 AM   Result Value Ref Range    Sodium 139 136 - 145 mmol/L    Potassium 4.6 3.5 - 5.1 mmol/L    Chloride 104 97 - 108 mmol/L    CO2 26 21 - 32 mmol/L    Anion gap 9 5 - 15 mmol/L    Glucose 130 (H) 65 - 100 mg/dL    BUN 32 (H) 6 - 20 MG/DL    Creatinine 3.34 (H) 0.70 - 1.30 MG/DL    BUN/Creatinine ratio 10 (L) 12 - 20      GFR est AA 22 (L) >60 ml/min/1.73m2    GFR est non-AA 18 (L) >60 ml/min/1.73m2 Calcium 8.5 8.5 - 10.1 MG/DL    Phosphorus 3.3 2.6 - 4.7 MG/DL    Albumin 3.0 (L) 3.5 - 5.0 g/dL   CBC W/O DIFF    Collection Time: 02/28/19  9:15 AM   Result Value Ref Range    WBC 4.3 4.1 - 11.1 K/uL    RBC 3.41 (L) 4.10 - 5.70 M/uL    HGB 9.3 (L) 12.1 - 17.0 g/dL    HCT 30.7 (L) 36.6 - 50.3 %    MCV 90.0 80.0 - 99.0 FL    MCH 27.3 26.0 - 34.0 PG    MCHC 30.3 30.0 - 36.5 g/dL    RDW 21.1 (H) 11.5 - 14.5 %    PLATELET 253 288 - 061 K/uL    MPV 10.5 8.9 - 12.9 FL    NRBC 0.0 0  WBC    ABSOLUTE NRBC 0.00 0.00 - 0.01 K/uL     No results found for: VALF2, VALAC, VALP, VALPR, DS6, CRBAM, CRBAMP, CARB2, XCRBAM  No results found for: LITHM   RADIOLOGY REPORTS:(reviewed/updated 2/28/2019)  Xr Chest Pa Lat    Result Date: 2/8/2019  EXAM: XR CHEST PA LAT INDICATION: Shortness of breath, Low back pain and abdominal pain for several days. End-stage renal disease on dialysis, missed dialysis one day ago. COMPARISON: Chest views on 5/20/2018 TECHNIQUE: 4 images of PA and lateral chest views FINDINGS: Right jugular dialysis catheter is new and terminates in the distal superior vena cava. Cardiac monitoring wires overlie the thorax. Borderline cardiac size is unchanged. Aortic arch is not enlarged. The pulmonary vasculature is within normal limits. The lungs and pleural spaces are clear. The visualized bones and upper abdomen are age-appropriate. IMPRESSION: No acute process on chest x-ray. Right jugular dialysis catheter terminates in the distal superior vena cava. No pneumothorax. Ct Abd Pelv W Cont    Result Date: 2/8/2019  EXAM: CT ABD PELV W CONT INDICATION: Abdominal pain and low back pain for several days. End-stage renal disease on dialysis, missed dialysis yesterday. Normal white blood cell count. Normal liver enzymes. COMPARISON: None. CONTRAST: 100 mL of Isovue-370.  TECHNIQUE: Following the uneventful intravenous administration of contrast, thin axial images were obtained through the abdomen and pelvis. Coronal and sagittal reconstructions were generated. Oral contrast was administered. CT dose reduction was achieved through use of a standardized protocol tailored for this examination and automatic exposure control for dose modulation. FINDINGS: LUNG BASES: No pneumonia. Mild dependent atelectasis. INCIDENTALLY IMAGED HEART AND MEDIASTINUM: Borderline cardiac size. No pericardial effusion. LIVER: Subcentimeter liver lesions measure up to 9 mm in segment 2 of the liver. Surface is smooth. Streak artifact from bilateral upper extremities. GALLBLADDER: Gallstones are in the body and neck. No evidence of cholecystitis. CBD is not dilated. SPLEEN: Normal size and enhancement. PANCREAS: No mass or ductal dilatation. ADRENALS: Unremarkable. KIDNEYS: No mass, calculus, or hydronephrosis. STOMACH: Partial distention. SMALL BOWEL: No dilatation or wall thickening. COLON: Moderate colonic fecal burden. No mural thickening. APPENDIX: Unremarkable. PERITONEUM: No ascites or pneumoperitoneum. RETROPERITONEUM: IVC filter is in good position. Aorta is atherosclerotic without aneurysm. Mild atherosclerotic stenosis of the celiac artery. No lymphadenopathy. REPRODUCTIVE ORGANS: Mild prostatomegaly measures 6 cm and extends into the base of the urinary bladder. URINARY BLADDER: No mass or calculus. BONES: Moderate bilateral hip osteoarthritis. ADDITIONAL COMMENTS: N/A     IMPRESSION: 1. No acute process on CT. 2. Cholelithiasis. No cholecystitis. 3. Subcentimeter segment 2 liver lesions cannot be fully characterized on this examination. 4. Moderate colonic fecal burden may represent constipation. No bowel obstruction.          MEDICATIONS     ALL MEDICATIONS:   Current Facility-Administered Medications   Medication Dose Route Frequency    sodium chloride (NS) flush        sertraline (ZOLOFT) tablet 100 mg  100 mg Oral DAILY    traZODone (DESYREL) tablet 25 mg  25 mg Oral QHS    heparin (porcine) 1,000 unit/mL injection 4,300 Units  4,300 Units Hemodialysis DIALYSIS PRN    epoetin rosie-epbx (RETACRIT) injection 20,000 Units  20,000 Units SubCUTAneous DIALYSIS TUE, THU & SAT    oxyCODONE-acetaminophen (PERCOCET) 5-325 mg per tablet 1 Tab  1 Tab Oral BID PRN    ibuprofen (MOTRIN) tablet 800 mg  800 mg Oral Q8H PRN    aspirin delayed-release tablet 81 mg  81 mg Oral DAILY    rosuvastatin (CRESTOR) tablet 10 mg  10 mg Oral QHS    ferric citrate (AURYXIA) tablet 420 mg  420 mg Oral TID WITH MEALS    NIFEdipine ER (PROCARDIA XL) tablet 60 mg  60 mg Oral Q24H    doxazosin (CARDURA) tablet 4 mg  4 mg Oral QHS    OLANZapine (ZyPREXA) tablet 2.5 mg  2.5 mg Oral Q6H PRN    ziprasidone (GEODON) 10 mg in sterile water (preservative free) 0.5 mL injection  10 mg IntraMUSCular BID PRN    benztropine (COGENTIN) tablet 1 mg  1 mg Oral BID PRN    benztropine (COGENTIN) injection 1 mg  1 mg IntraMUSCular BID PRN    zolpidem (AMBIEN) tablet 5 mg  5 mg Oral QHS PRN    acetaminophen (TYLENOL) tablet 650 mg  650 mg Oral Q4H PRN    magnesium hydroxide (MILK OF MAGNESIA) 400 mg/5 mL oral suspension 30 mL  30 mL Oral DAILY PRN    nicotine (NICODERM CQ) 21 mg/24 hr patch 1 Patch  1 Patch TransDERmal DAILY PRN    glucose chewable tablet 16 g  4 Tab Oral PRN    dextrose (D50W) injection syrg 12.5-25 g  25-50 mL IntraVENous PRN    glucagon (GLUCAGEN) injection 1 mg  1 mg IntraMUSCular PRN    insulin lispro (HUMALOG) injection   SubCUTAneous AC&HS      SCHEDULED MEDICATIONS:   Current Facility-Administered Medications   Medication Dose Route Frequency    sodium chloride (NS) flush        sertraline (ZOLOFT) tablet 100 mg  100 mg Oral DAILY    traZODone (DESYREL) tablet 25 mg  25 mg Oral QHS    epoetin rosie-epbx (RETACRIT) injection 20,000 Units  20,000 Units SubCUTAneous DIALYSIS TUE, THU & SAT    aspirin delayed-release tablet 81 mg  81 mg Oral DAILY    rosuvastatin (CRESTOR) tablet 10 mg  10 mg Oral QHS    ferric citrate (AURYXIA) tablet 420 mg  420 mg Oral TID WITH MEALS    NIFEdipine ER (PROCARDIA XL) tablet 60 mg  60 mg Oral Q24H    doxazosin (CARDURA) tablet 4 mg  4 mg Oral QHS    insulin lispro (HUMALOG) injection   SubCUTAneous AC&HS          ASSESSMENT & PLAN     The patient, Stepan Giron, is a 70 y.o.  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  Plan for hemodialysis 3 days a week.  If it is not controlled  with the same, provide additional antihypertensive meds.  Monitor blood pressure.    End-stage renal disease, on hemodialysis.  Schedule treatment for hemodialysis on  Tuesdays, Thursdays, and Saturdays. .   Type 2 diabetes mellitus.  Place on Humalog insulin correction coverage schedule,  Accu-Chek, and check hemoglobin A1c level.  The patient will be on a diabetic/renal  Diet. Nephrology consult   2/15/19: continue meds/milue, encourage compliance. 2/16/19: continue meds/milue  2/17/19: continue meds/milue increaisng zoloft 50 mg po daily  2/18/19: continue meds/milue  2/19/19: readding percocet twice daily as needed for pain.   2/20/19: awaiting placemnet, PT/OT for placement  2/21/19: nephrology consult, recommendation regrading dialysis,  2/22/19: increasing zoloft 75 mg daily, continue milue,  2/25/19: continue meds/milue, HD tomorrow  2/26/19: increasing zoloft 100mg po daily, HHA referral  2/27/19: received BP meds today, received percocet last night/ continue meds/milue  2/28/19: awaiting placemnet, PT/OT for placement  I will continue to monitor blood levels (Depakote, Tegretol, lithium, clozapine---a drug with a narrow therapeutic index= NTI) and associated labs for drug therapy implemented that require intense monitoring for toxicity as deemed appropriate based on current medication side effects and pharmacodynamically determined drug 1/2 lives. In summary, Micheal Cadet, is a 70 y.o.  male who presents with a severe exacerbation of the principal diagnosis of Depression  Patient's condition is worsening/not improving/not stable improving. Patient requires continued inpatient hospitalization for further stabilization, safety monitoring and medication management. I will continue to coordinate the provision of individual, milieu, occupational, group, and substance abuse therapies to address target symptoms/diagnoses as deemed appropriate for the individual patient. A coordinated, multidisplinary treatment team round was conducted with the patient (this team consists of the nurse, psychiatric unit pharmcist,  and writer). Complete current electronic health record for patient has been reviewed today including consultant notes, ancillary staff notes, nurses and psychiatric tech notes. uicide risk assessment completed and patient deemed to be of low risk for suicide at this time. The following regarding medications was addressed during rounds with patient:   the risks and benefits of the proposed medication. The patient was given the opportunity to ask questions. Informed consent given to the use of the above medications. Will continue to adjust psychiatric and non-psychiatric medications (see above \"medication\" section and orders section for details) as deemed appropriate & based upon diagnoses and response to treatment. I will continue to order blood tests/labs and diagnostic tests as deemed appropriate and review results as they become available (see orders for details and above listed lab/test results). I will order psychiatric records from previous [facility] hospitals to further elucidate the nature of patient's psychopathology and review once available.     I will gather additional collateral information from friends, family and o/p treatment team to further elucidate the nature of patient's psychopathology and [facility]. I certify that this patient's inpatient psychiatric hospital services furnished since the previous certification were, and continue to be, required for treatment that could reasonably be expected to improve the patient's condition, or for diagnostic study, and that the patient continues to need, on a daily basis, active treatment furnished directly by or requiring the supervision of inpatient psychiatric facility personnel. In addition, the hospital records show that services furnished were intensive treatment services, admission or related services, or equivalent services.     EXPECTED DISCHARGE DATE/DAY: TBD     DISPOSITION: Home       Signed By:   Monica Villatoro MD  2/28/2019

## 2019-02-28 NOTE — BH NOTES
HYPOGLYCEMIC EPISODE DOCUMENTATION    Patient with hypoglycemic episode(s) at [phone] (time) on 02/28/2019(date). BG value(s) pre-treatment 70    Was patient symptomatic?  [] yes, [x] no  Patient was treated with the following rescue medications/treatments: [] D50                [] Glucose tablets                [] Glucagon                [x] 4oz juice                [] 6oz reg soda                [] 8oz low fat milk  BG value post-treatment: 102  Once BG treated and value greater than 80mg/dl, pt was provided with the following:  [x] snack  [] meal  Name of MD notified:Dr. Delia Tipton  The following orders were received: None

## 2019-02-28 NOTE — PROGRESS NOTES
Problem: Depressed Mood (Adult/Pediatric)  Goal: *STG: Participates in treatment plan  Outcome: Progressing Towards Goal  Patient participates in treatment plan.  Spends time on telephone working on placement with family members

## 2019-02-28 NOTE — PROGRESS NOTES
Problem: Discharge Planning  Goal: *Discharge to safe environment  Outcome: Progressing Towards Goal  Patient will require placement at SNF upon discharge. Goal: *Knowledge of medication management  Outcome: Not Progressing Towards Goal  Patient verbalizes understanding of medication regimen. Patient is inconsistently accepting medications. Goal: *Knowledge of discharge instructions  Outcome: Progressing Towards Goal  Patient verbalizes understanding of goals for treatment and safe discharge.

## 2019-03-01 LAB
GLUCOSE BLD STRIP.AUTO-MCNC: 104 MG/DL (ref 65–100)
GLUCOSE BLD STRIP.AUTO-MCNC: 71 MG/DL (ref 65–100)
GLUCOSE BLD STRIP.AUTO-MCNC: 91 MG/DL (ref 65–100)
SERVICE CMNT-IMP: ABNORMAL
SERVICE CMNT-IMP: NORMAL
SERVICE CMNT-IMP: NORMAL

## 2019-03-01 PROCEDURE — 74011250637 HC RX REV CODE- 250/637: Performed by: PSYCHIATRY & NEUROLOGY

## 2019-03-01 PROCEDURE — 82962 GLUCOSE BLOOD TEST: CPT

## 2019-03-01 PROCEDURE — 65220000003 HC RM SEMIPRIVATE PSYCH

## 2019-03-01 PROCEDURE — 74011250637 HC RX REV CODE- 250/637: Performed by: INTERNAL MEDICINE

## 2019-03-01 PROCEDURE — 74011250637 HC RX REV CODE- 250/637: Performed by: HOSPITALIST

## 2019-03-01 PROCEDURE — 74011250637 HC RX REV CODE- 250/637: Performed by: FAMILY MEDICINE

## 2019-03-01 RX ADMIN — OXYCODONE AND ACETAMINOPHEN 1 TABLET: 5; 325 TABLET ORAL at 22:29

## 2019-03-01 RX ADMIN — OXYCODONE AND ACETAMINOPHEN 1 TABLET: 5; 325 TABLET ORAL at 00:51

## 2019-03-01 RX ADMIN — NIFEDIPINE 60 MG: 60 TABLET, FILM COATED, EXTENDED RELEASE ORAL at 18:29

## 2019-03-01 RX ADMIN — FERRIC CITRATE 420 MG: 210 TABLET, COATED ORAL at 09:53

## 2019-03-01 RX ADMIN — ASPIRIN 81 MG: 81 TABLET, COATED ORAL at 09:53

## 2019-03-01 RX ADMIN — ROSUVASTATIN CALCIUM 10 MG: 10 TABLET, FILM COATED ORAL at 21:21

## 2019-03-01 RX ADMIN — FERRIC CITRATE 420 MG: 210 TABLET, COATED ORAL at 18:29

## 2019-03-01 RX ADMIN — TRAZODONE HYDROCHLORIDE 25 MG: 50 TABLET ORAL at 00:53

## 2019-03-01 RX ADMIN — DOXAZOSIN 4 MG: 2 TABLET ORAL at 21:21

## 2019-03-01 RX ADMIN — SERTRALINE HYDROCHLORIDE 100 MG: 50 TABLET ORAL at 09:53

## 2019-03-01 RX ADMIN — TRAZODONE HYDROCHLORIDE 25 MG: 50 TABLET ORAL at 21:21

## 2019-03-01 RX ADMIN — FERRIC CITRATE 420 MG: 210 TABLET, COATED ORAL at 12:57

## 2019-03-01 NOTE — PROGRESS NOTES
Problem: Falls - Risk of  Goal: *Absence of Falls  Document Bello Fall Risk and appropriate interventions in the flowsheet. Outcome: Progressing Towards Goal  Fall Risk Interventions:  Mobility Interventions: Bed/chair exit alarm  Mentation Interventions: Adequate sleep, hydration, pain control, Bed/chair exit alarm  Medication Interventions: Bed/chair exit alarm  Elimination Interventions: Patient to call for help with toileting needs  History of Falls Interventions: Bed/chair exit alarm    Received pt asleep in bed. NAD. Respirations even and unlabored. Will cont to monitor q15min for safety.     6736: PRN Medication Documentation  Specific patient behavior that led to need for PRN medication: pt request med for pain  Staff interventions attempted prior to PRN being given: med education  PRN medication given: Percocet PO  Patient response/effectiveness of PRN medication:  0200: pt appears to be asleep

## 2019-03-01 NOTE — BH NOTES
GROUP THERAPY PROGRESS NOTE    Nino Munoz did not participate in a 45 minute Process Group on the Geriatric Unit with a focus on shifting ones feelings to a positive note and preparing for the day through group singing. He was rolled in on his wheelchair during the end of the last verse of the music. He did not contribute to the conversation or sing along with the music.

## 2019-03-01 NOTE — PROGRESS NOTES
Nephrology Progress Note  Jose Going  Date of Admission : 2/13/2019    CC:  Follow up for ESRD       Assessment and Plan     ESRD on HD:  - HD TTS at [facility]  - HD tomorrow  - noted plans to transfer to [facility]    HTN:  - cont current meds    Anemia of CKD:  - FANNY with dialysis     Secondary HPT:  - cont auryxia    Diastolic HF    PAD s/p b/l BKAs    DM2:  - on insulin    Depression:  - per psychiatry       Interval History:  Seen and examined   No cp, sob, n/v/d reported. Current Medications: all current  Medications have been eviewed in EPIC  Review of Systems: Pertinent items are noted in HPI. Objective:  Vitals:    Vitals:    02/28/19 1534 02/28/19 2004 03/01/19 0045 03/01/19 0815   BP: 186/77 191/73 152/61 145/70   Pulse: (!) 50 (!) 55 60 (!) 55   Resp: 18 18 18 16   Temp: 98.7 °F (37.1 °C) 99 °F (37.2 °C)  97.8 °F (36.6 °C)   TempSrc:       SpO2: 100% 99%  100%   Weight:         Intake and Output:  No intake/output data recorded. 02/27 1901 - 03/01 0700  In: -   Out: 8656 [Urine:175]    Physical Examination:  General: NAD  Neck:  Supple, no mass  Resp:  Lungs CTA B/L, no wheezing , normal respiratory effort  CV:  RRR,  no murmur or rub, no thigh edema, b/l BKAs  GI:  Soft, NT, + Bowel sounds, no hepatosplenomegaly  Neurologic:  Non focal  Psych:             Depressed, flat affect  Skin:  No Rash  Access:         TIJ PC c/d/i    []    High complexity decision making was performed  []    Patient is at high-risk of decompensation with multiple organ involvement    Lab Data Personally Reviewed: I have reviewed all the pertinent labs, microbiology data and radiology studies during assessment.     Recent Labs     02/28/19 0915 02/26/19  1350    140   K 4.6 4.7    108   CO2 26 27   * 82   BUN 32* 36*   CREA 3.34* 3.34*   CA 8.5 8.5   PHOS 3.3 3.4   ALB 3.0* 3.0*     Recent Labs     02/28/19 0915 02/26/19  1350   WBC 4.3 4.2   HGB 9.3* 9.6*   HCT 30.7* 30.3*    159     No results found for: SDES  Lab Results   Component Value Date/Time    Culture result: MRSA NOT PRESENT 01/24/2018 10:30 AM    Culture result:  01/24/2018 10:30 AM         Screening of patient nares for MRSA is for surveillance purposes and, if positive, to facilitate isolation considerations in high risk settings. It is not intended for automatic decolonization interventions per se as regimens are not sufficiently effective to warrant routine use. Culture result: MRSA NOT PRESENT 01/19/2015 10:44 AM    Culture result:  01/19/2015 10:44 AM         Screening of patient nares for MRSA is for surveillance purposes and, if positive, to facilitate isolation considerations in high risk settings. It is not intended for automatic decolonization interventions per se as regimens are not sufficiently effective to warrant routine use. Recent Results (from the past 24 hour(s))   GLUCOSE, POC    Collection Time: 02/28/19  1:10 PM   Result Value Ref Range    Glucose (POC) 93 65 - 100 mg/dL    Performed by [address] [place] [address], POC    Collection Time: 02/28/19  4:21 PM   Result Value Ref Range    Glucose (POC) 112 (H) 65 - 100 mg/dL    Performed by Nancy Ennis    GLUCOSE, POC    Collection Time: 02/28/19  8:05 PM   Result Value Ref Range    Glucose (POC) 150 (H) 65 - 100 mg/dL    Performed by Nancy Ennis    GLUCOSE, POC    Collection Time: 03/01/19  9:35 AM   Result Value Ref Range    Glucose (POC) 71 65 - 100 mg/dL    Performed by Alec Ordonez Se, POC    Collection Time: 03/01/19  9:51 AM   Result Value Ref Range    Glucose (POC) 104 (H) 65 - 100 mg/dL    Performed by Aaron White MD  [address]  Phone - [phone]   Fax - [phone]  [email or web address]

## 2019-03-01 NOTE — PROGRESS NOTES
Problem: Falls - Risk of  Goal: *Absence of Falls  Document Bello Fall Risk and appropriate interventions in the flowsheet.   Outcome: Progressing Towards Goal  Fall Risk Interventions:  Mobility Interventions: Bed/chair exit alarm, Communicate number of staff needed for ambulation/transfer, Utilize walker, cane, or other assistive device    Mentation Interventions: Adequate sleep, hydration, pain control, Bed/chair exit alarm    Medication Interventions: Bed/chair exit alarm    Elimination Interventions: Patient to call for help with toileting needs    History of Falls Interventions: Bed/chair exit alarm

## 2019-03-01 NOTE — INTERDISCIPLINARY ROUNDS
Behavioral Health Interdisciplinary Rounds     Patient Name: Precious Gannon  Age: 70 y.o. Room/Bed:  740/  Primary Diagnosis: Depression   Admission Status: Voluntary     Readmission within 30 days: no  Power of  in place: no  Patient requires a blocked bed: no          Reason for blocked bed:   Sleep hours:  5+      Participation in Care/Groups:  no  Medication Compliant?: Selective  PRNS (last 24 hours): Pain    Restraints (last 24 hours):  no     Alcohol screening (AUDIT) completed -  AUDIT Score: 0  If applicable, date SBIRT discussed in treatment team AND documented:    Tobacco - patient is a smoker: Have You Used Tobacco in the Past 30 Days: Yes  Illegal Drugs use: Have You Used Any Illegal Substances Over the Past 12 Months: Yes    24 hour chart check complete: yes     Patient goal(s) for today: Follow staff direction; participate in care  Treatment team focus/goals: follow-up Mau; call Gamaliel Hair  Progress note: Pt has been accepted [facility]    LOS:  16  Expected LOS: TBD    Participating treatment team members:  Precious Gannon, DARRICK Mike; Dr. Kelsey Monsalve MD; Marina Mattson RN; Nata Gonzalez, PharmD

## 2019-03-01 NOTE — BH NOTES
MARTÍNEZ met with Pt to discuss discharge plans. Pt expressed grief over loss of his independence and failing health. Pt reported he has been against going to a nursing home for fear that he will die there. MARTÍNEZ explained that Pt would be placed in a SNF with rehab opportunities. MARTÍNEZ encouraged Pt to have his prosthetic legs delivered to the SNF so he could work with PT/OT in becoming proficient and independent. MARTÍNEZ encouraged Pt to remember \"everything is temporary\" and that SNF placement would offer him the opportunity to rebuild his strength and have a safe place to stay while he and his partner figure out their next steps. SW encouraged Pt to have a goal of eventually moving back into an apartment with his partner and gaining home health services once he is stronger. Pt thanked MARTÍNEZ for her time and stated he was willing to agree to SNF placement for the time being.

## 2019-03-01 NOTE — PROGRESS NOTES
Problem: Depressed Mood (Adult/Pediatric)  Goal: *STG: Participates in treatment plan  Outcome: Progressing Towards Goal  Pt in bed resting quietly at the start of shift. Pt smiling and pleasant at the start of shift. Pt asked to come out to dining area for dinner. Pt assisted to chair by lift team. Pt compliant with meal and meds.

## 2019-03-01 NOTE — PROGRESS NOTES
Physical therapy:    Attempted to see patient for PT re-evaluation, however pt refused. Pt was received supine in bed after speaking with . Pt declined OOB mobility at this time stating \"I don't feel like it. And I am not going to a nursing home. \"  Educated pt on the importance of participating with therapy in order to prevent weakness, but pt continued to decline. Will follow-up one more time on Monday.      Yazmin Marie, PT, DPT

## 2019-03-01 NOTE — BH NOTES
PSYCHIATRIC PROGRESS NOTE         Patient Name  Dominick Epstein   Date of Birth 1940   [facility] [id number]   Medical Record Number  [id number]      Age  70 y.o. PCP Ana Xie MD   Admit date:  2/13/2019    Room Number  740/01  @ [address]   Date of Service  3/1/2019           E & M PROGRESS NOTE: 15 mins         HISTORY       REASON FOR HOSPITALIZATION:  CC: \"Pt admitted under a voluntary basis for severe depression with suicidal ideations and  an inability to care for self. HISTORY OF PRESENT ILLNESS:    The patient, Dominick Epstein, is a 70 y.o. BLACK OR  male with 82-year-old -American male with past medical  history of end-stage renal disease; on hemodialysis, sleep apnea, peripheral vascular disease, bilateral BKA, hypertension, hyperlipidemia, peripheral neuropathy, anemia,hepatitis C, arthritis, peptic ulcer disease, coronary artery disease, DVT, prior GIbleed, chronic diastolic CHF, prior suicidal ideation, depression, and noncompliance   transfered to [facility] from [facility] after pt reported feeling depressed nad suicidal Pt stated he lives in the [place] and he is unhappy about his living situation , he stated he had lots of Money before [place] HAS 13 CHILDREN but now he is broke and no one ask about him. Pt reports feeling hopeless, helpless, angry. These symptoms are of high severity. These symptoms are constant . Pt ha sh/o using marijuana occasionally. UDS: negative; BAL=0. .  2/15/19: Pt is irritable and refused zoloft and vitals today. Pt has dialysis yesterday. 2/16/19: Pt took zoloft but refuses some medical meds. Pt had dialysis on Saturday. Pt is demanding in the evenings. 2/17/19: Pt is demanding, irritable in the evenings, compliant with zoloft. Eating well. 2/18/19: Pt is demanding and manipulative ta times but taking zoloft. Pt is eating well. 2/19/19: Pt taking zoloft.  Pt got agitated yesterday evening and received geodon I/M PRN. Pt reported he need percocet. AS per reports, he was on percocets for pain. 2/20/19: Pt is doing better, is no longer suicidal and is accepting of nursing home placement, is psychiatrically stable for discharge. 2/21/19: Pt did not receive dialysis , was sleeping in his bed. Pt has clot in the catheter nad need new port. Nephrology is contacted. Pt report not feeling depressed, awaiting placement.i  2/22/19: Pt  Is irritable at times, did not get dialusis yetserday, New port is placed, nephrology is on board. Pt is eating   well nad sleeping well. 2/23/19-Presented verbal and cooperative. He is compliant with meds. Repotrs he is homeless and has nowhere to go to. SW is trying to find appropriate placement. No AVH or SI/HI.  2/24/19- pt stayed in bed most of the day. He had been asking for discharge earlier but has not recognized a place. He was seen in his room and encouraged get up and come to the day-room. No prn's used. 2/25/19: Pt will go for dialysis on Tuesday. Pt  Is cooperative, refusing to go to [facility]. Pt is eating well, slept good,  2/26/19: Pt is still depressed at times. Pt contacted a friend yesterday and pt says  He wants to go live with her. Pt will need HHA and dialysis transportation after discharge. 2/27/19: slept 6 hours, BP was low , Bp meds were held yesterday. Pt is on phone. Pt refused zoloft in am, gets irritated at times. 2/28/19: Pt is in dialyiss, is in betetr mood, more courteous. Pt is eating well. Pt's finger stick was 71, no symptoms. 3/1/19: Pt slept 7 hours, received percocet last night. Nursing home placement in [facility] in progress. Pt is eating well, no acute issues at this time. SIDE EFFECTS: (reviewed/updated 3/1/2019)  None reported or admitted to.   No noted toxicity with use of Depakote/Tegretol/lithium/Clozaril/TCAs   ALLERGIES:(reviewed/updated 3/1/2019)  Allergies   Allergen Reactions    Tetracycline Hives      MEDICATIONS PRIOR TO ADMISSION:(reviewed/updated 3/1/2019)  Medications Prior to Admission   Medication Sig    NIFEdipine ER (ADALAT CC) 60 mg ER tablet Take 1 Tab by mouth daily. HOLD for HR<60    hydroCHLOROthiazide (HYDRODIURIL) 25 mg tablet Take 1 Tab by mouth daily.  sertraline (ZOLOFT) 25 mg tablet Take 1 Tab by mouth daily.  oxyCODONE-acetaminophen (PERCOCET 10)  mg per tablet Take 1 Tab by mouth every six (6) hours as needed for Pain.  traZODone (DESYREL) 50 mg tablet Take 50 mg by mouth nightly.  rosuvastatin (CRESTOR) 10 mg tablet Take 10 mg by mouth nightly.  aspirin delayed-release 81 mg tablet Take 1 Tab by mouth daily. PAST MEDICAL HISTORY: Past medical history from the initial psychiatric evaluation has been reviewed (reviewed/updated 3/1/2019) with no additional updates (I asked patient and no additional past medical history provided). Past Medical History:   Diagnosis Date    Arthritis     CAD (coronary artery disease) 2007    CKD (chronic kidney disease), stage III ([address]     DM type 2 causing renal disease ([address]     DVT (deep venous thrombosis) (HCC)     Hx of DVT:  Xarelto stopped due to GI bleed. S/P IVC filter.  Gait abnormality     GI bleed     Heart murmur     Hepatitis C     History of blood transfusion     Hypercholesterolemia     Hypertension 11/7/2014    Neuropathy     Non compliance w medication regimen     Peripheral vascular disease ([facility] 11/7/2014    A. S/P Right SFA POBA and tibial atherectomy (2/4/13).     PUD (peptic ulcer disease) 2007    Unspecified sleep apnea     never used cpap     Past Surgical History:   Procedure Laterality Date    ABDOMEN SURGERY PROC UNLISTED  2007    has approx 7-8\" midline incision on abdomen--\"had to open him up and clean him out after feeding tube clogged\"    HX GI  2007    feeding tube for approx 2 mo    VASCULAR SURGERY PROCEDURE UNLIST Right 1/22/2015    popliteal-tibial bypass      SOCIAL HISTORY: Social history from the initial psychiatric evaluation has been reviewed (reviewed/updated 3/1/2019) with no additional updates (I asked patient and no additional social history provided). Social History     Socioeconomic History    Marital status: SINGLE     Spouse name: Not on file    Number of children: Not on file    Years of education: Not on file    Highest education level: Not on file   Social Needs    Financial resource strain: Not on file    Food insecurity - worry: Not on file    Food insecurity - inability: Not on file    Transportation needs - medical: Not on file   Rezzie needs - non-medical: Not on file   Occupational History    Not on file   Tobacco Use    Smoking status: Current Every Day Smoker     Packs/day: 0.50    Smokeless tobacco: Never Used   Substance and Sexual Activity    Alcohol use: No    Drug use: No     Comment: >20 years ago    Sexual activity: Not on file   Other Topics Concern     Service Not Asked    Blood Transfusions Not Asked    Caffeine Concern Not Asked    Occupational Exposure Not Asked    Hobby Hazards Not Asked    Sleep Concern Not Asked    Stress Concern Not Asked    Weight Concern Not Asked    Special Diet Not Asked    Back Care Not Asked    Exercise Not Asked    Bike Helmet Not Asked    Seat Belt Not Asked    Self-Exams Not Asked   Social History Narrative    78 year ols male admitted for depression and homelessness. Pt will be evicated from apartment due to non payment of bills. Girlfriend of 30 years left him to [facility] live in the [place] with others. \" Pt is a brittle diabetic, with treatment non compliance. He has bilarela lower extremity amputations. Patient has a long hx of substance abuse. FAMILY HISTORY: Family history from the initial psychiatric evaluation has been reviewed (reviewed/updated 3/1/2019) with no additional updates (I asked patient and no additional family history provided).    Family History   Problem Relation Age of Onset  Hypertension Father        REVIEW OF SYSTEMS: (reviewed/updated 3/1/2019)  Appetite:   Sleep: All other Review of Systems: Negative except          MENTAL STATUS EXAM & [address] (MSE):    MSE FINDINGS ARE WITHIN NORMAL LIMITS (WNL) UNLESS OTHERWISE STATED BELOW. ( ALL OF THE BELOW CATEGORIES OF THE MSE HAVE BEEN REVIEWED (reviewed 2/14/2019) AND UPDATED AS DEEMED APPROPRIATE )  General Presentation age appropriate and casually dressed, cooperative   Orientation oriented to time, place and person   Vital Signs  See below (reviewed 2/14/2019); Vital Signs (BP, Pulse, & Temp) are within normal limits if not listed below.    Gait and Station Stable/steady, no ataxia   Musculoskeletal System No extrapyramidal symptoms (EPS); no abnormal muscular movements or Tardive Dyskinesia (TD); muscle strength and tone are within normal limits   Language No aphasia or dysarthria   Speech:  soft   Thought Processes logical; slow rate of thoughts; fair abstract reasoning/computation   Thought Associations goal directed   Thought Content free of delusions   Suicidal Ideations intention   Homicidal Ideations no plan  and no intention   Mood:  anxious  and depressed   Affect:  constricted   Memory recent  intact   Memory remote:  intact   Concentration/Attention:  distractable   Fund of Knowledge average   Insight:  fair   Reliability fair   Judgment:  fair                                                                                                    VITALS:     Patient Vitals for the past 24 hrs:   Temp Pulse Resp BP SpO2   03/01/19 0815 97.8 °F (36.6 °C) (!) 55 16 145/70 100 %   03/01/19 0045 -- 60 18 152/61 --   02/28/19 2004 99 °F (37.2 °C) (!) 55 18 191/73 99 %   02/28/19 1534 98.7 °F (37.1 °C) (!) 50 18 186/77 100 %   02/28/19 1402 -- -- -- 149/72 --     Wt Readings from Last 3 Encounters:   02/14/19 73 kg (160 lb 15 oz)   02/13/19 73.4 kg (161 lb 12.8 oz)   01/15/19 81.6 kg (180 lb)     Temp Readings from Last 3 Encounters:   03/01/19 97.8 °F (36.6 °C)   02/22/19 98.2 °F (36.8 °C)   02/13/19 98.2 °F (36.8 °C)     BP Readings from Last 3 Encounters:   03/01/19 145/70   02/22/19 146/68   02/13/19 181/82     Pulse Readings from Last 3 Encounters:   03/01/19 (!) 55   02/22/19 (!) 53   02/13/19 (!) 48            DATA     LABORATORY DATA:(reviewed/updated 3/1/2019)  Recent Results (from the past 24 hour(s))   GLUCOSE, POC    Collection Time: 02/28/19  1:10 PM   Result Value Ref Range    Glucose (POC) 93 65 - 100 mg/dL    Performed by [address] [place] [address] Drive, POC    Collection Time: 02/28/19  4:21 PM   Result Value Ref Range    Glucose (POC) 112 (H) 65 - 100 mg/dL    Performed by Faustino Chavez, POC    Collection Time: 02/28/19  8:05 PM   Result Value Ref Range    Glucose (POC) 150 (H) 65 - 100 mg/dL    Performed by Aram Weathers    GLUCOSE, POC    Collection Time: 03/01/19  9:35 AM   Result Value Ref Range    Glucose (POC) 71 65 - 100 mg/dL    Performed by [address], POC    Collection Time: 03/01/19  9:51 AM   Result Value Ref Range    Glucose (POC) 104 (H) 65 - 100 mg/dL    Performed by [address], POC    Collection Time: 03/01/19 11:48 AM   Result Value Ref Range    Glucose (POC) 91 65 - 100 mg/dL    Performed by Feliberto Elliott      No results found for: VALF2, VALAC, VALP, VALPR, DS6, CRBAM, CRBAMP, CARB2, XCRBAM  No results found for: LITHM   RADIOLOGY REPORTS:(reviewed/updated 3/1/2019)  Xr Chest Pa Lat    Result Date: 2/8/2019  EXAM: XR CHEST PA LAT INDICATION: Shortness of breath, Low back pain and abdominal pain for several days. End-stage renal disease on dialysis, missed dialysis one day ago. COMPARISON: Chest views on 5/20/2018 TECHNIQUE: 4 images of PA and lateral chest views FINDINGS: Right jugular dialysis catheter is new and terminates in the distal superior vena cava. Cardiac monitoring wires overlie the thorax. Borderline cardiac size is unchanged.  Aortic arch is not enlarged. The pulmonary vasculature is within normal limits. The lungs and pleural spaces are clear. The visualized bones and upper abdomen are age-appropriate. IMPRESSION: No acute process on chest x-ray. Right jugular dialysis catheter terminates in the distal superior vena cava. No pneumothorax. Ct Abd Pelv W Cont    Result Date: 2/8/2019  EXAM: CT ABD PELV W CONT INDICATION: Abdominal pain and low back pain for several days. End-stage renal disease on dialysis, missed dialysis yesterday. Normal white blood cell count. Normal liver enzymes. COMPARISON: None. CONTRAST: 100 mL of Isovue-370. TECHNIQUE: Following the uneventful intravenous administration of contrast, thin axial images were obtained through the abdomen and pelvis. Coronal and sagittal reconstructions were generated. Oral contrast was administered. CT dose reduction was achieved through use of a standardized protocol tailored for this examination and automatic exposure control for dose modulation. FINDINGS: LUNG BASES: No pneumonia. Mild dependent atelectasis. INCIDENTALLY IMAGED HEART AND MEDIASTINUM: Borderline cardiac size. No pericardial effusion. LIVER: Subcentimeter liver lesions measure up to 9 mm in segment 2 of the liver. Surface is smooth. Streak artifact from bilateral upper extremities. GALLBLADDER: Gallstones are in the body and neck. No evidence of cholecystitis. CBD is not dilated. SPLEEN: Normal size and enhancement. PANCREAS: No mass or ductal dilatation. ADRENALS: Unremarkable. KIDNEYS: No mass, calculus, or hydronephrosis. STOMACH: Partial distention. SMALL BOWEL: No dilatation or wall thickening. COLON: Moderate colonic fecal burden. No mural thickening. APPENDIX: Unremarkable. PERITONEUM: No ascites or pneumoperitoneum. RETROPERITONEUM: IVC filter is in good position. Aorta is atherosclerotic without aneurysm. Mild atherosclerotic stenosis of the celiac artery. No lymphadenopathy.  REPRODUCTIVE ORGANS: Mild prostatomegaly measures 6 cm and extends into the base of the urinary bladder. URINARY BLADDER: No mass or calculus. BONES: Moderate bilateral hip osteoarthritis. ADDITIONAL COMMENTS: N/A     IMPRESSION: 1. No acute process on CT. 2. Cholelithiasis. No cholecystitis. 3. Subcentimeter segment 2 liver lesions cannot be fully characterized on this examination. 4. Moderate colonic fecal burden may represent constipation. No bowel obstruction.          MEDICATIONS     ALL MEDICATIONS:   Current Facility-Administered Medications   Medication Dose Route Frequency    sodium chloride (NS) flush 10 mL  10 mL InterCATHeter PRN    sertraline (ZOLOFT) tablet 100 mg  100 mg Oral DAILY    traZODone (DESYREL) tablet 25 mg  25 mg Oral QHS    heparin (porcine) 1,000 unit/mL injection 4,300 Units  4,300 Units Hemodialysis DIALYSIS PRN    epoetin rosie-epbx (RETACRIT) injection 20,000 Units  20,000 Units SubCUTAneous DIALYSIS TUE, THU & SAT    oxyCODONE-acetaminophen (PERCOCET) 5-325 mg per tablet 1 Tab  1 Tab Oral BID PRN    ibuprofen (MOTRIN) tablet 800 mg  800 mg Oral Q8H PRN    aspirin delayed-release tablet 81 mg  81 mg Oral DAILY    rosuvastatin (CRESTOR) tablet 10 mg  10 mg Oral QHS    ferric citrate (AURYXIA) tablet 420 mg  420 mg Oral TID WITH MEALS    NIFEdipine ER (PROCARDIA XL) tablet 60 mg  60 mg Oral Q24H    doxazosin (CARDURA) tablet 4 mg  4 mg Oral QHS    OLANZapine (ZyPREXA) tablet 2.5 mg  2.5 mg Oral Q6H PRN    ziprasidone (GEODON) 10 mg in sterile water (preservative free) 0.5 mL injection  10 mg IntraMUSCular BID PRN    benztropine (COGENTIN) tablet 1 mg  1 mg Oral BID PRN    benztropine (COGENTIN) injection 1 mg  1 mg IntraMUSCular BID PRN    zolpidem (AMBIEN) tablet 5 mg  5 mg Oral QHS PRN    acetaminophen (TYLENOL) tablet 650 mg  650 mg Oral Q4H PRN    magnesium hydroxide (MILK OF MAGNESIA) 400 mg/5 mL oral suspension 30 mL  30 mL Oral DAILY PRN    nicotine (NICODERM CQ) 21 mg/24 hr patch 1 Patch  1 Patch TransDERmal DAILY PRN    glucose chewable tablet 16 g  4 Tab Oral PRN    dextrose (D50W) injection syrg 12.5-25 g  25-50 mL IntraVENous PRN    glucagon (GLUCAGEN) injection 1 mg  1 mg IntraMUSCular PRN      SCHEDULED MEDICATIONS:   Current Facility-Administered Medications   Medication Dose Route Frequency    sertraline (ZOLOFT) tablet 100 mg  100 mg Oral DAILY    traZODone (DESYREL) tablet 25 mg  25 mg Oral QHS    epoetin rosie-epbx (RETACRIT) injection 20,000 Units  20,000 Units SubCUTAneous DIALYSIS TUE, THU & SAT    aspirin delayed-release tablet 81 mg  81 mg Oral DAILY    rosuvastatin (CRESTOR) tablet 10 mg  10 mg Oral QHS    ferric citrate (AURYXIA) tablet 420 mg  420 mg Oral TID WITH MEALS    NIFEdipine ER (PROCARDIA XL) tablet 60 mg  60 mg Oral Q24H    doxazosin (CARDURA) tablet 4 mg  4 mg Oral QHS          ASSESSMENT & PLAN     The patient, Elmo Sanchez, is a 70 y.o.  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  Plan for hemodialysis 3 days a week.  If it is not controlled  with the same, provide additional antihypertensive meds.  Monitor blood pressure.    End-stage renal disease, on hemodialysis.  Schedule treatment for hemodialysis on  Tuesdays, Thursdays, and Saturdays. .   Type 2 diabetes mellitus.  Place on Humalog insulin correction coverage schedule,  Accu-Chek, and check hemoglobin A1c level.  The patient will be on a diabetic/renal  Diet. Nephrology consult   2/15/19: continue meds/milue, encourage compliance. 2/16/19: continue meds/milue  2/17/19: continue meds/milue increaisng zoloft 50 mg po daily  2/18/19: continue meds/milue  2/19/19: readding percocet twice daily as needed for pain.   2/20/19: awaiting [facility]emnet, PT/OT for placement  2/21/19: nephrology consult, recommendation regrading dialysis,  2/22/19: increasing zoloft 75 mg daily, continue milue,  2/25/19: continue meds/milue, HD tomorrow  2/26/19: increasing zoloft 100mg po daily, HHA referral  2/27/19: received BP meds today, received percocet last night/ continue meds/milue  2/28/19: awaiting placemnet, PT/OT for placement  3/1/19: Nursing [facility] in progress  I will continue to monitor blood levels (Depakote, Tegretol, lithium, clozapine---a drug with a narrow therapeutic index= NTI) and associated labs for drug therapy implemented that require intense monitoring for toxicity as deemed appropriate based on current medication side effects and pharmacodynamically determined drug 1/2 lives. In summary, Eula Buchanan, is a 70 y.o.  male who presents with a severe exacerbation of the principal diagnosis of Depression  Patient's condition is worsening/not improving/not stable improving. Patient requires continued inpatient hospitalization for further stabilization, safety monitoring and medication management. I will continue to coordinate the provision of individual, milieu, occupational, group, and substance abuse therapies to address target symptoms/diagnoses as deemed appropriate for the individual patient. A coordinated, multidisplinary treatment team round was conducted with the patient (this team consists of the nurse, psychiatric unit pharmcist,  and writer). Complete current electronic health record for patient has been reviewed today including consultant notes, ancillary staff notes, nurses and psychiatric tech notes. uicide risk assessment completed and patient deemed to be of low risk for suicide at this time. The following regarding medications was addressed during rounds with patient:   the risks and benefits of the proposed medication. The patient was given the opportunity to ask questions. Informed consent given to the use of the above medications.  Will continue to adjust psychiatric and non-psychiatric medications (see above \"medication\" section and orders section for details) as deemed appropriate & based upon diagnoses and response to treatment. I will continue to order blood tests/labs and diagnostic tests as deemed appropriate and review results as they become available (see orders for details and above listed lab/test results). I will order psychiatric records from previous [facility] to further elucidate the nature of patient's psychopathology and review once available. I will gather additional collateral information from friends, family and o/p treatment team to further elucidate the nature of patient's psychopathology and baselline level of psychiatric functioning. I certify that this patient's inpatient psychiatric hospital services furnished since the previous certification were, and continue to be, required for treatment that could reasonably be expected to improve the patient's condition, or for diagnostic study, and that the patient continues to need, on a daily basis, active treatment furnished directly by or requiring the supervision of inpatient psychiatric facility personnel. In addition, the hospital records show that services furnished were intensive treatment services, admission or related services, or equivalent services.     EXPECTED DISCHARGE DATE/DAY: TBD     DISPOSITION: Home       Signed By:   Yonny Stewart MD  3/1/2019

## 2019-03-01 NOTE — PROGRESS NOTES
MUSIC THERAPY GROUP PROGRESS NOTE        3/1/2019    The patient Micheal Cadet a 70 y.o. male participated in Music Therapy group on [address] unit. Group time: 11:10-11:45    Personal goal for participation: Patient will actively participate in two thirds of the group time. Goal orientation: Positive social interaction    Group therapy participation: Active for the entirety of the group    Therapeutic interventions: Sing along, rhythm instrument playing, movement to music. Observation of participation: The patient actively participated for the entirety of the Music Therapy Group time. He was skeptical of this at first, but then went on to be the group's most active participant.     Gregory Blancas, MT-BC (Music Therapist-Board Certified)  Spiritual Care Department  Referral-based service

## 2019-03-01 NOTE — BH NOTES
SW working on transitioning Pt's dialysis treatment from [facility] to [facility], since the SNF where Pt is moving is in [place]. SW sent updated dialysis flowsheets, hep C panel, and lab results via [facility].

## 2019-03-01 NOTE — DIABETES MGMT
DTC Progress Note    Recommendations/ Comments: pt with low BG this am and pm. Please encourage po intake to help support BG > 80 mg/dL . Noted pt on HD and ESRD likely contributing to low BG. Current hospital DM medication: lispro correction - high sensitivity - has not required any    Chart reviewed on [facility]. Patient is a 70 y.o. male with hx of DM on no DM meds at home. Pt on HD. A1c:   Lab Results   Component Value Date/Time    Hemoglobin A1c 5.0 02/14/2019 04:23 AM    Hemoglobin A1c 4.5 01/21/2018 02:21 AM       Recent Glucose Results:   Lab Results   Component Value Date/Time    GLUCPOC 91 03/01/2019 11:48 AM    GLUCPOC 104 (H) 03/01/2019 09:51 AM    GLUCPOC 71 03/01/2019 09:35 AM        Lab Results   Component Value Date/Time    Creatinine 3.34 (H) 02/28/2019 09:15 AM     CrCl cannot be calculated (Unknown ideal weight. ). Active Orders   Diet    DIET REGULAR Double Portions        PO intake:   No data found. Will continue to follow as needed.     Thank you,    Krzysztof Franks, [address] [facility]    Office: [phone]    Time spent: 2 minutes

## 2019-03-01 NOTE — PROGRESS NOTES
Problem: Depressed Mood (Adult/Pediatric)  Goal: *STG: Participates in treatment plan  Outcome: Not Progressing Towards Goal  Pt verbalizes feeling sad and depressed   Goal is for discharge   Med/Meal compliant   Sitting out in the DR   NAD noted   Will continue to monitor.

## 2019-03-01 NOTE — PROGRESS NOTES
Occupational Therapy    Attempted to see pt for OT evaluation. Pt refused. Pt was received supine in bed after speaking with . Pt declined OOB mobility at this time stating \"I don't feel like it. And I am not going to a nursing home. \"  Educated pt on the importance of participating with therapy in order to prevent weakness, but pt continued to decline. Will follow-up one more time on Monday. Thank you.  Cathy Hudson, OT

## 2019-03-02 LAB
ALBUMIN SERPL-MCNC: 2.9 G/DL (ref 3.5–5)
ANION GAP SERPL CALC-SCNC: 3 MMOL/L (ref 5–15)
BUN SERPL-MCNC: 40 MG/DL (ref 6–20)
BUN/CREAT SERPL: 12 (ref 12–20)
CALCIUM SERPL-MCNC: 8.7 MG/DL (ref 8.5–10.1)
CHLORIDE SERPL-SCNC: 108 MMOL/L (ref 97–108)
CO2 SERPL-SCNC: 27 MMOL/L (ref 21–32)
CREAT SERPL-MCNC: 3.46 MG/DL (ref 0.7–1.3)
GLUCOSE SERPL-MCNC: 68 MG/DL (ref 65–100)
PHOSPHATE SERPL-MCNC: 4.2 MG/DL (ref 2.6–4.7)
POTASSIUM SERPL-SCNC: 4.9 MMOL/L (ref 3.5–5.1)
SODIUM SERPL-SCNC: 138 MMOL/L (ref 136–145)

## 2019-03-02 PROCEDURE — 74011250637 HC RX REV CODE- 250/637: Performed by: HOSPITALIST

## 2019-03-02 PROCEDURE — 80069 RENAL FUNCTION PANEL: CPT

## 2019-03-02 PROCEDURE — 65220000003 HC RM SEMIPRIVATE PSYCH

## 2019-03-02 PROCEDURE — 36415 COLL VENOUS BLD VENIPUNCTURE: CPT

## 2019-03-02 PROCEDURE — 74011250637 HC RX REV CODE- 250/637: Performed by: INTERNAL MEDICINE

## 2019-03-02 PROCEDURE — 74011250637 HC RX REV CODE- 250/637: Performed by: PSYCHIATRY & NEUROLOGY

## 2019-03-02 PROCEDURE — 74011250637 HC RX REV CODE- 250/637: Performed by: FAMILY MEDICINE

## 2019-03-02 RX ADMIN — FERRIC CITRATE 420 MG: 210 TABLET, COATED ORAL at 17:06

## 2019-03-02 RX ADMIN — ASPIRIN 81 MG: 81 TABLET, COATED ORAL at 09:37

## 2019-03-02 RX ADMIN — SERTRALINE HYDROCHLORIDE 100 MG: 50 TABLET ORAL at 09:37

## 2019-03-02 RX ADMIN — DOXAZOSIN 4 MG: 2 TABLET ORAL at 21:24

## 2019-03-02 RX ADMIN — TRAZODONE HYDROCHLORIDE 25 MG: 50 TABLET ORAL at 21:24

## 2019-03-02 RX ADMIN — NIFEDIPINE 60 MG: 60 TABLET, FILM COATED, EXTENDED RELEASE ORAL at 17:06

## 2019-03-02 RX ADMIN — ROSUVASTATIN CALCIUM 10 MG: 10 TABLET, FILM COATED ORAL at 21:24

## 2019-03-02 RX ADMIN — OXYCODONE AND ACETAMINOPHEN 1 TABLET: 5; 325 TABLET ORAL at 21:24

## 2019-03-02 NOTE — INTERDISCIPLINARY ROUNDS
Behavioral Health Interdisciplinary Rounds     Patient Name: Lizeth Khanna  Age: 70 y.o. Room/Bed:  740/  Primary Diagnosis: Depression   Admission Status: Voluntary     Readmission within 30 days: no  Power of  in place: no  Patient requires a blocked bed: no          Reason for blocked bed:   Sleep hours: 5.75       Participation in Care/Groups:  no  Medication Compliant?: Selective  PRNS (last 24 hours): Pain    Restraints (last 24 hours):  no     Alcohol screening (AUDIT) completed -  AUDIT Score: 0  If applicable, date SBIRT discussed in treatment team AND documented:    Tobacco - patient is a smoker: Have You Used Tobacco in the Past 30 Days: Yes  Illegal Drugs use: Have You Used Any Illegal Substances Over the Past 12 Months: Yes    24 hour chart check complete: yes     Patient goal(s) for today:   Treatment team focus/goals:   Progress note     LOS:  17  Expected LOS:     Participating treatment team members:  Lizeth Coon, * (assigned SW),

## 2019-03-02 NOTE — PROGRESS NOTES
Problem: Depressed Mood (Adult/Pediatric)  Goal: *STG: Participates in treatment plan  Outcome: Progressing Towards Goal  Pt sitting quietly at the dining table watching TV at the start of shift. Pt flat affect. Pt cooperative. Pt compliant with meal and meds. Problem: Falls - Risk of  Goal: *Absence of Falls  Document Bello Fall Risk and appropriate interventions in the flowsheet.   Outcome: Progressing Towards Goal  Fall Risk Interventions:  Mobility Interventions: Bed/chair exit alarm, Communicate number of staff needed for ambulation/transfer, Utilize walker, cane, or other assistive device    Mentation Interventions: Adequate sleep, hydration, pain control, Bed/chair exit alarm, Door open when patient unattended, More frequent rounding    Medication Interventions: Bed/chair exit alarm, Patient to call before getting OOB    Elimination Interventions: Bed/chair exit alarm, Urinal in reach    History of Falls Interventions: Bed/chair exit alarm

## 2019-03-02 NOTE — BH NOTES
SW met with pt 1:1 to discuss d/c to NH. Pt voiced his reluctance to go to a nursing home. SW assured that it is to help him get better and  improve his overall health. He remarked I have to give up my money. SW empathized with huis feeling of loosing control but challenged pt to think more positively for his future. Pt said I want to live with my GF but she cannot take care of me. SW agreed and that is why he is being placed there to get stronger with PT to gain more strength. Pt said he understood but it takes time for me to admit I need hep. SW said that is the first step: admit that we need help.

## 2019-03-02 NOTE — PROGRESS NOTES
Called from RN this AM that pt pulled out his permacath  STAT renal panel now  If needed, will have IR place nabor over weekend  Otherwise, if labs stable, will have IR replace PC monday

## 2019-03-02 NOTE — PROGRESS NOTES
Problem: Falls - Risk of  Goal: *Absence of Falls  Document Bello Fall Risk and appropriate interventions in the flowsheet. Outcome: Progressing Towards Goal  Fall Risk Interventions:  Mobility Interventions: Bed/chair exit alarm, Communicate number of staff needed for ambulation/transfer, Utilize walker, cane, or other assistive device  In bed asleep with respirations noted as even and unlabored as chest was rising and falling. Will continue to monitor for safety and 15 minute checks throughout shift.   Mentation Interventions: Adequate sleep, hydration, pain control, Bed/chair exit alarm    Medication Interventions: Bed/chair exit alarm    Elimination Interventions: Patient to call for help with toileting needs    History of Falls Interventions: Bed/chair exit alarm

## 2019-03-02 NOTE — BH NOTES
Taina Galo RN from [facility] called about patients dialysis due today. Informed him patient pulled out his permacath and Dr Jasson Yost was informed by night shift RN and he verbalized he was going to come see patient and has ordered labs. 0645: Pt is noncompliant with staff to draw his lab work. ICU nurse called to help with labs. 0488: Labs drawn by ICU nurse and sent to lab    1122: Tito Yost to inform him about patients lab work. Orders given to hold placing permacath until Monday. Patient should stay NPO after Sunday midnight. MD verbalized he will come talk to patient tomorrow.

## 2019-03-02 NOTE — BH NOTES
Patient called staff into his room and stated that he had accidentally pulled catheter out of right shoulder that is utilized for dialysis. Due to treatment scheduled for Saturday morning, I called to notify on call physician about catheter and patient having treatment scheduled for Saturday. Called several times and no response as of this writing. Was asked to continue to call by staff who was trying to notify Physician hourly. On call physician returned call at [phone] and instructed me to call Physician in the morning.

## 2019-03-02 NOTE — BH NOTES
PSYCHIATRIC PROGRESS NOTE         Patient Name  Nino Munoz   Date of Birth 1940   [facility] [id number]   Medical Record Number  [id number]      Age  70 y.o. PCP Royer Tong MD   Admit date:  2/13/2019    Room Number  740/01  @ [facility]   Date of Service  3/2/2019           E & M PROGRESS NOTE: 15 mins         HISTORY       REASON FOR HOSPITALIZATION:  CC: \"Pt admitted under a voluntary basis for severe depression with suicidal ideations and  an inability to care for self. HISTORY OF PRESENT ILLNESS:    The patient, Nino Munoz, is a 70 y.o. BLACK OR  male with a67-year-old -American male with past medical  history of end-stage renal disease; on hemodialysis, sleep apnea, peripheral vascular disease, bilateral BKA, hypertension, hyperlipidemia, peripheral neuropathy, anemia,hepatitis C, arthritis, peptic ulcer disease, coronary artery disease, DVT, prior GIbleed, chronic diastolic CHF, prior suicidal ideation, depression, and noncompliance   transfered to [facility] from [facility] after pt reported feeling depressed nad suicidal Pt stated he lives in the [place] and he is unhappy about his living situation , he stated he had lots of Money before [place] HAS 13 CHILDREN but now he is broke and no one ask about him. Pt reports feeling hopeless, helpless, angry. These symptoms are of high severity. These symptoms are constant . Pt ha sh/o using marijuana occasionally. UDS: negative; BAL=0. .  2/15/19: Pt is irritable and refused zoloft and vitals today. Pt has dialysis yesterday. 2/16/19: Pt took zoloft but refuses some medical meds. Pt had dialysis on Saturday. Pt is demanding in the evenings. 2/17/19: Pt is demanding, irritable in the evenings, compliant with zoloft. Eating well. 2/18/19: Pt is demanding and manipulative ta times but taking zoloft. Pt is eating well. 2/19/19: Pt taking zoloft.  Pt got agitated yesterday evening and received geodon I/M PRN. Pt reported he need percocet. AS per reports, he was on percocets for pain. 2/20/19: Pt is doing better, is no longer suicidal and is accepting of nursing home placement, is psychiatrically stable for discharge. 2/21/19: Pt did not receive dialysis , was sleeping in his bed. Pt has clot in the catheter nad need new port. Nephrology is contacted. Pt report not feeling depressed, awaiting placement.i  2/22/19: Pt  Is irritable at times, did not get dialusis yetserday, New port is placed, nephrology is on board. Pt is eating   well nad sleeping well. 2/23/19-Presented verbal and cooperative. He is compliant with meds. Repotrs he is homeless and has nowhere to go to. SW is trying to find appropriate placement. No AVH or SI/HI.  2/24/19- pt stayed in bed most of the day. He had been asking for discharge earlier but has not recognized a place. He was seen in his room and encouraged get up and come to the day-room. No prn's used. 2/25/19: Pt will go for dialysis on Tuesday. Pt  Is cooperative, refusing to go to [facility]. Pt is eating well, slept good,  2/26/19: Pt is still depressed at times. Pt contacted a friend yesterday and pt says  He wants to go live with her. Pt will need HHA and dialysis transportation after discharge. 2/27/19: slept 6 hours, BP was low , Bp meds were held yesterday. Pt is on phone. Pt refused zoloft in am, gets irritated at times. 2/28/19: Pt is in dialyiss, is in betetr mood, more courteous. Pt is eating well. Pt's finger stick was 71, no symptoms. 3/1/19: Pt slept 7 hours, received percocet last night. Nursing home placement in [facility] in progress. Pt is eating well, no acute issues at this time. 3/2: Could not get dialysis due to problems with port. Will get the port adjusted on Monday. Nephrology is aware, is courteous. sleeping eating well. SIDE EFFECTS: (reviewed/updated 3/2/2019)  None reported or admitted to.   No noted toxicity with use of Depakote/Tegretol/lithium/Clozaril/TCAs   ALLERGIES:(reviewed/updated 3/2/2019)  Allergies   Allergen Reactions    Tetracycline Hives      MEDICATIONS PRIOR TO ADMISSION:(reviewed/updated 3/2/2019)  Medications Prior to Admission   Medication Sig    NIFEdipine ER (ADALAT CC) 60 mg ER tablet Take 1 Tab by mouth daily. HOLD for HR<60    hydroCHLOROthiazide (HYDRODIURIL) 25 mg tablet Take 1 Tab by mouth daily.  sertraline (ZOLOFT) 25 mg tablet Take 1 Tab by mouth daily.  oxyCODONE-acetaminophen (PERCOCET 10)  mg per tablet Take 1 Tab by mouth every six (6) hours as needed for Pain.  traZODone (DESYREL) 50 mg tablet Take 50 mg by mouth nightly.  rosuvastatin (CRESTOR) 10 mg tablet Take 10 mg by mouth nightly.  aspirin delayed-release 81 mg tablet Take 1 Tab by mouth daily. PAST MEDICAL HISTORY: Past medical history from the initial psychiatric evaluation has been reviewed (reviewed/updated 3/2/2019) with no additional updates (I asked patient and no additional past medical history provided). Past Medical History:   Diagnosis Date    Arthritis     CAD (coronary artery disease) 2007    CKD (chronic kidney disease), stage III ([address]     DM type 2 causing renal disease ([address]     DVT (deep venous thrombosis) (HCC)     Hx of DVT:  Xarelto stopped due to GI bleed. S/P IVC filter.  Gait abnormality     GI bleed     Heart murmur     Hepatitis C     History of blood transfusion     Hypercholesterolemia     Hypertension 11/7/2014    Neuropathy     Non compliance w medication regimen     Peripheral vascular disease ([facility] 11/7/2014    A. S/P Right SFA POBA and tibial atherectomy (2/4/13).     PUD (peptic ulcer disease) 2007    Unspecified sleep apnea     never used cpap     Past Surgical History:   Procedure Laterality Date    ABDOMEN SURGERY PROC UNLISTED  2007    has approx 7-8\" midline incision on abdomen--\"had to open him up and clean him out after feeding tube clogged\"    HX GI  2007    feeding tube for approx 2 mo    VASCULAR SURGERY PROCEDURE UNLIST Right 1/22/2015    popliteal-tibial bypass      SOCIAL HISTORY: Social history from the initial psychiatric evaluation has been reviewed (reviewed/updated 3/2/2019) with no additional updates (I asked patient and no additional social history provided). Social History     Socioeconomic History    Marital status: SINGLE     Spouse name: Not on file    Number of children: Not on file    Years of education: Not on file    Highest education level: Not on file   Social Needs    Financial resource strain: Not on file    Food insecurity - worry: Not on file    Food insecurity - inability: Not on file    Transportation needs - medical: Not on file   [facility] needs - non-medical: Not on file   Occupational History    Not on file   Tobacco Use    Smoking status: Current Every Day Smoker     Packs/day: 0.50    Smokeless tobacco: Never Used   Substance and Sexual Activity    Alcohol use: No    Drug use: No     Comment: >20 years ago    Sexual activity: Not on file   Other Topics Concern     Service Not Asked    Blood Transfusions Not Asked    Caffeine Concern Not Asked    Occupational Exposure Not Asked    Hobby Hazards Not Asked    Sleep Concern Not Asked    Stress Concern Not Asked    Weight Concern Not Asked    Special Diet Not Asked    Back Care Not Asked    Exercise Not Asked    Bike Helmet Not Asked    Seat Belt Not Asked    Self-Exams Not Asked   Social History Narrative    78 year ols male admitted for depression and homelessness. Pt will be evicated from apartment due to non payment of bills. [facility] of 30 years [facility] [place] [facility]. \" Pt is a brittle diabetic, with treatment non compliance. He has bilarela lower extremity amputations. Patient has a long hx of substance abuse.       FAMILY HISTORY: Family history from the initial psychiatric evaluation has been reviewed (reviewed/updated 3/2/2019) with no additional updates (I asked patient and no additional family history provided). Family History   Problem Relation Age of Onset    Hypertension Father        REVIEW OF SYSTEMS: (reviewed/updated 3/2/2019)  Appetite:   Sleep: All other Review of Systems: Negative except          MENTAL STATUS EXAM & [address] (MSE):    MSE FINDINGS ARE WITHIN NORMAL LIMITS (WNL) UNLESS OTHERWISE STATED BELOW. ( ALL OF THE BELOW CATEGORIES OF THE MSE HAVE BEEN REVIEWED (reviewed 2/14/2019) AND UPDATED AS DEEMED APPROPRIATE )  General Presentation age appropriate and casually dressed, cooperative   Orientation oriented to time, place and person   Vital Signs  See below (reviewed 2/14/2019); Vital Signs (BP, Pulse, & Temp) are within normal limits if not listed below.    Gait and Station Stable/steady, no ataxia   Musculoskeletal System No extrapyramidal symptoms (EPS); no abnormal muscular movements or Tardive Dyskinesia (TD); muscle strength and tone are within normal limits   Language No aphasia or dysarthria   Speech:  soft   Thought Processes logical; slow rate of thoughts; fair abstract reasoning/computation   Thought Associations goal directed   Thought Content free of delusions   Suicidal Ideations intention   Homicidal Ideations no plan  and no intention   Mood:  anxious  and depressed   Affect:  constricted   Memory recent  intact   Memory remote:  intact   Concentration/Attention:  distractable   Fund of Knowledge average   Insight:  fair   Reliability fair   Judgment:  fair                                                                                                    VITALS:     Patient Vitals for the past 24 hrs:   Temp Pulse Resp BP SpO2   03/02/19 0935 98.1 °F (36.7 °C) 62 16 157/74 99 %   03/02/19 0845 -- -- 16 -- --   03/01/19 2005 98.4 °F (36.9 °C) (!) 56 16 167/68 100 %   03/01/19 1608 98.4 °F (36.9 °C) (!) 55 16 165/74 99 %     Wt Readings from Last 3 Encounters:   02/14/19 73 kg (160 lb 15 oz)   02/13/19 73.4 kg (161 lb 12.8 oz)   01/15/19 81.6 kg (180 lb)     Temp Readings from Last 3 Encounters:   03/02/19 98.1 °F (36.7 °C)   02/22/19 98.2 °F (36.8 °C)   02/13/19 98.2 °F (36.8 °C)     BP Readings from Last 3 Encounters:   03/02/19 157/74   02/22/19 146/68   02/13/19 181/82     Pulse Readings from Last 3 Encounters:   03/02/19 62   02/22/19 (!) 53   02/13/19 (!) 48            DATA     LABORATORY DATA:(reviewed/updated 3/2/2019)  Recent Results (from the past 24 hour(s))   RENAL FUNCTION PANEL    Collection Time: 03/02/19  9:21 AM   Result Value Ref Range    Sodium 138 136 - 145 mmol/L    Potassium 4.9 3.5 - 5.1 mmol/L    Chloride 108 97 - 108 mmol/L    CO2 27 21 - 32 mmol/L    Anion gap 3 (L) 5 - 15 mmol/L    Glucose 68 65 - 100 mg/dL    BUN 40 (H) 6 - 20 MG/DL    Creatinine 3.46 (H) 0.70 - 1.30 MG/DL    BUN/Creatinine ratio 12 12 - 20      GFR est AA 21 (L) >60 ml/min/1.73m2    GFR est non-AA 17 (L) >60 ml/min/1.73m2    Calcium 8.7 8.5 - 10.1 MG/DL    Phosphorus 4.2 2.6 - 4.7 MG/DL    Albumin 2.9 (L) 3.5 - 5.0 g/dL     No results found for: VALF2, VALAC, VALP, VALPR, DS6, CRBAM, CRBAMP, CARB2, XCRBAM  No results found for: LITHM   RADIOLOGY REPORTS:(reviewed/updated 3/2/2019)  Xr Chest Pa Lat    Result Date: 2/8/2019  EXAM: XR CHEST PA LAT INDICATION: Shortness of breath, Low back pain and abdominal pain for several days. End-stage renal disease on dialysis, missed dialysis one day ago. COMPARISON: Chest views on 5/20/2018 TECHNIQUE: 4 images of PA and lateral chest views FINDINGS: Right jugular dialysis catheter is new and terminates in the distal superior vena cava. Cardiac monitoring wires overlie the thorax. Borderline cardiac size is unchanged. Aortic arch is not enlarged. The pulmonary vasculature is within normal limits. The lungs and pleural spaces are clear. The visualized bones and upper abdomen are age-appropriate.      IMPRESSION: No acute process on chest x-ray. Right jugular dialysis catheter terminates in the distal superior vena cava. No pneumothorax. Ct Abd Pelv W Cont    Result Date: 2/8/2019  EXAM: CT ABD PELV W CONT INDICATION: Abdominal pain and low back pain for several days. End-stage renal disease on dialysis, missed dialysis yesterday. Normal white blood cell count. Normal liver enzymes. COMPARISON: None. CONTRAST: 100 mL of Isovue-370. TECHNIQUE: Following the uneventful intravenous administration of contrast, thin axial images were obtained through the abdomen and pelvis. Coronal and sagittal reconstructions were generated. Oral contrast was administered. CT dose reduction was achieved through use of a standardized protocol tailored for this examination and automatic exposure control for dose modulation. FINDINGS: LUNG BASES: No pneumonia. Mild dependent atelectasis. INCIDENTALLY IMAGED HEART AND MEDIASTINUM: Borderline cardiac size. No pericardial effusion. LIVER: Subcentimeter liver lesions measure up to 9 mm in segment 2 of the liver. Surface is smooth. Streak artifact from bilateral upper extremities. GALLBLADDER: Gallstones are in the body and neck. No evidence of cholecystitis. CBD is not dilated. SPLEEN: Normal size and enhancement. PANCREAS: No mass or ductal dilatation. ADRENALS: Unremarkable. KIDNEYS: No mass, calculus, or hydronephrosis. STOMACH: Partial distention. SMALL BOWEL: No dilatation or wall thickening. COLON: Moderate colonic fecal burden. No mural thickening. APPENDIX: Unremarkable. PERITONEUM: No ascites or pneumoperitoneum. RETROPERITONEUM: IVC filter is in good position. Aorta is atherosclerotic without aneurysm. Mild atherosclerotic stenosis of the celiac artery. No lymphadenopathy. REPRODUCTIVE ORGANS: Mild prostatomegaly measures 6 cm and extends into the base of the urinary bladder. URINARY BLADDER: No mass or calculus. BONES: Moderate bilateral hip osteoarthritis. ADDITIONAL COMMENTS: N/A     IMPRESSION: 1.  No acute process on CT. 2. Cholelithiasis. No cholecystitis. 3. Subcentimeter segment 2 liver lesions cannot be fully characterized on this examination. 4. Moderate colonic fecal burden may represent constipation. No bowel obstruction.          MEDICATIONS     ALL MEDICATIONS:   Current Facility-Administered Medications   Medication Dose Route Frequency    sodium chloride (NS) flush 10 mL  10 mL InterCATHeter PRN    sertraline (ZOLOFT) tablet 100 mg  100 mg Oral DAILY    traZODone (DESYREL) tablet 25 mg  25 mg Oral QHS    heparin (porcine) 1,000 unit/mL injection 4,300 Units  4,300 Units Hemodialysis DIALYSIS PRN    epoetin rosie-epbx (RETACRIT) injection 20,000 Units  20,000 Units SubCUTAneous DIALYSIS TUE, THU & SAT    oxyCODONE-acetaminophen (PERCOCET) 5-325 mg per tablet 1 Tab  1 Tab Oral BID PRN    ibuprofen (MOTRIN) tablet 800 mg  800 mg Oral Q8H PRN    aspirin delayed-release tablet 81 mg  81 mg Oral DAILY    rosuvastatin (CRESTOR) tablet 10 mg  10 mg Oral QHS    ferric citrate (AURYXIA) tablet 420 mg  420 mg Oral TID WITH MEALS    NIFEdipine ER (PROCARDIA XL) tablet 60 mg  60 mg Oral Q24H    doxazosin (CARDURA) tablet 4 mg  4 mg Oral QHS    OLANZapine (ZyPREXA) tablet 2.5 mg  2.5 mg Oral Q6H PRN    ziprasidone (GEODON) 10 mg in sterile water (preservative free) 0.5 mL injection  10 mg IntraMUSCular BID PRN    benztropine (COGENTIN) tablet 1 mg  1 mg Oral BID PRN    benztropine (COGENTIN) injection 1 mg  1 mg IntraMUSCular BID PRN    zolpidem (AMBIEN) tablet 5 mg  5 mg Oral QHS PRN    acetaminophen (TYLENOL) tablet 650 mg  650 mg Oral Q4H PRN    magnesium hydroxide (MILK OF MAGNESIA) 400 mg/5 mL oral suspension 30 mL  30 mL Oral DAILY PRN    nicotine (NICODERM CQ) 21 mg/24 hr patch 1 Patch  1 Patch TransDERmal DAILY PRN    glucose chewable tablet 16 g  4 Tab Oral PRN    dextrose (D50W) injection syrg 12.5-25 g  25-50 mL IntraVENous PRN    glucagon (GLUCAGEN) injection 1 mg  1 mg IntraMUSCular PRN      SCHEDULED MEDICATIONS:   Current Facility-Administered Medications   Medication Dose Route Frequency    sertraline (ZOLOFT) tablet 100 mg  100 mg Oral DAILY    traZODone (DESYREL) tablet 25 mg  25 mg Oral QHS    epoetin rosie-epbx (RETACRIT) injection 20,000 Units  20,000 Units SubCUTAneous DIALYSIS TUE, THU & SAT    aspirin delayed-release tablet 81 mg  81 mg Oral DAILY    rosuvastatin (CRESTOR) tablet 10 mg  10 mg Oral QHS    ferric citrate (AURYXIA) tablet 420 mg  420 mg Oral TID WITH MEALS    NIFEdipine ER (PROCARDIA XL) tablet 60 mg  60 mg Oral Q24H    doxazosin (CARDURA) tablet 4 mg  4 mg Oral QHS          ASSESSMENT & PLAN     The patient, Angie Ross, is a 70 y.o.  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  Plan for hemodialysis 3 days a week.  If it is not controlled  with the same, provide additional antihypertensive meds.  Monitor blood pressure.    End-stage renal disease, on hemodialysis.  Schedule treatment for hemodialysis on  Tuesdays, Thursdays, and Saturdays. .   Type 2 diabetes mellitus.  Place on Humalog insulin correction coverage schedule,  Accu-Chek, and check hemoglobin A1c level.  The patient will be on a diabetic/renal  Diet. Nephrology consult   2/15/19: continue meds/milue, encourage compliance. 2/16/19: continue meds/milue  2/17/19: continue meds/milue increaisng zoloft 50 mg po daily  2/18/19: continue meds/milue  2/19/19: readding percocet twice daily as needed for pain.   2/20/19: awaiting placemnet, PT/OT for placement  2/21/19: nephrology consult, recommendation regrading dialysis,  2/22/19: increasing zoloft 75 mg daily, continue milue,  2/25/19: continue meds/milue, HD tomorrow  2/26/19: increasing zoloft 100mg po daily, HHA referral  2/27/19: received BP meds today, received percocet last night/ continue meds/milue  2/28/19: awaiting placemnet, PT/OT for placement  3/1/19: Nursing home placement in [facility] in progress  I will continue to monitor blood levels (Depakote, Tegretol, lithium, clozapine---a drug with a narrow therapeutic index= NTI) and associated labs for drug therapy implemented that require intense monitoring for toxicity as deemed appropriate based on current medication side effects and pharmacodynamically determined drug 1/2 lives. In summary, Franky Clay, is a 70 y.o.  male who presents with a severe exacerbation of the principal diagnosis of Depression  Patient's condition is worsening/not improving/not stable improving. Patient requires continued inpatient hospitalization for further stabilization, safety monitoring and medication management. I will continue to coordinate the provision of individual, milieu, occupational, group, and substance abuse therapies to address target symptoms/diagnoses as deemed appropriate for the individual patient. A coordinated, multidisplinary treatment team round was conducted with the patient (this team consists of the nurse, psychiatric unit pharmcist,  and writer). Complete current electronic health record for patient has been reviewed today including consultant notes, ancillary staff notes, nurses and psychiatric tech notes. uicide risk assessment completed and patient deemed to be of low risk for suicide at this time. The following regarding medications was addressed during rounds with patient:   the risks and benefits of the proposed medication. The patient was given the opportunity to ask questions. Informed consent given to the use of the above medications. Will continue to adjust psychiatric and non-psychiatric medications (see above \"medication\" section and orders section for details) as deemed appropriate & based upon diagnoses and response to treatment.      I will continue to order blood tests/labs and diagnostic tests as deemed appropriate and review results as they become available (see orders for details and above listed lab/test results). I will order psychiatric records from previous [facility] to further elucidate the nature of patient's psychopathology and review once available. I will gather additional collateral information from friends, family and o/p treatment team to further elucidate the nature of patient's psychopathology and baselline level of psychiatric functioning. I certify that this patient's inpatient psychiatric hospital services furnished since the previous certification were, and continue to be, required for treatment that could reasonably be expected to improve the patient's condition, or for diagnostic study, and that the patient continues to need, on a daily basis, active treatment furnished directly by or requiring the supervision of inpatient psychiatric facility personnel. In addition, the hospital records show that services furnished were intensive treatment services, admission or related services, or equivalent services.     EXPECTED DISCHARGE DATE/DAY: TBD     DISPOSITION: Home       Signed By:   Demetrice Redman MD  3/2/2019

## 2019-03-02 NOTE — PROGRESS NOTES
Problem: Falls - Risk of  Goal: *Absence of Falls  Document Bello Fall Risk and appropriate interventions in the flowsheet. Outcome: Progressing Towards Goal  Fall Risk Interventions:  Mobility Interventions: Bed/chair exit alarm, Communicate number of staff needed for ambulation/transfer, Utilize walker, cane, or other assistive device  Mentation Interventions: Adequate sleep, hydration, pain control, Bed/chair exit alarm  Medication Interventions: Bed/chair exit alarm  Elimination Interventions: Patient to call for help with toileting needs  History of Falls Interventions: Bed/chair exit alarm        Problem: Pressure Injury - Risk of  Goal: *Prevention of pressure injury  Document Rambo Scale and appropriate interventions in the flowsheet. Outcome: Progressing Towards Goal  Pressure Injury Interventions:  Sensory Interventions: Assess changes in LOC, Chair cushion, Minimize linen layers, Pressure redistribution bed/mattress (bed type)  Moisture Interventions: Absorbent underpads  Activity Interventions: Pressure redistribution bed/mattress(bed type)  Mobility Interventions: Pressure redistribution bed/mattress (bed type)  Nutrition Interventions: Document food/fluid/supplement intake  Friction and Shear Interventions: Minimize layers    0730 Pt resting quietly in bed. Respirations even and unlabored. NAD noted. Will monitor with Q 15 safety checks. 0830 Pt refused vitals. Appears irritable. 0945 Pt cooperative with lab draw. Obtained by ICU nurse.

## 2019-03-03 PROCEDURE — 74011250637 HC RX REV CODE- 250/637: Performed by: FAMILY MEDICINE

## 2019-03-03 PROCEDURE — 65220000003 HC RM SEMIPRIVATE PSYCH

## 2019-03-03 PROCEDURE — 74011250637 HC RX REV CODE- 250/637: Performed by: INTERNAL MEDICINE

## 2019-03-03 PROCEDURE — 74011250637 HC RX REV CODE- 250/637: Performed by: PSYCHIATRY & NEUROLOGY

## 2019-03-03 PROCEDURE — 74011250637 HC RX REV CODE- 250/637: Performed by: HOSPITALIST

## 2019-03-03 RX ADMIN — SERTRALINE HYDROCHLORIDE 100 MG: 50 TABLET ORAL at 09:06

## 2019-03-03 RX ADMIN — TRAZODONE HYDROCHLORIDE 25 MG: 50 TABLET ORAL at 20:55

## 2019-03-03 RX ADMIN — OXYCODONE AND ACETAMINOPHEN 1 TABLET: 5; 325 TABLET ORAL at 17:00

## 2019-03-03 RX ADMIN — ASPIRIN 81 MG: 81 TABLET, COATED ORAL at 09:06

## 2019-03-03 RX ADMIN — FERRIC CITRATE 420 MG: 210 TABLET, COATED ORAL at 09:18

## 2019-03-03 RX ADMIN — ROSUVASTATIN CALCIUM 10 MG: 10 TABLET, FILM COATED ORAL at 20:55

## 2019-03-03 RX ADMIN — NIFEDIPINE 60 MG: 60 TABLET, FILM COATED, EXTENDED RELEASE ORAL at 16:59

## 2019-03-03 RX ADMIN — FERRIC CITRATE 420 MG: 210 TABLET, COATED ORAL at 16:58

## 2019-03-03 NOTE — BH NOTES
Dr Lindsey Jolley came to see patient   Informed pt about port being placed tomorrow   Orders given for patient to be NPO after midnight today.

## 2019-03-03 NOTE — PROGRESS NOTES
Problem: Depressed Mood (Adult/Pediatric)  Goal: *STG: Participates in treatment plan  Outcome: Progressing Towards Goal  Pt is watching TV in the DR   Calm, cooperative and appropriate with staff and peers  Irritable demanding and restless at times  Med/meal compliant   NAD noted   Will continue to monitor.

## 2019-03-03 NOTE — BH NOTES
PSYCHIATRIC PROGRESS NOTE         Patient Name  Jyoti Mcgowan   Date of Birth 1940   [facility] [id number]   Medical Record Number  [id number]      Age  70 y.o. PCP Marisol Serrano MD   Admit date:  2/13/2019    Room Number  740/01  @ Counts include 234 beds at the [facility]   Date of Service  3/3/2019           E & M PROGRESS NOTE: 15 mins         HISTORY       REASON FOR HOSPITALIZATION:  CC: \"Pt admitted under a voluntary basis for severe depression with suicidal ideations and  an inability to care for self. HISTORY OF PRESENT ILLNESS:    The patient, Jyoti Mcgowan, is a 70 y.o. BLACK OR  male with a67-year-old -American male with past medical  history of end-stage renal disease; on hemodialysis, sleep apnea, peripheral vascular disease, bilateral BKA, hypertension, hyperlipidemia, peripheral neuropathy, anemia,hepatitis C, arthritis, peptic ulcer disease, coronary artery disease, DVT, prior GIbleed, chronic diastolic CHF, prior suicidal ideation, depression, and noncompliance   transfered to [facility] from [facility] after pt reported feeling depressed nad suicidal Pt stated he lives in the [place] and he is unhappy about his living situation , he stated he had lots of Money before [place] HAS 13 CHILDREN but now he is broke and no one ask about him. Pt reports feeling hopeless, helpless, angry. These symptoms are of high severity. These symptoms are constant . Pt ha sh/o using marijuana occasionally. UDS: negative; BAL=0. .  2/15/19: Pt is irritable and refused zoloft and vitals today. Pt has dialysis yesterday. 2/16/19: Pt took zoloft but refuses some medical meds. Pt had dialysis on Saturday. Pt is demanding in the evenings. 2/17/19: Pt is demanding, irritable in the evenings, compliant with zoloft. Eating well. 2/18/19: Pt is demanding and manipulative ta times but taking zoloft. Pt is eating well. 2/19/19: Pt taking zoloft.  Pt got agitated yesterday evening and received geodon I/M PRN. Pt reported he need percocet. AS per reports, he was on percocets for pain. 2/20/19: Pt is doing better, is no longer suicidal and is accepting of nursing home placement, is psychiatrically stable for discharge. 2/21/19: Pt did not receive dialysis , was sleeping in his bed. Pt has clot in the catheter nad need new port. Nephrology is contacted. Pt report not feeling depressed, awaiting placement.i  2/22/19: Pt  Is irritable at times, did not get dialusis yetserday, New port is placed, nephrology is on board. Pt is eating   well nad sleeping well. 2/23/19-Presented verbal and cooperative. He is compliant with meds. Repotrs he is homeless and has nowhere to go to. SW is trying to find appropriate placement. No AVH or SI/HI.  2/24/19- pt stayed in bed most of the day. He had been asking for discharge earlier but has not recognized a place. He was seen in his room and encouraged get up and come to the day-room. No prn's used. 2/25/19: Pt will go for dialysis on Tuesday. Pt  Is cooperative, refusing to go to [facility]. Pt is eating well, slept good,  2/26/19: Pt is still depressed at times. Pt contacted a friend yesterday and pt says  He wants to go live with her. Pt will need HHA and dialysis transportation after discharge. 2/27/19: slept 6 hours, BP was low , Bp meds were held yesterday. Pt is on phone. Pt refused zoloft in am, gets irritated at times. 2/28/19: Pt is in dialyiss, is in betetr mood, more courteous. Pt is eating well. Pt's finger stick was 71, no symptoms. 3/1/19: Pt slept 7 hours, received percocet last night. Nursing home placement in [facility] in progress. Pt is eating well, no acute issues at this time. 3/2: Could not get dialysis due to problems with port. Will get the port adjusted on Monday. Nephrology is aware, is courteous. sleeping eating well. 3/3: Reports depressed mood. No SI. SIDE EFFECTS: (reviewed/updated 3/3/2019)  None reported or admitted to.   No noted toxicity with use of Depakote/Tegretol/lithium/Clozaril/TCAs   ALLERGIES:(reviewed/updated 3/3/2019)  Allergies   Allergen Reactions    Tetracycline Hives      MEDICATIONS PRIOR TO ADMISSION:(reviewed/updated 3/3/2019)  Medications Prior to Admission   Medication Sig    NIFEdipine ER (ADALAT CC) 60 mg ER tablet Take 1 Tab by mouth daily. HOLD for HR<60    hydroCHLOROthiazide (HYDRODIURIL) 25 mg tablet Take 1 Tab by mouth daily.  sertraline (ZOLOFT) 25 mg tablet Take 1 Tab by mouth daily.  oxyCODONE-acetaminophen (PERCOCET 10)  mg per tablet Take 1 Tab by mouth every six (6) hours as needed for Pain.  traZODone (DESYREL) 50 mg tablet Take 50 mg by mouth nightly.  rosuvastatin (CRESTOR) 10 mg tablet Take 10 mg by mouth nightly.  aspirin delayed-release 81 mg tablet Take 1 Tab by mouth daily. PAST MEDICAL HISTORY: Past medical history from the initial psychiatric evaluation has been reviewed (reviewed/updated 3/3/2019) with no additional updates (I asked patient and no additional past medical history provided). Past Medical History:   Diagnosis Date    Arthritis     CAD (coronary artery disease) 2007    CKD (chronic kidney disease), stage III ([address]     DM type 2 causing renal disease ([address]     DVT (deep venous thrombosis) (HCC)     Hx of DVT:  Xarelto stopped due to GI bleed. S/P IVC filter.  Gait abnormality     GI bleed     Heart murmur     Hepatitis C     History of blood transfusion     Hypercholesterolemia     Hypertension 11/7/2014    Neuropathy     Non compliance w medication regimen     Peripheral vascular disease ([facility] 11/7/2014    A. S/P Right SFA POBA and tibial atherectomy (2/4/13).     PUD (peptic ulcer disease) 2007    Unspecified sleep apnea     never used cpap     Past Surgical History:   Procedure Laterality Date    ABDOMEN SURGERY PROC UNLISTED  2007    has approx 7-8\" midline incision on abdomen--\"had to open him up and clean him out after feeding tube clogged\"    HX GI  2007    feeding tube for approx 2 mo    VASCULAR SURGERY PROCEDURE UNLIST Right 1/22/2015    popliteal-tibial bypass      SOCIAL HISTORY: Social history from the initial psychiatric evaluation has been reviewed (reviewed/updated 3/3/2019) with no additional updates (I asked patient and no additional social history provided). Social History     Socioeconomic History    Marital status: SINGLE     Spouse name: Not on file    Number of children: Not on file    Years of education: Not on file    Highest education level: Not on file   Social Needs    Financial resource strain: Not on file    Food insecurity - worry: Not on file    Food insecurity - inability: Not on file    Transportation needs - medical: Not on file   [employer] needs - non-medical: Not on file   Occupational History    Not on file   Tobacco Use    Smoking status: Current Every Day Smoker     Packs/day: 0.50    Smokeless tobacco: Never Used   Substance and Sexual Activity    Alcohol use: No    Drug use: No     Comment: >20 years ago    Sexual activity: Not on file   Other Topics Concern     Service Not Asked    Blood Transfusions Not Asked    Caffeine Concern Not Asked    Occupational Exposure Not Asked    Hobby Hazards Not Asked    Sleep Concern Not Asked    Stress Concern Not Asked    Weight Concern Not Asked    Special Diet Not Asked    Back Care Not Asked    Exercise Not Asked    Bike Helmet Not Asked    Seat Belt Not Asked    Self-Exams Not Asked   Social History Narrative    78 year ols male admitted for depression and homelessness. Pt will be evicated from apartment due to non payment of bills. Girlfriend of 30 years left him to [facility] live [place] the [place] with others. \" Pt is a brittle diabetic, with treatment non compliance. He has bilarela lower extremity amputations. Patient has a long hx of substance abuse.       FAMILY HISTORY: Family history from the initial psychiatric evaluation has been reviewed (reviewed/updated 3/3/2019) with no additional updates (I asked patient and no additional family history provided). Family History   Problem Relation Age of Onset    Hypertension Father        REVIEW OF SYSTEMS: (reviewed/updated 3/3/2019)  Appetite:   Sleep: All other Review of Systems: Negative except          MENTAL STATUS EXAM & [address] (MSE):    MSE FINDINGS ARE WITHIN NORMAL LIMITS (WNL) UNLESS OTHERWISE STATED BELOW. ( ALL OF THE BELOW CATEGORIES OF THE MSE HAVE BEEN REVIEWED (reviewed 2/14/2019) AND UPDATED AS DEEMED APPROPRIATE )  General Presentation age appropriate and casually dressed, cooperative   Orientation oriented to time, place and person   Vital Signs  See below (reviewed 2/14/2019); Vital Signs (BP, Pulse, & Temp) are within normal limits if not listed below.    Gait and Station Stable/steady, no ataxia   Musculoskeletal System No extrapyramidal symptoms (EPS); no abnormal muscular movements or Tardive Dyskinesia (TD); muscle strength and tone are within normal limits   Language No aphasia or dysarthria   Speech:  soft   Thought Processes logical; slow rate of thoughts; fair abstract reasoning/computation   Thought Associations goal directed   Thought Content free of delusions   Suicidal Ideations intention   Homicidal Ideations no plan  and no intention   Mood:  anxious  and depressed   Affect:  constricted   Memory recent  intact   Memory remote:  intact   Concentration/Attention:  distractable   Fund of Knowledge average   Insight:  fair   Reliability fair   Judgment:  fair                                                                                                    VITALS:     Patient Vitals for the past 24 hrs:   Temp Pulse Resp BP SpO2   03/03/19 1658 -- 62 -- -- --   03/03/19 1617 98.1 °F (36.7 °C) (!) 47 18 172/76 100 %   03/03/19 1108 97.8 °F (36.6 °C) (!) 49 16 151/69 --   03/02/19 2026 98.4 °F (36.9 °C) (!) 55 18 180/71 99 %     Wt Readings from Last 3 Encounters:   02/14/19 73 kg (160 lb 15 oz)   02/13/19 73.4 kg (161 lb 12.8 oz)   01/15/19 81.6 kg (180 lb)     Temp Readings from Last 3 Encounters:   03/03/19 98.1 °F (36.7 °C)   02/22/19 98.2 °F (36.8 °C)   02/13/19 98.2 °F (36.8 °C)     BP Readings from Last 3 Encounters:   03/03/19 172/76   02/22/19 146/68   02/13/19 181/82     Pulse Readings from Last 3 Encounters:   03/03/19 62   02/22/19 (!) 53   02/13/19 (!) 48            DATA     LABORATORY DATA:(reviewed/updated 3/3/2019)  No results found for this or any previous visit (from the past 24 hour(s)). No results found for: VALF2, VALAC, VALP, VALPR, DS6, CRBAM, CRBAMP, CARB2, XCRBAM  No results found for: LITHM   RADIOLOGY REPORTS:(reviewed/updated 3/3/2019)  Xr Chest Pa Lat    Result Date: 2/8/2019  EXAM: XR CHEST PA LAT INDICATION: Shortness of breath, Low back pain and abdominal pain for several days. End-stage renal disease on dialysis, missed dialysis one day ago. COMPARISON: Chest views on 5/20/2018 TECHNIQUE: 4 images of PA and lateral chest views FINDINGS: Right jugular dialysis catheter is new and terminates in the distal superior vena cava. Cardiac monitoring wires overlie the thorax. Borderline cardiac size is unchanged. Aortic arch is not enlarged. The pulmonary vasculature is within normal limits. The lungs and pleural spaces are clear. The visualized bones and upper abdomen are age-appropriate. IMPRESSION: No acute process on chest x-ray. Right jugular dialysis catheter terminates in the distal superior vena cava. No pneumothorax. Ct Abd Pelv W Cont    Result Date: 2/8/2019  EXAM: CT ABD PELV W CONT INDICATION: Abdominal pain and low back pain for several days. End-stage renal disease on dialysis, missed dialysis yesterday. Normal white blood cell count. Normal liver enzymes. COMPARISON: None. CONTRAST: 100 mL of Isovue-370.  TECHNIQUE: Following the uneventful intravenous administration of contrast, thin axial images were obtained through the abdomen and pelvis. Coronal and sagittal reconstructions were generated. Oral contrast was administered. CT dose reduction was achieved through use of a standardized protocol tailored for this examination and automatic exposure control for dose modulation. FINDINGS: LUNG BASES: No pneumonia. Mild dependent atelectasis. INCIDENTALLY IMAGED HEART AND MEDIASTINUM: Borderline cardiac size. No pericardial effusion. LIVER: Subcentimeter liver lesions measure up to 9 mm in segment 2 of the liver. Surface is smooth. Streak artifact from bilateral upper extremities. GALLBLADDER: Gallstones are in the body and neck. No evidence of cholecystitis. CBD is not dilated. SPLEEN: Normal size and enhancement. PANCREAS: No mass or ductal dilatation. ADRENALS: Unremarkable. KIDNEYS: No mass, calculus, or hydronephrosis. STOMACH: Partial distention. SMALL BOWEL: No dilatation or wall thickening. COLON: Moderate colonic fecal burden. No mural thickening. APPENDIX: Unremarkable. PERITONEUM: No ascites or pneumoperitoneum. RETROPERITONEUM: IVC filter is in good position. Aorta is atherosclerotic without aneurysm. Mild atherosclerotic stenosis of the celiac artery. No lymphadenopathy. REPRODUCTIVE ORGANS: Mild prostatomegaly measures 6 cm and extends into the base of the urinary bladder. URINARY BLADDER: No mass or calculus. BONES: Moderate bilateral hip osteoarthritis. ADDITIONAL COMMENTS: N/A     IMPRESSION: 1. No acute process on CT. 2. Cholelithiasis. No cholecystitis. 3. Subcentimeter segment 2 liver lesions cannot be fully characterized on this examination. 4. Moderate colonic fecal burden may represent constipation. No bowel obstruction.          MEDICATIONS     ALL MEDICATIONS:   Current Facility-Administered Medications   Medication Dose Route Frequency    sodium chloride (NS) flush 10 mL  10 mL InterCATHeter PRN    sertraline (ZOLOFT) tablet 100 mg  100 mg Oral DAILY    traZODone (DESYREL) tablet 25 mg  25 mg Oral QHS    heparin (porcine) 1,000 unit/mL injection 4,300 Units  4,300 Units Hemodialysis DIALYSIS PRN    epoetin rosie-epbx (RETACRIT) injection 20,000 Units  20,000 Units SubCUTAneous DIALYSIS TUE, THU & SAT    oxyCODONE-acetaminophen (PERCOCET) 5-325 mg per tablet 1 Tab  1 Tab Oral BID PRN    ibuprofen (MOTRIN) tablet 800 mg  800 mg Oral Q8H PRN    aspirin delayed-release tablet 81 mg  81 mg Oral DAILY    rosuvastatin (CRESTOR) tablet 10 mg  10 mg Oral QHS    ferric citrate (AURYXIA) tablet 420 mg  420 mg Oral TID WITH MEALS    NIFEdipine ER (PROCARDIA XL) tablet 60 mg  60 mg Oral Q24H    doxazosin (CARDURA) tablet 4 mg  4 mg Oral QHS    OLANZapine (ZyPREXA) tablet 2.5 mg  2.5 mg Oral Q6H PRN    ziprasidone (GEODON) 10 mg in sterile water (preservative free) 0.5 mL injection  10 mg IntraMUSCular BID PRN    benztropine (COGENTIN) tablet 1 mg  1 mg Oral BID PRN    benztropine (COGENTIN) injection 1 mg  1 mg IntraMUSCular BID PRN    zolpidem (AMBIEN) tablet 5 mg  5 mg Oral QHS PRN    acetaminophen (TYLENOL) tablet 650 mg  650 mg Oral Q4H PRN    magnesium hydroxide (MILK OF MAGNESIA) 400 mg/5 mL oral suspension 30 mL  30 mL Oral DAILY PRN    nicotine (NICODERM CQ) 21 mg/24 hr patch 1 Patch  1 Patch TransDERmal DAILY PRN    glucose chewable tablet 16 g  4 Tab Oral PRN    dextrose (D50W) injection syrg 12.5-25 g  25-50 mL IntraVENous PRN    glucagon (GLUCAGEN) injection 1 mg  1 mg IntraMUSCular PRN      SCHEDULED MEDICATIONS:   Current Facility-Administered Medications   Medication Dose Route Frequency    sertraline (ZOLOFT) tablet 100 mg  100 mg Oral DAILY    traZODone (DESYREL) tablet 25 mg  25 mg Oral QHS    epoetin rosie-epbx (RETACRIT) injection 20,000 Units  20,000 Units SubCUTAneous DIALYSIS TUE, THU & SAT    aspirin delayed-release tablet 81 mg  81 mg Oral DAILY    rosuvastatin (CRESTOR) tablet 10 mg  10 mg Oral QHS    ferric citrate (AURYXIA) tablet 420 mg  420 mg Oral TID WITH MEALS    NIFEdipine ER (PROCARDIA XL) tablet 60 mg  60 mg Oral Q24H    doxazosin (CARDURA) tablet 4 mg  4 mg Oral QHS          ASSESSMENT & PLAN     The patient, Corby Tamayo, is a 70 y.o.  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  Plan for hemodialysis 3 days a week.  If it is not controlled  with the same, provide additional antihypertensive meds.  Monitor blood pressure.    End-stage renal disease, on hemodialysis.  Schedule treatment for hemodialysis on  Tuesdays, Thursdays, and Saturdays. .   Type 2 diabetes mellitus.  Place on Humalog insulin correction coverage schedule,  Accu-Chek, and check hemoglobin A1c level.  The patient will be on a diabetic/renal  Diet. Nephrology consult   2/15/19: continue meds/milue, encourage compliance. 2/16/19: continue meds/milue  2/17/19: continue meds/milue increaisng zoloft 50 mg po daily  2/18/19: continue meds/milue  2/19/19: readding percocet twice daily as needed for pain. 2/20/19: awaiting placemnet, PT/OT for placement  2/21/19: nephrology consult, recommendation regrading dialysis,  2/22/19: increasing zoloft 75 mg daily, continue milue,  2/25/19: continue meds/milue, HD tomorrow  2/26/19: increasing zoloft 100mg po daily, A referral  2/27/19: received BP meds today, received percocet last night/ continue meds/milue  2/28/19: awaiting placemnet, PT/OT for placement  3/1/19: Nursing home placement in [place] [id number] in progress  I will continue to monitor blood levels (Depakote, Tegretol, lithium, clozapine---a drug with a narrow therapeutic index= NTI) and associated labs for drug therapy implemented that require intense monitoring for toxicity as deemed appropriate based on current medication side effects and pharmacodynamically determined drug 1/2 lives.     In summary, Stepan Giron, is a 70 y.o.  male who presents with a severe exacerbation of the principal diagnosis of Depression  Patient's condition is worsening/not improving/not stable improving. Patient requires continued inpatient hospitalization for further stabilization, safety monitoring and medication management. I will continue to coordinate the provision of individual, milieu, occupational, group, and substance abuse therapies to address target symptoms/diagnoses as deemed appropriate for the individual patient. A coordinated, multidisplinary treatment team round was conducted with the patient (this team consists of the nurse, psychiatric unit pharmcist,  and writer). Complete current electronic health record for patient has been reviewed today including consultant notes, ancillary staff notes, nurses and psychiatric tech notes. uicide risk assessment completed and patient deemed to be of low risk for suicide at this time. The following regarding medications was addressed during rounds with patient:   the risks and benefits of the proposed medication. The patient was given the opportunity to ask questions. Informed consent given to the use of the above medications. Will continue to adjust psychiatric and non-psychiatric medications (see above \"medication\" section and orders section for details) as deemed appropriate & based upon diagnoses and response to treatment. I will continue to order blood tests/labs and diagnostic tests as deemed appropriate and review results as they become available (see orders for details and above listed lab/test results). I will order psychiatric records from previous [facility] to further elucidate the nature of patient's psychopathology and review once available.     I will gather additional collateral information from friends, family and o/p treatment team to further elucidate the nature of patient's psychopathology and baselline level of psychiatric functioning. I certify that this patient's inpatient psychiatric hospital services furnished since the previous certification were, and continue to be, required for treatment that could reasonably be expected to improve the patient's condition, or for diagnostic study, and that the patient continues to need, on a daily basis, active treatment furnished directly by or requiring the supervision of inpatient psychiatric facility personnel. In addition, the hospital records show that services furnished were intensive treatment services, admission or related services, or equivalent services.     EXPECTED DISCHARGE DATE/DAY: TBD     DISPOSITION: Home       Signed By:   Yesica Jackman MD  3/3/2019

## 2019-03-03 NOTE — INTERDISCIPLINARY ROUNDS
Behavioral Health Interdisciplinary Rounds     Patient Name: Blessing Park  Age: 70 y.o. Room/Bed:  740/  Primary Diagnosis: Depression   Admission Status: Voluntary     Readmission within 30 days: no  Power of  in place: no  Patient requires a blocked bed: no          Reason for blocked bed:   Sleep hours: 6.75       Participation in Care/Groups:  no  Medication Compliant?: Selective  PRNS (last 24 hours): Pain    Restraints (last 24 hours):  no     Alcohol screening (AUDIT) completed -  AUDIT Score: 0  If applicable, date SBIRT discussed in treatment team AND documented:    Tobacco - patient is a smoker: Have You Used Tobacco in the Past 30 Days: Yes  Illegal Drugs use: Have You Used Any Illegal Substances Over the Past 12 Months: Yes    24 hour chart check complete: yes     Patient goal(s) for today:   Treatment team focus/goals:   Progress note     LOS:  18  Expected LOS:    Participating treatment team members:  Blessing Xavier, * (assigned SW),

## 2019-03-03 NOTE — BH NOTES
PRN Medication Documentation    Specific patient behavior that led to need for PRN medication: c/o gerealized pain 9/10  Staff interventions attempted prior to PRN being given: repositioned, promote rest, decreased stimuli  PRN medication given:PO Percocet  Patient response/effectiveness of PRN medication: pt quietly laying in bed, no further c/o pain

## 2019-03-03 NOTE — PROGRESS NOTES
Problem: Falls - Risk of  Goal: *Absence of Falls  Document Bello Fall Risk and appropriate interventions in the flowsheet. Outcome: Progressing Towards Goal  Fall Risk Interventions:  Patient is absent of falls. Blunted affect, irritable, demanding, patient is med and meal compliant, out on the unit.   Mobility Interventions: Bed/chair exit alarm, Communicate number of staff needed for ambulation/transfer, Patient to call before getting OOB    Mentation Interventions: Adequate sleep, hydration, pain control, Bed/chair exit alarm, Door open when patient unattended, More frequent rounding    Medication Interventions: Patient to call before getting OOB, Teach patient to arise slowly    Elimination Interventions: Call light in reach, Patient to call for help with toileting needs, Urinal in reach    History of Falls Interventions: Bed/chair exit alarm

## 2019-03-03 NOTE — PROGRESS NOTES
Problem: Falls - Risk of  Goal: *Absence of Falls  Document Bello Fall Risk and appropriate interventions in the flowsheet. Outcome: Progressing Towards Goal  Fall Risk Interventions:  Mobility Interventions: Bed/chair exit alarm, Communicate number of staff needed for ambulation/transfer, Utilize walker, cane, or other assistive device    Mentation Interventions: Adequate sleep, hydration, pain control, Bed/chair exit alarm, Door open when patient unattended, More frequent rounding  In bed asleep with respirations noted as even and unlabored aschest was rising and falling. Will continue to monitor for safety and 15 minute checks throughout shift.   Medication Interventions: Bed/chair exit alarm, Patient to call before getting OOB    Elimination Interventions: Bed/chair exit alarm, Urinal in reach    History of Falls Interventions: Bed/chair exit alarm

## 2019-03-03 NOTE — PROGRESS NOTES
Nephrology Progress Note  Elmo Sanchez  Date of Admission : 2/13/2019    CC:  Follow up for ESRD       Assessment and Plan     ESRD on HD:  - HD TTS at [address] at midnight  - PC to be replaced tomorrow then dialysis afterwards    HTN:  - cont current meds    Anemia of CKD:  - FANNY with dialysis     Secondary HPT:  - cont auryxia    Diastolic HF    PAD s/p b/l BKAs    DM2:  - on insulin    Depression:  - per psychiatry       Interval History:  Seen and examined   Feeling ok. No sob. Not talking much. No bleeding at old PC site. No cp, n/v/d. Current Medications: all current  Medications have been eviewed in EPIC  Review of Systems: Pertinent items are noted in HPI. Objective:  Vitals:    Vitals:    03/02/19 0845 03/02/19 0935 03/02/19 1526 03/02/19 2026   BP:  157/74 166/74 180/71   Pulse:  62 (!) 50 (!) 55   Resp: 16 16 18 18   Temp:  98.1 °F (36.7 °C) 98 °F (36.7 °C) 98.4 °F (36.9 °C)   TempSrc:       SpO2:  99% 100% 99%   Weight:         Intake and Output:  No intake/output data recorded. No intake/output data recorded. Physical Examination:  General: NAD  Neck:  Supple, no mass  Resp:  Lungs CTA B/L, no wheezing , normal respiratory effort  CV:  RRR,  no murmur or rub, no thigh edema, b/l BKAs  GI:  Soft, NT, + Bowel sounds, no hepatosplenomegaly  Neurologic:  Non focal  Psych:             Depressed, flat affect  Skin:  No Rash  Access:         TIJ PC c/d/i    []    High complexity decision making was performed  []    Patient is at high-risk of decompensation with multiple organ involvement    Lab Data Personally Reviewed: I have reviewed all the pertinent labs, microbiology data and radiology studies during assessment.     Recent Labs     03/02/19  0921 02/28/19  0915    139   K 4.9 4.6    104   CO2 27 26   GLU 68 130*   BUN 40* 32*   CREA 3.46* 3.34*   CA 8.7 8.5   PHOS 4.2 3.3   ALB 2.9* 3.0*     Recent Labs     02/28/19  0915   WBC 4.3   HGB 9.3*   HCT 30.7*   PLT 154     No results found for: SDES  Lab Results   Component Value Date/Time    Culture result: MRSA NOT PRESENT 01/24/2018 10:30 AM    Culture result:  01/24/2018 10:30 AM         Screening of patient nares for MRSA is for surveillance purposes and, if positive, to facilitate isolation considerations in high risk settings. It is not intended for automatic decolonization interventions per se as regimens are not sufficiently effective to warrant routine use. Culture result: MRSA NOT PRESENT 01/19/2015 10:44 AM    Culture result:  01/19/2015 10:44 AM         Screening of patient nares for MRSA is for surveillance purposes and, if positive, to facilitate isolation considerations in high risk settings. It is not intended for automatic decolonization interventions per se as regimens are not sufficiently effective to warrant routine use. Recent Results (from the past 24 hour(s))   RENAL FUNCTION PANEL    Collection Time: 03/02/19  9:21 AM   Result Value Ref Range    Sodium 138 136 - 145 mmol/L    Potassium 4.9 3.5 - 5.1 mmol/L    Chloride 108 97 - 108 mmol/L    CO2 27 21 - 32 mmol/L    Anion gap 3 (L) 5 - 15 mmol/L    Glucose 68 65 - 100 mg/dL    BUN 40 (H) 6 - 20 MG/DL    Creatinine 3.46 (H) 0.70 - 1.30 MG/DL    BUN/Creatinine ratio 12 12 - 20      GFR est AA 21 (L) >60 ml/min/1.73m2    GFR est non-AA 17 (L) >60 ml/min/1.73m2    Calcium 8.7 8.5 - 10.1 MG/DL    Phosphorus 4.2 2.6 - 4.7 MG/DL    Albumin 2.9 (L) 3.5 - 5.0 g/dL                   Renea Green MD  [address]  Phone - [phone]   Fax - [phone]  [email or web address]. [email or web address]

## 2019-03-04 ENCOUNTER — HOSPITAL ENCOUNTER (OUTPATIENT)
Dept: INTERVENTIONAL RADIOLOGY/VASCULAR | Age: 79
Discharge: HOME | DRG: 881 | End: 2019-03-04
Attending: INTERNAL MEDICINE
Payer: MEDICARE

## 2019-03-04 VITALS
HEART RATE: 54 BPM | DIASTOLIC BLOOD PRESSURE: 80 MMHG | SYSTOLIC BLOOD PRESSURE: 188 MMHG | TEMPERATURE: 97.6 F | OXYGEN SATURATION: 10 % | RESPIRATION RATE: 15 BRPM

## 2019-03-04 PROCEDURE — 77030004513 HC CATH ANGI CONTRA COOK -B

## 2019-03-04 PROCEDURE — 74011250636 HC RX REV CODE- 250/636

## 2019-03-04 PROCEDURE — 74011250637 HC RX REV CODE- 250/637: Performed by: HOSPITALIST

## 2019-03-04 PROCEDURE — C1769 GUIDE WIRE: HCPCS

## 2019-03-04 PROCEDURE — 74011250637 HC RX REV CODE- 250/637: Performed by: PSYCHIATRY & NEUROLOGY

## 2019-03-04 PROCEDURE — 02HV33Z INSERTION OF INFUSION DEVICE INTO SUPERIOR VENA CAVA, PERCUTANEOUS APPROACH: ICD-10-PCS | Performed by: RADIOLOGY

## 2019-03-04 PROCEDURE — 74011250636 HC RX REV CODE- 250/636: Performed by: RADIOLOGY

## 2019-03-04 PROCEDURE — 36561 INSERT TUNNELED CV CATH: CPT

## 2019-03-04 PROCEDURE — 77030039266 HC ADH SKN EXOFIN S2SG -A

## 2019-03-04 PROCEDURE — C1752 CATH,HEMODIALYSIS,SHORT-TERM: HCPCS

## 2019-03-04 PROCEDURE — 74011250637 HC RX REV CODE- 250/637: Performed by: FAMILY MEDICINE

## 2019-03-04 PROCEDURE — 65220000003 HC RM SEMIPRIVATE PSYCH

## 2019-03-04 PROCEDURE — C1894 INTRO/SHEATH, NON-LASER: HCPCS

## 2019-03-04 PROCEDURE — 36558 INSERT TUNNELED CV CATH: CPT

## 2019-03-04 PROCEDURE — 0JH63XZ INSERTION OF TUNNELED VASCULAR ACCESS DEVICE INTO CHEST SUBCUTANEOUS TISSUE AND FASCIA, PERCUTANEOUS APPROACH: ICD-10-PCS | Performed by: RADIOLOGY

## 2019-03-04 PROCEDURE — 77030011229 HC DIL VESL COON COOK -A

## 2019-03-04 PROCEDURE — 90935 HEMODIALYSIS ONE EVALUATION: CPT

## 2019-03-04 RX ORDER — CEFAZOLIN SODIUM/WATER 2 G/20 ML
2 SYRINGE (ML) INTRAVENOUS ONCE
Status: COMPLETED | OUTPATIENT
Start: 2019-03-04 | End: 2019-03-04

## 2019-03-04 RX ORDER — HEPARIN SODIUM 1000 [USP'U]/ML
3500 INJECTION, SOLUTION INTRAVENOUS; SUBCUTANEOUS ONCE
Status: COMPLETED | OUTPATIENT
Start: 2019-03-04 | End: 2019-03-04

## 2019-03-04 RX ORDER — LIDOCAINE HYDROCHLORIDE 20 MG/ML
INJECTION, SOLUTION INFILTRATION; PERINEURAL
Status: COMPLETED
Start: 2019-03-04 | End: 2019-03-04

## 2019-03-04 RX ORDER — FENTANYL CITRATE 50 UG/ML
100 INJECTION, SOLUTION INTRAMUSCULAR; INTRAVENOUS
Status: DISCONTINUED | OUTPATIENT
Start: 2019-03-04 | End: 2019-03-04

## 2019-03-04 RX ADMIN — OXYCODONE AND ACETAMINOPHEN 1 TABLET: 5; 325 TABLET ORAL at 20:35

## 2019-03-04 RX ADMIN — Medication 2 G: at 15:04

## 2019-03-04 RX ADMIN — HEPARIN SODIUM 3000 UNITS: 1000 INJECTION INTRAVENOUS; SUBCUTANEOUS at 15:00

## 2019-03-04 RX ADMIN — FENTANYL CITRATE 25 MCG: 50 INJECTION, SOLUTION INTRAMUSCULAR; INTRAVENOUS at 15:00

## 2019-03-04 RX ADMIN — DOXAZOSIN 4 MG: 2 TABLET ORAL at 21:42

## 2019-03-04 RX ADMIN — TRAZODONE HYDROCHLORIDE 25 MG: 50 TABLET ORAL at 21:42

## 2019-03-04 RX ADMIN — LIDOCAINE HYDROCHLORIDE 10 ML: 20 INJECTION, SOLUTION INFILTRATION; PERINEURAL at 15:00

## 2019-03-04 RX ADMIN — SERTRALINE HYDROCHLORIDE 100 MG: 50 TABLET ORAL at 10:21

## 2019-03-04 RX ADMIN — ROSUVASTATIN CALCIUM 10 MG: 10 TABLET, FILM COATED ORAL at 21:42

## 2019-03-04 RX ADMIN — ASPIRIN 81 MG: 81 TABLET, COATED ORAL at 10:21

## 2019-03-04 NOTE — BH NOTES
PSYCHIATRIC PROGRESS NOTE         Patient Name  Dell Werner   Date of Birth 1940   [facility] [id number]   Medical Record Number  [id number]      Age  70 y.o. PCP Moon Lopez MD   Admit date:  2/13/2019    Room Number  740/01  @ [facility]   Date of Service  3/4/2019           E & M PROGRESS NOTE: 15 mins         HISTORY       REASON FOR HOSPITALIZATION:  CC: \"Pt admitted under a voluntary basis for severe depression with suicidal ideations and  an inability to care for self. HISTORY OF PRESENT ILLNESS:    The patient, Dell Werner, is a 70 y.o. BLACK OR  male with 57-year-old -American male with past medical  history of end-stage renal disease; on hemodialysis, sleep apnea, peripheral vascular disease, bilateral BKA, hypertension, hyperlipidemia, peripheral neuropathy, anemia,hepatitis C, arthritis, peptic ulcer disease, coronary artery disease, DVT, prior GIbleed, chronic diastolic CHF, prior suicidal ideation, depression, and noncompliance   transfered to [facility] from [facility] after pt reported feeling depressed nad suicidal Pt stated he lives in the [place] and he is unhappy about his living situation , he stated he had lots of Money before [place] HAS 13 CHILDREN but now he is broke and no one ask about him. Pt reports feeling hopeless, helpless, angry. These symptoms are of high severity. These symptoms are constant . Pt ha sh/o using marijuana occasionally. UDS: negative; BAL=0. .  2/15/19: Pt is irritable and refused zoloft and vitals today. Pt has dialysis yesterday. 2/16/19: Pt took zoloft but refuses some medical meds. Pt had dialysis on Saturday. Pt is demanding in the evenings. 2/17/19: Pt is demanding, irritable in the evenings, compliant with zoloft. Eating well. 2/18/19: Pt is demanding and manipulative ta times but taking zoloft. Pt is eating well. 2/19/19: Pt taking zoloft.  Pt got agitated yesterday evening and received geodon I/M PRN. Pt reported he need percocet. AS per reports, he was on percocets for pain. 2/20/19: Pt is doing better, is no longer suicidal and is accepting of nursing home placement, is psychiatrically stable for discharge. 2/21/19: Pt did not receive dialysis , was sleeping in his bed. Pt has clot in the catheter nad need new port. Nephrology is contacted. Pt report not feeling depressed, awaiting placement.i  2/22/19: Pt  Is irritable at times, did not get dialusis yetserday, New port is placed, nephrology is on board. Pt is eating   well nad sleeping well. 2/23/19-Presented verbal and cooperative. He is compliant with meds. Repotrs he is homeless and has nowhere to go to. SW is trying to find appropriate placement. No AVH or SI/HI.  2/24/19- pt stayed in bed most of the day. He had been asking for discharge earlier but has not recognized a place. He was seen in his room and encouraged get up and come to the day-room. No prn's used. 2/25/19: Pt will go for dialysis on Tuesday. Pt  Is cooperative, refusing to go to [facility]. Pt is eating well, slept good,  2/26/19: Pt is still depressed at times. Pt contacted a friend yesterday and pt says  He wants to go live with her. Pt will need HHA and dialysis transportation after discharge. 2/27/19: slept 6 hours, BP was low , Bp meds were held yesterday. Pt is on phone. Pt refused zoloft in am, gets irritated at times. 2/28/19: Pt is in dialyiss, is in betetr mood, more courteous. Pt is eating well. Pt's finger stick was 71, no symptoms. 3/1/19: Pt slept 7 hours, received percocet last night. Nursing [facility] in [facility] in progress. Pt is eating well, no acute issues at this time. 3/2: Could not get dialysis due to problems with port. Will get the port adjusted on Monday. Nephrology is aware, is courteous. sleeping eating well. 3/3: Reports depressed mood. No SI.   3/4/19: Pt pulled his port on Friday, did not receive dialysis on Saturday.  Pt keeps on changing statement regarding residential. Pt is for [facility]. Pt took meds. SIDE EFFECTS: (reviewed/updated 3/4/2019)  None reported or admitted to. No noted toxicity with use of Depakote/Tegretol/lithium/Clozaril/TCAs   ALLERGIES:(reviewed/updated 3/4/2019)  Allergies   Allergen Reactions    Tetracycline Hives      MEDICATIONS PRIOR TO ADMISSION:(reviewed/updated 3/4/2019)  Medications Prior to Admission   Medication Sig    NIFEdipine ER (ADALAT CC) 60 mg ER tablet Take 1 Tab by mouth daily. HOLD for HR<60    hydroCHLOROthiazide (HYDRODIURIL) 25 mg tablet Take 1 Tab by mouth daily.  sertraline (ZOLOFT) 25 mg tablet Take 1 Tab by mouth daily.  oxyCODONE-acetaminophen (PERCOCET 10)  mg per tablet Take 1 Tab by mouth every six (6) hours as needed for Pain.  traZODone (DESYREL) 50 mg tablet Take 50 mg by mouth nightly.  rosuvastatin (CRESTOR) 10 mg tablet Take 10 mg by mouth nightly.  aspirin delayed-release 81 mg tablet Take 1 Tab by mouth daily. PAST MEDICAL HISTORY: Past medical history from the initial psychiatric evaluation has been reviewed (reviewed/updated 3/4/2019) with no additional updates (I asked patient and no additional past medical history provided). Past Medical History:   Diagnosis Date    Arthritis     CAD (coronary artery disease) 2007    CKD (chronic kidney disease), stage III ([address]     DM type 2 causing renal disease ([address]     DVT (deep venous thrombosis) (HCC)     Hx of DVT:  Xarelto stopped due to GI bleed. S/P IVC filter.  Gait abnormality     GI bleed     Heart murmur     Hepatitis C     History of blood transfusion     Hypercholesterolemia     Hypertension 11/7/2014    Neuropathy     Non compliance w medication regimen     Peripheral vascular disease ([facility] 11/7/2014    A. S/P Right SFA POBA and tibial atherectomy (2/4/13).     PUD (peptic ulcer disease) 2007    Unspecified sleep apnea     never used cpap     Past Surgical History:   Procedure Laterality Date    ABDOMEN SURGERY PROC UNLISTED  2007    has approx 7-8\" midline incision on abdomen--\"had to open him up and clean him out after feeding tube clogged\"    HX GI  2007    feeding tube for approx 2 mo    VASCULAR SURGERY PROCEDURE UNLIST Right 1/22/2015    popliteal-tibial bypass      SOCIAL HISTORY: Social history from the initial psychiatric evaluation has been reviewed (reviewed/updated 3/4/2019) with no additional updates (I asked patient and no additional social history provided). Social History     Socioeconomic History    Marital status: SINGLE     Spouse name: Not on file    Number of children: Not on file    Years of education: Not on file    Highest education level: Not on file   Social Needs    Financial resource strain: Not on file    Food insecurity - worry: Not on file    Food insecurity - inability: Not on file    Transportation needs - medical: Not on file   [employer] needs - non-medical: Not on file   Occupational History    Not on file   Tobacco Use    Smoking status: Current Every Day Smoker     Packs/day: 0.50    Smokeless tobacco: Never Used   Substance and Sexual Activity    Alcohol use: No    Drug use: No     Comment: >20 years ago    Sexual activity: Not on file   Other Topics Concern     Service Not Asked    Blood Transfusions Not Asked    Caffeine Concern Not Asked    Occupational Exposure Not Asked    Hobby Hazards Not Asked    Sleep Concern Not Asked    Stress Concern Not Asked    Weight Concern Not Asked    Special Diet Not Asked    Back Care Not Asked    Exercise Not Asked    Bike Helmet Not Asked    Seat Belt Not Asked    Self-Exams Not Asked   Social History Narrative    78 year ols male admitted for depression and homelessness. Pt will be evicated from apartment due to non payment of bills. Girlfriend of 30 years left him to [facility] live [place] the [place] with others. \" Pt is a brittle diabetic, with treatment non compliance. He has bilarela lower extremity amputations. Patient has a long hx of substance abuse. FAMILY HISTORY: Family history from the initial psychiatric evaluation has been reviewed (reviewed/updated 3/4/2019) with no additional updates (I asked patient and no additional family history provided). Family History   Problem Relation Age of Onset    Hypertension Father        REVIEW OF SYSTEMS: (reviewed/updated 3/4/2019)  Appetite:   Sleep: All other Review of Systems: Negative except          MENTAL STATUS EXAM & [address] (MSE):    MSE FINDINGS ARE WITHIN NORMAL LIMITS (WNL) UNLESS OTHERWISE STATED BELOW. ( ALL OF THE BELOW CATEGORIES OF THE MSE HAVE BEEN REVIEWED (reviewed 2/14/2019) AND UPDATED AS DEEMED APPROPRIATE )  General Presentation age appropriate and casually dressed, cooperative   Orientation oriented to time, place and person   Vital Signs  See below (reviewed 2/14/2019); Vital Signs (BP, Pulse, & Temp) are within normal limits if not listed below.    Gait and Station Stable/steady, no ataxia   Musculoskeletal System No extrapyramidal symptoms (EPS); no abnormal muscular movements or Tardive Dyskinesia (TD); muscle strength and tone are within normal limits   Language No aphasia or dysarthria   Speech:  soft   Thought Processes logical; slow rate of thoughts; fair abstract reasoning/computation   Thought Associations goal directed   Thought Content free of delusions   Suicidal Ideations intention   Homicidal Ideations no plan  and no intention   Mood:  anxious  and depressed   Affect:  constricted   Memory recent  intact   Memory remote:  intact   Concentration/Attention:  distractable   Fund of Knowledge average   Insight:  fair   Reliability fair   Judgment:  fair                                                                                                    VITALS:     Patient Vitals for the past 24 hrs:   Temp Pulse Resp BP SpO2   03/04/19 0754 98.2 °F (36.8 °C) (!) 50 16 174/76 98 %   03/03/19 1953 98.2 °F (36.8 °C) (!) 48 16 179/74 100 %   03/03/19 1658 -- 62 -- -- --   03/03/19 1617 98.1 °F (36.7 °C) (!) 47 18 172/76 100 %   03/03/19 1108 97.8 °F (36.6 °C) (!) 49 16 151/69 --     Wt Readings from Last 3 Encounters:   02/14/19 73 kg (160 lb 15 oz)   02/13/19 73.4 kg (161 lb 12.8 oz)   01/15/19 81.6 kg (180 lb)     Temp Readings from Last 3 Encounters:   03/04/19 98.2 °F (36.8 °C)   02/22/19 98.2 °F (36.8 °C)   02/13/19 98.2 °F (36.8 °C)     BP Readings from Last 3 Encounters:   03/04/19 174/76   02/22/19 146/68   02/13/19 181/82     Pulse Readings from Last 3 Encounters:   03/04/19 (!) 50   02/22/19 (!) 53   02/13/19 (!) 48            DATA     LABORATORY DATA:(reviewed/updated 3/4/2019)  No results found for this or any previous visit (from the past 24 hour(s)). No results found for: VALF2, VALAC, VALP, VALPR, DS6, CRBAM, CRBAMP, CARB2, XCRBAM  No results found for: LITHM   RADIOLOGY REPORTS:(reviewed/updated 3/4/2019)  Xr Chest Pa Lat    Result Date: 2/8/2019  EXAM: XR CHEST PA LAT INDICATION: Shortness of breath, Low back pain and abdominal pain for several days. End-stage renal disease on dialysis, missed dialysis one day ago. COMPARISON: Chest views on 5/20/2018 TECHNIQUE: 4 images of PA and lateral chest views FINDINGS: Right jugular dialysis catheter is new and terminates in the distal superior vena cava. Cardiac monitoring wires overlie the thorax. Borderline cardiac size is unchanged. Aortic arch is not enlarged. The pulmonary vasculature is within normal limits. The lungs and pleural spaces are clear. The visualized bones and upper abdomen are age-appropriate. IMPRESSION: No acute process on chest x-ray. Right jugular dialysis catheter terminates in the distal superior vena cava. No pneumothorax. Ct Abd Pelv W Cont    Result Date: 2/8/2019  EXAM: CT ABD PELV W CONT INDICATION: Abdominal pain and low back pain for several days.  End-stage renal disease on dialysis, missed dialysis yesterday. Normal white blood cell count. Normal liver enzymes. COMPARISON: None. CONTRAST: 100 mL of Isovue-370. TECHNIQUE: Following the uneventful intravenous administration of contrast, thin axial images were obtained through the abdomen and pelvis. Coronal and sagittal reconstructions were generated. Oral contrast was administered. CT dose reduction was achieved through use of a standardized protocol tailored for this examination and automatic exposure control for dose modulation. FINDINGS: LUNG BASES: No pneumonia. Mild dependent atelectasis. INCIDENTALLY IMAGED HEART AND MEDIASTINUM: Borderline cardiac size. No pericardial effusion. LIVER: Subcentimeter liver lesions measure up to 9 mm in segment 2 of the liver. Surface is smooth. Streak artifact from bilateral upper extremities. GALLBLADDER: Gallstones are in the body and neck. No evidence of cholecystitis. CBD is not dilated. SPLEEN: Normal size and enhancement. PANCREAS: No mass or ductal dilatation. ADRENALS: Unremarkable. KIDNEYS: No mass, calculus, or hydronephrosis. STOMACH: Partial distention. SMALL BOWEL: No dilatation or wall thickening. COLON: Moderate colonic fecal burden. No mural thickening. APPENDIX: Unremarkable. PERITONEUM: No ascites or pneumoperitoneum. RETROPERITONEUM: IVC filter is in good position. Aorta is atherosclerotic without aneurysm. Mild atherosclerotic stenosis of the celiac artery. No lymphadenopathy. REPRODUCTIVE ORGANS: Mild prostatomegaly measures 6 cm and extends into the base of the urinary bladder. URINARY BLADDER: No mass or calculus. BONES: Moderate bilateral hip osteoarthritis. ADDITIONAL COMMENTS: N/A     IMPRESSION: 1. No acute process on CT. 2. Cholelithiasis. No cholecystitis. 3. Subcentimeter segment 2 liver lesions cannot be fully characterized on this examination. 4. Moderate colonic fecal burden may represent constipation. No bowel obstruction.          MEDICATIONS ALL MEDICATIONS:   Current Facility-Administered Medications   Medication Dose Route Frequency    sodium chloride (NS) flush 10 mL  10 mL InterCATHeter PRN    sertraline (ZOLOFT) tablet 100 mg  100 mg Oral DAILY    traZODone (DESYREL) tablet 25 mg  25 mg Oral QHS    heparin (porcine) 1,000 unit/mL injection 4,300 Units  4,300 Units Hemodialysis DIALYSIS PRN    epoetin rosie-epbx (RETACRIT) injection 20,000 Units  20,000 Units SubCUTAneous DIALYSIS TUE, THU & SAT    oxyCODONE-acetaminophen (PERCOCET) 5-325 mg per tablet 1 Tab  1 Tab Oral BID PRN    ibuprofen (MOTRIN) tablet 800 mg  800 mg Oral Q8H PRN    aspirin delayed-release tablet 81 mg  81 mg Oral DAILY    rosuvastatin (CRESTOR) tablet 10 mg  10 mg Oral QHS    ferric citrate (AURYXIA) tablet 420 mg  420 mg Oral TID WITH MEALS    NIFEdipine ER (PROCARDIA XL) tablet 60 mg  60 mg Oral Q24H    doxazosin (CARDURA) tablet 4 mg  4 mg Oral QHS    OLANZapine (ZyPREXA) tablet 2.5 mg  2.5 mg Oral Q6H PRN    ziprasidone (GEODON) 10 mg in sterile water (preservative free) 0.5 mL injection  10 mg IntraMUSCular BID PRN    benztropine (COGENTIN) tablet 1 mg  1 mg Oral BID PRN    benztropine (COGENTIN) injection 1 mg  1 mg IntraMUSCular BID PRN    zolpidem (AMBIEN) tablet 5 mg  5 mg Oral QHS PRN    acetaminophen (TYLENOL) tablet 650 mg  650 mg Oral Q4H PRN    magnesium hydroxide (MILK OF MAGNESIA) 400 mg/5 mL oral suspension 30 mL  30 mL Oral DAILY PRN    nicotine (NICODERM CQ) 21 mg/24 hr patch 1 Patch  1 Patch TransDERmal DAILY PRN    glucose chewable tablet 16 g  4 Tab Oral PRN    dextrose (D50W) injection syrg 12.5-25 g  25-50 mL IntraVENous PRN    glucagon (GLUCAGEN) injection 1 mg  1 mg IntraMUSCular PRN      SCHEDULED MEDICATIONS:   Current Facility-Administered Medications   Medication Dose Route Frequency    sertraline (ZOLOFT) tablet 100 mg  100 mg Oral DAILY    traZODone (DESYREL) tablet 25 mg  25 mg Oral QHS    epoetin rosie-epbx (RETACRIT) injection 20,000 Units  20,000 Units SubCUTAneous DIALYSIS TUE, THU & SAT    aspirin delayed-release tablet 81 mg  81 mg Oral DAILY    rosuvastatin (CRESTOR) tablet 10 mg  10 mg Oral QHS    ferric citrate (AURYXIA) tablet 420 mg  420 mg Oral TID WITH MEALS    NIFEdipine ER (PROCARDIA XL) tablet 60 mg  60 mg Oral Q24H    doxazosin (CARDURA) tablet 4 mg  4 mg Oral QHS          ASSESSMENT & PLAN     The patient, Jenny Pierre, is a 70 y.o.  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  Plan for hemodialysis 3 days a week.  If it is not controlled  with the same, provide additional antihypertensive meds.  Monitor blood pressure.    End-stage renal disease, on hemodialysis.  Schedule treatment for hemodialysis on  Tuesdays, Thursdays, and Saturdays. .   Type 2 diabetes mellitus.  Place on Humalog insulin correction coverage schedule,  Accu-Chek, and check hemoglobin A1c level.  The patient will be on a diabetic/renal  Diet. Nephrology consult   2/15/19: continue meds/milue, encourage compliance. 2/16/19: continue meds/milue  2/17/19: continue meds/milue increaisng zoloft 50 mg po daily  2/18/19: continue meds/milue  2/19/19: readding percocet twice daily as needed for pain.   2/20/19: awaiting [facility], PT/OT for placement  2/21/19: nephrology consult, recommendation regrading dialysis,  2/22/19: increasing zoloft 75 mg daily, continue milue,  2/25/19: continue meds/milue, HD tomorrow  2/26/19: increasing zoloft 100mg po daily, HHA referral  2/27/19: received BP meds today, received percocet last night/ continue meds/milue  2/28/19: awaiting [facility], PT/OT for placement  3/1/19: Nursing home placement in [place] [address] in progress  3/4/19: [address] [place] [address] Radiology to see for port, continue meds, continue Placement referrals  I will continue to monitor blood levels (Depakote, Tegretol, lithium, clozapine---a drug with a narrow therapeutic index= NTI) and associated labs for drug therapy implemented that require intense monitoring for toxicity as deemed appropriate based on current medication side effects and pharmacodynamically determined drug 1/2 lives. In summary, Lizeth Khanna, is a 70 y.o.  male who presents with a severe exacerbation of the principal diagnosis of Depression  Patient's condition is worsening/not improving/not stable improving. Patient requires continued inpatient hospitalization for further stabilization, safety monitoring and medication management. I will continue to coordinate the provision of individual, milieu, occupational, group, and substance abuse therapies to address target symptoms/diagnoses as deemed appropriate for the individual patient. A coordinated, multidisplinary treatment team round was conducted with the patient (this team consists of the nurse, psychiatric unit pharmcist,  and writer). Complete current electronic health record for patient has been reviewed today including consultant notes, ancillary staff notes, nurses and psychiatric tech notes. uicide risk assessment completed and patient deemed to be of low risk for suicide at this time. The following regarding medications was addressed during rounds with patient:   the risks and benefits of the proposed medication. The patient was given the opportunity to ask questions. Informed consent given to the use of the above medications. Will continue to adjust psychiatric and non-psychiatric medications (see above \"medication\" section and orders section for details) as deemed appropriate & based upon diagnoses and response to treatment.      I will continue to order blood tests/labs and diagnostic tests as deemed appropriate and review results as they become available (see orders for details and above listed lab/test results). I will order psychiatric records from previous [facility] hospitals to further elucidate the nature of patient's psychopathology and review once available. I will gather additional collateral information from friends, family and o/p treatment team to further elucidate the nature of patient's psychopathology and baselline level of psychiatric functioning. I certify that this patient's inpatient psychiatric hospital services furnished since the previous certification were, and continue to be, required for treatment that could reasonably be expected to improve the patient's condition, or for diagnostic study, and that the patient continues to need, on a daily basis, active treatment furnished directly by or requiring the supervision of inpatient psychiatric facility personnel. In addition, the hospital records show that services furnished were intensive treatment services, admission or related services, or equivalent services.     EXPECTED DISCHARGE DATE/DAY: TBD     DISPOSITION: Home       Signed By:   Derrick Gamino MD  3/4/2019

## 2019-03-04 NOTE — BH NOTES
GROUP THERAPY PROGRESS NOTE    Jaylan Jackson did not participate in a 45 minute Process Group on the Geriatric Unit with a focus on shifting ones feelings to a positive note and preparing for the day through group singing. He was wheeled down the espinoza and waved just as the group was ending.

## 2019-03-04 NOTE — PROGRESS NOTES
Problem: Depressed Mood (Adult/Pediatric)  Goal: *STG: Attends activities and groups  Outcome: Progressing Towards Goal  Patient is alert and verbal.  Just arrived from interventional radiology. No distress noted. Will continue to monitor. 1610 - Patient returned to the unit. 3694 - Patient is in dialysis (on the General Unit).

## 2019-03-04 NOTE — PROGRESS NOTES
Problem: Falls - Risk of  Goal: *Absence of Falls  Document Bello Fall Risk and appropriate interventions in the flowsheet.   Outcome: Progressing Towards Goal  Fall Risk Interventions:  Mobility Interventions: Bed/chair exit alarm, Communicate number of staff needed for ambulation/transfer    Mentation Interventions: Adequate sleep, hydration, pain control, Bed/chair exit alarm, Door open when patient unattended, More frequent rounding    Medication Interventions: Teach patient to arise slowly    Elimination Interventions: Patient to call for help with toileting needs, Urinal in reach    History of Falls Interventions: Bed/chair exit alarm

## 2019-03-04 NOTE — PROGRESS NOTES
Nephrology Progress Note  Elmo Sanchez  Date of Admission : 2/13/2019    CC:  Follow up for ESRD       Assessment and Plan     ESRD on HD:  - HD TTS at [facility] to be replaced today then dialysis afterwards    HTN:  - cont current meds    Anemia of CKD:  - FANNY with dialysis     Secondary HPT:  - cont auryxia    Diastolic HF    PAD s/p b/l BKAs    DM2:  - on insulin    Depression:  - per psychiatry       Interval History:  Seen and examined   Awaiting PC placement this morning. No cp, n/v/d. Current Medications: all current  Medications have been eviewed in EPIC  Review of Systems: Pertinent items are noted in HPI. Objective:  Vitals:    Vitals:    03/03/19 1617 03/03/19 1658 03/03/19 1953 03/04/19 0754   BP: 172/76  179/74 174/76   Pulse: (!) 47 62 (!) 48 (!) 50   Resp: 18  16 16   Temp: 98.1 °F (36.7 °C)  98.2 °F (36.8 °C) 98.2 °F (36.8 °C)   TempSrc:       SpO2: 100%  100% 98%   Weight:         Intake and Output:  No intake/output data recorded. No intake/output data recorded. Physical Examination:  General: NAD  Neck:  Supple, no mass  Resp:  Lungs CTA B/L, no wheezing , normal respiratory effort  CV:  RRR,  no murmur or rub, no thigh edema, b/l BKAs  GI:  Soft, NT, + Bowel sounds, no hepatosplenomegaly  Neurologic:  Non focal  Psych:             Depressed, flat affect  Skin:  No Rash  Access:           TIJ PC c/d/i    []    High complexity decision making was performed  []    Patient is at high-risk of decompensation with multiple organ involvement    Lab Data Personally Reviewed: I have reviewed all the pertinent labs, microbiology data and radiology studies during assessment. Recent Labs     03/02/19  0921      K 4.9      CO2 27   GLU 68   BUN 40*   CREA 3.46*   CA 8.7   PHOS 4.2   ALB 2.9*     No results for input(s): WBC, HGB, HCT, PLT, HGBEXT, HCTEXT, PLTEXT, HGBEXT, HCTEXT, PLTEXT in the last 72 hours.   No results found for: SDES  Lab Results   Component Value Date/Time    Culture result: MRSA NOT PRESENT 01/24/2018 10:30 AM    Culture result:  01/24/2018 10:30 AM         Screening of patient nares for MRSA is for surveillance purposes and, if positive, to facilitate isolation considerations in high risk settings. It is not intended for automatic decolonization interventions per se as regimens are not sufficiently effective to warrant routine use. Culture result: MRSA NOT PRESENT 01/19/2015 10:44 AM    Culture result:  01/19/2015 10:44 AM         Screening of patient nares for MRSA is for surveillance purposes and, if positive, to facilitate isolation considerations in high risk settings. It is not intended for automatic decolonization interventions per se as regimens are not sufficiently effective to warrant routine use. No results found for this or any previous visit (from the past 24 hour(s)). Unique Gaitan MD  [address]  Phone - [phone]   Fax - [phone]  www. [email or web address]

## 2019-03-04 NOTE — BH NOTES
Taken down to Interventional Radiology via stretcher. No discomforts/needs. Patient has remained NPO all morning.

## 2019-03-04 NOTE — PROGRESS NOTES
Physical Therapy:     Chart reviewed and attempted to see patient for PT evaluation, however patient adamantly refusing PT at this time stating he does \"not feel like talking\" and \"will not participate\". Patient has refused therapy for the past several visits. Patient also reported he will discharge to another facility tomorrow. PT explained role however patient still adamantly refusing. Per PT evaluation on 2/25/19, patient overall SBA/CGA for transfers and w/c mobility. Will sign off at this time.     Nan Soares, PT, DPT

## 2019-03-04 NOTE — PROGRESS NOTES
Problem: Depressed Mood (Adult/Pediatric)  Goal: *STG: Demonstrates reduction in symptoms and increase in insight into coping skills/future focused  Outcome: Progressing Towards Goal  0915 Pt up in room with staff. Pt received shower. NPO after midnight. No needs expressed. Will monitor with Q 15 safety checks. Problem: Falls - Risk of  Goal: *Absence of Falls  Document Bello Fall Risk and appropriate interventions in the flowsheet.   Outcome: Progressing Towards Goal  Fall Risk Interventions:  Mobility Interventions: Communicate number of staff needed for ambulation/transfer, Mechanical lift  Mentation Interventions: Adequate sleep, hydration, pain control, Bed/chair exit alarm, Door open when patient unattended, More frequent rounding  Medication Interventions: Teach patient to arise slowly, Patient to call before getting OOB  Elimination Interventions: Call light in reach, Bed/chair exit alarm  History of Falls Interventions: Bed/chair exit alarm

## 2019-03-04 NOTE — INTERDISCIPLINARY ROUNDS
Behavioral Health Interdisciplinary Rounds     Patient Name: Susan Weber  Age: 70 y.o. Room/Bed:  740/01  Primary Diagnosis: Depression   Admission Status: Voluntary     Readmission within 30 days: no  Power of  in place: no  Patient requires a blocked bed: no          Reason for blocked bed:   Sleep hours: 5       Participation in Care/Groups:    Medication Compliant?: Yes  PRNS (last 24 hours): Pain    Restraints (last 24 hours):  no     Alcohol screening (AUDIT) completed -  AUDIT Score: 0  If applicable, date SBIRT discussed in treatment team AND documented:    Tobacco - patient is a smoker: Have You Used Tobacco in the Past 30 Days: Yes  Illegal Drugs use: Have You Used Any Illegal Substances Over the Past 12 Months: Yes    24 hour chart check complete: yes     Patient goal(s) for today: Participate in unit activities  Treatment team focus/goals: Continue working to get dialysis transferred to [address] [place] [address] note: Pt presents with low mood; will not engage with PT/OT    LOS:  19  Expected LOS: TBD    Participating treatment team members:  Susan MarchRichardson MSW; Dr. Tosha Mendoza MD; Edwina Claudio, MIKHAIL; Zeb Garcia, PharmD

## 2019-03-04 NOTE — PROGRESS NOTES
Chart reviewed and attempted to see patient for OT intervention however patient adamantly refusing OT at this time stating he does \"not feel like talking\" and \"will not participate\". Noted patient has refused therapy for the past several visits. Patient also reported he will discharge to another facility tomorrow. OT explained role however patient still adamantly refusing. Will sign off for OT at this time. Please re-consult if patient expresses desire to participate with therapy or if there has been a change in patient functional status. Thank you.

## 2019-03-05 LAB
HBV SURFACE AB SER QL: REACTIVE
HBV SURFACE AB SER-ACNC: 13.24 MIU/ML
HBV SURFACE AG SER QL: <0.1 INDEX
HBV SURFACE AG SER QL: NEGATIVE

## 2019-03-05 PROCEDURE — 86706 HEP B SURFACE ANTIBODY: CPT

## 2019-03-05 PROCEDURE — 65220000003 HC RM SEMIPRIVATE PSYCH

## 2019-03-05 PROCEDURE — 87340 HEPATITIS B SURFACE AG IA: CPT

## 2019-03-05 PROCEDURE — 74011250637 HC RX REV CODE- 250/637: Performed by: FAMILY MEDICINE

## 2019-03-05 PROCEDURE — 74011250637 HC RX REV CODE- 250/637: Performed by: PSYCHIATRY & NEUROLOGY

## 2019-03-05 PROCEDURE — 36415 COLL VENOUS BLD VENIPUNCTURE: CPT

## 2019-03-05 PROCEDURE — 90935 HEMODIALYSIS ONE EVALUATION: CPT

## 2019-03-05 PROCEDURE — 74011250637 HC RX REV CODE- 250/637: Performed by: INTERNAL MEDICINE

## 2019-03-05 PROCEDURE — 86704 HEP B CORE ANTIBODY TOTAL: CPT

## 2019-03-05 PROCEDURE — 74011250637 HC RX REV CODE- 250/637: Performed by: HOSPITALIST

## 2019-03-05 RX ADMIN — ASPIRIN 81 MG: 81 TABLET, COATED ORAL at 09:38

## 2019-03-05 RX ADMIN — ROSUVASTATIN CALCIUM 10 MG: 10 TABLET, FILM COATED ORAL at 20:53

## 2019-03-05 RX ADMIN — OXYCODONE AND ACETAMINOPHEN 1 TABLET: 5; 325 TABLET ORAL at 09:38

## 2019-03-05 RX ADMIN — TRAZODONE HYDROCHLORIDE 25 MG: 50 TABLET ORAL at 20:52

## 2019-03-05 RX ADMIN — OXYCODONE AND ACETAMINOPHEN 1 TABLET: 5; 325 TABLET ORAL at 22:57

## 2019-03-05 RX ADMIN — FERRIC CITRATE 420 MG: 210 TABLET, COATED ORAL at 09:38

## 2019-03-05 RX ADMIN — SERTRALINE HYDROCHLORIDE 100 MG: 50 TABLET ORAL at 09:38

## 2019-03-05 RX ADMIN — DOXAZOSIN 4 MG: 2 TABLET ORAL at 20:54

## 2019-03-05 NOTE — INTERDISCIPLINARY ROUNDS
Behavioral Health Interdisciplinary Rounds     Patient Name: Nino Munoz  Age: 70 y.o. Room/Bed:  740/  Primary Diagnosis: Depression   Admission Status: Voluntary     Readmission within 30 days: no  Power of  in place: no  Patient requires a blocked bed: no          Reason for blocked bed:   Sleep hours: 6       Participation in Care/Groups:  no  Medication Compliant?: Yes  PRNS (last 24 hours): Pain    Restraints (last 24 hours):  no     Alcohol screening (AUDIT) completed -  AUDIT Score: 0  If applicable, date SBIRT discussed in treatment team AND documented:    Tobacco - patient is a smoker: Have You Used Tobacco in the Past 30 Days: Yes  Illegal Drugs use: Have You Used Any Illegal Substances Over the Past 12 Months: Yes    24 hour chart check complete: yes     Patient goal(s) for today: Dialysis  Treatment team focus/goals: Order Hep B Panel; coordinate with David  Progress note: Pt reports not wanting SNF today; changes his mind daily    LOS:  20  Expected LOS: 21    Participating treatment team members:  Ninoshania MunozBhupinder MSW; Dr. Osito Sarah MD; Elif Briseno, MIKHAIL; Ángel Haines, DongD

## 2019-03-05 NOTE — PROCEDURES
[facility] Dialysis Team [facility]  [phone]    Vitals   Pre   Post   Assessment   Pre   Post     Temp  Temp: 97.8 °F (36.6 °C) (03/04/19 1735)  97.9 LOC  A/O x 4  No change    HR   55   56   Lungs   Clear   No change   B/P  180/56   184/85 Cardiac   Bradycardia  No change   Resp   18   20   Skin   Warm/dry No change    Pain level  0 0 Edema  None     No change   Orders:    Duration:   Start:    1735 End:    1935 Total:   2 hrs   Dialyzer:   Dialyzer/Set Up Inspection: Donovan Tirado (03/04/19 1735)   K Bath:   Dialysate K (mEq/L): 2 (03/04/19 1735)   Ca Bath:   Dialysate CA (mEq/L): 2.5 (03/04/19 1735)   Na/Bicarb:   Dialysate NA (mEq/L): 140 (03/04/19 1735)   Target Fluid Removal:   Goal/Amount of Fluid to Remove (mL): 2000 mL (03/04/19 1735)   Access     Type & Location:   Newly placed RIJ Tunneled catheter aspirated and flushed with NS with no problems. Prepped with Prevantics as per protocol. Site dry and intact no S/S of infection     Labs     Obtained/Reviewed   Critical Results Called   Date when labs were drawn-  Hgb-    HGB   Date Value Ref Range Status   02/28/2019 9.3 (L) 12.1 - 17.0 g/dL Final     K-    Potassium   Date Value Ref Range Status   03/02/2019 4.9 3.5 - 5.1 mmol/L Final     Ca-   Calcium   Date Value Ref Range Status   03/02/2019 8.7 8.5 - 10.1 MG/DL Final     Bun-   BUN   Date Value Ref Range Status   03/02/2019 40 (H) 6 - 20 MG/DL Final     Creat-   Creatinine   Date Value Ref Range Status   03/02/2019 3.46 (H) 0.70 - 1.30 MG/DL Final        Medications/ Blood Products Given     Name   Dose   Route and Time     None                Blood Volume Processed (BVP):    42.1 Net Fluid   Removed:  2000 ml   Comments   Time Out Done: 8032  Primary Nurse Rpt Pre:JOSS Mdidleton RN  Primary Nurse Rpt Post:ANGELA Anand RN  Pt Education:Access care  Care Plan:Continue HD Plan of care  Tx Summary:Arrived at [address] [place] [address] HD set up SBAR obtained from Primary RN  Pt was transported to the room after Timeout HD started  [id number]: HD started with no problems. Access with good flow  1615:Pt resting with no distress  1715: Access secured lines visible pt no complaints  1815:Pt resting no distress  1935: HD completed All possible blood returned with NS rinseback. Catheter flushed with NS and blocked with NS as per RN report no Heparin. Tolerated full treatment Report given to Primary RN pt was transported back to his room  Admiting Diagnosis:Depression  Pt's previous clinic-[facility]  Consent signed - Informed Consent Verified: Yes (02/28/19 0830)  [facility]au Consent -Yes on File  Hepatitis Status- Hep B Antigen Negative 2/9/19  Machine #- Machine Number: [id number]/ [id number] (03/04/19 7955)  Telemetry status-N/A  Pre-dialysis wt. -

## 2019-03-05 NOTE — PROCEDURES
[facility] Dialysis Team [facility]  [phone]    Vitals   Pre   Post   Assessment   Pre   Post     Temp  Temp: 98.3 °F (36.8 °C) (03/05/19 1420)  98.1   LOC  A/O x 4 No change   HR   51   55   Lungs   Clear  No change   B/P  176/80   145/70   Cardiac   Bradycardia No change   Resp   18   19   Skin   Warm/dry No change   Pain level  0 0 Edema  none     No change   Orders:    Duration:   Start:   1112 End:    1720   Total:   3 hrs   Dialyzer:   Dialyzer/Set Up Inspection: Annie Garber (03/05/19 1420)   K Bath:   Dialysate K (mEq/L): 2 (03/05/19 1420)   Ca Bath:   Dialysate CA (mEq/L): 2.5 (03/05/19 1420)   Na/Bicarb:   Dialysate NA (mEq/L): 140 (03/05/19 1420)   Target Fluid Removal:   Goal/Amount of Fluid to Remove (mL): 2000 mL (03/05/19 1420)   Access     Type & Location:   RIJ Tunneled catheter aspirated and flushed with NS with no problems. Accessed and prepped as per policy Site dry and intact No S/S of infection Dressing changed into Gauge and paper cloth tape pt allergic to plastic dressing per DR David Reynoso     Obtained/Reviewed   Critical Results Called   Date when labs were drawn-  Hgb-    HGB   Date Value Ref Range Status   02/28/2019 9.3 (L) 12.1 - 17.0 g/dL Final     K-    Potassium   Date Value Ref Range Status   03/02/2019 4.9 3.5 - 5.1 mmol/L Final     Ca-   Calcium   Date Value Ref Range Status   03/02/2019 8.7 8.5 - 10.1 MG/DL Final     Bun-   BUN   Date Value Ref Range Status   03/02/2019 40 (H) 6 - 20 MG/DL Final     Creat-   Creatinine   Date Value Ref Range Status   03/02/2019 3.46 (H) 0.70 - 1.30 MG/DL Final        Medications/ Blood Products Given     Name   Dose   Route and Time     None                Blood Volume Processed (BVP):   65.9 Net Fluid   Removed:  2000 Ml   Comments   Time Out Done: 0  Primary Nurse Rpt Pre:Юлия MILLER RN  Primary Nurse Rpt Post:Judy J RN  Pt Education:Access care  Care Plan:Continue HD Plan of Care  Tx Summary:Arrived at [address] [facility] set up SBAR received from Primary RN  After timeout done HD started  1420: HD started with no problems Catheter with good flow  1515: Pt resting with no distress  1610:Pt resting no complaints  1720:HD completed All possible blood returned with NS rinsed back Tolerated HD well Catheter flushed with NS and dwelled. Report given to Primary RN Pt returned to his room    HD Related Medications Reviewed      Admiting Diagnosis:Depression  Pt's previous clinic-[facility]  Consent signed - Informed Consent Verified: Yes (03/05/19 1420)  [facility] Consent - Yes on file  Hepatitis Status- Hep B Antigen Negative 2/9/19  Machine #- Machine Number: [id number]/ [id number] (03/05/19 1420)  Telemetry status-  Pre-dialysis wt. -

## 2019-03-05 NOTE — PROGRESS NOTES
Nephrology Progress Note  Isidra Cazares  Date of Admission : 2/13/2019    CC:  Follow up for ESRD       Assessment and Plan     ESRD on HD:  - HD TTS at [facility]  - HD again today per schedule     HTN:  - cont current meds    Anemia of CKD:  - FANNY with dialysis     Secondary HPT:  - cont auryxia    Diastolic HF    PAD s/p b/l BKAs    DM2:  - on insulin    Depression:  - per psychiatry       Interval History:  Seen and examined   Dialyzed for 2 hrs yesterday. Educated about permacath care . No cp, n/v/d. Current Medications: all current  Medications have been eviewed in EPIC  Review of Systems: Pertinent items are noted in HPI. Objective:  Vitals:    Vitals:    03/04/19 1900 03/04/19 1935 03/04/19 2005 03/05/19 0933   BP: 183/57 184/85 184/87 177/76   Pulse: (!) 56 (!) 55 (!) 58 (!) 49   Resp: 20 18 18 20   Temp:  97.9 °F (36.6 °C) 98 °F (36.7 °C) 98.2 °F (36.8 °C)   TempSrc:  Oral     SpO2:   97% 99%   Weight:         Intake and Output:  No intake/output data recorded. 03/03 1901 - 03/05 0700  In: -   Out: 2000     Physical Examination:  General: NAD  Neck:  Supple, no mass  Resp:  Lungs CTA B/L, no wheezing , normal respiratory effort  CV:  RRR,  no murmur or rub, no thigh edema, b/l BKAs  GI:  Soft, NT, + Bowel sounds, no hepatosplenomegaly  Neurologic:  Non focal  Psych:             Depressed, flat affect  Skin:  No Rash  Access:           TIJ PC c/d/i    []    High complexity decision making was performed  []    Patient is at high-risk of decompensation with multiple organ involvement    Lab Data Personally Reviewed: I have reviewed all the pertinent labs, microbiology data and radiology studies during assessment. No results for input(s): NA, K, CL, CO2, GLU, BUN, CREA, CA, MG, PHOS, ALB, TBIL, SGOT, ALT, INR in the last 72 hours.     No lab exists for component: INREXT, INREXT  No results for input(s): WBC, HGB, HCT, PLT, HGBEXT, HCTEXT, PLTEXT, HGBEXT, HCTEXT, PLTEXT in the last 72 hours. No results found for: SDES  Lab Results   Component Value Date/Time    Culture result: MRSA NOT PRESENT 01/24/2018 10:30 AM    Culture result:  01/24/2018 10:30 AM         Screening of patient nares for MRSA is for surveillance purposes and, if positive, to facilitate isolation considerations in high risk settings. It is not intended for automatic decolonization interventions per se as regimens are not sufficiently effective to warrant routine use. Culture result: MRSA NOT PRESENT 01/19/2015 10:44 AM    Culture result:  01/19/2015 10:44 AM         Screening of patient nares for MRSA is for surveillance purposes and, if positive, to facilitate isolation considerations in high risk settings. It is not intended for automatic decolonization interventions per se as regimens are not sufficiently effective to warrant routine use. No results found for this or any previous visit (from the past 24 hour(s)). Yao Roberts MD  [address] [place] [address]  Phone - [phone]   Fax - [phone]  [email or web address]

## 2019-03-05 NOTE — PROGRESS NOTES
Patient is alert/oriented with asymptomatic bradycardia. Sitting up at the dining table eating breakfast. Staff to re-evaluate vitals.

## 2019-03-05 NOTE — BH NOTES
GROUP THERAPY PROGRESS NOTE    Wilmer Lopez partially participated in a late morning Process Group on the Geriatric Unit, with a focus identifying feelings, planning for the day, and singing. Group time: 50 minutes. Personal goal for participation: To increase the capacity to shift ones mood, prepare for the day, and share in group singing. Goal orientation: The patient will be able to prepare for the day through group singing. Group therapy participation: When prompted, this patient marginally participated in the group. Therapeutic interventions reviewed and discussed: The group members were asked to introduce themselves by first names and participate in group singing as a way to increase their oxygen and blood flow and begin their day on a positive note. They were also asked to join in singing several songs. Impression of participation: The patient joined the group about group home through the session, as he was wheeled into the group by nursing. He was alert and generally oriented. He expressed no current SI/HI and displayed no overt psychosis. He indicated that it was \"not by accident\" that he was being discharged today. He left the group with about five minutes left in the session to make a telephone call to his family. He seemed to be finalizing his discharge plans with the assistance of nursing. He did not join in the music or add anything else to the group conversation. His affect was non-dysphoric and his mood matched his affect.

## 2019-03-05 NOTE — PROGRESS NOTES
.Problem: Depressed Mood (Adult/Pediatric)  Goal: *STG: Participates in treatment plan  Outcome: Progressing Towards Goal  Patient participates in treatment plan. Actively seeks out opportunities for discharge options  Goal: *STG: Attends activities and groups  Outcome: Progressing Towards Goal  Patient out in dayroom, passively engages with staff. Goal: *STG: Demonstrates reduction in symptoms and increase in insight into coping skills/future focused  Outcome: Progressing Towards Goal  Patient aware of limitations, uses phone to plan opportunities upon discharge    Problem: Falls - Risk of  Goal: *Absence of Falls  Document Bello Fall Risk and appropriate interventions in the flowsheet. Outcome: Progressing Towards Goal  Fall Risk Interventions:  Patient remains free from falls. Document Bello fall risk scale. Utilize lift team when transferring  Mobility Interventions: Bed/chair exit alarm    Mentation Interventions: Adequate sleep, hydration, pain control, Bed/chair exit alarm, Door open when patient unattended, More frequent rounding    Medication Interventions: Teach patient to arise slowly, Patient to call before getting OOB    Elimination Interventions: Patient to call for help with toileting needs, Urinal in reach    History of Falls Interventions: Bed/chair exit alarm        Problem: Pressure Injury - Risk of  Goal: *Prevention of pressure injury  Document Rambo Scale and appropriate interventions in the flowsheet. Outcome: Progressing Towards Goal  Pressure Injury Interventions:  Document Rambo Scale and appropriate interventions. Transfer using two person.  Patient self turns  Sensory Interventions: Assess changes in LOC, Chair cushion, Minimize linen layers, Pressure redistribution bed/mattress (bed type)    Moisture Interventions: Absorbent underpads, Apply protective barrier, creams and emollients    Activity Interventions: Pressure redistribution bed/mattress(bed type)    Mobility Interventions: Pressure redistribution bed/mattress (bed type)    Nutrition Interventions: Document food/fluid/supplement intake    Friction and Shear Interventions: Minimize layers      PRN Medication Documentation  @[id number]  Specific patient behavior that led to need for PRN medication: neck pain, 9 on numeric scale 1-10  Staff interventions attempted prior to PRN being given: medication, music  PRN medication given: Percocet 5/325 Mg PO  Patient response/effectiveness of PRN medication: pending

## 2019-03-05 NOTE — BH NOTES
PSYCHIATRIC PROGRESS NOTE         Patient Name  Carlos Manuel King   Date of Birth 1940   [facility] [id number]   Medical Record Number  [id number]      Age  70 y.o. PCP Elray Saint, MD   Admit date:  2/13/2019    Room Number  740/01  @ [address]   Date of Service  3/5/2019           E & M PROGRESS NOTE: 15 mins         HISTORY       REASON FOR HOSPITALIZATION:  CC: \"Pt admitted under a voluntary basis for severe depression with suicidal ideations and  an inability to care for self. HISTORY OF PRESENT ILLNESS:    The patient, Carlos Manuel King, is a 70 y.o. BLACK OR  male with 59-year-old -American male with past medical  history of end-stage renal disease; on hemodialysis, sleep apnea, peripheral vascular disease, bilateral BKA, hypertension, hyperlipidemia, peripheral neuropathy, anemia,hepatitis C, arthritis, peptic ulcer disease, coronary artery disease, DVT, prior GIbleed, chronic diastolic CHF, prior suicidal ideation, depression, and noncompliance   transfered to [address] from [facility] after pt reported feeling depressed nad suicidal Pt stated he lives in the [place] and he is unhappy about his living situation , he stated he had lots of Money before [place] HAS 13 CHILDREN but now he is broke and no one ask about him. Pt reports feeling hopeless, helpless, angry. These symptoms are of high severity. These symptoms are constant . Pt ha sh/o using marijuana occasionally. UDS: negative; BAL=0. .  2/15/19: Pt is irritable and refused zoloft and vitals today. Pt has dialysis yesterday. 2/16/19: Pt took zoloft but refuses some medical meds. Pt had dialysis on Saturday. Pt is demanding in the evenings. 2/17/19: Pt is demanding, irritable in the evenings, compliant with zoloft. Eating well. 2/18/19: Pt is demanding and manipulative ta times but taking zoloft. Pt is eating well. 2/19/19: Pt taking zoloft.  Pt got agitated yesterday evening and received geodon I/M PRN. Pt reported he need percocet. AS per reports, he was on percocets for pain. 2/20/19: Pt is doing better, is no longer suicidal and is accepting of nursing home placement, is psychiatrically stable for discharge. 2/21/19: Pt did not receive dialysis , was sleeping in his bed. Pt has clot in the catheter nad need new port. Nephrology is contacted. Pt report not feeling depressed, awaiting placement.i  2/22/19: Pt  Is irritable at times, did not get dialusis yetserday, New port is placed, nephrology is on board. Pt is eating   well nad sleeping well. 2/23/19-Presented verbal and cooperative. He is compliant with meds. Repotrs he is homeless and has nowhere to go to. SW is trying to find appropriate placement. No AVH or SI/HI.  2/24/19- pt stayed in bed most of the day. He had been asking for discharge earlier but has not recognized a place. He was seen in his room and encouraged get up and come to the day-room. No prn's used. 2/25/19: Pt will go for dialysis on Tuesday. Pt  Is cooperative, refusing to go to [facility]. Pt is eating well, slept good,  2/26/19: Pt is still depressed at times. Pt contacted a friend yesterday and pt says  He wants to go live with her. Pt will need HHA and dialysis transportation after discharge. 2/27/19: slept 6 hours, BP was low , Bp meds were held yesterday. Pt is on phone. Pt refused zoloft in am, gets irritated at times. 2/28/19: Pt is in dialyiss, is in betetr mood, more courteous. Pt is eating well. Pt's finger stick was 71, no symptoms. 3/1/19: Pt slept 7 hours, received percocet last night. [facility] in [facility] in progress. Pt is eating well, no acute issues at this time. 3/2: Could not get dialysis due to problems with port. Will get the port adjusted on Monday. Nephrology is aware, is courteous. sleeping eating well. 3/3: Reports depressed mood. No SI.   3/4/19: Pt pulled his port on Friday, did not receive dialysis on Saturday.  Pt keeps on changing statement regarding residential. Pt is for placment at residential at [place]. Pt took meds. 3/5/19: Pt is fine sitting comfortable, got port done, has dialysis today. Pt psycheducated not to get port out. Hep B testing ordered      SIDE EFFECTS: (reviewed/updated 3/5/2019)  None reported or admitted to. No noted toxicity with use of Depakote/Tegretol/lithium/Clozaril/TCAs   ALLERGIES:(reviewed/updated 3/5/2019)  Allergies   Allergen Reactions    Tetracycline Hives      MEDICATIONS PRIOR TO ADMISSION:(reviewed/updated 3/5/2019)  Medications Prior to Admission   Medication Sig    NIFEdipine ER (ADALAT CC) 60 mg ER tablet Take 1 Tab by mouth daily. HOLD for HR<60    hydroCHLOROthiazide (HYDRODIURIL) 25 mg tablet Take 1 Tab by mouth daily.  sertraline (ZOLOFT) 25 mg tablet Take 1 Tab by mouth daily.  oxyCODONE-acetaminophen (PERCOCET 10)  mg per tablet Take 1 Tab by mouth every six (6) hours as needed for Pain.  traZODone (DESYREL) 50 mg tablet Take 50 mg by mouth nightly.  rosuvastatin (CRESTOR) 10 mg tablet Take 10 mg by mouth nightly.  aspirin delayed-release 81 mg tablet Take 1 Tab by mouth daily. PAST MEDICAL HISTORY: Past medical history from the initial psychiatric evaluation has been reviewed (reviewed/updated 3/5/2019) with no additional updates (I asked patient and no additional past medical history provided). Past Medical History:   Diagnosis Date    Arthritis     CAD (coronary artery disease) 2007    CKD (chronic kidney disease), stage III ([address]     DM type 2 causing renal disease ([address]     DVT (deep venous thrombosis) (HCC)     Hx of DVT:  Xarelto stopped due to GI bleed. S/P IVC filter.  Gait abnormality     GI bleed     Heart murmur     Hepatitis C     History of blood transfusion     Hypercholesterolemia     Hypertension 11/7/2014    Neuropathy     Non compliance w medication regimen     Peripheral vascular disease ([address] 11/7/2014    A.   S/P Right SFA POBA and tibial atherectomy (2/4/13).  PUD (peptic ulcer disease) 2007    Unspecified sleep apnea     never used cpap     Past Surgical History:   Procedure Laterality Date    ABDOMEN SURGERY PROC UNLISTED  2007    has approx 7-8\" midline incision on abdomen--\"had to open him up and clean him out after feeding tube clogged\"    HX GI  2007    feeding tube for approx 2 mo    IR INSERT TUNL CVC W/O PORT OVER 5 YR  3/4/2019    VASCULAR SURGERY PROCEDURE UNLIST Right 1/22/2015    popliteal-tibial bypass      SOCIAL HISTORY: Social history from the initial psychiatric evaluation has been reviewed (reviewed/updated 3/5/2019) with no additional updates (I asked patient and no additional social history provided).    Social History     Socioeconomic History    Marital status: SINGLE     Spouse name: Not on file    Number of children: Not on file    Years of education: Not on file    Highest education level: Not on file   Social Needs    Financial resource strain: Not on file    Food insecurity - worry: Not on file    Food insecurity - inability: Not on file    Transportation needs - medical: Not on file   [place] needs - non-medical: Not on file   Occupational History    Not on file   Tobacco Use    Smoking status: Current Every Day Smoker     Packs/day: 0.50    Smokeless tobacco: Never Used   Substance and Sexual Activity    Alcohol use: No    Drug use: No     Comment: >20 years ago    Sexual activity: Not on file   Other Topics Concern     Service Not Asked    Blood Transfusions Not Asked    Caffeine Concern Not Asked    Occupational Exposure Not Asked    Hobby Hazards Not Asked    Sleep Concern Not Asked    Stress Concern Not Asked    Weight Concern Not Asked    Special Diet Not Asked    Back Care Not Asked    Exercise Not Asked    Bike Helmet Not Asked    Seat Belt Not Asked    Self-Exams Not Asked   Social History Narrative    78 year ols male admitted for depression and homelessness. Pt will be evicated from apartment due to non payment of bills. [facility] of 30 [facility] [place] [facility]. \" Pt is a brittle diabetic, with treatment non compliance. He has bilarela lower extremity amputations. Patient has a long hx of substance abuse. FAMILY HISTORY: Family history from the initial psychiatric evaluation has been reviewed (reviewed/updated 3/5/2019) with no additional updates (I asked patient and no additional family history provided). Family History   Problem Relation Age of Onset    Hypertension Father        REVIEW OF SYSTEMS: (reviewed/updated 3/5/2019)  Appetite:   Sleep: All other Review of Systems: Negative except          MENTAL STATUS EXAM & [address] (MSE):    MSE FINDINGS ARE WITHIN NORMAL LIMITS (WNL) UNLESS OTHERWISE STATED BELOW. ( ALL OF THE BELOW CATEGORIES OF THE MSE HAVE BEEN REVIEWED (reviewed 2/14/2019) AND UPDATED AS DEEMED APPROPRIATE )  General Presentation age appropriate and casually dressed, cooperative   Orientation oriented to time, place and person   Vital Signs  See below (reviewed 2/14/2019); Vital Signs (BP, Pulse, & Temp) are within normal limits if not listed below.    Gait and Station Stable/steady, no ataxia   Musculoskeletal System No extrapyramidal symptoms (EPS); no abnormal muscular movements or Tardive Dyskinesia (TD); muscle strength and tone are within normal limits   Language No aphasia or dysarthria   Speech:  soft   Thought Processes logical; slow rate of thoughts; fair abstract reasoning/computation   Thought Associations goal directed   Thought Content free of delusions   Suicidal Ideations intention   Homicidal Ideations no plan  and no intention   Mood:  anxious  and depressed   Affect:  constricted   Memory recent  intact   Memory remote:  intact   Concentration/Attention:  distractable   Fund of Knowledge average   Insight:  fair   Reliability fair   Judgment: fair                                                                                                    VITALS:     Patient Vitals for the past 24 hrs:   Temp Pulse Resp BP SpO2   03/05/19 0933 98.2 °F (36.8 °C) (!) 49 20 177/76 99 %   03/04/19 2005 98 °F (36.7 °C) (!) 58 18 184/87 97 %   03/04/19 1935 97.9 °F (36.6 °C) (!) 55 18 184/85 --   03/04/19 1900 -- (!) 56 20 183/57 --   03/04/19 1845 -- (!) 56 20 184/85 --   03/04/19 1830 -- (!) 55 18 189/90 --   03/04/19 1815 -- (!) 56 18 190/88 --   03/04/19 1800 -- (!) 54 20 171/65 --   03/04/19 1745 -- (!) 55 18 160/58 --   03/04/19 1735 97.8 °F (36.6 °C) (!) 54 18 170/58 --   03/04/19 1624 97.5 °F (36.4 °C) 78 16 184/82 98 %     Wt Readings from Last 3 Encounters:   02/14/19 73 kg (160 lb 15 oz)   02/13/19 73.4 kg (161 lb 12.8 oz)   01/15/19 81.6 kg (180 lb)     Temp Readings from Last 3 Encounters:   03/05/19 98.2 °F (36.8 °C)   03/04/19 97.6 °F (36.4 °C)   02/22/19 98.2 °F (36.8 °C)     BP Readings from Last 3 Encounters:   03/05/19 177/76   03/04/19 188/80   02/22/19 146/68     Pulse Readings from Last 3 Encounters:   03/05/19 (!) 49   03/04/19 (!) 54   02/22/19 (!) 53            DATA     LABORATORY DATA:(reviewed/updated 3/5/2019)  No results found for this or any previous visit (from the past 24 hour(s)). No results found for: VALF2, VALAC, VALP, VALPR, DS6, CRBAM, CRBAMP, CARB2, XCRBAM  No results found for: LITHM   RADIOLOGY REPORTS:(reviewed/updated 3/5/2019)  Xr Chest Pa Lat    Result Date: 2/8/2019  EXAM: XR CHEST PA LAT INDICATION: Shortness of breath, Low back pain and abdominal pain for several days. End-stage renal disease on dialysis, missed dialysis one day ago. COMPARISON: Chest views on 5/20/2018 TECHNIQUE: 4 images of PA and lateral chest views FINDINGS: Right jugular dialysis catheter is new and terminates in the distal superior vena cava. Cardiac monitoring wires overlie the thorax. Borderline cardiac size is unchanged.  Aortic arch is not enlarged. The pulmonary vasculature is within normal limits. The lungs and pleural spaces are clear. The visualized bones and upper abdomen are age-appropriate. IMPRESSION: No acute process on chest x-ray. Right jugular dialysis catheter terminates in the distal superior vena cava. No pneumothorax. Ct Abd Pelv W Cont    Result Date: 2/8/2019  EXAM: CT ABD PELV W CONT INDICATION: Abdominal pain and low back pain for several days. End-stage renal disease on dialysis, missed dialysis yesterday. Normal white blood cell count. Normal liver enzymes. COMPARISON: None. CONTRAST: 100 mL of Isovue-370. TECHNIQUE: Following the uneventful intravenous administration of contrast, thin axial images were obtained through the abdomen and pelvis. Coronal and sagittal reconstructions were generated. Oral contrast was administered. CT dose reduction was achieved through use of a standardized protocol tailored for this examination and automatic exposure control for dose modulation. FINDINGS: LUNG BASES: No pneumonia. Mild dependent atelectasis. INCIDENTALLY IMAGED HEART AND MEDIASTINUM: Borderline cardiac size. No pericardial effusion. LIVER: Subcentimeter liver lesions measure up to 9 mm in segment 2 of the liver. Surface is smooth. Streak artifact from bilateral upper extremities. GALLBLADDER: Gallstones are in the body and neck. No evidence of cholecystitis. CBD is not dilated. SPLEEN: Normal size and enhancement. PANCREAS: No mass or ductal dilatation. ADRENALS: Unremarkable. KIDNEYS: No mass, calculus, or hydronephrosis. STOMACH: Partial distention. SMALL BOWEL: No dilatation or wall thickening. COLON: Moderate colonic fecal burden. No mural thickening. APPENDIX: Unremarkable. PERITONEUM: No ascites or pneumoperitoneum. RETROPERITONEUM: IVC filter is in good position. Aorta is atherosclerotic without aneurysm. Mild atherosclerotic stenosis of the celiac artery. No lymphadenopathy.  REPRODUCTIVE ORGANS: Mild prostatomegaly measures 6 cm and extends into the base of the urinary bladder. URINARY BLADDER: No mass or calculus. BONES: Moderate bilateral hip osteoarthritis. ADDITIONAL COMMENTS: N/A     IMPRESSION: 1. No acute process on CT. 2. Cholelithiasis. No cholecystitis. 3. Subcentimeter segment 2 liver lesions cannot be fully characterized on this examination. 4. Moderate colonic fecal burden may represent constipation. No bowel obstruction.          MEDICATIONS     ALL MEDICATIONS:   Current Facility-Administered Medications   Medication Dose Route Frequency    sodium chloride (NS) flush 10 mL  10 mL InterCATHeter PRN    sertraline (ZOLOFT) tablet 100 mg  100 mg Oral DAILY    traZODone (DESYREL) tablet 25 mg  25 mg Oral QHS    heparin (porcine) 1,000 unit/mL injection 4,300 Units  4,300 Units Hemodialysis DIALYSIS PRN    epoetin rosie-epbx (RETACRIT) injection 20,000 Units  20,000 Units SubCUTAneous DIALYSIS TUE, THU & SAT    oxyCODONE-acetaminophen (PERCOCET) 5-325 mg per tablet 1 Tab  1 Tab Oral BID PRN    ibuprofen (MOTRIN) tablet 800 mg  800 mg Oral Q8H PRN    aspirin delayed-release tablet 81 mg  81 mg Oral DAILY    rosuvastatin (CRESTOR) tablet 10 mg  10 mg Oral QHS    ferric citrate (AURYXIA) tablet 420 mg  420 mg Oral TID WITH MEALS    NIFEdipine ER (PROCARDIA XL) tablet 60 mg  60 mg Oral Q24H    doxazosin (CARDURA) tablet 4 mg  4 mg Oral QHS    OLANZapine (ZyPREXA) tablet 2.5 mg  2.5 mg Oral Q6H PRN    ziprasidone (GEODON) 10 mg in sterile water (preservative free) 0.5 mL injection  10 mg IntraMUSCular BID PRN    benztropine (COGENTIN) tablet 1 mg  1 mg Oral BID PRN    benztropine (COGENTIN) injection 1 mg  1 mg IntraMUSCular BID PRN    zolpidem (AMBIEN) tablet 5 mg  5 mg Oral QHS PRN    acetaminophen (TYLENOL) tablet 650 mg  650 mg Oral Q4H PRN    magnesium hydroxide (MILK OF MAGNESIA) 400 mg/5 mL oral suspension 30 mL  30 mL Oral DAILY PRN    nicotine (NICODERM CQ) 21 mg/24 hr patch 1 Patch  1 Patch TransDERmal DAILY PRN    glucose chewable tablet 16 g  4 Tab Oral PRN    dextrose (D50W) injection syrg 12.5-25 g  25-50 mL IntraVENous PRN    glucagon (GLUCAGEN) injection 1 mg  1 mg IntraMUSCular PRN      SCHEDULED MEDICATIONS:   Current Facility-Administered Medications   Medication Dose Route Frequency    sertraline (ZOLOFT) tablet 100 mg  100 mg Oral DAILY    traZODone (DESYREL) tablet 25 mg  25 mg Oral QHS    epoetin rosie-epbx (RETACRIT) injection 20,000 Units  20,000 Units SubCUTAneous DIALYSIS TUE, THU & SAT    aspirin delayed-release tablet 81 mg  81 mg Oral DAILY    rosuvastatin (CRESTOR) tablet 10 mg  10 mg Oral QHS    ferric citrate (AURYXIA) tablet 420 mg  420 mg Oral TID WITH MEALS    NIFEdipine ER (PROCARDIA XL) tablet 60 mg  60 mg Oral Q24H    doxazosin (CARDURA) tablet 4 mg  4 mg Oral QHS          ASSESSMENT & PLAN     The patient, Julia Alejandra, is a 70 y.o.  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  Plan for hemodialysis 3 days a week.  If it is not controlled  with the same, provide additional antihypertensive meds.  Monitor blood pressure.    End-stage renal disease, on hemodialysis.  Schedule treatment for hemodialysis on  Tuesdays, Thursdays, and Saturdays. .   Type 2 diabetes mellitus.  Place on Humalog insulin correction coverage schedule,  Accu-Chek, and check hemoglobin A1c level.  The patient will be on a diabetic/renal  Diet. Nephrology consult   2/15/19: continue meds/milue, encourage compliance. 2/16/19: continue meds/milue  2/17/19: continue meds/milue increaisng zoloft 50 mg po daily  2/18/19: continue meds/milue  2/19/19: readding percocet twice daily as needed for pain.   2/20/19: awaiting placemnet, PT/OT for placement  2/21/19: nephrology consult, recommendation regrading dialysis,  2/22/19: increasing zoloft 75 mg daily, continue milue,  2/25/19: continue meds/milue, HD tomorrow  2/26/19: increasing zoloft 100mg po daily, HHA referral  2/27/19: received BP meds today, received percocet last night/ continue meds/milue  2/28/19: awaiting placemnet, PT/OT for placement  3/1/19: Nursing home [facility] in [facility] in progress  3/4/19: [address] [place] [address] Radiology to see for port, continue meds, continue Placement referrals  I will continue to monitor blood levels (Depakote, Tegretol, lithium, clozapine---a drug with a narrow therapeutic index= NTI) and associated labs for drug therapy implemented that require intense monitoring for toxicity as deemed appropriate based on current medication side effects and pharmacodynamically determined drug 1/2 lives. In summary, Precious Gannon, is a 70 y.o.  male who presents with a severe exacerbation of the principal diagnosis of Depression  Patient's condition is worsening/not improving/not stable improving. Patient requires continued inpatient hospitalization for further stabilization, safety monitoring and medication management. I will continue to coordinate the provision of individual, milieu, occupational, group, and substance abuse therapies to address target symptoms/diagnoses as deemed appropriate for the individual patient. A coordinated, multidisplinary treatment team round was conducted with the patient (this team consists of the nurse, psychiatric unit pharmcist,  and writer). Complete current electronic health record for patient has been reviewed today including consultant notes, ancillary staff notes, nurses and psychiatric tech notes. uicide risk assessment completed and patient deemed to be of low risk for suicide at this time. The following regarding medications was addressed during rounds with patient:   the risks and benefits of the proposed medication.  The patient was given the opportunity to ask questions. Informed consent given to the use of the above medications. Will continue to adjust psychiatric and non-psychiatric medications (see above \"medication\" section and orders section for details) as deemed appropriate & based upon diagnoses and response to treatment. I will continue to order blood tests/labs and diagnostic tests as deemed appropriate and review results as they become available (see orders for details and above listed lab/test results). I will order psychiatric records from previous [facility] hospitals to further elucidate the nature of patient's psychopathology and review once available. I will gather additional collateral information from friends, family and o/p treatment team to further elucidate the nature of patient's psychopathology and baselline level of psychiatric functioning. I certify that this patient's inpatient psychiatric hospital services furnished since the previous certification were, and continue to be, required for treatment that could reasonably be expected to improve the patient's condition, or for diagnostic study, and that the patient continues to need, on a daily basis, active treatment furnished directly by or requiring the supervision of inpatient psychiatric facility personnel. In addition, the hospital records show that services furnished were intensive treatment services, admission or related services, or equivalent services.     EXPECTED DISCHARGE DATE/DAY: TBD     DISPOSITION: Home       Signed By:   Irais Flores MD  3/5/2019

## 2019-03-05 NOTE — PROGRESS NOTES
Spoke with [facility] received info needed for Hep B Panel. Orders placed labs will be drawn today. Per Bharathi at admissions office he states fax results at [phone] and that he should be approved by tomorrow afternoon.

## 2019-03-05 NOTE — PROGRESS NOTES
Problem: Discharge Planning  Goal: *Discharge to safe environment  Outcome: Progressing Towards Goal  Patient will discharge to SNF once dialysis is moved to [facility]. Pt has been accepted to [facility]. Goal: *Knowledge of medication management  Outcome: Progressing Towards Goal  Patient verbalizes understanding of medication regimen. Patient is taking medications as prescribed. Goal: *Knowledge of discharge instructions  Outcome: Progressing Towards Goal  Patient verbalizes understanding of goals for treatment and safe discharge.

## 2019-03-06 PROCEDURE — 74011250637 HC RX REV CODE- 250/637: Performed by: HOSPITALIST

## 2019-03-06 PROCEDURE — 74011250637 HC RX REV CODE- 250/637: Performed by: INTERNAL MEDICINE

## 2019-03-06 PROCEDURE — 74011250637 HC RX REV CODE- 250/637: Performed by: PSYCHIATRY & NEUROLOGY

## 2019-03-06 PROCEDURE — 74011250637 HC RX REV CODE- 250/637: Performed by: FAMILY MEDICINE

## 2019-03-06 PROCEDURE — 65220000003 HC RM SEMIPRIVATE PSYCH

## 2019-03-06 RX ADMIN — ROSUVASTATIN CALCIUM 10 MG: 10 TABLET, FILM COATED ORAL at 20:19

## 2019-03-06 RX ADMIN — NIFEDIPINE 60 MG: 60 TABLET, FILM COATED, EXTENDED RELEASE ORAL at 13:50

## 2019-03-06 RX ADMIN — DOXAZOSIN 4 MG: 2 TABLET ORAL at 20:20

## 2019-03-06 RX ADMIN — FERRIC CITRATE 420 MG: 210 TABLET, COATED ORAL at 17:27

## 2019-03-06 RX ADMIN — OXYCODONE AND ACETAMINOPHEN 1 TABLET: 5; 325 TABLET ORAL at 15:20

## 2019-03-06 RX ADMIN — TRAZODONE HYDROCHLORIDE 25 MG: 50 TABLET ORAL at 20:20

## 2019-03-06 NOTE — PROGRESS NOTES
Problem: Depressed Mood (Adult/Pediatric)  Goal: *STG: Demonstrates reduction in symptoms and increase in insight into coping skills/future focused  Outcome: Not Progressing Towards Goal  Received patient in bedroom [facility]. NAD. Voiced no complaints. On q 15 min safety checks. Visible in DR watching Care Station TV, talking on the phone to family and wife, Haley Door. He ate 100% dinner, fluids and snacks. He refused to take his 1700 scheduled meds. Said \"I don't need those medications\". He also told Haley Vargas on phone \"I don't want to be discharged tomorrow\". He did take his bedtime medication with much encouragement. PRN Medication Documentation    Specific patient behavior that led to need for PRN medication: Patient requested pain med for generalized body pain.   Staff interventions attempted prior to PRN being given: Gave scheduled med, gave snacks,repositioned in bed  PRN medication given: 2257 Percocet 5-325 mg tab   Patient response/effectiveness of PRN medication: will monitor

## 2019-03-06 NOTE — INTERDISCIPLINARY ROUNDS
Behavioral Health Interdisciplinary Rounds     Patient Name: Gustavo Castro  Age: 70 y.o. Room/Bed:  740/  Primary Diagnosis: Depression   Admission Status: Voluntary     Readmission within 30 days: no  Power of  in place: no  Patient requires a blocked bed: no          Reason for blocked bed:   Sleep hours:  6.0      Participation in Care/Groups:  no  Medication Compliant?: Yes  PRNS (last 24 hours): Pain    Restraints (last 24 hours):  no     Alcohol screening (AUDIT) completed -  AUDIT Score: 0  If applicable, date SBIRT discussed in treatment team AND documented:    Tobacco - patient is a smoker: Have You Used Tobacco in the Past 30 Days: Yes  Illegal Drugs use: Have You Used Any Illegal Substances Over the Past 12 Months: Yes    24 hour chart check complete: yes     Patient goal(s) for today:   Treatment team focus/goals: Coordinate with Mau to transfer outpatient dialysis from [facility] to [place]  Progress note: Pt refusing meds, food, or participation in daily activities; states Neville Gamma was admitted to the hospital    LOS:  21  Expected LOS: 21-23    Participating treatment team members:  Gustavo Castro, DARRICK Almaguer; Dr. Carissa Andre MD; Sami Urbina RN; Judd Sánchez, DongD

## 2019-03-06 NOTE — PROGRESS NOTES
Problem: Depressed Mood (Adult/Pediatric)  Goal: *STG: Participates in treatment plan  Outcome: Progressing Towards Goal  Patient participates in treatment plan. Goal: *STG: Attends activities and groups  Outcome: Not Progressing Towards Goal  Variance: Patient Uncooperative  Comments: Patient turns head away at the suggestion of getting out of bed.

## 2019-03-06 NOTE — BH NOTES
PSYCHIATRIC PROGRESS NOTE         Patient Name  Franky Clay   Date of Birth 1940   [facility] [id number]   Medical Record Number  [id number]      Age  70 y.o. PCP Quique Mcgovern MD   Admit date:  2/13/2019    Room Number  740/01  @ [address]   Date of Service  3/6/2019           E & M PROGRESS NOTE: 15 mins         HISTORY       REASON FOR HOSPITALIZATION:  CC: \"Pt admitted under a voluntary basis for severe depression with suicidal ideations and  an inability to care for self. HISTORY OF PRESENT ILLNESS:    The patient, Franky Clay, is a 70 y.o. BLACK OR  male with 44-year-old -American male with past medical  history of end-stage renal disease; on hemodialysis, sleep apnea, peripheral vascular disease, bilateral BKA, hypertension, hyperlipidemia, peripheral neuropathy, anemia,hepatitis C, arthritis, peptic ulcer disease, coronary artery disease, DVT, prior GIbleed, chronic diastolic CHF, prior suicidal ideation, depression, and noncompliance   transfered to [facility] from [facility] after pt reported feeling depressed nad suicidal Pt stated he lives in the [place] and he is unhappy about his living situation , he stated he had lots of Money before [place] HAS 13 CHILDREN but now he is broke and no one ask about him. Pt reports feeling hopeless, helpless, angry. These symptoms are of high severity. These symptoms are constant . Pt ha sh/o using marijuana occasionally. UDS: negative; BAL=0. .  2/15/19: Pt is irritable and refused zoloft and vitals today. Pt has dialysis yesterday. 2/16/19: Pt took zoloft but refuses some medical meds. Pt had dialysis on Saturday. Pt is demanding in the evenings. 2/17/19: Pt is demanding, irritable in the evenings, compliant with zoloft. Eating well. 2/18/19: Pt is demanding and manipulative ta times but taking zoloft. Pt is eating well. 2/19/19: Pt taking zoloft.  Pt got agitated yesterday evening and received geodon I/M PRN. Pt reported he need percocet. AS per reports, he was on percocets for pain. 2/20/19: Pt is doing better, is no longer suicidal and is accepting of nursing home placement, is psychiatrically stable for discharge. 2/21/19: Pt did not receive dialysis , was sleeping in his bed. Pt has clot in the catheter nad need new port. Nephrology is contacted. Pt report not feeling depressed, awaiting placement.i  2/22/19: Pt  Is irritable at times, did not get dialusis yetserday, New port is placed, nephrology is on board. Pt is eating   well nad sleeping well. 2/23/19-Presented verbal and cooperative. He is compliant with meds. Repotrs he is homeless and has nowhere to go to. SW is trying to find appropriate placement. No AVH or SI/HI.  2/24/19- pt stayed in bed most of the day. He had been asking for discharge earlier but has not recognized a place. He was seen in his room and encouraged get up and come to the day-room. No prn's used. 2/25/19: Pt will go for dialysis on Tuesday. Pt  Is cooperative, refusing to go to [facility]. Pt is eating well, slept good,  2/26/19: Pt is still depressed at times. Pt contacted a friend yesterday and pt says  He wants to go live with her. Pt will need HHA and dialysis transportation after discharge. 2/27/19: slept 6 hours, BP was low , Bp meds were held yesterday. Pt is on phone. Pt refused zoloft in am, gets irritated at times. 2/28/19: Pt is in dialyiss, is in betetr mood, more courteous. Pt is eating well. Pt's finger stick was 71, no symptoms. 3/1/19: Pt slept 7 hours, received percocet last night. Nursing home placement in [facility] in progress. Pt is eating well, no acute issues at this time. 3/2: Could not get dialysis due to problems with port. Will get the port adjusted on Monday. Nephrology is aware, is courteous. sleeping eating well. 3/3: Reports depressed mood. No SI.   3/4/19: Pt pulled his port on Friday, did not receive dialysis on Saturday.  Pt keeps on changing statement regarding [facility]. Pt is for placment at [facility] at [facility]. Pt took meds. 3/5/19: Pt is fine sitting comfortable, got port done, has dialysis today. Pt psycheducated not to get port out. Hep B testing ordered  3/6/19: Pt is irritable at times, got dialysis yesterday, eating well. SIDE EFFECTS: (reviewed/updated 3/6/2019)  None reported or admitted to. No noted toxicity with use of Depakote/Tegretol/lithium/Clozaril/TCAs   ALLERGIES:(reviewed/updated 3/6/2019)  Allergies   Allergen Reactions    Tetracycline Hives      MEDICATIONS PRIOR TO ADMISSION:(reviewed/updated 3/6/2019)  Medications Prior to Admission   Medication Sig    NIFEdipine ER (ADALAT CC) 60 mg ER tablet Take 1 Tab by mouth daily. HOLD for HR<60    hydroCHLOROthiazide (HYDRODIURIL) 25 mg tablet Take 1 Tab by mouth daily.  sertraline (ZOLOFT) 25 mg tablet Take 1 Tab by mouth daily.  oxyCODONE-acetaminophen (PERCOCET 10)  mg per tablet Take 1 Tab by mouth every six (6) hours as needed for Pain.  traZODone (DESYREL) 50 mg tablet Take 50 mg by mouth nightly.  rosuvastatin (CRESTOR) 10 mg tablet Take 10 mg by mouth nightly.  aspirin delayed-release 81 mg tablet Take 1 Tab by mouth daily. PAST MEDICAL HISTORY: Past medical history from the initial psychiatric evaluation has been reviewed (reviewed/updated 3/6/2019) with no additional updates (I asked patient and no additional past medical history provided). Past Medical History:   Diagnosis Date    Arthritis     CAD (coronary artery disease) 2007    CKD (chronic kidney disease), stage III ([address]     DM type 2 causing renal disease ([facility]     DVT (deep venous thrombosis) (HCC)     Hx of DVT:  Xarelto stopped due to GI bleed. S/P IVC filter.     Gait abnormality     GI bleed     Heart murmur     Hepatitis C     History of blood transfusion     Hypercholesterolemia     Hypertension 11/7/2014    Neuropathy     Non compliance w medication regimen     Peripheral vascular disease ([facility] 11/7/2014    A. S/P Right SFA POBA and tibial atherectomy (2/4/13).  PUD (peptic ulcer disease) 2007    Unspecified sleep apnea     never used cpap     Past Surgical History:   Procedure Laterality Date    ABDOMEN SURGERY PROC UNLISTED  2007    has approx 7-8\" midline incision on abdomen--\"had to open him up and clean him out after feeding tube clogged\"    HX GI  2007    feeding tube for approx 2 mo    IR INSERT TUNL CVC W/O PORT OVER 5 YR  3/4/2019    VASCULAR SURGERY PROCEDURE UNLIST Right 1/22/2015    popliteal-tibial bypass      SOCIAL HISTORY: Social history from the initial psychiatric evaluation has been reviewed (reviewed/updated 3/6/2019) with no additional updates (I asked patient and no additional social history provided).    Social History     Socioeconomic History    Marital status: SINGLE     Spouse name: Not on file    Number of children: Not on file    Years of education: Not on file    Highest education level: Not on file   Social Needs    Financial resource strain: Not on file    Food insecurity - worry: Not on file    Food insecurity - inability: Not on file    Transportation needs - medical: Not on file   [employer] needs - non-medical: Not on file   Occupational History    Not on file   Tobacco Use    Smoking status: Current Every Day Smoker     Packs/day: 0.50    Smokeless tobacco: Never Used   Substance and Sexual Activity    Alcohol use: No    Drug use: No     Comment: >20 years ago    Sexual activity: Not on file   Other Topics Concern     Service Not Asked    Blood Transfusions Not Asked    Caffeine Concern Not Asked    Occupational Exposure Not Asked    Hobby Hazards Not Asked    Sleep Concern Not Asked    Stress Concern Not Asked    Weight Concern Not Asked    Special Diet Not Asked    Back Care Not Asked    Exercise Not Asked    Bike Helmet Not Asked   [address] Not Asked    Self-Exams Not Asked   Social History Narrative    78 year ols male admitted for depression and homelessness. Pt will be evicated from apartment due to non payment of bills. [facility] of 30 years [facility] [place] [facility] [place] [facility] [place]. \" Pt is a brittle diabetic, with treatment non compliance. He has bilarela lower extremity amputations. Patient has a long hx of substance abuse. FAMILY HISTORY: Family history from the initial psychiatric evaluation has been reviewed (reviewed/updated 3/6/2019) with no additional updates (I asked patient and no additional family history provided). Family History   Problem Relation Age of Onset    Hypertension Father        REVIEW OF SYSTEMS: (reviewed/updated 3/6/2019)  Appetite:   Sleep: All other Review of Systems: Negative except          MENTAL STATUS EXAM & [address] (MSE):    MSE FINDINGS ARE WITHIN NORMAL LIMITS (WNL) UNLESS OTHERWISE STATED BELOW. ( ALL OF THE BELOW CATEGORIES OF THE MSE HAVE BEEN REVIEWED (reviewed 2/14/2019) AND UPDATED AS DEEMED APPROPRIATE )  General Presentation age appropriate and casually dressed, cooperative   Orientation oriented to time, place and person   Vital Signs  See below (reviewed 2/14/2019); Vital Signs (BP, Pulse, & Temp) are within normal limits if not listed below.    Gait and Station Stable/steady, no ataxia   Musculoskeletal System No extrapyramidal symptoms (EPS); no abnormal muscular movements or Tardive Dyskinesia (TD); muscle strength and tone are within normal limits   Language No aphasia or dysarthria   Speech:  soft   Thought Processes logical; slow rate of thoughts; fair abstract reasoning/computation   Thought Associations goal directed   Thought Content free of delusions   Suicidal Ideations intention   Homicidal Ideations no plan  and no intention   Mood:  anxious  and depressed   Affect:  constricted   Memory recent  intact   Memory remote:  intact   Concentration/Attention:  distractable   Fund of Knowledge average   Insight:  fair   Reliability fair   Judgment:  fair                                                                                                    VITALS:     Patient Vitals for the past 24 hrs:   Temp Pulse Resp BP SpO2   03/06/19 0927 97.8 °F (36.6 °C) (!) 50 18 164/82 100 %   03/06/19 0916 97.8 °F (36.6 °C) (!) 48 18 (!) 206/93 100 %   03/05/19 2006 99.9 °F (37.7 °C) 91 16 151/76 98 %   03/05/19 1720 98.1 °F (36.7 °C) (!) 51 19 145/70 --   03/05/19 1700 -- (!) 54 18 134/78 --   03/05/19 1645 -- (!) 56 18 145/78 --   03/05/19 1630 -- (!) 56 18 130/70 --   03/05/19 1615 -- (!) 51 20 135/80 --   03/05/19 1600 -- (!) 50 18 146/76 --   03/05/19 1545 -- (!) 51 18 150/80 --   03/05/19 1530 -- (!) 50 20 145/74 --   03/05/19 1515 -- (!) 51 18 155/72 --   03/05/19 1500 -- (!) 48 18 180/65 --   03/05/19 1445 -- (!) 48 20 165/85 --   03/05/19 1430 -- (!) 46 18 176/80 --   03/05/19 1420 98.3 °F (36.8 °C) (!) 46 18 181/77 --     Wt Readings from Last 3 Encounters:   02/14/19 73 kg (160 lb 15 oz)   02/13/19 73.4 kg (161 lb 12.8 oz)   01/15/19 81.6 kg (180 lb)     Temp Readings from Last 3 Encounters:   03/06/19 97.8 °F (36.6 °C)   03/04/19 97.6 °F (36.4 °C)   02/22/19 98.2 °F (36.8 °C)     BP Readings from Last 3 Encounters:   03/06/19 164/82   03/04/19 188/80   02/22/19 146/68     Pulse Readings from Last 3 Encounters:   03/06/19 (!) 50   03/04/19 (!) 54   02/22/19 (!) 53            DATA     LABORATORY DATA:(reviewed/updated 3/6/2019)  Recent Results (from the past 24 hour(s))   HEP B SURFACE AG    Collection Time: 03/05/19  2:30 PM   Result Value Ref Range    Hepatitis B surface Ag <0.10 Index    Hep B surface Ag Interp. NEGATIVE  NEG     HEP B SURFACE AB    Collection Time: 03/05/19  2:30 PM   Result Value Ref Range    Hepatitis B surface Ab 13.24 mIU/mL    Hep B surface Ab Interp.  REACTIVE (A) NR       No results found for: VALF2, VALAC, VALP, VALPR, DS6, CRBAM, CRBAMP, CARB2, XCRBAM  No results found for: FIGUEROA   RADIOLOGY REPORTS:(reviewed/updated 3/6/2019)  Xr Chest Pa Lat    Result Date: 2/8/2019  EXAM: XR CHEST PA LAT INDICATION: Shortness of breath, Low back pain and abdominal pain for several days. End-stage renal disease on dialysis, missed dialysis one day ago. COMPARISON: Chest views on 5/20/2018 TECHNIQUE: 4 images of PA and lateral chest views FINDINGS: Right jugular dialysis catheter is new and terminates in the distal superior vena cava. Cardiac monitoring wires overlie the thorax. Borderline cardiac size is unchanged. Aortic arch is not enlarged. The pulmonary vasculature is within normal limits. The lungs and pleural spaces are clear. The visualized bones and upper abdomen are age-appropriate. IMPRESSION: No acute process on chest x-ray. Right jugular dialysis catheter terminates in the distal superior vena cava. No pneumothorax. Ct Abd Pelv W Cont    Result Date: 2/8/2019  EXAM: CT ABD PELV W CONT INDICATION: Abdominal pain and low back pain for several days. End-stage renal disease on dialysis, missed dialysis yesterday. Normal white blood cell count. Normal liver enzymes. COMPARISON: None. CONTRAST: 100 mL of Isovue-370. TECHNIQUE: Following the uneventful intravenous administration of contrast, thin axial images were obtained through the abdomen and pelvis. Coronal and sagittal reconstructions were generated. Oral contrast was administered. CT dose reduction was achieved through use of a standardized protocol tailored for this examination and automatic exposure control for dose modulation. FINDINGS: LUNG BASES: No pneumonia. Mild dependent atelectasis. INCIDENTALLY IMAGED HEART AND MEDIASTINUM: Borderline cardiac size. No pericardial effusion. LIVER: Subcentimeter liver lesions measure up to 9 mm in segment 2 of the liver. Surface is smooth. Streak artifact from bilateral upper extremities. GALLBLADDER: Gallstones are in the body and neck. No evidence of cholecystitis.  CBD is not dilated. SPLEEN: Normal size and enhancement. PANCREAS: No mass or ductal dilatation. ADRENALS: Unremarkable. KIDNEYS: No mass, calculus, or hydronephrosis. STOMACH: Partial distention. SMALL BOWEL: No dilatation or wall thickening. COLON: Moderate colonic fecal burden. No mural thickening. APPENDIX: Unremarkable. PERITONEUM: No ascites or pneumoperitoneum. RETROPERITONEUM: IVC filter is in good position. Aorta is atherosclerotic without aneurysm. Mild atherosclerotic stenosis of the celiac artery. No lymphadenopathy. REPRODUCTIVE ORGANS: Mild prostatomegaly measures 6 cm and extends into the base of the urinary bladder. URINARY BLADDER: No mass or calculus. BONES: Moderate bilateral hip osteoarthritis. ADDITIONAL COMMENTS: N/A     IMPRESSION: 1. No acute process on CT. 2. Cholelithiasis. No cholecystitis. 3. Subcentimeter segment 2 liver lesions cannot be fully characterized on this examination. 4. Moderate colonic fecal burden may represent constipation. No bowel obstruction.          MEDICATIONS     ALL MEDICATIONS:   Current Facility-Administered Medications   Medication Dose Route Frequency    sodium chloride (NS) flush 10 mL  10 mL InterCATHeter PRN    sertraline (ZOLOFT) tablet 100 mg  100 mg Oral DAILY    traZODone (DESYREL) tablet 25 mg  25 mg Oral QHS    heparin (porcine) 1,000 unit/mL injection 4,300 Units  4,300 Units Hemodialysis DIALYSIS PRN    epoetin rosie-epbx (RETACRIT) injection 20,000 Units  20,000 Units SubCUTAneous DIALYSIS TUE, THU & SAT    oxyCODONE-acetaminophen (PERCOCET) 5-325 mg per tablet 1 Tab  1 Tab Oral BID PRN    ibuprofen (MOTRIN) tablet 800 mg  800 mg Oral Q8H PRN    aspirin delayed-release tablet 81 mg  81 mg Oral DAILY    rosuvastatin (CRESTOR) tablet 10 mg  10 mg Oral QHS    ferric citrate (AURYXIA) tablet 420 mg  420 mg Oral TID WITH MEALS    NIFEdipine ER (PROCARDIA XL) tablet 60 mg  60 mg Oral Q24H    doxazosin (CARDURA) tablet 4 mg  4 mg Oral QHS    OLANZapine (ZyPREXA) tablet 2.5 mg  2.5 mg Oral Q6H PRN    ziprasidone (GEODON) 10 mg in sterile water (preservative free) 0.5 mL injection  10 mg IntraMUSCular BID PRN    benztropine (COGENTIN) tablet 1 mg  1 mg Oral BID PRN    benztropine (COGENTIN) injection 1 mg  1 mg IntraMUSCular BID PRN    zolpidem (AMBIEN) tablet 5 mg  5 mg Oral QHS PRN    acetaminophen (TYLENOL) tablet 650 mg  650 mg Oral Q4H PRN    magnesium hydroxide (MILK OF MAGNESIA) 400 mg/5 mL oral suspension 30 mL  30 mL Oral DAILY PRN    nicotine (NICODERM CQ) 21 mg/24 hr patch 1 Patch  1 Patch TransDERmal DAILY PRN    glucose chewable tablet 16 g  4 Tab Oral PRN    dextrose (D50W) injection syrg 12.5-25 g  25-50 mL IntraVENous PRN    glucagon (GLUCAGEN) injection 1 mg  1 mg IntraMUSCular PRN      SCHEDULED MEDICATIONS:   Current Facility-Administered Medications   Medication Dose Route Frequency    sertraline (ZOLOFT) tablet 100 mg  100 mg Oral DAILY    traZODone (DESYREL) tablet 25 mg  25 mg Oral QHS    epoetin rosie-epbx (RETACRIT) injection 20,000 Units  20,000 Units SubCUTAneous DIALYSIS TUE, THU & SAT    aspirin delayed-release tablet 81 mg  81 mg Oral DAILY    rosuvastatin (CRESTOR) tablet 10 mg  10 mg Oral QHS    ferric citrate (AURYXIA) tablet 420 mg  420 mg Oral TID WITH MEALS    NIFEdipine ER (PROCARDIA XL) tablet 60 mg  60 mg Oral Q24H    doxazosin (CARDURA) tablet 4 mg  4 mg Oral QHS          ASSESSMENT & PLAN     The patient, Lizeth Khanna, is a 70 y.o.  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  Plan for hemodialysis 3 days a week.  If it is not controlled  with the same, provide additional antihypertensive meds.  Monitor blood pressure.    End-stage renal disease, on hemodialysis.  Schedule treatment for hemodialysis on  Tuesdays, Thursdays, and Saturdays. .   Type 2 diabetes mellitus.  Place on Humalog insulin correction coverage schedule,  Accu-Chek, and check hemoglobin A1c level.  The patient will be on a diabetic/renal  Diet. Nephrology consult   2/15/19: continue meds/milue, encourage compliance. 2/16/19: continue meds/milue  2/17/19: continue meds/milue increaisng zoloft 50 mg po daily  2/18/19: continue meds/milue  2/19/19: readding percocet twice daily as needed for pain. 2/20/19: awaiting [facility]emnet, PT/OT for placement  2/21/19: nephrology consult, recommendation regrading dialysis,  2/22/19: increasing zoloft 75 mg daily, continue milue,  2/25/19: continue meds/milue, HD tomorrow  2/26/19: increasing zoloft 100mg po daily, HHA referral  2/27/19: received BP meds today, received percocet last night/ continue meds/milue  2/28/19: awaiting [facility]emnet, PT/OT for placement  3/1/19: Nursing home placement in [facility] in progress  3/4/19: [address] [place] [address] Radiology to see for port, continue meds, continue Placement referrals  I will continue to monitor blood levels (Depakote, Tegretol, lithium, clozapine---a drug with a narrow therapeutic index= NTI) and associated labs for drug therapy implemented that require intense monitoring for toxicity as deemed appropriate based on current medication side effects and pharmacodynamically determined drug 1/2 lives. In summary, Van Wright, is a 70 y.o.  male who presents with a severe exacerbation of the principal diagnosis of Depression  Patient's condition is worsening/not improving/not stable improving. Patient requires continued inpatient hospitalization for further stabilization, safety monitoring and medication management. I will continue to coordinate the provision of individual, milieu, occupational, group, and substance abuse therapies to address target symptoms/diagnoses as deemed appropriate for the individual patient.   A coordinated, multidisplinary treatment team round was conducted with the patient (this team consists of the nurse, psychiatric unit pharmcist,  and writer). Complete current electronic health record for patient has been reviewed today including consultant notes, ancillary staff notes, nurses and psychiatric tech notes. uicide risk assessment completed and patient deemed to be of low risk for suicide at this time. The following regarding medications was addressed during rounds with patient:   the risks and benefits of the proposed medication. The patient was given the opportunity to ask questions. Informed consent given to the use of the above medications. Will continue to adjust psychiatric and non-psychiatric medications (see above \"medication\" section and orders section for details) as deemed appropriate & based upon diagnoses and response to treatment. I will continue to order blood tests/labs and diagnostic tests as deemed appropriate and review results as they become available (see orders for details and above listed lab/test results). I will order psychiatric records from previous [facility] hospitals to further elucidate the nature of patient's psychopathology and review once available. I will gather additional collateral information from friends, family and o/p treatment team to further elucidate the nature of patient's psychopathology and baselline level of psychiatric functioning. I certify that this patient's inpatient psychiatric hospital services furnished since the previous certification were, and continue to be, required for treatment that could reasonably be expected to improve the patient's condition, or for diagnostic study, and that the patient continues to need, on a daily basis, active treatment furnished directly by or requiring the supervision of inpatient psychiatric facility personnel.  In addition, the hospital records show that services furnished were intensive treatment services, admission or related services, or equivalent services.     EXPECTED DISCHARGE DATE/DAY: TBD     DISPOSITION: Home       Signed By:   Lalo Mcginnis MD  3/6/2019

## 2019-03-06 NOTE — PROGRESS NOTES
Problem: Falls - Risk of  Goal: *Absence of Falls  Document Bello Fall Risk and appropriate interventions in the flowsheet. Outcome: Progressing Towards Goal  Fall Risk Interventions:  Mobility Interventions: Bed/chair exit alarm, Communicate number of staff needed for ambulation/transfer, Patient to call before getting OOB    Mentation Interventions: Adequate sleep, hydration, pain control, Bed/chair exit alarm, Door open when patient unattended, More frequent rounding  In bed asleep  With respirations noted as even and unlabored as chest was rising and falling. Will continue to monitor for safety and 15 minute checks throughout shift.   Medication Interventions: Bed/chair exit alarm, Patient to call before getting OOB, Teach patient to arise slowly    Elimination Interventions: Bed/chair exit alarm, Patient to call for help with toileting needs, Toilet paper/wipes in reach, Urinal in reach    History of Falls Interventions: Bed/chair exit alarm

## 2019-03-06 NOTE — PROGRESS NOTES
Problem: Falls - Risk of   Goals will be met by 03/13/19  Goal: *Absence of Falls  Document Bello Fall Risk and appropriate interventions in the flowsheet. Outcome: Progressing Towards Goal  Fall Risk Interventions:  Patient is absent of falls. Patient is compliant with waiting for help with transfers. Patient has refused breakfast, refused to get up, patient turned himself onto rt side. Patient refused to get up for lunch, non-compliant with morning medications, patient is demanding, entitled. Mobility Interventions: Bed/chair exit alarm, Communicate number of staff needed for ambulation/transfer, Patient to call before getting OOB    Mentation Interventions: Adequate sleep, hydration, pain control, Bed/chair exit alarm, Door open when patient unattended, More frequent rounding    Medication Interventions: Bed/chair exit alarm    Elimination Interventions: Call light in reach, Patient to call for help with toileting needs    History of Falls Interventions: Bed/chair exit alarm    Problem: Depressed Mood (Adult/Pediatric)  Goal: *STG: Participates in treatment plan  Outcome: Progressing Towards Goal  Patient participates in treatment plan. Goal: *STG: Attends activities and groups  Outcome: Not Progressing Towards Goal  Variance: Patient Uncooperative  Comments: Patient turns head away at the suggestion of getting out of bed.     [facility] 60  Master Treatment Plan for Corby Hand    Date Treatment Plan Initiated: 03/06/19    Treatment Plan Modalities:  Type of Modality Amount  (x minutes) Frequency (x/week) Duration (x days) Name of Responsible Staff   [address] [facility] meetings to encourage peer interactions 13 215 [facility], RN     Group psychotherapy to assist in building coping skills and internal controls 60 7 1 Jose Arreguin   Therapeutic activity groups to build coping skills 60 7 1 Jose Arreguin   Psychoeducation in group setting to address:   Medication education   15 7 1 Osvaldo Sinclair, RN   Coping skills         Relaxation techniques         Symptom management         Discharge planning   60 2 Jaylon Tapia 115   60 1 1 volunteer   Recovery/AA/NA   60 3 1 volunteer   Physician medication management   15 7 1 Dr. Mitzi Merlos meeting/discharge planning   15 2 1400 [facility]                                  [phone]:  Patient advised he wants to get up. Left team called, they advised they will be here soon.

## 2019-03-06 NOTE — BH NOTES
GROUP THERAPY PROGRESS NOTE    Ramone Tapia did not participate in a 45 minute Process Group on the Geriatric Unit with a focus on shifting ones feelings to a positive note and preparing for the day through group singing.

## 2019-03-06 NOTE — PROGRESS NOTES
Problem: Depressed Mood (Adult/Pediatric)  Goal: *STG: Participates in treatment plan  Outcome: Progressing Towards Goal  Irritable. Demanding.

## 2019-03-06 NOTE — PROGRESS NOTES
Nephrology Progress Note  Micheal Given  Date of Admission : 2/13/2019    CC:  Follow up for ESRD       Assessment and Plan     ESRD on HD:  - HD TTS at [facility]  - HD tomorrow     HTN:  - cont current meds    Anemia of CKD:  - FANNY with dialysis     Secondary HPT:  - cont auryxia    Diastolic HF    PAD s/p b/l BKAs    DM2:  - on insulin    Depression:  - per psychiatry       Interval History:  Seen and examined   Did not wanted to be bothered this morning . No cp, n/v/d. Current Medications: all current  Medications have been eviewed in EPIC  Review of Systems: Pertinent items are noted in HPI. Objective:  Vitals:    Vitals:    03/05/19 1645 03/05/19 1700 03/05/19 1720 03/05/19 2006   BP: 145/78 134/78 145/70 151/76   Pulse: (!) 56 (!) 54 (!) 51 91   Resp: 18 18 19 16   Temp:   98.1 °F (36.7 °C) 99.9 °F (37.7 °C)   TempSrc:   Oral    SpO2:    98%   Weight:         Intake and Output:  No intake/output data recorded. 03/04 1901 - 03/06 0700  In: -   Out: 4000     Physical Examination:  General: NAD  Neck:  Supple, no mass  Resp:  Lungs CTA B/L, no wheezing , normal respiratory effort  CV:  RRR,  no murmur or rub, no thigh edema, b/l BKAs  GI:  Soft, NT, + Bowel sounds, no hepatosplenomegaly  Neurologic:  Non focal  Psych:             Depressed, flat affect  Skin:  No Rash  Access:           TIJ PC c/d/i    []    High complexity decision making was performed  []    Patient is at high-risk of decompensation with multiple organ involvement    Lab Data Personally Reviewed: I have reviewed all the pertinent labs, microbiology data and radiology studies during assessment. No results for input(s): NA, K, CL, CO2, GLU, BUN, CREA, CA, MG, PHOS, ALB, TBIL, SGOT, ALT, INR in the last 72 hours. No lab exists for component: INREXT, INREXT  No results for input(s): WBC, HGB, HCT, PLT, HGBEXT, HCTEXT, PLTEXT, HGBEXT, HCTEXT, PLTEXT in the last 72 hours.   No results found for: SDES  Lab Results   Component Value Date/Time    Culture result: MRSA NOT PRESENT 01/24/2018 10:30 AM    Culture result:  01/24/2018 10:30 AM         Screening of patient nares for MRSA is for surveillance purposes and, if positive, to facilitate isolation considerations in high risk settings. It is not intended for automatic decolonization interventions per se as regimens are not sufficiently effective to warrant routine use. Culture result: MRSA NOT PRESENT 01/19/2015 10:44 AM    Culture result:  01/19/2015 10:44 AM         Screening of patient nares for MRSA is for surveillance purposes and, if positive, to facilitate isolation considerations in high risk settings. It is not intended for automatic decolonization interventions per se as regimens are not sufficiently effective to warrant routine use. Recent Results (from the past 24 hour(s))   HEP B SURFACE AG    Collection Time: 03/05/19  2:30 PM   Result Value Ref Range    Hepatitis B surface Ag <0.10 Index    Hep B surface Ag Interp. NEGATIVE  NEG     HEP B SURFACE AB    Collection Time: 03/05/19  2:30 PM   Result Value Ref Range    Hepatitis B surface Ab 13.24 mIU/mL    Hep B surface Ab Interp. REACTIVE (A) NR                     Gigi Fofana MD  [facility]   [address] [address]  Phone - [phone]   Fax - [phone]  [email or web address]

## 2019-03-07 LAB
ALBUMIN SERPL-MCNC: 3.2 G/DL (ref 3.5–5)
ANION GAP SERPL CALC-SCNC: 8 MMOL/L (ref 5–15)
BASOPHILS # BLD: 0 K/UL (ref 0–0.1)
BASOPHILS NFR BLD: 1 % (ref 0–1)
BUN SERPL-MCNC: 48 MG/DL (ref 6–20)
BUN/CREAT SERPL: 12 (ref 12–20)
CALCIUM SERPL-MCNC: 8.5 MG/DL (ref 8.5–10.1)
CHLORIDE SERPL-SCNC: 102 MMOL/L (ref 97–108)
CO2 SERPL-SCNC: 26 MMOL/L (ref 21–32)
COMMENT, HOLDF: NORMAL
CREAT SERPL-MCNC: 3.98 MG/DL (ref 0.7–1.3)
DIFFERENTIAL METHOD BLD: ABNORMAL
EOSINOPHIL # BLD: 0.5 K/UL (ref 0–0.4)
EOSINOPHIL NFR BLD: 12 % (ref 0–7)
ERYTHROCYTE [DISTWIDTH] IN BLOOD BY AUTOMATED COUNT: 21.4 % (ref 11.5–14.5)
GLUCOSE SERPL-MCNC: 106 MG/DL (ref 65–100)
HBV CORE AB SERPL QL IA: POSITIVE
HCT VFR BLD AUTO: 34.8 % (ref 36.6–50.3)
HGB BLD-MCNC: 10.5 G/DL (ref 12.1–17)
IMM GRANULOCYTES # BLD AUTO: 0 K/UL (ref 0–0.04)
IMM GRANULOCYTES NFR BLD AUTO: 1 % (ref 0–0.5)
LYMPHOCYTES # BLD: 1.1 K/UL (ref 0.8–3.5)
LYMPHOCYTES NFR BLD: 28 % (ref 12–49)
MCH RBC QN AUTO: 27.9 PG (ref 26–34)
MCHC RBC AUTO-ENTMCNC: 30.2 G/DL (ref 30–36.5)
MCV RBC AUTO: 92.3 FL (ref 80–99)
MONOCYTES # BLD: 0.4 K/UL (ref 0–1)
MONOCYTES NFR BLD: 10 % (ref 5–13)
NEUTS SEG # BLD: 2 K/UL (ref 1.8–8)
NEUTS SEG NFR BLD: 48 % (ref 32–75)
NRBC # BLD: 0 K/UL (ref 0–0.01)
NRBC BLD-RTO: 0 PER 100 WBC
PHOSPHATE SERPL-MCNC: 4.7 MG/DL (ref 2.6–4.7)
PLATELET # BLD AUTO: 142 K/UL (ref 150–400)
PMV BLD AUTO: 9.6 FL (ref 8.9–12.9)
POTASSIUM SERPL-SCNC: 4.2 MMOL/L (ref 3.5–5.1)
RBC # BLD AUTO: 3.77 M/UL (ref 4.1–5.7)
RBC MORPH BLD: ABNORMAL
RBC MORPH BLD: ABNORMAL
SAMPLES BEING HELD,HOLD: NORMAL
SODIUM SERPL-SCNC: 136 MMOL/L (ref 136–145)
WBC # BLD AUTO: 4 K/UL (ref 4.1–11.1)

## 2019-03-07 PROCEDURE — 65220000003 HC RM SEMIPRIVATE PSYCH

## 2019-03-07 PROCEDURE — 74011250637 HC RX REV CODE- 250/637: Performed by: PSYCHIATRY & NEUROLOGY

## 2019-03-07 PROCEDURE — 90935 HEMODIALYSIS ONE EVALUATION: CPT

## 2019-03-07 PROCEDURE — 80069 RENAL FUNCTION PANEL: CPT

## 2019-03-07 PROCEDURE — 74011250637 HC RX REV CODE- 250/637: Performed by: FAMILY MEDICINE

## 2019-03-07 PROCEDURE — 74011250637 HC RX REV CODE- 250/637: Performed by: HOSPITALIST

## 2019-03-07 PROCEDURE — 36415 COLL VENOUS BLD VENIPUNCTURE: CPT

## 2019-03-07 PROCEDURE — 74011250636 HC RX REV CODE- 250/636: Performed by: INTERNAL MEDICINE

## 2019-03-07 PROCEDURE — 85025 COMPLETE CBC W/AUTO DIFF WBC: CPT

## 2019-03-07 PROCEDURE — 74011250637 HC RX REV CODE- 250/637: Performed by: INTERNAL MEDICINE

## 2019-03-07 RX ADMIN — SERTRALINE HYDROCHLORIDE 100 MG: 50 TABLET ORAL at 09:50

## 2019-03-07 RX ADMIN — OXYCODONE AND ACETAMINOPHEN 1 TABLET: 5; 325 TABLET ORAL at 17:25

## 2019-03-07 RX ADMIN — EPOETIN ALFA-EPBX 20000 UNITS: 10000 INJECTION, SOLUTION INTRAVENOUS; SUBCUTANEOUS at 16:00

## 2019-03-07 RX ADMIN — FERRIC CITRATE 420 MG: 210 TABLET, COATED ORAL at 09:50

## 2019-03-07 RX ADMIN — TRAZODONE HYDROCHLORIDE 25 MG: 50 TABLET ORAL at 21:00

## 2019-03-07 RX ADMIN — ASPIRIN 81 MG: 81 TABLET, COATED ORAL at 09:50

## 2019-03-07 RX ADMIN — FERRIC CITRATE 420 MG: 210 TABLET, COATED ORAL at 17:25

## 2019-03-07 RX ADMIN — ZOLPIDEM TARTRATE 5 MG: 5 TABLET ORAL at 22:10

## 2019-03-07 RX ADMIN — OXYCODONE AND ACETAMINOPHEN 1 TABLET: 5; 325 TABLET ORAL at 03:38

## 2019-03-07 RX ADMIN — NIFEDIPINE 60 MG: 60 TABLET, FILM COATED, EXTENDED RELEASE ORAL at 17:25

## 2019-03-07 RX ADMIN — HEPARIN SODIUM 4300 UNITS: 1000 INJECTION INTRAVENOUS; SUBCUTANEOUS at 15:57

## 2019-03-07 RX ADMIN — DOXAZOSIN 4 MG: 2 TABLET ORAL at 21:14

## 2019-03-07 RX ADMIN — ROSUVASTATIN CALCIUM 10 MG: 10 TABLET, FILM COATED ORAL at 21:16

## 2019-03-07 RX ADMIN — FERRIC CITRATE 420 MG: 210 TABLET, COATED ORAL at 12:17

## 2019-03-07 NOTE — PROGRESS NOTES
Problem: Falls - Risk of  Goal: *Absence of Falls  Document Bello Fall Risk and appropriate interventions in the flowsheet. Outcome: Progressing Towards Goal  Fall Risk Interventions:  Mobility Interventions: Bed/chair exit alarm, Communicate number of staff needed for ambulation/transfer, Patient to call before getting OOB    Mentation Interventions: Adequate sleep, hydration, pain control, Bed/chair exit alarm, Door open when patient unattended, More frequent rounding    Medication Interventions: Bed/chair exit alarm    Elimination Interventions: Call light in reach, Patient to call for help with toileting needs    History of Falls Interventions: Bed/chair exit alarm    In bed asleep with respirations noted as even and unlabored as chest was rising and falling. Will continue to monitor for safety and 15 minute checks throughout shift.

## 2019-03-07 NOTE — PROGRESS NOTES
1532 Problem: Depressed Mood (Adult/Pediatric)  Goal: *STG: Verbalizes anger, guilt, and other feelings in a constructive manor  Outcome: Progressing Towards Goal  Patient is received in bed 733 receiving dialysis. Staff continue q15 checks and encourage pt to share feelings. 1643 dialysis nurse states pt dialysis is completed. Nurse has called lift team for assistance to transfer pt to [place] [address] and back to [facility] unit. Nurse asks if he would like to eat dinner while still in bed in dialysis room. Patient states no he does not. 1700 nurse asked pt if she could check his blood sugar. He refused stating. \"they don't have to do that anymore\"  When nurse said she would check the doctor's order, he said \"You don't have to check it\" Nurse awaits lift team to help with patient transfer to [facility] patient showed that he is able to transfer independently to [place] [address]. Nurse encouraged independence. He was in appropriate position fro transfer and to use upper body strength to transfer from bed to . He was refusing to wait for lift team. At the same time lift team arrived and gave minimal assistance. Nurse encouraged independence. Patient states he can transfer but his girlfriend lifts him at home. Patient ate 100% dinner.

## 2019-03-07 NOTE — DIALYSIS
[facility] Dialysis Team [facility] [place]  [phone]    Vitals   Pre   Post   Assessment   Pre   Post     Temp  Temp: 98.5 °F (36.9 °C) (03/07/19 1323)  98.5 LOC  A&0x3 A&0x3   HR   Pulse (Heart Rate): (!) 47 (03/07/19 1330) 52 Lungs   clear  clear   B/P   BP: 183/82 (03/07/19 1330) 148/73 Cardiac   normal  normal   Resp   Resp Rate: 16 (03/07/19 0815) 18 Skin   Dry and intact  dry and intact   Pain level  Pain Intensity 1: 0 (03/07/19 0815) 0/10 Edema  none     none   Orders:    Duration:   Start:    1326 End:    1626 Total:   3 hrs   Dialyzer:   Dialyzer/Set Up Inspection: Annie Garber (03/07/19 1323)   K Bath:   Dialysate K (mEq/L): 2 (03/07/19 1323)   Ca Bath:   Dialysate CA (mEq/L): 2.5 (03/07/19 1323)   Na/Bicarb:   Dialysate NA (mEq/L): 140 (03/07/19 1323)   Target Fluid Removal:   Goal/Amount of Fluid to Remove (mL): 2000 mL (03/07/19 1323)   Access  RIJ   Type & Location:    A&0x3, RT IJ dressing changed, no s/s infection, aspirated well and flushed.    Labs  Reviewed   Obtained/Reviewed   Critical Results Called   Date when labs were drawn-  Hgb-    HGB   Date Value Ref Range Status   02/28/2019 9.3 (L) 12.1 - 17.0 g/dL Final     K-    Potassium   Date Value Ref Range Status   03/02/2019 4.9 3.5 - 5.1 mmol/L Final     Ca-   Calcium   Date Value Ref Range Status   03/02/2019 8.7 8.5 - 10.1 MG/DL Final     Bun-   BUN   Date Value Ref Range Status   03/02/2019 40 (H) 6 - 20 MG/DL Final     Creat-   Creatinine   Date Value Ref Range Status   03/02/2019 3.46 (H) 0.70 - 1.30 MG/DL Final        Medications/ Blood Products Given     Name   Dose   Route and Time     heparin 2100unit Aertial port @1630   heparin 2200unit Venous port @1630   epogen 20,000units [email or web address]   Blood Volume Processed (BVP):    65.6 Net Fluid   Removed:  2000   Comments   Time Out Done: yes  Primary Nurse Rpt Pre: Kevyn Smith RN @8870  Primary Nurse Rpt Post:  Renea Carvajal RN @0639  Pt Education: yes  Care Plan:continue trx  Tx Summary: tolerated trx well, rcvd all rinse back, ports locked with heparin and capped  Admiting Diagnosis: Altered Mental status  Pt's previous clinic-  Consent signed - Informed Consent Verified: Yes (03/07/19 1323)  [facility] Consent - yes  Hepatitis Status- neg 3/5/19  Machine #- Machine Number: [id number] (03/07/19 1323)  Telemetry status-n/a  Pre-dialysis wt. -  n/a

## 2019-03-07 NOTE — INTERDISCIPLINARY ROUNDS
Behavioral Health Interdisciplinary Rounds     Patient Name: Corby Tamayo  Age: 70 y.o. Room/Bed:  740/  Primary Diagnosis: Depression   Admission Status: Voluntary     Readmission within 30 days: no  Power of  in place: no  Patient requires a blocked bed: no          Reason for blocked bed:   Sleep hours: 6.75       Participation in Care/Groups:  no  Medication Compliant?: Yes  PRNS (last 24 hours): Pain    Restraints (last 24 hours):  no     Alcohol screening (AUDIT) completed -  AUDIT Score: 0  If applicable, date SBIRT discussed in treatment team AND documented:    Tobacco - patient is a smoker: Have You Used Tobacco in the Past 30 Days: Yes  Illegal Drugs use: Have You Used Any Illegal Substances Over the Past 12 Months: Yes    24 hour chart check complete: yes     Patient goal(s) for today:   Treatment team focus/goals:   Progress note     LOS:  22  Expected LOS:     Participating treatment team members:  Corby Tamayo, * (assigned SW),

## 2019-03-07 NOTE — BH NOTES
GROUP THERAPY PROGRESS NOTE    Lizeth Khanna is participating in [facility].      Group time: 30 minutes    Personal goal for participation: relaxation/movie with peers    Goal orientation: relaxation    Group therapy participation: active    Therapeutic interventions reviewed and discussed: relaxation    Impression of participation: present

## 2019-03-07 NOTE — PROGRESS NOTES
Problem: Falls - Risk of  Goal: *Absence of Falls  Document Bello Fall Risk and appropriate interventions in the flowsheet. Outcome: Progressing Towards Goal  Fall Risk Interventions:  Patient is absent of falls. Patient is compliant with waiting for lift team for transfers. Patient is med and meal compliant, demanding, thoughts organized, out on the unit. Mobility Interventions: Bed/chair exit alarm    Mentation Interventions: Adequate sleep, hydration, pain control, Bed/chair exit alarm, Door open when patient unattended, More frequent rounding    Medication Interventions: Teach patient to arise slowly    Elimination Interventions: Call light in reach, Patient to call for help with toileting needs    History of Falls Interventions: Bed/chair exit alarm        0856:  Lift team called to get patient out of bed. Pt is 4th on list, they'll be here as soon as they can.    1009:  Called [facility] for approx time of patient's dialysis today. [facility] returned call, [facility] nurse will be here around 1300 for dialysis.

## 2019-03-07 NOTE — BH NOTES
56 Pt repeatedly propositions nurses to work for him as he would be the pimp. Staff redirect and set limits with his inappropriate behavior. 1800 \"the doctor looking at me like she on ten. Daniela Congo Daniela Congo \"  Pt laughed. 1801 Patient wheels self close to nurses. \"I'm gonna get Maribell here on Saturday. I'm gonna pimp slap somebody and walk out the door\"  \"I'm Ky Bitter make a ton of money and buy a house\"  Patient propositions LPN again to work for him as he would be her pimp. Staff redirect him. Patient states he was joking. He is calm/cooperative with staff and smiling. 1832 pt wheeled self close to nurse and asked many personal questions. Nurse attempted to set limits. Patient calm/cooperative and states he was joking about prior comments.

## 2019-03-07 NOTE — INTERDISCIPLINARY ROUNDS
PRN Medication Documentation    Specific patient behavior that led to need for PRN medication: complained of severe pain in right shoulder  Staff interventions attempted prior to PRN being given: Assessed level of pain. Patient stated it was a 10. PRN medication given: Oxycodone 5m/325 given at 0338  Patient response/effectiveness of PRN medication: Asleep at 0430.

## 2019-03-07 NOTE — PROGRESS NOTES
Problem: Discharge Planning  Goal: *Discharge to safe environment  Outcome: Progressing Towards Goal  D/c to SNF arranged. Discussed appointment date and time for [facility]. According to their appointment schedule Tuesday at 1100 is first available and he is scheduled for that. Will inform tx team of update.

## 2019-03-07 NOTE — BH NOTES
PSYCHIATRIC PROGRESS NOTE         Patient Name  Gustavo Castro   Date of Birth 1940   [facility] [id number]   Medical Record Number  [id number]      Age  70 y.o. PCP Samaria Flores MD   Admit date:  2/13/2019    Room Number  740/01  @ [address]   Date of Service  3/7/2019           E & M PROGRESS NOTE: 15 mins         HISTORY       REASON FOR HOSPITALIZATION:  CC: \"Pt admitted under a voluntary basis for severe depression with suicidal ideations and  an inability to care for self. HISTORY OF PRESENT ILLNESS:    The patient, Gustavo Castro, is a 70 y.o. BLACK OR  male with 70-year-old -American male with past medical  history of end-stage renal disease; on hemodialysis, sleep apnea, peripheral vascular disease, bilateral BKA, hypertension, hyperlipidemia, peripheral neuropathy, anemia,hepatitis C, arthritis, peptic ulcer disease, coronary artery disease, DVT, prior GIbleed, chronic diastolic CHF, prior suicidal ideation, depression, and noncompliance   transfered to [facility] from [facility] after pt reported feeling depressed nad suicidal Pt stated he lives in the [place] and he is unhappy about his living situation , he stated he had lots of Money before [place] HAS 13 CHILDREN but now he is broke and no one ask about him. Pt reports feeling hopeless, helpless, angry. These symptoms are of high severity. These symptoms are constant . Pt ha sh/o using marijuana occasionally. UDS: negative; BAL=0. .  2/15/19: Pt is irritable and refused zoloft and vitals today. Pt has dialysis yesterday. 2/16/19: Pt took zoloft but refuses some medical meds. Pt had dialysis on Saturday. Pt is demanding in the evenings. 2/17/19: Pt is demanding, irritable in the evenings, compliant with zoloft. Eating well. 2/18/19: Pt is demanding and manipulative ta times but taking zoloft. Pt is eating well. 2/19/19: Pt taking zoloft.  Pt got agitated yesterday evening and received geodon I/M PRN. Pt reported he need percocet. AS per reports, he was on percocets for pain. 2/20/19: Pt is doing better, is no longer suicidal and is accepting of nursing home placement, is psychiatrically stable for discharge. 2/21/19: Pt did not receive dialysis , was sleeping in his bed. Pt has clot in the catheter nad need new port. Nephrology is contacted. Pt report not feeling depressed, awaiting placement.i  2/22/19: Pt  Is irritable at times, did not get dialusis yetserday, New port is placed, nephrology is on board. Pt is eating   well nad sleeping well. 2/23/19-Presented verbal and cooperative. He is compliant with meds. Repotrs he is homeless and has nowhere to go to. SW is trying to find appropriate placement. No AVH or SI/HI.  2/24/19- pt stayed in bed most of the day. He had been asking for discharge earlier but has not recognized a place. He was seen in his room and encouraged get up and come to the day-room. No prn's used. 2/25/19: Pt will go for dialysis on Tuesday. Pt  Is cooperative, refusing to go to [facility]. Pt is eating well, slept good,  2/26/19: Pt is still depressed at times. Pt contacted a friend yesterday and pt says  He wants to go live with her. Pt will need [facility] and dialysis transportation after discharge. 2/27/19: slept 6 hours, BP was low , Bp meds were held yesterday. Pt is on phone. Pt refused zoloft in am, gets irritated at times. 2/28/19: Pt is in dialyiss, is in betetr mood, more courteous. Pt is eating well. Pt's finger stick was 71, no symptoms. 3/1/19: Pt slept 7 hours, received percocet last night. Nursing home placement in [facility] in progress. Pt is eating well, no acute issues at this time. 3/2: Could not get dialysis due to problems with port. Will get the port adjusted on Monday. Nephrology is aware, is courteous. sleeping eating well. 3/3: Reports depressed mood. No SI.   3/4/19: Pt pulled his port on Friday, did not receive dialysis on Saturday.  Pt keeps on changing statement regarding [facility]. Pt is for placment at [facility]. Pt took meds. 3/5/19: Pt is fine sitting comfortable, got port done, has dialysis today. Pt psycheducated not to get port out. Hep B testing ordered  3/6/19: Pt is irritable at times, got dialysis yesterday, eating well. 3/7/19: Took his meds, slept 7 hours. Pt had percocet last night and in am. Pt is getting dialysis today. Labs today      SIDE EFFECTS: (reviewed/updated 3/7/2019)  None reported or admitted to. No noted toxicity with use of Depakote/Tegretol/lithium/Clozaril/TCAs   ALLERGIES:(reviewed/updated 3/7/2019)  Allergies   Allergen Reactions    Tetracycline Hives      MEDICATIONS PRIOR TO ADMISSION:(reviewed/updated 3/7/2019)  Medications Prior to Admission   Medication Sig    NIFEdipine ER (ADALAT CC) 60 mg ER tablet Take 1 Tab by mouth daily. HOLD for HR<60    hydroCHLOROthiazide (HYDRODIURIL) 25 mg tablet Take 1 Tab by mouth daily.  sertraline (ZOLOFT) 25 mg tablet Take 1 Tab by mouth daily.  oxyCODONE-acetaminophen (PERCOCET 10)  mg per tablet Take 1 Tab by mouth every six (6) hours as needed for Pain.  traZODone (DESYREL) 50 mg tablet Take 50 mg by mouth nightly.  rosuvastatin (CRESTOR) 10 mg tablet Take 10 mg by mouth nightly.  aspirin delayed-release 81 mg tablet Take 1 Tab by mouth daily. PAST MEDICAL HISTORY: Past medical history from the initial psychiatric evaluation has been reviewed (reviewed/updated 3/7/2019) with no additional updates (I asked patient and no additional past medical history provided). Past Medical History:   Diagnosis Date    Arthritis     CAD (coronary artery disease) 2007    CKD (chronic kidney disease), stage III ([address]     DM type 2 causing renal disease ([address]     DVT (deep venous thrombosis) (HCC)     Hx of DVT:  Xarelto stopped due to GI bleed. S/P IVC filter.     Gait abnormality     GI bleed     Heart murmur     Hepatitis C     History of blood transfusion     Hypercholesterolemia     Hypertension 11/7/2014    Neuropathy     Non compliance w medication regimen     Peripheral vascular disease ([facility] 11/7/2014    A. S/P Right SFA POBA and tibial atherectomy (2/4/13).  PUD (peptic ulcer disease) 2007    Unspecified sleep apnea     never used cpap     Past Surgical History:   Procedure Laterality Date    ABDOMEN SURGERY PROC UNLISTED  2007    has approx 7-8\" midline incision on abdomen--\"had to open him up and clean him out after feeding tube clogged\"    HX GI  2007    feeding tube for approx 2 mo    IR INSERT TUNL CVC W/O PORT OVER 5 YR  3/4/2019    VASCULAR SURGERY PROCEDURE UNLIST Right 1/22/2015    popliteal-tibial bypass      SOCIAL HISTORY: Social history from the initial psychiatric evaluation has been reviewed (reviewed/updated 3/7/2019) with no additional updates (I asked patient and no additional social history provided).    Social History     Socioeconomic History    Marital status: SINGLE     Spouse name: Not on file    Number of children: Not on file    Years of education: Not on file    Highest education level: Not on file   Social Needs    Financial resource strain: Not on file    Food insecurity - worry: Not on file    Food insecurity - inability: Not on file    Transportation needs - medical: Not on file   [employer] needs - non-medical: Not on file   Occupational History    Not on file   Tobacco Use    Smoking status: Current Every Day Smoker     Packs/day: 0.50    Smokeless tobacco: Never Used   Substance and Sexual Activity    Alcohol use: No    Drug use: No     Comment: >20 years ago    Sexual activity: Not on file   Other Topics Concern     Service Not Asked    Blood Transfusions Not Asked    Caffeine Concern Not Asked    Occupational Exposure Not Asked    Hobby Hazards Not Asked    Sleep Concern Not Asked    Stress Concern Not Asked    Weight Concern Not Asked    Special Diet Not Asked    Back Care Not Asked    Exercise Not Asked    Bike Helmet Not Asked    Seat Belt Not Asked    Self-Exams Not Asked   Social History Narrative    78 year ols male admitted for depression and homelessness. Pt will be evicated from apartment due to non payment of bills. Girlfriend of 30 years left him to [place] with [place]. \" Pt is a brittle diabetic, with treatment non compliance. He has bilarela lower extremity amputations. Patient has a long hx of substance abuse. FAMILY HISTORY: Family history from the initial psychiatric evaluation has been reviewed (reviewed/updated 3/7/2019) with no additional updates (I asked patient and no additional family history provided). Family History   Problem Relation Age of Onset    Hypertension Father        REVIEW OF SYSTEMS: (reviewed/updated 3/7/2019)  Appetite:   Sleep: All other Review of Systems: Negative except          MENTAL STATUS EXAM & [address] (MSE):    MSE FINDINGS ARE WITHIN NORMAL LIMITS (WNL) UNLESS OTHERWISE STATED BELOW. ( ALL OF THE BELOW CATEGORIES OF THE MSE HAVE BEEN REVIEWED (reviewed 2/14/2019) AND UPDATED AS DEEMED APPROPRIATE )  General Presentation age appropriate and casually dressed, cooperative   Orientation oriented to time, place and person   Vital Signs  See below (reviewed 2/14/2019); Vital Signs (BP, Pulse, & Temp) are within normal limits if not listed below.    Gait and Station Stable/steady, no ataxia   Musculoskeletal System No extrapyramidal symptoms (EPS); no abnormal muscular movements or Tardive Dyskinesia (TD); muscle strength and tone are within normal limits   Language No aphasia or dysarthria   Speech:  soft   Thought Processes logical; slow rate of thoughts; fair abstract reasoning/computation   Thought Associations goal directed   Thought Content free of delusions   Suicidal Ideations intention   Homicidal Ideations no plan  and no intention   Mood:  anxious  and depressed Affect:  constricted   Memory recent  intact   Memory remote:  intact   Concentration/Attention:  distractable   Fund of Knowledge average   Insight:  fair   Reliability fair   Judgment:  fair                                                                                                    VITALS:     Patient Vitals for the past 24 hrs:   Temp Pulse Resp BP SpO2   03/07/19 0815 98.2 °F (36.8 °C) (!) 50 16 153/60 100 %   03/06/19 2014 97.4 °F (36.3 °C) (!) 55 18 143/73 100 %   03/06/19 1528 97.6 °F (36.4 °C) (!) 48 16 189/58 100 %   03/06/19 1350 -- (!) 59 -- (!) 211/93 --     Wt Readings from Last 3 Encounters:   02/14/19 73 kg (160 lb 15 oz)   02/13/19 73.4 kg (161 lb 12.8 oz)   01/15/19 81.6 kg (180 lb)     Temp Readings from Last 3 Encounters:   03/07/19 98.2 °F (36.8 °C)   03/04/19 97.6 °F (36.4 °C)   02/22/19 98.2 °F (36.8 °C)     BP Readings from Last 3 Encounters:   03/07/19 153/60   03/04/19 188/80   02/22/19 146/68     Pulse Readings from Last 3 Encounters:   03/07/19 (!) 50   03/04/19 (!) 54   02/22/19 (!) 53            DATA     LABORATORY DATA:(reviewed/updated 3/7/2019)  No results found for this or any previous visit (from the past 24 hour(s)). No results found for: VALF2, VALAC, VALP, VALPR, DS6, CRBAM, CRBAMP, CARB2, XCRBAM  No results found for: LITHM   RADIOLOGY REPORTS:(reviewed/updated 3/7/2019)  Xr Chest Pa Lat    Result Date: 2/8/2019  EXAM: XR CHEST PA LAT INDICATION: Shortness of breath, Low back pain and abdominal pain for several days. End-stage renal disease on dialysis, missed dialysis one day ago. COMPARISON: Chest views on 5/20/2018 TECHNIQUE: 4 images of PA and lateral chest views FINDINGS: Right jugular dialysis catheter is new and terminates in the distal superior vena cava. Cardiac monitoring wires overlie the thorax. Borderline cardiac size is unchanged. Aortic arch is not enlarged. The pulmonary vasculature is within normal limits. The lungs and pleural spaces are clear.  The visualized bones and upper abdomen are age-appropriate. IMPRESSION: No acute process on chest x-ray. Right jugular dialysis catheter terminates in the distal superior vena cava. No pneumothorax. Ct Abd Pelv W Cont    Result Date: 2/8/2019  EXAM: CT ABD PELV W CONT INDICATION: Abdominal pain and low back pain for several days. End-stage renal disease on dialysis, missed dialysis yesterday. Normal white blood cell count. Normal liver enzymes. COMPARISON: None. CONTRAST: 100 mL of Isovue-370. TECHNIQUE: Following the uneventful intravenous administration of contrast, thin axial images were obtained through the abdomen and pelvis. Coronal and sagittal reconstructions were generated. Oral contrast was administered. CT dose reduction was achieved through use of a standardized protocol tailored for this examination and automatic exposure control for dose modulation. FINDINGS: LUNG BASES: No pneumonia. Mild dependent atelectasis. INCIDENTALLY IMAGED HEART AND MEDIASTINUM: Borderline cardiac size. No pericardial effusion. LIVER: Subcentimeter liver lesions measure up to 9 mm in segment 2 of the liver. Surface is smooth. Streak artifact from bilateral upper extremities. GALLBLADDER: Gallstones are in the body and neck. No evidence of cholecystitis. CBD is not dilated. SPLEEN: Normal size and enhancement. PANCREAS: No mass or ductal dilatation. ADRENALS: Unremarkable. KIDNEYS: No mass, calculus, or hydronephrosis. STOMACH: Partial distention. SMALL BOWEL: No dilatation or wall thickening. COLON: Moderate colonic fecal burden. No mural thickening. APPENDIX: Unremarkable. PERITONEUM: No ascites or pneumoperitoneum. RETROPERITONEUM: IVC filter is in good position. Aorta is atherosclerotic without aneurysm. Mild atherosclerotic stenosis of the celiac artery. No lymphadenopathy. REPRODUCTIVE ORGANS: Mild prostatomegaly measures 6 cm and extends into the base of the urinary bladder. URINARY BLADDER: No mass or calculus. BONES: Moderate bilateral hip osteoarthritis. ADDITIONAL COMMENTS: N/A     IMPRESSION: 1. No acute process on CT. 2. Cholelithiasis. No cholecystitis. 3. Subcentimeter segment 2 liver lesions cannot be fully characterized on this examination. 4. Moderate colonic fecal burden may represent constipation. No bowel obstruction.          MEDICATIONS     ALL MEDICATIONS:   Current Facility-Administered Medications   Medication Dose Route Frequency    sodium chloride (NS) flush 10 mL  10 mL InterCATHeter PRN    sertraline (ZOLOFT) tablet 100 mg  100 mg Oral DAILY    traZODone (DESYREL) tablet 25 mg  25 mg Oral QHS    heparin (porcine) 1,000 unit/mL injection 4,300 Units  4,300 Units Hemodialysis DIALYSIS PRN    epoetin rosie-epbx (RETACRIT) injection 20,000 Units  20,000 Units SubCUTAneous DIALYSIS TUE, THU & SAT    oxyCODONE-acetaminophen (PERCOCET) 5-325 mg per tablet 1 Tab  1 Tab Oral BID PRN    ibuprofen (MOTRIN) tablet 800 mg  800 mg Oral Q8H PRN    aspirin delayed-release tablet 81 mg  81 mg Oral DAILY    rosuvastatin (CRESTOR) tablet 10 mg  10 mg Oral QHS    ferric citrate (AURYXIA) tablet 420 mg  420 mg Oral TID WITH MEALS    NIFEdipine ER (PROCARDIA XL) tablet 60 mg  60 mg Oral Q24H    doxazosin (CARDURA) tablet 4 mg  4 mg Oral QHS    OLANZapine (ZyPREXA) tablet 2.5 mg  2.5 mg Oral Q6H PRN    ziprasidone (GEODON) 10 mg in sterile water (preservative free) 0.5 mL injection  10 mg IntraMUSCular BID PRN    benztropine (COGENTIN) tablet 1 mg  1 mg Oral BID PRN    benztropine (COGENTIN) injection 1 mg  1 mg IntraMUSCular BID PRN    zolpidem (AMBIEN) tablet 5 mg  5 mg Oral QHS PRN    acetaminophen (TYLENOL) tablet 650 mg  650 mg Oral Q4H PRN    magnesium hydroxide (MILK OF MAGNESIA) 400 mg/5 mL oral suspension 30 mL  30 mL Oral DAILY PRN    nicotine (NICODERM CQ) 21 mg/24 hr patch 1 Patch  1 Patch TransDERmal DAILY PRN    glucose chewable tablet 16 g  4 Tab Oral PRN    dextrose (D50W) injection syrg 12.5-25 g  25-50 mL IntraVENous PRN    glucagon (GLUCAGEN) injection 1 mg  1 mg IntraMUSCular PRN      SCHEDULED MEDICATIONS:   Current Facility-Administered Medications   Medication Dose Route Frequency    sertraline (ZOLOFT) tablet 100 mg  100 mg Oral DAILY    traZODone (DESYREL) tablet 25 mg  25 mg Oral QHS    epoetin rosie-epbx (RETACRIT) injection 20,000 Units  20,000 Units SubCUTAneous DIALYSIS TUE, THU & SAT    aspirin delayed-release tablet 81 mg  81 mg Oral DAILY    rosuvastatin (CRESTOR) tablet 10 mg  10 mg Oral QHS    ferric citrate (AURYXIA) tablet 420 mg  420 mg Oral TID WITH MEALS    NIFEdipine ER (PROCARDIA XL) tablet 60 mg  60 mg Oral Q24H    doxazosin (CARDURA) tablet 4 mg  4 mg Oral QHS          ASSESSMENT & PLAN     The patient, Elmo Sanchez, is a 70 y.o.  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  Plan for hemodialysis 3 days a week.  If it is not controlled  with the same, provide additional antihypertensive meds.  Monitor blood pressure.    End-stage renal disease, on hemodialysis.  Schedule treatment for hemodialysis on  Tuesdays, Thursdays, and Saturdays. .   Type 2 diabetes mellitus.  Place on Humalog insulin correction coverage schedule,  Accu-Chek, and check hemoglobin A1c level.  The patient will be on a diabetic/renal  Diet. Nephrology consult   2/15/19: continue meds/milue, encourage compliance. 2/16/19: continue meds/milue  2/17/19: continue meds/milue increaisng zoloft 50 mg po daily  2/18/19: continue meds/milue  2/19/19: readding percocet twice daily as needed for pain.   2/20/19: awaiting [facility]emnet, PT/OT for placement  2/21/19: nephrology consult, recommendation regrading dialysis,  2/22/19: increasing zoloft 75 mg daily, continue milue,  2/25/19: continue meds/milue, HD tomorrow  2/26/19: increasing zoloft 100mg po daily, HHA referral  2/27/19: received BP meds today, received percocet last night/ continue meds/milue  2/28/19: awaiting [facility], PT/OT for placement  3/1/19: Nursing home placement in [facility] in progress  3/4/19: [address] Radiology to see for port, continue meds, continue Placement referrals  3/7/19:HEP b AB Positive, continue emds/milue  I will continue to monitor blood levels (Depakote, Tegretol, lithium, clozapine---a drug with a narrow therapeutic index= NTI) and associated labs for drug therapy implemented that require intense monitoring for toxicity as deemed appropriate based on current medication side effects and pharmacodynamically determined drug 1/2 lives. In summary, Jaylan Jackson, is a 70 y.o.  male who presents with a severe exacerbation of the principal diagnosis of Depression  Patient's condition is worsening/not improving/not stable improving. Patient requires continued inpatient hospitalization for further stabilization, safety monitoring and medication management. I will continue to coordinate the provision of individual, milieu, occupational, group, and substance abuse therapies to address target symptoms/diagnoses as deemed appropriate for the individual patient. A coordinated, multidisplinary treatment team round was conducted with the patient (this team consists of the nurse, psychiatric unit pharmcist,  and writer). Complete current electronic health record for patient has been reviewed today including consultant notes, ancillary staff notes, nurses and psychiatric tech notes. uicide risk assessment completed and patient deemed to be of low risk for suicide at this time. The following regarding medications was addressed during rounds with patient:   the risks and benefits of the proposed medication. The patient was given the opportunity to ask questions.  Informed consent given to the use of the above medications. Will continue to adjust psychiatric and non-psychiatric medications (see above \"medication\" section and orders section for details) as deemed appropriate & based upon diagnoses and response to treatment. I will continue to order blood tests/labs and diagnostic tests as deemed appropriate and review results as they become available (see orders for details and above listed lab/test results). I will order psychiatric records from previous [facility] hospitals to further elucidate the nature of patient's psychopathology and review once available. I will gather additional collateral information from friends, family and o/p treatment team to further elucidate the nature of patient's psychopathology and baselline level of psychiatric functioning. I certify that this patient's inpatient psychiatric hospital services furnished since the previous certification were, and continue to be, required for treatment that could reasonably be expected to improve the patient's condition, or for diagnostic study, and that the patient continues to need, on a daily basis, active treatment furnished directly by or requiring the supervision of inpatient psychiatric facility personnel. In addition, the hospital records show that services furnished were intensive treatment services, admission or related services, or equivalent services.     EXPECTED DISCHARGE DATE/DAY: TBD     DISPOSITION: Home       Signed By:   Monica Villatoro MD  3/7/2019

## 2019-03-08 PROCEDURE — 74011250637 HC RX REV CODE- 250/637: Performed by: FAMILY MEDICINE

## 2019-03-08 PROCEDURE — 65220000003 HC RM SEMIPRIVATE PSYCH

## 2019-03-08 PROCEDURE — 74011250637 HC RX REV CODE- 250/637: Performed by: PSYCHIATRY & NEUROLOGY

## 2019-03-08 PROCEDURE — 74011250637 HC RX REV CODE- 250/637: Performed by: INTERNAL MEDICINE

## 2019-03-08 PROCEDURE — 74011250637 HC RX REV CODE- 250/637: Performed by: HOSPITALIST

## 2019-03-08 RX ADMIN — FERRIC CITRATE 420 MG: 210 TABLET, COATED ORAL at 17:13

## 2019-03-08 RX ADMIN — ACETAMINOPHEN 650 MG: 325 TABLET ORAL at 21:55

## 2019-03-08 RX ADMIN — SERTRALINE HYDROCHLORIDE 100 MG: 50 TABLET ORAL at 10:28

## 2019-03-08 RX ADMIN — TRAZODONE HYDROCHLORIDE 25 MG: 50 TABLET ORAL at 21:10

## 2019-03-08 RX ADMIN — ROSUVASTATIN CALCIUM 10 MG: 10 TABLET, FILM COATED ORAL at 21:09

## 2019-03-08 RX ADMIN — OXYCODONE AND ACETAMINOPHEN 1 TABLET: 5; 325 TABLET ORAL at 14:10

## 2019-03-08 RX ADMIN — ASPIRIN 81 MG: 81 TABLET, COATED ORAL at 10:29

## 2019-03-08 RX ADMIN — NIFEDIPINE 60 MG: 60 TABLET, FILM COATED, EXTENDED RELEASE ORAL at 17:11

## 2019-03-08 NOTE — BH NOTES
Patient refused to have his blood sugar taken. \"I don't have that checked anymore!  I'm telling you I don't have it checked\"

## 2019-03-08 NOTE — PROGRESS NOTES
1546 Problem: Depressed Mood (Adult/Pediatric)  Goal: *STG: Verbalizes anger, guilt, and other feelings in a constructive manor  Outcome: Progressing Towards Goal  Patient is received using phone in his room. He yells out demands to nurses. Lift team present on unit to lift him to [facility]. Patient comes out of room manipulative with nurse and attempting to again be intrusive and ask personal questions of nurse. Redirection given. \"what kind of car do you drive? \"  \"did you get me some newports? \"  Nurse gives redirection. Pt thereafter more calm and cooperative and was given a snack. Continue q15 checks. 1658 Pt smiling and cooperative while eating dinner. He is kind and talkative with peers; asking a female peer who visited her earlier. 1758 pt reading newspaper and watching news. Calm/cooperative.

## 2019-03-08 NOTE — BH NOTES
Patient eating his bedtime snack. He is smiling, calm/cooperative and talking with staff, peers and other patient's visitors.

## 2019-03-08 NOTE — PROGRESS NOTES
Problem: Depressed Mood (Adult/Pediatric)  Goal: *STG: Verbalizes anger, guilt, and other feelings in a constructive manor  Outcome: Progressing Towards Goal  Patient is able to express his feelings and concerns in a constructive manner  Goal: *STG: Attends activities and groups  Outcome: Not Progressing Towards Goal  Patient did not attend group today, still in bed asleep    Problem: Falls - Risk of  Goal: *Absence of Falls  Document Bello Fall Risk and appropriate interventions in the flowsheet. Outcome: Progressing Towards Goal  Fall Risk Interventions:  Patient remains free from falls. Document Bello fall risk scale. [facility]. Mobility Interventions: Communicate number of staff needed for ambulation/transfer    Mentation Interventions: Adequate sleep, hydration, pain control    Medication Interventions: Teach patient to arise slowly    Elimination Interventions: Toilet paper/wipes in reach    History of Falls Interventions: Door open when patient unattended        Problem: Pressure Injury - Risk of  Goal: *Prevention of pressure injury  Document Rambo Scale and appropriate interventions in the flowsheet. Outcome: Progressing Towards Goal  Pressure Injury Interventions:  Document Rambo Scale and appropriate interventions in flowsheet. Patient self turns.   Sensory Interventions: Assess changes in LOC, Chair cushion, Minimize linen layers, Pressure redistribution bed/mattress (bed type)    Moisture Interventions: Absorbent underpads, Apply protective barrier, creams and emollients    Activity Interventions: Chair cushion    Mobility Interventions: Chair cushion    Nutrition Interventions: Offer support with meals,snacks and hydration    Friction and Shear Interventions: Minimize layers

## 2019-03-08 NOTE — BH NOTES
PSYCHIATRIC PROGRESS NOTE         Patient Name  Elmo Sanchez   Date of Birth 1940   [facility] [id number]   Medical Record Number  [id number]      Age  70 y.o. PCP Nicolette Newell MD   Admit date:  2/13/2019    Room Number  740/01  @ [address]   Date of Service  3/8/2019           E & M PROGRESS NOTE: 15 mins         HISTORY       REASON FOR HOSPITALIZATION:  CC: \"Pt admitted under a voluntary basis for severe depression with suicidal ideations and  an inability to care for self. HISTORY OF PRESENT ILLNESS:    The patient, Elmo Sanchez, is a 70 y.o. BLACK OR  male with 70-year-old -American male with past medical  history of end-stage renal disease; on hemodialysis, sleep apnea, peripheral vascular disease, bilateral BKA, hypertension, hyperlipidemia, peripheral neuropathy, anemia,hepatitis C, arthritis, peptic ulcer disease, coronary artery disease, DVT, prior GIbleed, chronic diastolic CHF, prior suicidal ideation, depression, and noncompliance   transfered to [facility] from [facility] after pt reported feeling depressed nad suicidal Pt stated he lives in the [place] and he is unhappy about his living situation , he stated he had lots of Money before [place] HAS 13 CHILDREN but now he is broke and no one ask about him. Pt reports feeling hopeless, helpless, angry. These symptoms are of high severity. These symptoms are constant . Pt ha sh/o using marijuana occasionally. UDS: negative; BAL=0. .  2/15/19: Pt is irritable and refused zoloft and vitals today. Pt has dialysis yesterday. 2/16/19: Pt took zoloft but refuses some medical meds. Pt had dialysis on Saturday. Pt is demanding in the evenings. 2/17/19: Pt is demanding, irritable in the evenings, compliant with zoloft. Eating well. 2/18/19: Pt is demanding and manipulative ta times but taking zoloft. Pt is eating well. 2/19/19: Pt taking zoloft.  Pt got agitated yesterday evening and received geodon I/M PRN. Pt reported he need percocet. AS per reports, he was on percocets for pain. 2/20/19: Pt is doing better, is no longer suicidal and is accepting of nursing home placement, is psychiatrically stable for discharge. 2/21/19: Pt did not receive dialysis , was sleeping in his bed. Pt has clot in the catheter nad need new port. Nephrology is contacted. Pt report not feeling depressed, awaiting placement.i  2/22/19: Pt  Is irritable at times, did not get dialusis yetserday, New port is placed, nephrology is on board. Pt is eating   well nad sleeping well. 2/23/19-Presented verbal and cooperative. He is compliant with meds. Repotrs he is homeless and has nowhere to go to. SW is trying to find appropriate placement. No AVH or SI/HI.  2/24/19- pt stayed in bed most of the day. He had been asking for discharge earlier but has not recognized a place. He was seen in his room and encouraged get up and come to the day-room. No prn's used. 2/25/19: Pt will go for dialysis on Tuesday. Pt  Is cooperative, refusing to go to [facility]. Pt is eating well, slept good,  2/26/19: Pt is still depressed at times. Pt contacted a friend yesterday and pt says  He wants to go live with her. Pt will need HHA and dialysis transportation after discharge. 2/27/19: slept 6 hours, BP was low , Bp meds were held yesterday. Pt is on phone. Pt refused zoloft in am, gets irritated at times. 2/28/19: Pt is in dialyiss, is in betetr mood, more courteous. Pt is eating well. Pt's finger stick was 71, no symptoms. 3/1/19: Pt slept 7 hours, received percocet last night. [facility] in [facility] in progress. Pt is eating well, no acute issues at this time. 3/2: Could not get dialysis due to problems with port. Will get the port adjusted on Monday. Nephrology is aware, is courteous. sleeping eating well. 3/3: Reports depressed mood. No SI.   3/4/19: Pt pulled his port on Friday, did not receive dialysis on Saturday.  Pt keeps on changing statement regarding [facility]. Pt is for plac[facility] at [facility]. Pt took meds. 3/5/19: Pt is fine sitting comfortable, got port done, has dialysis today. Pt psycheducated not to get port out. Hep B testing ordered  3/6/19: Pt is irritable at times, got dialysis yesterday, eating well. 3/7/19: Took his meds, slept 7 hours. Pt had percocet last night and in am. Pt is getting dialysis today. Labs today  3/8/19: Pt is proprositions the nurses. Pt got dialysis yesterday. Pt taking meds and is eating well. SIDE EFFECTS: (reviewed/updated 3/8/2019)  None reported or admitted to. No noted toxicity with use of Depakote/Tegretol/lithium/Clozaril/TCAs   ALLERGIES:(reviewed/updated 3/8/2019)  Allergies   Allergen Reactions    Tetracycline Hives      MEDICATIONS PRIOR TO ADMISSION:(reviewed/updated 3/8/2019)  Medications Prior to Admission   Medication Sig    NIFEdipine ER (ADALAT CC) 60 mg ER tablet Take 1 Tab by mouth daily. HOLD for HR<60    hydroCHLOROthiazide (HYDRODIURIL) 25 mg tablet Take 1 Tab by mouth daily.  sertraline (ZOLOFT) 25 mg tablet Take 1 Tab by mouth daily.  oxyCODONE-acetaminophen (PERCOCET 10)  mg per tablet Take 1 Tab by mouth every six (6) hours as needed for Pain.  traZODone (DESYREL) 50 mg tablet Take 50 mg by mouth nightly.  rosuvastatin (CRESTOR) 10 mg tablet Take 10 mg by mouth nightly.  aspirin delayed-release 81 mg tablet Take 1 Tab by mouth daily. PAST MEDICAL HISTORY: Past medical history from the initial psychiatric evaluation has been reviewed (reviewed/updated 3/8/2019) with no additional updates (I asked patient and no additional past medical history provided). Past Medical History:   Diagnosis Date    Arthritis     CAD (coronary artery disease) 2007    CKD (chronic kidney disease), stage III ([address]     DM type 2 causing renal disease [facility] [address]     DVT (deep venous thrombosis) (HCC)     Hx of DVT:  Xarelto stopped due to GI bleed.   S/P IVC filter.  Gait abnormality     GI bleed     Heart murmur     Hepatitis C     History of blood transfusion     Hypercholesterolemia     Hypertension 11/7/2014    Neuropathy     Non compliance w medication regimen     Peripheral vascular disease ([facility] [address] 11/7/2014    A. S/P Right SFA POBA and tibial atherectomy (2/4/13).  PUD (peptic ulcer disease) 2007    Unspecified sleep apnea     never used cpap     Past Surgical History:   Procedure Laterality Date    ABDOMEN SURGERY PROC UNLISTED  2007    has approx 7-8\" midline incision on abdomen--\"had to open him up and clean him out after feeding tube clogged\"    HX GI  2007    feeding tube for approx 2 mo    IR INSERT TUNL CVC W/O PORT OVER 5 YR  3/4/2019    VASCULAR SURGERY PROCEDURE UNLIST Right 1/22/2015    popliteal-tibial bypass      SOCIAL HISTORY: Social history from the initial psychiatric evaluation has been reviewed (reviewed/updated 3/8/2019) with no additional updates (I asked patient and no additional social history provided).    Social History     Socioeconomic History    Marital status: SINGLE     Spouse name: Not on file    Number of children: Not on file    Years of education: Not on file    Highest education level: Not on file   Social Needs    Financial resource strain: Not on file    Food insecurity - worry: Not on file    Food insecurity - inability: Not on file    Transportation needs - medical: Not on file   [place] needs - non-medical: Not on file   Occupational History    Not on file   Tobacco Use    Smoking status: Current Every Day Smoker     Packs/day: 0.50    Smokeless tobacco: Never Used   Substance and Sexual Activity    Alcohol use: No    Drug use: No     Comment: >20 years ago    Sexual activity: Not on file   Other Topics Concern     Service Not Asked    Blood Transfusions Not Asked    Caffeine Concern Not Asked    Occupational Exposure Not Asked    Hobby Hazards Not Asked    Sleep Concern Not Asked    Stress Concern Not Asked    Weight Concern Not Asked    Special Diet Not Asked    Back Care Not Asked    Exercise Not Asked    Bike Helmet Not Asked    Seat Belt Not Asked    Self-Exams Not Asked   Social History Narrative    78 year ols male admitted for depression and homelessness. Pt will be evicated from apartment due to non payment of bills. Girlfriend of 30 years left him to [place] live in the [place] with others. \" Pt is a brittle diabetic, with treatment non compliance. He has bilarela lower extremity amputations. Patient has a long hx of substance abuse. FAMILY HISTORY: Family history from the initial psychiatric evaluation has been reviewed (reviewed/updated 3/8/2019) with no additional updates (I asked patient and no additional family history provided). Family History   Problem Relation Age of Onset    Hypertension Father        REVIEW OF SYSTEMS: (reviewed/updated 3/8/2019)  Appetite:   Sleep: All other Review of Systems: Negative except          MENTAL STATUS EXAM & [address] (MSE):    MSE FINDINGS ARE WITHIN NORMAL LIMITS (WNL) UNLESS OTHERWISE STATED BELOW. ( ALL OF THE BELOW CATEGORIES OF THE MSE HAVE BEEN REVIEWED (reviewed 2/14/2019) AND UPDATED AS DEEMED APPROPRIATE )  General Presentation age appropriate and casually dressed, cooperative   Orientation oriented to time, place and person   Vital Signs  See below (reviewed 2/14/2019); Vital Signs (BP, Pulse, & Temp) are within normal limits if not listed below.    Gait and Station Stable/steady, no ataxia   Musculoskeletal System No extrapyramidal symptoms (EPS); no abnormal muscular movements or Tardive Dyskinesia (TD); muscle strength and tone are within normal limits   Language No aphasia or dysarthria   Speech:  soft   Thought Processes logical; slow rate of thoughts; fair abstract reasoning/computation   Thought Associations goal directed   Thought Content free of delusions   Suicidal Ideations intention   Homicidal Ideations no plan  and no intention   Mood:  anxious  and depressed   Affect:  constricted   Memory recent  intact   Memory remote:  intact   Concentration/Attention:  distractable   Fund of Knowledge average   Insight:  fair   Reliability fair   Judgment:  fair                                                                                                    VITALS:     Patient Vitals for the past 24 hrs:   Temp Pulse Resp BP SpO2   03/08/19 0738 98.5 °F (36.9 °C) 74 16 167/59 --   03/07/19 1941 98.5 °F (36.9 °C) 61 16 130/76 94 %   03/07/19 1626 -- (!) 52 -- 148/56 --   03/07/19 1615 -- (!) 52 -- 148/66 --   03/07/19 1600 -- (!) 55 -- 167/69 --   03/07/19 1545 -- (!) 49 -- 172/72 --   03/07/19 1530 -- (!) 47 -- 181/69 --   03/07/19 1515 -- (!) 49 -- 177/77 --   03/07/19 1500 -- (!) 49 -- 179/76 --   03/07/19 1445 -- (!) 49 -- 182/67 --   03/07/19 1430 -- (!) 46 -- 182/63 --   03/07/19 1415 -- (!) 50 -- 184/82 --   03/07/19 1400 -- (!) 46 -- 178/66 --   03/07/19 1345 -- (!) 48 -- 183/84 --   03/07/19 1330 -- (!) 47 -- 183/82 --   03/07/19 1323 98.5 °F (36.9 °C) (!) 47 -- 193/78 --     Wt Readings from Last 3 Encounters:   02/14/19 73 kg (160 lb 15 oz)   02/13/19 73.4 kg (161 lb 12.8 oz)   01/15/19 81.6 kg (180 lb)     Temp Readings from Last 3 Encounters:   03/08/19 98.5 °F (36.9 °C)   03/04/19 97.6 °F (36.4 °C)   02/22/19 98.2 °F (36.8 °C)     BP Readings from Last 3 Encounters:   03/08/19 167/59   03/04/19 188/80   02/22/19 146/68     Pulse Readings from Last 3 Encounters:   03/08/19 74   03/04/19 (!) 54   02/22/19 (!) 53            DATA     LABORATORY DATA:(reviewed/updated 3/8/2019)  No results found for this or any previous visit (from the past 24 hour(s)).   No results found for: VALF2, VALAC, VALP, VALPR, DS6, CRBAM, CRBAMP, CARB2, XCRBAM  No results found for: LITHM   RADIOLOGY REPORTS:(reviewed/updated 3/8/2019)  Xr Chest Pa Lat    Result Date: 2/8/2019  EXAM: XR CHEST PA LAT INDICATION: Shortness of breath, Low back pain and abdominal pain for several days. End-stage renal disease on dialysis, missed dialysis one day ago. COMPARISON: Chest views on 5/20/2018 TECHNIQUE: 4 images of PA and lateral chest views FINDINGS: Right jugular dialysis catheter is new and terminates in the distal superior vena cava. Cardiac monitoring wires overlie the thorax. Borderline cardiac size is unchanged. Aortic arch is not enlarged. The pulmonary vasculature is within normal limits. The lungs and pleural spaces are clear. The visualized bones and upper abdomen are age-appropriate. IMPRESSION: No acute process on chest x-ray. Right jugular dialysis catheter terminates in the distal superior vena cava. No pneumothorax. Ct Abd Pelv W Cont    Result Date: 2/8/2019  EXAM: CT ABD PELV W CONT INDICATION: Abdominal pain and low back pain for several days. End-stage renal disease on dialysis, missed dialysis yesterday. Normal white blood cell count. Normal liver enzymes. COMPARISON: None. CONTRAST: 100 mL of Isovue-370. TECHNIQUE: Following the uneventful intravenous administration of contrast, thin axial images were obtained through the abdomen and pelvis. Coronal and sagittal reconstructions were generated. Oral contrast was administered. CT dose reduction was achieved through use of a standardized protocol tailored for this examination and automatic exposure control for dose modulation. FINDINGS: LUNG BASES: No pneumonia. Mild dependent atelectasis. INCIDENTALLY IMAGED HEART AND MEDIASTINUM: Borderline cardiac size. No pericardial effusion. LIVER: Subcentimeter liver lesions measure up to 9 mm in segment 2 of the liver. Surface is smooth. Streak artifact from bilateral upper extremities. GALLBLADDER: Gallstones are in the body and neck. No evidence of cholecystitis. CBD is not dilated. SPLEEN: Normal size and enhancement. PANCREAS: No mass or ductal dilatation. ADRENALS: Unremarkable.  KIDNEYS: No mass, calculus, or hydronephrosis. STOMACH: Partial distention. SMALL BOWEL: No dilatation or wall thickening. COLON: Moderate colonic fecal burden. No mural thickening. APPENDIX: Unremarkable. PERITONEUM: No ascites or pneumoperitoneum. RETROPERITONEUM: IVC filter is in good position. Aorta is atherosclerotic without aneurysm. Mild atherosclerotic stenosis of the celiac artery. No lymphadenopathy. REPRODUCTIVE ORGANS: Mild prostatomegaly measures 6 cm and extends into the base of the urinary bladder. URINARY BLADDER: No mass or calculus. BONES: Moderate bilateral hip osteoarthritis. ADDITIONAL COMMENTS: N/A     IMPRESSION: 1. No acute process on CT. 2. Cholelithiasis. No cholecystitis. 3. Subcentimeter segment 2 liver lesions cannot be fully characterized on this examination. 4. Moderate colonic fecal burden may represent constipation. No bowel obstruction.          MEDICATIONS     ALL MEDICATIONS:   Current Facility-Administered Medications   Medication Dose Route Frequency    sodium chloride (NS) flush 10 mL  10 mL InterCATHeter PRN    sertraline (ZOLOFT) tablet 100 mg  100 mg Oral DAILY    traZODone (DESYREL) tablet 25 mg  25 mg Oral QHS    heparin (porcine) 1,000 unit/mL injection 4,300 Units  4,300 Units Hemodialysis DIALYSIS PRN    epoetin rosie-epbx (RETACRIT) injection 20,000 Units  20,000 Units SubCUTAneous DIALYSIS TUE, THU & SAT    oxyCODONE-acetaminophen (PERCOCET) 5-325 mg per tablet 1 Tab  1 Tab Oral BID PRN    ibuprofen (MOTRIN) tablet 800 mg  800 mg Oral Q8H PRN    aspirin delayed-release tablet 81 mg  81 mg Oral DAILY    rosuvastatin (CRESTOR) tablet 10 mg  10 mg Oral QHS    ferric citrate (AURYXIA) tablet 420 mg  420 mg Oral TID WITH MEALS    NIFEdipine ER (PROCARDIA XL) tablet 60 mg  60 mg Oral Q24H    doxazosin (CARDURA) tablet 4 mg  4 mg Oral QHS    OLANZapine (ZyPREXA) tablet 2.5 mg  2.5 mg Oral Q6H PRN    ziprasidone (GEODON) 10 mg in sterile water (preservative free) 0.5 mL injection 10 mg IntraMUSCular BID PRN    benztropine (COGENTIN) tablet 1 mg  1 mg Oral BID PRN    benztropine (COGENTIN) injection 1 mg  1 mg IntraMUSCular BID PRN    zolpidem (AMBIEN) tablet 5 mg  5 mg Oral QHS PRN    acetaminophen (TYLENOL) tablet 650 mg  650 mg Oral Q4H PRN    magnesium hydroxide (MILK OF MAGNESIA) 400 mg/5 mL oral suspension 30 mL  30 mL Oral DAILY PRN    nicotine (NICODERM CQ) 21 mg/24 hr patch 1 Patch  1 Patch TransDERmal DAILY PRN    glucose chewable tablet 16 g  4 Tab Oral PRN    dextrose (D50W) injection syrg 12.5-25 g  25-50 mL IntraVENous PRN    glucagon (GLUCAGEN) injection 1 mg  1 mg IntraMUSCular PRN      SCHEDULED MEDICATIONS:   Current Facility-Administered Medications   Medication Dose Route Frequency    sertraline (ZOLOFT) tablet 100 mg  100 mg Oral DAILY    traZODone (DESYREL) tablet 25 mg  25 mg Oral QHS    epoetin rosie-epbx (RETACRIT) injection 20,000 Units  20,000 Units SubCUTAneous DIALYSIS TUE, THU & SAT    aspirin delayed-release tablet 81 mg  81 mg Oral DAILY    rosuvastatin (CRESTOR) tablet 10 mg  10 mg Oral QHS    ferric citrate (AURYXIA) tablet 420 mg  420 mg Oral TID WITH MEALS    NIFEdipine ER (PROCARDIA XL) tablet 60 mg  60 mg Oral Q24H    doxazosin (CARDURA) tablet 4 mg  4 mg Oral QHS          ASSESSMENT & PLAN     The patient, Elmo Sanchez, is a 70 y.o.  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  Plan for hemodialysis 3 days a week.  If it is not controlled  with the same, provide additional antihypertensive meds.  Monitor blood pressure.    End-stage renal disease, on hemodialysis.  Schedule treatment for hemodialysis on  Tuesdays, Thursdays, and Saturdays.  .   Type 2 diabetes mellitus.  Place on Humalog insulin correction coverage schedule,  Accu-Chek, and check hemoglobin A1c level.  The patient will be on a diabetic/renal  Diet. Nephrology consult   2/15/19: continue meds/milue, encourage compliance. 2/16/19: continue meds/milue  2/17/19: continue meds/milue increaisng zoloft 50 mg po daily  2/18/19: continue meds/milue  2/19/19: readding percocet twice daily as needed for pain. 2/20/19: awaiting placemnet, PT/OT for placement  2/21/19: nephrology consult, recommendation regrading dialysis,  2/22/19: increasing zoloft 75 mg daily, continue milue,  2/25/19: continue meds/milue, HD tomorrow  2/26/19: increasing zoloft 100mg po daily, HHA referral  2/27/19: received BP meds today, received percocet last night/ continue meds/milue  2/28/19: awaiting placemnet, PT/OT for placement  3/1/19: Nursing home placement in [facility] in progress  3/4/19: [address] [place] [address] Radiology to see for port, continue meds, continue Placement referrals  3/7/19:HEP b AB Positive, continue emds/milue  I will continue to monitor blood levels (Depakote, Tegretol, lithium, clozapine---a drug with a narrow therapeutic index= NTI) and associated labs for drug therapy implemented that require intense monitoring for toxicity as deemed appropriate based on current medication side effects and pharmacodynamically determined drug 1/2 lives. In summary, Micheal Cadet, is a 70 y.o.  male who presents with a severe exacerbation of the principal diagnosis of Depression  Patient's condition is worsening/not improving/not stable improving. Patient requires continued inpatient hospitalization for further stabilization, safety monitoring and medication management. I will continue to coordinate the provision of individual, milieu, occupational, group, and substance abuse therapies to address target symptoms/diagnoses as deemed appropriate for the individual patient.   A coordinated, multidisplinary treatment team round was conducted with the patient (this team consists of the nurse, psychiatric unit pharmcist, social worker and writer). Complete current electronic health record for patient has been reviewed today including consultant notes, ancillary staff notes, nurses and psychiatric tech notes. uicide risk assessment completed and patient deemed to be of low risk for suicide at this time. The following regarding medications was addressed during rounds with patient:   the risks and benefits of the proposed medication. The patient was given the opportunity to ask questions. Informed consent given to the use of the above medications. Will continue to adjust psychiatric and non-psychiatric medications (see above \"medication\" section and orders section for details) as deemed appropriate & based upon diagnoses and response to treatment. I will continue to order blood tests/labs and diagnostic tests as deemed appropriate and review results as they become available (see orders for details and above listed lab/test results). I will order psychiatric records from previous [facility] hospitals to further elucidate the nature of patient's psychopathology and review once available. I will gather additional collateral information from friends, family and o/p treatment team to further elucidate the nature of patient's psychopathology and baselline level of psychiatric functioning. I certify that this patient's inpatient psychiatric hospital services furnished since the previous certification were, and continue to be, required for treatment that could reasonably be expected to improve the patient's condition, or for diagnostic study, and that the patient continues to need, on a daily basis, active treatment furnished directly by or requiring the supervision of inpatient psychiatric facility personnel. In addition, the hospital records show that services furnished were intensive treatment services, admission or related services, or equivalent services.     EXPECTED DISCHARGE DATE/DAY: TBD     DISPOSITION: Home Signed By:   Monica Villatoro MD  3/8/2019

## 2019-03-08 NOTE — PROGRESS NOTES
Problem: Falls - Risk of  Goal: *Absence of Falls  Document Bello Fall Risk and appropriate interventions in the flowsheet. Outcome: Progressing Towards Goal  Fall Risk Interventions:  Mobility Interventions: Assess mobility with egress test    Mentation Interventions: Adequate sleep, hydration, pain control    Medication Interventions: Teach patient to arise slowly    Elimination Interventions: Toilet paper/wipes in reach    History of Falls Interventions: Door open when patient unattended  In bed asleep with respirations noted as even and unlabored as chest was rising and falling. Will continue to monitor foor safety and 15 minute checks throughout shift.

## 2019-03-08 NOTE — BH NOTES
Patient asked for lift team to be called to [facility]. Nurse and lift team staff speak with patient about fact that he transferred independently from bed to [facility] earlier today after his dialysis. Nurse instructed patient that for safety and in order to keep good upper body strength that staff would help him do the same as he did earlier and transfer himself. \"I'm not doing that! \"  Patient refused to help with his transfer  Lift team staff and 2 nurses lifted patient with max assist.  Patient instructed that from now on he would need to begin moving self more independently.

## 2019-03-08 NOTE — INTERDISCIPLINARY ROUNDS
Behavioral Health Interdisciplinary Rounds     Patient Name: Corby Tamayo  Age: 70 y.o. Room/Bed:  740/  Primary Diagnosis: Depression   Admission Status: Voluntary     Readmission within 30 days: no  Power of  in place: no  Patient requires a blocked bed: no          Reason for blocked bed:     Sleep hours: 5.50       Participation in Care/Groups:  no  Medication Compliant?: Yes  PRNS (last 24 hours): Pain    Restraints (last 24 hours):  no     Alcohol screening (AUDIT) completed -  AUDIT Score: 0  If applicable, date SBIRT discussed in treatment team AND documented:    Tobacco - patient is a smoker: Have You Used Tobacco in the Past 30 Days: Yes  Illegal Drugs use: Have You Used Any Illegal Substances Over the Past 12 Months: Yes    24 hour chart check complete: yes     Patient goal(s) for today: Visit with peers  Treatment team focus/goals: Set up [facility] transport for D/C; set up [address] transport for dialysis on 3/12  Progress note: Pt irritable and agitated; states he is not looking forward to discharge    LOS:  23  Expected LOS: ~25-27    Participating treatment team members:  Corby Roni, DARRICK Mcdonald; Dr. Chun Ruiz MD; Amelie Su, MIKHAIL; Leann Reed, DongD

## 2019-03-08 NOTE — BH NOTES
PRN Medication Documentation    Specific patient behavior that led to need for PRN medication: Generalized pain 10/10. Staff interventions attempted prior to PRN being given: Education, deep breathing and distraction offered  PRN medication given: Percocet PO given   Patient response/effectiveness of PRN medication: 1448: pt verbalizes pain is now a 0/10. Medication effective.

## 2019-03-08 NOTE — PROGRESS NOTES
Nephrology Progress Note  Wilmer Lopez  Date of Admission : 2/13/2019    CC:  Follow up for ESRD       Assessment and Plan     ESRD on HD:  - HD TTS at [facility]  - HD tomorrow     HTN:  - cont current meds    Anemia of CKD:  - FANNY with dialysis     Secondary HPT:  - cont auryxia    Diastolic HF    PAD s/p b/l BKAs    DM2:  - on insulin    Depression:  - per psychiatry       Interval History:  Seen and examined   Anticipated d/c next week . No cp, n/v/d. Current Medications: all current  Medications have been eviewed in EPIC  Review of Systems: Pertinent items are noted in HPI. Objective:  Vitals:    Vitals:    03/07/19 1615 03/07/19 1626 03/07/19 1941 03/08/19 0738   BP: 148/66 148/56 130/76 167/59   Pulse: (!) 52 (!) 52 61 74   Resp:   16 16   Temp:   98.5 °F (36.9 °C) 98.5 °F (36.9 °C)   TempSrc:       SpO2:   94%    Weight:         Intake and Output:  No intake/output data recorded. 03/06 1901 - 03/08 0700  In: -   Out: 2000     Physical Examination:  General: NAD  Neck:  Supple, no mass  Resp:  Lungs CTA B/L, no wheezing , normal respiratory effort  CV:  RRR,  no murmur or rub, no thigh edema, b/l BKAs  GI:  Soft, NT, + Bowel sounds, no hepatosplenomegaly  Neurologic:  Non focal  Psych:             Depressed, flat affect  Skin:  No Rash  Access:           TIJ PC c/d/i    []    High complexity decision making was performed  []    Patient is at high-risk of decompensation with multiple organ involvement    Lab Data Personally Reviewed: I have reviewed all the pertinent labs, microbiology data and radiology studies during assessment.     Recent Labs     03/07/19  1300      K 4.2      CO2 26   *   BUN 48*   CREA 3.98*   CA 8.5   PHOS 4.7   ALB 3.2*     Recent Labs     03/07/19  1300   WBC 4.0*   HGB 10.5*   HCT 34.8*   *     No results found for: SDES  Lab Results   Component Value Date/Time    Culture result: MRSA NOT PRESENT 01/24/2018 10:30 AM    Culture result: 01/24/2018 10:30 AM         Screening of patient nares for MRSA is for surveillance purposes and, if positive, to facilitate isolation considerations in high risk settings. It is not intended for automatic decolonization interventions per se as regimens are not sufficiently effective to warrant routine use. Culture result: MRSA NOT PRESENT 01/19/2015 10:44 AM    Culture result:  01/19/2015 10:44 AM         Screening of patient nares for MRSA is for surveillance purposes and, if positive, to facilitate isolation considerations in high risk settings. It is not intended for automatic decolonization interventions per se as regimens are not sufficiently effective to warrant routine use. Recent Results (from the past 24 hour(s))   SAMPLES BEING HELD    Collection Time: 03/07/19  1:00 PM   Result Value Ref Range    SAMPLES BEING HELD 1PST 1LAV     COMMENT        Add-on orders for these samples will be processed based on acceptable specimen integrity and analyte stability, which may vary by analyte. CBC WITH AUTOMATED DIFF    Collection Time: 03/07/19  1:00 PM   Result Value Ref Range    WBC 4.0 (L) 4.1 - 11.1 K/uL    RBC 3.77 (L) 4.10 - 5.70 M/uL    HGB 10.5 (L) 12.1 - 17.0 g/dL    HCT 34.8 (L) 36.6 - 50.3 %    MCV 92.3 80.0 - 99.0 FL    MCH 27.9 26.0 - 34.0 PG    MCHC 30.2 30.0 - 36.5 g/dL    RDW 21.4 (H) 11.5 - 14.5 %    PLATELET 974 (L) 147 - 400 K/uL    MPV 9.6 8.9 - 12.9 FL    NRBC 0.0 0  WBC    ABSOLUTE NRBC 0.00 0.00 - 0.01 K/uL    NEUTROPHILS 48 32 - 75 %    LYMPHOCYTES 28 12 - 49 %    MONOCYTES 10 5 - 13 %    EOSINOPHILS 12 (H) 0 - 7 %    BASOPHILS 1 0 - 1 %    IMMATURE GRANULOCYTES 1 (H) 0.0 - 0.5 %    ABS. NEUTROPHILS 2.0 1.8 - 8.0 K/UL    ABS. LYMPHOCYTES 1.1 0.8 - 3.5 K/UL    ABS. MONOCYTES 0.4 0.0 - 1.0 K/UL    ABS. EOSINOPHILS 0.5 (H) 0.0 - 0.4 K/UL    ABS. BASOPHILS 0.0 0.0 - 0.1 K/UL    ABS. IMM.  GRANS. 0.0 0.00 - 0.04 K/UL    DF SMEAR SCANNED      RBC COMMENTS ANISOCYTOSIS  2+        RBC COMMENTS POIKILOCYTOSIS  1+       RENAL FUNCTION PANEL    Collection Time: 03/07/19  1:00 PM   Result Value Ref Range    Sodium 136 136 - 145 mmol/L    Potassium 4.2 3.5 - 5.1 mmol/L    Chloride 102 97 - 108 mmol/L    CO2 26 21 - 32 mmol/L    Anion gap 8 5 - 15 mmol/L    Glucose 106 (H) 65 - 100 mg/dL    BUN 48 (H) 6 - 20 MG/DL    Creatinine 3.98 (H) 0.70 - 1.30 MG/DL    BUN/Creatinine ratio 12 12 - 20      GFR est AA 18 (L) >60 ml/min/1.73m2    GFR est non-AA 15 (L) >60 ml/min/1.73m2    Calcium 8.5 8.5 - 10.1 MG/DL    Phosphorus 4.7 2.6 - 4.7 MG/DL    Albumin 3.2 (L) 3.5 - 5.0 g/dL                   Rosa Salter MD  [address]  Phone - [phone]   Fax - [phone]  www. [facility]

## 2019-03-08 NOTE — BH NOTES
GROUP THERAPY PROGRESS NOTE    Franky Clay is participating in Movie Group.      Group time: 30 minutes    Personal goal for participation: TCM movie \"always fair weather\"    Goal orientation: community    Group therapy participation: active    Therapeutic interventions reviewed and discussed: leisure/movie    Impression of participation: patient smiling, calm/cooperative

## 2019-03-08 NOTE — BH NOTES
GROUP THERAPY PROGRESS NOTE    Wilmer Lopez did not participate in a 45 minute Process Group on the Geriatric Unit with a focus on shifting ones feelings to a positive note and preparing for the day through group singing.

## 2019-03-08 NOTE — BH NOTES
MARTÍNEZ called Mya Shi ([phone]), admissions coordinator for [facility], to see if they would be able to accept Pt on Sunday, 3/10/19. Mr. Teena White stated that the SNF would be happy to accept Pt on Sunday, but Pt needed to have transportation set up for his upcoming outpatient dialysis treatment scheduled for 3/12/19. MARTÍNEZ stated she would contact [facility]. MARTÍNEZ called [facility] ([phone]) to set up transportation for outpatient dialysis treatment from [facility] to [facility] [place], [place]. MARTÍNEZ received call back stating that Pt did not reverify his Medicaid benefits and was downgraded to [facility], which does not include transportation benefits. MARTÍNEZ contacted Itzel ([phone]), nurse navigator through [facility], regarding the lapse in benefits. She stated that [facility] could not offer any transportation, due to Pt relocating to [facility]. MARTÍNEZ contacted Mya Shi at [facility] and alerted him to the lapse in benefit. Mr. Teena White stated that he would need to run Pt's Medicaid benefit again to see if he meets criteria for LTC at [facility]. Mr. Teena White reported he would call MARTÍNEZ back. 1723: AMRTÍNEZ spoke to Mya Shi from [employer] who reported that Pt's Medicaid was still active and approved for LTC. He stated that SNF is still interested in accepting Pt, but Mr. Teena White needs to speak with the  regarding transportation issues to-and-from dialysis treatment. Mr. Teena White stated that he reached out to the , but has not yet heard back and does not expect to hear back until Monday. MARTÍNEZ agreed to postpone discharge until Monday.

## 2019-03-09 PROCEDURE — 74011250637 HC RX REV CODE- 250/637: Performed by: HOSPITALIST

## 2019-03-09 PROCEDURE — 74011250637 HC RX REV CODE- 250/637: Performed by: FAMILY MEDICINE

## 2019-03-09 PROCEDURE — 65220000003 HC RM SEMIPRIVATE PSYCH

## 2019-03-09 PROCEDURE — 74011250637 HC RX REV CODE- 250/637: Performed by: INTERNAL MEDICINE

## 2019-03-09 PROCEDURE — 74011250637 HC RX REV CODE- 250/637: Performed by: PSYCHIATRY & NEUROLOGY

## 2019-03-09 RX ADMIN — SERTRALINE HYDROCHLORIDE 100 MG: 50 TABLET ORAL at 09:48

## 2019-03-09 RX ADMIN — OXYCODONE AND ACETAMINOPHEN 1 TABLET: 5; 325 TABLET ORAL at 17:12

## 2019-03-09 RX ADMIN — ROSUVASTATIN CALCIUM 10 MG: 10 TABLET, FILM COATED ORAL at 21:01

## 2019-03-09 RX ADMIN — ASPIRIN 81 MG: 81 TABLET, COATED ORAL at 09:48

## 2019-03-09 RX ADMIN — FERRIC CITRATE 420 MG: 210 TABLET, COATED ORAL at 09:48

## 2019-03-09 RX ADMIN — DOXAZOSIN 4 MG: 2 TABLET ORAL at 21:00

## 2019-03-09 RX ADMIN — OXYCODONE AND ACETAMINOPHEN 1 TABLET: 5; 325 TABLET ORAL at 02:30

## 2019-03-09 RX ADMIN — FERRIC CITRATE 420 MG: 210 TABLET, COATED ORAL at 17:12

## 2019-03-09 RX ADMIN — FERRIC CITRATE 420 MG: 210 TABLET, COATED ORAL at 11:54

## 2019-03-09 RX ADMIN — NIFEDIPINE 60 MG: 60 TABLET, FILM COATED, EXTENDED RELEASE ORAL at 17:12

## 2019-03-09 RX ADMIN — TRAZODONE HYDROCHLORIDE 25 MG: 50 TABLET ORAL at 21:02

## 2019-03-09 NOTE — PROGRESS NOTES
Problem: Falls - Risk of  Goal: *Absence of Falls  Document Bello Fall Risk and appropriate interventions in the flowsheet. Outcome: Progressing Towards Goal  Fall Risk Interventions:  Mobility Interventions: Assess mobility with egress test    Mentation Interventions: Adequate sleep, hydration, pain control, Door open when patient unattended    Medication Interventions: Teach patient to arise slowly    Elimination Interventions:  Toilet paper/wipes in reach    History of Falls Interventions: Door open when patient unattended, Bed/chair exit alarm

## 2019-03-09 NOTE — PROGRESS NOTES
For HD today  We will see him again next week   Esmer Gunn for d/c from renal stand point       [facility]  Office :[phone]  Fax: [phone]

## 2019-03-09 NOTE — INTERDISCIPLINARY ROUNDS
Behavioral Health Interdisciplinary Rounds     Patient Name: Brian Paulino  Age: 70 y.o. Room/Bed:  0/  Primary Diagnosis: Depression   Admission Status: Voluntary     Readmission within 30 days: no  Power of  in place: no  Patient requires a blocked bed: no          Reason for blocked bed:     Sleep hours:  7     Participation in Care/Groups:  no  Medication Compliant?: Yes  PRNS (last 24 hours): Pain    Restraints (last 24 hours):  no     Alcohol screening (AUDIT) completed -  AUDIT Score: 0  If applicable, date SBIRT discussed in treatment team AND documented:    Tobacco - patient is a smoker: Have You Used Tobacco in the Past 30 Days: Yes  Illegal Drugs use: Have You Used Any Illegal Substances Over the Past 12 Months: Yes    24 hour chart check complete: yes     Patient goal(s) for today:   Treatment team focus/goals:   Progress note:     LOS:    Expected LOS: ~25-27    Participating treatment team members:  Brian Paulino

## 2019-03-09 NOTE — BH NOTES
PSYCHIATRIC PROGRESS NOTE         Patient Name  Gustavo Castro   Date of Birth 1940   [facility] [id number]   Medical Record Number  [id number]      Age  70 y.o. PCP Samaria Flores MD   Admit date:  2/13/2019    Room Number  740/01  @ [facility]   Date of Service  3/9/2019           E & M PROGRESS NOTE: 15 mins         HISTORY       REASON FOR HOSPITALIZATION:  CC: \"Pt admitted under a voluntary basis for severe depression with suicidal ideations and  an inability to care for self. HISTORY OF PRESENT ILLNESS:    The patient, Gustavo Castro, is a 70 y.o. BLACK OR  male with 68-year-old -American male with past medical  history of end-stage renal disease; on hemodialysis, sleep apnea, peripheral vascular disease, bilateral BKA, hypertension, hyperlipidemia, peripheral neuropathy, anemia,hepatitis C, arthritis, peptic ulcer disease, coronary artery disease, DVT, prior GIbleed, chronic diastolic CHF, prior suicidal ideation, depression, and noncompliance   transfered to [facility] from [facility] after pt reported feeling depressed nad suicidal Pt stated he lives in the [place] and he is unhappy about his living situation , he stated he had lots of Money before [place] HAS 13 CHILDREN but now he is broke and no one ask about him. Pt reports feeling hopeless, helpless, angry. These symptoms are of high severity. These symptoms are constant . Pt ha sh/o using marijuana occasionally. UDS: negative; BAL=0. .  2/15/19: Pt is irritable and refused zoloft and vitals today. Pt has dialysis yesterday. 2/16/19: Pt took zoloft but refuses some medical meds. Pt had dialysis on Saturday. Pt is demanding in the evenings. 2/17/19: Pt is demanding, irritable in the evenings, compliant with zoloft. Eating well. 2/18/19: Pt is demanding and manipulative ta times but taking zoloft. Pt is eating well. 2/19/19: Pt taking zoloft.  Pt got agitated yesterday evening and received geodon I/M PRN. Pt reported he need percocet. AS per reports, he was on percocets for pain. 2/20/19: Pt is doing better, is no longer suicidal and is accepting of nursing home placement, is psychiatrically stable for discharge. 2/21/19: Pt did not receive dialysis , was sleeping in his bed. Pt has clot in the catheter nad need new port. Nephrology is contacted. Pt report not feeling depressed, awaiting placement.i  2/22/19: Pt  Is irritable at times, did not get dialusis yetserday, New port is placed, nephrology is on board. Pt is eating   well nad sleeping well. 2/23/19-Presented verbal and cooperative. He is compliant with meds. Repotrs he is homeless and has nowhere to go to. SW is trying to find appropriate placement. No AVH or SI/HI.  2/24/19- pt stayed in bed most of the day. He had been asking for discharge earlier but has not recognized a place. He was seen in his room and encouraged get up and come to the day-room. No prn's used. 2/25/19: Pt will go for dialysis on Tuesday. Pt  Is cooperative, refusing to go to [facility]. Pt is eating well, slept good,  2/26/19: Pt is still depressed at times. Pt contacted a friend yesterday and pt says  He wants to go live with her. Pt will need HHA and dialysis transportation after discharge. 2/27/19: slept 6 hours, BP was low , Bp meds were held yesterday. Pt is on phone. Pt refused zoloft in am, gets irritated at times. 2/28/19: Pt is in dialyiss, is in betetr mood, more courteous. Pt is eating well. Pt's finger stick was 71, no symptoms. 3/1/19: Pt slept 7 hours, received percocet last night. Nursing home placement in [facility] in progress. Pt is eating well, no acute issues at this time. 3/2: Could not get dialysis due to problems with port. Will get the port adjusted on Monday. Nephrology is aware, is courteous. sleeping eating well. 3/3: Reports depressed mood. No SI.   3/4/19: Pt pulled his port on Friday, did not receive dialysis on Saturday.  Pt keeps on changing statement regarding penitentiary. Pt is for placment at [facility] at [facility]. Pt took meds. 3/5/19: Pt is fine sitting comfortable, got port done, has dialysis today. Pt psycheducated not to get port out. Hep B testing ordered  3/6/19: Pt is irritable at times, got dialysis yesterday, eating well. 3/7/19: Took his meds, slept 7 hours. Pt had percocet last night and in am. Pt is getting dialysis today. Labs today  3/8/19: Pt is proprositions the nurses. Pt got dialysis yesterday. Pt taking meds and is eating well. 3/9/19: Patient seen today, he was upset and reported that he does not want to be discharge in [facility]. he complaint with medications, slept 7 hours,accetping meals and complaint with dialysis appointments. He denies suicidal thoughts plan or intent. SIDE EFFECTS: (reviewed/updated 3/9/2019)  None reported or admitted to. No noted toxicity with use of Depakote/Tegretol/lithium/Clozaril/TCAs   ALLERGIES:(reviewed/updated 3/9/2019)  Allergies   Allergen Reactions    Tetracycline Hives      MEDICATIONS PRIOR TO ADMISSION:(reviewed/updated 3/9/2019)  Medications Prior to Admission   Medication Sig    NIFEdipine ER (ADALAT CC) 60 mg ER tablet Take 1 Tab by mouth daily. HOLD for HR<60    hydroCHLOROthiazide (HYDRODIURIL) 25 mg tablet Take 1 Tab by mouth daily.  sertraline (ZOLOFT) 25 mg tablet Take 1 Tab by mouth daily.  oxyCODONE-acetaminophen (PERCOCET 10)  mg per tablet Take 1 Tab by mouth every six (6) hours as needed for Pain.  traZODone (DESYREL) 50 mg tablet Take 50 mg by mouth nightly.  rosuvastatin (CRESTOR) 10 mg tablet Take 10 mg by mouth nightly.  aspirin delayed-release 81 mg tablet Take 1 Tab by mouth daily. PAST MEDICAL HISTORY: Past medical history from the initial psychiatric evaluation has been reviewed (reviewed/updated 3/9/2019) with no additional updates (I asked patient and no additional past medical history provided).    Past Medical History: Diagnosis Date    Arthritis     CAD (coronary artery disease) 2007    CKD [facility]ronic kidney disease), [facility] III [facility]     DM type 2 causing renal disease ([facility]     DVT (deep venous thrombosis) (HCC)     Hx of DVT:  Xarelto stopped due to GI bleed. S/P IVC filter.  Gait abnormality     GI bleed     Heart murmur     Hepatitis C     History of blood transfusion     Hypercholesterolemia     Hypertension 11/7/2014    Neuropathy     Non compliance w medication regimen     Peripheral vascular disease ([facility] 11/7/2014    A. S/P Right SFA POBA and tibial atherectomy (2/4/13).  PUD (peptic ulcer disease) 2007    Unspecified sleep apnea     never used cpap     Past Surgical History:   Procedure Laterality Date    ABDOMEN SURGERY PROC UNLISTED  2007    has approx 7-8\" midline incision on abdomen--\"had to open him up and clean him out after feeding tube clogged\"    HX GI  2007    feeding tube for approx 2 mo    IR INSERT TUNL CVC W/O PORT OVER 5 YR  3/4/2019    VASCULAR SURGERY PROCEDURE UNLIST Right 1/22/2015    popliteal-tibial bypass      SOCIAL HISTORY: Social history from the initial psychiatric evaluation has been reviewed (reviewed/updated 3/9/2019) with no additional updates (I asked patient and no additional social history provided).    Social History     Socioeconomic History    Marital status: SINGLE     Spouse name: Not on file    Number of children: Not on file    Years of education: Not on file    Highest education level: Not on file   Social Needs    Financial resource strain: Not on file    Food insecurity - worry: Not on file    Food insecurity - inability: Not on file    Transportation needs - medical: Not on file   [facility] needs - non-medical: Not on file   Occupational History    Not on file   Tobacco Use    Smoking status: Current Every Day Smoker     Packs/day: 0.50    Smokeless tobacco: Never Used   Substance and Sexual Activity    Alcohol use: No    Drug use: No     Comment: >20 years ago    Sexual activity: Not on file   Other Topics Concern     Service Not Asked    Blood Transfusions Not Asked    Caffeine Concern Not Asked    Occupational Exposure Not Asked    Hobby Hazards Not Asked    Sleep Concern Not Asked    Stress Concern Not Asked    Weight Concern Not Asked    Special Diet Not Asked    Back Care Not Asked    Exercise Not Asked    Bike Helmet Not Asked    Seat Belt Not Asked    Self-Exams Not Asked   Social History Narrative    78 year ols male admitted for depression and homelessness. Pt will be evicated from apartment due to non payment of bills. [facility] of 30 [facility] [place]. \" Pt is a brittle diabetic, with treatment non compliance. He has bilarela lower extremity amputations. Patient has a long hx of substance abuse. FAMILY HISTORY: Family history from the initial psychiatric evaluation has been reviewed (reviewed/updated 3/9/2019) with no additional updates (I asked patient and no additional family history provided). Family History   Problem Relation Age of Onset    Hypertension Father        REVIEW OF SYSTEMS: (reviewed/updated 3/9/2019)  Appetite:   Sleep: All other Review of Systems: Negative except          MENTAL STATUS EXAM & [address] (MSE):    MSE FINDINGS ARE WITHIN NORMAL LIMITS (WNL) UNLESS OTHERWISE STATED BELOW. ( ALL OF THE BELOW CATEGORIES OF THE MSE HAVE BEEN REVIEWED (reviewed 2/14/2019) AND UPDATED AS DEEMED APPROPRIATE )  General Presentation age appropriate and casually dressed, cooperative   Orientation oriented to time, place and person   Vital Signs  See below (reviewed 2/14/2019); Vital Signs (BP, Pulse, & Temp) are within normal limits if not listed below.    Gait and Station Stable/steady, no ataxia   Musculoskeletal System No extrapyramidal symptoms (EPS); no abnormal muscular movements or Tardive Dyskinesia (TD); muscle strength and tone are within normal limits   Language No aphasia or dysarthria   Speech:  soft   Thought Processes logical; slow rate of thoughts; fair abstract reasoning/computation   Thought Associations goal directed   Thought Content free of delusions   Suicidal Ideations intention   Homicidal Ideations no plan  and no intention   Mood:  anxious  and depressed   Affect:  constricted   Memory recent  intact   Memory remote:  intact   Concentration/Attention:  distractable   Fund of Knowledge average   Insight:  fair   Reliability fair   Judgment:  fair                                                                                                    VITALS:     Patient Vitals for the past 24 hrs:   Temp Pulse Resp BP SpO2   03/09/19 1009 98.3 °F (36.8 °C) (!) 52 16 135/65 98 %   03/08/19 2009 -- (!) 51 16 147/67 100 %   03/08/19 1541 -- 64 -- -- --   03/08/19 1534 -- 60 16 177/72 99 %   03/08/19 1521 98.4 °F (36.9 °C) -- -- -- --     Wt Readings from Last 3 Encounters:   02/14/19 73 kg (160 lb 15 oz)   02/13/19 73.4 kg (161 lb 12.8 oz)   01/15/19 81.6 kg (180 lb)     Temp Readings from Last 3 Encounters:   03/09/19 98.3 °F (36.8 °C)   03/04/19 97.6 °F (36.4 °C)   02/22/19 98.2 °F (36.8 °C)     BP Readings from Last 3 Encounters:   03/09/19 135/65   03/04/19 188/80   02/22/19 146/68     Pulse Readings from Last 3 Encounters:   03/09/19 (!) 52   03/04/19 (!) 54   02/22/19 (!) 53            DATA     LABORATORY DATA:(reviewed/updated 3/9/2019)  No results found for this or any previous visit (from the past 24 hour(s)). No results found for: VALF2, VALAC, VALP, VALPR, DS6, CRBAM, CRBAMP, CARB2, XCRBAM  No results found for: LITHM   RADIOLOGY REPORTS:(reviewed/updated 3/9/2019)  Xr Chest Pa Lat    Result Date: 2/8/2019  EXAM: XR CHEST PA LAT INDICATION: Shortness of breath, Low back pain and abdominal pain for several days. End-stage renal disease on dialysis, missed dialysis one day ago.  COMPARISON: Chest views on 5/20/2018 TECHNIQUE: 4 images of PA and lateral chest views FINDINGS: Right jugular dialysis catheter is new and terminates in the distal superior vena cava. Cardiac monitoring wires overlie the thorax. Borderline cardiac size is unchanged. Aortic arch is not enlarged. The pulmonary vasculature is within normal limits. The lungs and pleural spaces are clear. The visualized bones and upper abdomen are age-appropriate. IMPRESSION: No acute process on chest x-ray. Right jugular dialysis catheter terminates in the distal superior vena cava. No pneumothorax. Ct Abd Pelv W Cont    Result Date: 2/8/2019  EXAM: CT ABD PELV W CONT INDICATION: Abdominal pain and low back pain for several days. End-stage renal disease on dialysis, missed dialysis yesterday. Normal white blood cell count. Normal liver enzymes. COMPARISON: None. CONTRAST: 100 mL of Isovue-370. TECHNIQUE: Following the uneventful intravenous administration of contrast, thin axial images were obtained through the abdomen and pelvis. Coronal and sagittal reconstructions were generated. Oral contrast was administered. CT dose reduction was achieved through use of a standardized protocol tailored for this examination and automatic exposure control for dose modulation. FINDINGS: LUNG BASES: No pneumonia. Mild dependent atelectasis. INCIDENTALLY IMAGED HEART AND MEDIASTINUM: Borderline cardiac size. No pericardial effusion. LIVER: Subcentimeter liver lesions measure up to 9 mm in segment 2 of the liver. Surface is smooth. Streak artifact from bilateral upper extremities. GALLBLADDER: Gallstones are in the body and neck. No evidence of cholecystitis. CBD is not dilated. SPLEEN: Normal size and enhancement. PANCREAS: No mass or ductal dilatation. ADRENALS: Unremarkable. KIDNEYS: No mass, calculus, or hydronephrosis. STOMACH: Partial distention. SMALL BOWEL: No dilatation or wall thickening. COLON: Moderate colonic fecal burden. No mural thickening. APPENDIX: Unremarkable.  PERITONEUM: No ascites or pneumoperitoneum. RETROPERITONEUM: IVC filter is in good position. Aorta is atherosclerotic without aneurysm. Mild atherosclerotic stenosis of the celiac artery. No lymphadenopathy. REPRODUCTIVE ORGANS: Mild prostatomegaly measures 6 cm and extends into the base of the urinary bladder. URINARY BLADDER: No mass or calculus. BONES: Moderate bilateral hip osteoarthritis. ADDITIONAL COMMENTS: N/A     IMPRESSION: 1. No acute process on CT. 2. Cholelithiasis. No cholecystitis. 3. Subcentimeter segment 2 liver lesions cannot be fully characterized on this examination. 4. Moderate colonic fecal burden may represent constipation. No bowel obstruction.          MEDICATIONS     ALL MEDICATIONS:   Current Facility-Administered Medications   Medication Dose Route Frequency    sodium chloride (NS) flush 10 mL  10 mL InterCATHeter PRN    sertraline (ZOLOFT) tablet 100 mg  100 mg Oral DAILY    traZODone (DESYREL) tablet 25 mg  25 mg Oral QHS    heparin (porcine) 1,000 unit/mL injection 4,300 Units  4,300 Units Hemodialysis DIALYSIS PRN    epoetin rosie-epbx (RETACRIT) injection 20,000 Units  20,000 Units SubCUTAneous DIALYSIS TUE, THU & SAT    oxyCODONE-acetaminophen (PERCOCET) 5-325 mg per tablet 1 Tab  1 Tab Oral BID PRN    ibuprofen (MOTRIN) tablet 800 mg  800 mg Oral Q8H PRN    aspirin delayed-release tablet 81 mg  81 mg Oral DAILY    rosuvastatin (CRESTOR) tablet 10 mg  10 mg Oral QHS    ferric citrate (AURYXIA) tablet 420 mg  420 mg Oral TID WITH MEALS    NIFEdipine ER (PROCARDIA XL) tablet 60 mg  60 mg Oral Q24H    doxazosin (CARDURA) tablet 4 mg  4 mg Oral QHS    OLANZapine (ZyPREXA) tablet 2.5 mg  2.5 mg Oral Q6H PRN    ziprasidone (GEODON) 10 mg in sterile water (preservative free) 0.5 mL injection  10 mg IntraMUSCular BID PRN    benztropine (COGENTIN) tablet 1 mg  1 mg Oral BID PRN    benztropine (COGENTIN) injection 1 mg  1 mg IntraMUSCular BID PRN    acetaminophen (TYLENOL) tablet 650 mg 650 mg Oral Q4H PRN    magnesium hydroxide (MILK OF MAGNESIA) 400 mg/5 mL oral suspension 30 mL  30 mL Oral DAILY PRN    nicotine (NICODERM CQ) 21 mg/24 hr patch 1 Patch  1 Patch TransDERmal DAILY PRN    glucose chewable tablet 16 g  4 Tab Oral PRN    dextrose (D50W) injection syrg 12.5-25 g  25-50 mL IntraVENous PRN    glucagon (GLUCAGEN) injection 1 mg  1 mg IntraMUSCular PRN      SCHEDULED MEDICATIONS:   Current Facility-Administered Medications   Medication Dose Route Frequency    sertraline (ZOLOFT) tablet 100 mg  100 mg Oral DAILY    traZODone (DESYREL) tablet 25 mg  25 mg Oral QHS    epoetin rosie-epbx (RETACRIT) injection 20,000 Units  20,000 Units SubCUTAneous DIALYSIS TUE, THU & SAT    aspirin delayed-release tablet 81 mg  81 mg Oral DAILY    rosuvastatin (CRESTOR) tablet 10 mg  10 mg Oral QHS    ferric citrate (AURYXIA) tablet 420 mg  420 mg Oral TID WITH MEALS    NIFEdipine ER (PROCARDIA XL) tablet 60 mg  60 mg Oral Q24H    doxazosin (CARDURA) tablet 4 mg  4 mg Oral QHS          ASSESSMENT & PLAN     The patient, Julia Alejandra, is a 70 y.o.  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  Plan for hemodialysis 3 days a week.  If it is not controlled  with the same, provide additional antihypertensive meds.  Monitor blood pressure.    End-stage renal disease, on hemodialysis.  Schedule treatment for hemodialysis on  Tuesdays, Thursdays, and Saturdays. .   Type 2 diabetes mellitus.  Place on Humalog insulin correction coverage schedule,  Accu-Chek, and check hemoglobin A1c level.  The patient will be on a diabetic/renal  Diet. Nephrology consult   2/15/19: continue meds/milue, encourage compliance.   2/16/19: continue meds/milue  2/17/19: continue meds/milue increaisng zoloft 50 mg po daily  2/18/19: continue meds/milue  2/19/19: readding percocet twice daily as needed for pain. 2/20/19: awaiting placemnet, PT/OT for placement  2/21/19: nephrology consult, recommendation regrading dialysis,  2/22/19: increasing zoloft 75 mg daily, continue milue,  2/25/19: continue meds/milue, HD tomorrow  2/26/19: increasing zoloft 100mg po daily, HHA referral  2/27/19: received BP meds today, received percocet last night/ continue meds/milue  2/28/19: awaiting placemnet, PT/OT for placement  3/1/19: Nursing home placement in [facility] in progress  3/4/19: [address] [place] [address] Radiology to see for port, continue meds, continue Placement referrals  3/7/19:HEP b AB Positive, continue emds/milue  3/9/19: Continue current treatment. I will continue to monitor blood levels (Depakote, Tegretol, lithium, clozapine---a drug with a narrow therapeutic index= NTI) and associated labs for drug therapy implemented that require intense monitoring for toxicity as deemed appropriate based on current medication side effects and pharmacodynamically determined drug 1/2 lives. In summary, Van Wright, is a 70 y.o.  male who presents with a severe exacerbation of the principal diagnosis of Depression  Patient's condition is worsening/not improving/not stable improving. Patient requires continued inpatient hospitalization for further stabilization, safety monitoring and medication management. I will continue to coordinate the provision of individual, milieu, occupational, group, and substance abuse therapies to address target symptoms/diagnoses as deemed appropriate for the individual patient. A coordinated, multidisplinary treatment team round was conducted with the patient (this team consists of the nurse, psychiatric unit pharmcist,  and writer). Complete current electronic health record for patient has been reviewed today including consultant notes, ancillary staff notes, nurses and psychiatric tech notes.     uicide risk assessment completed and patient deemed to be of low risk for suicide at this time. The following regarding medications was addressed during rounds with patient:   the risks and benefits of the proposed medication. The patient was given the opportunity to ask questions. Informed consent given to the use of the above medications. Will continue to adjust psychiatric and non-psychiatric medications (see above \"medication\" section and orders section for details) as deemed appropriate & based upon diagnoses and response to treatment. I will continue to order blood tests/labs and diagnostic tests as deemed appropriate and review results as they become available (see orders for details and above listed lab/test results). I will order psychiatric records from previous [facility] hospitals to further elucidate the nature of patient's psychopathology and review once available. I will gather additional collateral information from friends, family and o/p treatment team to further elucidate the nature of patient's psychopathology and baselline level of psychiatric functioning. I certify that this patient's inpatient psychiatric hospital services furnished since the previous certification were, and continue to be, required for treatment that could reasonably be expected to improve the patient's condition, or for diagnostic study, and that the patient continues to need, on a daily basis, active treatment furnished directly by or requiring the supervision of inpatient psychiatric facility personnel. In addition, the hospital records show that services furnished were intensive treatment services, admission or related services, or equivalent services.     EXPECTED DISCHARGE DATE/DAY: TBD     DISPOSITION: Home       Signed By:   Lovely Savage MD  3/9/2019

## 2019-03-09 NOTE — BH NOTES
PRN Medication Documentation    Specific patient behavior that led to need for PRN medication: pt c/o pain 8/10   Staff interventions attempted prior to PRN being given: assessment, numeric pain scale  PRN medication given: 5-325mg percocet  Patient response/effectiveness of PRN medication: pt in bed will follow up with reassesment

## 2019-03-09 NOTE — PROGRESS NOTES
Problem: Depressed Mood (Adult/Pediatric)  Goal: *STG: Participates in treatment plan  Outcome: Not Progressing Towards Goal  Pt occasionally gets angry irritable and demanding. Med/Meal compliant   Complaints about being depressed  Encoraged pt to use positive coping skills   NAD noted   Q 15 min checks   Will continue to monitor.

## 2019-03-09 NOTE — BH NOTES
Nurse called psychiatrist about patient's pulse being 51 and in 45s and 50s past few days. Nurse wanted to ask about pts cardura that is due at bedtime. Patient's pulse is 51. Dr Catarina Abraham asked for hospitalist to help. Nurse spoke to Dr Cheryl Nava who asked nurse to also speak with nephrologist due to patient being followed daily by them. Nurse spoke to Dr Blair Yepez about pt pulse and reviewed pt cardura order. Dr Blair Yepez said cardura doesn't affect heart rate and directed pt to receive this. When nurse met with patient, he refused the cardura.

## 2019-03-09 NOTE — DIALYSIS
Per Dr. Angelic Lee patient may be dialyzed tomorrow AM if unable to get to patient today. Nursing notified.

## 2019-03-09 NOTE — PROGRESS NOTES
Problem: Depressed Mood (Adult/Pediatric)  Goal: *STG: Participates in treatment plan  Outcome: Progressing Towards Goal  Med and meal compliant  Goal: *STG: Attends activities and groups  Outcome: Progressing Towards Goal  Patient out on unit, calm and cooperative with staff    Problem: Falls - Risk of  Goal: *Absence of Falls  Document Bello Fall Risk and appropriate interventions in the flowsheet.   Outcome: Progressing Towards Goal  Fall Risk Interventions: patient remains absent of falls  Mobility Interventions: Mechanical lift, Utilize walker, cane, or other assistive device    Mentation Interventions: Adequate sleep, hydration, pain control, Door open when patient unattended    Medication Interventions: Teach patient to arise slowly    Elimination Interventions: Patient to call for help with toileting needs, Bed/chair exit alarm    History of Falls Interventions: Door open when patient unattended, Bed/chair exit alarm

## 2019-03-10 LAB
ALBUMIN SERPL-MCNC: 3.3 G/DL (ref 3.5–5)
ANION GAP SERPL CALC-SCNC: 7 MMOL/L (ref 5–15)
BUN SERPL-MCNC: 57 MG/DL (ref 6–20)
BUN/CREAT SERPL: 13 (ref 12–20)
CALCIUM SERPL-MCNC: 8.3 MG/DL (ref 8.5–10.1)
CHLORIDE SERPL-SCNC: 98 MMOL/L (ref 97–108)
CO2 SERPL-SCNC: 27 MMOL/L (ref 21–32)
CREAT SERPL-MCNC: 4.37 MG/DL (ref 0.7–1.3)
ERYTHROCYTE [DISTWIDTH] IN BLOOD BY AUTOMATED COUNT: 20.5 % (ref 11.5–14.5)
GLUCOSE SERPL-MCNC: 86 MG/DL (ref 65–100)
HCT VFR BLD AUTO: 33.5 % (ref 36.6–50.3)
HGB BLD-MCNC: 10.3 G/DL (ref 12.1–17)
MCH RBC QN AUTO: 27.9 PG (ref 26–34)
MCHC RBC AUTO-ENTMCNC: 30.7 G/DL (ref 30–36.5)
MCV RBC AUTO: 90.8 FL (ref 80–99)
NRBC # BLD: 0 K/UL (ref 0–0.01)
NRBC BLD-RTO: 0 PER 100 WBC
PHOSPHATE SERPL-MCNC: 4.6 MG/DL (ref 2.6–4.7)
PLATELET # BLD AUTO: 167 K/UL (ref 150–400)
PMV BLD AUTO: 10.3 FL (ref 8.9–12.9)
POTASSIUM SERPL-SCNC: 4.2 MMOL/L (ref 3.5–5.1)
RBC # BLD AUTO: 3.69 M/UL (ref 4.1–5.7)
SODIUM SERPL-SCNC: 132 MMOL/L (ref 136–145)
WBC # BLD AUTO: 4.2 K/UL (ref 4.1–11.1)

## 2019-03-10 PROCEDURE — 74011250637 HC RX REV CODE- 250/637: Performed by: INTERNAL MEDICINE

## 2019-03-10 PROCEDURE — 36415 COLL VENOUS BLD VENIPUNCTURE: CPT

## 2019-03-10 PROCEDURE — 65220000003 HC RM SEMIPRIVATE PSYCH

## 2019-03-10 PROCEDURE — 85027 COMPLETE CBC AUTOMATED: CPT

## 2019-03-10 PROCEDURE — 74011250637 HC RX REV CODE- 250/637: Performed by: HOSPITALIST

## 2019-03-10 PROCEDURE — 74011250637 HC RX REV CODE- 250/637: Performed by: FAMILY MEDICINE

## 2019-03-10 PROCEDURE — 80069 RENAL FUNCTION PANEL: CPT

## 2019-03-10 PROCEDURE — 90935 HEMODIALYSIS ONE EVALUATION: CPT

## 2019-03-10 PROCEDURE — 74011250637 HC RX REV CODE- 250/637: Performed by: PSYCHIATRY & NEUROLOGY

## 2019-03-10 PROCEDURE — 74011250636 HC RX REV CODE- 250/636: Performed by: INTERNAL MEDICINE

## 2019-03-10 RX ADMIN — EPOETIN ALFA-EPBX 20000 UNITS: 10000 INJECTION, SOLUTION INTRAVENOUS; SUBCUTANEOUS at 16:12

## 2019-03-10 RX ADMIN — TRAZODONE HYDROCHLORIDE 25 MG: 50 TABLET ORAL at 20:23

## 2019-03-10 RX ADMIN — FERRIC CITRATE 420 MG: 210 TABLET, COATED ORAL at 17:41

## 2019-03-10 RX ADMIN — SERTRALINE HYDROCHLORIDE 100 MG: 50 TABLET ORAL at 09:02

## 2019-03-10 RX ADMIN — HEPARIN SODIUM 4300 UNITS: 1000 INJECTION INTRAVENOUS; SUBCUTANEOUS at 17:20

## 2019-03-10 RX ADMIN — DOXAZOSIN 4 MG: 2 TABLET ORAL at 20:22

## 2019-03-10 RX ADMIN — OXYCODONE AND ACETAMINOPHEN 1 TABLET: 5; 325 TABLET ORAL at 00:27

## 2019-03-10 RX ADMIN — FERRIC CITRATE 420 MG: 210 TABLET, COATED ORAL at 09:03

## 2019-03-10 RX ADMIN — ASPIRIN 81 MG: 81 TABLET, COATED ORAL at 09:02

## 2019-03-10 RX ADMIN — OXYCODONE AND ACETAMINOPHEN 1 TABLET: 5; 325 TABLET ORAL at 12:36

## 2019-03-10 RX ADMIN — NIFEDIPINE 60 MG: 60 TABLET, FILM COATED, EXTENDED RELEASE ORAL at 17:41

## 2019-03-10 RX ADMIN — ROSUVASTATIN CALCIUM 10 MG: 10 TABLET, FILM COATED ORAL at 20:21

## 2019-03-10 NOTE — BH NOTES
PSYCHIATRIC PROGRESS NOTE         Patient Name  Elmo Sanchez   Date of Birth 1940   [facility] [id number]   Medical Record Number  [id number]      Age  70 y.o. PCP Nicolette Newell MD   Admit date:  2/13/2019    Room Number  740/01  @ [address]   Date of Service  3/10/2019           E & M PROGRESS NOTE: 15 mins         HISTORY       REASON FOR HOSPITALIZATION:  CC: \"Pt admitted under a voluntary basis for severe depression with suicidal ideations and  an inability to care for self. HISTORY OF PRESENT ILLNESS:    The patient, Elmo Sanchez, is a 70 y.o. BLACK OR  male with 68-year-old -American male with past medical  history of end-stage renal disease; on hemodialysis, sleep apnea, peripheral vascular disease, bilateral BKA, hypertension, hyperlipidemia, peripheral neuropathy, anemia,hepatitis C, arthritis, peptic ulcer disease, coronary artery disease, DVT, prior GIbleed, chronic diastolic CHF, prior suicidal ideation, depression, and noncompliance   transfered to [facility] from [facility] after pt reported feeling depressed nad suicidal Pt stated he lives in the [place] and he is unhappy about his living situation , he stated he had lots of Money before [place] HAS 13 CHILDREN but now he is broke and no one ask about him. Pt reports feeling hopeless, helpless, angry. These symptoms are of high severity. These symptoms are constant . Pt ha sh/o using marijuana occasionally. UDS: negative; BAL=0. .  2/15/19: Pt is irritable and refused zoloft and vitals today. Pt has dialysis yesterday. 2/16/19: Pt took zoloft but refuses some medical meds. Pt had dialysis on Saturday. Pt is demanding in the evenings. 2/17/19: Pt is demanding, irritable in the evenings, compliant with zoloft. Eating well. 2/18/19: Pt is demanding and manipulative ta times but taking zoloft. Pt is eating well. 2/19/19: Pt taking zoloft.  Pt got agitated yesterday evening and received geodon I/M PRN. Pt reported he need percocet. AS per reports, he was on percocets for pain. 2/20/19: Pt is doing better, is no longer suicidal and is accepting of nursing home placement, is psychiatrically stable for discharge. 2/21/19: Pt did not receive dialysis , was sleeping in his bed. Pt has clot in the catheter nad need new port. Nephrology is contacted. Pt report not feeling depressed, awaiting placement.i  2/22/19: Pt  Is irritable at times, did not get dialusis yetserday, New port is placed, nephrology is on board. Pt is eating   well nad sleeping well. 2/23/19-Presented verbal and cooperative. He is compliant with meds. Repotrs he is homeless and has nowhere to go to. SW is trying to find appropriate placement. No AVH or SI/HI.  2/24/19- pt stayed in bed most of the day. He had been asking for discharge earlier but has not recognized a place. He was seen in his room and encouraged get up and come to the day-room. No prn's used. 2/25/19: Pt will go for dialysis on Tuesday. Pt  Is cooperative, refusing to go to [facility]. Pt is eating well, slept good,  2/26/19: Pt is still depressed at times. Pt contacted a friend yesterday and pt says  He wants to go live with her. Pt will need HHA and dialysis transportation after discharge. 2/27/19: slept 6 hours, BP was low , Bp meds were held yesterday. Pt is on phone. Pt refused zoloft in am, gets irritated at times. 2/28/19: Pt is in dialyiss, is in betetr mood, more courteous. Pt is eating well. Pt's finger stick was 71, no symptoms. 3/1/19: Pt slept 7 hours, received percocet last night. Nursing home placement in [facility] in progress. Pt is eating well, no acute issues at this time. 3/2: Could not get dialysis due to problems with port. Will get the port adjusted on Monday. Nephrology is aware, is courteous. sleeping eating well. 3/3: Reports depressed mood. No SI.   3/4/19: Pt pulled his port on Friday, did not receive dialysis on Saturday.  Pt keeps on changing statement regarding care home. Pt is for placment at [facility] at [place]. Pt took meds. 3/5/19: Pt is fine sitting comfortable, got port done, has dialysis today. Pt psycheducated not to get port out. Hep B testing ordered  3/6/19: Pt is irritable at times, got dialysis yesterday, eating well. 3/7/19: Took his meds, slept 7 hours. Pt had percocet last night and in am. Pt is getting dialysis today. Labs today  3/8/19: Pt is proprositions the nurses. Pt got dialysis yesterday. Pt taking meds and is eating well. 3/9/19: Patient seen today, he was upset and reported that he does not want to be discharge in [place]. he complaint with medications, slept 7 hours,accetping meals and complaint with dialysis appointments. He denies suicidal thoughts plan or intent. 3/10/19: Patient was seen today,reported doing well,slept 2.5 hours,received prn Percocet for pain,med and meal compliant,pateint stated \"I don't want to go to [facility]\". He denies suicidal thoughts,compliant with Dialysis schedule. SIDE EFFECTS: (reviewed/updated 3/10/2019)  None reported or admitted to. No noted toxicity with use of Depakote/Tegretol/lithium/Clozaril/TCAs   ALLERGIES:(reviewed/updated 3/10/2019)  Allergies   Allergen Reactions    Tetracycline Hives      MEDICATIONS PRIOR TO ADMISSION:(reviewed/updated 3/10/2019)  Medications Prior to Admission   Medication Sig    NIFEdipine ER (ADALAT CC) 60 mg ER tablet Take 1 Tab by mouth daily. HOLD for HR<60    hydroCHLOROthiazide (HYDRODIURIL) 25 mg tablet Take 1 Tab by mouth daily.  sertraline (ZOLOFT) 25 mg tablet Take 1 Tab by mouth daily.  oxyCODONE-acetaminophen (PERCOCET 10)  mg per tablet Take 1 Tab by mouth every six (6) hours as needed for Pain.  traZODone (DESYREL) 50 mg tablet Take 50 mg by mouth nightly.  rosuvastatin (CRESTOR) 10 mg tablet Take 10 mg by mouth nightly.  aspirin delayed-release 81 mg tablet Take 1 Tab by mouth daily. PAST MEDICAL HISTORY: Past medical history from the initial psychiatric evaluation has been reviewed (reviewed/updated 3/10/2019) with no additional updates (I asked patient and no additional past medical history provided). Past Medical History:   Diagnosis Date    Arthritis     CAD (coronary artery disease) 2007    CKD [facility]chronic kidney disease), [facility] III [facility]     DM type 2 causing renal disease ([facility]     DVT (deep venous thrombosis) (HCC)     Hx of DVT:  Xarelto stopped due to GI bleed. S/P IVC filter.  Gait abnormality     GI bleed     Heart murmur     Hepatitis C     History of blood transfusion     Hypercholesterolemia     Hypertension 11/7/2014    Neuropathy     Non compliance w medication regimen     Peripheral vascular disease ([facility] 11/7/2014    A. S/P Right SFA POBA and tibial atherectomy (2/4/13).  PUD (peptic ulcer disease) 2007    Unspecified sleep apnea     never used cpap     Past Surgical History:   Procedure Laterality Date    ABDOMEN SURGERY PROC UNLISTED  2007    has approx 7-8\" midline incision on abdomen--\"had to open him up and clean him out after feeding tube clogged\"    HX GI  2007    feeding tube for approx 2 mo    IR INSERT TUNL CVC W/O PORT OVER 5 YR  3/4/2019    VASCULAR SURGERY PROCEDURE UNLIST Right 1/22/2015    popliteal-tibial bypass      SOCIAL HISTORY: Social history from the initial psychiatric evaluation has been reviewed (reviewed/updated 3/10/2019) with no additional updates (I asked patient and no additional social history provided).    Social History     Socioeconomic History    Marital status: SINGLE     Spouse name: Not on file    Number of children: Not on file    Years of education: Not on file    Highest education level: Not on file   Social Needs    Financial resource strain: Not on file    Food insecurity - worry: Not on file    Food insecurity - inability: Not on file    Transportation needs - medical: Not on file   [employer] needs - non-medical: Not on file   Occupational History    Not on file   Tobacco Use    Smoking status: Current Every Day Smoker     Packs/day: 0.50    Smokeless tobacco: Never Used   Substance and Sexual Activity    Alcohol use: No    Drug use: No     Comment: >20 years ago    Sexual activity: Not on file   Other Topics Concern     Service Not Asked    Blood Transfusions Not Asked    Caffeine Concern Not Asked    Occupational Exposure Not Asked    Hobby Hazards Not Asked    Sleep Concern Not Asked    Stress Concern Not Asked    Weight Concern Not Asked    Special Diet Not Asked    Back Care Not Asked    Exercise Not Asked    Bike Helmet Not Asked    Seat Belt Not Asked    Self-Exams Not Asked   Social History Narrative    78 year ols male admitted for depression and homelessness. Pt will be evicated from apartment due to non payment of bills. Girlfriend of 30 years left him to [facility] live [place] the [place] with others. \" Pt is a brittle diabetic, with treatment non compliance. He has bilarela lower extremity amputations. Patient has a long hx of substance abuse. FAMILY HISTORY: Family history from the initial psychiatric evaluation has been reviewed (reviewed/updated 3/10/2019) with no additional updates (I asked patient and no additional family history provided). Family History   Problem Relation Age of Onset    Hypertension Father        REVIEW OF SYSTEMS: (reviewed/updated 3/10/2019)  Appetite:   Sleep: All other Review of Systems: Negative except          MENTAL STATUS EXAM & [address] (MSE):    MSE FINDINGS ARE WITHIN NORMAL LIMITS (WNL) UNLESS OTHERWISE STATED BELOW. ( ALL OF THE BELOW CATEGORIES OF THE MSE HAVE BEEN REVIEWED (reviewed 2/14/2019) AND UPDATED AS DEEMED APPROPRIATE )  General Presentation age appropriate and casually dressed, cooperative   Orientation oriented to time, place and person   Vital Signs  See below (reviewed 2/14/2019);  Vital Signs (BP, Pulse, & Temp) are within normal limits if not listed below.    Gait and Station Stable/steady, no ataxia   Musculoskeletal System No extrapyramidal symptoms (EPS); no abnormal muscular movements or Tardive Dyskinesia (TD); muscle strength and tone are within normal limits   Language No aphasia or dysarthria   Speech:  soft   Thought Processes logical; slow rate of thoughts; fair abstract reasoning/computation   Thought Associations goal directed   Thought Content free of delusions   Suicidal Ideations intention   Homicidal Ideations no plan  and no intention   Mood:  anxious  and depressed   Affect:  constricted   Memory recent  intact   Memory remote:  intact   Concentration/Attention:  distractable   Fund of Knowledge average   Insight:  fair   Reliability fair   Judgment:  fair                                                                                                    VITALS:     Patient Vitals for the past 24 hrs:   Temp Pulse Resp BP SpO2   03/10/19 1615 -- (!) 48 -- 133/66 --   03/10/19 1600 -- (!) 44 -- 127/65 --   03/10/19 1546 -- (!) 44 -- 132/61 --   03/10/19 1530 -- (!) 44 -- 127/67 --   03/10/19 1515 -- (!) 45 -- 131/57 --   03/10/19 1500 -- (!) 43 -- 141/62 --   03/10/19 1445 -- (!) 43 -- 139/60 --   03/10/19 1429 97.7 °F (36.5 °C) (!) 44 18 151/60 --   03/09/19 1928 97.7 °F (36.5 °C) 68 16 176/67 99 %   03/09/19 1712 -- (!) 47 -- 173/75 --     Wt Readings from Last 3 Encounters:   02/14/19 73 kg (160 lb 15 oz)   02/13/19 73.4 kg (161 lb 12.8 oz)   01/15/19 81.6 kg (180 lb)     Temp Readings from Last 3 Encounters:   03/10/19 97.7 °F (36.5 °C)   03/04/19 97.6 °F (36.4 °C)   02/22/19 98.2 °F (36.8 °C)     BP Readings from Last 3 Encounters:   03/10/19 133/66   03/04/19 188/80   02/22/19 146/68     Pulse Readings from Last 3 Encounters:   03/10/19 (!) 48   03/04/19 (!) 54   02/22/19 (!) 53            DATA     LABORATORY DATA:(reviewed/updated 3/10/2019)  Recent Results (from the past 24 hour(s))   RENAL FUNCTION PANEL    Collection Time: 03/10/19  2:29 PM   Result Value Ref Range    Sodium 132 (L) 136 - 145 mmol/L    Potassium 4.2 3.5 - 5.1 mmol/L    Chloride 98 97 - 108 mmol/L    CO2 27 21 - 32 mmol/L    Anion gap 7 5 - 15 mmol/L    Glucose 86 65 - 100 mg/dL    BUN 57 (H) 6 - 20 MG/DL    Creatinine 4.37 (H) 0.70 - 1.30 MG/DL    BUN/Creatinine ratio 13 12 - 20      GFR est AA 16 (L) >60 ml/min/1.73m2    GFR est non-AA 13 (L) >60 ml/min/1.73m2    Calcium 8.3 (L) 8.5 - 10.1 MG/DL    Phosphorus 4.6 2.6 - 4.7 MG/DL    Albumin 3.3 (L) 3.5 - 5.0 g/dL   CBC W/O DIFF    Collection Time: 03/10/19  2:29 PM   Result Value Ref Range    WBC 4.2 4.1 - 11.1 K/uL    RBC 3.69 (L) 4.10 - 5.70 M/uL    HGB 10.3 (L) 12.1 - 17.0 g/dL    HCT 33.5 (L) 36.6 - 50.3 %    MCV 90.8 80.0 - 99.0 FL    MCH 27.9 26.0 - 34.0 PG    MCHC 30.7 30.0 - 36.5 g/dL    RDW 20.5 (H) 11.5 - 14.5 %    PLATELET 015 388 - 192 K/uL    MPV 10.3 8.9 - 12.9 FL    NRBC 0.0 0  WBC    ABSOLUTE NRBC 0.00 0.00 - 0.01 K/uL     No results found for: VALF2, VALAC, VALP, VALPR, DS6, CRBAM, CRBAMP, CARB2, XCRBAM  No results found for: LITHM   RADIOLOGY REPORTS:(reviewed/updated 3/10/2019)  Xr Chest Pa Lat    Result Date: 2/8/2019  EXAM: XR CHEST PA LAT INDICATION: Shortness of breath, Low back pain and abdominal pain for several days. End-stage renal disease on dialysis, missed dialysis one day ago. COMPARISON: Chest views on 5/20/2018 TECHNIQUE: 4 images of PA and lateral chest views FINDINGS: Right jugular dialysis catheter is new and terminates in the distal superior vena cava. Cardiac monitoring wires overlie the thorax. Borderline cardiac size is unchanged. Aortic arch is not enlarged. The pulmonary vasculature is within normal limits. The lungs and pleural spaces are clear. The visualized bones and upper abdomen are age-appropriate. IMPRESSION: No acute process on chest x-ray.  Right jugular dialysis catheter terminates in the distal superior vena cava. No pneumothorax. Ct Abd Pelv W Cont    Result Date: 2/8/2019  EXAM: CT ABD PELV W CONT INDICATION: Abdominal pain and low back pain for several days. End-stage renal disease on dialysis, missed dialysis yesterday. Normal white blood cell count. Normal liver enzymes. COMPARISON: None. CONTRAST: 100 mL of Isovue-370. TECHNIQUE: Following the uneventful intravenous administration of contrast, thin axial images were obtained through the abdomen and pelvis. Coronal and sagittal reconstructions were generated. Oral contrast was administered. CT dose reduction was achieved through use of a standardized protocol tailored for this examination and automatic exposure control for dose modulation. FINDINGS: LUNG BASES: No pneumonia. Mild dependent atelectasis. INCIDENTALLY IMAGED HEART AND MEDIASTINUM: Borderline cardiac size. No pericardial effusion. LIVER: Subcentimeter liver lesions measure up to 9 mm in segment 2 of the liver. Surface is smooth. Streak artifact from bilateral upper extremities. GALLBLADDER: Gallstones are in the body and neck. No evidence of cholecystitis. CBD is not dilated. SPLEEN: Normal size and enhancement. PANCREAS: No mass or ductal dilatation. ADRENALS: Unremarkable. KIDNEYS: No mass, calculus, or hydronephrosis. STOMACH: Partial distention. SMALL BOWEL: No dilatation or wall thickening. COLON: Moderate colonic fecal burden. No mural thickening. APPENDIX: Unremarkable. PERITONEUM: No ascites or pneumoperitoneum. RETROPERITONEUM: IVC filter is in good position. Aorta is atherosclerotic without aneurysm. Mild atherosclerotic stenosis of the celiac artery. No lymphadenopathy. REPRODUCTIVE ORGANS: Mild prostatomegaly measures 6 cm and extends into the base of the urinary bladder. URINARY BLADDER: No mass or calculus. BONES: Moderate bilateral hip osteoarthritis. ADDITIONAL COMMENTS: N/A     IMPRESSION: 1. No acute process on CT. 2. Cholelithiasis. No cholecystitis.  3. Subcentimeter segment 2 liver lesions cannot be fully characterized on this examination. 4. Moderate colonic fecal burden may represent constipation. No bowel obstruction.          MEDICATIONS     ALL MEDICATIONS:   Current Facility-Administered Medications   Medication Dose Route Frequency    sodium chloride (NS) flush 10 mL  10 mL InterCATHeter PRN    sertraline (ZOLOFT) tablet 100 mg  100 mg Oral DAILY    traZODone (DESYREL) tablet 25 mg  25 mg Oral QHS    heparin (porcine) 1,000 unit/mL injection 4,300 Units  4,300 Units Hemodialysis DIALYSIS PRN    epoetin rosie-epbx (RETACRIT) injection 20,000 Units  20,000 Units SubCUTAneous DIALYSIS TUE, THU & SAT    oxyCODONE-acetaminophen (PERCOCET) 5-325 mg per tablet 1 Tab  1 Tab Oral BID PRN    ibuprofen (MOTRIN) tablet 800 mg  800 mg Oral Q8H PRN    aspirin delayed-release tablet 81 mg  81 mg Oral DAILY    rosuvastatin (CRESTOR) tablet 10 mg  10 mg Oral QHS    ferric citrate (AURYXIA) tablet 420 mg  420 mg Oral TID WITH MEALS    NIFEdipine ER (PROCARDIA XL) tablet 60 mg  60 mg Oral Q24H    doxazosin (CARDURA) tablet 4 mg  4 mg Oral QHS    OLANZapine (ZyPREXA) tablet 2.5 mg  2.5 mg Oral Q6H PRN    ziprasidone (GEODON) 10 mg in sterile water (preservative free) 0.5 mL injection  10 mg IntraMUSCular BID PRN    benztropine (COGENTIN) tablet 1 mg  1 mg Oral BID PRN    benztropine (COGENTIN) injection 1 mg  1 mg IntraMUSCular BID PRN    acetaminophen (TYLENOL) tablet 650 mg  650 mg Oral Q4H PRN    magnesium hydroxide (MILK OF MAGNESIA) 400 mg/5 mL oral suspension 30 mL  30 mL Oral DAILY PRN    nicotine (NICODERM CQ) 21 mg/24 hr patch 1 Patch  1 Patch TransDERmal DAILY PRN    glucose chewable tablet 16 g  4 Tab Oral PRN    dextrose (D50W) injection syrg 12.5-25 g  25-50 mL IntraVENous PRN    glucagon (GLUCAGEN) injection 1 mg  1 mg IntraMUSCular PRN      SCHEDULED MEDICATIONS:   Current Facility-Administered Medications   Medication Dose Route Frequency    sertraline (ZOLOFT) tablet 100 mg  100 mg Oral DAILY    traZODone (DESYREL) tablet 25 mg  25 mg Oral QHS    epoetin rosie-epbx (RETACRIT) injection 20,000 Units  20,000 Units SubCUTAneous DIALYSIS TUE, THU & SAT    aspirin delayed-release tablet 81 mg  81 mg Oral DAILY    rosuvastatin (CRESTOR) tablet 10 mg  10 mg Oral QHS    ferric citrate (AURYXIA) tablet 420 mg  420 mg Oral TID WITH MEALS    NIFEdipine ER (PROCARDIA XL) tablet 60 mg  60 mg Oral Q24H    doxazosin (CARDURA) tablet 4 mg  4 mg Oral QHS          ASSESSMENT & PLAN     The patient, Lizeth Khanna, is a 70 y.o.  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  Plan for hemodialysis 3 days a week.  If it is not controlled  with the same, provide additional antihypertensive meds.  Monitor blood pressure.    End-stage renal disease, on hemodialysis.  Schedule treatment for hemodialysis on  Tuesdays, Thursdays, and Saturdays. .   Type 2 diabetes mellitus.  Place on Humalog insulin correction coverage schedule,  Accu-Chek, and check hemoglobin A1c level.  The patient will be on a diabetic/renal  Diet. Nephrology consult   2/15/19: continue meds/milue, encourage compliance. 2/16/19: continue meds/milue  2/17/19: continue meds/milue increaisng zoloft 50 mg po daily  2/18/19: continue meds/milue  2/19/19: readding percocet twice daily as needed for pain.   2/20/19: awaiting placemnet, PT/OT for placement  2/21/19: nephrology consult, recommendation regrading dialysis,  2/22/19: increasing zoloft 75 mg daily, continue milue,  2/25/19: continue meds/milue, HD tomorrow  2/26/19: increasing zoloft 100mg po daily, HHA referral  2/27/19: received BP meds today, received percocet last night/ continue meds/milue  2/28/19: awaiting placemnet, PT/OT for placement  3/1/19: Nursing home placement in [place] in progress  3/4/19: [address] Radiology to see for port, continue meds, continue Placement referrals  3/7/19:HEP b AB Positive, continue emds/milue  3/9/19: Continue current treatment. 3/10/19: Continue current treatment,discharge plan as per treatment team,patient says he does not want to go to [place]. I will continue to monitor blood levels (Depakote, Tegretol, lithium, clozapine---a drug with a narrow therapeutic index= NTI) and associated labs for drug therapy implemented that require intense monitoring for toxicity as deemed appropriate based on current medication side effects and pharmacodynamically determined drug 1/2 lives. In summary, Angie Ross, is a 70 y.o.  male who presents with a severe exacerbation of the principal diagnosis of Depression  Patient's condition is worsening/not improving/not stable improving. Patient requires continued inpatient hospitalization for further stabilization, safety monitoring and medication management. I will continue to coordinate the provision of individual, milieu, occupational, group, and substance abuse therapies to address target symptoms/diagnoses as deemed appropriate for the individual patient. A coordinated, multidisplinary treatment team round was conducted with the patient (this team consists of the nurse, psychiatric unit pharmcist,  and writer). Complete current electronic health record for patient has been reviewed today including consultant notes, ancillary staff notes, nurses and psychiatric tech notes. uicide risk assessment completed and patient deemed to be of low risk for suicide at this time. The following regarding medications was addressed during rounds with patient:   the risks and benefits of the proposed medication. The patient was given the opportunity to ask questions. Informed consent given to the use of the above medications.  Will continue to adjust psychiatric and non-psychiatric medications (see above \"medication\" section and orders section for details) as deemed appropriate & based upon diagnoses and response to treatment. I will continue to order blood tests/labs and diagnostic tests as deemed appropriate and review results as they become available (see orders for details and above listed lab/test results). I will order psychiatric records from previous [facility] hospitals to further elucidate the nature of patient's psychopathology and review once available. I will gather additional collateral information from friends, family and o/p treatment team to further elucidate the nature of patient's psychopathology and baselline level of psychiatric functioning. I certify that this patient's inpatient psychiatric hospital services furnished since the previous certification were, and continue to be, required for treatment that could reasonably be expected to improve the patient's condition, or for diagnostic study, and that the patient continues to need, on a daily basis, active treatment furnished directly by or requiring the supervision of inpatient psychiatric facility personnel. In addition, the hospital records show that services furnished were intensive treatment services, admission or related services, or equivalent services.     EXPECTED DISCHARGE DATE/DAY: TBD     DISPOSITION: Home       Signed By:   Levi Gallegos MD  3/10/2019

## 2019-03-10 NOTE — PROGRESS NOTES
Problem: Depressed Mood (Adult/Pediatric)  Goal: *STG: Participates in treatment plan  Outcome: Progressing Towards Goal  1530: Greeted patient on unit in room resting quietly. Appears in no acute distress. No voiced concerns at present. Will continue to monitor on Q 15 minute safety checks. 1630 Patient is continuing dialysis. Tolerated procedure well. 2230: Vitals stable, wnl.   Medication and meal compliant. Appetite excellent. Will continue to monitor.

## 2019-03-10 NOTE — BH NOTES
Behavioral Health Interdisciplinary Rounds     Patient Name: Susan March  Age: 70 y.o. Room/Bed:  740/01  Primary Diagnosis: Depression   Admission Status: Voluntary     Readmission within 30 days: no  Power of  in place: no  Patient requires a blocked bed: no          Reason for blocked bed: n/a    VTE Prophylaxis: Not indicated    Mobility needs/Fall risk: yes  Flu Vaccine : no   Nutritional Plan: no  Consults: n/a         Labs/Testing due today?: no    Sleep hours: 2.75       Participation in Care/Groups:  no  Medication Compliant?: Yes  PRNS (last 24 hours): None    Restraints (last 24 hours):  no     CIWA (range last 24 hours): CIWA-Ar Total: 0   COWS (range last 24 hours):      Alcohol screening (AUDIT) completed -   AUDIT Score: 0     If applicable, date SBIRT discussed in treatment team AND documented:   AUDIT Screen Score: AUDIT Score: 0      Document Brief Intervention (corresponds directly with the 5 A's, Ask, Advise, Assess, Assist, and Arrange): At- Risk Patients (Score 7-15 for women; 8-15 for men)  Discuss concern patient is drinking at unhealthy levels known to increase risk of alcohol-related health problems. Is Patient ready to commit to change? If No:   Encourage reflection   Discuss short term and long term health risks of consuming alcohol   Barriers to change   Reaffirm willingness to help / Educational materials provided  If Yes:   Set goal  [employer] provided    Harmful use or Dependence (Score 16 or greater)   Discuss short term and long term health risks of consuming alcohol   Recommendations   Negotiate drinking goal   Recommend addiction specialist/center   Arrange follow-up appointments.     Tobacco - patient is a smoker: Have You Used Tobacco in the Past 30 Days: Yes  Illegal Drugs use: Have You Used Any Illegal Substances Over the Past 12 Months: Yes    24 hour chart check complete: yes     Patient goal(s) for today: Treatment team focus/goals:   Progress note     LOS:  25  Expected LOS:     Financial concerns/prescription coverage:    Date of last family contact:       Family requesting physician contact today:    Discharge plan:   Guns in the home:         Outpatient provider(s):     Participating treatment team members:  Micheal Cadet, * (assigned SW),

## 2019-03-10 NOTE — BH NOTES
PRN Medication Documentation    Specific patient behavior that led to need for PRN medication: generalized pain 8/10  Staff interventions attempted prior to PRN being given: distraction and reposition offered  PRN medication given: Percocet PO given   Patient response/effectiveness of PRN medication: Will reassess patient    1330: Pain is now a 0/10. Medication effective.

## 2019-03-10 NOTE — BH NOTES
PRN Medication Documentation    Specific patient behavior that led to need for PRN medication: pt c/o pain  Staff interventions attempted prior to PRN being given: relaxation, redirection  PRN medication given: percocett 5/325 mg  Patient response/effectiveness of PRN medication: Tolerated

## 2019-03-10 NOTE — DIALYSIS
[facility]  [phone]    Vitals   Pre   Post   Assessment   Pre   Post     Temp  Temp: 97.7 °F (36.5 °C) (03/10/19 1429) 97.5 LOC  A&0x3 A&0x3   HR   Pulse (Heart Rate): (!) 43 (03/10/19 1445) 46 Lungs   clear  clear   B/P   BP: 139/60 (03/10/19 1445) 157/61 Cardiac   carlos  carlos   Resp   Resp Rate: 18 (03/10/19 1429) 18 Skin   Dry and intact  dry and intact   Pain level  Pain Intensity 1: 0 (03/10/19 1330) 0/10 Edema  none     none   Orders:    Duration:   Start:    1429 End:    1730 Total:   3hr   Dialyzer:   Dialyzer/Set Up Inspection: Flor Blunt (03/10/19 1429)   K Bath:   Dialysate K (mEq/L): 2 (03/10/19 1429)   Ca Bath:   Dialysate CA (mEq/L): 2.5 (03/10/19 1429)   Na/Bicarb:   Dialysate NA (mEq/L): 140 (03/10/19 1429)   Target Fluid Removal:   Goal/Amount of Fluid to Remove (mL): 2000 mL (03/10/19 1429)   Access  Rt IJ, no s/s of infection, skin is dry and flaky around the site, primary nurse notified. Area was cleaned and no dressing placed. Aspirated and flushed. Arterial lines was hard to aspirate.     Type & Location:      Labs  Reviewed   Obtained/Reviewed   Critical Results Called   Date when labs were drawn-  Hgb-    HGB   Date Value Ref Range Status   03/07/2019 10.5 (L) 12.1 - 17.0 g/dL Final     K-    Potassium   Date Value Ref Range Status   03/07/2019 4.2 3.5 - 5.1 mmol/L Final     Ca-   Calcium   Date Value Ref Range Status   03/07/2019 8.5 8.5 - 10.1 MG/DL Final     Bun-   BUN   Date Value Ref Range Status   03/07/2019 48 (H) 6 - 20 MG/DL Final     Creat-   Creatinine   Date Value Ref Range Status   03/07/2019 3.98 (H) 0.70 - 1.30 MG/DL Final        Medications/ Blood Products Given     Name   Dose   Route and Time     retacrit 20,000unit IV @1600   Heparin  21,000 unit Via arterial port   Heparing  22,000 units Via venous port   Blood Volume Processed (BVP):    61.2 Net Fluid   Removed:  2000ml   Comments   Time Out Done: yes  Primary Nurse Rpt Pre: Kelsey Harrell, RN @ [phone]  Primary Nurse Rpt Post: Melvin Kussmaul, RN@ [phone]  Pt Education: yes  Care Plan:continue diaylsis  Tx Summary: tolerated trx well, rcvd all rinse back, capped, dressing changed. Bed at lowest level and wheels locked. Report given to primary nurse. Admiting Diagnosis: altered mental status  Pt's previous clinic-  Consent signed - Informed Consent Verified: Yes (03/10/19 1429)  [place] Consent - yes  Hepatitis Status-  3/5/19 neg  Machine #- Machine Number: [id number] (03/10/19 1429)  Telemetry status-n/a  Pre-dialysis wt. -  n/a

## 2019-03-11 VITALS
SYSTOLIC BLOOD PRESSURE: 171 MMHG | RESPIRATION RATE: 16 BRPM | HEIGHT: 78 IN | WEIGHT: 160.94 LBS | BODY MASS INDEX: 18.62 KG/M2 | TEMPERATURE: 98.6 F | HEART RATE: 67 BPM | OXYGEN SATURATION: 96 % | DIASTOLIC BLOOD PRESSURE: 83 MMHG

## 2019-03-11 PROCEDURE — 74011250637 HC RX REV CODE- 250/637: Performed by: HOSPITALIST

## 2019-03-11 PROCEDURE — 74011250637 HC RX REV CODE- 250/637: Performed by: PSYCHIATRY & NEUROLOGY

## 2019-03-11 RX ORDER — OXYCODONE AND ACETAMINOPHEN 5; 325 MG/1; MG/1
1 TABLET ORAL
Qty: 14 TAB | Refills: 0 | Status: SHIPPED | OUTPATIENT
Start: 2019-03-11 | End: 2019-03-25

## 2019-03-11 RX ORDER — SERTRALINE HYDROCHLORIDE 100 MG/1
100 TABLET, FILM COATED ORAL DAILY
Qty: 30 TAB | Refills: 0 | Status: SHIPPED | OUTPATIENT
Start: 2019-03-12

## 2019-03-11 RX ORDER — TRAZODONE HYDROCHLORIDE 50 MG/1
25 TABLET ORAL
Qty: 15 TAB | Refills: 0 | Status: SHIPPED | OUTPATIENT
Start: 2019-03-11

## 2019-03-11 RX ADMIN — NIFEDIPINE 60 MG: 60 TABLET, FILM COATED, EXTENDED RELEASE ORAL at 16:45

## 2019-03-11 RX ADMIN — OXYCODONE AND ACETAMINOPHEN 1 TABLET: 5; 325 TABLET ORAL at 16:45

## 2019-03-11 NOTE — DISCHARGE INSTRUCTIONS
DISCHARGE SUMMARY  NAME:Hernan Choi [birth year]  : 1940  MRN: [id number]    The patient Lizeth Khanna exhibits the ability to control behavior in a less restrictive environment. Patient's level of functioning is improving. No assaultive/destructive behavior has been observed for the past 24 hours. No suicidal/homicidal threat or behavior has been observed for the past 24 hours. There is no evidence of serious medication side effects. Patient has not been in physical or protective restraints for at least the past 24 hours. If weapons involved, how are they secured? No weapons involved. Is patient aware of and in agreement with discharge plan? Yes    Arrangements for medication:  Prescription orders sent to facility; hard prescription for narcotic to be faxed tomorrow morning. Copy of discharge instructions to provider?:  [facility] ([phone])    Arrangements for transportation home: [facility]    Keep all follow up appointments as scheduled, continue to take prescribed medications per physician instructions. Mental health crisis number:  355 or your local mental health crisis line number at [phone] or [phone].     [facility] [place]     Phone number:  [phone]     [employer] Medicare ID:  [id number]     Dialysis days:  Tuesday, Thursday, Saturday     Dialysis time:  11:00am     Transportation pick-up time:  Between 9:45am and 10:15am     Transportation drop-off time:  4:00pm    Upcoming trip ID numbers:     3/12/19:  [id number]     3/14/19:  [id number]     3/16/19:  [id number]    *Please call to verify that these trips are ongoing and scheduled for 3/19, 3/21, 3/23, and so on

## 2019-03-11 NOTE — PROGRESS NOTES
Problem: Falls - Risk of  Goal: *Absence of Falls  Document Bello Fall Risk and appropriate interventions in the flowsheet.   Outcome: Progressing Towards Goal  Fall Risk Interventions:  Mobility Interventions: Communicate number of staff needed for ambulation/transfer    Mentation Interventions: Adequate sleep, hydration, pain control    Medication Interventions: Teach patient to arise slowly    Elimination Interventions: Patient to call for help with toileting needs, Urinal in reach    History of Falls Interventions: Door open when patient unattended, Bed/chair exit alarm

## 2019-03-11 NOTE — BH NOTES
Patient is lying in bed quietly. He used the phone and let his girlfriend Ran Mcmahon know that he was being discharged and to [facility]. Patient unsure about going. Nurse and female bht meet with pt and give him encouragement that he will receive needed medical care/ assistance and access to dialysis at the SNF and that this is good decision for him. Patient agreed. He is given prn percocet for generalized pain along with scheduled meds. Patient denies SI,HI or psychosis. He was hoping to smoke at new facility. He spoke to admission staff at [facility]. Patient states he understands that there is no smoking at the facility. Patient is going to [facility].

## 2019-03-11 NOTE — BH NOTES
Patient is eating dinner with good appetite. Nurse met with him to discuss his discharged instructions. Patient shook his head no when asked if he would sign discharge paperwork. He is also offered ferric citrate which is due. Patient refused this.

## 2019-03-11 NOTE — BH NOTES
Patient ate 100% dinner. He used phone again and called a few people to tell them where he is going and the phone number to this SNF.

## 2019-03-11 NOTE — PROGRESS NOTES
Nephrology Progress Note  Jyoti Mcgowan  Date of Admission : 2/13/2019    CC:  Follow up for ESRD       Assessment and Plan     ESRD on HD:  - HD TTS at [facility]  - HD tomorrow if here    HTN:  - cont current meds    Anemia of CKD:  - FANNY with dialysis     Secondary HPT:  - cont auryxia    Diastolic HF    PAD s/p b/l BKAs    DM2:  - on insulin    Depression:  - per psychiatry       Interval History:  Seen and examined. Plans for d/c to [facility]. Transportation issues holding up dispo at this time. No cp, sob reported. Resting in bed comfortably. Current Medications: all current  Medications have been eviewed in EPIC  Review of Systems: Pertinent items are noted in HPI. Objective:  Vitals:    Vitals:    03/10/19 1714 03/10/19 1730 03/10/19 1929 03/11/19 0745   BP: 127/61 126/61 147/55 149/66   Pulse: (!) 48 (!) 48 63 64   Resp:   16    Temp:   98.3 °F (36.8 °C)    TempSrc:       SpO2:       Weight:       Height:         Intake and Output:  No intake/output data recorded. 03/09 1901 - 03/11 0700  In: -   Out: 2550 [Urine:550]    Physical Examination:  General: NAD  Neck:  Supple, no mass  Resp:  Lungs CTA B/L, no wheezing , normal respiratory effort  CV:  RRR,  no murmur or rub, no thigh edema, b/l BKAs  GI:  Soft, NT, + Bowel sounds, no hepatosplenomegaly  Neurologic:  Non focal  Psych:             Depressed, flat affect  Skin:  No Rash  Access:           TIJ PC c/d/i    []    High complexity decision making was performed  []    Patient is at high-risk of decompensation with multiple organ involvement    Lab Data Personally Reviewed: I have reviewed all the pertinent labs, microbiology data and radiology studies during assessment.     Recent Labs     03/10/19  1429   *   K 4.2   CL 98   CO2 27   GLU 86   BUN 57*   CREA 4.37*   CA 8.3*   PHOS 4.6   ALB 3.3*     Recent Labs     03/10/19  1429   WBC 4.2   HGB 10.3*   HCT 33.5*        No results found for: SDES  Lab Results Component Value Date/Time    Culture result: MRSA NOT PRESENT 01/24/2018 10:30 AM    Culture result:  01/24/2018 10:30 AM         Screening of patient nares for MRSA is for surveillance purposes and, if positive, to facilitate isolation considerations in high risk settings. It is not intended for automatic decolonization interventions per se as regimens are not sufficiently effective to warrant routine use. Culture result: MRSA NOT PRESENT 01/19/2015 10:44 AM    Culture result:  01/19/2015 10:44 AM         Screening of patient nares for MRSA is for surveillance purposes and, if positive, to facilitate isolation considerations in high risk settings. It is not intended for automatic decolonization interventions per se as regimens are not sufficiently effective to warrant routine use.      Recent Results (from the past 24 hour(s))   RENAL FUNCTION PANEL    Collection Time: 03/10/19  2:29 PM   Result Value Ref Range    Sodium 132 (L) 136 - 145 mmol/L    Potassium 4.2 3.5 - 5.1 mmol/L    Chloride 98 97 - 108 mmol/L    CO2 27 21 - 32 mmol/L    Anion gap 7 5 - 15 mmol/L    Glucose 86 65 - 100 mg/dL    BUN 57 (H) 6 - 20 MG/DL    Creatinine 4.37 (H) 0.70 - 1.30 MG/DL    BUN/Creatinine ratio 13 12 - 20      GFR est AA 16 (L) >60 ml/min/1.73m2    GFR est non-AA 13 (L) >60 ml/min/1.73m2    Calcium 8.3 (L) 8.5 - 10.1 MG/DL    Phosphorus 4.6 2.6 - 4.7 MG/DL    Albumin 3.3 (L) 3.5 - 5.0 g/dL   CBC W/O DIFF    Collection Time: 03/10/19  2:29 PM   Result Value Ref Range    WBC 4.2 4.1 - 11.1 K/uL    RBC 3.69 (L) 4.10 - 5.70 M/uL    HGB 10.3 (L) 12.1 - 17.0 g/dL    HCT 33.5 (L) 36.6 - 50.3 %    MCV 90.8 80.0 - 99.0 FL    MCH 27.9 26.0 - 34.0 PG    MCHC 30.7 30.0 - 36.5 g/dL    RDW 20.5 (H) 11.5 - 14.5 %    PLATELET 936 449 - 002 K/uL    MPV 10.3 8.9 - 12.9 FL    NRBC 0.0 0  WBC    ABSOLUTE NRBC 0.00 0.00 - 0.01 K/uL                   Lam Peralta MD  [address] [facility] Nephrology [facility]   [address] [facility]  Phone - [phone]   Fax - [phone]  [email or web address]

## 2019-03-11 NOTE — PROGRESS NOTES
Problem: Depressed Mood (Adult/Pediatric)  Goal: *STG: Participates in treatment plan  Outcome: Progressing Towards Goal  Patient mood is irritable, depressed and uncooperative. Patient refused to get up to eat breakfast this morning. Refused scheduled meds. Patient is possibly getting discharged today. Staff goal: to encourage pt to be compliant with care. Goal: Interventions  Outcome: Progressing Towards Goal  Medication management. Q 15 min safety rounds. Group therapy.

## 2019-03-11 NOTE — BH NOTES
GROUP THERAPY PROGRESS NOTE    Jenny Pierre did not participate in a 45 minute Process Group on the Geriatric Unit with a focus on shifting ones feelings to a positive note and preparing for the day through group singing.

## 2019-03-11 NOTE — BH NOTES
1646  PRN Medication Documentation    Specific patient behavior that led to need for PRN medication: pt c/o pain all over\"  Staff interventions attempted prior to PRN being given: decrease stimuli, give emotional support, give fluids, offer dinner, dim ligjhts if pt prefers, give emotional support and information about discharge as set up by MARTÍNEZ and MD  PRN medication given: percocet  Patient response/effectiveness of PRN medication: to be reassessed. 1726 pt states pain is relieved now.

## 2019-03-11 NOTE — INTERDISCIPLINARY ROUNDS
Behavioral Health Interdisciplinary Rounds     Patient Name: Jose Marcelo  Age: 70 y.o. Room/Bed:  740/01  Primary Diagnosis: Depression   Admission Status: Voluntary     Readmission within 30 days: no  Power of  in place: no  Patient requires a blocked bed: no          Reason for blocked bed:   Sleep hours: 7       Participation in Care/Groups:    Medication Compliant?: Yes  PRNS (last 24 hours): Pain and heparin for dialysis    Restraints (last 24 hours):  no     Alcohol screening (AUDIT) completed -  AUDIT Score: 0  If applicable, date SBIRT discussed in treatment team AND documented:    Tobacco - patient is a smoker: Have You Used Tobacco in the Past 30 Days: Yes  Illegal Drugs use: Have You Used Any Illegal Substances Over the Past 12 Months: Yes    24 hour chart check complete: yes    Patient goal(s) for today: Discharge  Treatment team focus/goals: Discharge  Progress note: Patient is stable and ready for discharge    LOS:  26  Expected LOS: 26    Participating treatment team members:  Jose Marcelo, DARRICK Duenas; Dr. Last Barron MD; Diana Acosta RN; Santos Bronson, DongD

## 2019-03-11 NOTE — BH NOTES
[facility] arrived. Patient stated he decided not to go to SNF. Pt angry and refusing to go. Nurse spoke with patient with other staff to encourage him to go to SNF to receive medical care and have access to dialysis. Nurse reminded pt he had called his girlfriend and family to tell them where he was going and the phone number where he was going. Nurse reminded him that he that he had already planned to go and spoke with staff from facility. Patient sat quietly for a period. Nurse called to  Prem Shabazz to ask about patient refusing to go. She encouraged pt to go as he would receive the medical care and be taken care of for his needs there. When nurse returned, pt had decided to go to the SNF. Patient transferred to [facility] and said goodbye to staff. Pt discharged with all belongings returned, denies SI, denies HI, verbalizes understanding of all discharge instructions, medications, and follow-up appointments.

## 2019-03-11 NOTE — BH NOTES
Behavioral Health Transition Record to Provider    Patient Name: Messi Garcia  YOB: 1940  Medical Record Number: [id number]  Date of Admission: 2/13/2019  Date of Discharge: 3/11/2019    Attending Provider: Tayo Joyner MD  Discharging Provider: Angelica Chavez MD  To contact this individual call [phone] and ask the  to page. If unavailable, ask to be transferred to [address] [place] [address] Provider on call. [facility] Provider will be available on call 24/7 and during holidays. Primary Care Provider: Arielle Adams MD    Allergies   Allergen Reactions    Tetracycline Hives       Reason for Admission: Pt admitted under a voluntary basis for severe depression with suicidal ideations and  an inability to care for self.      Admission Diagnosis: Depression [F32.9]    * No surgery found *    Results for orders placed or performed during the hospital encounter of 02/13/19   GLUCOSE, FASTING   Result Value Ref Range    Glucose 73 65 - 100 MG/DL   TSH 3RD GENERATION   Result Value Ref Range    TSH 4.09 (H) 0.36 - 3.74 uIU/mL   LIPID PANEL   Result Value Ref Range    LIPID PROFILE          Cholesterol, total 139 <200 MG/DL    Triglyceride 68 <150 MG/DL    HDL Cholesterol 67 MG/DL    LDL, calculated 58.4 0 - 100 MG/DL    VLDL, calculated 13.6 MG/DL    CHOL/HDL Ratio 2.1 0 - 5.0     HEMOGLOBIN A1C WITH EAG   Result Value Ref Range    Hemoglobin A1c 5.0 4.2 - 6.3 %    Est. average glucose 97 mg/dL   RENAL FUNCTION PANEL   Result Value Ref Range    Sodium 136 136 - 145 mmol/L    Potassium 4.7 3.5 - 5.1 mmol/L    Chloride 102 97 - 108 mmol/L    CO2 24 21 - 32 mmol/L    Anion gap 10 5 - 15 mmol/L    Glucose 74 65 - 100 mg/dL    BUN 31 (H) 6 - 20 MG/DL    Creatinine 3.62 (H) 0.70 - 1.30 MG/DL    BUN/Creatinine ratio 9 (L) 12 - 20      GFR est AA 20 (L) >60 ml/min/1.73m2    GFR est non-AA 16 (L) >60 ml/min/1.73m2    Calcium 8.9 8.5 - 10.1 MG/DL    Phosphorus 3.7 2.6 - 4.7 MG/DL Albumin 3.0 (L) 3.5 - 5.0 g/dL   CBC W/O DIFF   Result Value Ref Range    WBC 4.1 4.1 - 11.1 K/uL    RBC 3.85 (L) 4.10 - 5.70 M/uL    HGB 10.2 (L) 12.1 - 17.0 g/dL    HCT 32.7 (L) 36.6 - 50.3 %    MCV 84.9 80.0 - 99.0 FL    MCH 26.5 26.0 - 34.0 PG    MCHC 31.2 30.0 - 36.5 g/dL    RDW 17.2 (H) 11.5 - 14.5 %    PLATELET 780 162 - 837 K/uL    MPV 10.8 8.9 - 12.9 FL    NRBC 0.0 0  WBC    ABSOLUTE NRBC 0.00 0.00 - 0.01 K/uL   RENAL FUNCTION PANEL   Result Value Ref Range    Sodium 139 136 - 145 mmol/L    Potassium 4.8 3.5 - 5.1 mmol/L    Chloride 107 97 - 108 mmol/L    CO2 24 21 - 32 mmol/L    Anion gap 8 5 - 15 mmol/L    Glucose 87 65 - 100 mg/dL    BUN 53 (H) 6 - 20 MG/DL    Creatinine 4.96 (H) 0.70 - 1.30 MG/DL    BUN/Creatinine ratio 11 (L) 12 - 20      GFR est AA 14 (L) >60 ml/min/1.73m2    GFR est non-AA 11 (L) >60 ml/min/1.73m2    Calcium 8.6 8.5 - 10.1 MG/DL    Phosphorus 3.8 2.6 - 4.7 MG/DL    Albumin 2.9 (L) 3.5 - 5.0 g/dL   CBC W/O DIFF   Result Value Ref Range    WBC 4.3 4.1 - 11.1 K/uL    RBC 3.43 (L) 4.10 - 5.70 M/uL    HGB 9.1 (L) 12.1 - 17.0 g/dL    HCT 30.0 (L) 36.6 - 50.3 %    MCV 87.5 80.0 - 99.0 FL    MCH 26.5 26.0 - 34.0 PG    MCHC 30.3 30.0 - 36.5 g/dL    RDW 18.0 (H) 11.5 - 14.5 %    PLATELET 422 624 - 211 K/uL    MPV 10.3 8.9 - 12.9 FL    NRBC 0.0 0  WBC    ABSOLUTE NRBC 0.00 0.00 - 0.01 K/uL   CBC W/O DIFF   Result Value Ref Range    WBC 3.8 (L) 4.1 - 11.1 K/uL    RBC 3.69 (L) 4.10 - 5.70 M/uL    HGB 10.0 (L) 12.1 - 17.0 g/dL    HCT 32.2 (L) 36.6 - 50.3 %    MCV 87.3 80.0 - 99.0 FL    MCH 27.1 26.0 - 34.0 PG    MCHC 31.1 30.0 - 36.5 g/dL    RDW 18.6 (H) 11.5 - 14.5 %    PLATELET 549 535 - 741 K/uL    MPV 10.5 8.9 - 12.9 FL    NRBC 0.0 0  WBC    ABSOLUTE NRBC 0.00 0.00 - 0.01 K/uL   RENAL FUNCTION PANEL   Result Value Ref Range    Sodium 139 136 - 145 mmol/L    Potassium 5.1 3.5 - 5.1 mmol/L    Chloride 104 97 - 108 mmol/L    CO2 27 21 - 32 mmol/L    Anion gap 8 5 - 15 mmol/L Glucose 127 (H) 65 - 100 mg/dL    BUN 46 (H) 6 - 20 MG/DL    Creatinine 4.14 (H) 0.70 - 1.30 MG/DL    BUN/Creatinine ratio 11 (L) 12 - 20      GFR est AA 17 (L) >60 ml/min/1.73m2    GFR est non-AA 14 (L) >60 ml/min/1.73m2    Calcium 9.0 8.5 - 10.1 MG/DL    Phosphorus 4.3 2.6 - 4.7 MG/DL    Albumin 3.1 (L) 3.5 - 5.0 g/dL   CBC WITH AUTOMATED DIFF   Result Value Ref Range    WBC 4.2 4.1 - 11.1 K/uL    RBC 3.38 (L) 4.10 - 5.70 M/uL    HGB 9.6 (L) 12.1 - 17.0 g/dL    HCT 30.3 (L) 36.6 - 50.3 %    MCV 89.6 80.0 - 99.0 FL    MCH 28.4 26.0 - 34.0 PG    MCHC 31.7 30.0 - 36.5 g/dL    RDW 20.3 (H) 11.5 - 14.5 %    PLATELET 071 032 - 634 K/uL    MPV 10.2 8.9 - 12.9 FL    NRBC 0.0 0  WBC    ABSOLUTE NRBC 0.00 0.00 - 0.01 K/uL    NEUTROPHILS 53 32 - 75 %    LYMPHOCYTES 27 12 - 49 %    MONOCYTES 10 5 - 13 %    EOSINOPHILS 9 (H) 0 - 7 %    BASOPHILS 1 0 - 1 %    IMMATURE GRANULOCYTES 0 0.0 - 0.5 %    ABS. NEUTROPHILS 2.3 1.8 - 8.0 K/UL    ABS. LYMPHOCYTES 1.1 0.8 - 3.5 K/UL    ABS. MONOCYTES 0.4 0.0 - 1.0 K/UL    ABS. EOSINOPHILS 0.4 0.0 - 0.4 K/UL    ABS. BASOPHILS 0.0 0.0 - 0.1 K/UL    ABS. IMM.  GRANS. 0.0 0.00 - 0.04 K/UL    DF SMEAR SCANNED      RBC COMMENTS ANISOCYTOSIS  2+       RENAL FUNCTION PANEL   Result Value Ref Range    Sodium 140 136 - 145 mmol/L    Potassium 4.7 3.5 - 5.1 mmol/L    Chloride 108 97 - 108 mmol/L    CO2 27 21 - 32 mmol/L    Anion gap 5 5 - 15 mmol/L    Glucose 82 65 - 100 mg/dL    BUN 36 (H) 6 - 20 MG/DL    Creatinine 3.34 (H) 0.70 - 1.30 MG/DL    BUN/Creatinine ratio 11 (L) 12 - 20      GFR est AA 22 (L) >60 ml/min/1.73m2    GFR est non-AA 18 (L) >60 ml/min/1.73m2    Calcium 8.5 8.5 - 10.1 MG/DL    Phosphorus 3.4 2.6 - 4.7 MG/DL    Albumin 3.0 (L) 3.5 - 5.0 g/dL   RENAL FUNCTION PANEL   Result Value Ref Range    Sodium 139 136 - 145 mmol/L    Potassium 4.6 3.5 - 5.1 mmol/L    Chloride 104 97 - 108 mmol/L    CO2 26 21 - 32 mmol/L    Anion gap 9 5 - 15 mmol/L    Glucose 130 (H) 65 - 100 mg/dL BUN 32 (H) 6 - 20 MG/DL    Creatinine 3.34 (H) 0.70 - 1.30 MG/DL    BUN/Creatinine ratio 10 (L) 12 - 20      GFR est AA 22 (L) >60 ml/min/1.73m2    GFR est non-AA 18 (L) >60 ml/min/1.73m2    Calcium 8.5 8.5 - 10.1 MG/DL    Phosphorus 3.3 2.6 - 4.7 MG/DL    Albumin 3.0 (L) 3.5 - 5.0 g/dL   CBC W/O DIFF   Result Value Ref Range    WBC 4.3 4.1 - 11.1 K/uL    RBC 3.41 (L) 4.10 - 5.70 M/uL    HGB 9.3 (L) 12.1 - 17.0 g/dL    HCT 30.7 (L) 36.6 - 50.3 %    MCV 90.0 80.0 - 99.0 FL    MCH 27.3 26.0 - 34.0 PG    MCHC 30.3 30.0 - 36.5 g/dL    RDW 21.1 (H) 11.5 - 14.5 %    PLATELET 103 815 - 241 K/uL    MPV 10.5 8.9 - 12.9 FL    NRBC 0.0 0  WBC    ABSOLUTE NRBC 0.00 0.00 - 0.01 K/uL   RENAL FUNCTION PANEL   Result Value Ref Range    Sodium 138 136 - 145 mmol/L    Potassium 4.9 3.5 - 5.1 mmol/L    Chloride 108 97 - 108 mmol/L    CO2 27 21 - 32 mmol/L    Anion gap 3 (L) 5 - 15 mmol/L    Glucose 68 65 - 100 mg/dL    BUN 40 (H) 6 - 20 MG/DL    Creatinine 3.46 (H) 0.70 - 1.30 MG/DL    BUN/Creatinine ratio 12 12 - 20      GFR est AA 21 (L) >60 ml/min/1.73m2    GFR est non-AA 17 (L) >60 ml/min/1.73m2    Calcium 8.7 8.5 - 10.1 MG/DL    Phosphorus 4.2 2.6 - 4.7 MG/DL    Albumin 2.9 (L) 3.5 - 5.0 g/dL   HEP B SURFACE AG   Result Value Ref Range    Hepatitis B surface Ag <0.10 Index    Hep B surface Ag Interp. NEGATIVE  NEG     HEP B SURFACE AB   Result Value Ref Range    Hepatitis B surface Ab 13.24 mIU/mL    Hep B surface Ab Interp.  REACTIVE (A) NR     HEPATITIS B CORE AB, TOTAL   Result Value Ref Range    Hep B Core Ab, total Positive (A) NEGATIVE     CBC WITH AUTOMATED DIFF   Result Value Ref Range    WBC 4.0 (L) 4.1 - 11.1 K/uL    RBC 3.77 (L) 4.10 - 5.70 M/uL    HGB 10.5 (L) 12.1 - 17.0 g/dL    HCT 34.8 (L) 36.6 - 50.3 %    MCV 92.3 80.0 - 99.0 FL    MCH 27.9 26.0 - 34.0 PG    MCHC 30.2 30.0 - 36.5 g/dL    RDW 21.4 (H) 11.5 - 14.5 %    PLATELET 399 (L) 598 - 400 K/uL    MPV 9.6 8.9 - 12.9 FL    NRBC 0.0 0  WBC ABSOLUTE NRBC 0.00 0.00 - 0.01 K/uL    NEUTROPHILS 48 32 - 75 %    LYMPHOCYTES 28 12 - 49 %    MONOCYTES 10 5 - 13 %    EOSINOPHILS 12 (H) 0 - 7 %    BASOPHILS 1 0 - 1 %    IMMATURE GRANULOCYTES 1 (H) 0.0 - 0.5 %    ABS. NEUTROPHILS 2.0 1.8 - 8.0 K/UL    ABS. LYMPHOCYTES 1.1 0.8 - 3.5 K/UL    ABS. MONOCYTES 0.4 0.0 - 1.0 K/UL    ABS. EOSINOPHILS 0.5 (H) 0.0 - 0.4 K/UL    ABS. BASOPHILS 0.0 0.0 - 0.1 K/UL    ABS. IMM.  GRANS. 0.0 0.00 - 0.04 K/UL    DF SMEAR SCANNED      RBC COMMENTS ANISOCYTOSIS  2+        RBC COMMENTS POIKILOCYTOSIS  1+       RENAL FUNCTION PANEL   Result Value Ref Range    Sodium 136 136 - 145 mmol/L    Potassium 4.2 3.5 - 5.1 mmol/L    Chloride 102 97 - 108 mmol/L    CO2 26 21 - 32 mmol/L    Anion gap 8 5 - 15 mmol/L    Glucose 106 (H) 65 - 100 mg/dL    BUN 48 (H) 6 - 20 MG/DL    Creatinine 3.98 (H) 0.70 - 1.30 MG/DL    BUN/Creatinine ratio 12 12 - 20      GFR est AA 18 (L) >60 ml/min/1.73m2    GFR est non-AA 15 (L) >60 ml/min/1.73m2    Calcium 8.5 8.5 - 10.1 MG/DL    Phosphorus 4.7 2.6 - 4.7 MG/DL    Albumin 3.2 (L) 3.5 - 5.0 g/dL   RENAL FUNCTION PANEL   Result Value Ref Range    Sodium 132 (L) 136 - 145 mmol/L    Potassium 4.2 3.5 - 5.1 mmol/L    Chloride 98 97 - 108 mmol/L    CO2 27 21 - 32 mmol/L    Anion gap 7 5 - 15 mmol/L    Glucose 86 65 - 100 mg/dL    BUN 57 (H) 6 - 20 MG/DL    Creatinine 4.37 (H) 0.70 - 1.30 MG/DL    BUN/Creatinine ratio 13 12 - 20      GFR est AA 16 (L) >60 ml/min/1.73m2    GFR est non-AA 13 (L) >60 ml/min/1.73m2    Calcium 8.3 (L) 8.5 - 10.1 MG/DL    Phosphorus 4.6 2.6 - 4.7 MG/DL    Albumin 3.3 (L) 3.5 - 5.0 g/dL   CBC W/O DIFF   Result Value Ref Range    WBC 4.2 4.1 - 11.1 K/uL    RBC 3.69 (L) 4.10 - 5.70 M/uL    HGB 10.3 (L) 12.1 - 17.0 g/dL    HCT 33.5 (L) 36.6 - 50.3 %    MCV 90.8 80.0 - 99.0 FL    MCH 27.9 26.0 - 34.0 PG    MCHC 30.7 30.0 - 36.5 g/dL    RDW 20.5 (H) 11.5 - 14.5 %    PLATELET 023 639 - 391 K/uL    MPV 10.3 8.9 - 12.9 FL    NRBC 0.0 0  WBC ABSOLUTE NRBC 0.00 0.00 - 0.01 K/uL   GLUCOSE, POC   Result Value Ref Range    Glucose (POC) 116 (H) 65 - 100 mg/dL    Performed by ROBBI WHIPPLE    GLUCOSE, POC   Result Value Ref Range    Glucose (POC) 76 65 - 100 mg/dL    Performed by [address], POC   Result Value Ref Range    Glucose (POC) 130 (H) 65 - 100 mg/dL    Performed by [address], POC   Result Value Ref Range    Glucose (POC) 72 65 - 100 mg/dL    Performed by Alda Wolf.    GLUCOSE, POC   Result Value Ref Range    Glucose (POC) 89 65 - 100 mg/dL    Performed by Alda Wolf.    GLUCOSE, POC   Result Value Ref Range    Glucose (POC) 113 (H) 65 - 100 mg/dL    Performed by Rene Mclaughlin    GLUCOSE, POC   Result Value Ref Range    Glucose (POC) 77 65 - 100 mg/dL    Performed by Tracey Vazquez    GLUCOSE, POC   Result Value Ref Range    Glucose (POC) 85 65 - 100 mg/dL    Performed by Tracey Vazquez    GLUCOSE, POC   Result Value Ref Range    Glucose (POC) 81 65 - 100 mg/dL    Performed by Tracey Vazquez    GLUCOSE, POC   Result Value Ref Range    Glucose (POC) 90 65 - 100 mg/dL    Performed by Lynda Polanco    GLUCOSE, POC   Result Value Ref Range    Glucose (POC) 111 (H) 65 - 100 mg/dL    Performed by Rene Mclaughlin    GLUCOSE, POC   Result Value Ref Range    Glucose (POC) 76 65 - 100 mg/dL    Performed by Sebastien Badillo    GLUCOSE, POC   Result Value Ref Range    Glucose (POC) 104 (H) 65 - 100 mg/dL    Performed by Sebastien Badillo    GLUCOSE, POC   Result Value Ref Range    Glucose (POC) 94 65 - 100 mg/dL    Performed by Sebastien Badillo    GLUCOSE, POC   Result Value Ref Range    Glucose (POC) 93 65 - 100 mg/dL    Performed by TOMEKA ROACH    GLUCOSE, POC   Result Value Ref Range    Glucose (POC) 128 (H) 65 - 100 mg/dL    Performed by TOMEKA ROACH    GLUCOSE, POC   Result Value Ref Range    Glucose (POC) 80 65 - 100 mg/dL    Performed by Saman Rocha    GLUCOSE, POC   Result Value Ref Range    Glucose (POC) 153 (H) 65 - 100 mg/dL    Performed by Maria Victoria Ron    GLUCOSE, POC   Result Value Ref Range    Glucose (POC) 100 65 - 100 mg/dL    Performed by [address], POC   Result Value Ref Range    Glucose (POC) 85 65 - 100 mg/dL    Performed by Gilda CALVERT (Moon)    GLUCOSE, POC   Result Value Ref Range    Glucose (POC) 88 65 - 100 mg/dL    Performed by [address], POC   Result Value Ref Range    Glucose (POC) 77 65 - 100 mg/dL    Performed by [address], POC   Result Value Ref Range    Glucose (POC) 112 (H) 65 - 100 mg/dL    Performed by [address], POC   Result Value Ref Range    Glucose (POC) 107 (H) 65 - 100 mg/dL    Performed by Ermias Cornejo    GLUCOSE, POC   Result Value Ref Range    Glucose (POC) 162 (H) 65 - 100 mg/dL    Performed by Ermias Corenjo    GLUCOSE, POC   Result Value Ref Range    Glucose (POC) 79 65 - 100 mg/dL    Performed by Gomez Lutz    GLUCOSE, POC   Result Value Ref Range    Glucose (POC) 109 (H) 65 - 100 mg/dL    Performed by Gomez Lutz    GLUCOSE, POC   Result Value Ref Range    Glucose (POC) 80 65 - 100 mg/dL    Performed by Gomez Lutz    GLUCOSE, POC   Result Value Ref Range    Glucose (POC) 159 (H) 65 - 100 mg/dL    Performed by [facility]    GLUCOSE, POC   Result Value Ref Range    Glucose (POC) 135 (H) 65 - 100 mg/dL    Performed by ESTRELLITA LU    GLUCOSE, POC   Result Value Ref Range    Glucose (POC) 82 65 - 100 mg/dL    Performed by [employer]    GLUCOSE, POC   Result Value Ref Range    Glucose (POC) 105 (H) 65 - 100 mg/dL    Performed by JONI JACOBO    GLUCOSE, POC   Result Value Ref Range    Glucose (POC) 107 (H) 65 - 100 mg/dL    Performed by Gamaliel Daunt    GLUCOSE, POC   Result Value Ref Range    Glucose (POC) 85 65 - 100 mg/dL    Performed by Gamaliel Daunt    GLUCOSE, POC   Result Value Ref Range    Glucose (POC) 81 65 - 100 mg/dL    Performed by Antonio Amin    GLUCOSE, POC   Result Value Ref Range    Glucose (POC) 155 (H) 65 - 100 mg/dL    Performed by [address], POC   Result Value Ref Range    Glucose (POC) 82 65 - 100 mg/dL    Performed by [address], POC   Result Value Ref Range    Glucose (POC) 125 (H) 65 - 100 mg/dL    Performed by Monica Corona, POC   Result Value Ref Range    Glucose (POC) 101 (H) 65 - 100 mg/dL    Performed by Errol García.    GLUCOSE, POC   Result Value Ref Range    Glucose (POC) 112 (H) 65 - 100 mg/dL    Performed by Tushar Florez    GLUCOSE, POC   Result Value Ref Range    Glucose (POC) 64 (L) 65 - 100 mg/dL    Performed by [address], POC   Result Value Ref Range    Glucose (POC) 98 65 - 100 mg/dL    Performed by [address], POC   Result Value Ref Range    Glucose (POC) 74 65 - 100 mg/dL    Performed by [facility]    GLUCOSE, POC   Result Value Ref Range    Glucose (POC) 78 65 - 100 mg/dL    Performed by ESTRELLITA LU    GLUCOSE, POC   Result Value Ref Range    Glucose (POC) 120 (H) 65 - 100 mg/dL    Performed by ESTRELLITA LU    GLUCOSE, POC   Result Value Ref Range    Glucose (POC) 111 (H) 65 - 100 mg/dL    Performed by ESTRELLITA LU    GLUCOSE, POC   Result Value Ref Range    Glucose (POC) 117 (H) 65 - 100 mg/dL    Performed by Mckinley Bean    GLUCOSE, POC   Result Value Ref Range    Glucose (POC) 83 65 - 100 mg/dL    Performed by TOMEKA ROACH    GLUCOSE, POC   Result Value Ref Range    Glucose (POC) 70 65 - 100 mg/dL    Performed by Mundo Amanda    GLUCOSE, POC   Result Value Ref Range    Glucose (POC) 82 65 - 100 mg/dL    Performed by Mundo Amanda    GLUCOSE, POC   Result Value Ref Range    Glucose (POC) 85 65 - 100 mg/dL    Performed by [facility]    GLUCOSE, POC   Result Value Ref Range    Glucose (POC) 108 (H) 65 - 100 mg/dL    Performed by Leyla Boswell) NEHAL    GLUCOSE, POC   Result Value Ref Range    Glucose (POC) 84 65 - 100 mg/dL    Performed by Mundo Amanda    GLUCOSE, POC   Result Value Ref Range    Glucose (POC) 91 65 - 100 mg/dL    Performed by Yuri Glaser    GLUCOSE, POC   Result Value Ref Range    Glucose (POC) 86 65 - 100 mg/dL    Performed by Paulina Bran    GLUCOSE, POC   Result Value Ref Range    Glucose (POC) 78 65 - 100 mg/dL    Performed by [facility] [address], POC   Result Value Ref Range    Glucose (POC) 86 65 - 100 mg/dL    Performed by [facility] [address], POC   Result Value Ref Range    Glucose (POC) 115 (H) 65 - 100 mg/dL    Performed by Mackenzie Enciso    GLUCOSE, POC   Result Value Ref Range    Glucose (POC) 102 (H) 65 - 100 mg/dL    Performed by Silver Leary    GLUCOSE, POC   Result Value Ref Range    Glucose (POC) 99 65 - 100 mg/dL    Performed by Errol Al    GLUCOSE, POC   Result Value Ref Range    Glucose (POC) 71 65 - 100 mg/dL    Performed by Scherrie Sparks    GLUCOSE, POC   Result Value Ref Range    Glucose (POC) 74 65 - 100 mg/dL    Performed by Scherrie Sparks    GLUCOSE, POC   Result Value Ref Range    Glucose (POC) 102 (H) 65 - 100 mg/dL    Performed by Scherrie Sparks    GLUCOSE, POC   Result Value Ref Range    Glucose (POC) 93 65 - 100 mg/dL    Performed by Scherrie Sparks    GLUCOSE, POC   Result Value Ref Range    Glucose (POC) 112 (H) 65 - 100 mg/dL    [facility] by [facility]    GLUCOSE, POC   Result Value Ref Range    Glucose (POC) 150 (H) 65 - 100 mg/dL    Performed by [facility]    GLUCOSE, POC   Result Value Ref Range    Glucose (POC) 71 65 - 100 mg/dL    Performed by RUDOLPH RODRIGUEZ    GLUCOSE, POC   Result Value Ref Range    Glucose (POC) 104 (H) 65 - 100 mg/dL    Performed by RUDOLPH MARTINS, POC   Result Value Ref Range    Glucose (POC) 91 65 - 100 mg/dL    Performed by Raina Gordillo        Immunizations administered during this encounter:   Immunization History   Administered Date(s) Administered    Pneumococcal Polysaccharide (PPSV-23) 01/26/2015       Screening for Metabolic Disorders for Patients on Antipsychotic Medications  (Data obtained from the EMR)    Estimated Body Mass Index  Estimated body mass index is 17.68 kg/m² as calculated from the following:    Height as of this encounter: 6' 8\" (2.032 m). Weight as of this encounter: 73 kg (160 lb 15 oz). 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   Wt 73 kg (160 lb 15 oz)   SpO2 99%   BMI 17.68 kg/m²       Blood Glucose/Hemoglobin A1c  Lab Results   Component Value Date/Time    Glucose 86 03/10/2019 02:29 PM    Glucose 73 2019 04:23 AM    Glucose (POC) 91 2019 11:48 AM       Lab Results   Component Value Date/Time    Hemoglobin A1c 5.0 2019 04:23 AM        Lipid Panel  Lab Results   Component Value Date/Time    Cholesterol, total 139 2019 04:23 AM    HDL Cholesterol 67 2019 04:23 AM    LDL, calculated 58.4 2019 04:23 AM    Triglyceride 68 2019 04:23 AM    CHOL/HDL Ratio 2.1 2019 04:23 AM        Discharge Diagnosis: Depression (ICD-10-CM: F32.9)    Discharge Plan: Patient discharged via ambulance to [facility] for long-term care. DISCHARGE SUMMARY    NAME:Hernan Freed  : 1940  MRN: [id number]    The patient Ramone Tapia exhibits the ability to control behavior in a less restrictive environment. Patient's level of functioning is improving. No assaultive/destructive behavior has been observed for the past 24 hours. No suicidal/homicidal threat or behavior has been observed for the past 24 hours. There is no evidence of serious medication side effects. Patient has not been in physical or protective restraints for at least the past 24 hours. If weapons involved, how are they secured? No weapons involved.     Is patient aware of and in agreement with discharge plan?  Yes    Arrangements for medication:  Prescription orders sent to facility; hard prescription for narcotic to be faxed tomorrow morning. Copy of discharge instructions to provider?:  [facility] ([phone])    Arrangements for transportation home: [facility]    Keep all follow up appointments as scheduled, continue to take prescribed medications per physician instructions. Mental health crisis number:  513 or your local mental health crisis line number at [phone] or [phone]. [facility] Medicare Transportation for [place]     Phone number:  [phone]     [employer] Medicare ID:  [id number]     Dialysis days:  Tuesday, Thursday, Saturday     Dialysis time:  11:00am     Transportation pick-up time:  Between 9:45am and 10:15am     Transportation drop-off time:  4:00pm    Upcoming trip ID numbers:     3/12/19:  [id number]     3/14/19:  [id number]     3/16/19:  [id number]    *Please call to verify that these trips are ongoing and scheduled for 3/19, 3/21, 3/23, and so on        Discharge Medication List and Instructions:   Current Discharge Medication List      START taking these medications    Details   oxyCODONE-acetaminophen (PERCOCET) 5-325 mg per tablet Take 1 Tab by mouth two (2) times daily as needed for Pain for up to 14 days. Max Daily Amount: 2 Tabs. Qty: 14 Tab, Refills: 0    Associated Diagnoses: Peripheral vascular disease ([facility] [address]         CONTINUE these medications which have CHANGED    Details   !! sertraline (ZOLOFT) 100 mg tablet Take 1 Tab by mouth daily. Qty: 30 Tab, Refills: 0      traZODone (DESYREL) 50 mg tablet Take 0.5 Tabs by mouth nightly. Qty: 15 Tab, Refills: 0       !! - Potential duplicate medications found. Please discuss with provider. CONTINUE these medications which have NOT CHANGED    Details   NIFEdipine ER (ADALAT CC) 60 mg ER tablet Take 1 Tab by mouth daily.  HOLD for HR<60  Qty: 30 Tab, Refills: 0      hydroCHLOROthiazide (HYDRODIURIL) 25 mg tablet Take 1 Tab by mouth daily. Qty: 30 Tab, Refills: 0      !! sertraline (ZOLOFT) 25 mg tablet Take 1 Tab by mouth daily. Qty: 30 Tab, Refills: 0      oxyCODONE-acetaminophen (PERCOCET 10)  mg per tablet Take 1 Tab by mouth every six (6) hours as needed for Pain. rosuvastatin (CRESTOR) 10 mg tablet Take 10 mg by mouth nightly. aspirin delayed-release 81 mg tablet Take 1 Tab by mouth daily. !! - Potential duplicate medications found. Please discuss with provider. Unresulted Labs (24h ago, onward)    None        To obtain results of studies pending at discharge, please contact [phone]    Follow-up Information     Follow up With Specialties Details Why [address] on 3/11/2019 Patient will discharge directly to [facility] for long-term care. [address] [place] [address]  On 3/12/2019 You have an 11:00am chair time for dialysis. Your [employer] ride will arrive between 9:45am and 10:15am. Your ride confirmation number is [id number]. Call [phone] for any ride questions or concerns. [address]  [phone]    [address] [place] [address]  On 3/14/2019 You have an 11:00am chair time for dialysis. Your [employer] ride will arrive between 9:45am and 10:15am. Your ride confirmation number is [phone]. Call [phone] for any ride questions or concerns. 1600 [facility], [address] [place] [address]  [phone]    [address] [place] [address]  On 3/16/2019 You have an 11:00am chair time for dialysis. Your [employer] ride will arrive between 9:45am and 10:15am. Your ride confirmation number is [id number]. Call [phone] for any ride questions or concerns.  [address]  [phone]    Amadeo Faulkner MD [place] [id number]  [phone]            Advanced Directive:   Does the patient have an appointed surrogate decision maker? No  Does the patient have a Medical Advance Directive? No  Does the patient have a Psychiatric Advance Directive? No  If the patient does not have a surrogate or Medical Advance Directive AND Psychiatric Advance Directive, the patient was offered information on these advance directives Yes and Patient declined to complete    Patient Instructions: Please continue all medications until otherwise directed by physician. Tobacco Cessation Discharge Plan:   Is the patient a smoker and needs referral for smoking cessation? Yes  Patient referred to the following for smoking cessation with an appointment? Refused     Patient was offered medication to assist with smoking cessation at discharge? Refused  Was education for smoking cessation added to the discharge instructions? Yes    Alcohol/Substance Abuse Discharge Plan:   Does the patient have a history of substance/alcohol abuse and requires a referral for treatment? No  Patient referred to the following for substance/alcohol abuse treatment with an appointment? Not applicable  Patient was offered medication to assist with alcohol cessation at discharge? Not applicable  Was education for substance/alcohol abuse added to discharge instructions?  No    Patient discharged to Inpatient facility; discussed with the receiving facility  contact information for pending studies, plan for follow-up care and Primary physician, other healthcare professional, or site designated for follow up care

## 2019-03-11 NOTE — BH NOTES
Patient asks for lift team to come help him up to [facility] for dinner prior to going by ambulance to SNF. Nurse called lift team and they are on the way.

## 2019-03-11 NOTE — MASTER TREATMENT PLAN
TRANSFER - OUT REPORT:    Verbal report given to CBS (name) on Carlos Barnett  being transferred to [facility]  for routine progression of care and discharge from [address] [place] [address] to SNF LTC     Report consisted of patients Situation, Background, Assessment and   Recommendations(SBAR). Information from the following report(s) SBAR was reviewed with the receiving nurse. Opportunity for questions and clarification was provided.       Patient transported with:  Ambulance transport to SNF

## 2019-03-11 NOTE — PROGRESS NOTES
Problem: Depressed Mood (Adult/Pediatric)  Goal: *STG: Verbalizes anger, guilt, and other feelings in a constructive manor  Outcome: Progressing Towards Goal  Received pt lying in bed with report from day shift and SW that pt will be leaving at 6pm to SNF. At beginning of manjit shift nurse speaks with patient about his leaving. He is quiet and cooperative, gathered belongings together for discharge and SW has given phone number to call report to [phone]. Pt will go to [facility].

## 2019-03-12 RX ORDER — OXYCODONE AND ACETAMINOPHEN 10; 325 MG/1; MG/1
1 TABLET ORAL
Qty: 14 TAB | Refills: 0 | Status: SHIPPED | OUTPATIENT
Start: 2019-03-12 | End: 2019-03-26

## 2019-03-12 NOTE — DISCHARGE SUMMARY
Brief D/C Summary - full version pending. The patient was seen with the treatment team. Sondra Roland is reportedly doing well today. No SI or HI noted. The utility of acute hospitalization has been maximized.     Discharge to SNF  Prescriptions provided  Follow up as documented by case management

## 2019-03-18 ENCOUNTER — ED HISTORICAL/CONVERTED ENCOUNTER (OUTPATIENT)
Dept: OTHER | Age: 79
End: 2019-03-18

## 2019-05-08 ENCOUNTER — IP HISTORICAL/CONVERTED ENCOUNTER (OUTPATIENT)
Dept: OTHER | Age: 79
End: 2019-05-08

## 2019-05-25 ENCOUNTER — ED HISTORICAL/CONVERTED ENCOUNTER (OUTPATIENT)
Dept: OTHER | Age: 79
End: 2019-05-25

## 2019-05-28 ENCOUNTER — IP HISTORICAL/CONVERTED ENCOUNTER (OUTPATIENT)
Dept: OTHER | Age: 79
End: 2019-05-28

## 2019-05-30 ENCOUNTER — OP HISTORICAL/CONVERTED ENCOUNTER (OUTPATIENT)
Dept: OTHER | Age: 79
End: 2019-05-30

## 2019-07-27 ENCOUNTER — IP HISTORICAL/CONVERTED ENCOUNTER (OUTPATIENT)
Dept: OTHER | Age: 79
End: 2019-07-27

## 2019-08-02 NOTE — BH NOTES
PRN Medication Documentation    Specific patient behavior that led to need for PRN medication: pain  Staff interventions attempted prior to PRN being given: repositionsed  PRN medication given: percocet  Patient response/effectiveness of PRN medication: well PT recommending rehab. Contacted [facility].     Sandeep Johnson, BSN RN  Care Management  Pager: [phone]

## 2019-08-13 ENCOUNTER — OP HISTORICAL/CONVERTED ENCOUNTER (OUTPATIENT)
Dept: OTHER | Age: 79
End: 2019-08-13

## 2019-09-09 ENCOUNTER — OP HISTORICAL/CONVERTED ENCOUNTER (OUTPATIENT)
Dept: OTHER | Age: 79
End: 2019-09-09

## 2019-09-12 ENCOUNTER — OP HISTORICAL/CONVERTED ENCOUNTER (OUTPATIENT)
Dept: OTHER | Age: 79
End: 2019-09-12

## 2019-09-23 ENCOUNTER — OP HISTORICAL/CONVERTED ENCOUNTER (OUTPATIENT)
Dept: OTHER | Age: 79
End: 2019-09-23

## 2019-09-25 ENCOUNTER — IP HISTORICAL/CONVERTED ENCOUNTER (OUTPATIENT)
Dept: OTHER | Age: 79
End: 2019-09-25

## 2020-02-18 NOTE — INTERDISCIPLINARY ROUNDS
18-Feb-2020 Behavioral Health Interdisciplinary Rounds     Patient Name: Gabbi Harris  Age: 78 y.o. Room/Bed:  740/02  Primary Diagnosis: Depression   Admission Status: Involuntary Commitment     Readmission within 30 days: no  Power of  in place: yes - Daughter  Patient requires a blocked bed: no          Reason for blocked bed: n/a    VTE Prophylaxis: Yes - ASA  Flu vaccine given : no - refused   Mobility needs/Fall risk: yes    Nutritional Plan: yes  Consults: none         Labs/Testing due today?: no    Sleep hours: 5.5      Participation in Care/Groups:  no  Medication Compliant?: selective  PRNS (last 24 hours): None    Restraints (last 24 hours):  no  Substance Abuse:  no  CIWA (range last 24 hours):  COWS (range last 24 hours):   Alcohol screening (AUDIT) completed -  AUDIT Score: 0  If applicable, date SBIRT discussed in treatment team AND documented:   Tobacco - patient is a smoker: yes   Date tobacco education completed by RN: 2/17/18  24 hour chart check complete: yes     Patient goal(s) for today:   Treatment team focus/goals:   Progress note     LOS:  25  Expected LOS:   Financial concerns/prescription coverage:    Date of last family contact:      Family requesting physician contact today:    Discharge plan:  Guns in the home: no        Outpatient provider(s):     Participating treatment team members:  Gabbi Harris, * (assigned SW),

## 2020-08-03 NOTE — BH NOTES
- currently smokes pack/day  - nicotene patch  - tobacco cessation education given   1930:  I have reviewed discharge instructions with the patient and spouse. The patient and spouse verbalized understanding. Patient discharged with all belongings returned, denies SI, denies HI, verbalizes understanding of all discharge instructions, medications and follow-up appointments. Patient's family assisted with getting him dressed. He is transferred from the [facility] chair to his personal wheelchair with the [employer] and then escorted to the door of the unit for his family's transfer to their vehicle.

## 2021-12-06 NOTE — ED NOTES
Patient is calling regarding medication refill and is requesting a call back.    Patient was brought in via EMS with c/o a 100 pound weight loss

## 2022-01-10 NOTE — PROGRESS NOTES
Problem: Falls - Risk of  Goal: *Absence of Falls  Document Bello Fall Risk and appropriate interventions in the flowsheet.   Outcome: Progressing Towards Goal  Fall Risk Interventions:  Mobility Interventions: Bed/chair exit alarm, Communicate number of staff needed for ambulation/transfer    Mentation Interventions: Adequate sleep, hydration, pain control, Bed/chair exit alarm, Door open when patient unattended, More frequent rounding    Medication Interventions: Teach patient to arise slowly, Patient to call before getting OOB    Elimination Interventions: Patient to call for help with toileting needs, Urinal in reach    History of Falls Interventions: Bed/chair exit alarm Render Risk Assessment In Note?: yes Detail Level: Simple Additional Notes: Patient consent was obtained to proceed with the visit and recommended plan of care after discussion of all risks and benefits, including the risks of COVID-19 exposure.

## 2023-02-13 NOTE — ED NOTES
Dr Mia Huerta at bedside to assess patient. Emergency Department Nursing Plan of Care The Nursing Plan of Care is developed from the Nursing assessment and Emergency Department Attending provider initial evaluation. The plan of care may be reviewed in the ED Provider note. The Plan of Care was developed with the following considerations:  
Patient / Family readiness to learn indicated by:verbalized understanding Persons(s) to be included in education: patient Barriers to Learning/Limitations:No 
 
Signed Danita Villeda RN   
5/20/2018   3:16 PM

## 2023-07-09 NOTE — ED TRIAGE NOTES
Arrived from [facility] ED c/o depression. Patient stated they [facility] refused to see him via ambulance. Patient arrived to [place] and got dropped off transportation. Depression since 3 weeks. 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 Telemetry Bed?: Yes   Admitting Physician: MARLYN JONES [[id number]]   Is this a telephone or verbal order?: This is a telephone order from the admitting physician

## 2023-09-25 NOTE — BH NOTES
PRN Medication Documentation    Specific patient behavior that led to need for PRN medication: c/o arm neck and leg pain 9/10  Staff interventions attempted prior to PRN being given: repositioned, rest  PRN medication given: Po Percocet  Patient response/effectiveness of PRN medication: pt states some relief stretcher

## 2024-04-11 NOTE — DIALYSIS
[facility]  [phone]    Vitals   Pre   Post   Assessment   Pre   Post     Temp  Temp: 98.6 °F (37 °C) (02/23/19 0858)  98.1   LOC  aox4 No change   HR   Pulse (Heart Rate): (!) 49 (02/23/19 0858) 49 Lungs   cta  no change   B/P   BP: 151/72 (02/23/19 0858) 142/65   Cardiac   carlos  no change   Resp   Resp Rate: 16 (02/23/19 0858) 18 Skin   Dry intact  no change   Pain level  Pain Intensity 1: 0 (02/23/19 0820) 0 Edema  none     No change   Orders:    Duration:   Start:    3491 End:    1217 Total:   3.5   Dialyzer:   Dialyzer/Set Up Inspection: Whitney Cruz (02/23/19 0858)   Elidia Agrawal Bath:   Dialysate K (mEq/L): 2 (02/23/19 0858)   Ca Bath:   Dialysate CA (mEq/L): 2.5 (02/23/19 0858)   Na/Bicarb:   Dialysate NA (mEq/L): 140 (02/23/19 0858)   Target Fluid Removal:   Goal/Amount of Fluid to Remove (mL): 3000 mL (02/23/19 0858)   Access     Type & Location:   r chest hd cvc. Dressing dated for 2/22/19. Line placed yesterday. Non transparent dressing in place. Unable to assess site. Labs     Obtained/Reviewed   Critical Results Called   Date when labs were drawn-  Hgb-    HGB   Date Value Ref Range Status   02/21/2019 10.0 (L) 12.1 - 17.0 g/dL Final     K-    Potassium   Date Value Ref Range Status   02/21/2019 5.1 3.5 - 5.1 mmol/L Final     Ca-   Calcium   Date Value Ref Range Status   02/21/2019 9.0 8.5 - 10.1 MG/DL Final     Bun-   BUN   Date Value Ref Range Status   02/21/2019 46 (H) 6 - 20 MG/DL Final     Creat-   Creatinine   Date Value Ref Range Status   02/21/2019 4.14 (H) 0.70 - 1.30 MG/DL Final        Medications/ Blood Products Given     Name   Dose   Route and Time     none                Blood Volume Processed (BVP):    71 Net Fluid   Removed:  2400   Comments   Time Out Done: 0830  Primary Nurse Rpt Pre:  Primary Nurse Rpt Post:  Pt Education:procedural  Care Plan:cont current hd poc  Tx Summary:  5160 pt  Sleeping. 1017-UF turned of for hypotension.  Ns 100 ml given  1045:Hypotensive episode. Asymptomatic. 100ml saline given. Ping Turner MD. UF goal reduced. Shannan Dejesus  Pt's previous clinic-[place]  Consent signed - Informed Consent Verified: Yes (02/23/19 1815)  [facility] Consent - yes  Hepatitis Status- 2/9/19 ag neg con care  Machine #- Machine Number: [id number] (02/23/19 7710)  Telemetry status-  Pre-dialysis wt. - 2. The status of comorbities. (See ED/admit documents)
